# Patient Record
Sex: FEMALE | Race: BLACK OR AFRICAN AMERICAN | NOT HISPANIC OR LATINO | Employment: OTHER | URBAN - METROPOLITAN AREA
[De-identification: names, ages, dates, MRNs, and addresses within clinical notes are randomized per-mention and may not be internally consistent; named-entity substitution may affect disease eponyms.]

---

## 2023-02-22 LAB
ALANINE AMINOTRANSFERASE (SGPT) (U/L) IN SER/PLAS: 16 U/L (ref 7–45)
ALBUMIN (G/DL) IN SER/PLAS: 3.5 G/DL (ref 3.4–5)
ALKALINE PHOSPHATASE (U/L) IN SER/PLAS: 73 U/L (ref 33–136)
ANION GAP IN SER/PLAS: 14 MMOL/L (ref 10–20)
ASPARTATE AMINOTRANSFERASE (SGOT) (U/L) IN SER/PLAS: 20 U/L (ref 9–39)
BILIRUBIN TOTAL (MG/DL) IN SER/PLAS: 0.4 MG/DL (ref 0–1.2)
CALCIDIOL (25 OH VITAMIN D3) (NG/ML) IN SER/PLAS: 40 NG/ML
CALCIUM (MG/DL) IN SER/PLAS: 9.2 MG/DL (ref 8.6–10.6)
CARBON DIOXIDE, TOTAL (MMOL/L) IN SER/PLAS: 26 MMOL/L (ref 21–32)
CHLORIDE (MMOL/L) IN SER/PLAS: 105 MMOL/L (ref 98–107)
CHOLESTEROL (MG/DL) IN SER/PLAS: 183 MG/DL (ref 0–199)
CHOLESTEROL IN HDL (MG/DL) IN SER/PLAS: 57.1 MG/DL
CHOLESTEROL/HDL RATIO: 3.2
CREATININE (MG/DL) IN SER/PLAS: 1.61 MG/DL (ref 0.5–1.05)
ERYTHROCYTE DISTRIBUTION WIDTH (RATIO) BY AUTOMATED COUNT: 12.9 % (ref 11.5–14.5)
ERYTHROCYTE MEAN CORPUSCULAR HEMOGLOBIN CONCENTRATION (G/DL) BY AUTOMATED: 30 G/DL (ref 32–36)
ERYTHROCYTE MEAN CORPUSCULAR VOLUME (FL) BY AUTOMATED COUNT: 102 FL (ref 80–100)
ERYTHROCYTES (10*6/UL) IN BLOOD BY AUTOMATED COUNT: 3.24 X10E12/L (ref 4–5.2)
ESTIMATED AVERAGE GLUCOSE FOR HBA1C: 163 MG/DL
GFR FEMALE: 30 ML/MIN/1.73M2
GLUCOSE (MG/DL) IN SER/PLAS: 211 MG/DL (ref 74–99)
HEMATOCRIT (%) IN BLOOD BY AUTOMATED COUNT: 33 % (ref 36–46)
HEMOGLOBIN (G/DL) IN BLOOD: 9.9 G/DL (ref 12–16)
HEMOGLOBIN A1C/HEMOGLOBIN TOTAL IN BLOOD: 7.3 %
LDL: 109 MG/DL (ref 0–99)
LEUKOCYTES (10*3/UL) IN BLOOD BY AUTOMATED COUNT: 7.8 X10E9/L (ref 4.4–11.3)
NRBC (PER 100 WBCS) BY AUTOMATED COUNT: 0 /100 WBC (ref 0–0)
PLATELETS (10*3/UL) IN BLOOD AUTOMATED COUNT: 261 X10E9/L (ref 150–450)
POTASSIUM (MMOL/L) IN SER/PLAS: 4.9 MMOL/L (ref 3.5–5.3)
PROTEIN TOTAL: 6.4 G/DL (ref 6.4–8.2)
SODIUM (MMOL/L) IN SER/PLAS: 140 MMOL/L (ref 136–145)
THYROTROPIN (MIU/L) IN SER/PLAS BY DETECTION LIMIT <= 0.05 MIU/L: 3.28 MIU/L (ref 0.44–3.98)
TRIGLYCERIDE (MG/DL) IN SER/PLAS: 86 MG/DL (ref 0–149)
UREA NITROGEN (MG/DL) IN SER/PLAS: 34 MG/DL (ref 6–23)
VLDL: 17 MG/DL (ref 0–40)

## 2023-05-23 PROBLEM — R26.9 GAIT DISORDER: Status: ACTIVE | Noted: 2023-05-23

## 2023-05-23 PROBLEM — M79.89 LEG SWELLING: Status: ACTIVE | Noted: 2023-05-23

## 2023-05-23 PROBLEM — D64.9 ANEMIA: Status: ACTIVE | Noted: 2023-05-23

## 2023-05-23 PROBLEM — R01.1 HEART MURMUR: Status: ACTIVE | Noted: 2023-05-23

## 2023-05-23 PROBLEM — R31.9 HEMATURIA: Status: ACTIVE | Noted: 2023-05-23

## 2023-05-23 PROBLEM — K21.9 GERD (GASTROESOPHAGEAL REFLUX DISEASE): Status: ACTIVE | Noted: 2023-05-23

## 2023-05-23 PROBLEM — E11.649 DIABETIC HYPOGLYCEMIA (MULTI): Status: ACTIVE | Noted: 2023-05-23

## 2023-05-23 PROBLEM — I10 ESSENTIAL HYPERTENSION: Status: ACTIVE | Noted: 2023-05-23

## 2023-05-23 PROBLEM — N39.0 ACUTE UTI: Status: ACTIVE | Noted: 2023-05-23

## 2023-05-23 PROBLEM — M48.061 LUMBAR SPINAL STENOSIS: Status: ACTIVE | Noted: 2023-05-23

## 2023-05-23 PROBLEM — R30.0 BURNING WITH URINATION: Status: ACTIVE | Noted: 2023-05-23

## 2023-05-23 PROBLEM — E78.5 HYPERLIPIDEMIA: Status: ACTIVE | Noted: 2023-05-23

## 2023-05-23 PROBLEM — E11.9 DIABETES MELLITUS (MULTI): Status: ACTIVE | Noted: 2023-05-23

## 2023-05-23 PROBLEM — L60.2 LONG TOENAIL: Status: ACTIVE | Noted: 2023-05-23

## 2023-05-23 LAB
ALANINE AMINOTRANSFERASE (SGPT) (U/L) IN SER/PLAS: 13 U/L (ref 7–45)
ALBUMIN (G/DL) IN SER/PLAS: 3.3 G/DL (ref 3.4–5)
ALKALINE PHOSPHATASE (U/L) IN SER/PLAS: 60 U/L (ref 33–136)
ANION GAP IN SER/PLAS: 14 MMOL/L (ref 10–20)
ASPARTATE AMINOTRANSFERASE (SGOT) (U/L) IN SER/PLAS: 19 U/L (ref 9–39)
BILIRUBIN TOTAL (MG/DL) IN SER/PLAS: 0.7 MG/DL (ref 0–1.2)
CALCIUM (MG/DL) IN SER/PLAS: 9 MG/DL (ref 8.6–10.6)
CARBON DIOXIDE, TOTAL (MMOL/L) IN SER/PLAS: 26 MMOL/L (ref 21–32)
CHLORIDE (MMOL/L) IN SER/PLAS: 101 MMOL/L (ref 98–107)
CREATININE (MG/DL) IN SER/PLAS: 1.57 MG/DL (ref 0.5–1.05)
ERYTHROCYTE DISTRIBUTION WIDTH (RATIO) BY AUTOMATED COUNT: 12.5 % (ref 11.5–14.5)
ERYTHROCYTE MEAN CORPUSCULAR HEMOGLOBIN CONCENTRATION (G/DL) BY AUTOMATED: 29.7 G/DL (ref 32–36)
ERYTHROCYTE MEAN CORPUSCULAR VOLUME (FL) BY AUTOMATED COUNT: 102 FL (ref 80–100)
ERYTHROCYTES (10*6/UL) IN BLOOD BY AUTOMATED COUNT: 3.12 X10E12/L (ref 4–5.2)
ESTIMATED AVERAGE GLUCOSE FOR HBA1C: 151 MG/DL
GFR FEMALE: 31 ML/MIN/1.73M2
GLUCOSE (MG/DL) IN SER/PLAS: 101 MG/DL (ref 74–99)
HEMATOCRIT (%) IN BLOOD BY AUTOMATED COUNT: 31.7 % (ref 36–46)
HEMOGLOBIN (G/DL) IN BLOOD: 9.4 G/DL (ref 12–16)
HEMOGLOBIN A1C/HEMOGLOBIN TOTAL IN BLOOD: 6.9 %
LEUKOCYTES (10*3/UL) IN BLOOD BY AUTOMATED COUNT: 7.3 X10E9/L (ref 4.4–11.3)
NRBC (PER 100 WBCS) BY AUTOMATED COUNT: 0 /100 WBC (ref 0–0)
PLATELETS (10*3/UL) IN BLOOD AUTOMATED COUNT: 225 X10E9/L (ref 150–450)
POTASSIUM (MMOL/L) IN SER/PLAS: 4.8 MMOL/L (ref 3.5–5.3)
PROTEIN TOTAL: 6.3 G/DL (ref 6.4–8.2)
SODIUM (MMOL/L) IN SER/PLAS: 136 MMOL/L (ref 136–145)
UREA NITROGEN (MG/DL) IN SER/PLAS: 31 MG/DL (ref 6–23)

## 2023-05-25 ENCOUNTER — OFFICE VISIT (OUTPATIENT)
Dept: PRIMARY CARE | Facility: CLINIC | Age: 88
End: 2023-05-25
Payer: MEDICARE

## 2023-05-25 VITALS
DIASTOLIC BLOOD PRESSURE: 82 MMHG | HEIGHT: 64 IN | HEART RATE: 53 BPM | OXYGEN SATURATION: 98 % | RESPIRATION RATE: 12 BRPM | BODY MASS INDEX: 33.97 KG/M2 | WEIGHT: 199 LBS | SYSTOLIC BLOOD PRESSURE: 136 MMHG

## 2023-05-25 DIAGNOSIS — I10 ESSENTIAL HYPERTENSION: Primary | ICD-10-CM

## 2023-05-25 DIAGNOSIS — E11.69 TYPE 2 DIABETES MELLITUS WITH OTHER SPECIFIED COMPLICATION, UNSPECIFIED WHETHER LONG TERM INSULIN USE (MULTI): ICD-10-CM

## 2023-05-25 DIAGNOSIS — E78.5 HYPERLIPIDEMIA, UNSPECIFIED HYPERLIPIDEMIA TYPE: ICD-10-CM

## 2023-05-25 PROBLEM — H43.819 POSTERIOR VITREOUS DETACHMENT: Status: ACTIVE | Noted: 2023-04-12

## 2023-05-25 PROBLEM — H02.409 PTOSIS: Status: ACTIVE | Noted: 2023-04-12

## 2023-05-25 PROBLEM — G62.9 NEUROPATHY: Status: ACTIVE | Noted: 2023-05-25

## 2023-05-25 PROBLEM — E11.21 DIABETIC NEPHROPATHY ASSOCIATED WITH TYPE 2 DIABETES MELLITUS (MULTI): Status: ACTIVE | Noted: 2018-12-18

## 2023-05-25 PROBLEM — I89.0 LYMPHEDEMA: Status: ACTIVE | Noted: 2023-05-25

## 2023-05-25 PROBLEM — M19.90 OA (OSTEOARTHRITIS): Status: ACTIVE | Noted: 2023-05-25

## 2023-05-25 PROBLEM — N18.30 CHRONIC KIDNEY DISEASE, STAGE 3, MOD DECREASED GFR (MULTI): Status: ACTIVE | Noted: 2018-09-18

## 2023-05-25 PROBLEM — H40.1131 PRIMARY OPEN ANGLE GLAUCOMA OF BOTH EYES, MILD STAGE: Status: ACTIVE | Noted: 2023-04-12

## 2023-05-25 PROBLEM — H35.373 EPIRETINAL MEMBRANE (ERM) OF BOTH EYES: Status: ACTIVE | Noted: 2023-04-12

## 2023-05-25 PROBLEM — E11.9 DM (DIABETES MELLITUS) (MULTI): Status: ACTIVE | Noted: 2023-04-12

## 2023-05-25 PROBLEM — E66.9 OBESITY, CLASS II, BMI 35-39.9: Status: ACTIVE | Noted: 2019-08-27

## 2023-05-25 PROBLEM — E66.812 OBESITY, CLASS II, BMI 35-39.9: Status: ACTIVE | Noted: 2019-08-27

## 2023-05-25 PROBLEM — D12.6 TUBULAR ADENOMA OF COLON: Status: ACTIVE | Noted: 2023-05-25

## 2023-05-25 PROBLEM — K21.9 GASTROESOPHAGEAL REFLUX DISEASE WITHOUT ESOPHAGITIS: Status: ACTIVE | Noted: 2023-05-25

## 2023-05-25 PROBLEM — H25.013 CORTICAL SENILE CATARACT OF BOTH EYES: Status: ACTIVE | Noted: 2023-04-12

## 2023-05-25 LAB — POC FINGERSTICK BLOOD GLUCOSE: 148 MG/DL (ref 70–100)

## 2023-05-25 PROCEDURE — 82962 GLUCOSE BLOOD TEST: CPT | Performed by: INTERNAL MEDICINE

## 2023-05-25 PROCEDURE — 1036F TOBACCO NON-USER: CPT | Performed by: INTERNAL MEDICINE

## 2023-05-25 PROCEDURE — 1159F MED LIST DOCD IN RCRD: CPT | Performed by: INTERNAL MEDICINE

## 2023-05-25 PROCEDURE — 3079F DIAST BP 80-89 MM HG: CPT | Performed by: INTERNAL MEDICINE

## 2023-05-25 PROCEDURE — 3075F SYST BP GE 130 - 139MM HG: CPT | Performed by: INTERNAL MEDICINE

## 2023-05-25 PROCEDURE — 99214 OFFICE O/P EST MOD 30 MIN: CPT | Performed by: INTERNAL MEDICINE

## 2023-05-25 RX ORDER — LATANOPROST 50 UG/ML
1 SOLUTION/ DROPS OPHTHALMIC NIGHTLY
COMMUNITY
Start: 2019-11-11

## 2023-05-25 RX ORDER — AMLODIPINE BESYLATE 10 MG/1
10 TABLET ORAL
COMMUNITY
Start: 2019-08-27 | End: 2023-09-05 | Stop reason: ALTCHOICE

## 2023-05-25 RX ORDER — BRIMONIDINE TARTRATE 1 MG/ML
1 SOLUTION/ DROPS OPHTHALMIC 2 TIMES DAILY
COMMUNITY
Start: 2018-11-08

## 2023-05-25 RX ORDER — SYRINGE,SAFETY WITH NEEDLE,1ML 25GX1"
1 SYRINGE (EA) MISCELLANEOUS 2 TIMES DAILY
Qty: 100 EACH | Refills: 3 | Status: SHIPPED | OUTPATIENT
Start: 2023-05-25 | End: 2023-10-10 | Stop reason: SDUPTHER

## 2023-05-25 RX ORDER — CHOLECALCIFEROL (VITAMIN D3) 50 MCG
2000 TABLET ORAL
COMMUNITY
Start: 2013-07-09 | End: 2024-03-11 | Stop reason: ALTCHOICE

## 2023-05-25 RX ORDER — INSULIN HUMAN 100 [IU]/ML
35 INJECTION, SUSPENSION SUBCUTANEOUS
COMMUNITY
Start: 2021-08-05 | End: 2023-10-04 | Stop reason: ALTCHOICE

## 2023-05-25 RX ORDER — METOPROLOL SUCCINATE 50 MG/1
50 TABLET, EXTENDED RELEASE ORAL DAILY
COMMUNITY
Start: 2022-02-18 | End: 2023-09-05 | Stop reason: ALTCHOICE

## 2023-05-25 RX ORDER — LOSARTAN POTASSIUM 50 MG/1
50 TABLET ORAL DAILY
COMMUNITY
Start: 2023-05-09 | End: 2023-09-05 | Stop reason: ALTCHOICE

## 2023-05-25 RX ORDER — TIMOLOL MALEATE 5 MG/ML
1 SOLUTION/ DROPS OPHTHALMIC DAILY
COMMUNITY
Start: 2018-11-08

## 2023-05-25 RX ORDER — ATORVASTATIN CALCIUM 10 MG/1
1 TABLET, FILM COATED ORAL DAILY
COMMUNITY
Start: 2018-11-01 | End: 2023-08-03

## 2023-05-25 RX ORDER — PEN NEEDLE, DIABETIC 29 G X1/2"
NEEDLE, DISPOSABLE MISCELLANEOUS
COMMUNITY
Start: 2022-12-02 | End: 2023-05-25 | Stop reason: SDUPTHER

## 2023-05-25 RX ORDER — ASPIRIN 81 MG/1
81 TABLET ORAL
COMMUNITY

## 2023-05-25 NOTE — PROGRESS NOTES
"Subjective   Chief complaint: Carolann Cartagena is a 89 y.o. female who presents for Follow-up (Pt is here today to go over her blood work results. ).    HPI:  Patient presents for blood work follow up. She has no acute concerns.        Objective   /82   Pulse 53   Resp 12   Ht 1.626 m (5' 4\")   Wt 90.3 kg (199 lb)   SpO2 98%   BMI 34.16 kg/m²   Physical Exam  Vitals reviewed.   Constitutional:       Appearance: Normal appearance.   HENT:      Head: Normocephalic and atraumatic.   Cardiovascular:      Rate and Rhythm: Normal rate and regular rhythm.   Pulmonary:      Effort: Pulmonary effort is normal.      Breath sounds: Normal breath sounds.   Abdominal:      General: Bowel sounds are normal.      Palpations: Abdomen is soft.   Musculoskeletal:      Cervical back: Neck supple.   Skin:     General: Skin is warm and dry.   Neurological:      General: No focal deficit present.      Mental Status: She is alert.   Psychiatric:         Mood and Affect: Mood normal.         Behavior: Behavior is cooperative.         I have reviewed and reconciled the medication list with the patient today.   Current Outpatient Medications:     Alphagan P 0.1 % ophthalmic solution, Administer 1 drop into both eyes twice a day., Disp: , Rfl:     amLODIPine (Norvasc) 10 mg tablet, Take 1 tablet (10 mg) by mouth once daily., Disp: , Rfl:     aspirin 81 mg EC tablet, Take 1 tablet (81 mg) by mouth once daily., Disp: , Rfl:     atorvastatin (Lipitor) 10 mg tablet, Take 1 tablet (10 mg) by mouth once daily., Disp: , Rfl:     BD Insulin Syringe Ultra-Fine 1 mL 31 gauge x 5/16 syringe, , Disp: , Rfl:     cholecalciferol (Vitamin D-3) 50 MCG (2000 UT) tablet, Take 1 tablet (2,000 Units) by mouth once daily., Disp: , Rfl:     HumuLIN N NPH U-100 Insulin 100 unit/mL injection, Inject 35 Units under the skin 2 times a day before meals., Disp: , Rfl:     latanoprost (Xalatan) 0.005 % ophthalmic solution, 1 drop once daily., Disp: , Rfl:     " losartan (Cozaar) 50 mg tablet, Take 1 tablet (50 mg) by mouth once daily., Disp: , Rfl:     metoprolol succinate XL (Toprol-XL) 50 mg 24 hr tablet, Take 1 tablet (50 mg) by mouth once daily., Disp: , Rfl:     timolol (Timoptic) 0.5 % ophthalmic solution, 1 drop., Disp: , Rfl:      Imaging:  No results found.     Labs reviewed:    Lab Results   Component Value Date    WBC 7.3 05/23/2023    HGB 9.4 (L) 05/23/2023    HCT 31.7 (L) 05/23/2023     05/23/2023    CHOL 183 02/22/2023    TRIG 86 02/22/2023    HDL 57.1 02/22/2023    ALT 13 05/23/2023    AST 19 05/23/2023     05/23/2023    K 4.8 05/23/2023     05/23/2023    CREATININE 1.57 (H) 05/23/2023    BUN 31 (H) 05/23/2023    CO2 26 05/23/2023    TSH 3.28 02/22/2023    HGBA1C 6.9 (A) 05/23/2023       Assessment/Plan   Problem List Items Addressed This Visit       Diabetes mellitus (CMS/[facility] [place])     Continue humulin         Relevant Orders    POCT fingerstick glucose manually resulted (Completed)    Essential hypertension - Primary     Continue losartan, amlodipine, metoprolol         Hyperlipidemia     Continue atorvastatin            Continue current medications as listed  Follow up in 3 months

## 2023-08-02 DIAGNOSIS — E78.5 HYPERLIPIDEMIA, UNSPECIFIED HYPERLIPIDEMIA TYPE: ICD-10-CM

## 2023-08-03 RX ORDER — ATORVASTATIN CALCIUM 10 MG/1
10 TABLET, FILM COATED ORAL DAILY
Qty: 90 TABLET | Refills: 3 | Status: SHIPPED | OUTPATIENT
Start: 2023-08-03 | End: 2023-10-25 | Stop reason: SDUPTHER

## 2023-08-09 RX ORDER — LANCETS 33 GAUGE
EACH MISCELLANEOUS
COMMUNITY
Start: 2023-05-09 | End: 2023-10-12 | Stop reason: SDUPTHER

## 2023-08-09 RX ORDER — BLOOD SUGAR DIAGNOSTIC
1 STRIP MISCELLANEOUS 3 TIMES DAILY
COMMUNITY
Start: 2019-10-11 | End: 2023-10-12 | Stop reason: SDUPTHER

## 2023-08-28 ENCOUNTER — CLINICAL SUPPORT (OUTPATIENT)
Dept: PRIMARY CARE | Facility: CLINIC | Age: 88
End: 2023-08-28
Payer: MEDICARE

## 2023-08-28 DIAGNOSIS — I10 ESSENTIAL HYPERTENSION: ICD-10-CM

## 2023-08-28 DIAGNOSIS — E11.69 TYPE 2 DIABETES MELLITUS WITH OTHER SPECIFIED COMPLICATION, UNSPECIFIED WHETHER LONG TERM INSULIN USE (MULTI): ICD-10-CM

## 2023-08-28 DIAGNOSIS — I10 BENIGN ESSENTIAL HYPERTENSION: ICD-10-CM

## 2023-08-28 DIAGNOSIS — E03.9 HYPOTHYROIDISM, UNSPECIFIED TYPE: ICD-10-CM

## 2023-08-28 DIAGNOSIS — D50.9 IRON DEFICIENCY ANEMIA, UNSPECIFIED IRON DEFICIENCY ANEMIA TYPE: ICD-10-CM

## 2023-08-28 LAB
ALANINE AMINOTRANSFERASE (SGPT) (U/L) IN SER/PLAS: 13 U/L (ref 7–45)
ALBUMIN (G/DL) IN SER/PLAS: 3.4 G/DL (ref 3.4–5)
ALKALINE PHOSPHATASE (U/L) IN SER/PLAS: 64 U/L (ref 33–136)
ANION GAP IN SER/PLAS: 15 MMOL/L (ref 10–20)
ASPARTATE AMINOTRANSFERASE (SGOT) (U/L) IN SER/PLAS: 20 U/L (ref 9–39)
BILIRUBIN TOTAL (MG/DL) IN SER/PLAS: 0.5 MG/DL (ref 0–1.2)
CALCIUM (MG/DL) IN SER/PLAS: 9.1 MG/DL (ref 8.6–10.6)
CARBON DIOXIDE, TOTAL (MMOL/L) IN SER/PLAS: 24 MMOL/L (ref 21–32)
CHLORIDE (MMOL/L) IN SER/PLAS: 106 MMOL/L (ref 98–107)
CREATININE (MG/DL) IN SER/PLAS: 1.92 MG/DL (ref 0.5–1.05)
ERYTHROCYTE DISTRIBUTION WIDTH (RATIO) BY AUTOMATED COUNT: 12.8 % (ref 11.5–14.5)
ERYTHROCYTE MEAN CORPUSCULAR HEMOGLOBIN CONCENTRATION (G/DL) BY AUTOMATED: 31 G/DL (ref 32–36)
ERYTHROCYTE MEAN CORPUSCULAR VOLUME (FL) BY AUTOMATED COUNT: 102 FL (ref 80–100)
ERYTHROCYTES (10*6/UL) IN BLOOD BY AUTOMATED COUNT: 3.12 X10E12/L (ref 4–5.2)
GFR FEMALE: 24 ML/MIN/1.73M2
GLUCOSE (MG/DL) IN SER/PLAS: 81 MG/DL (ref 74–99)
HEMATOCRIT (%) IN BLOOD BY AUTOMATED COUNT: 31.9 % (ref 36–46)
HEMOGLOBIN (G/DL) IN BLOOD: 9.9 G/DL (ref 12–16)
LEUKOCYTES (10*3/UL) IN BLOOD BY AUTOMATED COUNT: 7.1 X10E9/L (ref 4.4–11.3)
NRBC (PER 100 WBCS) BY AUTOMATED COUNT: 0 /100 WBC (ref 0–0)
PLATELETS (10*3/UL) IN BLOOD AUTOMATED COUNT: 269 X10E9/L (ref 150–450)
POTASSIUM (MMOL/L) IN SER/PLAS: 4.6 MMOL/L (ref 3.5–5.3)
PROTEIN TOTAL: 6.4 G/DL (ref 6.4–8.2)
SODIUM (MMOL/L) IN SER/PLAS: 140 MMOL/L (ref 136–145)
UREA NITROGEN (MG/DL) IN SER/PLAS: 33 MG/DL (ref 6–23)

## 2023-08-28 PROCEDURE — 83036 HEMOGLOBIN GLYCOSYLATED A1C: CPT

## 2023-08-28 PROCEDURE — 85027 COMPLETE CBC AUTOMATED: CPT

## 2023-08-28 PROCEDURE — 80053 COMPREHEN METABOLIC PANEL: CPT

## 2023-08-29 LAB
ESTIMATED AVERAGE GLUCOSE FOR HBA1C: 154 MG/DL
HEMOGLOBIN A1C/HEMOGLOBIN TOTAL IN BLOOD: 7 %

## 2023-08-30 ENCOUNTER — OFFICE VISIT (OUTPATIENT)
Dept: PRIMARY CARE | Facility: CLINIC | Age: 88
End: 2023-08-30
Payer: MEDICARE

## 2023-08-30 VITALS
SYSTOLIC BLOOD PRESSURE: 142 MMHG | WEIGHT: 196 LBS | OXYGEN SATURATION: 98 % | HEART RATE: 69 BPM | BODY MASS INDEX: 33.64 KG/M2 | RESPIRATION RATE: 12 BRPM | DIASTOLIC BLOOD PRESSURE: 68 MMHG

## 2023-08-30 DIAGNOSIS — I10 ESSENTIAL HYPERTENSION: ICD-10-CM

## 2023-08-30 DIAGNOSIS — I89.0 LYMPHEDEMA: ICD-10-CM

## 2023-08-30 DIAGNOSIS — E78.5 HYPERLIPIDEMIA, UNSPECIFIED HYPERLIPIDEMIA TYPE: Primary | ICD-10-CM

## 2023-08-30 DIAGNOSIS — E11.22 TYPE 2 DIABETES MELLITUS WITH CHRONIC KIDNEY DISEASE, WITH LONG-TERM CURRENT USE OF INSULIN, UNSPECIFIED CKD STAGE (MULTI): ICD-10-CM

## 2023-08-30 DIAGNOSIS — N18.30 STAGE 3 CHRONIC KIDNEY DISEASE, UNSPECIFIED WHETHER STAGE 3A OR 3B CKD (MULTI): ICD-10-CM

## 2023-08-30 DIAGNOSIS — D64.9 ANEMIA, UNSPECIFIED TYPE: ICD-10-CM

## 2023-08-30 DIAGNOSIS — Z79.4 TYPE 2 DIABETES MELLITUS WITH CHRONIC KIDNEY DISEASE, WITH LONG-TERM CURRENT USE OF INSULIN, UNSPECIFIED CKD STAGE (MULTI): ICD-10-CM

## 2023-08-30 LAB — POC FINGERSTICK BLOOD GLUCOSE: 220 MG/DL (ref 70–100)

## 2023-08-30 PROCEDURE — 99214 OFFICE O/P EST MOD 30 MIN: CPT | Performed by: INTERNAL MEDICINE

## 2023-08-30 PROCEDURE — 1036F TOBACCO NON-USER: CPT | Performed by: INTERNAL MEDICINE

## 2023-08-30 PROCEDURE — 82962 GLUCOSE BLOOD TEST: CPT | Performed by: INTERNAL MEDICINE

## 2023-08-30 PROCEDURE — 1157F ADVNC CARE PLAN IN RCRD: CPT | Performed by: INTERNAL MEDICINE

## 2023-08-30 PROCEDURE — 3078F DIAST BP <80 MM HG: CPT | Performed by: INTERNAL MEDICINE

## 2023-08-30 PROCEDURE — 3077F SYST BP >= 140 MM HG: CPT | Performed by: INTERNAL MEDICINE

## 2023-08-30 PROCEDURE — 1159F MED LIST DOCD IN RCRD: CPT | Performed by: INTERNAL MEDICINE

## 2023-08-30 RX ORDER — HYDRALAZINE HYDROCHLORIDE 25 MG/1
25 TABLET, FILM COATED ORAL 2 TIMES DAILY
Qty: 14 TABLET | Refills: 0 | Status: SHIPPED | OUTPATIENT
Start: 2023-08-30 | End: 2023-09-05 | Stop reason: SDUPTHER

## 2023-08-30 ASSESSMENT — ENCOUNTER SYMPTOMS
GASTROINTESTINAL NEGATIVE: 1
EYES NEGATIVE: 1
NEUROLOGICAL NEGATIVE: 1
MUSCULOSKELETAL NEGATIVE: 1
RESPIRATORY NEGATIVE: 1
HEMATOLOGIC/LYMPHATIC NEGATIVE: 1
CONSTITUTIONAL NEGATIVE: 1
ALLERGIC/IMMUNOLOGIC NEGATIVE: 1
ENDOCRINE NEGATIVE: 1
PSYCHIATRIC NEGATIVE: 1

## 2023-08-30 NOTE — PROGRESS NOTES
Subjective   Reason for Visit: Carolann Cartagena is an 89 y.o. female here for a Medicare Wellness visit.     Past Medical, Surgical, and Family History reviewed and updated in chart.    Reviewed all medications by prescribing practitioner or clinical pharmacist (such as prescriptions, OTCs, herbal therapies and supplements) and documented in the medical record.    HPI  Pt is here for blood work follow up. No acute concerns   Patient Care Team:  Josh Rosa MD as PCP - General  Josh Rosa MD as PCP - [employer] PCP     Review of Systems   Constitutional: Negative.    HENT: Negative.     Eyes: Negative.    Respiratory: Negative.     Cardiovascular:  Positive for leg swelling.   Gastrointestinal: Negative.    Endocrine: Negative.    Genitourinary: Negative.    Musculoskeletal: Negative.    Skin: Negative.    Allergic/Immunologic: Negative.    Neurological: Negative.    Hematological: Negative.    Psychiatric/Behavioral: Negative.     All other systems reviewed and are negative.      Objective   Vitals:  /68   Pulse 69   Resp 12   Wt 88.9 kg (196 lb)   SpO2 98%   BMI 33.64 kg/m²       Physical Exam  Vitals reviewed.   Constitutional:       Appearance: Normal appearance.   HENT:      Head: Normocephalic and atraumatic.   Cardiovascular:      Rate and Rhythm: Normal rate and regular rhythm.      Pulses: Normal pulses.      Heart sounds: Normal heart sounds.   Pulmonary:      Effort: Pulmonary effort is normal.      Breath sounds: Normal breath sounds.   Musculoskeletal:         General: Normal range of motion.      Cervical back: Normal range of motion and neck supple.      Right lower leg: Edema present.      Left lower leg: Edema present.   Skin:     General: Skin is warm and dry.   Neurological:      General: No focal deficit present.      Mental Status: She is alert.   Psychiatric:         Mood and Affect: Mood normal.         Behavior: Behavior normal.         Assessment/Plan   Problem  List Items Addressed This Visit          Cardiac and Vasculature    Essential hypertension    Current Assessment & Plan     Discontinue losartan, start hydralazine 25mg BID continue amlodipine. Monitor BP at home           Hyperlipidemia - Primary    Current Assessment & Plan     Low fat diet watch salt intake             Endocrine/Metabolic    Diabetes mellitus (CMS/[facility])    Current Assessment & Plan     Sugar uncontrolled. Continue humulin 40 units am and pm but add 5 units before lunch time. Monitor A1c and sugar next visit   Check CMP next visit          Relevant Orders    POCT fingerstick glucose manually resulted (Completed)       Genitourinary and Reproductive    Chronic kidney disease, stage 3, mod decreased GFR (CMS/[facility])    Current Assessment & Plan     GFR 24, creatinine 1.92  Discontinue losartan and started pt on hydralazine 25 mg instead. Continue amlodipine recheck BMP next visit              Hematology and Neoplasia    Anemia       Symptoms and Signs    Lymphedema   7 day supply of hydralazine ordered until mail in order arrives. Repeat BMP and A1c scheduled for next visit. Start 5 units of humulin before lunch,. Monitor BP at home

## 2023-08-30 NOTE — ASSESSMENT & PLAN NOTE
GFR 24, creatinine 1.92  Discontinue losartan and started pt on hydralazine 25 mg instead. Continue amlodipine recheck BMP next visit

## 2023-08-30 NOTE — ASSESSMENT & PLAN NOTE
Sugar uncontrolled. Continue humulin 40 units am and pm but add 5 units before lunch time. Monitor A1c and sugar next visit   Check CMP next visit

## 2023-09-05 ENCOUNTER — OFFICE VISIT (OUTPATIENT)
Dept: PRIMARY CARE | Facility: CLINIC | Age: 88
End: 2023-09-05
Payer: MEDICARE

## 2023-09-05 VITALS
DIASTOLIC BLOOD PRESSURE: 82 MMHG | WEIGHT: 179 LBS | HEART RATE: 84 BPM | OXYGEN SATURATION: 99 % | RESPIRATION RATE: 14 BRPM | BODY MASS INDEX: 30.56 KG/M2 | HEIGHT: 64 IN | SYSTOLIC BLOOD PRESSURE: 258 MMHG

## 2023-09-05 DIAGNOSIS — N18.30 STAGE 3 CHRONIC KIDNEY DISEASE, UNSPECIFIED WHETHER STAGE 3A OR 3B CKD (MULTI): ICD-10-CM

## 2023-09-05 DIAGNOSIS — I89.0 LYMPHEDEMA: ICD-10-CM

## 2023-09-05 DIAGNOSIS — E13.69 OTHER SPECIFIED DIABETES MELLITUS WITH OTHER SPECIFIED COMPLICATION, UNSPECIFIED WHETHER LONG TERM INSULIN USE (MULTI): ICD-10-CM

## 2023-09-05 DIAGNOSIS — E11.22 TYPE 2 DIABETES MELLITUS WITH CHRONIC KIDNEY DISEASE, WITH LONG-TERM CURRENT USE OF INSULIN, UNSPECIFIED CKD STAGE (MULTI): ICD-10-CM

## 2023-09-05 DIAGNOSIS — D64.9 ANEMIA, UNSPECIFIED TYPE: ICD-10-CM

## 2023-09-05 DIAGNOSIS — I10 ESSENTIAL HYPERTENSION: ICD-10-CM

## 2023-09-05 DIAGNOSIS — Z79.4 TYPE 2 DIABETES MELLITUS WITH CHRONIC KIDNEY DISEASE, WITH LONG-TERM CURRENT USE OF INSULIN, UNSPECIFIED CKD STAGE (MULTI): ICD-10-CM

## 2023-09-05 DIAGNOSIS — E78.5 HYPERLIPIDEMIA, UNSPECIFIED HYPERLIPIDEMIA TYPE: ICD-10-CM

## 2023-09-05 LAB — POC FINGERSTICK BLOOD GLUCOSE: 155 MG/DL (ref 70–100)

## 2023-09-05 PROCEDURE — 82962 GLUCOSE BLOOD TEST: CPT | Performed by: INTERNAL MEDICINE

## 2023-09-05 PROCEDURE — 1159F MED LIST DOCD IN RCRD: CPT | Performed by: INTERNAL MEDICINE

## 2023-09-05 PROCEDURE — 1036F TOBACCO NON-USER: CPT | Performed by: INTERNAL MEDICINE

## 2023-09-05 PROCEDURE — 3079F DIAST BP 80-89 MM HG: CPT | Performed by: INTERNAL MEDICINE

## 2023-09-05 PROCEDURE — 1125F AMNT PAIN NOTED PAIN PRSNT: CPT | Performed by: INTERNAL MEDICINE

## 2023-09-05 PROCEDURE — 1157F ADVNC CARE PLAN IN RCRD: CPT | Performed by: INTERNAL MEDICINE

## 2023-09-05 PROCEDURE — 3077F SYST BP >= 140 MM HG: CPT | Performed by: INTERNAL MEDICINE

## 2023-09-05 PROCEDURE — 99213 OFFICE O/P EST LOW 20 MIN: CPT | Performed by: INTERNAL MEDICINE

## 2023-09-05 RX ORDER — METOPROLOL SUCCINATE 50 MG/1
50 TABLET, EXTENDED RELEASE ORAL DAILY
Qty: 90 TABLET | Refills: 3 | Status: SHIPPED | OUTPATIENT
Start: 2023-09-05 | End: 2023-09-12 | Stop reason: SDUPTHER

## 2023-09-05 RX ORDER — HYDRALAZINE HYDROCHLORIDE 25 MG/1
25 TABLET, FILM COATED ORAL 2 TIMES DAILY
Qty: 180 TABLET | Refills: 3 | Status: SHIPPED | OUTPATIENT
Start: 2023-09-05 | End: 2023-09-12 | Stop reason: SDUPTHER

## 2023-09-05 ASSESSMENT — PAIN SCALES - GENERAL: PAINLEVEL: 2

## 2023-09-05 NOTE — PROGRESS NOTES
"Subjective   Chief complaint: Carolann Cartagena is a 89 y.o. female who presents for hydralazine side effects  and Palpitations.    HPI:  Patient did not take her medication appropriately she supposed to be taking metoprolol hydralazine she thought only she should be taking hydralazine so her blood pressure went up and she becomes tachycardic.        Objective   BP (!) 258/82 (BP Location: Left arm, Patient Position: Sitting, BP Cuff Size: Large adult)   Pulse 84   Resp 14   Ht 1.626 m (5' 4\")   Wt 81.2 kg (179 lb)   SpO2 99%   BMI 30.73 kg/m²   Physical Exam  Vitals reviewed.   Constitutional:       Appearance: Normal appearance.   HENT:      Head: Normocephalic and atraumatic.   Cardiovascular:      Rate and Rhythm: Normal rate and regular rhythm.   Pulmonary:      Effort: Pulmonary effort is normal.      Breath sounds: Normal breath sounds.   Abdominal:      General: Bowel sounds are normal.      Palpations: Abdomen is soft.   Musculoskeletal:      Cervical back: Neck supple.   Skin:     General: Skin is warm and dry.   Neurological:      General: No focal deficit present.      Mental Status: She is alert.   Psychiatric:         Mood and Affect: Mood normal.         Behavior: Behavior is cooperative.         I have reviewed and reconciled the medication list with the patient today.   Current Outpatient Medications:     Alphagan P 0.1 % ophthalmic solution, Administer 1 drop into both eyes twice a day., Disp: , Rfl:     aspirin 81 mg EC tablet, Take 1 tablet (81 mg) by mouth once daily., Disp: , Rfl:     atorvastatin (Lipitor) 10 mg tablet, TAKE 1 TABLET DAILY, Disp: 90 tablet, Rfl: 3    cholecalciferol (Vitamin D-3) 50 MCG (2000 UT) tablet, Take 1 tablet (2,000 Units) by mouth once daily., Disp: , Rfl:     HumuLIN N NPH U-100 Insulin 100 unit/mL injection, Inject 35 Units under the skin 2 times a day before meals., Disp: , Rfl:     hydrALAZINE (Apresoline) 25 mg tablet, Take 1 tablet (25 mg) by mouth 2 times a " "day for 7 days., Disp: 14 tablet, Rfl: 0    insulin syringe-needle U-100 (BD Insulin Syringe Ultra-Fine) 31G X 5/16\" 1 mL syringe, Inject 1 each under the skin 2 times a day. Use as instructed, Disp: 100 each, Rfl: 3    latanoprost (Xalatan) 0.005 % ophthalmic solution, 1 drop once daily., Disp: , Rfl:     OneTouch Delica Plus Lancet 30 gauge misc, , Disp: , Rfl:     OneTouch Ultra Test strip, 1 strip 3 times a day., Disp: , Rfl:     timolol (Timoptic) 0.5 % ophthalmic solution, 1 drop., Disp: , Rfl:      Imaging:  No results found.     Labs reviewed:    Lab Results   Component Value Date    WBC 7.1 08/28/2023    HGB 9.9 (L) 08/28/2023    HCT 31.9 (L) 08/28/2023     08/28/2023    CHOL 183 02/22/2023    TRIG 86 02/22/2023    HDL 57.1 02/22/2023    ALT 13 08/28/2023    AST 20 08/28/2023     08/28/2023    K 4.6 08/28/2023     08/28/2023    CREATININE 1.92 (H) 08/28/2023    BUN 33 (H) 08/28/2023    CO2 24 08/28/2023    TSH 3.28 02/22/2023    HGBA1C 7.0 (A) 08/28/2023       Assessment/Plan   Problem List Items Addressed This Visit       Anemia    Diabetes mellitus (CMS/[facility])    Relevant Orders    POCT Fingerstick Glucose manually resulted (Completed)    Essential hypertension    Hyperlipidemia    Chronic kidney disease, stage 3, mod decreased GFR (CMS/[facility])    Lymphedema    DM (diabetes mellitus) (CMS/[facility])    Relevant Orders    POCT Fingerstick Glucose manually resulted (Completed)   Hypertension  Restart metoprolol ER XL 50 mg daily.  Start hydralazine again 25 mg twice a day  Come back in a week to check the blood pressure.    Continue current medications as listed  Follow up in 1 week to check the blood pressure again     "

## 2023-09-06 ENCOUNTER — APPOINTMENT (OUTPATIENT)
Dept: PRIMARY CARE | Facility: CLINIC | Age: 88
End: 2023-09-06
Payer: MEDICARE

## 2023-09-08 ENCOUNTER — LAB (OUTPATIENT)
Dept: LAB | Facility: LAB | Age: 88
End: 2023-09-08
Payer: MEDICARE

## 2023-09-08 DIAGNOSIS — N18.30 STAGE 3 CHRONIC KIDNEY DISEASE, UNSPECIFIED WHETHER STAGE 3A OR 3B CKD (MULTI): ICD-10-CM

## 2023-09-08 LAB
ANION GAP IN SER/PLAS: 14 MMOL/L (ref 10–20)
CALCIUM (MG/DL) IN SER/PLAS: 9.2 MG/DL (ref 8.6–10.6)
CARBON DIOXIDE, TOTAL (MMOL/L) IN SER/PLAS: 25 MMOL/L (ref 21–32)
CHLORIDE (MMOL/L) IN SER/PLAS: 101 MMOL/L (ref 98–107)
CREATININE (MG/DL) IN SER/PLAS: 1.76 MG/DL (ref 0.5–1.05)
GFR FEMALE: 27 ML/MIN/1.73M2
GLUCOSE (MG/DL) IN SER/PLAS: 98 MG/DL (ref 74–99)
POTASSIUM (MMOL/L) IN SER/PLAS: 4.8 MMOL/L (ref 3.5–5.3)
SODIUM (MMOL/L) IN SER/PLAS: 135 MMOL/L (ref 136–145)
UREA NITROGEN (MG/DL) IN SER/PLAS: 28 MG/DL (ref 6–23)

## 2023-09-08 PROCEDURE — 80048 BASIC METABOLIC PNL TOTAL CA: CPT

## 2023-09-08 PROCEDURE — 36415 COLL VENOUS BLD VENIPUNCTURE: CPT

## 2023-09-12 ENCOUNTER — OFFICE VISIT (OUTPATIENT)
Dept: PRIMARY CARE | Facility: CLINIC | Age: 88
End: 2023-09-12
Payer: MEDICARE

## 2023-09-12 VITALS
SYSTOLIC BLOOD PRESSURE: 178 MMHG | OXYGEN SATURATION: 98 % | HEART RATE: 63 BPM | DIASTOLIC BLOOD PRESSURE: 84 MMHG | RESPIRATION RATE: 12 BRPM

## 2023-09-12 DIAGNOSIS — I89.0 LYMPHEDEMA: ICD-10-CM

## 2023-09-12 DIAGNOSIS — E11.22 TYPE 2 DIABETES MELLITUS WITH CHRONIC KIDNEY DISEASE, WITH LONG-TERM CURRENT USE OF INSULIN, UNSPECIFIED CKD STAGE (MULTI): ICD-10-CM

## 2023-09-12 DIAGNOSIS — E78.5 HYPERLIPIDEMIA, UNSPECIFIED HYPERLIPIDEMIA TYPE: ICD-10-CM

## 2023-09-12 DIAGNOSIS — N18.30 STAGE 3 CHRONIC KIDNEY DISEASE, UNSPECIFIED WHETHER STAGE 3A OR 3B CKD (MULTI): Primary | ICD-10-CM

## 2023-09-12 DIAGNOSIS — I10 ESSENTIAL HYPERTENSION: ICD-10-CM

## 2023-09-12 DIAGNOSIS — D64.9 ANEMIA, UNSPECIFIED TYPE: ICD-10-CM

## 2023-09-12 DIAGNOSIS — Z79.4 TYPE 2 DIABETES MELLITUS WITH CHRONIC KIDNEY DISEASE, WITH LONG-TERM CURRENT USE OF INSULIN, UNSPECIFIED CKD STAGE (MULTI): ICD-10-CM

## 2023-09-12 LAB — POC FINGERSTICK BLOOD GLUCOSE: 210 MG/DL (ref 70–100)

## 2023-09-12 PROCEDURE — 1036F TOBACCO NON-USER: CPT | Performed by: INTERNAL MEDICINE

## 2023-09-12 PROCEDURE — 3077F SYST BP >= 140 MM HG: CPT | Performed by: INTERNAL MEDICINE

## 2023-09-12 PROCEDURE — 82962 GLUCOSE BLOOD TEST: CPT | Performed by: INTERNAL MEDICINE

## 2023-09-12 PROCEDURE — 1159F MED LIST DOCD IN RCRD: CPT | Performed by: INTERNAL MEDICINE

## 2023-09-12 PROCEDURE — 1125F AMNT PAIN NOTED PAIN PRSNT: CPT | Performed by: INTERNAL MEDICINE

## 2023-09-12 PROCEDURE — 99214 OFFICE O/P EST MOD 30 MIN: CPT | Performed by: INTERNAL MEDICINE

## 2023-09-12 PROCEDURE — 1157F ADVNC CARE PLAN IN RCRD: CPT | Performed by: INTERNAL MEDICINE

## 2023-09-12 PROCEDURE — 3079F DIAST BP 80-89 MM HG: CPT | Performed by: INTERNAL MEDICINE

## 2023-09-12 RX ORDER — HYDRALAZINE HYDROCHLORIDE 25 MG/1
12.5 TABLET, FILM COATED ORAL 2 TIMES DAILY
Qty: 90 TABLET | Refills: 3 | Status: SHIPPED | OUTPATIENT
Start: 2023-09-12 | End: 2023-10-11 | Stop reason: ALTCHOICE

## 2023-09-12 RX ORDER — METOPROLOL SUCCINATE 100 MG/1
100 TABLET, EXTENDED RELEASE ORAL DAILY
Qty: 90 TABLET | Refills: 3 | Status: SHIPPED | OUTPATIENT
Start: 2023-09-12 | End: 2023-10-11 | Stop reason: ALTCHOICE

## 2023-09-12 NOTE — ASSESSMENT & PLAN NOTE
BP is 178/84 today. Advised patient to keep track of BP at home.    Decrease Hydralazine from 25mg to 12.5mg BID. (to alleviate patient's palpitations)  Increase Metoprolol from 50mg to 100mg once a day in the morning.

## 2023-09-12 NOTE — ASSESSMENT & PLAN NOTE
Labs reviewed with patient, GFR is 27, creatinine is 1.76, and BUN is 28. Which is an improvement from her blood work in August.

## 2023-09-12 NOTE — PROGRESS NOTES
"Subjective   Chief complaint: Carolann Cartagena is a 89 y.o. female who presents for Follow-up (Pt is here for blood work , pt c/oi nausea and chills/).    HPI:  HPI      Objective   /84   Pulse 63   Resp 12   SpO2 98%   Physical Exam    I have reviewed and reconciled the medication list with the patient today.   Current Outpatient Medications:     Alphagan P 0.1 % ophthalmic solution, Administer 1 drop into both eyes twice a day., Disp: , Rfl:     aspirin 81 mg EC tablet, Take 1 tablet (81 mg) by mouth once daily., Disp: , Rfl:     atorvastatin (Lipitor) 10 mg tablet, TAKE 1 TABLET DAILY, Disp: 90 tablet, Rfl: 3    cholecalciferol (Vitamin D-3) 50 MCG (2000 UT) tablet, Take 1 tablet (2,000 Units) by mouth once daily., Disp: , Rfl:     HumuLIN N NPH U-100 Insulin 100 unit/mL injection, Inject 35 Units under the skin 2 times a day before meals., Disp: , Rfl:     hydrALAZINE (Apresoline) 25 mg tablet, Take 1 tablet (25 mg) by mouth 2 times a day., Disp: 180 tablet, Rfl: 3    insulin syringe-needle U-100 (BD Insulin Syringe Ultra-Fine) 31G X 5/16\" 1 mL syringe, Inject 1 each under the skin 2 times a day. Use as instructed, Disp: 100 each, Rfl: 3    latanoprost (Xalatan) 0.005 % ophthalmic solution, 1 drop once daily., Disp: , Rfl:     metoprolol succinate XL (Toprol XL) 50 mg 24 hr tablet, Take 1 tablet (50 mg) by mouth once daily. Do not crush or chew., Disp: 90 tablet, Rfl: 3    OneTouch Delica Plus Lancet 30 gauge misc, , Disp: , Rfl:     OneTouch Ultra Test strip, 1 strip 3 times a day., Disp: , Rfl:     timolol (Timoptic) 0.5 % ophthalmic solution, 1 drop., Disp: , Rfl:      Imaging:  No results found.     Labs reviewed:    Lab Results   Component Value Date    WBC 7.1 08/28/2023    HGB 9.9 (L) 08/28/2023    HCT 31.9 (L) 08/28/2023     08/28/2023    CHOL 183 02/22/2023    TRIG 86 02/22/2023    HDL 57.1 02/22/2023    ALT 13 08/28/2023    AST 20 08/28/2023     (L) 09/08/2023    K 4.8 09/08/2023    "  09/08/2023    CREATININE 1.76 (H) 09/08/2023    BUN 28 (H) 09/08/2023    CO2 25 09/08/2023    TSH 3.28 02/22/2023    HGBA1C 7.0 (A) 08/28/2023       Assessment/Plan   Problem List Items Addressed This Visit          Endocrine/Metabolic    Diabetes mellitus (CMS/[facility])    Relevant Orders    POCT fingerstick glucose manually resulted (Completed)       Continue current medications as listed  Follow up in 2 months

## 2023-09-12 NOTE — PROGRESS NOTES
"Subjective   Chief complaint: Carolann Cartagena is a 89 y.o. female who presents for Follow-up (Pt is here for blood work , pt c/oi nausea and chills/).    HPI:  HPI  Pt is here for a follow up on her blood work. She has been experiencing some palpitations associated with her hydralazine.      Objective   /84   Pulse 63   Resp 12   SpO2 98%   Physical Exam  Vitals reviewed.   Cardiovascular:      Rate and Rhythm: Normal rate and regular rhythm.      Heart sounds: No murmur heard.  Pulmonary:      Effort: No respiratory distress.      Breath sounds: No wheezing or rales.   Musculoskeletal:         General: No swelling or tenderness.   Skin:     General: Skin is warm and dry.   Neurological:      Mental Status: She is alert.         I have reviewed and reconciled the medication list with the patient today.   Current Outpatient Medications:     Alphagan P 0.1 % ophthalmic solution, Administer 1 drop into both eyes twice a day., Disp: , Rfl:     aspirin 81 mg EC tablet, Take 1 tablet (81 mg) by mouth once daily., Disp: , Rfl:     atorvastatin (Lipitor) 10 mg tablet, TAKE 1 TABLET DAILY, Disp: 90 tablet, Rfl: 3    cholecalciferol (Vitamin D-3) 50 MCG (2000 UT) tablet, Take 1 tablet (2,000 Units) by mouth once daily., Disp: , Rfl:     HumuLIN N NPH U-100 Insulin 100 unit/mL injection, Inject 35 Units under the skin 2 times a day before meals., Disp: , Rfl:     hydrALAZINE (Apresoline) 25 mg tablet, Take 1 tablet (25 mg) by mouth 2 times a day., Disp: 180 tablet, Rfl: 3    insulin syringe-needle U-100 (BD Insulin Syringe Ultra-Fine) 31G X 5/16\" 1 mL syringe, Inject 1 each under the skin 2 times a day. Use as instructed, Disp: 100 each, Rfl: 3    latanoprost (Xalatan) 0.005 % ophthalmic solution, 1 drop once daily., Disp: , Rfl:     metoprolol succinate XL (Toprol XL) 50 mg 24 hr tablet, Take 1 tablet (50 mg) by mouth once daily. Do not crush or chew., Disp: 90 tablet, Rfl: 3    OneTouch Delica Plus Lancet 30 gauge " misc, , Disp: , Rfl:     OneTouch Ultra Test strip, 1 strip 3 times a day., Disp: , Rfl:     timolol (Timoptic) 0.5 % ophthalmic solution, 1 drop., Disp: , Rfl:      Imaging:  No results found.     Labs reviewed:    Lab Results   Component Value Date    WBC 7.1 08/28/2023    HGB 9.9 (L) 08/28/2023    HCT 31.9 (L) 08/28/2023     08/28/2023    CHOL 183 02/22/2023    TRIG 86 02/22/2023    HDL 57.1 02/22/2023    ALT 13 08/28/2023    AST 20 08/28/2023     (L) 09/08/2023    K 4.8 09/08/2023     09/08/2023    CREATININE 1.76 (H) 09/08/2023    BUN 28 (H) 09/08/2023    CO2 25 09/08/2023    TSH 3.28 02/22/2023    HGBA1C 7.0 (A) 08/28/2023       Assessment/Plan   Problem List Items Addressed This Visit          Cardiac and Vasculature    Essential hypertension     BP is 178/84 today. Advised patient to keep track of BP at home.    Decrease Hydralazine from 25mg to 12.5mg BID. (to alleviate patient's palpitations)  Increase Metoprolol from 50mg to 100mg once a day in the morning.          Hyperlipidemia     Continue Lipitor 10mg.            Endocrine/Metabolic    Diabetes mellitus (CMS/[facility])     Labs reviewed with patient, Hgb A1c is 7.0%  Continue Humulin N as prescribed.         Relevant Orders    POCT fingerstick glucose manually resulted (Completed)       Genitourinary and Reproductive    Chronic kidney disease, stage 3, mod decreased GFR (CMS/[facility]) - Primary     Labs reviewed with patient, GFR is 27, creatinine is 1.76, and BUN is 28. Which is an improvement from her blood work in August.            Hematology and Neoplasia    Anemia       Symptoms and Signs    Lymphedema       Continue current medications as listed  Follow up in 3 months

## 2023-09-15 LAB
ALANINE AMINOTRANSFERASE (SGPT) (U/L) IN SER/PLAS: 23 U/L (ref 7–45)
ALBUMIN (G/DL) IN SER/PLAS: 3.6 G/DL (ref 3.4–5)
ALKALINE PHOSPHATASE (U/L) IN SER/PLAS: 74 U/L (ref 33–136)
ANION GAP IN SER/PLAS: 13 MMOL/L (ref 10–20)
ASPARTATE AMINOTRANSFERASE (SGOT) (U/L) IN SER/PLAS: 38 U/L (ref 9–39)
BASOPHILS (10*3/UL) IN BLOOD BY AUTOMATED COUNT: 0.05 X10E9/L (ref 0–0.1)
BASOPHILS/100 LEUKOCYTES IN BLOOD BY AUTOMATED COUNT: 0.5 % (ref 0–2)
BILIRUBIN TOTAL (MG/DL) IN SER/PLAS: 0.4 MG/DL (ref 0–1.2)
CALCIUM (MG/DL) IN SER/PLAS: 8.5 MG/DL (ref 8.6–10.3)
CARBON DIOXIDE, TOTAL (MMOL/L) IN SER/PLAS: 22 MMOL/L (ref 21–32)
CHLORIDE (MMOL/L) IN SER/PLAS: 90 MMOL/L (ref 98–107)
CREATININE (MG/DL) IN SER/PLAS: 1.92 MG/DL (ref 0.5–1.05)
EOSINOPHILS (10*3/UL) IN BLOOD BY AUTOMATED COUNT: 0.11 X10E9/L (ref 0–0.4)
EOSINOPHILS/100 LEUKOCYTES IN BLOOD BY AUTOMATED COUNT: 1.1 % (ref 0–6)
ERYTHROCYTE DISTRIBUTION WIDTH (RATIO) BY AUTOMATED COUNT: 11.9 % (ref 11.5–14.5)
ERYTHROCYTE MEAN CORPUSCULAR HEMOGLOBIN CONCENTRATION (G/DL) BY AUTOMATED: 34.1 G/DL (ref 32–36)
ERYTHROCYTE MEAN CORPUSCULAR VOLUME (FL) BY AUTOMATED COUNT: 92 FL (ref 80–100)
ERYTHROCYTES (10*6/UL) IN BLOOD BY AUTOMATED COUNT: 2.95 X10E12/L (ref 4–5.2)
GFR FEMALE: 24 ML/MIN/1.73M2
GLUCOSE (MG/DL) IN SER/PLAS: 67 MG/DL (ref 74–99)
HEMATOCRIT (%) IN BLOOD BY AUTOMATED COUNT: 27 % (ref 36–46)
HEMOGLOBIN (G/DL) IN BLOOD: 9.2 G/DL (ref 12–16)
IMMATURE GRANULOCYTES/100 LEUKOCYTES IN BLOOD BY AUTOMATED COUNT: 0.3 % (ref 0–0.9)
LEUKOCYTES (10*3/UL) IN BLOOD BY AUTOMATED COUNT: 9.8 X10E9/L (ref 4.4–11.3)
LYMPHOCYTES (10*3/UL) IN BLOOD BY AUTOMATED COUNT: 2.76 X10E9/L (ref 0.8–3)
LYMPHOCYTES/100 LEUKOCYTES IN BLOOD BY AUTOMATED COUNT: 28 % (ref 13–44)
MAGNESIUM (MG/DL) IN SER/PLAS: 1.9 MG/DL (ref 1.6–2.4)
MONOCYTES (10*3/UL) IN BLOOD BY AUTOMATED COUNT: 1.2 X10E9/L (ref 0.05–0.8)
MONOCYTES/100 LEUKOCYTES IN BLOOD BY AUTOMATED COUNT: 12.2 % (ref 2–10)
NEUTROPHILS (10*3/UL) IN BLOOD BY AUTOMATED COUNT: 5.69 X10E9/L (ref 1.6–5.5)
NEUTROPHILS/100 LEUKOCYTES IN BLOOD BY AUTOMATED COUNT: 57.9 % (ref 40–80)
PLATELETS (10*3/UL) IN BLOOD AUTOMATED COUNT: 270 X10E9/L (ref 150–450)
POCT GLUCOSE: 127 MG/DL (ref 74–99)
POCT GLUCOSE: 63 MG/DL (ref 74–99)
POTASSIUM (MMOL/L) IN SER/PLAS: 4.1 MMOL/L (ref 3.5–5.3)
PROTEIN TOTAL: 7.1 G/DL (ref 6.4–8.2)
SODIUM (MMOL/L) IN SER/PLAS: 121 MMOL/L (ref 136–145)
TROPONIN I, HIGH SENSITIVITY: 27 NG/L (ref 0–13)
UREA NITROGEN (MG/DL) IN SER/PLAS: 36 MG/DL (ref 6–23)

## 2023-09-16 LAB
ANION GAP IN SER/PLAS: 12 MMOL/L (ref 10–20)
APPEARANCE, URINE: CLEAR
BILIRUBIN, URINE: NEGATIVE
BLOOD, URINE: NEGATIVE
CALCIUM (MG/DL) IN SER/PLAS: 8.4 MG/DL (ref 8.6–10.3)
CARBON DIOXIDE, TOTAL (MMOL/L) IN SER/PLAS: 22 MMOL/L (ref 21–32)
CHLORIDE (MMOL/L) IN SER/PLAS: 93 MMOL/L (ref 98–107)
COLOR, URINE: YELLOW
CREATININE (MG/DL) IN SER/PLAS: 1.9 MG/DL (ref 0.5–1.05)
CREATININE (MG/DL) IN URINE: 54.6 MG/DL (ref 20–320)
ERYTHROCYTE DISTRIBUTION WIDTH (RATIO) BY AUTOMATED COUNT: 12 % (ref 11.5–14.5)
ERYTHROCYTE MEAN CORPUSCULAR HEMOGLOBIN CONCENTRATION (G/DL) BY AUTOMATED: 31.7 G/DL (ref 32–36)
ERYTHROCYTE MEAN CORPUSCULAR VOLUME (FL) BY AUTOMATED COUNT: 98 FL (ref 80–100)
ERYTHROCYTES (10*6/UL) IN BLOOD BY AUTOMATED COUNT: 2.85 X10E12/L (ref 4–5.2)
ESTIMATED AVERAGE GLUCOSE FOR HBA1C: 143 MG/DL
GFR FEMALE: 24 ML/MIN/1.73M2
GLUCOSE (MG/DL) IN SER/PLAS: 85 MG/DL (ref 74–99)
GLUCOSE, URINE: NEGATIVE MG/DL
HEMATOCRIT (%) IN BLOOD BY AUTOMATED COUNT: 27.8 % (ref 36–46)
HEMOGLOBIN (G/DL) IN BLOOD: 8.8 G/DL (ref 12–16)
HEMOGLOBIN A1C/HEMOGLOBIN TOTAL IN BLOOD: 6.6 %
KETONES, URINE: NEGATIVE MG/DL
LEUKOCYTE ESTERASE, URINE: NEGATIVE
LEUKOCYTES (10*3/UL) IN BLOOD BY AUTOMATED COUNT: 7.7 X10E9/L (ref 4.4–11.3)
MAGNESIUM (MG/DL) IN SER/PLAS: 1.9 MG/DL (ref 1.6–2.4)
NITRITE, URINE: NEGATIVE
OSMOLALITY, RANDOM URINE: 210 MOSM/KG (ref 200–1200)
OSMOLALITY, SERUM: 264 MOSM/KG H2O (ref 280–300)
PH, URINE: 5 (ref 5–8)
PLATELETS (10*3/UL) IN BLOOD AUTOMATED COUNT: 189 X10E9/L (ref 150–450)
POCT GLUCOSE: 111 MG/DL (ref 74–99)
POCT GLUCOSE: 176 MG/DL (ref 74–99)
POCT GLUCOSE: 274 MG/DL (ref 74–99)
POCT GLUCOSE: 303 MG/DL (ref 74–99)
POCT GLUCOSE: 96 MG/DL (ref 74–99)
POTASSIUM (MMOL/L) IN SER/PLAS: 4.2 MMOL/L (ref 3.5–5.3)
PROTEIN, URINE: ABNORMAL MG/DL
RBC, URINE: 1 /HPF (ref 0–5)
SODIUM (MMOL/L) IN SER/PLAS: 121 MMOL/L (ref 136–145)
SODIUM (MMOL/L) IN SER/PLAS: 123 MMOL/L (ref 136–145)
SODIUM URINE RANDOM: 17 MMOL/L
SODIUM/CREATININE (MMOL/G) IN URINE: 31 MMOL/G CREAT
SPECIFIC GRAVITY, URINE: 1.01 (ref 1–1.03)
SQUAMOUS EPITHELIAL CELLS, URINE: 1 /HPF
TROPONIN I, HIGH SENSITIVITY: 25 NG/L (ref 0–13)
UREA NITROGEN (MG/DL) IN SER/PLAS: 34 MG/DL (ref 6–23)
UROBILINOGEN, URINE: <2 MG/DL (ref 0–1.9)
WBC, URINE: 1 /HPF (ref 0–5)

## 2023-09-17 ENCOUNTER — HOSPITAL ENCOUNTER (INPATIENT)
Dept: DATA CONVERSION | Facility: HOSPITAL | Age: 88
LOS: 10 days | Discharge: HOME | End: 2023-09-27
Attending: INTERNAL MEDICINE | Admitting: INTERNAL MEDICINE
Payer: MEDICARE

## 2023-09-17 DIAGNOSIS — T14.90XA INJURY, UNSPECIFIED, INITIAL ENCOUNTER: ICD-10-CM

## 2023-09-17 DIAGNOSIS — R07.9 CHEST PAIN, UNSPECIFIED: ICD-10-CM

## 2023-09-17 DIAGNOSIS — I10 ESSENTIAL (PRIMARY) HYPERTENSION: ICD-10-CM

## 2023-09-17 LAB
ANION GAP IN SER/PLAS: 11 MMOL/L (ref 10–20)
CALCIUM (MG/DL) IN SER/PLAS: 8.1 MG/DL (ref 8.6–10.3)
CARBON DIOXIDE, TOTAL (MMOL/L) IN SER/PLAS: 23 MMOL/L (ref 21–32)
CHLORIDE (MMOL/L) IN SER/PLAS: 92 MMOL/L (ref 98–107)
CREATININE (MG/DL) IN SER/PLAS: 2.12 MG/DL (ref 0.5–1.05)
ERYTHROCYTE DISTRIBUTION WIDTH (RATIO) BY AUTOMATED COUNT: 12 % (ref 11.5–14.5)
ERYTHROCYTE MEAN CORPUSCULAR HEMOGLOBIN CONCENTRATION (G/DL) BY AUTOMATED: 33.2 G/DL (ref 32–36)
ERYTHROCYTE MEAN CORPUSCULAR VOLUME (FL) BY AUTOMATED COUNT: 94 FL (ref 80–100)
ERYTHROCYTES (10*6/UL) IN BLOOD BY AUTOMATED COUNT: 2.63 X10E12/L (ref 4–5.2)
GFR FEMALE: 21 ML/MIN/1.73M2
GLUCOSE (MG/DL) IN SER/PLAS: 203 MG/DL (ref 74–99)
HEMATOCRIT (%) IN BLOOD BY AUTOMATED COUNT: 24.7 % (ref 36–46)
HEMOGLOBIN (G/DL) IN BLOOD: 8.2 G/DL (ref 12–16)
LEUKOCYTES (10*3/UL) IN BLOOD BY AUTOMATED COUNT: 7.8 X10E9/L (ref 4.4–11.3)
NATRIURETIC PEPTIDE B (PG/ML) IN SER/PLAS: 555 PG/ML (ref 0–99)
PLATELETS (10*3/UL) IN BLOOD AUTOMATED COUNT: 248 X10E9/L (ref 150–450)
POCT GLUCOSE: 171 MG/DL (ref 74–99)
POCT GLUCOSE: 206 MG/DL (ref 74–99)
POCT GLUCOSE: 356 MG/DL (ref 74–99)
POCT GLUCOSE: 55 MG/DL (ref 74–99)
POTASSIUM (MMOL/L) IN SER/PLAS: 4.8 MMOL/L (ref 3.5–5.3)
SODIUM (MMOL/L) IN SER/PLAS: 121 MMOL/L (ref 136–145)
UREA NITROGEN (MG/DL) IN SER/PLAS: 40 MG/DL (ref 6–23)

## 2023-09-18 LAB
ANION GAP IN SER/PLAS: 13 MMOL/L (ref 10–20)
CALCIUM (MG/DL) IN SER/PLAS: 8 MG/DL (ref 8.6–10.3)
CARBON DIOXIDE, TOTAL (MMOL/L) IN SER/PLAS: 22 MMOL/L (ref 21–32)
CHLORIDE (MMOL/L) IN SER/PLAS: 93 MMOL/L (ref 98–107)
CORTISOL (UG/DL) IN SERUM - AM: 20.1 UG/DL (ref 5–20)
CREATININE (MG/DL) IN SER/PLAS: 2.12 MG/DL (ref 0.5–1.05)
ERYTHROCYTE DISTRIBUTION WIDTH (RATIO) BY AUTOMATED COUNT: 12 % (ref 11.5–14.5)
ERYTHROCYTE MEAN CORPUSCULAR HEMOGLOBIN CONCENTRATION (G/DL) BY AUTOMATED: 32.1 G/DL (ref 32–36)
ERYTHROCYTE MEAN CORPUSCULAR VOLUME (FL) BY AUTOMATED COUNT: 93 FL (ref 80–100)
ERYTHROCYTES (10*6/UL) IN BLOOD BY AUTOMATED COUNT: 2.6 X10E12/L (ref 4–5.2)
GFR FEMALE: 21 ML/MIN/1.73M2
GLUCOSE (MG/DL) IN SER/PLAS: 162 MG/DL (ref 74–99)
HEMATOCRIT (%) IN BLOOD BY AUTOMATED COUNT: 24.3 % (ref 36–46)
HEMOGLOBIN (G/DL) IN BLOOD: 7.8 G/DL (ref 12–16)
LEUKOCYTES (10*3/UL) IN BLOOD BY AUTOMATED COUNT: 9 X10E9/L (ref 4.4–11.3)
PLATELETS (10*3/UL) IN BLOOD AUTOMATED COUNT: 244 X10E9/L (ref 150–450)
POCT GLUCOSE: 184 MG/DL (ref 74–99)
POCT GLUCOSE: 221 MG/DL (ref 74–99)
POCT GLUCOSE: 238 MG/DL (ref 74–99)
POCT GLUCOSE: 298 MG/DL (ref 74–99)
POTASSIUM (MMOL/L) IN SER/PLAS: 4.7 MMOL/L (ref 3.5–5.3)
SODIUM (MMOL/L) IN SER/PLAS: 123 MMOL/L (ref 136–145)
THYROTROPIN (MIU/L) IN SER/PLAS BY DETECTION LIMIT <= 0.05 MIU/L: 5.15 MIU/L (ref 0.44–3.98)
THYROXINE (T4) FREE (NG/DL) IN SER/PLAS: 0.93 NG/DL (ref 0.61–1.12)
UREA NITROGEN (MG/DL) IN SER/PLAS: 41 MG/DL (ref 6–23)

## 2023-09-19 LAB
ANION GAP IN SER/PLAS: 10 MMOL/L (ref 10–20)
ANION GAP IN SER/PLAS: 12 MMOL/L (ref 10–20)
CALCIUM (MG/DL) IN SER/PLAS: 8 MG/DL (ref 8.6–10.3)
CALCIUM (MG/DL) IN SER/PLAS: 8.4 MG/DL (ref 8.6–10.3)
CARBON DIOXIDE, TOTAL (MMOL/L) IN SER/PLAS: 21 MMOL/L (ref 21–32)
CARBON DIOXIDE, TOTAL (MMOL/L) IN SER/PLAS: 23 MMOL/L (ref 21–32)
CHLORIDE (MMOL/L) IN SER/PLAS: 92 MMOL/L (ref 98–107)
CHLORIDE (MMOL/L) IN SER/PLAS: 92 MMOL/L (ref 98–107)
CREATININE (MG/DL) IN SER/PLAS: 2.14 MG/DL (ref 0.5–1.05)
CREATININE (MG/DL) IN SER/PLAS: 2.23 MG/DL (ref 0.5–1.05)
ERYTHROCYTE DISTRIBUTION WIDTH (RATIO) BY AUTOMATED COUNT: 12 % (ref 11.5–14.5)
ERYTHROCYTE MEAN CORPUSCULAR HEMOGLOBIN CONCENTRATION (G/DL) BY AUTOMATED: 33.1 G/DL (ref 32–36)
ERYTHROCYTE MEAN CORPUSCULAR VOLUME (FL) BY AUTOMATED COUNT: 93 FL (ref 80–100)
ERYTHROCYTES (10*6/UL) IN BLOOD BY AUTOMATED COUNT: 2.66 X10E12/L (ref 4–5.2)
GFR FEMALE: 20 ML/MIN/1.73M2
GFR FEMALE: 21 ML/MIN/1.73M2
GLUCOSE (MG/DL) IN SER/PLAS: 200 MG/DL (ref 74–99)
GLUCOSE (MG/DL) IN SER/PLAS: 234 MG/DL (ref 74–99)
HEMATOCRIT (%) IN BLOOD BY AUTOMATED COUNT: 24.8 % (ref 36–46)
HEMOGLOBIN (G/DL) IN BLOOD: 8.2 G/DL (ref 12–16)
LEUKOCYTES (10*3/UL) IN BLOOD BY AUTOMATED COUNT: 7.1 X10E9/L (ref 4.4–11.3)
PLATELETS (10*3/UL) IN BLOOD AUTOMATED COUNT: 253 X10E9/L (ref 150–450)
POCT GLUCOSE: 229 MG/DL (ref 74–99)
POCT GLUCOSE: 241 MG/DL (ref 74–99)
POCT GLUCOSE: 264 MG/DL (ref 74–99)
POCT GLUCOSE: 306 MG/DL (ref 74–99)
POTASSIUM (MMOL/L) IN SER/PLAS: 5.1 MMOL/L (ref 3.5–5.3)
POTASSIUM (MMOL/L) IN SER/PLAS: 5.1 MMOL/L (ref 3.5–5.3)
SODIUM (MMOL/L) IN SER/PLAS: 120 MMOL/L (ref 136–145)
SODIUM (MMOL/L) IN SER/PLAS: 120 MMOL/L (ref 136–145)
THYROTROPIN (MIU/L) IN SER/PLAS BY DETECTION LIMIT <= 0.05 MIU/L: 5.53 MIU/L (ref 0.44–3.98)
THYROXINE (T4) FREE (NG/DL) IN SER/PLAS: 1.03 NG/DL (ref 0.61–1.12)
UREA NITROGEN (MG/DL) IN SER/PLAS: 43 MG/DL (ref 6–23)
UREA NITROGEN (MG/DL) IN SER/PLAS: 47 MG/DL (ref 6–23)

## 2023-09-20 LAB
ANION GAP IN SER/PLAS: 12 MMOL/L (ref 10–20)
ATRIAL RATE: 38 BPM
CALCIUM (MG/DL) IN SER/PLAS: 8.2 MG/DL (ref 8.6–10.3)
CARBON DIOXIDE, TOTAL (MMOL/L) IN SER/PLAS: 21 MMOL/L (ref 21–32)
CHLORIDE (MMOL/L) IN SER/PLAS: 96 MMOL/L (ref 98–107)
CREATININE (MG/DL) IN SER/PLAS: 2.99 MG/DL (ref 0.5–1.05)
ERYTHROCYTE DISTRIBUTION WIDTH (RATIO) BY AUTOMATED COUNT: 12.2 % (ref 11.5–14.5)
ERYTHROCYTE MEAN CORPUSCULAR HEMOGLOBIN CONCENTRATION (G/DL) BY AUTOMATED: 33.3 G/DL (ref 32–36)
ERYTHROCYTE MEAN CORPUSCULAR VOLUME (FL) BY AUTOMATED COUNT: 94 FL (ref 80–100)
ERYTHROCYTES (10*6/UL) IN BLOOD BY AUTOMATED COUNT: 2.46 X10E12/L (ref 4–5.2)
GFR FEMALE: 14 ML/MIN/1.73M2
GLUCOSE (MG/DL) IN SER/PLAS: 203 MG/DL (ref 74–99)
HEMATOCRIT (%) IN BLOOD BY AUTOMATED COUNT: 23.1 % (ref 36–46)
HEMOGLOBIN (G/DL) IN BLOOD: 7.7 G/DL (ref 12–16)
LEUKOCYTES (10*3/UL) IN BLOOD BY AUTOMATED COUNT: 8.1 X10E9/L (ref 4.4–11.3)
P AXIS: 32 DEGREES
P OFFSET: 181 MS
P ONSET: 134 MS
PLATELETS (10*3/UL) IN BLOOD AUTOMATED COUNT: 254 X10E9/L (ref 150–450)
POCT GLUCOSE: 211 MG/DL (ref 74–99)
POCT GLUCOSE: 237 MG/DL (ref 74–99)
POCT GLUCOSE: 260 MG/DL (ref 74–99)
POCT GLUCOSE: 272 MG/DL (ref 74–99)
POTASSIUM (MMOL/L) IN SER/PLAS: 5.3 MMOL/L (ref 3.5–5.3)
PR INTERVAL: 176 MS
Q ONSET: 222 MS
QRS COUNT: 7 BEATS
QRS DURATION: 132 MS
QT INTERVAL: 532 MS
QTC CALCULATION(BAZETT): 422 MS
QTC FREDERICIA: 456 MS
R AXIS: 30 DEGREES
SODIUM (MMOL/L) IN SER/PLAS: 124 MMOL/L (ref 136–145)
T AXIS: 51 DEGREES
T OFFSET: 488 MS
UREA NITROGEN (MG/DL) IN SER/PLAS: 47 MG/DL (ref 6–23)
VENTRICULAR RATE: 38 BPM

## 2023-09-21 LAB
ADRENOCORTICOTROPIC HORMONE: 59 PG/ML (ref 7.2–63.3)
ALBUMIN (G/DL) IN SER/PLAS: 3.1 G/DL (ref 3.4–5)
ANION GAP IN SER/PLAS: 14 MMOL/L (ref 10–20)
CALCIUM (MG/DL) IN SER/PLAS: 8.1 MG/DL (ref 8.6–10.3)
CARBON DIOXIDE, TOTAL (MMOL/L) IN SER/PLAS: 22 MMOL/L (ref 21–32)
CHLORIDE (MMOL/L) IN SER/PLAS: 95 MMOL/L (ref 98–107)
CREATININE (MG/DL) IN SER/PLAS: 2.66 MG/DL (ref 0.5–1.05)
ERYTHROCYTE DISTRIBUTION WIDTH (RATIO) BY AUTOMATED COUNT: 12.2 % (ref 11.5–14.5)
ERYTHROCYTE MEAN CORPUSCULAR HEMOGLOBIN CONCENTRATION (G/DL) BY AUTOMATED: 32.6 G/DL (ref 32–36)
ERYTHROCYTE MEAN CORPUSCULAR VOLUME (FL) BY AUTOMATED COUNT: 95 FL (ref 80–100)
ERYTHROCYTES (10*6/UL) IN BLOOD BY AUTOMATED COUNT: 2.45 X10E12/L (ref 4–5.2)
FERRITIN (UG/LL) IN SER/PLAS: 147 UG/L (ref 8–150)
GFR FEMALE: 16 ML/MIN/1.73M2
GLUCOSE (MG/DL) IN SER/PLAS: 195 MG/DL (ref 74–99)
HEMATOCRIT (%) IN BLOOD BY AUTOMATED COUNT: 23.3 % (ref 36–46)
HEMOGLOBIN (G/DL) IN BLOOD: 7.6 G/DL (ref 12–16)
IRON (UG/DL) IN SER/PLAS: 63 UG/DL (ref 35–150)
IRON BINDING CAPACITY (UG/DL) IN SER/PLAS: 305 UG/DL (ref 240–445)
IRON SATURATION (%) IN SER/PLAS: 21 % (ref 25–45)
LEUKOCYTES (10*3/UL) IN BLOOD BY AUTOMATED COUNT: 6.7 X10E9/L (ref 4.4–11.3)
PHOSPHATE (MG/DL) IN SER/PLAS: 4.8 MG/DL (ref 2.5–4.9)
PLATELETS (10*3/UL) IN BLOOD AUTOMATED COUNT: 259 X10E9/L (ref 150–450)
POCT GLUCOSE: 155 MG/DL (ref 74–99)
POCT GLUCOSE: 183 MG/DL (ref 74–99)
POCT GLUCOSE: 214 MG/DL (ref 74–99)
POCT GLUCOSE: 232 MG/DL (ref 74–99)
POTASSIUM (MMOL/L) IN SER/PLAS: 5 MMOL/L (ref 3.5–5.3)
SODIUM (MMOL/L) IN SER/PLAS: 126 MMOL/L (ref 136–145)
UREA NITROGEN (MG/DL) IN SER/PLAS: 47 MG/DL (ref 6–23)

## 2023-09-22 LAB
ALBUMIN (G/DL) IN SER/PLAS: 3.3 G/DL (ref 3.4–5)
ANION GAP IN SER/PLAS: 15 MMOL/L (ref 10–20)
CALCIUM (MG/DL) IN SER/PLAS: 8.8 MG/DL (ref 8.6–10.3)
CARBON DIOXIDE, TOTAL (MMOL/L) IN SER/PLAS: 22 MMOL/L (ref 21–32)
CHLORIDE (MMOL/L) IN SER/PLAS: 94 MMOL/L (ref 98–107)
CREATININE (MG/DL) IN SER/PLAS: 2.65 MG/DL (ref 0.5–1.05)
ERYTHROCYTE DISTRIBUTION WIDTH (RATIO) BY AUTOMATED COUNT: 12.5 % (ref 11.5–14.5)
ERYTHROCYTE MEAN CORPUSCULAR HEMOGLOBIN CONCENTRATION (G/DL) BY AUTOMATED: 32.1 G/DL (ref 32–36)
ERYTHROCYTE MEAN CORPUSCULAR VOLUME (FL) BY AUTOMATED COUNT: 96 FL (ref 80–100)
ERYTHROCYTES (10*6/UL) IN BLOOD BY AUTOMATED COUNT: 2.6 X10E12/L (ref 4–5.2)
GFR FEMALE: 16 ML/MIN/1.73M2
GLUCOSE (MG/DL) IN SER/PLAS: 103 MG/DL (ref 74–99)
HEMATOCRIT (%) IN BLOOD BY AUTOMATED COUNT: 24.9 % (ref 36–46)
HEMOGLOBIN (G/DL) IN BLOOD: 8 G/DL (ref 12–16)
LEUKOCYTES (10*3/UL) IN BLOOD BY AUTOMATED COUNT: 7.6 X10E9/L (ref 4.4–11.3)
PHOSPHATE (MG/DL) IN SER/PLAS: 4.7 MG/DL (ref 2.5–4.9)
PLATELETS (10*3/UL) IN BLOOD AUTOMATED COUNT: 280 X10E9/L (ref 150–450)
POCT GLUCOSE: 119 MG/DL (ref 74–99)
POCT GLUCOSE: 123 MG/DL (ref 74–99)
POCT GLUCOSE: 161 MG/DL (ref 74–99)
POCT GLUCOSE: 164 MG/DL (ref 74–99)
POCT GLUCOSE: 185 MG/DL (ref 74–99)
POCT GLUCOSE: 328 MG/DL (ref 74–99)
POCT GLUCOSE: 37 MG/DL (ref 74–99)
POTASSIUM (MMOL/L) IN SER/PLAS: 4.5 MMOL/L (ref 3.5–5.3)
SODIUM (MMOL/L) IN SER/PLAS: 126 MMOL/L (ref 136–145)
UREA NITROGEN (MG/DL) IN SER/PLAS: 51 MG/DL (ref 6–23)

## 2023-09-23 LAB
ALBUMIN (G/DL) IN SER/PLAS: 3.2 G/DL (ref 3.4–5)
ALDOSTERONE IN SERUM: 17.2 NG/DL
ALDOSTERONE/RENIN ACTIVITY CALCULATION: 13.2 RATIO
ANION GAP IN SER/PLAS: 12 MMOL/L (ref 10–20)
CALCIUM (MG/DL) IN SER/PLAS: 8.4 MG/DL (ref 8.6–10.3)
CARBON DIOXIDE, TOTAL (MMOL/L) IN SER/PLAS: 25 MMOL/L (ref 21–32)
CHLORIDE (MMOL/L) IN SER/PLAS: 94 MMOL/L (ref 98–107)
CREATININE (MG/DL) IN SER/PLAS: 2.54 MG/DL (ref 0.5–1.05)
ERYTHROCYTE DISTRIBUTION WIDTH (RATIO) BY AUTOMATED COUNT: 12.9 % (ref 11.5–14.5)
ERYTHROCYTE MEAN CORPUSCULAR HEMOGLOBIN CONCENTRATION (G/DL) BY AUTOMATED: 31.9 G/DL (ref 32–36)
ERYTHROCYTE MEAN CORPUSCULAR VOLUME (FL) BY AUTOMATED COUNT: 98 FL (ref 80–100)
ERYTHROCYTES (10*6/UL) IN BLOOD BY AUTOMATED COUNT: 2.62 X10E12/L (ref 4–5.2)
GFR FEMALE: 17 ML/MIN/1.73M2
GLUCOSE (MG/DL) IN SER/PLAS: 227 MG/DL (ref 74–99)
HEMATOCRIT (%) IN BLOOD BY AUTOMATED COUNT: 25.7 % (ref 36–46)
HEMOGLOBIN (G/DL) IN BLOOD: 8.2 G/DL (ref 12–16)
LEUKOCYTES (10*3/UL) IN BLOOD BY AUTOMATED COUNT: 7.6 X10E9/L (ref 4.4–11.3)
PHOSPHATE (MG/DL) IN SER/PLAS: 4.9 MG/DL (ref 2.5–4.9)
PLATELETS (10*3/UL) IN BLOOD AUTOMATED COUNT: 283 X10E9/L (ref 150–450)
POCT GLUCOSE: 222 MG/DL (ref 74–99)
POCT GLUCOSE: 264 MG/DL (ref 74–99)
POCT GLUCOSE: 316 MG/DL (ref 74–99)
POCT GLUCOSE: 363 MG/DL (ref 74–99)
POTASSIUM (MMOL/L) IN SER/PLAS: 4.7 MMOL/L (ref 3.5–5.3)
RENIN ACTIVITY: 1.3 NG/ML/HR
SODIUM (MMOL/L) IN SER/PLAS: 126 MMOL/L (ref 136–145)
UREA NITROGEN (MG/DL) IN SER/PLAS: 50 MG/DL (ref 6–23)

## 2023-09-24 LAB
ALBUMIN (G/DL) IN SER/PLAS: 3.1 G/DL (ref 3.4–5)
ANION GAP IN SER/PLAS: 14 MMOL/L (ref 10–20)
CALCIUM (MG/DL) IN SER/PLAS: 8.3 MG/DL (ref 8.6–10.3)
CARBON DIOXIDE, TOTAL (MMOL/L) IN SER/PLAS: 26 MMOL/L (ref 21–32)
CHLORIDE (MMOL/L) IN SER/PLAS: 94 MMOL/L (ref 98–107)
CREATININE (MG/DL) IN SER/PLAS: 2.48 MG/DL (ref 0.5–1.05)
ERYTHROCYTE DISTRIBUTION WIDTH (RATIO) BY AUTOMATED COUNT: 12.6 % (ref 11.5–14.5)
ERYTHROCYTE MEAN CORPUSCULAR HEMOGLOBIN CONCENTRATION (G/DL) BY AUTOMATED: 32 G/DL (ref 32–36)
ERYTHROCYTE MEAN CORPUSCULAR VOLUME (FL) BY AUTOMATED COUNT: 97 FL (ref 80–100)
ERYTHROCYTES (10*6/UL) IN BLOOD BY AUTOMATED COUNT: 2.49 X10E12/L (ref 4–5.2)
GFR FEMALE: 18 ML/MIN/1.73M2
GLUCOSE (MG/DL) IN SER/PLAS: 191 MG/DL (ref 74–99)
HEMATOCRIT (%) IN BLOOD BY AUTOMATED COUNT: 24.1 % (ref 36–46)
HEMOGLOBIN (G/DL) IN BLOOD: 7.7 G/DL (ref 12–16)
LEUKOCYTES (10*3/UL) IN BLOOD BY AUTOMATED COUNT: 10.1 X10E9/L (ref 4.4–11.3)
PHOSPHATE (MG/DL) IN SER/PLAS: 4.1 MG/DL (ref 2.5–4.9)
PLATELETS (10*3/UL) IN BLOOD AUTOMATED COUNT: 255 X10E9/L (ref 150–450)
POCT GLUCOSE: 187 MG/DL (ref 74–99)
POCT GLUCOSE: 202 MG/DL (ref 74–99)
POCT GLUCOSE: 272 MG/DL (ref 74–99)
POCT GLUCOSE: 342 MG/DL (ref 74–99)
POTASSIUM (MMOL/L) IN SER/PLAS: 4.7 MMOL/L (ref 3.5–5.3)
SODIUM (MMOL/L) IN SER/PLAS: 129 MMOL/L (ref 136–145)
UREA NITROGEN (MG/DL) IN SER/PLAS: 48 MG/DL (ref 6–23)

## 2023-09-25 LAB
ALBUMIN (G/DL) IN SER/PLAS: 3 G/DL (ref 3.4–5)
ANION GAP IN SER/PLAS: 13 MMOL/L (ref 10–20)
CALCIUM (MG/DL) IN SER/PLAS: 8.5 MG/DL (ref 8.6–10.3)
CARBON DIOXIDE, TOTAL (MMOL/L) IN SER/PLAS: 26 MMOL/L (ref 21–32)
CHLORIDE (MMOL/L) IN SER/PLAS: 94 MMOL/L (ref 98–107)
CREATININE (MG/DL) IN SER/PLAS: 2.38 MG/DL (ref 0.5–1.05)
ERYTHROCYTE DISTRIBUTION WIDTH (RATIO) BY AUTOMATED COUNT: 12.7 % (ref 11.5–14.5)
ERYTHROCYTE MEAN CORPUSCULAR HEMOGLOBIN CONCENTRATION (G/DL) BY AUTOMATED: 32 G/DL (ref 32–36)
ERYTHROCYTE MEAN CORPUSCULAR VOLUME (FL) BY AUTOMATED COUNT: 98 FL (ref 80–100)
ERYTHROCYTES (10*6/UL) IN BLOOD BY AUTOMATED COUNT: 2.46 X10E12/L (ref 4–5.2)
GFR FEMALE: 19 ML/MIN/1.73M2
GLUCOSE (MG/DL) IN SER/PLAS: 140 MG/DL (ref 74–99)
HEMATOCRIT (%) IN BLOOD BY AUTOMATED COUNT: 24.1 % (ref 36–46)
HEMOGLOBIN (G/DL) IN BLOOD: 7.7 G/DL (ref 12–16)
LEUKOCYTES (10*3/UL) IN BLOOD BY AUTOMATED COUNT: 10.2 X10E9/L (ref 4.4–11.3)
PHOSPHATE (MG/DL) IN SER/PLAS: 3.7 MG/DL (ref 2.5–4.9)
PLATELETS (10*3/UL) IN BLOOD AUTOMATED COUNT: 262 X10E9/L (ref 150–450)
POCT GLUCOSE: 198 MG/DL (ref 74–99)
POCT GLUCOSE: 201 MG/DL (ref 74–99)
POCT GLUCOSE: 279 MG/DL (ref 74–99)
POCT GLUCOSE: 290 MG/DL (ref 74–99)
POTASSIUM (MMOL/L) IN SER/PLAS: 4.6 MMOL/L (ref 3.5–5.3)
SODIUM (MMOL/L) IN SER/PLAS: 128 MMOL/L (ref 136–145)
UREA NITROGEN (MG/DL) IN SER/PLAS: 44 MG/DL (ref 6–23)

## 2023-09-26 LAB
ANION GAP IN SER/PLAS: 13 MMOL/L (ref 10–20)
APPEARANCE, URINE: ABNORMAL
BACTERIA, URINE: ABNORMAL /HPF
BILIRUBIN, URINE: NEGATIVE
BLOOD, URINE: ABNORMAL
CALCIUM (MG/DL) IN SER/PLAS: 8.3 MG/DL (ref 8.6–10.3)
CARBON DIOXIDE, TOTAL (MMOL/L) IN SER/PLAS: 25 MMOL/L (ref 21–32)
CHLORIDE (MMOL/L) IN SER/PLAS: 94 MMOL/L (ref 98–107)
COLOR, URINE: ABNORMAL
CREATININE (MG/DL) IN SER/PLAS: 2.18 MG/DL (ref 0.5–1.05)
CREATININE (MG/DL) IN URINE: 69.1 MG/DL (ref 20–320)
ERYTHROCYTE DISTRIBUTION WIDTH (RATIO) BY AUTOMATED COUNT: 12.7 % (ref 11.5–14.5)
ERYTHROCYTE MEAN CORPUSCULAR HEMOGLOBIN CONCENTRATION (G/DL) BY AUTOMATED: 32.5 G/DL (ref 32–36)
ERYTHROCYTE MEAN CORPUSCULAR VOLUME (FL) BY AUTOMATED COUNT: 96 FL (ref 80–100)
ERYTHROCYTES (10*6/UL) IN BLOOD BY AUTOMATED COUNT: 2.52 X10E12/L (ref 4–5.2)
GFR FEMALE: 21 ML/MIN/1.73M2
GLUCOSE (MG/DL) IN SER/PLAS: 214 MG/DL (ref 74–99)
GLUCOSE, URINE: ABNORMAL MG/DL
HEMATOCRIT (%) IN BLOOD BY AUTOMATED COUNT: 24.3 % (ref 36–46)
HEMOGLOBIN (G/DL) IN BLOOD: 7.9 G/DL (ref 12–16)
KETONES, URINE: NEGATIVE MG/DL
LEUKOCYTE ESTERASE, URINE: ABNORMAL
LEUKOCYTES (10*3/UL) IN BLOOD BY AUTOMATED COUNT: 12.1 X10E9/L (ref 4.4–11.3)
MAGNESIUM (MG/DL) IN SER/PLAS: 2 MG/DL (ref 1.6–2.4)
NITRITE, URINE: NEGATIVE
PH, URINE: 6 (ref 5–8)
PLATELETS (10*3/UL) IN BLOOD AUTOMATED COUNT: 274 X10E9/L (ref 150–450)
POCT GLUCOSE: 204 MG/DL (ref 74–99)
POCT GLUCOSE: 212 MG/DL (ref 74–99)
POCT GLUCOSE: 225 MG/DL (ref 74–99)
POCT GLUCOSE: 298 MG/DL (ref 74–99)
POTASSIUM (MMOL/L) IN SER/PLAS: 4.6 MMOL/L (ref 3.5–5.3)
PROTEIN (MG/DL) IN URINE: 217 MG/DL (ref 5–24)
PROTEIN, URINE: ABNORMAL MG/DL
PROTEIN/CREATININE (MG/MG) IN URINE: 3.14 MG/MG CREAT (ref 0–0.17)
RBC, URINE: >182 /HPF (ref 0–5)
SODIUM (MMOL/L) IN SER/PLAS: 127 MMOL/L (ref 136–145)
SPECIFIC GRAVITY, URINE: 1.01 (ref 1–1.03)
SQUAMOUS EPITHELIAL CELLS, URINE: 6 /HPF
UREA NITROGEN (MG/DL) IN SER/PLAS: 38 MG/DL (ref 6–23)
UROBILINOGEN, URINE: <2 MG/DL (ref 0–1.9)
WBC, URINE: >182 /HPF (ref 0–5)

## 2023-09-27 LAB
ANION GAP IN SER/PLAS: 14 MMOL/L (ref 10–20)
CALCIUM (MG/DL) IN SER/PLAS: 8.4 MG/DL (ref 8.6–10.3)
CARBON DIOXIDE, TOTAL (MMOL/L) IN SER/PLAS: 25 MMOL/L (ref 21–32)
CHLORIDE (MMOL/L) IN SER/PLAS: 92 MMOL/L (ref 98–107)
CREATININE (MG/DL) IN SER/PLAS: 2.2 MG/DL (ref 0.5–1.05)
ERYTHROCYTE DISTRIBUTION WIDTH (RATIO) BY AUTOMATED COUNT: 12.8 % (ref 11.5–14.5)
ERYTHROCYTE MEAN CORPUSCULAR HEMOGLOBIN CONCENTRATION (G/DL) BY AUTOMATED: 31.6 G/DL (ref 32–36)
ERYTHROCYTE MEAN CORPUSCULAR VOLUME (FL) BY AUTOMATED COUNT: 98 FL (ref 80–100)
ERYTHROCYTES (10*6/UL) IN BLOOD BY AUTOMATED COUNT: 2.61 X10E12/L (ref 4–5.2)
GFR FEMALE: 20 ML/MIN/1.73M2
GLUCOSE (MG/DL) IN SER/PLAS: 168 MG/DL (ref 74–99)
HEMATOCRIT (%) IN BLOOD BY AUTOMATED COUNT: 25.6 % (ref 36–46)
HEMOGLOBIN (G/DL) IN BLOOD: 8.1 G/DL (ref 12–16)
LEUKOCYTES (10*3/UL) IN BLOOD BY AUTOMATED COUNT: 11.6 X10E9/L (ref 4.4–11.3)
PLATELETS (10*3/UL) IN BLOOD AUTOMATED COUNT: 273 X10E9/L (ref 150–450)
POCT GLUCOSE: 194 MG/DL (ref 74–99)
POCT GLUCOSE: 230 MG/DL (ref 74–99)
POCT GLUCOSE: 248 MG/DL (ref 74–99)
POTASSIUM (MMOL/L) IN SER/PLAS: 4.7 MMOL/L (ref 3.5–5.3)
SODIUM (MMOL/L) IN SER/PLAS: 126 MMOL/L (ref 136–145)
UREA NITROGEN (MG/DL) IN SER/PLAS: 38 MG/DL (ref 6–23)

## 2023-09-28 LAB — URINE CULTURE: ABNORMAL

## 2023-09-29 LAB
ALBUMIN ELP: NORMAL
ALPHA 1: NORMAL
ALPHA 2: NORMAL
BETA: NORMAL
GAMMA GLOBULIN: NORMAL
IMMUNOGLOBULIN LIGHT CHAINS KAPPA/LAMBDA (MASS RATIO) IN SERUM: NORMAL
IMMUNOGLOBULIN LIGHT CHAINS.KAPPA (MG/DL) IN SERUM: NORMAL
IMMUNOGLOBULIN LIGHT CHAINS.LAMBDA (MG/DL) IN SERUM: NORMAL
PATH REVIEW-SERUM PROTEIN ELECTROPHORESIS: NORMAL
PROTEIN ELECTROPHORESIS INTERPRETATION: NORMAL
PROTEIN TOTAL: NORMAL

## 2023-09-30 NOTE — PROGRESS NOTES
Service: Renal     Subjective Data:   ALICIA CARTAGENA is a 89 year old Female who is Hospital Day # 13.     Ms. Cartagena is a 89-year-old woman with a history of hypertension, hyperlipidemia, diabetes who presented with hypoglycemia and hypertension.  Cardiology saw her as for a BP of 200s systolic.  She had  apparently fallen the night prior to her presentation. Was taken of losartan in light of her renal function and placed on hydralazine.  Echocardiogram noted normal systolic function, moderate concentric LVH, and elevated right-sided pressures. Prather catheter  was placed. She received IVP Lasix, now held.     No overnight events.  She does not offer specific complaints.    Overnight Events: Patient had an uneventful night.     Objective Data:     Objective Information:      T   P  R  BP   MAP  SpO2   Value  36.1  72  18  178/62      98%  Date/Time 9/27 11:34 9/27 11:34 9/27 11:34 9/27 11:34    9/27 11:34  Range  (36.1C - 36.7C )  (56 - 75 )  (16 - 19 )  (129 - 178 )/ (45 - 73 )    (95% - 99% )      Pain reported at 9/27 9:43: 0 = None    ---- Intake and Output  -----  Mn/Dy/Year Time  Intake   Output  Net  Sep 27, 2023 6:00 am  120   400  -280  Sep 26, 2023 10:00 pm  0   800  -800    The Intake and Output Totals for the last 24 hours are:      Intake   Output  Net      120   1200  -1080    Physical Exam by System:    Constitutional: Well developed, awake/alert/oriented  x3, no distress, alert and cooperative   Eyes: PERRL, EOMI, clear sclera   ENMT: mucous membranes moist, no apparent injury,  no lesions seen   Head/Neck: Neck supple, no JVD, trachea midline   Respiratory/Thorax: Diminished to bilateral bases   Cardiovascular: Regular, rate and rhythm, systolic  murmur, normal S 1 and S 2   Gastrointestinal: Nondistended, soft, non-tender,  no rebound tenderness or guarding   Musculoskeletal: ROM intact, no joint swelling, normal  strength   Extremities: normal extremities, 1+ lower extremity  edema    Neurological: alert and oriented x3  No asterixis   Skin: Warm and dry, no lesions, no rashes     Medication:    Medications:          Continuous Medications       --------------------------------  No continuous medications are active       Scheduled Medications       --------------------------------    1. Aspirin Chewable:  81  mg  Oral  Daily    2. Atorvastatin:  10  mg  Oral  Daily    3. Brimonidine 0.2% Ophthalmic:  1  drop(s)  Both Eyes  2 Times a Day    4. cefTRIAXone 1 gram/ Dextrose 5% IVPB Premixed Soln 50 mL:  50  mL  IntraVenous Piggyback  Every 24 Hours    5. Cholecalciferol (Vitamin D3):  2000  International Unit(s)  Oral  Daily    6. Docusate 50 mg - Senna 8.6 m  tablet(s)  Oral  2 Times a Day    7. Furosemide:  20  mg  Oral  Daily    8. Heparin SubCutaneous:  5000  unit(s)  SubCutaneous  Every 8 Hours    9. hydrALAZINE (APRESOLINE):  50  mg  Oral  Every 8 Hours    10. Insulin Isophane (NPH) Injectable:  10  unit(s)  SubCutaneous  2 Times a Day    11. Insulin Lispro Moderate Corrective Scale:  unit(s)  SubCutaneous  3 Times a Day Before Meals    12. Latanoprost 0.005% Ophthalmic:  1  drop(s)  Both Eyes  At Bedtime    13. NIFEdipine (PROCARDIA XL) Extended Release:  60  mg  Oral  Daily    14. Pantoprazole:  40  mg  Oral  Daily    15. Polyethylene Glycol:  17  gram(s)  Oral  2 Times a Day    16. Timolol 0.5% Ophthalmic:  1  drop(s)  Both Eyes  2 Times a Day         PRN Medications       --------------------------------    1. Acetaminophen:  650  mg  Oral  Every 4 Hours    2. Acetaminophen:  650  mg  Oral  Every 4 Hours    3. Dextrose 50% in Water Injectable:  25  gram(s)  IntraVenous Push  Every 15 Minutes    4. Glucagon Injectable:  1  mg  IntraMuscular  Every 15 Minutes    5. hydrALAZINE (APRESOLINE) Injectable:  10  mg  IntraVenous Push  Every 6 Hours    6. Lidocaine 2% (UROJET) Topical Gel:  5  mL  Topical  Every 4 Hours    7. Meclizine:  12.5  mg  Oral  4 Times a Day    8. Ondansetron  Injectable:  4  mg  IntraVenous Push  Every 4 Hours    9. Sodium Chloride 0.9% Injectable Flush:  10  mL  IntraVenous Flush  Every 8 Hours and as Needed        Recent Lab Results:    Results:    CBC: 9/27/2023 07:30              \     Hgb     /                              \     8.1 L    /  WBC  ----------------  Plt               11.6 H    ----------------    273              /     Hct     \                              /     25.6 L    \            RBC: 2.61 L    MCV: 98           BMP: 9/27/2023 07:30  NA+        Cl-     BUN  /                         126 L   92 L    38 H /  --------------------------------  Glucose                ---------------------------  168 H    K+     HCO3-   Creat \                         4.7  25    2.20 H \  Calcium : 8.4 L    Anion Gap : 14      Radiology Results:    Results:        Impression:    Unremarkable exam.  There has not been significant interval change from the prior exam.     CT Head without Contrast [Sep 24 2023  4:42PM]      Assessment and Plan:   Daily Risk Screen:  ·  Does patient have an indwelling urinary catheter? n/a consulting service     Comorbidities:  ·  Comorbidity Other     Code Status:  ·  Code Status Full Code     Assessment:    ALICIA IRENE is a 89 year old Female with a past medical history of  hypertension, hyperlipidemia, diabetes, chronic kidney disease stage III with a baseline creatinine of 1.5 to 1.6 mg/deciliter who presented with hypoglycemia and hypertension.  Cardiology saw her  as for a BP of 200s systolic.  She had apparently fallen the night prior to her presentation. Was taken of losartan in light of her renal function and placed on hydralazine.  Echocardiogram noted normal systolic function, moderate concentric LVH, and  elevated right-sided pressures. Prather catheter was placed. She received IVP Lasix, now held.   She was noted to have an acute kidney injury thus we were asked to see her. Her creatinine has improved some and is now  plateau' d at 2.2. She still appears mildly fluid overloaded having lower limb edema but certainly no respiratory distress. She is  off of the sodium chloride tablets. Her Prather catheter is out and she denies further retention. Intravenous Lasix have been stopped. Her blood pressures have come down. I added her on 20 mg of oral Lasix daily which I will continue. Her hyponatremia has  improved with diuretics and fluid restriction. I liberalized her  to 1.4 L with a slight decline in sodium. We have otherwise given her IV Venofer and a dose of erythropoietin. She has nephrotic range proteinuria. This may be related to diabetes but I  will assess for a paraprotein. She is now on antimicrobial coverage as she has GNB in the urine. Her creatinine is stable. She will follow up outpatient [phone].       Electronic Signatures:  Pascual Bhakta)  (Signed 27-Sep-2023 16:06)   Authored: Service, Subjective Data, Objective Data, Assessment  and Plan, Note Completion      Last Updated: 27-Sep-2023 16:06 by Pascual Bhakta ()

## 2023-09-30 NOTE — PROGRESS NOTES
Service: Renal     Subjective Data:   ALICIA CARTAGENA is a 89 year old Female who is Hospital Day # 12.     Ms. Cartagena is a 89-year-old woman with a history of hypertension, hyperlipidemia, diabetes who presented with hypoglycemia and hypertension.  Cardiology saw her as for a BP of 200s systolic.  She had  apparently fallen the night prior to her presentation. Was taken of losartan in light of her renal function and placed on hydralazine.  Echocardiogram noted normal systolic function, moderate concentric LVH, and elevated right-sided pressures. Prather catheter  was placed. She received IVP Lasix, now held.     No overnight events. She feels dizzy this morning. BS and her BP is not low.    Overnight Events: Patient had an uneventful night.     Objective Data:     Objective Information:      T   P  R  BP   MAP  SpO2   Value  36.3  60  18  145/69      95%  Date/Time 9/26 11:23 9/26 11:23 9/26 11:23 9/26 11:23    9/26 11:23  Range  (36.3C - 37.2C )  (60 - 84 )  (16 - 19 )  (145 - 183 )/ (42 - 74 )    (95% - 99% )  Highest temp of 37.2 C was recorded at 9/25 15:46      Pain reported at 9/26 10:18: 0 = None    ---- Intake and Output  -----  Mn/Dy/Year Time  Intake   Output  Net  Sep 26, 2023 6:00 am  0   400  -400  Sep 25, 2023 10:00 pm  0   210  -210  Sep 25, 2023 2:00 pm  480   1000  -520    The Intake and Output Totals for the last 24 hours are:      Intake   Output  Net      480   1610  -1130    Physical Exam by System:    Constitutional: Well developed, awake/alert/oriented  x3, no distress, alert and cooperative   Eyes: PERRL, EOMI, clear sclera   ENMT: mucous membranes moist, no apparent injury,  no lesions seen   Head/Neck: Neck supple, no JVD, trachea midline   Respiratory/Thorax: Diminished to bilateral bases   Cardiovascular: Regular, rate and rhythm, systolic  murmur, normal S 1 and S 2   Gastrointestinal: Nondistended, soft, non-tender,  no rebound tenderness or guarding   Genitourinary: Prather catheter    Musculoskeletal: ROM intact, no joint swelling, normal  strength   Extremities: normal extremities, 1+ lower extremity  edema   Neurological: alert and oriented x3  No asterixis   Skin: Warm and dry, no lesions, no rashes     Medication:    Medications:          Continuous Medications       --------------------------------  No continuous medications are active       Scheduled Medications       --------------------------------    1. Aspirin Chewable:  81  mg  Oral  Daily    2. Atorvastatin:  10  mg  Oral  Daily    3. Brimonidine 0.2% Ophthalmic:  1  drop(s)  Both Eyes  2 Times a Day    4. cefTRIAXone 1 gram/ Dextrose 5% IVPB Premixed Soln 50 mL:  50  mL  IntraVenous Piggyback  Every 24 Hours    5. Cholecalciferol (Vitamin D3):  2000  International Unit(s)  Oral  Daily    6. Docusate 50 mg - Senna 8.6 m  tablet(s)  Oral  2 Times a Day    7. Furosemide:  20  mg  Oral  Daily    8. Heparin SubCutaneous:  5000  unit(s)  SubCutaneous  Every 8 Hours    9. hydrALAZINE (APRESOLINE):  50  mg  Oral  Every 8 Hours    10. Insulin Isophane (NPH) Injectable:  10  unit(s)  SubCutaneous  2 Times a Day    11. Insulin Lispro Moderate Corrective Scale:  unit(s)  SubCutaneous  3 Times a Day Before Meals    12. Latanoprost 0.005% Ophthalmic:  1  drop(s)  Both Eyes  At Bedtime    13. NIFEdipine (PROCARDIA XL) Extended Release:  60  mg  Oral  Daily    14. Pantoprazole:  40  mg  Oral  Daily    15. Polyethylene Glycol:  17  gram(s)  Oral  2 Times a Day    16. Timolol 0.5% Ophthalmic:  1  drop(s)  Both Eyes  2 Times a Day         PRN Medications       --------------------------------    1. Acetaminophen:  650  mg  Oral  Every 4 Hours    2. Acetaminophen:  650  mg  Oral  Every 4 Hours    3. Dextrose 50% in Water Injectable:  25  gram(s)  IntraVenous Push  Every 15 Minutes    4. Glucagon Injectable:  1  mg  IntraMuscular  Every 15 Minutes    5. hydrALAZINE (APRESOLINE) Injectable:  10  mg  IntraVenous Push  Every 6 Hours    6. Lidocaine  2% (UROJET) Topical Gel:  5  mL  Topical  Every 4 Hours    7. Ondansetron Injectable:  4  mg  IntraVenous Push  Every 4 Hours    8. Sodium Chloride 0.9% Injectable Flush:  10  mL  IntraVenous Flush  Every 8 Hours and as Needed        Recent Lab Results:    Results:    CBC: 9/26/2023 07:41              \     Hgb     /                              \     7.9 L    /  WBC  ----------------  Plt               12.1 H    ----------------    274              /     Hct     \                              /     24.3 L    \            RBC: 2.52 L    MCV: 96           BMP: 9/26/2023 07:41  NA+        Cl-     BUN  /                         127 L   94 L    38 H /  --------------------------------  Glucose                ---------------------------  214 H    K+     HCO3-   Creat \                         4.6  25    2.18 H \  Calcium : 8.3 L    Anion Gap : 13      Radiology Results:    Results:        Impression:    Unremarkable exam.  There has not been significant interval change from the prior exam.     CT Head without Contrast [Sep 24 2023  4:42PM]      Assessment and Plan:   Daily Risk Screen:  ·  Does patient have an indwelling urinary catheter? n/a consulting service     Comorbidities:  ·  Comorbidity Other     Code Status:  ·  Code Status Full Code     Assessment:    ALICIA IRENE is a 89 year old Female with a past medical history of  hypertension, hyperlipidemia, diabetes, chronic kidney disease stage III with a baseline creatinine of 1.5 to 1.6 mg/deciliter who presented with hypoglycemia and hypertension.  Cardiology saw her  as for a BP of 200s systolic.  She had apparently fallen the night prior to her presentation. Was taken of losartan in light of her renal function and placed on hydralazine.  Echocardiogram noted normal systolic function, moderate concentric LVH, and  elevated right-sided pressures. Prather catheter was placed. She received IVP Lasix, now held.   She was noted to have an acute kidney injury  thus we were asked to see her. Her creatinine fortunately is slowing improving. She still appears mildly fluid overloaded having lower limb edema but certainly no respiratory distress. She is off of the sodium  chloride tablets. Her Prather catheter is out and intravenous Lasix have been stopped. Her blood pressures have come down. I added her on 20 mg of oral Lasix daily which I will continue for the time being. Her sodium has corrected with diuretics and fluid  restriction with her corrected sodium being 130. Her labs and exam findings are not suggestive of an ADH issue. I will liberalize her restriction to 1.4 L. She has received IV Venofer and we have given her a dose of erythropoietin in light of her anemia.  Her blood pressures have come down. Frankie/renin ratio was checked and not suggestive of hyperaldosteronism but she was on the ARB, now held. She has nephrotic range proteinuria. This may be related to diabetes but I will assess for a paraprotein. Her UA  may be related to the Prather but a urine culture was sent. Trend her RFP. Will follow.       Electronic Signatures:  Pascual Bhakta)  (Signed 26-Sep-2023 14:11)   Authored: Service, Subjective Data, Objective Data, Assessment  and Plan, Note Completion      Last Updated: 26-Sep-2023 14:11 by Pascual Bhakta ()

## 2023-09-30 NOTE — PROGRESS NOTES
Consult Type: subsequent visit/care     Service: Endocrinology     Subjective Data:   ALICIA IRENE is a 89 year old Female who is Hospital Day # 10.     No new complaints    No hypoglycemia.    Objective Data:     Objective Information:      T   P  R  BP   MAP  SpO2   Value  37.1  63  18  146/68      99%  Date/Time  0:44  0:44  0:44  0:44     0:44  Range  (36.5C - 37.5C )  (63 - 79 )  (16 - 18 )  (146 - 180 )/ (40 - 68 )    (97% - 100% )   As of 23-Sep-2023 02:23:00, patient is on 2 L/min of oxygen via nasal cannula with humidification.  Highest temp of 37.5 C was recorded at  2:23        Physical Exam by System:    Constitutional: Elderly female in no distress     Medication:    Medications:          Continuous Medications       --------------------------------  No continuous medications are active       Scheduled Medications       --------------------------------    1. Aspirin Chewable:  81  mg  Oral  Daily    2. Atorvastatin:  10  mg  Oral  Daily    3. Brimonidine 0.2% Ophthalmic:  1  drop(s)  Both Eyes  2 Times a Day    4. Cholecalciferol (Vitamin D3):  2000  International Unit(s)  Oral  Daily    5. Docusate 50 mg - Senna 8.6 m  tablet(s)  Oral  2 Times a Day    6. Heparin SubCutaneous:  5000  unit(s)  SubCutaneous  Every 8 Hours    7. hydrALAZINE (APRESOLINE):  50  mg  Oral  Every 8 Hours    8. Insulin Isophane (NPH) Injectable:  5  unit(s)  SubCutaneous  2 Times a Day    9. Insulin Lispro Moderate Corrective Scale:  unit(s)  SubCutaneous  3 Times a Day Before Meals    10. Latanoprost 0.005% Ophthalmic:  1  drop(s)  Both Eyes  At Bedtime    11. NIFEdipine (PROCARDIA XL) Extended Release:  30  mg  Oral  Daily    12. Pantoprazole:  40  mg  Oral  Daily    13. Polyethylene Glycol:  17  gram(s)  Oral  2 Times a Day    14. Timolol 0.5% Ophthalmic:  1  drop(s)  Both Eyes  2 Times a Day         PRN Medications       --------------------------------    1. Acetaminophen:  650  mg  Oral   Every 4 Hours    2. Acetaminophen:  650  mg  Oral  Every 4 Hours    3. Dextrose 50% in Water Injectable:  25  gram(s)  IntraVenous Push  Every 15 Minutes    4. Glucagon Injectable:  1  mg  IntraMuscular  Every 15 Minutes    5. hydrALAZINE (APRESOLINE) Injectable:  10  mg  IntraVenous Push  Every 6 Hours    6. Lidocaine 2% (UROJET) Topical Gel:  5  mL  Topical  Every 4 Hours    7. Ondansetron Injectable:  4  mg  IntraVenous Push  Every 4 Hours    8. Sodium Chloride 0.9% Injectable Flush:  10  mL  IntraVenous Flush  Every 8 Hours and as Needed        Recent Lab Results:    Results:        I have reviewed these laboratory results:    Glucose_POCT  Trending View      Result 23-Sep-2023 20:24:00  23-Sep-2023 17:05:00  23-Sep-2023 13:03:00    Glucose-POCT 264   H   363   H   316   H          Assessment and Plan:   Code Status:  ·  Code Status Full Code     Assessment:    TYPE 2 DIABETES MELLITUS  LONG TERM CURRENT INSULIN USE  History of frequent hypoglycemia  No further hypoglycemia  Hyperglycemic yesterday      RECOMMENDATIONS  Continue insulin corrective scale, if needed  Discussed option of changing from NPH BID to once daily basal analog.  She did not seem to understand that change.  For the sake of consistency, will continue Humulin N BID.   NPH 8 units BID        Electronic Signatures:  Saad Edouard)  (Signed 24-Sep-2023 11:21)   Authored: Service, Subjective Data, Objective Data, Assessment  and Plan, Note Completion      Last Updated: 24-Sep-2023 11:21 by Saad Edouard)

## 2023-09-30 NOTE — PROGRESS NOTES
Consult Type: subsequent visit/care     Service: Endocrinology     Subjective Data:   ALICIA IRENE is a 89 year old Female who is Hospital Day # 8.     Hypoglycemia overnight following NPH 15 units BID.    Objective Data:     Objective Information:      T   P  R  BP   MAP  SpO2   Value  36.4  56  18  127/69      96%  Date/Time 9/22 2:06 9/22 2:06 9/22 2:06 9/22 2:06    9/22 2:06  Range  (36.4C - 37.1C )  (52 - 76 )  (17 - 18 )  (115 - 185 )/ (46 - 82 )    (96% - 100% )  Highest temp of 37.1 C was recorded at 9/21 12:00        Physical Exam by System:    Constitutional: Elderly female in no distress     Medication:    Medications:          Continuous Medications       --------------------------------  No continuous medications are active       Scheduled Medications       --------------------------------    1. Aspirin Chewable:  81  mg  Oral  Daily    2. Atorvastatin:  10  mg  Oral  Daily    3. Brimonidine 0.2% Ophthalmic:  1  drop(s)  Both Eyes  2 Times a Day    4. Cholecalciferol (Vitamin D3):  2000  International Unit(s)  Oral  Daily    5. Heparin SubCutaneous:  5000  unit(s)  SubCutaneous  Every 8 Hours    6. hydrALAZINE (APRESOLINE):  50  mg  Oral  Every 8 Hours    7. Insulin Isophane (NPH) Injectable:  15  unit(s)  SubCutaneous  2 Times a Day    8. Insulin Lispro Moderate Corrective Scale:  unit(s)  SubCutaneous  3 Times a Day Before Meals    9. Latanoprost 0.005% Ophthalmic:  1  drop(s)  Both Eyes  At Bedtime    10. NIFEdipine (PROCARDIA XL) Extended Release:  30  mg  Oral  Daily    11. Pantoprazole:  40  mg  Oral  Daily    12. Timolol 0.5% Ophthalmic:  1  drop(s)  Both Eyes  2 Times a Day         PRN Medications       --------------------------------    1. Acetaminophen:  650  mg  Oral  Every 4 Hours    2. Acetaminophen:  650  mg  Oral  Every 4 Hours    3. Dextrose 50% in Water Injectable:  25  gram(s)  IntraVenous Push  Every 15 Minutes    4. Glucagon Injectable:  1  mg  IntraMuscular  Every 15 Minutes     5. Ondansetron Injectable:  4  mg  IntraVenous Push  Every 4 Hours    6. Sodium Chloride 0.9% Injectable Flush:  10  mL  IntraVenous Flush  Every 8 Hours and as Needed           Currently Suspended Medications       --------------------------------    1. hydrALAZINE (APRESOLINE) Injectable:  10  mg  IntraVenous Push  Every 6 Hours      Recent Lab Results:    Results:        I have reviewed these laboratory results:    Glucose_POCT  Trending View      Result 22-Sep-2023 02:07:00  22-Sep-2023 01:33:00  21-Sep-2023 20:31:00  21-Sep-2023 18:11:00  21-Sep-2023 13:17:00    Glucose-POCT 123   H   37   L   155   H   214   H   183   H          Assessment and Plan:   Code Status:  ·  Code Status Full Code     Assessment:    TYPE 2 DIABETES MELLITUS  LONG TERM CURRENT INSULIN USE  History of frequent hypoglycemia  Hypoglycemia despite marked decrease in NPH insulin      RECOMMENDATIONS  Continue insulin corrective scale, if needed  Hold NPH for now.  Will determine dose to resume  Appreciate Diabetes education        Electronic Signatures:  Saad Edouard)  (Signed 22-Sep-2023 17:39)   Authored: Service, Subjective Data, Objective Data, Assessment  and Plan, Note Completion      Last Updated: 22-Sep-2023 17:39 by Saad Edouard)

## 2023-09-30 NOTE — PROGRESS NOTES
Service: Renal     Subjective Data:   ALICIA CARTAGENA is a 89 year old Female who is Hospital Day # 11.     Ms. Cartagena is a 89-year-old woman with a history of hypertension, hyperlipidemia, diabetes who presented with hypoglycemia and hypertension.  Cardiology saw her as for a BP of 200s systolic.  She had  apparently fallen the night prior to her presentation. Was taken of losartan in light of her renal function and placed on hydralazine.  Echocardiogram noted normal systolic function, moderate concentric LVH, and elevated right-sided pressures. Prather catheter  was placed. She received IVP Lasix, now held.     No overnight events.    Overnight Events: Patient had an uneventful night.     Objective Data:     Objective Information:      T   P  R  BP   MAP  SpO2   Value  36.4  73  16  153/70      96%  Date/Time 9/25 11:58 9/25 11:58 9/25 11:58 9/25 11:58    9/25 11:58  Range  (36.4C - 37.1C )  (63 - 79 )  (16 - 18 )  (146 - 179 )/ (45 - 71 )    (96% - 99% )  Highest temp of 37.1 C was recorded at 9/24 0:44      Pain reported at 9/25 7:45: 0 = None    ---- Intake and Output  -----  Mn/Dy/Year Time  Intake   Output  Net  Sep 24, 2023 2:00 pm  480   0  480    The Intake and Output Totals for the last 24 hours are:      Intake   Output  Net      480   null  null    Physical Exam by System:    Constitutional: Well developed, awake/alert/oriented  x3, no distress, alert and cooperative   Eyes: PERRL, EOMI, clear sclera   ENMT: mucous membranes moist, no apparent injury,  no lesions seen   Head/Neck: Neck supple, no JVD, trachea midline   Respiratory/Thorax: Diminished to bilateral bases   Cardiovascular: Regular, rate and rhythm, systolic  murmur, normal S 1 and S 2   Gastrointestinal: Nondistended, soft, non-tender,  no rebound tenderness or guarding   Genitourinary: Prather catheter   Musculoskeletal: ROM intact, no joint swelling, normal  strength   Extremities: normal extremities, 1+ lower extremity  edema    Neurological: alert and oriented x3  No asterixis   Skin: Warm and dry, no lesions, no rashes     Medication:    Medications:          Continuous Medications       --------------------------------  No continuous medications are active       Scheduled Medications       --------------------------------    1. Aspirin Chewable:  81  mg  Oral  Daily    2. Atorvastatin:  10  mg  Oral  Daily    3. Brimonidine 0.2% Ophthalmic:  1  drop(s)  Both Eyes  2 Times a Day    4. Cholecalciferol (Vitamin D3):  2000  International Unit(s)  Oral  Daily    5. Docusate 50 mg - Senna 8.6 m  tablet(s)  Oral  2 Times a Day    6. Heparin SubCutaneous:  5000  unit(s)  SubCutaneous  Every 8 Hours    7. hydrALAZINE (APRESOLINE):  50  mg  Oral  Every 8 Hours    8. Insulin Isophane (NPH) Injectable:  8  unit(s)  SubCutaneous  2 Times a Day    9. Insulin Lispro Moderate Corrective Scale:  unit(s)  SubCutaneous  3 Times a Day Before Meals    10. Latanoprost 0.005% Ophthalmic:  1  drop(s)  Both Eyes  At Bedtime    11. NIFEdipine (PROCARDIA XL) Extended Release:  30  mg  Oral  Daily    12. Pantoprazole:  40  mg  Oral  Daily    13. Polyethylene Glycol:  17  gram(s)  Oral  2 Times a Day    14. Timolol 0.5% Ophthalmic:  1  drop(s)  Both Eyes  2 Times a Day         PRN Medications       --------------------------------    1. Acetaminophen:  650  mg  Oral  Every 4 Hours    2. Acetaminophen:  650  mg  Oral  Every 4 Hours    3. Dextrose 50% in Water Injectable:  25  gram(s)  IntraVenous Push  Every 15 Minutes    4. Glucagon Injectable:  1  mg  IntraMuscular  Every 15 Minutes    5. hydrALAZINE (APRESOLINE) Injectable:  10  mg  IntraVenous Push  Every 6 Hours    6. Lidocaine 2% (UROJET) Topical Gel:  5  mL  Topical  Every 4 Hours    7. Ondansetron Injectable:  4  mg  IntraVenous Push  Every 4 Hours    8. Sodium Chloride 0.9% Injectable Flush:  10  mL  IntraVenous Flush  Every 8 Hours and as Needed        Recent Lab Results:    Results:    CBC:  9/25/2023 05:20              \     Hgb     /                              \     7.7 L    /  WBC  ----------------  Plt               10.2       ----------------    262              /     Hct     \                              /     24.1 L    \            RBC: 2.46 L    MCV: 98           RFP: 9/25/2023 05:20  NA+        Cl-     BUN  /                         128 L   94 L    44 H /  --------------------------------  Glucose                ---------------------------  140 H    K+     HCO3-   Creat \                         4.6    26    2.38 H \  Calcium : 8.5 LAnion Gap : 13          Albumin : 3.0 L     Phos : 3.7      Radiology Results:    Results:    CT Head without Contrast [Sep 24 2023  4:42PM]  Xray Chest 1 View [Sep 22 2023 11:38AM]  Electrocardiogram 12 Lead [Sep 20 2023  8:06AM]  Ultrasound Renal Bilateral [Sep 18 2023  2:35PM]  Echocardiogram [Sep 17 2023  8:17AM]      Assessment and Plan:   Daily Risk Screen:  ·  Does patient have an indwelling urinary catheter? n/a consulting service     Comorbidities:  ·  Comorbidity Other     Code Status:  ·  Code Status Full Code     Assessment:    ALICIA IRENE is a 89 year old Female with a past medical history of  hypertension, hyperlipidemia, diabetes, chronic kidney disease stage III with a baseline creatinine of 1.5 to 1.6 mg/deciliter who presented with hypoglycemia and hypertension.  Cardiology saw her  as for a BP of 200s systolic.  She had apparently fallen the night prior to her presentation. Was taken of losartan in light of her renal function and placed on hydralazine.  Echocardiogram noted normal systolic function, moderate concentric LVH, and  elevated right-sided pressures. Prather catheter was placed. She received IVP Lasix, now held.   She was noted to have an acute kidney injury thus we were asked to see her. Her creatinine fortunately has plateau' d. She still appears mildly fluid overloaded having lower limb edema but certainly no respiratory  distress.  She is off of the sodium chloride  tablets. She has the Prather catheter which will need addressed.  She is off intravenous Lasix. Blood pressures have come down. I will add her on 20 mg of oral Lasix daily.  He has iron deficiency anemia.  She has received IV Venofer and was given a dose  of erythropoietin.  Improving hyponatremia.  She is on a 1.2 L fluid restriction.  Her urine sodium was low, urine osmolality was below serum.  Lower suspicion for an ADH issue.  Her Frankie/renin ratio was not suggestive of hyperaldosteronism but she was  on the ARB. This is now held. Trend her RFP. Will follow.       Electronic Signatures:  Pascual Bhakta)  (Signed 25-Sep-2023 12:47)   Authored: Service, Subjective Data, Objective Data, Assessment  and Plan, Note Completion      Last Updated: 25-Sep-2023 12:47 by Pascual Bhakta ()

## 2023-09-30 NOTE — PROGRESS NOTES
Consult Type: subsequent visit/care     Service: General Internal Medicine     History of Present Illness:   History Present Illness:  Admission Reason: hypertensive urgency   HPI:    Blood pressure today still running high.  She did not receive the amlodipine yesterday.  Amlodipine was discontinued this morning.  Her blood pressure still 200s  over 70s today.  She complains of burning in her feet.  She complains of swelling to her lower extremities    Subjective Data:   ALICIA IRENE is a 89 year old Female who is Hospital Day # 4.    Overnight Events: Patient had an uneventful night.     Objective Data:     Objective Information:      T   P  R  BP   MAP  SpO2   Value  36.6  47  16  110/61   82  98%  Date/Time 9/18 8:28 9/18 9:52 9/18 9:52 9/18 9:52  9/17 4:15 9/18 9:52  Range  (36.2C - 36.8C )  (45 - 64 )  (16 - 20 )  (110 - 225 )/ (48 - 77 )  (82 - 104 )  (95% - 100% )      Pain reported at 9/17 21:51: 0 = None    Physical Exam Narrative:  ·  Physical Exam:    ROS-10 point review of systems obtained and negative except what is listed in HPI    Physical Exam:  General/Constitutional: Alert, oriented , cooperative,  in no acute distress.  Head: normocephalic, atraumatic  Skin: Intact,  dry skin  Eyes: PERRL, EOMs intact,  Conjunctiva pink with no erythema or exudates. No scleral icterus.   ENT: No external deformities. Nares patent, mucus membranes moist.  Pharynx clear, uvula midline.   Neck: Supple,   Pulmonary: Clear bilaterally with good chest wall excursion. No rales, rhonchi or wheezing.   Cardiac: Regular rate and rhythm. good pulses.  Abdomen: Soft, nontender, active bowel sounds.  No palpable organomegaly.  No rebound or guarding.  No CVA tenderness.  Genitourinary: Exam deferred.  Musculoskeletal: Full range of motion,  Neurological:  alert oriented x 3 no focal deficit.  Psychiatric: Appropriate mood and affect. Calm.     Medication:    Medications:          Continuous Medications        --------------------------------    1. Sodium Chloride 0.9% Infusion:  1000  mL  IntraVenous  <Continuous>         Scheduled Medications       --------------------------------    1. Aspirin Chewable:  81  mg  Oral  Daily    2. Atorvastatin:  10  mg  Oral  Daily    3. Brimonidine 0.2% Ophthalmic:  1  drop(s)  Both Eyes  2 Times a Day    4. Carvedilol:  12.5  mg  Oral  2 Times a Day    5. Cholecalciferol (Vitamin D3):  2000  International Unit(s)  Oral  Daily    6. Heparin SubCutaneous:  5000  unit(s)  SubCutaneous  Every 8 Hours    7. hydrALAZINE (APRESOLINE):  50  mg  Oral  Every 12 Hours    8. Insulin Lispro Moderate Corrective Scale:  unit(s)  SubCutaneous  3 Times a Day Before Meals    9. Latanoprost 0.005% Ophthalmic:  1  drop(s)  Both Eyes  At Bedtime    10. NIFEdipine (PROCARDIA XL) Extended Release:  30  mg  Oral  Daily    11. Pantoprazole:  40  mg  Oral  Daily    12. Timolol 0.5% Ophthalmic:  1  drop(s)  Both Eyes  2 Times a Day         PRN Medications       --------------------------------    1. Acetaminophen:  650  mg  Oral  Every 4 Hours    2. Acetaminophen:  650  mg  Oral  Every 4 Hours    3. Dextrose 50% in Water Injectable:  25  gram(s)  IntraVenous Push  Every 15 Minutes    4. Glucagon Injectable:  1  mg  IntraMuscular  Every 15 Minutes    5. hydrALAZINE (APRESOLINE) Injectable:  10  mg  IntraVenous Push  Every 6 Hours    6. Ondansetron Injectable:  4  mg  IntraVenous Push  Every 4 Hours    7. Sodium Chloride 0.9% Injectable Flush:  10  mL  IntraVenous Flush  Every 8 Hours and as Needed        Recent Lab Results:    Results:    CBC: 9/18/2023 06:55              \     Hgb     /                              \     7.8 L    /  WBC  ----------------  Plt               9.0       ----------------    244              /     Hct     \                              /     24.3 L    \            RBC: 2.60 L    MCV: 93           BMP: 9/18/2023 06:55  NA+        Cl-     BUN  /                         123 L   93 L     41 H /  --------------------------------  Glucose                ---------------------------  162 H    K+     HCO3-   Creat \                         4.7  22    2.12 H \  Calcium : 8.0 L    Anion Gap : 13      Radiology Results:    Results:        Conclusion:  CONCLUSIONS:  1. Poorly visualized anatomical structures due to suboptimal image quality.  2. Left ventricular systolic function is hyperdynamic with a 70-75% estimated ejection fraction.  3. Spectral Doppler shows an impaired relaxation pattern of left ventricular diastolic filling.  4. There is moderate concentric left ventricular hypertrophy.  5. Left ventricular cavity size is decreased.  6. The left atrium is enlarged.  7. Mild to moderately elevated right ventricular systolic pressure.  8. Mild aortic valve stenosis.    QUANTITATIVE DATA SUMMARY:  2D MEASUREMENTS:  Normal Ranges:  LAs:           3.15 cm    (2.7-4.0cm)  IVSd:          1.40 cm    (0.6-1.1cm)  LVPWd:         1.40 cm    (0.6-1.1cm)  LVIDd:         3.80 cm    (3.9-5.9cm)  LVIDs:         2.50 cm  LV Mass Index: 100.8 g/m2  LV % FS        34.2 %    LA VOLUME:  Normal Ranges:  LA Vol A4C:        131.3 ml   (22+/-6mL/m2)  LA Vol A2C:        88.2 ml  LA Vol BP:         109.2 ml  LA Vol Index A4C:  68.2ml/m2  LA Vol Index A2C:  45.8 ml/m2  LA Vol IndexBP:   56.7 ml/m2  LA Area A4C:       29.7 cm2  LA Area A2C:       24.0 cm2  LA Major Axis A4C: 5.7 cm  LA Major Axis A2C: 5.6 cm  LA Volume Index:   56.7 ml/m2    RA VOLUME BY A/L METHOD:  Normal Ranges:  RA Area A4C: 13.0 cm2    AORTA MEASUREMENTS:  Normal Ranges:  Ao Sinus, d: 2.67 cm (2.1-3.5cm)  Ao STJ, d:   2.38 cm (1.7-3.4cm)  Asc Ao, d:   3.04 cm (2.1-3.4cm)    LV SYSTOLIC FUNCTION BY 2D PLANIMETRY (MOD):  Normal Ranges:  EF-A4C View: 61.9 % (>=55%)  EF-A2C View: 67.4 %  EF-Biplane:  64.6 %    LV DIASTOLIC FUNCTION:  Normal Ranges:  MV Peak E:        0.48 m/s    (0.7-1.2 m/s)  MV Peak A:        1.10 m/s    (0.42-0.7 m/s)  E/A Ratio:         0.44        (1.0-2.2)  MV e'             0.04 m/s    (>8.0)  MV lateral e'     0.06 m/s  MV medial e'      0.04 m/s  MV A Dur:         156.00 msec  E/e' Ratio:       11.99       (<8.0)  PulmV Sys Maikel:    51.10 cm/s  PulmV Marshall Maikel:   32.90 cm/s  PulmV S/D Maikel:    1.60  PulmV A Revs Maikel: 21.20 cm/s  PulmV A Revs Dur: 124.00 msec    MITRAL VALVE:  Normal Ranges:  MV DT: 172 msec (150-240msec)    AORTIC VALVE:  Normal Ranges:  AoV Vmax:                2.46 m/s  (<=1.7m/s)  AoV Peak P.2 mmHg (<20mmHg)  AoV Mean P.0 mmHg (1.7-11.5mmHg)  LVOT Max Maikel:            1.60 m/s  (<=1.1m/s)  AoV VTI:                 51.80 cm  (18-25cm)  LVOT VTI:                34.40 cm  LVOT Diameter:           1.70 cm   (1.8-2.4cm)  AoV Area, VTI:           1.51 cm2  (2.5-5.5cm2)  AoV Area,Vmax:           1.48 cm2  (2.5-4.5cm2)  AoV Dimensionless Index: 0.66      RIGHT VENTRICLE:  RV Basal 3.35 cm  RV Mid   2.45 cm  RV Major 7.0 cm    TRICUSPID VALVE/RVSP:  Normal Ranges:  Peak TR Velocity: 3.28 m/s  Est. RA Pressure: 3 mmHg  RV Syst Pressure: 46.0 mmHg (< 30mmHg)  IVC Diam:   0.93 cm    PULMONIC VALVE:  Normal Ranges:  PV Accel Time: 129 msec (>120ms)  PV Max Maikel:    1.3 m/s  (0.6-0.9m/s)  PV Max P.1 mmHg    Pulmonary Veins:  PulmV A Revs Dur: 124.00 msec  PulmV A Revs Maikel: 21.20 cm/s  PulmV Marshall Maikel:   32.90 cm/s  PulmV S/D Maikel:    1.60  PulmV Sys Maikel:    51.10 cm/s      [id number] Adam Farmer MD  Electronically signed on 2023 at 8:17:32 AM        *** Final ***     Echocardiogram [Sep 17 2023  8:17AM]      Assessment and Plan:   Code Status:  ·  Code Status Full Code     Assessment:    ALICIA IRENE is a 89 year old Female presenting with hypoglycemia and hypertension for the past week or longer and a recent fall. she saw pcp before her fall and  also after a previous fall. her bp meds were adjusted and her insulin decreased but low bg persists and elevated bp persits. she noted a headache and  hallucinations last night prior to admission when her bp was very high. she reports walking to bathroom  and fell, and hit emergency assist button. she lives in senior home and they came to check on her. she was unable to get off the floor and thus was brought to ed.  on admission it was 223/71 with hr 57 and bg 63. she denies headache at this time. she  does have some right knee pain, hx of chronic r knee pain but a bit worse now. She reports hx of neuropathy related to her dm and has numb feeling in her feet.    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 trace bl lower ext edema. consider siadh, adrenal insuff, hypothyroidism    - tsh elevated, am cortisol, acth     massimo on CKD stg 3- nephrology consulted. bp uncontrolled. cret today is 2.12 baseline is 1.5      hypertensive urgency  -bp still uncontrolled. never received amlodipine yesterday. dc amlodipine and switched to nifedipine. cardio taday increased hydralazine to 50 mg- however bp is now down to 110- hydralazine is new. nifedipine has room to be adjusted. maybe consider adjusting  one medication at a time instead of multiple agents  - -cardiology consult - appreciate recs   - echo   - Left ventricular systolic function is hyperdynamic with a 70-75% estimated ejection fraction.  3. Spectral Doppler shows an impaired relaxation pattern of left ventricular diastolic filling.  4. There is moderate concentric left ventricular hypertrophy.  5. Left  ventricular cavity size is decreased.  6. The left atrium is enlarged.  7. Mild to moderately elevated right ventricular systolic pressure.  8. Mild aortic valve stenosis.     DVT prophylaxis  -heparin   -SCDs    GI prophylaxis   - ppi and docusate     Disposition: Plan of care discussed with patient.  No plan for discharge today.  Time spent 35 minutes..  Assumed care from observation unit on hospital day 2      Electronic Signatures:  Praveena Mark (APRN-CNS)  (Signed 18-Sep-2023 11:37)   Authored: Service, History of Present Illness, Subjective  Data, Objective Data, Assessment and Plan, Note Completion      Last Updated: 18-Sep-2023 11:37 by Praveena Mark (APRN-CNS)

## 2023-09-30 NOTE — PROGRESS NOTES
Service: Medicine     Subjective Data:   ALICIA IRENE is a 89 year old Female who is Hospital Day # 12.     On room air, feels ok. Robust void overnight. Afebrile. No overnight events reported.    Overnight Events: Patient had an uneventful night.     Objective Data:     Objective Information:      T   P  R  BP   MAP  SpO2   Value  36.6  75  18  164/73      96%  Date/Time 9/26 8:00 9/26 8:00 9/26 8:00 9/26 8:00    9/26 8:00  Range  (36.4C - 37.2C )  (69 - 84 )  (16 - 19 )  (153 - 183 )/ (42 - 74 )    (96% - 99% )  Highest temp of 37.2 C was recorded at 9/25 15:46      Pain reported at 9/26 8:00: 0 = None    ---- Intake and Output  -----  Mn/Dy/Year Time  Intake   Output  Net  Sep 26, 2023 6:00 am  0   400  -400  Sep 25, 2023 10:00 pm  0   210  -210  Sep 25, 2023 2:00 pm  480   1000  -520    The Intake and Output Totals for the last 24 hours are:      Intake   Output  Net      480   1610  -1130    Physical Exam Narrative:  ·  Physical Exam:    ROS-10 point review of systems obtained and negative except what is listed in HPI    Physical Exam:  General/Constitutional: Alert, oriented , cooperative,  in no acute distress.  Head: normocephalic, atraumatic  Skin: Intact,  dry skin  Eyes: PERRL, EOMs intact,  Conjunctiva pink with no erythema or exudates. No scleral icterus.   ENT: No external deformities. Nares patent, mucus membranes moist.  Pharynx clear, uvula midline.   Neck: Supple,   Pulmonary: Clear bilaterally with good chest wall excursion. No rales, rhonchi or wheezing.   Cardiac: Regular rate and rhythm. good pulses.  Abdomen: Soft, nontender, active bowel sounds.  No palpable organomegaly.  No rebound or guarding.  No CVA tenderness.  Musculoskeletal: Full range of motion,      Recent Lab Results:    Results:    CBC: 9/26/2023 07:41              \     Hgb     /                              \     7.9 L    /  WBC  ----------------  Plt               12.1 H    ----------------    274              /     Hct      \                              /     24.3 L    \            RBC: 2.52 L    MCV: 96         Radiology Results:    Results:        Impression:    1.  No evidence of acute cardiopulmonary process.           MACRO:  None     Xray Chest 1 View [Sep 22 2023 11:38AM]      Assessment and Plan:   Daily Risk Screen:  ·  Does patient have an indwelling urinary catheter? yes   ·  Plan for indwelling urinary catheter removal today? no   ·  The patient continues to require indwelling urinary catheterization for critically ill patients who need accurate urinary output measurements     Comorbidities:  ·  Comorbidity Other     Code Status:  ·  Code Status Full Code     Assessment:    9/26:  HypoNa... still low... cont fluid restriction, lasix, f/u renal recs.   DM... glucose better, a bit high... NPH increased 8 BID --> 10 BID. This will be dc regimen most likely.   Uncontrolled HTN... Increase procardia 30 --> 60 and monitor.   Prather removed yesterday, so far successful ToV.   Rafita/ckd... Cr stable, improving. Baseline appears to be about 1.5.   UTI... slight leukocytosis... cont CTX for now, f/u cx/s.   H/h stable and rising.   Home regimen for chronic conditions  Dvt px with heparin                9/25:  ALICIA IRENE is a 89 year old Female presenting with hypoglycemia and hypertension for the past week or longer and a recent fall. she saw pcp before her fall and also after a previous fall. her bp meds were adjusted and her insulin decreased but low  bg persists and elevated bp persits. she noted a headache and hallucinations last night prior to admission when her bp was very high. she reports walking to bathroom and fell, and hit emergency assist button. she lives in senior home and they came to  check on her. she was unable to get off the floor and thus was brought to ed.  on admission it was 223/71 with hr 57 and bg 63. she denies headache at this time. she does have some right knee pain, hx of chronic r knee pain but a  bit worse now. She reports  hx of neuropathy related to her dm and has numb feeling in her feet.    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   - hold oral hyperglycemic agent  -endocrinology consult   - sliding scale, nph per endocrinology   -glucose still with highs and lows     hyponatremia   - na 128, stable  -has been diuresed with Lasix, none today   dizziness resolved could have likely been from hyponatremia and/or diuresis, checked orthos they were also negative, ct head negative  - fluid restriction 1200ml   - consult nephrology    massimo on CKD stg 3-  nephrology following      volume overload  - huerta for strict I&O  iv lasix   huerta gave patient relief may have been retaining,     Anemia- likely due to chronic disease, ckd, hemoglobin 7.7    Hypertensive urgency- bp stable  bradycardia on 9/19 improved since coreg stopped  - switched to nifedipine. cardio increased hydralazine to 50 mg, switched metop switched  to coreg however coreg is now held, hydralazine increased to 50mg three times a day per cardiology   - -cardiology consult - appreciate recs   - echo   - Left ventricular systolic function is hyperdynamic with a 70-75% estimated ejection fraction.  3. Spectral Doppler shows an impaired relaxation pattern of left ventricular diastolic filling.  4. There is moderate concentric left ventricular hypertrophy.  5. Left ventricular cavity size is decreased.  6. The left atrium is enlarged.  7. Mild to moderately elevated right ventricular systolic pressure.  8. Mild aortic valve stenosis.     DVT prophylaxis  -heparin   -SCDs    GI prophylaxis   - ppi and docusate     Disposition: Plan of care discussed with patient.  Today or tomorrow pending nephrology recommendations.      Electronic Signatures:  Fredy Farah)  (Signed 26-Sep-2023 09:08)   Authored: Service, Subjective Data,  Objective Data, Assessment  and Plan, Note Completion      Last Updated: 26-Sep-2023 09:08 by Fredy Farah)

## 2023-09-30 NOTE — PROGRESS NOTES
Service: Cardiology     Subjective Data:   ALICIA IRENE is a 89 year old Female who is Hospital Day # 4.     BP still in the 200s.  Patient did not get amlodipine yesterday which was prescribed instead primary team gave her 30 mg of nifedipine this a.m.    CBC this morning shows hemoglobin from 9.2->7.8    Creatinine same at 2.12.    Objective Data:     Objective Information:        T   P  R  BP   MAP  SpO2   Value  36.6  63  18  208/62   82  100%  Date/Time 9/18 8:28 9/18 8:28 9/18 8:28 9/18 8:28  9/17 4:15 9/18 8:28  Range  (36.2C - 36.8C )  (45 - 64 )  (16 - 20 )  (138 - 225 )/ (48 - 77 )  (82 - 104 )  (95% - 100% )    Physical Exam by System:    Constitutional: Well developed, awake/alert/oriented  x3, no distress, alert and cooperative   Cardiovascular: Regular, rate and rhythm, no murmurs,  2+ equal pulses of the extremities, normal S 1and S 2   Extremities: normal extremities, no cyanosis edema,  contusions or wounds, no clubbing   Neurological: alert and oriented x3, intact senses,  motor, response and reflexes, normal strength   Psychological: Appropriate mood and behavior   Skin: Warm and dry, no lesions, no rashes     Medication:    Medications:          Continuous Medications       --------------------------------  No continuous medications are active       Scheduled Medications       --------------------------------    1. Aspirin Chewable:  81  mg  Oral  Daily    2. Atorvastatin:  10  mg  Oral  Daily    3. Brimonidine 0.2% Ophthalmic:  1  drop(s)  Both Eyes  2 Times a Day    4. Carvedilol:  12.5  mg  Oral  2 Times a Day    5. Cholecalciferol (Vitamin D3):  2000  International Unit(s)  Oral  Daily    6. Heparin SubCutaneous:  5000  unit(s)  SubCutaneous  Every 8 Hours    7. hydrALAZINE (APRESOLINE):  50  mg  Oral  Every 12 Hours    8. Insulin Lispro Moderate Corrective Scale:  unit(s)  SubCutaneous  3 Times a Day Before Meals    9. Latanoprost 0.005% Ophthalmic:  1  drop(s)  Both Eyes  At Bedtime     10. NIFEdipine (PROCARDIA XL) Extended Release:  30  mg  Oral  Daily    11. Pantoprazole:  40  mg  Oral  Daily    12. Timolol 0.5% Ophthalmic:  1  drop(s)  Both Eyes  2 Times a Day         PRN Medications       --------------------------------    1. Acetaminophen:  650  mg  Oral  Every 4 Hours    2. Acetaminophen:  650  mg  Oral  Every 4 Hours    3. Dextrose 50% in Water Injectable:  25  gram(s)  IntraVenous Push  Every 15 Minutes    4. Glucagon Injectable:  1  mg  IntraMuscular  Every 15 Minutes    5. hydrALAZINE (APRESOLINE) Injectable:  10  mg  IntraVenous Push  Every 6 Hours    6. Ondansetron Injectable:  4  mg  IntraVenous Push  Every 4 Hours    7. Sodium Chloride 0.9% Injectable Flush:  10  mL  IntraVenous Flush  Every 8 Hours and as Needed        Recent Lab Results:    Results:    CBC: 9/18/2023 06:55              \     Hgb     /                              \     7.8 L    /  WBC  ----------------  Plt               9.0       ----------------    244              /     Hct     \                              /     24.3 L    \            RBC: 2.60 L    MCV: 93           BMP: 9/18/2023 06:55  NA+        Cl-     BUN  /                         123 L   93 L    41 H /  --------------------------------  Glucose                ---------------------------  162 H    K+     HCO3-   Creat \                         4.7  22    2.12 H \  Calcium : 8.0 L    Anion Gap : 13      Radiology Results:    Results:        Conclusion:  CONCLUSIONS:  1. Poorly visualized anatomical structures due to suboptimal image quality.  2. Left ventricular systolic function is hyperdynamic with a 70-75% estimated ejection fraction.  3. Spectral Doppler shows an impaired relaxation pattern of left ventricular diastolic filling.  4. There is moderate concentric left ventricular hypertrophy.  5. Left ventricular cavity size is decreased.  6. The left atrium is enlarged.  7. Mild to moderately elevated right ventricular systolic  pressure.  8. Mild aortic valve stenosis.    QUANTITATIVE DATA SUMMARY:  2D MEASUREMENTS:  Normal Ranges:  LAs:           3.15 cm    (2.7-4.0cm)  IVSd:          1.40 cm    (0.6-1.1cm)  LVPWd:         1.40 cm    (0.6-1.1cm)  LVIDd:         3.80 cm    (3.9-5.9cm)  LVIDs:         2.50 cm  LV Mass Index: 100.8 g/m2  LV % FS        34.2 %    LA VOLUME:  Normal Ranges:  LA Vol A4C:        131.3 ml   (22+/-6mL/m2)  LA Vol A2C:        88.2 ml  LA Vol BP:         109.2 ml  LA Vol Index A4C:  68.2ml/m2  LA Vol Index A2C:  45.8 ml/m2  LA Vol IndexBP:   56.7 ml/m2  LA Area A4C:       29.7 cm2  LA Area A2C:       24.0 cm2  LA Major Axis A4C: 5.7 cm  LA Major Axis A2C: 5.6 cm  LA Volume Index:   56.7 ml/m2    RA VOLUME BY A/L METHOD:  Normal Ranges:  RA Area A4C: 13.0 cm2    AORTA MEASUREMENTS:  Normal Ranges:  Ao Sinus, d: 2.67 cm (2.1-3.5cm)  Ao STJ, d:   2.38 cm (1.7-3.4cm)  Asc Ao, d:   3.04 cm (2.1-3.4cm)    LV SYSTOLIC FUNCTION BY 2D PLANIMETRY (MOD):  Normal Ranges:  EF-A4C View: 61.9 % (>=55%)  EF-A2C View: 67.4 %  EF-Biplane:  64.6 %    LV DIASTOLIC FUNCTION:  Normal Ranges:  MV Peak E:        0.48 m/s    (0.7-1.2 m/s)  MV Peak A:        1.10 m/s    (0.42-0.7 m/s)  E/A Ratio:        0.44        (1.0-2.2)  MV e'             0.04 m/s    (>8.0)  MV lateral e'     0.06 m/s  MV medial e'      0.04 m/s  MV A Dur:         156.00 msec  E/e' Ratio:       11.99       (<8.0)  PulmV Sys Maikel:    51.10 cm/s  PulmV Marshall Maikel:   32.90 cm/s  PulmV S/D Maikel:    1.60  PulmV A Revs Maikel: 21.20 cm/s  PulmV A Revs Dur: 124.00 msec    MITRAL VALVE:  Normal Ranges:  MV DT: 172 msec (150-240msec)    AORTIC VALVE:  Normal Ranges:  AoV Vmax:                2.46 m/s  (<=1.7m/s)  AoV Peak P.2 mmHg (<20mmHg)  AoV Mean P.0 mmHg (1.7-11.5mmHg)  LVOT Max Maikel:            1.60 m/s  (<=1.1m/s)  AoV VTI:                 51.80 cm  (18-25cm)  LVOT VTI:                34.40 cm  LVOT Diameter:           1.70 cm   (1.8-2.4cm)  AoV  "Area, VTI:           1.51 cm2  (2.5-5.5cm2)  AoV Area,Vmax:           1.48 cm2  (2.5-4.5cm2)  AoV Dimensionless Index: 0.66      RIGHT VENTRICLE:  RV Basal 3.35 cm  RV Mid   2.45 cm  RV Major 7.0 cm    TRICUSPID VALVE/RVSP:  Normal Ranges:  Peak TR Velocity: 3.28 m/s  Est. RA Pressure: 3 mmHg  RV Syst Pressure: 46.0 mmHg (< 30mmHg)  IVC Diam:   0.93 cm    PULMONIC VALVE:  Normal Ranges:  PV Accel Time: 129 msec (>120ms)  PV Max Maikel:    1.3 m/s  (0.6-0.9m/s)  PV Max P.1 mmHg    Pulmonary Veins:  PulmV A Revs Dur: 124.00 msec  PulmV A Revs Maikel: 21.20 cm/s  PulmV Marshall Maikel:   32.90 cm/s  PulmV S/D Maikel:    1.60  PulmV Sys Maikel:    51.10 cm/s      [id number] Adam Farmer MD  Electronically signed on 2023 at 8:17:32 AM        *** Final ***     Echocardiogram [Sep 17 2023  8:17AM]      Impression:    Cardiomegaly. No acute pulmonary process.     Xray Chest 1 View [Sep 15 2023  9:41PM]      Assessment and Plan:   Comorbidities:  ·  Comorbidity Other     Code Status:  ·  Code Status Full Code     Assessment:    ALICIA IRENE is a 89 year old Female with a past medical history of GERD, htn, hld, anemia (baseline 9), DM (insulin), lumbar stenosis, and lymphedema presents to  [facility] ER with hypoglycemia and hypertension.  Patient suffered a recent fall as of late on admit to the ER her blood pressure was 223/71, heart rate 57, 98% on room air.  Her blood sugar on admit was 63.  Cardiology was consulted on this stay for \"resistant  htn, new findings on echo\".  I personally reviewed the patient?s recent labs, medications, orders, EKGs, and pertinent cardiac imaging/ echocardiography.     #Hypertensive emergency  -BP on admit 223/71  -Home BP meds include 100 metoprolol succinate daily and hydralazine 25 mg twice daily per patient  -Start Coreg 12.5 mg twice daily, and increase with hydralazine 50 mg twice daily  -Continue with nifedipine 30 mg daily as started by primary    #Chronic HFpEF  -TTE  Poorly visualized " anatomical structures due to suboptimal image quality.  2. Left ventricular systolic function is hyperdynamic with a 70-75% estimated ejection fraction.  3. Spectral Doppler shows an impaired relaxation pattern of left ventricular diastolic filling.  -admit   -Chest x-ray clear, on room air, denies shortness of breath    #OLGA LIDIA on CKD  -Baseline creatinine 1.5  -Today it is 2.12  -hold her ACE/ ARB for now     RECS:   -Continue with blood pressure meds above, can follow-up with Dr. Pete Iniguez MD or Lauren Pappas CNP in 4-6 weeks.     Thank you for allowing me to participate in their care.  Please feel free to call me with any further questions or concerns.        Louie Iniguez MD, [facility], [facility]  Division of Cardiovascular Medicine  Medical Director, [facility], [facility]  , Medicine, [facility] [facility] [employer] [facility] [employer]  [email or web address]  pager: [phone]     I have examined the patient and edited the documented physical examination as necessary.  I have reviewed the NAZANIN?s encounter note, approve the NAZANIN?s documentation and have edited the note to reflect our diagnostic and therapeutic plan.                 Electronic Signatures:  Amena Bowers (APRN-CNP)  (Signed 18-Sep-2023 09:26)   Authored: Service, Subjective Data, Objective Data, Assessment  and Plan  Louie Iniguez)  (Signed 18-Sep-2023 14:32)   Authored: Assessment and Plan, Note Completion      Last Updated: 18-Sep-2023 14:32 by Louie Iniguez)

## 2023-09-30 NOTE — PROGRESS NOTES
Service: Cardiology     Subjective Data:   ALICIA IRENE is a 89 year old Female who is Hospital Day # 7.     Patient resting, no complaints of CP, dyspnea, headache, dizziness or lightheadedness.    Objective Data:     Objective Information:      T   P  R  BP   MAP  SpO2   Value  37.1  52  18  150/46      98%  Date/Time 9/21 12:00 9/21 12:00 9/21 12:00 9/21 12:00    9/21 12:00  Range  (36.1C - 37.1C )  (52 - 86 )  (16 - 18 )  (133 - 185 )/ (40 - 72 )    (98% - 99% )  Highest temp of 37.1 C was recorded at 9/21 12:00      Pain reported at 9/21 8:24: 0 = None    Physical Exam by System:    Constitutional: Pleasant obese female, awake/alert/oriented  x3, no apparent distress, alert and cooperative   ENMT: mucous membranes moist, no apparent injury,  no lesions seen   Head/Neck: Neck supple, no apparent injury, No JVD   Respiratory/Thorax: Patent airways, CTAB, normal  breath sounds with good chest expansion   Cardiovascular: Regular, rate and rhythm, + cardiac  murmur, 2+ equal pulses of the extremities, normal S 1and S 2, trace BLE edema   Gastrointestinal: Nondistended, soft, non-tender,  no rebound tenderness or guarding, +BS   Psychological: Appropriate mood and behavior   Skin: Warm and dry, no lesions, no rashes     Medication:    Medications:      CARDIOVASCULAR AGENTS:    1. NIFEdipine (PROCARDIA XL) Extended Release:  30  mg  Oral  Daily    2. hydrALAZINE (APRESOLINE):  50  mg  Oral  Every 8 Hours      CENTRAL NERVOUS SYSTEM AGENTS:    1. Acetaminophen:  650  mg  Oral  Every 4 Hours   PRN       2. Acetaminophen:  650  mg  Oral  Every 4 Hours   PRN       3. Aspirin Chewable:  81  mg  Oral  Daily    4. Ondansetron Injectable:  4  mg  IntraVenous Push  Every 4 Hours   PRN         COAGULATION MODIFIERS:    1. Heparin SubCutaneous:  5000  unit(s)  SubCutaneous  Every 8 Hours      GASTROINTESTINAL AGENTS:    1. Pantoprazole:  40  mg  Oral  Daily      METABOLIC AGENTS:    1. Insulin Isophane (NPH) Injectable:   15  unit(s)  SubCutaneous  2 Times a Day    2. Insulin Lispro Moderate Corrective Scale:  unit(s)  SubCutaneous  3 Times a Day Before Meals    3. Atorvastatin:  10  mg  Oral  Daily    4. Dextrose 50% in Water Injectable:  25  gram(s)  IntraVenous Push  Every 15 Minutes   PRN       5. Glucagon Injectable:  1  mg  IntraMuscular  Every 15 Minutes   PRN         NUTRITIONAL PRODUCTS:    1. Sodium Chloride 0.9% Injectable Flush:  10  mL  IntraVenous Flush  Every 8 Hours and as Needed   PRN       2. Cholecalciferol (Vitamin D3):  2000  International Unit(s)  Oral  Daily      TOPICAL AGENTS:    1. Brimonidine 0.2% Ophthalmic:  1  drop(s)  Both Eyes  2 Times a Day    2. Latanoprost 0.005% Ophthalmic:  1  drop(s)  Both Eyes  At Bedtime    3. Timolol 0.5% Ophthalmic:  1  drop(s)  Both Eyes  2 Times a Day          Currently Suspended Medications       --------------------------------    1. hydrALAZINE (APRESOLINE) Injectable:  10  mg  IntraVenous Push  Every 6 Hours   PRN         Recent Lab Results:    Results:    CBC: 9/21/2023 05:01              \     Hgb     /                              \     7.6 L    /  WBC  ----------------  Plt               6.7       ----------------    259              /     Hct     \                              /     23.3 L    \            RBC: 2.45 L    MCV: 95           RFP: 9/21/2023 05:01  NA+        Cl-     BUN  /                         126 L   95 L    47 H /  --------------------------------  Glucose                ---------------------------  195 H    K+     HCO3-   Creat \                         5.0    22    2.66 H \  Calcium : 8.1 LAnion Gap : 14          Albumin : 3.1 L     Phos : 4.8      Radiology Results:    Results:    I have reviewed this radiology result:         Conclusion:  CONCLUSIONS:  1. Poorly visualized anatomical structures due to suboptimal image quality.  2. Left ventricular systolic function is hyperdynamic with a 70-75% estimated ejection fraction.  3. Spectral  Doppler shows an impaired relaxation pattern of left ventricular diastolic filling.  4. There is moderate concentric left ventricular hypertrophy.  5. Left ventricular cavity size is decreased.  6. The left atrium is enlarged.  7. Mild to moderately elevated right ventricular systolic pressure.  8. Mild aortic valve stenosis.    QUANTITATIVE DATA SUMMARY:  2D MEASUREMENTS:  Normal Ranges:  LAs:           3.15 cm    (2.7-4.0cm)  IVSd:          1.40 cm    (0.6-1.1cm)  LVPWd:         1.40 cm    (0.6-1.1cm)  LVIDd:         3.80 cm    (3.9-5.9cm)  LVIDs:         2.50 cm  LV Mass Index: 100.8 g/m2  LV % FS        34.2 %    LA VOLUME:  Normal Ranges:  LA Vol A4C:        131.3 ml   (22+/-6mL/m2)  LA Vol A2C:        88.2 ml  LA Vol BP:         109.2 ml  LA Vol Index A4C:  68.2ml/m2  LA Vol Index A2C:  45.8 ml/m2  LA Vol IndexBP:   56.7 ml/m2  LA Area A4C:       29.7 cm2  LA Area A2C:       24.0 cm2  LA Major Axis A4C: 5.7 cm  LA Major Axis A2C: 5.6 cm  LA Volume Index:   56.7 ml/m2    RA VOLUME BY A/L METHOD:  Normal Ranges:  RA Area A4C: 13.0 cm2    AORTA MEASUREMENTS:  Normal Ranges:  Ao Sinus, d: 2.67 cm (2.1-3.5cm)  Ao STJ, d:   2.38 cm (1.7-3.4cm)  Asc Ao, d:   3.04 cm (2.1-3.4cm)    LV SYSTOLIC FUNCTION BY 2D PLANIMETRY (MOD):  Normal Ranges:  EF-A4C View: 61.9 % (>=55%)  EF-A2C View: 67.4 %  EF-Biplane:  64.6 %    LV DIASTOLIC FUNCTION:  Normal Ranges:  MV Peak E:        0.48 m/s    (0.7-1.2 m/s)  MV Peak A:        1.10 m/s    (0.42-0.7 m/s)  E/A Ratio:        0.44        (1.0-2.2)  MV e'             0.04 m/s    (>8.0)  MV lateral e'     0.06 m/s  MV medial e'      0.04 m/s  MV A Dur:         156.00 msec  E/e' Ratio:       11.99       (<8.0)  PulmV Sys Maikel:    51.10 cm/s  PulmV Marshall Maikel:   32.90 cm/s  PulmV S/D Maikel:    1.60  PulmV A Revs Maikel: 21.20 cm/s  PulmV A Revs Dur: 124.00 msec    MITRAL VALVE:  Normal Ranges:  MV DT: 172 msec (150-240msec)    AORTIC VALVE:  Normal Ranges:  AoV Vmax:                2.46 m/s   "(<=1.7m/s)  AoV Peak P.2 mmHg (<20mmHg)  AoV Mean P.0 mmHg (1.7-11.5mmHg)  LVOT Max Maikel:            1.60 m/s  (<=1.1m/s)  AoV VTI:                 51.80 cm  (18-25cm)  LVOT VTI:                34.40 cm  LVOT Diameter:           1.70 cm   (1.8-2.4cm)  AoV Area, VTI:           1.51 cm2  (2.5-5.5cm2)  AoV Area,Vmax:           1.48 cm2  (2.5-4.5cm2)  AoV Dimensionless Index: 0.66      RIGHT VENTRICLE:  RV Basal 3.35 cm  RV Mid   2.45 cm  RV Major 7.0 cm    TRICUSPID VALVE/RVSP:  Normal Ranges:  Peak TR Velocity: 3.28 m/s  Est. RA Pressure: 3 mmHg  RV Syst Pressure: 46.0 mmHg (< 30mmHg)  IVC Diam:   0.93 cm    PULMONIC VALVE:  Normal Ranges:  PV Accel Time: 129 msec (>120ms)  PV Max Maikel:    1.3 m/s  (0.6-0.9m/s)  PV Max P.1 mmHg    Pulmonary Veins:  PulmV A Revs Dur: 124.00 msec  PulmV A Revs Maikel: 21.20 cm/s  PulmV Marshall Maikel:   32.90 cm/s  PulmV S/D Maikel:    1.60  PulmV Sys Maikel:    51.10 cm/s      [id number] Adam Farmer MD  Electronically signed on 2023 at 8:17:32 AM        *** Final ***     Echocardiogram [Sep 17 2023  8:17AM]      Assessment and Plan:   Comorbidities:  ·  Comorbidity Other     Code Status:  ·  Code Status Full Code     Assessment:    ALICIA IRENE is a 89 year old Female with a past medical history of GERD, htn, hld, anemia (baseline 9), DM (insulin), lumbar stenosis, and lymphedema presents to  [facility] ER with hypoglycemia and hypertension.  Patient suffered a recent fall as of late on admit to the ER her blood pressure was 223/71, heart rate 57, 98% on room air.  Her blood sugar on admit was 63. I personally reviewed the patient?s recent  labs, medications, orders, EKGs, and pertinent cardiac imaging/ echocardiography.  Cardiology was consulted on this stay for \"resistant htn    I reviewed telemetry , SR, no further episodes noted of bradycardia in the 30's    #Hypertensive emergency. BP improved  -BP on admit 223/71  -Home BP meds include 100 metoprolol " succinate daily and hydralazine 25 mg twice daily per patient  - Hold BB for now> d/t episode of bradycardia  - c/w Hydralazine to 50 mg PO TID  - c/w nifedipine 30 mg daily as started by primary    #Chronic HFpEF  -TTE 9/16/23 Poorly visualized anatomical structures due to suboptimal image quality.  2. Left ventricular systolic function is hyperdynamic with a 70-75% estimated ejection fraction.  3. Spectral Doppler shows an impaired relaxation pattern of left ventricular diastolic filling.  -admit   -Chest x-ray clear, on room air, denies shortness of breath    #OLGA LIDIA on CKD  -Baseline creatinine 1.5  -Today it is 2.66  - Nephrology following   -hold her ACE/ ARB for now     # Hyponatremia> management per primary    RECS:   - Continue with blood pressure meds as above, can uptitrate hydralazine as needed  - Discontinued BB in setting of Bradycardic episode, currently on Timolol eye gtt's  - Patient will benefit from follow-up with Dr. Pete Iniguez MD or Lauren Pappas CNP in 4-6 weeks> requested appointment  - No cardiac barriers to discharge  - Cardiology will sign off  - Please call with questions        Electronic Signatures:  Denise De La Rosa (APRN-CNP)  (Signed 21-Sep-2023 15:16)   Authored: Service, Subjective Data, Objective Data, Assessment  and Plan, Note Completion      Last Updated: 21-Sep-2023 15:16 by Denise De La Rosa (APRN-CNP)

## 2023-09-30 NOTE — PROGRESS NOTES
Service: Cardiology     Subjective Data:   ALICIA IRENE is a 89 year old Female who is Hospital Day # 5.     Patient sitting on side of bed eating meal, reports feeling significantly better than she did when she arrived.  Currently denies any complaints of chest pain, dyspnea, palpitations, headache, dizziness  or lightheadedness.  Currently has no supplemental oxygen requirements.    Objective Data:     Objective Information:      T   P  R  BP   MAP  SpO2   Value  36.4  58  18  153/44      97%  Date/Time 9/19 12:38 9/19 12:38 9/19 12:38 9/19 12:38    9/19 12:38  Range  (36.2C - 36.8C )  (47 - 63 )  (16 - 18 )  (110 - 208 )/ (44 - 69 )    (95% - 100% )      Pain reported at 9/19 9:15: 0 = None    Physical Exam by System:    Constitutional: Pleasant obese female, awake/alert/oriented  x3, no apparent distress, alert and cooperative   ENMT: mucous membranes moist, no apparent injury,  no lesions seen   Head/Neck: Neck supple, no apparent injury, No JVD   Respiratory/Thorax: Patent airways, CTAB, normal  breath sounds with good chest expansion   Cardiovascular: Regular, rate and rhythm, + cardiac  murmur, 2+ equal pulses of the extremities, normal S 1and S 2, trace BLE edema   Gastrointestinal: Nondistended, soft, non-tender,  no rebound tenderness or guarding, +BS   Psychological: Appropriate mood and behavior   Skin: Warm and dry, no lesions, no rashes     Medication:    Medications:      CARDIOVASCULAR AGENTS:    1. Carvedilol:  12.5  mg  Oral  2 Times a Day    2. NIFEdipine (PROCARDIA XL) Extended Release:  30  mg  Oral  Daily    3. hydrALAZINE (APRESOLINE):  50  mg  Oral  Every 12 Hours    4. hydrALAZINE (APRESOLINE) Injectable:  10  mg  IntraVenous Push  Every 6 Hours   PRN         CENTRAL NERVOUS SYSTEM AGENTS:    1. Acetaminophen:  650  mg  Oral  Every 4 Hours   PRN       2. Acetaminophen:  650  mg  Oral  Every 4 Hours   PRN       3. Aspirin Chewable:  81  mg  Oral  Daily    4. Ondansetron Injectable:  4   mg  IntraVenous Push  Every 4 Hours   PRN         COAGULATION MODIFIERS:    1. Heparin SubCutaneous:  5000  unit(s)  SubCutaneous  Every 8 Hours      GASTROINTESTINAL AGENTS:    1. Pantoprazole:  40  mg  Oral  Daily      METABOLIC AGENTS:    1. Insulin Lispro Moderate Corrective Scale:  unit(s)  SubCutaneous  3 Times a Day Before Meals    2. Atorvastatin:  10  mg  Oral  Daily    3. Dextrose 50% in Water Injectable:  25  gram(s)  IntraVenous Push  Every 15 Minutes   PRN       4. Glucagon Injectable:  1  mg  IntraMuscular  Every 15 Minutes   PRN         NUTRITIONAL PRODUCTS:    1. Sodium Chloride:  1  gram(s)  Oral  2 Times a Day    2. Sodium Chloride 0.9% Injectable Flush:  10  mL  IntraVenous Flush  Every 8 Hours and as Needed   PRN       3. Cholecalciferol (Vitamin D3):  2000  International Unit(s)  Oral  Daily      TOPICAL AGENTS:    1. Brimonidine 0.2% Ophthalmic:  1  drop(s)  Both Eyes  2 Times a Day    2. Latanoprost 0.005% Ophthalmic:  1  drop(s)  Both Eyes  At Bedtime    3. Timolol 0.5% Ophthalmic:  1  drop(s)  Both Eyes  2 Times a Day      Recent Lab Results:    Results:    CBC: 9/19/2023 07:53              \     Hgb     /                              \     8.2 L    /  WBC  ----------------  Plt               7.1       ----------------    253              /     Hct     \                              /     24.8 L    \            RBC: 2.66 L    MCV: 93           BMP: 9/19/2023 12:40  NA+        Cl-     BUN  /                         120 LL   92 L    47 H /  --------------------------------  Glucose                ---------------------------  234 H    K+     HCO3-   Creat \                         5.1  23    2.23 H \  Calcium : 8.0 L    Anion Gap : 10      Radiology Results:    Results:    I have reviewed this radiology result:         Impression:    Extremely limited study given body habitus.     1. No hydronephrosis.  2. Indeterminate left renal mixed echogenicity nodule measuring 1.4  cm. This could  be further evaluated by dedicated renal MRI with IV  contrast.  3. Partially visualized heterogenous hepatic echotexture possibly  related to underlying diffuse hepatocellular dysfunction. Advise  correlation with liver function test     MACRO:  None     Ultrasound Renal Bilateral [Sep 18 2023  2:35PM]      Conclusion:  CONCLUSIONS:  1. Poorly visualized anatomical structures due to suboptimal image quality.  2. Left ventricular systolic function is hyperdynamic with a 70-75% estimated ejection fraction.  3. Spectral Doppler shows an impaired relaxation pattern of left ventricular diastolic filling.  4. There is moderate concentric left ventricular hypertrophy.  5. Left ventricular cavity size is decreased.  6. The left atrium is enlarged.  7. Mild to moderately elevated right ventricular systolic pressure.  8. Mild aortic valve stenosis.    QUANTITATIVE DATA SUMMARY:  2D MEASUREMENTS:  Normal Ranges:  LAs:           3.15 cm    (2.7-4.0cm)  IVSd:          1.40 cm    (0.6-1.1cm)  LVPWd:         1.40 cm    (0.6-1.1cm)  LVIDd:         3.80 cm    (3.9-5.9cm)  LVIDs:         2.50 cm  LV Mass Index: 100.8 g/m2  LV % FS        34.2 %    LA VOLUME:  Normal Ranges:  LA Vol A4C:        131.3 ml   (22+/-6mL/m2)  LA Vol A2C:        88.2 ml  LA Vol BP:         109.2 ml  LA Vol Index A4C:  68.2ml/m2  LA Vol Index A2C:  45.8 ml/m2  LA Vol IndexBP:   56.7 ml/m2  LA Area A4C:       29.7 cm2  LA Area A2C:       24.0 cm2  LA Major Axis A4C: 5.7 cm  LA Major Axis A2C: 5.6 cm  LA Volume Index:   56.7 ml/m2    RA VOLUME BY A/L METHOD:  Normal Ranges:  RA Area A4C: 13.0 cm2    AORTA MEASUREMENTS:  Normal Ranges:  Ao Sinus, d: 2.67 cm (2.1-3.5cm)  Ao STJ, d:   2.38 cm (1.7-3.4cm)  Asc Ao, d:   3.04 cm (2.1-3.4cm)    LV SYSTOLIC FUNCTION BY 2D PLANIMETRY (MOD):  Normal Ranges:  EF-A4C View: 61.9 % (>=55%)  EF-A2C View: 67.4 %  EF-Biplane:  64.6 %    LV DIASTOLIC FUNCTION:  Normal Ranges:  MV Peak E:        0.48 m/s    (0.7-1.2 m/s)  MV Peak  A:        1.10 m/s    (0.42-0.7 m/s)  E/A Ratio:        0.44        (1.0-2.2)  MV e'             0.04 m/s    (>8.0)  MV lateral e'     0.06 m/s  MV medial e'      0.04 m/s  MV A Dur:         156.00 msec  E/e' Ratio:       11.99       (<8.0)  PulmV Sys Maikel:    51.10 cm/s  PulmV Marshall Maikel:   32.90 cm/s  PulmV S/D Maikel:    1.60  PulmV A Revs Maikel: 21.20 cm/s  PulmV A Revs Dur: 124.00 msec    MITRAL VALVE:  Normal Ranges:  MV DT: 172 msec (150-240msec)    AORTIC VALVE:  Normal Ranges:  AoV Vmax:                2.46 m/s  (<=1.7m/s)  AoV Peak P.2 mmHg (<20mmHg)  AoV Mean P.0 mmHg (1.7-11.5mmHg)  LVOT Max Maikel:            1.60 m/s  (<=1.1m/s)  AoV VTI:                 51.80 cm  (18-25cm)  LVOT VTI:                34.40 cm  LVOT Diameter:           1.70 cm   (1.8-2.4cm)  AoV Area, VTI:           1.51 cm2  (2.5-5.5cm2)  AoV Area,Vmax:           1.48 cm2  (2.5-4.5cm2)  AoV Dimensionless Index: 0.66      RIGHT VENTRICLE:  RV Basal 3.35 cm  RV Mid   2.45 cm  RV Major 7.0 cm    TRICUSPID VALVE/RVSP:  Normal Ranges:  Peak TR Velocity: 3.28 m/s  Est. RA Pressure: 3 mmHg  RV Syst Pressure: 46.0 mmHg (< 30mmHg)  IVC Diam:   0.93 cm    PULMONIC VALVE:  Normal Ranges:  PV Accel Time: 129 msec (>120ms)  PV Max Maikel:    1.3 m/s  (0.6-0.9m/s)  PV Max P.1 mmHg    Pulmonary Veins:  PulmV A Revs Dur: 124.00 msec  PulmV A Revs Maikel: 21.20 cm/s  PulmV Marshall Maikel:   32.90 cm/s  PulmV S/D Maikel:    1.60  PulmV Sys Maikel:    51.10 cm/s      [id number] Adam Farmer MD  Electronically signed on 2023 at 8:17:32 AM        *** Final ***     Echocardiogram [Sep 17 2023  8:17AM]      Assessment and Plan:   Comorbidities:  ·  Comorbidity Other     Code Status:  ·  Code Status Full Code     Assessment:    ALICIA IRENE is a 89 year old Female with a past medical history of GERD, htn, hld, anemia (baseline 9), DM (insulin), lumbar stenosis, and lymphedema presents to  [facility] ER with hypoglycemia and hypertension.  Patient  "suffered a recent fall as of late on admit to the ER her blood pressure was 223/71, heart rate 57, 98% on room air.  Her blood sugar on admit was 63. I personally reviewed the patient?s recent  labs, medications, orders, EKGs, and pertinent cardiac imaging/ echocardiography.  Cardiology was consulted on this stay for \"resistant htn    #Hypertensive emergency. BP currently improved  -BP on admit 223/71  -Home BP meds include 100 metoprolol succinate daily and hydralazine 25 mg twice daily per patient  - C/W Coreg 12.5 mg twice daily,hydralazine 50 mg twice daily  -Continue with nifedipine 30 mg daily as started by primary    #Chronic HFpEF  -TTE 9/17 Poorly visualized anatomical structures due to suboptimal image quality.  2. Left ventricular systolic function is hyperdynamic with a 70-75% estimated ejection fraction.  3. Spectral Doppler shows an impaired relaxation pattern of left ventricular diastolic filling.  -admit   -Chest x-ray clear, on room air, denies shortness of breath    #OLGA LIDIA on CKD  -Baseline creatinine 1.5  -Today it is 2.23  -hold her ACE/ ARB for now     # Hyponatremia> management per primary    RECS:   -Continue with blood pressure meds as above, can uptitrate hydralazine as needed  -Patient will benefit from follow-up with Dr. Pete Iniguez MD or Lauren Pappas CNP in 4-6 weeks> will request appointment  No cardiac barriers to discharge  Please call with questions     Thank you for allowing me to participate in their care.  Please feel free to call me with any further questions or concerns.        Louie Iniguez MD, FACC, INGR[facility] NAZARIO  Division of Cardiovascular Medicine  Medical Director, [facility], [facility]  , Medicine, [employer]  [email or web address]  pager: [phone]             Attestation:   Note Completion:  I am a:  Advanced Practice Provider   Attending Only - Shared Visit with Advanced " Practice Provider This is a shared visit.  I have reviewed the Advanced Practice Provider?s encounter note, approve the Advanced Practice Provider?s documentation,  and provide the following additional information from my personal encounter.    Comments/ Additional Findings    I have examined the patient and edited the documented physical examination as necessary.  I have reviewed the NAZANIN?s encounter note, approve  the NAZANIN?s documentation and have edited the note to reflect our diagnostic and therapeutic plan.          Electronic Signatures:  Louie Iniguez)  (Signed 19-Sep-2023 22:35)   Authored: Assessment and Plan, Note Completion  Denise De La Rosa (APRN-CNP)  (Signed 19-Sep-2023 17:09)   Authored: Service, Subjective Data, Objective Data, Assessment  and Plan, Note Completion      Last Updated: 19-Sep-2023 22:35 by Louie Iniguez)

## 2023-09-30 NOTE — PROGRESS NOTES
Service: Cardiology     Subjective Data:   ALICIA IRENE is a 89 year old Female who is Hospital Day # 6.     Evaluated patient this afternoon, states she had a rough night, but feels better now.    Appears patient had an episode yesterday evening of hypoglycemia glucose 55 and bradycardia heart rate in the 30s> she had + nausea/ dizziness    Unclear if hypoglycemia was treated but glucose was improved by morning    Coreg held and pt. is on Timolol eye drops.    Objective Data:     Objective Information:      T   P  R  BP   MAP  SpO2   Value  36.3  77  16  145/61      99%  Date/Time 9/20 11:31 9/20 11:31 9/20 11:31 9/20 11:31 9/20 11:31  Range  (36.1C - 37.4C )  (38 - 86 )  (16 - 18 )  (111 - 185 )/ (32 - 71 )    (96% - 99% )  Highest temp of 37.4 C was recorded at 9/19 19:45      Pain reported at 9/20 9:07: 0 = None    Physical Exam by System:    Constitutional: Pleasant obese female, awake/alert/oriented  x3, no apparent distress, alert and cooperative   ENMT: mucous membranes moist, no apparent injury,  no lesions seen   Head/Neck: Neck supple, no apparent injury, No JVD   Respiratory/Thorax: Patent airways, CTAB, normal  breath sounds with good chest expansion   Cardiovascular: Regular, rate and rhythm, + cardiac  murmur, 2+ equal pulses of the extremities, normal S 1and S 2, trace BLE edema   Gastrointestinal: Nondistended, soft, non-tender,  no rebound tenderness or guarding, +BS   Psychological: Appropriate mood and behavior   Skin: Warm and dry, no lesions, no rashes     Medication:    Medications:      CARDIOVASCULAR AGENTS:    1. NIFEdipine (PROCARDIA XL) Extended Release:  30  mg  Oral  Daily    2. Furosemide Injectable:  80  mg  IntraVenous Push  Once    3. hydrALAZINE (APRESOLINE):  50  mg  Oral  Every 12 Hours    4. hydrALAZINE (APRESOLINE) Injectable:  10  mg  IntraVenous Push  Every 6 Hours   PRN         CENTRAL NERVOUS SYSTEM AGENTS:    1. Acetaminophen:  650  mg  Oral  Every 4 Hours   PRN        2. Acetaminophen:  650  mg  Oral  Every 4 Hours   PRN       3. Aspirin Chewable:  81  mg  Oral  Daily    4. Ondansetron Injectable:  4  mg  IntraVenous Push  Every 4 Hours   PRN         COAGULATION MODIFIERS:    1. Heparin SubCutaneous:  5000  unit(s)  SubCutaneous  Every 8 Hours      GASTROINTESTINAL AGENTS:    1. Pantoprazole:  40  mg  Oral  Daily      METABOLIC AGENTS:    1. Insulin Lispro Moderate Corrective Scale:  unit(s)  SubCutaneous  3 Times a Day Before Meals    2. Atorvastatin:  10  mg  Oral  Daily    3. Dextrose 50% in Water Injectable:  25  gram(s)  IntraVenous Push  Every 15 Minutes   PRN       4. Glucagon Injectable:  1  mg  IntraMuscular  Every 15 Minutes   PRN         NUTRITIONAL PRODUCTS:    1. Sodium Chloride 0.9% Injectable Flush:  10  mL  IntraVenous Flush  Every 8 Hours and as Needed   PRN       2. Sodium Chloride 0.9% IV Bolus:  500  mL  IntraVenous Piggyback  Once    3. Cholecalciferol (Vitamin D3):  2000  International Unit(s)  Oral  Daily      TOPICAL AGENTS:    1. Brimonidine 0.2% Ophthalmic:  1  drop(s)  Both Eyes  2 Times a Day    2. Latanoprost 0.005% Ophthalmic:  1  drop(s)  Both Eyes  At Bedtime    3. Timolol 0.5% Ophthalmic:  1  drop(s)  Both Eyes  2 Times a Day          Currently Suspended Medications       --------------------------------    1. Carvedilol:  12.5  mg  Oral  2 Times a Day      Recent Lab Results:    Results:    CBC: 9/20/2023 06:40              \     Hgb     /                              \     7.7 L    /  WBC  ----------------  Plt               8.1       ----------------    254              /     Hct     \                              /     23.1 L    \            RBC: 2.46 L    MCV: 94           BMP: 9/20/2023 06:40  NA+        Cl-     BUN  /                         124 L   96 L    47 H /  --------------------------------  Glucose                ---------------------------  203 H    K+     HCO3-   Creat \                         5.3  21    2.99 H  \  Calcium : 8.2 L    Anion Gap : 12      Radiology Results:    Results:    I have reviewed this radiology result:         Conclusion:  CONCLUSIONS:  1. Poorly visualized anatomical structures due to suboptimal image quality.  2. Left ventricular systolic function is hyperdynamic with a 70-75% estimated ejection fraction.  3. Spectral Doppler shows an impaired relaxation pattern of left ventricular diastolic filling.  4. There is moderate concentric left ventricular hypertrophy.  5. Left ventricular cavity size is decreased.  6. The left atrium is enlarged.  7. Mild to moderately elevated right ventricular systolic pressure.  8. Mild aortic valve stenosis.    QUANTITATIVE DATA SUMMARY:  2D MEASUREMENTS:  Normal Ranges:  LAs:           3.15 cm    (2.7-4.0cm)  IVSd:          1.40 cm    (0.6-1.1cm)  LVPWd:         1.40 cm    (0.6-1.1cm)  LVIDd:         3.80 cm    (3.9-5.9cm)  LVIDs:         2.50 cm  LV Mass Index: 100.8 g/m2  LV % FS        34.2 %    LA VOLUME:  Normal Ranges:  LA Vol A4C:        131.3 ml   (22+/-6mL/m2)  LA Vol A2C:        88.2 ml  LA Vol BP:         109.2 ml  LA Vol Index A4C:  68.2ml/m2  LA Vol Index A2C:  45.8 ml/m2  LA Vol IndexBP:   56.7 ml/m2  LA Area A4C:       29.7 cm2  LA Area A2C:       24.0 cm2  LA Major Axis A4C: 5.7 cm  LA Major Axis A2C: 5.6 cm  LA Volume Index:   56.7 ml/m2    RA VOLUME BY A/L METHOD:  Normal Ranges:  RA Area A4C: 13.0 cm2    AORTA MEASUREMENTS:  Normal Ranges:  Ao Sinus, d: 2.67 cm (2.1-3.5cm)  Ao STJ, d:   2.38 cm (1.7-3.4cm)  Asc Ao, d:   3.04 cm (2.1-3.4cm)    LV SYSTOLIC FUNCTION BY 2D PLANIMETRY (MOD):  Normal Ranges:  EF-A4C View: 61.9 % (>=55%)  EF-A2C View: 67.4 %  EF-Biplane:  64.6 %    LV DIASTOLIC FUNCTION:  Normal Ranges:  MV Peak E:        0.48 m/s    (0.7-1.2 m/s)  MV Peak A:        1.10 m/s    (0.42-0.7 m/s)  E/A Ratio:        0.44        (1.0-2.2)  MV e'             0.04 m/s    (>8.0)  MV lateral e'     0.06 m/s  MV medial e'      0.04 m/s  MV A Dur:          156.00 msec  E/e' Ratio:       11.99       (<8.0)  PulmV Sys Maikel:    51.10 cm/s  PulmV Marshall Maikel:   32.90 cm/s  PulmV S/D Maikel:    1.60  PulmV A Revs Maikel: 21.20 cm/s  PulmV A Revs Dur: 124.00 msec    MITRAL VALVE:  Normal Ranges:  MV DT: 172 msec (150-240msec)    AORTIC VALVE:  Normal Ranges:  AoV Vmax:                2.46 m/s  (<=1.7m/s)  AoV Peak P.2 mmHg (<20mmHg)  AoV Mean P.0 mmHg (1.7-11.5mmHg)  LVOT Max Maikel:            1.60 m/s  (<=1.1m/s)  AoV VTI:                 51.80 cm  (18-25cm)  LVOT VTI:                34.40 cm  LVOT Diameter:           1.70 cm   (1.8-2.4cm)  AoV Area, VTI:           1.51 cm2  (2.5-5.5cm2)  AoV Area,Vmax:           1.48 cm2  (2.5-4.5cm2)  AoV Dimensionless Index: 0.66      RIGHT VENTRICLE:  RV Basal 3.35 cm  RV Mid   2.45 cm  RV Major 7.0 cm    TRICUSPID VALVE/RVSP:  Normal Ranges:  Peak TR Velocity: 3.28 m/s  Est. RA Pressure: 3 mmHg  RV Syst Pressure: 46.0 mmHg (< 30mmHg)  IVC Diam:   0.93 cm    PULMONIC VALVE:  Normal Ranges:  PV Accel Time: 129 msec (>120ms)  PV Max Maikel:    1.3 m/s  (0.6-0.9m/s)  PV Max P.1 mmHg    Pulmonary Veins:  PulmV A Revs Dur: 124.00 msec  PulmV A Revs Maikel: 21.20 cm/s  PulmV Marshall Maikel:   32.90 cm/s  PulmV S/D Maikel:    1.60  PulmV Sys Maikel:    51.10 cm/s      [id number] Adam Farmer MD  Electronically signed on 2023 at 8:17:32 AM        *** Final ***     Echocardiogram [Sep 17 2023  8:17AM]      Assessment and Plan:   Comorbidities:  ·  Comorbidity Other     Code Status:  ·  Code Status Full Code     Assessment:    ALICIA IRENE is a 89 year old Female with a past medical history of GERD, htn, hld, anemia (baseline 9), DM (insulin), lumbar stenosis, and lymphedema presents to  [facility] ER with hypoglycemia and hypertension.  Patient suffered a recent fall as of late on admit to the ER her blood pressure was 223/71, heart rate 57, 98% on room air.  Her blood sugar on admit was 63. I personally reviewed the patient?s  "recent  labs, medications, orders, EKGs, and pertinent cardiac imaging/ echocardiography.  Cardiology was consulted on this stay for \"resistant htn    I reviewed telemetry SR, rates in the 50-70's    #Hypertensive emergency. BP suboptimal.  -BP on admit 223/71  -Home BP meds include 100 metoprolol succinate daily and hydralazine 25 mg twice daily per patient  - Hold BB for now> d/t episode of bradycardia  - Increase Hydralazine to 50 mg PO TID  -Continue with nifedipine 30 mg daily as started by primary    #Chronic HFpEF  -TTE 9/16/23 Poorly visualized anatomical structures due to suboptimal image quality.  2. Left ventricular systolic function is hyperdynamic with a 70-75% estimated ejection fraction.  3. Spectral Doppler shows an impaired relaxation pattern of left ventricular diastolic filling.  -admit   -Chest x-ray clear, on room air, denies shortness of breath    #OLGA LIDIA on CKD  -Baseline creatinine 1.5  -Today it is 2.99  - Nephrology following   -hold her ACE/ ARB for now     # Hyponatremia> management per primary    RECS:   -Continue with blood pressure meds as above, can uptitrate hydralazine as needed  -Discontinue BB in setting of Bradycardic episode, currently on Timolol eye gtt's  -Patient will benefit from follow-up with Dr. Pete Iniguez MD or Lauren Pappas CNP in 4-6 weeks> requested appointment  Will follow peripherally         Attestation:   Note Completion:        Electronic Signatures:  Denise De La Rosa (APRN-CNP)  (Signed 20-Sep-2023 19:00)   Authored: Service, Subjective Data, Objective Data, Assessment  and Plan, Note Completion      Last Updated: 20-Sep-2023 19:00 by Denise De La Rosa (APRN-CNP)   "

## 2023-09-30 NOTE — PROGRESS NOTES
Service: Renal     Subjective Data:   ALICAI CARTAGENA is a 89 year old Female who is Hospital Day # 8.     Ms. Cartagena is a 89-year-old woman with a history of hypertension, hyperlipidemia, diabetes who presented with hypoglycemia and hypertension.  Cardiology saw her as the blood pressure was in the 200s systolic.   She had apparently fallen the night prior to her presentation.  Medications had been adjusted related to her kidneys, have been taken off of losartan and placed on hydralazine.  Echocardiogram had noted normal systolic function but moderate concentric  LVH, elevated right-sided pressures.  No overnight events.  Feeling fullness in the lower abdomen this morning.  Prather catheter just placed, Lasix was ordered.    Overnight Events: Patient had an uneventful night.     Objective Data:     Objective Information:      T   P  R  BP   MAP  SpO2   Value  36.7  80  18  209/50      100%  Date/Time 9/22 11:39 9/22 11:39 9/22 11:39 9/22 11:39    9/22 11:39  Range  (36.3C - 37.1C )  (48 - 80 )  (17 - 18 )  (115 - 209 )/ (38 - 82 )    (96% - 100% )  Highest temp of 37.1 C was recorded at 9/21 12:00      Pain reported at 9/21 20:27: 0 = None    Physical Exam by System:    Constitutional: Well developed, awake/alert/oriented  x3, no distress, alert and cooperative   Eyes: PERRL, EOMI, clear sclera   ENMT: mucous membranes moist, no apparent injury,  no lesions seen   Head/Neck: Elevated JVP   Respiratory/Thorax: Crackles in the bases   Cardiovascular: Regular, rate and rhythm, systolic  murmur, 2+ equal pulses of the extremities, normal S 1and S 2   Gastrointestinal: Nondistended, soft, non-tender,  no rebound tenderness or guarding, no masses palpable, no organomegaly, +BS, no bruits   Genitourinary: Prather catheter   Musculoskeletal: ROM intact, no joint swelling, normal  strength   Extremities: normal extremities, 1+ lower extremity  edema, contusions or wounds, no clubbing   Neurological: alert and oriented x3,  intact senses,  motor, response and reflexes, normal strength   Skin: Warm and dry, no lesions, no rashes     Medication:    Medications:          Continuous Medications       --------------------------------  No continuous medications are active       Scheduled Medications       --------------------------------    1. Aspirin Chewable:  81  mg  Oral  Daily    2. Atorvastatin:  10  mg  Oral  Daily    3. Brimonidine 0.2% Ophthalmic:  1  drop(s)  Both Eyes  2 Times a Day    4. Cholecalciferol (Vitamin D3):  2000  International Unit(s)  Oral  Daily    5. Furosemide Injectable:  40  mg  IntraVenous Push  Once    6. Heparin SubCutaneous:  5000  unit(s)  SubCutaneous  Every 8 Hours    7. hydrALAZINE (APRESOLINE):  50  mg  Oral  Every 8 Hours    8. Insulin Lispro Moderate Corrective Scale:  unit(s)  SubCutaneous  3 Times a Day Before Meals    9. Latanoprost 0.005% Ophthalmic:  1  drop(s)  Both Eyes  At Bedtime    10. NIFEdipine (PROCARDIA XL) Extended Release:  30  mg  Oral  Daily    11. Pantoprazole:  40  mg  Oral  Daily    12. Timolol 0.5% Ophthalmic:  1  drop(s)  Both Eyes  2 Times a Day         PRN Medications       --------------------------------    1. Acetaminophen:  650  mg  Oral  Every 4 Hours    2. Acetaminophen:  650  mg  Oral  Every 4 Hours    3. Dextrose 50% in Water Injectable:  25  gram(s)  IntraVenous Push  Every 15 Minutes    4. Glucagon Injectable:  1  mg  IntraMuscular  Every 15 Minutes    5. Lidocaine 2% (UROJET) Topical Gel:  5  mL  Topical  Every 4 Hours    6. Ondansetron Injectable:  4  mg  IntraVenous Push  Every 4 Hours    7. Sodium Chloride 0.9% Injectable Flush:  10  mL  IntraVenous Flush  Every 8 Hours and as Needed           Currently Suspended Medications       --------------------------------    1. hydrALAZINE (APRESOLINE) Injectable:  10  mg  IntraVenous Push  Every 6 Hours      Recent Lab Results:    Results:    CBC: 9/22/2023 06:59              \     Hgb     /                              \      8.0 L    /  WBC  ----------------  Plt               7.6       ----------------    280              /     Hct     \                              /     24.9 L    \            RBC: 2.60 L    MCV: 96           RFP: 9/22/2023 06:59  NA+        Cl-     BUN  /                         126 L   94 L    51 H /  --------------------------------  Glucose                ---------------------------  103 H    K+     HCO3-   Creat \                         4.5    22    2.65 H \  Calcium : 8.8Anion Gap : 15          Albumin : 3.3 L    Phos : 4.7        I have reviewed these laboratory results:    Glucose_POCT  Trending View      Result 22-Sep-2023 11:43:00  22-Sep-2023 08:49:00    Glucose-POCT 164   H   119   H        Renal Function Panel  22-Sep-2023 06:59:00      Result Value    Glucose, Serum  103   H   NA  126   L   K  4.5    CL  94   L   Bicarbonate, Serum  22    Anion Gap, Serum  15    BUN  51   H   CREAT  2.65   H   GFR Female  16   A   Calcium, Serum  8.8    Phosphorus, Serum  4.7    ALB  3.3   L     Complete Blood Count  22-Sep-2023 06:59:00      Result Value    White Blood Cell Count  7.6    Red Blood Cell Count  2.60   L   HGB  8.0   L   HCT  24.9   L   MCV  96    MCHC  32.1    PLT  280    RDW-CV  12.5        Radiology Results:    Results:        Impression:    1.  No evidence of acute cardiopulmonary process.           MACRO:  None     Xray Chest 1 View [Sep 22 2023 11:38AM]      Conclusion:  Marked sinus bradycardia  Right bundle branch block  Abnormal ECG  Confirmed by Jorge Lema ([id number]) on 9/20/2023 8:06:05 AM     Electrocardiogram 12 Lead [Sep 20 2023  8:06AM]      Impression:    Extremely limited study given body habitus.     1. No hydronephrosis.  2. Indeterminate left renal mixed echogenicity nodule measuring 1.4  cm. This could be further evaluated by dedicated renal MRI with IV  contrast.  3. Partially visualized heterogenous hepatic echotexture possibly  related to underlying diffuse  hepatocellular dysfunction. Advise  correlation with liver function test     MACRO:  None     Ultrasound Renal Bilateral [Sep 18 2023  2:35PM]      Conclusion:  CONCLUSIONS:  1. Poorly visualized anatomical structures due to suboptimal image quality.  2. Left ventricular systolic function is hyperdynamic with a 70-75% estimated ejection fraction.  3. Spectral Doppler shows an impaired relaxation pattern of left ventricular diastolic filling.  4. There is moderate concentric left ventricular hypertrophy.  5. Left ventricular cavity size is decreased.  6. The left atrium is enlarged.  7. Mild to moderately elevated right ventricular systolic pressure.  8. Mild aortic valve stenosis.    QUANTITATIVE DATA SUMMARY:  2D MEASUREMENTS:  Normal Ranges:  LAs:           3.15 cm    (2.7-4.0cm)  IVSd:          1.40 cm    (0.6-1.1cm)  LVPWd:         1.40 cm    (0.6-1.1cm)  LVIDd:         3.80 cm    (3.9-5.9cm)  LVIDs:         2.50 cm  LV Mass Index: 100.8 g/m2  LV % FS        34.2 %    LA VOLUME:  Normal Ranges:  LA Vol A4C:        131.3 ml   (22+/-6mL/m2)  LA Vol A2C:        88.2 ml  LA Vol BP:         109.2 ml  LA Vol Index A4C:  68.2ml/m2  LA Vol Index A2C:  45.8 ml/m2  LA Vol IndexBP:   56.7 ml/m2  LA Area A4C:       29.7 cm2  LA Area A2C:       24.0 cm2  LA Major Axis A4C: 5.7 cm  LA Major Axis A2C: 5.6 cm  LA Volume Index:   56.7 ml/m2    RA VOLUME BY A/L METHOD:  Normal Ranges:  RA Area A4C: 13.0 cm2    AORTA MEASUREMENTS:  Normal Ranges:  Ao Sinus, d: 2.67 cm (2.1-3.5cm)  Ao STJ, d:   2.38 cm (1.7-3.4cm)  Asc Ao, d:   3.04 cm (2.1-3.4cm)    LV SYSTOLIC FUNCTION BY 2D PLANIMETRY (MOD):  Normal Ranges:  EF-A4C View: 61.9 % (>=55%)  EF-A2C View: 67.4 %  EF-Biplane:  64.6 %    LV DIASTOLIC FUNCTION:  Normal Ranges:  MV Peak E:        0.48 m/s    (0.7-1.2 m/s)  MV Peak A:        1.10 m/s    (0.42-0.7 m/s)  E/A Ratio:        0.44        (1.0-2.2)  MV e'             0.04 m/s    (>8.0)  MV lateral e'     0.06 m/s  MV medial e'       0.04 m/s  MV A Dur:         156.00 msec  E/e' Ratio:       11.99       (<8.0)  PulmV Sys Maikel:    51.10 cm/s  PulmV Marshall Maikel:   32.90 cm/s  PulmV S/D Maikel:    1.60  PulmV A Revs Maikel: 21.20 cm/s  PulmV A Revs Dur: 124.00 msec    MITRAL VALVE:  Normal Ranges:  MV DT: 172 msec (150-240msec)    AORTIC VALVE:  Normal Ranges:  AoV Vmax:                2.46 m/s  (<=1.7m/s)  AoV Peak P.2 mmHg (<20mmHg)  AoV Mean P.0 mmHg (1.7-11.5mmHg)  LVOT Max Maikel:            1.60 m/s  (<=1.1m/s)  AoV VTI:                 51.80 cm  (18-25cm)  LVOT VTI:                34.40 cm  LVOT Diameter:           1.70 cm   (1.8-2.4cm)  AoV Area, VTI:           1.51 cm2  (2.5-5.5cm2)  AoV Area,Vmax:           1.48 cm2  (2.5-4.5cm2)  AoV Dimensionless Index: 0.66      RIGHT VENTRICLE:  RV Basal 3.35 cm  RV Mid   2.45 cm  RV Major 7.0 cm    TRICUSPID VALVE/RVSP:  Normal Ranges:  Peak TR Velocity: 3.28 m/s  Est. RA Pressure: 3 mmHg  RV Syst Pressure: 46.0 mmHg (< 30mmHg)  IVC Diam:   0.93 cm    PULMONIC VALVE:  Normal Ranges:  PV Accel Time: 129 msec (>120ms)  PV Max Maikel:    1.3 m/s  (0.6-0.9m/s)  PV Max P.1 mmHg    Pulmonary Veins:  PulmV A Revs Dur: 124.00 msec  PulmV A Revs Maikel: 21.20 cm/s  PulmV Marshall Maikel:   32.90 cm/s  PulmV S/D Maikel:    1.60  PulmV Sys Maikel:    51.10 cm/s      [id number] Adam Farmer MD  Electronically signed on 2023 at 8:17:32 AM        *** Final ***     Echocardiogram [Sep 17 2023  8:17AM]      Assessment and Plan:        Additional Dx:   Bradycardia: Entered Date: 20-Sep-2023 21:04   Chronic diastolic congestive heart failure: Entered Date:  20-Sep-2023 21:04   Hypertensive heart disease without heart failure: Entered  Date: 17-Sep-2023 10:08   Type 2 diabetes mellitus with diabetic chronic kidney disease : Entered Date: 17-Sep-2023 10:08   Stage 4 chronic kidney disease: Entered Date: 17-Sep-2023  10:08   Type 2 diabetes mellitus without complication: Entered Date:  16-Sep-2023  18:08   Long term current use of insulin: Entered Date: 16-Sep-2023  18:08       Medical History:   Chest pain: Onset Date: 18-Sep-2023, Entered Date: 18-Sep-2023  12:47   Atherosclerosis: Onset Date: 16-Sep-2023, Entered Date: 16-Sep-2023  15:18   Hypertension: Onset Date: 16-Sep-2023, Entered Date: 16-Sep-2023  15:18    Comorbidities:  ·  Comorbidity Other     Code Status:  ·  Code Status Full Code     Assessment:    Ms. Cartagena is a 89-year-old woman with a history of hypertension, hyperlipidemia, diabetes who presented with hypoglycemia  and hypertension.  Cardiology saw her as the blood pressure was in the 200s systolic.  She had apparently fallen the night prior to her presentation.  Medications had been adjusted related to her kidneys, have been taken off of losartan and placed on  hydralazine.  Echocardiogram had noted normal systolic function but moderate concentric LVH, elevated right-sided pressures.  I am afraid that she is fluid overloaded.  I stopped the sodium chloride tablets, I gave her a dose of Lasix.  Today she has fullness of the lower abdomen, Prather  catheter placed, more than 600 mL of urine immediately.  I reviewed the echocardiogram showing pressure overload.  She will get another dose of Lasix now.  Stable hyponatremia, fluid restriction necessary.  She will get a dose of intravenous iron.       Electronic Signatures:  Adam Leonard)  (Signed 22-Sep-2023 12:35)   Authored: Service, Subjective Data, Objective Data, Assessment  and Plan, Note Completion      Last Updated: 22-Sep-2023 12:35 by Adam Leonard)

## 2023-09-30 NOTE — PROGRESS NOTES
Consult Type: subsequent visit/care     Service: Endocrinology     Subjective Data:   ALICIA IRENE is a 89 year old Female who is Hospital Day # 7.     No new complaints  She agrees he home insulin was Humulin N.    Objective Data:     Objective Information:      T   P  R  BP   MAP  SpO2   Value  36.8  61  18  164/69      98%  Date/Time 9/21 3:27 9/21 3:27 9/21 3:27 9/21 3:27    9/21 3:27  Range  (36.1C - 36.8C )  (58 - 86 )  (16 - 18 )  (133 - 185 )/ (40 - 72 )    (98% - 99% )        Physical Exam by System:    Constitutional: Elderly female in no distress     Medication:    Medications:          Continuous Medications       --------------------------------  No continuous medications are active       Scheduled Medications       --------------------------------    1. Aspirin Chewable:  81  mg  Oral  Daily    2. Atorvastatin:  10  mg  Oral  Daily    3. Brimonidine 0.2% Ophthalmic:  1  drop(s)  Both Eyes  2 Times a Day    4. Cholecalciferol (Vitamin D3):  2000  International Unit(s)  Oral  Daily    5. Heparin SubCutaneous:  5000  unit(s)  SubCutaneous  Every 8 Hours    6. hydrALAZINE (APRESOLINE):  50  mg  Oral  Every 8 Hours    7. Insulin Isophane (NPH) Injectable:  15  unit(s)  SubCutaneous  2 Times a Day    8. Insulin Lispro Moderate Corrective Scale:  unit(s)  SubCutaneous  3 Times a Day Before Meals    9. Latanoprost 0.005% Ophthalmic:  1  drop(s)  Both Eyes  At Bedtime    10. NIFEdipine (PROCARDIA XL) Extended Release:  30  mg  Oral  Daily    11. Pantoprazole:  40  mg  Oral  Daily    12. Sodium Chloride 0.9% IV Bolus:  500  mL  IntraVenous Piggyback  Once    13. Timolol 0.5% Ophthalmic:  1  drop(s)  Both Eyes  2 Times a Day         PRN Medications       --------------------------------    1. Acetaminophen:  650  mg  Oral  Every 4 Hours    2. Acetaminophen:  650  mg  Oral  Every 4 Hours    3. Dextrose 50% in Water Injectable:  25  gram(s)  IntraVenous Push  Every 15 Minutes    4. Glucagon Injectable:  1   mg  IntraMuscular  Every 15 Minutes    5. hydrALAZINE (APRESOLINE) Injectable:  10  mg  IntraVenous Push  Every 6 Hours    6. Ondansetron Injectable:  4  mg  IntraVenous Push  Every 4 Hours    7. Sodium Chloride 0.9% Injectable Flush:  10  mL  IntraVenous Flush  Every 8 Hours and as Needed        Recent Lab Results:    Results:        I have reviewed these laboratory results:    Renal Function Panel  21-Sep-2023 05:01:00      Result Value    Glucose, Serum  195   H   NA  126   L   K  5.0    CL  95   L   Bicarbonate, Serum  22    Anion Gap, Serum  14    BUN  47   H   CREAT  2.66   H   GFR Female  16   A   Calcium, Serum  8.1   L   Phosphorus, Serum  4.8    ALB  3.1   L     Glucose_POCT  Trending View      Result 20-Sep-2023 19:35:00  20-Sep-2023 16:07:00  20-Sep-2023 11:30:00    Glucose-POCT 237   H   260   H   272   H          Assessment and Plan:   Code Status:  ·  Code Status Full Code     Assessment:    TYPE 2 DIABETES MELLITUS  LONG TERM CURRENT INSULIN USE  History of frequent hypoglycemia  No further hypoglycemia    RECOMMENDATIONS  Continue insulin corrective scale  Resume NPH at 15 units BID  Appreciate Diabetes education        Electronic Signatures:  Saad Edouard)  (Signed 21-Sep-2023 18:32)   Authored: Service, Subjective Data, Objective Data, Assessment  and Plan, Note Completion      Last Updated: 21-Sep-2023 18:32 by Saad Edouard)

## 2023-09-30 NOTE — PROGRESS NOTES
Service: Renal     Subjective Data:   ALICIA CARTAGENA is a 89 year old Female who is Hospital Day # 10.     Ms. Cartagena is a 89-year-old woman with a history of hypertension, hyperlipidemia, diabetes who presented with hypoglycemia and hypertension.  Cardiology saw her as the blood pressure was in the 200s systolic.   She had apparently fallen the night prior to her presentation.  Medications had been adjusted related to her kidneys, have been taken off of losartan and placed on hydralazine.  Echocardiogram had noted normal systolic function but moderate concentric  LVH, elevated right-sided pressures.  No overnight events.  Feeling fullness in the lower abdomen this morning.  Prather catheter was placed.  No overnight events.    Overnight Events: Patient had an uneventful night.     Objective Data:     Objective Information:      T   P  R  BP   MAP  SpO2   Value  37.1  78  18  179/46      97%  Date/Time 9/24 0:44 9/24 9:04 9/24 9:04 9/24 9:04    9/24 9:04  Range  (36.5C - 37.5C )  (63 - 79 )  (16 - 18 )  (146 - 180 )/ (40 - 68 )    (97% - 100% )   As of 23-Sep-2023 02:23:00, patient is on 2 L/min of oxygen via nasal cannula with humidification.  Highest temp of 37.5 C was recorded at 9/23 2:23      Pain reported at 9/23 23:10: 0 = None    ---- Intake and Output  -----  Mn/Dy/Year Time  Intake   Output  Net  Sep 23, 2023 2:00 pm  [phone] -[id number]    The Intake and Output Totals for the last 24 hours are:      Intake   Output  Net      [phone] -[id number]    Physical Exam by System:    Constitutional: Well developed, awake/alert/oriented  x3, no distress, alert and cooperative   Eyes: PERRL, EOMI, clear sclera   ENMT: mucous membranes moist, no apparent injury,  no lesions seen   Head/Neck: Elevated JVP   Respiratory/Thorax: Crackles in the bases   Cardiovascular: Regular, rate and rhythm, systolic  murmur, 2+ equal pulses of the extremities, normal S 1and S 2   Gastrointestinal: Nondistended, soft, non-tender,  no  rebound tenderness or guarding, no masses palpable, no organomegaly, +BS, no bruits   Genitourinary: Prather catheter   Musculoskeletal: ROM intact, no joint swelling, normal  strength   Extremities: normal extremities, 1+ lower extremity  edema, contusions or wounds, no clubbing   Neurological: alert and oriented x3, intact senses,  motor, response and reflexes, normal strength   Skin: Warm and dry, no lesions, no rashes     Medication:    Medications:          Continuous Medications       --------------------------------  No continuous medications are active       Scheduled Medications       --------------------------------    1. Aspirin Chewable:  81  mg  Oral  Daily    2. Atorvastatin:  10  mg  Oral  Daily    3. Brimonidine 0.2% Ophthalmic:  1  drop(s)  Both Eyes  2 Times a Day    4. Cholecalciferol (Vitamin D3):  2000  International Unit(s)  Oral  Daily    5. Docusate 50 mg - Senna 8.6 m  tablet(s)  Oral  2 Times a Day    6. Epoetin Fco (ESRD) Injectable:  18609  unit(s)  SubCutaneous  Once    7. Heparin SubCutaneous:  5000  unit(s)  SubCutaneous  Every 8 Hours    8. hydrALAZINE (APRESOLINE):  50  mg  Oral  Every 8 Hours    9. Insulin Isophane (NPH) Injectable:  5  unit(s)  SubCutaneous  2 Times a Day    10. Insulin Lispro Moderate Corrective Scale:  unit(s)  SubCutaneous  3 Times a Day Before Meals    11. Latanoprost 0.005% Ophthalmic:  1  drop(s)  Both Eyes  At Bedtime    12. NIFEdipine (PROCARDIA XL) Extended Release:  30  mg  Oral  Daily    13. Pantoprazole:  40  mg  Oral  Daily    14. Polyethylene Glycol:  17  gram(s)  Oral  2 Times a Day    15. Timolol 0.5% Ophthalmic:  1  drop(s)  Both Eyes  2 Times a Day         PRN Medications       --------------------------------    1. Acetaminophen:  650  mg  Oral  Every 4 Hours    2. Acetaminophen:  650  mg  Oral  Every 4 Hours    3. Dextrose 50% in Water Injectable:  25  gram(s)  IntraVenous Push  Every 15 Minutes    4. Glucagon Injectable:  1  mg   IntraMuscular  Every 15 Minutes    5. hydrALAZINE (APRESOLINE) Injectable:  10  mg  IntraVenous Push  Every 6 Hours    6. Lidocaine 2% (UROJET) Topical Gel:  5  mL  Topical  Every 4 Hours    7. Ondansetron Injectable:  4  mg  IntraVenous Push  Every 4 Hours    8. Sodium Chloride 0.9% Injectable Flush:  10  mL  IntraVenous Flush  Every 8 Hours and as Needed        Recent Lab Results:    Results:    CBC: 9/24/2023 08:25              \     Hgb     /                              \     7.7 L    /  WBC  ----------------  Plt               10.1       ----------------    255              /     Hct     \                              /     24.1 L    \            RBC: 2.49 L    MCV: 97           RFP: 9/24/2023 08:25  NA+        Cl-     BUN  /                         129 L   94 L    48 H /  --------------------------------  Glucose                ---------------------------  191 H    K+     HCO3-   Creat \                         4.7    26    2.48 H \  Calcium : 8.3 LAnion Gap : 14          Albumin : 3.1 L     Phos : 4.1        I have reviewed these laboratory results:    Glucose_POCT  Trending View      Result 24-Sep-2023 08:31:00  23-Sep-2023 20:24:00    Glucose-POCT 187   H   264   H        Complete Blood Count  24-Sep-2023 08:25:00      Result Value    White Blood Cell Count  10.1    Red Blood Cell Count  2.49   L   HGB  7.7   L   HCT  24.1   L   MCV  97    MCHC  32.0    PLT  255    RDW-CV  12.6      Renal Function Panel  24-Sep-2023 08:25:00      Result Value    Glucose, Serum  191   H   NA  129   L   K  4.7    CL  94   L   Bicarbonate, Serum  26    Anion Gap, Serum  14    BUN  48   H   CREAT  2.48   H   GFR Female  18   A   Calcium, Serum  8.3   L   Phosphorus, Serum  4.1    ALB  3.1   L       Radiology Results:    Results:        Impression:    1.  No evidence of acute cardiopulmonary process.           MACRO:  None     Xray Chest 1 View [Sep 22 2023 11:38AM]      Conclusion:  Marked sinus bradycardia  Right  bundle branch block  Abnormal ECG  Confirmed by Jorge Lema ([id number]) on 9/20/2023 8:06:05 AM     Electrocardiogram 12 Lead [Sep 20 2023  8:06AM]      Impression:    Extremely limited study given body habitus.     1. No hydronephrosis.  2. Indeterminate left renal mixed echogenicity nodule measuring 1.4  cm. This could be further evaluated by dedicated renal MRI with IV  contrast.  3. Partially visualized heterogenous hepatic echotexture possibly  related to underlying diffuse hepatocellular dysfunction. Advise  correlation with liver function test     MACRO:  None     Ultrasound Renal Bilateral [Sep 18 2023  2:35PM]      Conclusion:  CONCLUSIONS:  1. Poorly visualized anatomical structures due to suboptimal image quality.  2. Left ventricular systolic function is hyperdynamic with a 70-75% estimated ejection fraction.  3. Spectral Doppler shows an impaired relaxation pattern of left ventricular diastolic filling.  4. There is moderate concentric left ventricular hypertrophy.  5. Left ventricular cavity size is decreased.  6. The left atrium is enlarged.  7. Mild to moderately elevated right ventricular systolic pressure.  8. Mild aortic valve stenosis.    QUANTITATIVE DATA SUMMARY:  2D MEASUREMENTS:  Normal Ranges:  LAs:           3.15 cm    (2.7-4.0cm)  IVSd:          1.40 cm    (0.6-1.1cm)  LVPWd:         1.40 cm    (0.6-1.1cm)  LVIDd:         3.80 cm    (3.9-5.9cm)  LVIDs:         2.50 cm  LV Mass Index: 100.8 g/m2  LV % FS        34.2 %    LA VOLUME:  Normal Ranges:  LA Vol A4C:        131.3 ml   (22+/-6mL/m2)  LA Vol A2C:        88.2 ml  LA Vol BP:         109.2 ml  LA Vol Index A4C:  68.2ml/m2  LA Vol Index A2C:  45.8 ml/m2  LA Vol IndexBP:   56.7 ml/m2  LA Area A4C:       29.7 cm2  LA Area A2C:       24.0 cm2  LA Major Axis A4C: 5.7 cm  LA Major Axis A2C: 5.6 cm  LA Volume Index:   56.7 ml/m2    RA VOLUME BY A/L METHOD:  Normal Ranges:  RA Area A4C: 13.0 cm2    AORTA MEASUREMENTS:  Normal Ranges:  Ao  Sinus, d: 2.67 cm (2.1-3.5cm)  Ao STJ, d:   2.38 cm (1.7-3.4cm)  Asc Ao, d:   3.04 cm (2.1-3.4cm)    LV SYSTOLIC FUNCTION BY 2D PLANIMETRY (MOD):  Normal Ranges:  EF-A4C View: 61.9 % (>=55%)  EF-A2C View: 67.4 %  EF-Biplane:  64.6 %    LV DIASTOLIC FUNCTION:  Normal Ranges:  MV Peak E:        0.48 m/s    (0.7-1.2 m/s)  MV Peak A:        1.10 m/s    (0.42-0.7 m/s)  E/A Ratio:        0.44        (1.0-2.2)  MV e'             0.04 m/s    (>8.0)  MV lateral e'     0.06 m/s  MV medial e'      0.04 m/s  MV A Dur:         156.00 msec  E/e' Ratio:       11.99       (<8.0)  PulmV Sys Maikel:    51.10 cm/s  PulmV Marshall Maikel:   32.90 cm/s  PulmV S/D Maikel:    1.60  PulmV A Revs Maikel: 21.20 cm/s  PulmV A Revs Dur: 124.00 msec    MITRAL VALVE:  Normal Ranges:  MV DT: 172 msec (150-240msec)    AORTIC VALVE:  Normal Ranges:  AoV Vmax:                2.46 m/s  (<=1.7m/s)  AoV Peak P.2 mmHg (<20mmHg)  AoV Mean P.0 mmHg (1.7-11.5mmHg)  LVOT Max Maikel:            1.60 m/s  (<=1.1m/s)  AoV VTI:                 51.80 cm  (18-25cm)  LVOT VTI:                34.40 cm  LVOT Diameter:           1.70 cm   (1.8-2.4cm)  AoV Area, VTI:           1.51 cm2  (2.5-5.5cm2)  AoV Area,Vmax:           1.48 cm2  (2.5-4.5cm2)  AoV Dimensionless Index: 0.66      RIGHT VENTRICLE:  RV Basal 3.35 cm  RV Mid   2.45 cm  RV Major 7.0 cm    TRICUSPID VALVE/RVSP:  Normal Ranges:  Peak TR Velocity: 3.28 m/s  Est. RA Pressure: 3 mmHg  RV Syst Pressure: 46.0 mmHg (< 30mmHg)  IVC Diam:   0.93 cm    PULMONIC VALVE:  Normal Ranges:  PV Accel Time: 129 msec (>120ms)  PV Max Maikel:    1.3 m/s  (0.6-0.9m/s)  PV Max P.1 mmHg    Pulmonary Veins:  PulmV A Revs Dur: 124.00 msec  PulmV A Revs Maikel: 21.20 cm/s  PulmV Marshall Maikel:   32.90 cm/s  PulmV S/D Maikel:    1.60  PulmV Sys Maikel:    51.10 cm/s      [id number] Adam Farmer MD  Electronically signed on 2023 at 8:17:32 AM        *** Final ***     Echocardiogram [Sep 17 2023  8:17AM]      Assessment  and Plan:   Daily Risk Screen:  ·  Does patient have an indwelling urinary catheter? n/a consulting service          Additional Dx:   Bradycardia: Entered Date: 20-Sep-2023 21:04   Chronic diastolic congestive heart failure: Entered Date:  20-Sep-2023 21:04   Hypertensive heart disease without heart failure: Entered  Date: 17-Sep-2023 10:08   Type 2 diabetes mellitus with diabetic chronic kidney disease : Entered Date: 17-Sep-2023 10:08   Stage 4 chronic kidney disease: Entered Date: 17-Sep-2023  10:08   Type 2 diabetes mellitus without complication: Entered Date:  16-Sep-2023 18:08   Long term current use of insulin: Entered Date: 16-Sep-2023  18:08       Medical History:   Chest pain: Onset Date: 18-Sep-2023, Entered Date: 18-Sep-2023  12:47   Atherosclerosis: Onset Date: 16-Sep-2023, Entered Date: 16-Sep-2023  15:18   Hypertension: Onset Date: 16-Sep-2023, Entered Date: 16-Sep-2023  15:18    Comorbidities:  ·  Comorbidity Other     Code Status:  ·  Code Status Full Code     Assessment:    Ms. Cartagena is a 89-year-old woman with a history of hypertension, hyperlipidemia, diabetes who presented with hypoglycemia  and hypertension.  Cardiology saw her as the blood pressure was in the 200s systolic.  She had apparently fallen the night prior to her presentation.  Medications had been adjusted related to her kidneys, have been taken off of losartan and placed on  hydralazine.  Echocardiogram had noted normal systolic function but moderate concentric LVH, elevated right-sided pressures.  She still appears fluid overloaded but is improving.  She is off of the sodium chloride tablets, has the Prather catheter.  She is on intravenous Lasix, monitoring  the potassium.  Blood pressures are up and down, I hope that they will continue to improve with diuresis.  She has been receiving intravenous iron, she will get a dose of erythropoietin.        Electronic Signatures:  Adam Leonard)  (Signed 24-Sep-2023 09:45)   Authored:  Service, Subjective Data, Objective Data, Assessment  and Plan, Note Completion      Last Updated: 24-Sep-2023 09:45 by Adam Leonard)

## 2023-09-30 NOTE — PROGRESS NOTES
Service: Medicine     Subjective Data:   ALICIA IRENE is a 89 year old Female who is Hospital Day # 10.     Patient reports dizziness.    Objective Data:     Objective Information:      T   P  R  BP   MAP  SpO2   Value  36.4  70  18  169/68      99%  Date/Time 9/24 20:12 9/24 20:12 9/24 20:12 9/24 20:12    9/24 20:12  Range  (36.4C - 37.1C )  (63 - 78 )  (18 - 18 )  (146 - 179 )/ (45 - 68 )    (97% - 99% )  Highest temp of 37.1 C was recorded at 9/24 0:44      Pain reported at 9/24 10:15: 5 = Moderate    ---- Intake and Output  -----  Mn/Dy/Year Time  Intake   Output  Net  Sep 23, 2023 2:00 pm  358   557 -592      Physical Exam Narrative:  ·  Physical Exam:    ROS-10 point review of systems obtained and negative except what is listed in HPI    Physical Exam:  General/Constitutional: Alert, oriented , cooperative,  in no acute distress.  Head: normocephalic, atraumatic  Skin: Intact,  dry skin  Eyes: PERRL, EOMs intact,  Conjunctiva pink with no erythema or exudates. No scleral icterus.   ENT: No external deformities. Nares patent, mucus membranes moist.  Pharynx clear, uvula midline.   Neck: Supple,   Pulmonary: Clear bilaterally with good chest wall excursion. No rales, rhonchi or wheezing.   Cardiac: Regular rate and rhythm. good pulses.  Abdomen: Soft, nontender, active bowel sounds.  No palpable organomegaly.  No rebound or guarding.  No CVA tenderness.  Genitourinary: Exam deferred.  Musculoskeletal: Full range of motion,  Neurological:  alert oriented x 3 no focal deficit.  Psychiatric: Appropriate mood and affect. Calm.     Recent Lab Results:    Results:    CBC: 9/24/2023 08:25              \     Hgb     /                              \     7.7 L    /  WBC  ----------------  Plt               10.1       ----------------    255              /     Hct     \                              /     24.1 L    \            RBC: 2.49 L    MCV: 97           RFP: 9/24/2023 08:25  NA+        Cl-     BUN  /                          129 L   94 L    48 H /  --------------------------------  Glucose                ---------------------------  191 H    K+     HCO3-   Creat \                         4.7    26    2.48 H \  Calcium : 8.3 LAnion Gap : 14          Albumin : 3.1 L     Phos : 4.1      Radiology Results:    Results:        Impression:    1.  No evidence of acute cardiopulmonary process.           MACRO:  None     Xray Chest 1 View [Sep 22 2023 11:38AM]      Assessment and Plan:   Daily Risk Screen:  ·  Does patient have an indwelling urinary catheter? yes   ·  Plan for indwelling urinary catheter removal today? no   ·  The patient continues to require indwelling urinary catheterization for critically ill patients who need accurate urinary output measurements     Comorbidities:  ·  Comorbidity Other     Code Status:  ·  Code Status Full Code     Assessment:    ALICIA IRENE is a 89 year old Female presenting with hypoglycemia and hypertension for the past week or longer and a recent fall. she saw pcp before her fall and  also after a previous fall. her bp meds were adjusted and her insulin decreased but low bg persists and elevated bp persits. she noted a headache and hallucinations last night prior to admission when her bp was very high. she reports walking to bathroom  and fell, and hit emergency assist button. she lives in senior home and they came to check on her. she was unable to get off the floor and thus was brought to ed.  on admission it was 223/71 with hr 57 and bg 63. she denies headache at this time. she  does have some right knee pain, hx of chronic r knee pain but a bit worse now. She reports hx of neuropathy related to her dm and has numb feeling in her feet.    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   - hold oral hyperglycemic agent  -endocrinology consult   - sliding scale, nph per endocrinology   -glucose  still with highs and lows     hyponatremia   - na 129, continues to improve  -has been diuresed with Lasix, none today   patient reports dizziness, check orthos, ct head   - fluid restriction 1200ml   - consult nephrology    massimo on CKD stg 3-  nephrology following      volume overload  - huerta for strict I&O  iv lasix   huerta gave patient relief may have been retaining,     Anemia- likely due to chronic disease, ckd    hypertensive urgency- bp stabilizing, high noted at 0800 but in 140's with recheck at 1000 prior  to bp med pass  bradycardia on 9/19 improved since coreg stopped  - switched to nifedipine. cardio increased hydralazine to 50 mg, switched metop switched  to coreg however coreg is now held, hydralazine increased to 50mg three times a day per cardiology   - -cardiology consult - appreciate recs   - echo   - Left ventricular systolic function is hyperdynamic with a 70-75% estimated ejection fraction.  3. Spectral Doppler shows an impaired relaxation pattern of left ventricular diastolic filling.  4. There is moderate concentric left ventricular hypertrophy.  5. Left ventricular cavity size is decreased.  6. The left atrium is enlarged.  7. Mild to moderately elevated right ventricular systolic pressure.  8. Mild aortic valve stenosis.     DVT prophylaxis  -heparin   -SCDs    GI prophylaxis   - ppi and docusate     Disposition: Plan of care discussed with patient.  No plan for discharge today.       Electronic Signatures:  Dangelo Mcgregor (APRN-CNP)  (Signed 25-Sep-2023 08:35)   Authored: Service, Subjective Data, Objective Data, Assessment  and Plan, Note Completion      Last Updated: 25-Sep-2023 08:35 by Dangelo Mcgregor (APRN-CNP)

## 2023-09-30 NOTE — PROGRESS NOTES
Service: Medicine     Subjective Data:   ALICIA IRENE is a 89 year old Female who is Hospital Day # 9.     patient feels much better today.    Overnight Events: Patient had an uneventful night.     Objective Data:     Objective Information:      T   P  R  BP   MAP  SpO2   Value  36.9  79  18  167/53      98%  Date/Time 9/23 7:55 9/23 7:55 9/23 7:55 9/23 7:55    9/23 7:55  Range  (36.2C - 37.5C )  (48 - 87 )  (18 - 18 )  (127 - 209 )/ (38 - 70 )    (96% - 100% )   As of 23-Sep-2023 02:23:00, patient is on 2 L/min of oxygen via nasal cannula with humidification.  Highest temp of 37.5 C was recorded at 9/23 2:23    Physical Exam Narrative:  ·  Physical Exam:    ROS-10 point review of systems obtained and negative except what is listed in HPI    Physical Exam:  General/Constitutional: Alert, oriented , cooperative,  in no acute distress.  Head: normocephalic, atraumatic  Skin: Intact,  dry skin  Eyes: PERRL, EOMs intact,  Conjunctiva pink with no erythema or exudates. No scleral icterus.   ENT: No external deformities. Nares patent, mucus membranes moist.  Pharynx clear, uvula midline.   Neck: Supple,   Pulmonary: Clear bilaterally with good chest wall excursion. No rales, rhonchi or wheezing.   Cardiac: Regular rate and rhythm. good pulses.  Abdomen: Soft, nontender, active bowel sounds.  No palpable organomegaly.  No rebound or guarding.  No CVA tenderness.  Genitourinary: Exam deferred.  Musculoskeletal: Full range of motion,  Neurological:  alert oriented x 3 no focal deficit.  Psychiatric: Appropriate mood and affect. Calm.     Recent Lab Results:    Results:    CBC: 9/23/2023 06:01              \     Hgb     /                              \     8.2 L    /  WBC  ----------------  Plt               7.6       ----------------    283              /     Hct     \                              /     25.7 L    \            RBC: 2.62 L    MCV: 98           RFP: 9/23/2023 06:01  NA+        Cl-     BUN  /                          126 L   94 L    50 H /  --------------------------------  Glucose                ---------------------------  227 H    K+     HCO3-   Creat \                         4.7    25    2.54 H \  Calcium : 8.4 LAnion Gap : 12          Albumin : 3.2 L     Phos : 4.9      Radiology Results:    Results:        Impression:    1.  No evidence of acute cardiopulmonary process.           MACRO:  None     Xray Chest 1 View [Sep 22 2023 11:38AM]      Assessment and Plan:   Daily Risk Screen:  ·  Does patient have an indwelling urinary catheter? yes   ·  Plan for indwelling urinary catheter removal today? no   ·  The patient continues to require indwelling urinary catheterization for critically ill patients who need accurate urinary output measurements     Comorbidities:  ·  Comorbidity Other     Code Status:  ·  Code Status Full Code     Assessment:    ALICIA IRENE is a 89 year old Female presenting with hypoglycemia and hypertension for the past week or longer and a recent fall. she saw pcp before her fall and  also after a previous fall. her bp meds were adjusted and her insulin decreased but low bg persists and elevated bp persits. she noted a headache and hallucinations last night prior to admission when her bp was very high. she reports walking to bathroom  and fell, and hit emergency assist button. she lives in senior home and they came to check on her. she was unable to get off the floor and thus was brought to ed.  on admission it was 223/71 with hr 57 and bg 63. she denies headache at this time. she  does have some right knee pain, hx of chronic r knee pain but a bit worse now. She reports hx of neuropathy related to her dm and has numb feeling in her feet.    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   - hold oral hyperglycemic agent  -endocrinology consult   - sliding scale, nph per endocrinology   -glucose  still with highs and lows     hyponatremia   - na 126, continues to improve  -furosemide 80mg iv ordered x 1 per nephrology, yesterday, she will likely be diuresed again today  - fluid restriction 1200ml   - consult nephrology    massimo on CKD stg 3-  nephrology following      volume overload  - huerta for strict I&O  iv lasix   huerta gave patient relief may have been retaining,     Anemia- likely due to chronic disease, ckd    hypertensive urgency- bp stabilizing, high noted at 0800 but in 140's with recheck at 1000 prior  to bp med pass  bradycardia on 9/19 improved since coreg stopped  - switched to nifedipine. cardio increased hydralazine to 50 mg, switched metop switched  to coreg however coreg is now held, hydralazine increased to 50mg three times a day per cardiology   - -cardiology consult - appreciate recs   - echo   - Left ventricular systolic function is hyperdynamic with a 70-75% estimated ejection fraction.  3. Spectral Doppler shows an impaired relaxation pattern of left ventricular diastolic filling.  4. There is moderate concentric left ventricular hypertrophy.  5. Left ventricular cavity size is decreased.  6. The left atrium is enlarged.  7. Mild to moderately elevated right ventricular systolic pressure.  8. Mild aortic valve stenosis.     DVT prophylaxis  -heparin   -SCDs    GI prophylaxis   - ppi and docusate     Disposition: Plan of care discussed with patient.  No plan for discharge today.       Electronic Signatures:  Dangelo Mcgregor (APRN-CNP)  (Signed 23-Sep-2023 15:27)   Authored: Service, Subjective Data, Objective Data, Assessment  and Plan, Note Completion      Last Updated: 23-Sep-2023 15:27 by Dangelo Mcgregor (APRN-CNP)

## 2023-09-30 NOTE — PROGRESS NOTES
Service: Medicine     Subjective Data:   ALICIA IRENE is a 89 year old Female who is Hospital Day # 7.     Reports not feeling well this morning because of her leg swelling, no sob, dizziness or leg swelling.    Objective Data:     Objective Information:      T   P  R  BP   MAP  SpO2   Value  36.8  61  18  164/69      98%  Date/Time 9/21 3:27 9/21 3:27 9/21 3:27 9/21 3:27    9/21 3:27  Range  (36.1C - 36.8C )  (58 - 86 )  (16 - 18 )  (133 - 185 )/ (40 - 72 )    (98% - 99% )      Pain reported at 9/20 20:26: 0 = None    Physical Exam Narrative:  ·  Physical Exam:    ROS-10 point review of systems obtained and negative except what is listed in HPI    Physical Exam:  General/Constitutional: Alert, oriented , cooperative,  in no acute distress.  Head: normocephalic, atraumatic  Skin: Intact,  dry skin  Eyes: PERRL, EOMs intact,  Conjunctiva pink with no erythema or exudates. No scleral icterus.   ENT: No external deformities. Nares patent, mucus membranes moist.  Pharynx clear, uvula midline.   Neck: Supple,   Pulmonary: Clear bilaterally with good chest wall excursion. No rales, rhonchi or wheezing.   Cardiac: Regular rate and rhythm. good pulses.  Abdomen: Soft, nontender, active bowel sounds.  No palpable organomegaly.  No rebound or guarding.  No CVA tenderness.  Genitourinary: Exam deferred.  Musculoskeletal: Full range of motion,  Neurological:  alert oriented x 3 no focal deficit.  Psychiatric: Appropriate mood and affect. Calm.     Recent Lab Results:    Results:    CBC: 9/21/2023 05:01              \     Hgb     /                              \     7.6 L    /  WBC  ----------------  Plt               6.7       ----------------    259              /     Hct     \                              /     23.3 L    \            RBC: 2.45 L    MCV: 95           RFP: 9/21/2023 05:01  NA+        Cl-     BUN  /                         126 L   95 L    47 H /  --------------------------------  Glucose                 ---------------------------  195 H    K+     HCO3-   Creat \                         5.0    22    2.66 H \  Calcium : 8.1 LAnion Gap : 14          Albumin : 3.1 L     Phos : 4.8      Radiology Results:    Results:        Conclusion:  CONCLUSIONS:  1. Poorly visualized anatomical structures due to suboptimal image quality.  2. Left ventricular systolic function is hyperdynamic with a 70-75% estimated ejection fraction.  3. Spectral Doppler shows an impaired relaxation pattern of left ventricular diastolic filling.  4. There is moderate concentric left ventricular hypertrophy.  5. Left ventricular cavity size is decreased.  6. The left atrium is enlarged.  7. Mild to moderately elevated right ventricular systolic pressure.  8. Mild aortic valve stenosis.    QUANTITATIVE DATA SUMMARY:  2D MEASUREMENTS:  Normal Ranges:  LAs:           3.15 cm    (2.7-4.0cm)  IVSd:          1.40 cm    (0.6-1.1cm)  LVPWd:         1.40 cm    (0.6-1.1cm)  LVIDd:         3.80 cm    (3.9-5.9cm)  LVIDs:         2.50 cm  LV Mass Index: 100.8 g/m2  LV % FS        34.2 %    LA VOLUME:  Normal Ranges:  LA Vol A4C:        131.3 ml   (22+/-6mL/m2)  LA Vol A2C:        88.2 ml  LA Vol BP:         109.2 ml  LA Vol Index A4C:  68.2ml/m2  LA Vol Index A2C:  45.8 ml/m2  LA Vol IndexBP:   56.7 ml/m2  LA Area A4C:       29.7 cm2  LA Area A2C:       24.0 cm2  LA Major Axis A4C: 5.7 cm  LA Major Axis A2C: 5.6 cm  LA Volume Index:   56.7 ml/m2    RA VOLUME BY A/L METHOD:  Normal Ranges:  RA Area A4C: 13.0 cm2    AORTA MEASUREMENTS:  Normal Ranges:  Ao Sinus, d: 2.67 cm (2.1-3.5cm)  Ao STJ, d:   2.38 cm (1.7-3.4cm)  Asc Ao, d:   3.04 cm (2.1-3.4cm)    LV SYSTOLIC FUNCTION BY 2D PLANIMETRY (MOD):  Normal Ranges:  EF-A4C View: 61.9 % (>=55%)  EF-A2C View: 67.4 %  EF-Biplane:  64.6 %    LV DIASTOLIC FUNCTION:  Normal Ranges:  MV Peak E:        0.48 m/s    (0.7-1.2 m/s)  MV Peak A:        1.10 m/s    (0.42-0.7 m/s)  E/A Ratio:        0.44        (1.0-2.2)  MV  e'             0.04 m/s    (>8.0)  MV lateral e'     0.06 m/s  MV medial e'      0.04 m/s  MV A Dur:         156.00 msec  E/e' Ratio:       11.99       (<8.0)  PulmV Sys Maikel:    51.10 cm/s  PulmV Marshall Maikel:   32.90 cm/s  PulmV S/D Maikel:    1.60  PulmV A Revs Maikel: 21.20 cm/s  PulmV A Revs Dur: 124.00 msec    MITRAL VALVE:  Normal Ranges:  MV DT: 172 msec (150-240msec)    AORTIC VALVE:  Normal Ranges:  AoV Vmax:                2.46 m/s  (<=1.7m/s)  AoV Peak P.2 mmHg (<20mmHg)  AoV Mean P.0 mmHg (1.7-11.5mmHg)  LVOT Max Maikel:            1.60 m/s  (<=1.1m/s)  AoV VTI:                 51.80 cm  (18-25cm)  LVOT VTI:                34.40 cm  LVOT Diameter:           1.70 cm   (1.8-2.4cm)  AoV Area, VTI:           1.51 cm2  (2.5-5.5cm2)  AoV Area,Vmax:           1.48 cm2  (2.5-4.5cm2)  AoV Dimensionless Index: 0.66      RIGHT VENTRICLE:  RV Basal 3.35 cm  RV Mid   2.45 cm  RV Major 7.0 cm    TRICUSPID VALVE/RVSP:  Normal Ranges:  Peak TR Velocity: 3.28 m/s  Est. RA Pressure: 3 mmHg  RV Syst Pressure: 46.0 mmHg (< 30mmHg)  IVC Diam:   0.93 cm    PULMONIC VALVE:  Normal Ranges:  PV Accel Time: 129 msec (>120ms)  PV Max Maikel:    1.3 m/s  (0.6-0.9m/s)  PV Max P.1 mmHg    Pulmonary Veins:  PulmV A Revs Dur: 124.00 msec  PulmV A Revs Maikel: 21.20 cm/s  PulmV Marshall Maikel:   32.90 cm/s  PulmV S/D Maikel:    1.60  PulmV Sys Maikel:    51.10 cm/s      [id number] Adam Farmer MD  Electronically signed on 2023 at 8:17:32 AM        *** Final ***     Echocardiogram [Sep 17 2023  8:17AM]      Assessment and Plan:   Comorbidities:  ·  Comorbidity Other     Code Status:  ·  Code Status Full Code     Assessment:    ALICIA IRENE is a 89 year old Female presenting with hypoglycemia and hypertension for the past week or longer and a recent fall. she saw pcp before her fall and  also after a previous fall. her bp meds were adjusted and her insulin decreased but low bg persists and elevated bp persits. she noted a  headache and hallucinations last night prior to admission when her bp was very high. she reports walking to bathroom  and fell, and hit emergency assist button. she lives in senior home and they came to check on her. she was unable to get off the floor and thus was brought to ed.  on admission it was 223/71 with hr 57 and bg 63. she denies headache at this time. she  does have some right knee pain, hx of chronic r knee pain but a bit worse now. She reports hx of neuropathy related to her dm and has numb feeling in her feet.    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   - hold oral hyperglycemic agent  -endocrinology consult   - sliding scale resumed   -glu controlled    hyponatremia   - na 126, continues to improve  -furosemide 80mg iv ordered x 1 per nephrology, yesterday, she will likely be diuresed again today  - fluid restriction 1200ml   - consult nephrology    massimo on CKD stg 3-  creat trending up, nephrology following    Anemia-checking iron, likely due to chronic disease, ckd    hypertensive urgency- bp stabilizing, high noted at 0800 but in 140's with recheck at 1000 prior  to bp med pass  bradycardia on 9/19 improved since coreg stopped  - switched to nifedipine. cardio increased hydralazine to 50 mg, switched metop switched  to coreg however coreg is now held, hydralazine increased to 50mg three times a day per cardiology   - -cardiology consult - appreciate recs   - echo   - Left ventricular systolic function is hyperdynamic with a 70-75% estimated ejection fraction.  3. Spectral Doppler shows an impaired relaxation pattern of left ventricular diastolic filling.  4. There is moderate concentric left ventricular hypertrophy.  5. Left ventricular cavity size is decreased.  6. The left atrium is enlarged.  7. Mild to moderately elevated right ventricular systolic pressure.  8. Mild aortic valve stenosis.     DVT  prophylaxis  -heparin   -SCDs    GI prophylaxis   - ppi and docusate     Disposition: Plan of care discussed with patient.  No plan for discharge today.       Electronic Signatures:  Dangelo Mcgregor (APRN-CNP)  (Signed 22-Sep-2023 10:26)   Authored: Service, Subjective Data, Objective Data, Assessment  and Plan, Note Completion      Last Updated: 22-Sep-2023 10:26 by Dangelo Mcgregor (APRN-CNP)

## 2023-09-30 NOTE — PROGRESS NOTES
Service: Renal     Subjective Data:   ALICIA CARTAGENA is a 89 year old Female who is Hospital Day # 9.     Ms. Cartagena is a 89-year-old woman with a history of hypertension, hyperlipidemia, diabetes who presented with hypoglycemia and hypertension.  Cardiology saw her as the blood pressure was in the 200s systolic.   She had apparently fallen the night prior to her presentation.  Medications had been adjusted related to her kidneys, have been taken off of losartan and placed on hydralazine.  Echocardiogram had noted normal systolic function but moderate concentric  LVH, elevated right-sided pressures.  No overnight events.  Feeling fullness in the lower abdomen this morning.  Prather catheter was placed.  She is feeling much better today, sitting up eating.    Overnight Events: Patient had an uneventful night.     Objective Data:     Objective Information:      T   P  R  BP   MAP  SpO2   Value  36.9  79  18  167/53      98%  Date/Time 9/23 7:55 9/23 7:55 9/23 7:55 9/23 7:55    9/23 7:55  Range  (36.2C - 37.5C )  (48 - 87 )  (18 - 18 )  (127 - 209 )/ (38 - 70 )    (96% - 100% )   As of 23-Sep-2023 02:23:00, patient is on 2 L/min of oxygen via nasal cannula with humidification.  Highest temp of 37.5 C was recorded at 9/23 2:23    Physical Exam by System:    Constitutional: Well developed, awake/alert/oriented  x3, no distress, alert and cooperative   Eyes: PERRL, EOMI, clear sclera   ENMT: mucous membranes moist, no apparent injury,  no lesions seen   Head/Neck: Elevated JVP   Respiratory/Thorax: Crackles in the bases   Cardiovascular: Regular, rate and rhythm, systolic  murmur, 2+ equal pulses of the extremities, normal S 1and S 2   Gastrointestinal: Nondistended, soft, non-tender,  no rebound tenderness or guarding, no masses palpable, no organomegaly, +BS, no bruits   Genitourinary: Prather catheter   Musculoskeletal: ROM intact, no joint swelling, normal  strength   Extremities: normal extremities, 1+ lower  extremity  edema, contusions or wounds, no clubbing   Neurological: alert and oriented x3, intact senses,  motor, response and reflexes, normal strength   Skin: Warm and dry, no lesions, no rashes     Medication:    Medications:          Continuous Medications       --------------------------------  No continuous medications are active       Scheduled Medications       --------------------------------    1. Aspirin Chewable:  81  mg  Oral  Daily    2. Atorvastatin:  10  mg  Oral  Daily    3. Brimonidine 0.2% Ophthalmic:  1  drop(s)  Both Eyes  2 Times a Day    4. Cholecalciferol (Vitamin D3):  2000  International Unit(s)  Oral  Daily    5. Docusate 50 mg - Senna 8.6 m  tablet(s)  Oral  2 Times a Day    6. Furosemide Injectable:  80  mg  IntraVenous Push  Once    7. Heparin SubCutaneous:  5000  unit(s)  SubCutaneous  Every 8 Hours    8. hydrALAZINE (APRESOLINE):  50  mg  Oral  Every 8 Hours    9. Insulin Isophane (NPH) Injectable:  5  unit(s)  SubCutaneous  2 Times a Day    10. Insulin Lispro Moderate Corrective Scale:  unit(s)  SubCutaneous  3 Times a Day Before Meals    11. Iron Sucrose Injectable:  200  mg  IntraVenous Push  Once    12. Latanoprost 0.005% Ophthalmic:  1  drop(s)  Both Eyes  At Bedtime    13. NIFEdipine (PROCARDIA XL) Extended Release:  30  mg  Oral  Daily    14. Pantoprazole:  40  mg  Oral  Daily    15. Polyethylene Glycol:  17  gram(s)  Oral  2 Times a Day    16. Timolol 0.5% Ophthalmic:  1  drop(s)  Both Eyes  2 Times a Day         PRN Medications       --------------------------------    1. Acetaminophen:  650  mg  Oral  Every 4 Hours    2. Acetaminophen:  650  mg  Oral  Every 4 Hours    3. Dextrose 50% in Water Injectable:  25  gram(s)  IntraVenous Push  Every 15 Minutes    4. Glucagon Injectable:  1  mg  IntraMuscular  Every 15 Minutes    5. hydrALAZINE (APRESOLINE) Injectable:  10  mg  IntraVenous Push  Every 6 Hours    6. Lidocaine 2% (UROJET) Topical Gel:  5  mL  Topical  Every 4  Hours    7. Ondansetron Injectable:  4  mg  IntraVenous Push  Every 4 Hours    8. Sodium Chloride 0.9% Injectable Flush:  10  mL  IntraVenous Flush  Every 8 Hours and as Needed        Recent Lab Results:    Results:    CBC: 9/23/2023 06:01              \     Hgb     /                              \     8.2 L    /  WBC  ----------------  Plt               7.6       ----------------    283              /     Hct     \                              /     25.7 L    \            RBC: 2.62 L    MCV: 98           RFP: 9/23/2023 06:01  NA+        Cl-     BUN  /                         126 L   94 L    50 H /  --------------------------------  Glucose                ---------------------------  227 H    K+     HCO3-   Creat \                         4.7    25    2.54 H \  Calcium : 8.4 LAnion Gap : 12          Albumin : 3.2 L     Phos : 4.9        I have reviewed these laboratory results:    Glucose_POCT  23-Sep-2023 07:52:00      Result Value    Glucose-POCT  222   H     Renal Function Panel  23-Sep-2023 06:01:00      Result Value    Glucose, Serum  227   H   NA  126   L   K  4.7    CL  94   L   Bicarbonate, Serum  25    Anion Gap, Serum  12    BUN  50   H   CREAT  2.54   H   GFR Female  17   A   Calcium, Serum  8.4   L   Phosphorus, Serum  4.9    ALB  3.2   L     Complete Blood Count  23-Sep-2023 06:01:00      Result Value    White Blood Cell Count  7.6    Red Blood Cell Count  2.62   L   HGB  8.2   L   HCT  25.7   L   MCV  98    MCHC  31.9   L   PLT  283    RDW-CV  12.9        Radiology Results:    Results:        Impression:    1.  No evidence of acute cardiopulmonary process.           MACRO:  None     Xray Chest 1 View [Sep 22 2023 11:38AM]      Conclusion:  Marked sinus bradycardia  Right bundle branch block  Abnormal ECG  Confirmed by Jorge Lema ([id number]) on 9/20/2023 8:06:05 AM     Electrocardiogram 12 Lead [Sep 20 2023  8:06AM]      Impression:    Extremely limited study given body habitus.     1. No  hydronephrosis.  2. Indeterminate left renal mixed echogenicity nodule measuring 1.4  cm. This could be further evaluated by dedicated renal MRI with IV  contrast.  3. Partially visualized heterogenous hepatic echotexture possibly  related to underlying diffuse hepatocellular dysfunction. Advise  correlation with liver function test     MACRO:  None     Ultrasound Renal Bilateral [Sep 18 2023  2:35PM]      Assessment and Plan:   Daily Risk Screen:  ·  Does patient have an indwelling urinary catheter? n/a consulting service          Additional Dx:   Bradycardia: Entered Date: 20-Sep-2023 21:04   Chronic diastolic congestive heart failure: Entered Date:  20-Sep-2023 21:04   Hypertensive heart disease without heart failure: Entered  Date: 17-Sep-2023 10:08   Type 2 diabetes mellitus with diabetic chronic kidney disease : Entered Date: 17-Sep-2023 10:08   Stage 4 chronic kidney disease: Entered Date: 17-Sep-2023  10:08   Type 2 diabetes mellitus without complication: Entered Date:  16-Sep-2023 18:08   Long term current use of insulin: Entered Date: 16-Sep-2023  18:08       Medical History:   Chest pain: Onset Date: 18-Sep-2023, Entered Date: 18-Sep-2023  12:47   Atherosclerosis: Onset Date: 16-Sep-2023, Entered Date: 16-Sep-2023  15:18   Hypertension: Onset Date: 16-Sep-2023, Entered Date: 16-Sep-2023  15:18    Comorbidities:  ·  Comorbidity Other     Code Status:  ·  Code Status Full Code     Assessment:    Ms. Cartagena is a 89-year-old woman with a history of hypertension, hyperlipidemia, diabetes who presented with hypoglycemia  and hypertension.  Cardiology saw her as the blood pressure was in the 200s systolic.  She had apparently fallen the night prior to her presentation.  Medications had been adjusted related to her kidneys, have been taken off of losartan and placed on  hydralazine.  Echocardiogram had noted normal systolic function but moderate concentric LVH, elevated right-sided pressures.  She still appears  fluid overloaded but is improving.  She is off of the sodium chloride tablets, has the Prather catheter.  I will give her Lasix 80 mg IV now.  I am  hoping to see the kidney function improved, sodium,.  Again, echocardiogram suggested fluid overload.  She will also get another dose of intravenous iron.   tion necessary.  She will get a dose of intravenous iron.       Electronic Signatures:  Adam Leonard)  (Signed 23-Sep-2023 10:40)   Authored: Service, Subjective Data, Objective Data, Assessment  and Plan, Note Completion      Last Updated: 23-Sep-2023 10:40 by Adam Leonard)

## 2023-09-30 NOTE — PROGRESS NOTES
Consult Type: subsequent visit/care     Service: Endocrinology     Subjective Data:   ALICIA IRENE is a 89 year old Female who is Hospital Day # 9.     No new complaints  No further hypoglycemia.    Objective Data:     Objective Information:      T   P  R  BP   MAP  SpO2   Value  36.9  79  18  167/53      98%  Date/Time 9/23 7:55 9/23 7:55 9/23 7:55 9/23 7:55    9/23 7:55  Range  (36.2C - 37.5C )  (48 - 87 )  (18 - 18 )  (127 - 209 )/ (38 - 70 )    (96% - 100% )   As of 23-Sep-2023 02:23:00, patient is on 2 L/min of oxygen via nasal cannula with humidification.  Highest temp of 37.5 C was recorded at 9/23 2:23    Physical Exam by System:    Constitutional: Elderly female in no distress     Medication:    Medications:          Continuous Medications       --------------------------------  No continuous medications are active       Scheduled Medications       --------------------------------    1. Aspirin Chewable:  81  mg  Oral  Daily    2. Atorvastatin:  10  mg  Oral  Daily    3. Brimonidine 0.2% Ophthalmic:  1  drop(s)  Both Eyes  2 Times a Day    4. Cholecalciferol (Vitamin D3):  2000  International Unit(s)  Oral  Daily    5. Heparin SubCutaneous:  5000  unit(s)  SubCutaneous  Every 8 Hours    6. hydrALAZINE (APRESOLINE):  50  mg  Oral  Every 8 Hours    7. Insulin Isophane (NPH) Injectable:  5  unit(s)  SubCutaneous  2 Times a Day    8. Insulin Lispro Moderate Corrective Scale:  unit(s)  SubCutaneous  3 Times a Day Before Meals    9. Latanoprost 0.005% Ophthalmic:  1  drop(s)  Both Eyes  At Bedtime    10. NIFEdipine (PROCARDIA XL) Extended Release:  30  mg  Oral  Daily    11. Pantoprazole:  40  mg  Oral  Daily    12. Timolol 0.5% Ophthalmic:  1  drop(s)  Both Eyes  2 Times a Day         PRN Medications       --------------------------------    1. Acetaminophen:  650  mg  Oral  Every 4 Hours    2. Acetaminophen:  650  mg  Oral  Every 4 Hours    3. Dextrose 50% in Water Injectable:  25  gram(s)  IntraVenous  Push  Every 15 Minutes    4. Glucagon Injectable:  1  mg  IntraMuscular  Every 15 Minutes    5. hydrALAZINE (APRESOLINE) Injectable:  10  mg  IntraVenous Push  Every 6 Hours    6. Lidocaine 2% (UROJET) Topical Gel:  5  mL  Topical  Every 4 Hours    7. Ondansetron Injectable:  4  mg  IntraVenous Push  Every 4 Hours    8. Sodium Chloride 0.9% Injectable Flush:  10  mL  IntraVenous Flush  Every 8 Hours and as Needed        Recent Lab Results:    Results:        I have reviewed these laboratory results:    Glucose_POCT  Trending View      Result 23-Sep-2023 07:52:00  22-Sep-2023 20:32:00  22-Sep-2023 17:35:00  22-Sep-2023 16:44:00    Glucose-POCT 222   H   328   H   161   H   185   H        Renal Function Panel  23-Sep-2023 06:01:00      Result Value    Glucose, Serum  227   H   NA  126   L   K  4.7    CL  94   L   Bicarbonate, Serum  25    Anion Gap, Serum  12    BUN  50   H   CREAT  2.54   H   GFR Female  17   A   Calcium, Serum  8.4   L   Phosphorus, Serum  4.9    ALB  3.2   L       Assessment and Plan:   Daily Risk Screen:  ·  Does patient have an indwelling urinary catheter? n/a consulting service   ·  Does patient have a central line? n/a consulting service      Code Status:  ·  Code Status Full Code     Assessment:    TYPE 2 DIABETES MELLITUS  LONG TERM CURRENT INSULIN USE  History of frequent hypoglycemia  No hyperglycemia overnight      RECOMMENDATIONS  Continue insulin corrective scale, if needed  NPH 5 units BID        Electronic Signatures:  Saad Edouard)  (Signed 23-Sep-2023 11:52)   Authored: Service, Subjective Data, Objective Data, Assessment  and Plan, Note Completion      Last Updated: 23-Sep-2023 11:52 by Saad Edouard)

## 2023-09-30 NOTE — PROGRESS NOTES
Service: Renal     Subjective Data:   ALICIA CARTAGENA is a 89 year old Female who is Hospital Day # 7.     Ms. Cartagena is a 89-year-old woman with a history of hypertension, hyperlipidemia, diabetes who presented with hypoglycemia and hypertension.  Cardiology saw her as the blood pressure was in the 200s systolic.   She had apparently fallen the night prior to her presentation.  Medications had been adjusted related to her kidneys, have been taken off of losartan and placed on hydralazine.  Echocardiogram had noted normal systolic function but moderate concentric  LVH, elevated right-sided pressures.  No overnight events.    Overnight Events: Patient had an uneventful night.     Objective Data:     Objective Information:      T   P  R  BP   MAP  SpO2   Value  36.8  76  17  185/49      99%  Date/Time 9/21 8:00 9/21 8:00 9/21 8:00 9/21 8:00    9/21 8:00  Range  (36.1C - 36.8C )  (58 - 86 )  (16 - 18 )  (133 - 185 )/ (40 - 72 )    (98% - 99% )      Pain reported at 9/20 20:26: 0 = None    Physical Exam by System:    Constitutional: Well developed, awake/alert/oriented  x3, no distress, alert and cooperative   Eyes: PERRL, EOMI, clear sclera   ENMT: mucous membranes moist, no apparent injury,  no lesions seen   Head/Neck: Elevated JVP   Respiratory/Thorax: Crackles in the bases   Cardiovascular: Regular, rate and rhythm, systolic  murmur, 2+ equal pulses of the extremities, normal S 1and S 2   Gastrointestinal: Nondistended, soft, non-tender,  no rebound tenderness or guarding, no masses palpable, no organomegaly, +BS, no bruits   Genitourinary: No huerta   Musculoskeletal: ROM intact, no joint swelling, normal  strength   Extremities: normal extremities, 1+ lower extremity  edema, contusions or wounds, no clubbing   Neurological: alert and oriented x3, intact senses,  motor, response and reflexes, normal strength   Skin: Warm and dry, no lesions, no rashes     Medication:    Medications:          Continuous  Medications       --------------------------------  No continuous medications are active       Scheduled Medications       --------------------------------    1. Aspirin Chewable:  81  mg  Oral  Daily    2. Atorvastatin:  10  mg  Oral  Daily    3. Brimonidine 0.2% Ophthalmic:  1  drop(s)  Both Eyes  2 Times a Day    4. Cholecalciferol (Vitamin D3):  2000  International Unit(s)  Oral  Daily    5. Furosemide Injectable:  80  mg  IntraVenous Push  Once    6. Heparin SubCutaneous:  5000  unit(s)  SubCutaneous  Every 8 Hours    7. hydrALAZINE (APRESOLINE):  50  mg  Oral  Every 8 Hours    8. Insulin Isophane (NPH) Injectable:  15  unit(s)  SubCutaneous  2 Times a Day    9. Insulin Lispro Moderate Corrective Scale:  unit(s)  SubCutaneous  3 Times a Day Before Meals    10. Latanoprost 0.005% Ophthalmic:  1  drop(s)  Both Eyes  At Bedtime    11. NIFEdipine (PROCARDIA XL) Extended Release:  30  mg  Oral  Daily    12. Pantoprazole:  40  mg  Oral  Daily    13. Timolol 0.5% Ophthalmic:  1  drop(s)  Both Eyes  2 Times a Day         PRN Medications       --------------------------------    1. Acetaminophen:  650  mg  Oral  Every 4 Hours    2. Acetaminophen:  650  mg  Oral  Every 4 Hours    3. Dextrose 50% in Water Injectable:  25  gram(s)  IntraVenous Push  Every 15 Minutes    4. Glucagon Injectable:  1  mg  IntraMuscular  Every 15 Minutes    5. Ondansetron Injectable:  4  mg  IntraVenous Push  Every 4 Hours    6. Sodium Chloride 0.9% Injectable Flush:  10  mL  IntraVenous Flush  Every 8 Hours and as Needed           Currently Suspended Medications       --------------------------------    1. hydrALAZINE (APRESOLINE) Injectable:  10  mg  IntraVenous Push  Every 6 Hours      Recent Lab Results:    Results:    CBC: 9/21/2023 05:01              \     Hgb     /                              \     7.6 L    /  WBC  ----------------  Plt               6.7       ----------------    259              /     Hct     \                               /     23.3 L    \            RBC: 2.45 L    MCV: 95           RFP: 9/21/2023 05:01  NA+        Cl-     BUN  /                         126 L   95 L    47 H /  --------------------------------  Glucose                ---------------------------  195 H    K+     HCO3-   Creat \                         5.0    22    2.66 H \  Calcium : 8.1 LAnion Gap : 14          Albumin : 3.1 L     Phos : 4.8        I have reviewed these laboratory results:    Glucose_POCT  21-Sep-2023 09:42:00      Result Value    Glucose-POCT  232   H     Renal Function Panel  21-Sep-2023 05:01:00      Result Value    Glucose, Serum  195   H   NA  126   L   K  5.0    CL  95   L   Bicarbonate, Serum  22    Anion Gap, Serum  14    BUN  47   H   CREAT  2.66   H   GFR Female  16   A   Calcium, Serum  8.1   L   Phosphorus, Serum  4.8    ALB  3.1   L     Iron + TIBC, Serum  21-Sep-2023 05:01:00      Result Value    Iron, Serum  63    Total Iron Binding Capacity  305    % Saturation  21   L     Complete Blood Count  21-Sep-2023 05:01:00      Result Value    White Blood Cell Count  6.7    Red Blood Cell Count  2.45   L   HGB  7.6   L   HCT  23.3   L   MCV  95    MCHC  32.6    PLT  259    RDW-CV  12.2        Radiology Results:    Results:        Conclusion:  Marked sinus bradycardia  Right bundle branch block  Abnormal ECG  Confirmed by Jorge Lema ([id number]) on 9/20/2023 8:06:05 AM     Electrocardiogram 12 Lead [Sep 20 2023  8:06AM]      Impression:    Extremely limited study given body habitus.     1. No hydronephrosis.  2. Indeterminate left renal mixed echogenicity nodule measuring 1.4  cm. This could be further evaluated by dedicated renal MRI with IV  contrast.  3. Partially visualized heterogenous hepatic echotexture possibly  related to underlying diffuse hepatocellular dysfunction. Advise  correlation with liver function test     MACRO:  None     Ultrasound Renal Bilateral [Sep 18 2023  2:35PM]      Conclusion:  CONCLUSIONS:  1. Poorly  visualized anatomical structures due to suboptimal image quality.  2. Left ventricular systolic function is hyperdynamic with a 70-75% estimated ejection fraction.  3. Spectral Doppler shows an impaired relaxation pattern of left ventricular diastolic filling.  4. There is moderate concentric left ventricular hypertrophy.  5. Left ventricular cavity size is decreased.  6. The left atrium is enlarged.  7. Mild to moderately elevated right ventricular systolic pressure.  8. Mild aortic valve stenosis.    QUANTITATIVE DATA SUMMARY:  2D MEASUREMENTS:  Normal Ranges:  LAs:           3.15 cm    (2.7-4.0cm)  IVSd:          1.40 cm    (0.6-1.1cm)  LVPWd:         1.40 cm    (0.6-1.1cm)  LVIDd:         3.80 cm    (3.9-5.9cm)  LVIDs:         2.50 cm  LV Mass Index: 100.8 g/m2  LV % FS        34.2 %    LA VOLUME:  Normal Ranges:  LA Vol A4C:        131.3 ml   (22+/-6mL/m2)  LA Vol A2C:        88.2 ml  LA Vol BP:         109.2 ml  LA Vol Index A4C:  68.2ml/m2  LA Vol Index A2C:  45.8 ml/m2  LA Vol IndexBP:   56.7 ml/m2  LA Area A4C:       29.7 cm2  LA Area A2C:       24.0 cm2  LA Major Axis A4C: 5.7 cm  LA Major Axis A2C: 5.6 cm  LA Volume Index:   56.7 ml/m2    RA VOLUME BY A/L METHOD:  Normal Ranges:  RA Area A4C: 13.0 cm2    AORTA MEASUREMENTS:  Normal Ranges:  Ao Sinus, d: 2.67 cm (2.1-3.5cm)  Ao STJ, d:   2.38 cm (1.7-3.4cm)  Asc Ao, d:   3.04 cm (2.1-3.4cm)    LV SYSTOLIC FUNCTION BY 2D PLANIMETRY (MOD):  Normal Ranges:  EF-A4C View: 61.9 % (>=55%)  EF-A2C View: 67.4 %  EF-Biplane:  64.6 %    LV DIASTOLIC FUNCTION:  Normal Ranges:  MV Peak E:        0.48 m/s    (0.7-1.2 m/s)  MV Peak A:        1.10 m/s    (0.42-0.7 m/s)  E/A Ratio:        0.44        (1.0-2.2)  MV e'             0.04 m/s    (>8.0)  MV lateral e'     0.06 m/s  MV medial e'      0.04 m/s  MV A Dur:         156.00 msec  E/e' Ratio:       11.99       (<8.0)  PulmV Sys Maikel:    51.10 cm/s  PulmV Marshall Maikel:   32.90 cm/s  PulmV S/D Maikel:    1.60  PulmV A Revs Maikel:  21.20 cm/s  PulmV A Revs Dur: 124.00 msec    MITRAL VALVE:  Normal Ranges:  MV DT: 172 msec (150-240msec)    AORTIC VALVE:  Normal Ranges:  AoV Vmax:                2.46 m/s  (<=1.7m/s)  AoV Peak P.2 mmHg (<20mmHg)  AoV Mean P.0 mmHg (1.7-11.5mmHg)  LVOT Max Maikel:            1.60 m/s  (<=1.1m/s)  AoV VTI:                 51.80 cm  (18-25cm)  LVOT VTI:                34.40 cm  LVOT Diameter:           1.70 cm   (1.8-2.4cm)  AoV Area, VTI:           1.51 cm2  (2.5-5.5cm2)  AoV Area,Vmax:           1.48 cm2  (2.5-4.5cm2)  AoV Dimensionless Index: 0.66      RIGHT VENTRICLE:  RV Basal 3.35 cm  RV Mid   2.45 cm  RV Major 7.0 cm    TRICUSPID VALVE/RVSP:  Normal Ranges:  Peak TR Velocity: 3.28 m/s  Est. RA Pressure: 3 mmHg  RV Syst Pressure: 46.0 mmHg (< 30mmHg)  IVC Diam:   0.93 cm    PULMONIC VALVE:  Normal Ranges:  PV Accel Time: 129 msec (>120ms)  PV Max Maikel:    1.3 m/s  (0.6-0.9m/s)  PV Max P.1 mmHg    Pulmonary Veins:  PulmV A Revs Dur: 124.00 msec  PulmV A Revs Maikel: 21.20 cm/s  PulmV Marshall Maikel:   32.90 cm/s  PulmV S/D Maikel:    1.60  PulmV Sys Maikel:    51.10 cm/s      [id number] Adam Farmer MD  Electronically signed on 2023 at 8:17:32 AM        *** Final ***     Echocardiogram [Sep 17 2023  8:17AM]      Assessment and Plan:        Additional Dx:   Bradycardia: Entered Date: 20-Sep-2023 21:04   Chronic diastolic congestive heart failure: Entered Date:  20-Sep-2023 21:04   Hypertensive heart disease without heart failure: Entered  Date: 17-Sep-2023 10:08   Type 2 diabetes mellitus with diabetic chronic kidney disease : Entered Date: 17-Sep-2023 10:08   Stage 4 chronic kidney disease: Entered Date: 17-Sep-2023  10:08   Type 2 diabetes mellitus without complication: Entered Date:  16-Sep-2023 18:08   Long term current use of insulin: Entered Date: 16-Sep-2023  18:08       Medical History:   Chest pain: Onset Date: 18-Sep-2023, Entered Date: 18-Sep-2023  12:47   Atherosclerosis:  Onset Date: 16-Sep-2023, Entered Date: 16-Sep-2023  15:18   Hypertension: Onset Date: 16-Sep-2023, Entered Date: 16-Sep-2023  15:18    Comorbidities:  ·  Comorbidity Other     Code Status:  ·  Code Status Full Code     Assessment:    Ms. Cartagena is a 89-year-old woman with a history of hypertension, hyperlipidemia, diabetes who presented with hypoglycemia  and hypertension.  Cardiology saw her as the blood pressure was in the 200s systolic.  She had apparently fallen the night prior to her presentation.  Medications had been adjusted related to her kidneys, have been taken off of losartan and placed on  hydralazine.  Echocardiogram had noted normal systolic function but moderate concentric LVH, elevated right-sided pressures.  I am afraid that she is fluid overloaded.  I stopped the sodium chloride tablets, I gave her a dose of Lasix last night.  She will get another dose of Lasix.  She  had evidence of pressure overload by echocardiogram.  Iron stores are pending.  I asked for urine studies to be sent.  Acute kidney injury and hyponatremia are slightly better.        Electronic Signatures:  Adam Leonard)  (Signed 21-Sep-2023 11:17)   Authored: Service, Subjective Data, Objective Data, Assessment  and Plan, Note Completion      Last Updated: 21-Sep-2023 11:17 by Adam Leonard)

## 2023-09-30 NOTE — PROGRESS NOTES
Service: Medicine     Subjective Data:   ALICIA IRENE is a 89 year old Female who is Hospital Day # 11.     Patient dizziness resolved, no current complaints feeling well sodium at 128.    Overnight Events: Patient had an uneventful night.     Objective Data:     Objective Information:      T   P  R  BP   MAP  SpO2   Value  36.4  79  16  156/71      99%  Date/Time 9/25 8:33 9/25 8:33 9/25 8:33 9/25 8:33    9/25 8:33  Range  (36.4C - 37.1C )  (63 - 79 )  (16 - 18 )  (146 - 179 )/ (45 - 71 )    (97% - 99% )  Highest temp of 37.1 C was recorded at 9/24 0:44      Pain reported at 9/25 1:45: 0 = None    ---- Intake and Output  -----  Mn/Dy/Year Time  Intake   Output  Net  Sep 24, 2023 2:00 pm  480   0  480    The Intake and Output Totals for the last 24 hours are:      Intake   Output  Net      480   null  null    Physical Exam Narrative:  ·  Physical Exam:    ROS-10 point review of systems obtained and negative except what is listed in HPI    Physical Exam:  General/Constitutional: Alert, oriented , cooperative,  in no acute distress.  Head: normocephalic, atraumatic  Skin: Intact,  dry skin  Eyes: PERRL, EOMs intact,  Conjunctiva pink with no erythema or exudates. No scleral icterus.   ENT: No external deformities. Nares patent, mucus membranes moist.  Pharynx clear, uvula midline.   Neck: Supple,   Pulmonary: Clear bilaterally with good chest wall excursion. No rales, rhonchi or wheezing.   Cardiac: Regular rate and rhythm. good pulses.  Abdomen: Soft, nontender, active bowel sounds.  No palpable organomegaly.  No rebound or guarding.  No CVA tenderness.  Genitourinary: Exam deferred.  Musculoskeletal: Full range of motion,  Neurological:  alert oriented x 3 no focal deficit.  Psychiatric: Appropriate mood and affect. Calm.     Recent Lab Results:    Results:    CBC: 9/25/2023 05:20              \     Hgb     /                              \     7.7 L    /  WBC  ----------------  Plt               10.2        ----------------    262              /     Hct     \                              /     24.1 L    \            RBC: 2.46 L    MCV: 98           RFP: 9/25/2023 05:20  NA+        Cl-     BUN  /                         128 L   94 L    44 H /  --------------------------------  Glucose                ---------------------------  140 H    K+     HCO3-   Creat \                         4.6    26    2.38 H \  Calcium : 8.5 LAnion Gap : 13          Albumin : 3.0 L     Phos : 3.7      Radiology Results:    Results:        Impression:    1.  No evidence of acute cardiopulmonary process.           MACRO:  None     Xray Chest 1 View [Sep 22 2023 11:38AM]      Assessment and Plan:   Daily Risk Screen:  ·  Does patient have an indwelling urinary catheter? yes   ·  Plan for indwelling urinary catheter removal today? no   ·  The patient continues to require indwelling urinary catheterization for critically ill patients who need accurate urinary output measurements     Comorbidities:  ·  Comorbidity Other     Code Status:  ·  Code Status Full Code     Assessment:    ALICIA IRENE is a 89 year old Female presenting with hypoglycemia and hypertension for the past week or longer and a recent fall. she saw pcp before her fall and  also after a previous fall. her bp meds were adjusted and her insulin decreased but low bg persists and elevated bp persits. she noted a headache and hallucinations last night prior to admission when her bp was very high. she reports walking to bathroom  and fell, and hit emergency assist button. she lives in senior home and they came to check on her. she was unable to get off the floor and thus was brought to ed.  on admission it was 223/71 with hr 57 and bg 63. she denies headache at this time. she  does have some right knee pain, hx of chronic r knee pain but a bit worse now. She reports hx of neuropathy related to her dm and has numb feeling in her feet.    PMH: GERD, htn, hld, anemia (baseline 9),  DM (insulin), heart murmur, lumbar stenosis, lymphedema     Hypoglycemia  Glucose started to stabilize  dmt2 on insulin   - hold oral hyperglycemic agent  -endocrinology consult   - sliding scale, nph per endocrinology   -glucose still with highs and lows     hyponatremia   - na 128, stable  -has been diuresed with Lasix, none today   dizziness resolved could have likely been from hyponatremia and/or diuresis, checked orthos they were also negative, ct head negative  - fluid restriction 1200ml   - consult nephrology    massimo on CKD stg 3-  nephrology following      volume overload  - huerta for strict I&O  iv lasix   huerta gave patient relief may have been retaining,     Anemia- likely due to chronic disease, ckd, hemoglobin 7.7    Hypertensive urgency- bp stable  bradycardia on 9/19 improved since coreg stopped  - switched to nifedipine. cardio increased hydralazine to 50 mg, switched metop switched  to coreg however coreg is now held, hydralazine increased to 50mg three times a day per cardiology   - -cardiology consult - appreciate recs   - echo   - Left ventricular systolic function is hyperdynamic with a 70-75% estimated ejection fraction.  3. Spectral Doppler shows an impaired relaxation pattern of left ventricular diastolic filling.  4. There is moderate concentric left ventricular hypertrophy.  5. Left ventricular cavity size is decreased.  6. The left atrium is enlarged.  7. Mild to moderately elevated right ventricular systolic pressure.  8. Mild aortic valve stenosis.     DVT prophylaxis  -heparin   -SCDs    GI prophylaxis   - ppi and docusate     Disposition: Plan of care discussed with patient.  Today or tomorrow pending nephrology recommendations.      Electronic Signatures:  Dangelo Mcgregor (APRN-CNP)  (Signed 25-Sep-2023 08:44)   Authored: Service, Subjective Data, Objective Data, Assessment  and Plan, Note Completion      Last Updated: 25-Sep-2023 08:44 by Dangelo Mcgregor (APRN-CNP)

## 2023-09-30 NOTE — PROGRESS NOTES
Consult Type: subsequent visit/care     Service: Endocrinology     Subjective Data:   ALICIA IRENE is a 89 year old Female who is Hospital Day # 13.     Left leg numbness this morning, history of neuropathy.    Objective Data:     Objective Information:      T   P  R  BP   MAP  SpO2   Value  36.3  72  18  129/63      96%  Date/Time  4:00  4:00  4:00  4:00     4:00  Range  (36.3C - 36.7C )  (56 - 75 )  (16 - 19 )  (129 - 170 )/ (45 - 73 )    (95% - 99% )      Physical Exam by System:    Constitutional: Elderly female in no distress     Medication:    Medications:          Continuous Medications       --------------------------------  No continuous medications are active       Scheduled Medications       --------------------------------    1. Aspirin Chewable:  81  mg  Oral  Daily    2. Atorvastatin:  10  mg  Oral  Daily    3. Brimonidine 0.2% Ophthalmic:  1  drop(s)  Both Eyes  2 Times a Day    4. cefTRIAXone 1 gram/ Dextrose 5% IVPB Premixed Soln 50 mL:  50  mL  IntraVenous Piggyback  Every 24 Hours    5. Cholecalciferol (Vitamin D3):  2000  International Unit(s)  Oral  Daily    6. Docusate 50 mg - Senna 8.6 m  tablet(s)  Oral  2 Times a Day    7. Furosemide:  20  mg  Oral  Daily    8. Heparin SubCutaneous:  5000  unit(s)  SubCutaneous  Every 8 Hours    9. hydrALAZINE (APRESOLINE):  50  mg  Oral  Every 8 Hours    10. Insulin Isophane (NPH) Injectable:  10  unit(s)  SubCutaneous  2 Times a Day    11. Insulin Lispro Moderate Corrective Scale:  unit(s)  SubCutaneous  3 Times a Day Before Meals    12. Latanoprost 0.005% Ophthalmic:  1  drop(s)  Both Eyes  At Bedtime    13. NIFEdipine (PROCARDIA XL) Extended Release:  60  mg  Oral  Daily    14. Pantoprazole:  40  mg  Oral  Daily    15. Polyethylene Glycol:  17  gram(s)  Oral  2 Times a Day    16. Timolol 0.5% Ophthalmic:  1  drop(s)  Both Eyes  2 Times a Day         PRN Medications       --------------------------------    1.  Acetaminophen:  650  mg  Oral  Every 4 Hours    2. Acetaminophen:  650  mg  Oral  Every 4 Hours    3. Dextrose 50% in Water Injectable:  25  gram(s)  IntraVenous Push  Every 15 Minutes    4. Glucagon Injectable:  1  mg  IntraMuscular  Every 15 Minutes    5. hydrALAZINE (APRESOLINE) Injectable:  10  mg  IntraVenous Push  Every 6 Hours    6. Lidocaine 2% (UROJET) Topical Gel:  5  mL  Topical  Every 4 Hours    7. Meclizine:  12.5  mg  Oral  4 Times a Day    8. Ondansetron Injectable:  4  mg  IntraVenous Push  Every 4 Hours    9. Sodium Chloride 0.9% Injectable Flush:  10  mL  IntraVenous Flush  Every 8 Hours and as Needed        Recent Lab Results:    Results:        I have reviewed these laboratory results:    Glucose_POCT  Trending View      Result 26-Sep-2023 20:23:00  26-Sep-2023 15:43:00  26-Sep-2023 11:20:00    Glucose-POCT 212   H   204   H   298   H          Assessment and Plan:   Code Status:  ·  Code Status Full Code     Assessment:    TYPE 2 DIABETES MELLITUS  LONG TERM CURRENT INSULIN USE  No further hypoglycemia      RECOMMENDATIONS  Continue insulin corrective scale, discontinue at discharge  NPH 10 units BID, continue at discharge  Target glucose 100-200 mg/dl  Tight glucose control not indicated        Electronic Signatures:  Saad Edouard)  (Signed 27-Sep-2023 19:56)   Authored: Service, Subjective Data, Objective Data, Assessment  and Plan, Note Completion      Last Updated: 27-Sep-2023 19:56 by Saad Edouard)

## 2023-09-30 NOTE — PROGRESS NOTES
Service: Nephrology     Subjective Data:   ALICIA IRENE is a 89 year old Female who is Hospital Day # 5  Doing better with no new complaints.    Overnight Events: Patient had an uneventful night.     Objective Data:     Objective Information:      T   P  R  BP   MAP  SpO2   Value  36.4  58  18  153/44      97%  Date/Time 9/19 12:38 9/19 12:38 9/19 12:38 9/19 12:38    9/19 12:38  Range  (36.2C - 36.8C )  (47 - 63 )  (16 - 18 )  (110 - 208 )/ (44 - 69 )    (95% - 100% )      Pain reported at 9/19 9:15: 0 = None      T   P  R  BP   MAP  SpO2   Value  36.4  58  18  153/44      97%  Date/Time 9/19 12:38 9/19 12:38 9/19 12:38 9/19 12:38    9/19 12:38  Range  (36.2C - 36.8C )  (47 - 63 )  (16 - 18 )  (110 - 208 )/ (44 - 69 )    (95% - 100% )    Physical Exam by System:    Constitutional: Well developed, awake/alert/oriented  x3, no distress, alert and cooperative   Eyes: PERRL, EOMI, clear sclera   ENMT: mucous membranes moist, no apparent injury,  no lesions seen   Head/Neck: Neck supple, no apparent injury, thyroid  without mass or tenderness, No JVD, trachea midline, no bruits   Respiratory/Thorax: Patent airways, CTAB, normal  breath sounds with good chest expansion, thorax symmetric   Cardiovascular: Regular, rate and rhythm, no murmurs,  2+ equal pulses of the extremities, normal S 1and S 2   Gastrointestinal: Nondistended, soft, non-tender,  no rebound tenderness or guarding, no masses palpable, no organomegaly, +BS, no bruits   Musculoskeletal: ROM intact, no joint swelling, normal  strength   Extremities: normal extremities, no cyanosis edema,  contusions or wounds, no clubbing   Neurological: alert and oriented x3, intact senses,  motor, response and reflexes, normal strength   Skin: Warm and dry, no lesions, no rashes     Medication:    Medications:          Continuous Medications       --------------------------------  No continuous medications are active       Scheduled Medications        --------------------------------    1. Aspirin Chewable:  81  mg  Oral  Daily    2. Atorvastatin:  10  mg  Oral  Daily    3. Brimonidine 0.2% Ophthalmic:  1  drop(s)  Both Eyes  2 Times a Day    4. Carvedilol:  12.5  mg  Oral  2 Times a Day    5. Cholecalciferol (Vitamin D3):  2000  International Unit(s)  Oral  Daily    6. Heparin SubCutaneous:  5000  unit(s)  SubCutaneous  Every 8 Hours    7. hydrALAZINE (APRESOLINE):  50  mg  Oral  Every 12 Hours    8. Insulin Lispro Moderate Corrective Scale:  unit(s)  SubCutaneous  3 Times a Day Before Meals    9. Latanoprost 0.005% Ophthalmic:  1  drop(s)  Both Eyes  At Bedtime    10. NIFEdipine (PROCARDIA XL) Extended Release:  30  mg  Oral  Daily    11. Pantoprazole:  40  mg  Oral  Daily    12. Timolol 0.5% Ophthalmic:  1  drop(s)  Both Eyes  2 Times a Day         PRN Medications       --------------------------------    1. Acetaminophen:  650  mg  Oral  Every 4 Hours    2. Acetaminophen:  650  mg  Oral  Every 4 Hours    3. Dextrose 50% in Water Injectable:  25  gram(s)  IntraVenous Push  Every 15 Minutes    4. Glucagon Injectable:  1  mg  IntraMuscular  Every 15 Minutes    5. hydrALAZINE (APRESOLINE) Injectable:  10  mg  IntraVenous Push  Every 6 Hours    6. Ondansetron Injectable:  4  mg  IntraVenous Push  Every 4 Hours    7. Sodium Chloride 0.9% Injectable Flush:  10  mL  IntraVenous Flush  Every 8 Hours and as Needed           Currently Suspended Medications       --------------------------------    1. Sodium Chloride 0.9% Infusion:  1000  mL  IntraVenous  <Continuous>      Recent Lab Results:    Results:    CBC: 9/19/2023 07:53              \     Hgb     /                              \     8.2 L    /  WBC  ----------------  Plt               7.1       ----------------    253              /     Hct     \                              /     24.8 L    \            RBC: 2.66 L    MCV: 93           BMP: 9/19/2023 12:40  NA+        Cl-     BUN  /                         120  LL   92 L    47 H /  --------------------------------  Glucose                ---------------------------  234 H    K+     HCO3-   Creat \                         5.1  23    2.23 H \  Calcium : 8.0 L    Anion Gap : 10        I have reviewed these laboratory results:    Basic Metabolic Panel  19-Sep-2023 12:40:00      Result Value    Glucose, Serum  234   H   NA  120   LL   K  5.1    CL  92   L   Bicarbonate, Serum  23    Anion Gap, Serum  10    BUN  47   H   CREAT  2.23   H   GFR Female  20   A   Calcium, Serum  8.0   L     Glucose_POCT  Trending View      Result 19-Sep-2023 12:35:00  19-Sep-2023 08:18:00  18-Sep-2023 20:04:00  18-Sep-2023 16:09:00  18-Sep-2023 12:20:00  18-Sep-2023 08:32:00    Glucose-POCT 229   H   241   H   238   H   221   H   298   H   184   H        Complete Blood Count  Trending View      Result 19-Sep-2023 07:53:00  18-Sep-2023 06:55:00    White Blood Cell Count 7.1   9.0    Red Blood Cell Count 2.66   L   2.60   L    HGB 8.2   L   7.8   L    HCT 24.8   L   24.3   L    MCV 93   93    MCHC 33.1   32.1       244    RDW-CV 12.0   12.0        TSH with Reflex to Free T4 if Abnormal  18-Sep-2023 06:55:00      Result Value    Thyroid Stimulating Hormone, Serum  5.15   H         I have reviewed these laboratory results:    Basic Metabolic Panel  Trending View      Result 19-Sep-2023 12:40:00  18-Sep-2023 06:55:00    Glucose, Serum 234   H   162   H       LL   123   L    K 5.1   4.7    CL 92   L   93   L    Bicarbonate, Serum 23   22    Anion Gap, Serum 10   13    BUN 47   H   41   H    CREAT 2.23   H   2.12   H    GFR Female 20   A   21   A    Calcium, Serum 8.0   L   8.0   L        Glucose_POCT  Trending View      Result 19-Sep-2023 12:35:00  19-Sep-2023 08:18:00  18-Sep-2023 20:04:00  18-Sep-2023 16:09:00  18-Sep-2023 12:20:00  18-Sep-2023 08:32:00    Glucose-POCT 229   H   241   H   238   H   221   H   298   H   184   H        Complete Blood Count  Trending View      Result  19-Sep-2023 07:53:00  18-Sep-2023 06:55:00    White Blood Cell Count 7.1   9.0    Red Blood Cell Count 2.66   L   2.60   L    HGB 8.2   L   7.8   L    HCT 24.8   L   24.3   L    MCV 93   93    MCHC 33.1   32.1       244    RDW-CV 12.0   12.0        TSH with Reflex to Free T4 if Abnormal  18-Sep-2023 06:55:00      Result Value    Thyroid Stimulating Hormone, Serum  5.15   H     Cortisol A.M.  18-Sep-2023 06:55:00      Result Value    Cortisol A.M.  20.1   H       Radiology Results:    Results:        Impression:    Extremely limited study given body habitus.     1. No hydronephrosis.  2. Indeterminate left renal mixed echogenicity nodule measuring 1.4  cm. This could be further evaluated by dedicated renal MRI with IV  contrast.  3. Partially visualized heterogenous hepatic echotexture possibly  related to underlying diffuse hepatocellular dysfunction. Advise  correlation with liver function test     MACRO:  None     Ultrasound Renal Bilateral [Sep 18 2023  2:35PM]      Conclusion:  CONCLUSIONS:  1. Poorly visualized anatomical structures due to suboptimal image quality.  2. Left ventricular systolic function is hyperdynamic with a 70-75% estimated ejection fraction.  3. Spectral Doppler shows an impaired relaxation pattern of left ventricular diastolic filling.  4. There is moderate concentric left ventricular hypertrophy.  5. Left ventricular cavity size is decreased.  6. The left atrium is enlarged.  7. Mild to moderately elevated right ventricular systolic pressure.  8. Mild aortic valve stenosis.    QUANTITATIVE DATA SUMMARY:  2D MEASUREMENTS:  Normal Ranges:  LAs:           3.15 cm    (2.7-4.0cm)  IVSd:          1.40 cm    (0.6-1.1cm)  LVPWd:         1.40 cm    (0.6-1.1cm)  LVIDd:         3.80 cm    (3.9-5.9cm)  LVIDs:         2.50 cm  LV Mass Index: 100.8 g/m2  LV % FS        34.2 %    LA VOLUME:  Normal Ranges:  LA Vol A4C:        131.3 ml   (22+/-6mL/m2)  LA Vol A2C:        88.2 ml  LA Vol BP:          109.2 ml  LA Vol Index A4C:  68.2ml/m2  LA Vol Index A2C:  45.8 ml/m2  LA Vol IndexBP:   56.7 ml/m2  LA Area A4C:       29.7 cm2  LA Area A2C:       24.0 cm2  LA Major Axis A4C: 5.7 cm  LA Major Axis A2C: 5.6 cm  LA Volume Index:   56.7 ml/m2    RA VOLUME BY A/L METHOD:  Normal Ranges:  RA Area A4C: 13.0 cm2    AORTA MEASUREMENTS:  Normal Ranges:  Ao Sinus, d: 2.67 cm (2.1-3.5cm)  Ao STJ, d:   2.38 cm (1.7-3.4cm)  Asc Ao, d:   3.04 cm (2.1-3.4cm)    LV SYSTOLIC FUNCTION BY 2D PLANIMETRY (MOD):  Normal Ranges:  EF-A4C View: 61.9 % (>=55%)  EF-A2C View: 67.4 %  EF-Biplane:  64.6 %    LV DIASTOLIC FUNCTION:  Normal Ranges:  MV Peak E:        0.48 m/s    (0.7-1.2 m/s)  MV Peak A:        1.10 m/s    (0.42-0.7 m/s)  E/A Ratio:        0.44        (1.0-2.2)  MV e'             0.04 m/s    (>8.0)  MV lateral e'     0.06 m/s  MV medial e'      0.04 m/s  MV A Dur:         156.00 msec  E/e' Ratio:       11.99       (<8.0)  PulmV Sys Maikel:    51.10 cm/s  PulmV Marshall Maikel:   32.90 cm/s  PulmV S/D Maikel:    1.60  PulmV A Revs Maikel: 21.20 cm/s  PulmV A Revs Dur: 124.00 msec    MITRAL VALVE:  Normal Ranges:  MV DT: 172 msec (150-240msec)    AORTIC VALVE:  Normal Ranges:  AoV Vmax:                2.46 m/s  (<=1.7m/s)  AoV Peak P.2 mmHg (<20mmHg)  AoV Mean P.0 mmHg (1.7-11.5mmHg)  LVOT Max Maikel:            1.60 m/s  (<=1.1m/s)  AoV VTI:                 51.80 cm  (18-25cm)  LVOT VTI:                34.40 cm  LVOT Diameter:           1.70 cm   (1.8-2.4cm)  AoV Area, VTI:           1.51 cm2  (2.5-5.5cm2)  AoV Area,Vmax:           1.48 cm2  (2.5-4.5cm2)  AoV Dimensionless Index: 0.66      RIGHT VENTRICLE:  RV Basal 3.35 cm  RV Mid   2.45 cm  RV Major 7.0 cm    TRICUSPID VALVE/RVSP:  Normal Ranges:  Peak TR Velocity: 3.28 m/s  Est. RA Pressure: 3 mmHg  RV Syst Pressure: 46.0 mmHg (< 30mmHg)  IVC Diam:   0.93 cm    PULMONIC VALVE:  Normal Ranges:  PV Accel Time: 129 msec (>120ms)  PV Max Maikel:    1.3 m/s   (0.6-0.9m/s)  PV Max P.1 mmHg    Pulmonary Veins:  PulmV A Revs Dur: 124.00 msec  PulmV A Revs Maikel: 21.20 cm/s  PulmV Marshall Maikel:   32.90 cm/s  PulmV S/D Maikel:    1.60  PulmV Sys Maikel:    51.10 cm/s      [id number] Adam Farmer MD  Electronically signed on 2023 at 8:17:32 AM        *** Final ***     Echocardiogram [Sep 17 2023  8:17AM]      Assessment and Plan:        Additional Dx:   Hypertensive heart disease without heart failure: Entered  Date: 17-Sep-2023 10:08   Type 2 diabetes mellitus with diabetic chronic kidney disease : Entered Date: 17-Sep-2023 10:08   Stage 4 chronic kidney disease: Entered Date: 17-Sep-2023  10:08   Type 2 diabetes mellitus without complication: Entered Date:  16-Sep-2023 18:08   Long term current use of insulin: Entered Date: 16-Sep-2023  18:08       Medical History:   Chest pain: Onset Date: 18-Sep-2023, Entered Date: 18-Sep-2023  12:47   Atherosclerosis: Onset Date: 16-Sep-2023, Entered Date: 16-Sep-2023  15:18   Hypertension: Onset Date: 16-Sep-2023, Entered Date: 16-Sep-2023  15:18    Code Status:  ·  Code Status Full Code     Assessment:    Assessment:    ALICIA IRENE is a 89 year old -American female who presented emerged department with history of hypertension elevated for 1 week, with hypoglycemia, with  a fall on left knee,  Emergency Department temperature was 98.6 blood pressure done in triage was 223/78, heart rate was 59, respirate was 18, with the pulse ox on room air was 100%, patient has past medical history of diabetes, chronic right knee pain, and hypertension,  Labs Emergency Department showed evidence of hyponatremia with OLGA LIDIA on top of chronic kidney disease stage IV, history of diabetes mellitus and nephrology consult was placed for all the above.  Labs were reviewed and case was discussed with the nurse on the floor patient's blood sodium is is low with low osmolality for which we will be starting on normal saline to replace her intravascular  compartment, also negative for free water intake to  100 cc per 24 hours.  Family and patient denied being on SSRI/hydrochlorothiazide or any nephrotoxic medications.  We will check osmolality both urine and blood, and spot urine for electrolytes.      Impression and plan:  1.  OLGA LIDIA on top of CKD stage IV,  We will check urine lites, ultrasound kidneys throat obstruction, and follow labs closely  2.  Diabetes mellitus, will follow current meds  3.  Accelerated hypertension, currently well controlled we will check renin and aldosterone levels.  4.  Hyponatremia, currently on free water restriction we will follow labs closely.    BMP CBC daily    09/19/23:  No dramatic improvement in sodium level although her vital signs have been correcting.  Thyroid function tests reviewed high TSH will probably need supplementation.  Hospitalist.  We will start sodium chloride tablets 1 g p.o. twice daily today and follow levels in a.m.  BMP CBC in a.m.      Electronic Signatures:  Suly Camilo)  (Signed 19-Sep-2023 14:17)   Authored: Service, Subjective Data, Objective Data, Assessment  and Plan, Note Completion      Last Updated: 19-Sep-2023 14:17 by Suly Camilo)

## 2023-09-30 NOTE — PROGRESS NOTES
Service: Medicine     Subjective Data:   ALICIA IRENE is a 89 year old Female who is Hospital Day # 3.    Objective Data:     Objective Information:      T   P  R  BP   MAP  SpO2   Value  36.5  61  18  141/63   82  100%  Date/Time 9/17 8:12 9/17 8:12 9/17 8:12 9/17 8:12  9/17 4:15 9/17 8:12  Range  (35.9C - 36.8C )  (58 - 70 )  (16 - 18 )  (141 - 229 )/ (45 - 86 )  (82 - 104 )  (96% - 100% )      Pain reported at 9/16 22:11: 0 = None    Physical Exam Narrative:  ·  Physical Exam:    ROS-10 point review of systems obtained and negative except what is listed in HPI    Physical Exam:  General/Constitutional: Alert, oriented , cooperative,  in no acute distress.  Head: normocephalic, atraumatic  Skin: Intact,  dry skin  Eyes: PERRL, EOMs intact,  Conjunctiva pink with no erythema or exudates. No scleral icterus.   ENT: No external deformities. Nares patent, mucus membranes moist.  Pharynx clear, uvula midline.   Neck: Supple, without meningismus. Trachea at midline. No lymphadenopathy.  Pulmonary: Clear bilaterally with good chest wall excursion. No rales, rhonchi or wheezing.   Cardiac: Regular rate and rhythm. good pulses.  Abdomen: Soft, nontender, active bowel sounds.  No palpable organomegaly.  No rebound or guarding.  No CVA tenderness.  Genitourinary: Exam deferred.  Musculoskeletal: Full range of motion, no deformity. Pulses full and equal. trace, sock line edema bl lower ext. right knee no edema or warmth/redness.  Neurological:  Cranial nerves II through XII are grossly intact, no focal findings identified.  Psychiatric: Appropriate mood and affect. Calm.     Medication:    Medications:      CARDIOVASCULAR AGENTS:    1. Losartan:  100  mg  Oral  Daily    2. Metoprolol Succinate Extended Release:  50  mg  Oral  Daily    3. hydrALAZINE (APRESOLINE):  25  mg  Oral  Every 8 Hours    4. hydrALAZINE (APRESOLINE) Injectable:  10  mg  IntraVenous Push  Every 6 Hours   PRN         CENTRAL NERVOUS SYSTEM  AGENTS:    1. Acetaminophen:  650  mg  Oral  Every 4 Hours   PRN       2. Acetaminophen:  650  mg  Oral  Every 4 Hours   PRN       3. Aspirin Chewable:  81  mg  Oral  Daily    4. Ondansetron Injectable:  4  mg  IntraVenous Push  Every 4 Hours   PRN         COAGULATION MODIFIERS:    1. Heparin SubCutaneous:  5000  unit(s)  SubCutaneous  Every 8 Hours      GASTROINTESTINAL AGENTS:    1. Pantoprazole:  40  mg  Oral  Daily      METABOLIC AGENTS:    1. Insulin Lispro Moderate Corrective Scale:  unit(s)  SubCutaneous  3 Times a Day Before Meals    2. Atorvastatin:  10  mg  Oral  Daily    3. Dextrose 50% in Water Injectable:  25  gram(s)  IntraVenous Push  Every 15 Minutes   PRN       4. Glucagon Injectable:  1  mg  IntraMuscular  Every 15 Minutes   PRN         NUTRITIONAL PRODUCTS:    1. Sodium Chloride 0.9% Injectable Flush:  10  mL  IntraVenous Flush  Every 8 Hours and as Needed   PRN       2. Cholecalciferol (Vitamin D3):  2000  International Unit(s)  Oral  Daily      TOPICAL AGENTS:    1. Brimonidine 0.2% Ophthalmic:  1  drop(s)  Both Eyes  2 Times a Day    2. Latanoprost 0.005% Ophthalmic:  1  drop(s)  Both Eyes  At Bedtime    3. Timolol 0.5% Ophthalmic:  1  drop(s)  Both Eyes  2 Times a Day      Recent Lab Results:    Results:    CBC: 9/17/2023 04:42              \     Hgb     /                              \     8.2 L    /  WBC  ----------------  Plt               7.8       ----------------    248              /     Hct     \                              /     24.7 L    \            RBC: 2.63 L    MCV: 94           BMP: 9/17/2023 04:42  NA+        Cl-     BUN  /                         121 L   92 L    40 H /  --------------------------------  Glucose                ---------------------------  203 H    K+     HCO3-   Creat \                         4.8  23    2.12 H \  Calcium : 8.1 L    Anion Gap : 11        I have reviewed these laboratory results:    Glucose_POCT  Trending View      Result 17-Sep-2023  08:10:00  16-Sep-2023 19:58:00  16-Sep-2023 16:05:00  16-Sep-2023 12:07:00  16-Sep-2023 09:12:00  16-Sep-2023 00:57:00  15-Sep-2023 22:52:00  15-Sep-2023 21:02:00    Glucose-POCT 171   H   176   H   303   H   274   H   111   H   96   127   H   63   L        Complete Blood Count  Trending View      Result 17-Sep-2023 04:42:00  16-Sep-2023 08:11:00    White Blood Cell Count 7.8   7.7    Red Blood Cell Count 2.63   L   2.85   L    HGB 8.2   L   8.8   L    HCT 24.7   L   27.8   L    MCV 94   98    MCHC 33.2   31.7   L       189    RDW-CV 12.0   12.0        Basic Metabolic Panel  Trending View      Result 17-Sep-2023 04:42:00  16-Sep-2023 05:01:00    Glucose, Serum 203   H   85       L   123   L    K 4.8   4.2    CL 92   L   93   L    Bicarbonate, Serum 23   22    Anion Gap, Serum 11   12    BUN 40   H   34   H    CREAT 2.12   H   1.90   H    GFR Female 21   A   24   A    Calcium, Serum 8.1   L   8.4   L        Sodium, Serum  16-Sep-2023 18:58:00      Result Value    NA  121   L     Urinalysis  16-Sep-2023 10:20:00      Result Value    Color, Urine  YELLOW    Reference Range: STRAW,YELLOW    Appearance, Urine  CLEAR    Specific Gravity, Urine  1.008    pH, Urine  5.0    Protein, Urine  100(2+)   A   Glucose, Urine  NEGATIVE    Blood, Urine  NEGATIVE    Ketones, Urine  NEGATIVE    Bilirubin, Urine  NEGATIVE    Urobilinogen, Urine  <2.0    Nitrite, Urine  NEGATIVE    Leukocyte Esterase, Urine  NEGATIVE      Urinalysis, Microscopic  16-Sep-2023 10:20:00      Result Value    White Cells  1    Red Blood Cells  1    Epithelial Cells, Squamous  1      Sodium, Urine Spot  16-Sep-2023 10:20:00      Result Value    Sodium Urine Spot  17    Sodium/Creat Ratio  31    Creatinine, Urine Spot  54.6      Osmolality, Urine Spot  16-Sep-2023 10:20:00      Result Value    Osmolality, Urine Spot  210      Hemoglobin A1C, Level  16-Sep-2023 08:11:00      Result Value    Estimated Average Glucose  143    Hemoglobin A1C, Level   6.6 Diagnosis of Diabetes-Adults   Non-Diabetic: < or = 5.6%   Increased risk for developing diabetes: 5.7-6.4%   Diagnostic of diabetes: > or = 6.5%  .       Monitoring of Diabetes                Age (y)     Therapeutic Goal (%)   Adults:          >1   A     Osmolality, Serum  16-Sep-2023 08:11:00      Result Value    Osmolality, Serum  264   L     Magnesium, Serum  Trending View      Result 16-Sep-2023 05:01:00  15-Sep-2023 21:41:00    Magnesium, Serum 1.90   1.90        Troponin I, High Sensitivity  Trending View      Result 16-Sep-2023 00:49:00  15-Sep-2023 21:41:00    Troponin I, High Sensitivity 25   H   27   H        Complete Blood Count + Differential  15-Sep-2023 21:41:00      Result Value    White Blood Cell Count  9.8    Red Blood Cell Count  2.95   L   HGB  9.2   L   HCT  27.0   L   MCV  92    MCHC  34.1    PLT  270    RDW-CV  11.9    Neutrophil %  57.9    Immature Granulocytes %  0.3    Lymphocyte %  28.0    Monocyte %  12.2    Eosinophil %  1.1    Basophil %  0.5    Neutrophil Count  5.69   H   Lymphocyte Count  2.76    Monocyte Count  1.20   H   Eosinophil Count  0.11    Basophil Count  0.05      Comprehensive Metabolic Panel  15-Sep-2023 21:41:00      Result Value    Glucose, Serum  67   L   NA  121   L   K  4.1    CL  90   L   Bicarbonate, Serum  22    Anion Gap, Serum  13    BUN  36   H   CREAT  1.92   H   GFR Female  24   A   Calcium, Serum  8.5   L   ALB  3.6    ALKP  74    T Pro  7.1    T Bili  0.4    Alanine Aminotransferase, Serum  23    Aspartate Transaminase, Serum  38        Radiology Results:    Results:        Impression:    No acute fracture. Small joint effusion.     Tricompartmental right knee osteoarthrosis.     MACRO:  None     Xray Knee 3 View [Sep 15 2023  9:42PM]      Impression:    Cardiomegaly. No acute pulmonary process.     Xray Chest 1 View [Sep 15 2023  9:41PM]      Impression:    1. No acute intracranial abnormality or calvarial fracture.                    CT Head without  Contrast [Sep 15 2023  9:31PM]      Assessment and Plan:   Code Status:  ·  Code Status Full Code     Advance Care Planning:  Advance Care Planning: Patient didn't wish to or  was unable to provide advance care plan     Assessment:    ALICIA IRENE is a 89 year old Female presenting with hypoglycemia and hypertension for the past week or longer and a recent fall. she saw pcp before her fall and  also after a previous fall. her bp meds were adjusted and her insulin decreased but low bg persists and elevated bp persits. she noted a headache and hallucinations last night prior to admission when her bp was very high. she reports walking to bathroom  and fell, and hit emergency assist button. she lives in senior home and they came to check on her. she was unable to get off the floor and thus was brought to ed.  on admission it was 223/71 with hr 57 and bg 63. she denies headache at this time. she  does have some right knee pain, hx of chronic r knee pain but a bit worse now. She reports hx of neuropathy related to her dm and has numb feeling in her feet.    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   - hold oral hyperglycemic agent  - hold insulin   - d5 ns running - stopped   - sliding scale resumed this pm as bg 303     hyponatremia   acute renal insufficiency on CKD stg 3  - urine osmolality - 210  urine na 17  urine creat 54.6  - na 121 (corrected for glucose 123)   - serum osmolality 264   - was given ns iv fluids and d5ns, stopped overnight   - fluid restriction 1200ml   - consult nephrology   consider heart failure, not on thiazide. trace bl lower ext edema. consider siadh, adrenal insuff, hypothyroidism     - tsh, am cortisol, acth         hypertensive urgency, resistant htn   hx htn   acute renal insufficiency on CKD stg 3  anemia of chronic disease   bp 223/71 on admission   - creat (baseline 1.5) - up to 1.92, grf 24  ([id number])  - sched hydralazine 25mg q8hr and as needed hydralazine for sys >190  - monitor bp, diastolic low  - continue metoprolol and losartan home meds   - carolyn hose  - telemetry    -cardiology consult - appreciate recs   - echo   - Left ventricular systolic function is hyperdynamic with a 70-75% estimated ejection fraction.  3. Spectral Doppler shows an impaired relaxation pattern of left ventricular diastolic filling.  4. There is moderate concentric left ventricular hypertrophy.  5. Left ventricular cavity size is decreased.  6. The left atrium is enlarged.  7. Mild to moderately elevated right ventricular systolic pressure.  8. Mild aortic valve stenosis.     DVT prophylaxis  -heparin   -SCDs    GI prophylaxis   - ppi and docusate     Labs/Testing reviewed  Interdisciplinary team rounding completed with hospitalist, nurse, TCC  NP discussed plan and lab/testing results with Dr. Trejo  -- cardio consult pending   45 minutes total spent on patient's care today; >50% time spent on counseling/coordination of care       Electronic Signatures:  Johnna Ruelas (APRN-CNP)  (Signed 17-Sep-2023 10:54)   Authored: Service, Subjective Data, Objective Data, Assessment  and Plan, Note Completion      Last Updated: 17-Sep-2023 10:54 by Johnna Ruelas (APRN-CNP)

## 2023-09-30 NOTE — PROGRESS NOTES
Service: Medicine      Subjective Data:   ALICIA IRENE is a 89 year old Female who is Hospital Day # 8.     Patient not feeling well this am, huerta placed with relief.    Objective Data:     Objective Information:      T   P  R  BP   MAP  SpO2   Value  36.2  87  18  154/65      99%  Date/Time 9/22 20:00 9/22 20:00 9/22 20:00 9/22 20:00    9/22 20:00  Range  (36.2C - 37.1C )  (48 - 87 )  (17 - 18 )  (115 - 209 )/ (38 - 82 )    (96% - 100% )  Highest temp of 37.1 C was recorded at 9/21 12:00      Pain reported at 9/21 20:27: 0 = None    Physical Exam Narrative:  ·  Physical Exam:    ROS-10 point review of systems obtained and negative except what is listed in HPI    Physical Exam:  General/Constitutional: Alert, oriented , cooperative,  in no acute distress.  Head: normocephalic, atraumatic  Skin: Intact,  dry skin  Eyes: PERRL, EOMs intact,  Conjunctiva pink with no erythema or exudates. No scleral icterus.   ENT: No external deformities. Nares patent, mucus membranes moist.  Pharynx clear, uvula midline.   Neck: Supple,   Pulmonary: Clear bilaterally with good chest wall excursion. No rales, rhonchi or wheezing.   Cardiac: Regular rate and rhythm. good pulses.  Abdomen: Soft, nontender, active bowel sounds.  No palpable organomegaly.  No rebound or guarding.  No CVA tenderness.  Genitourinary: Exam deferred.  Musculoskeletal: Full range of motion,  Neurological:  alert oriented x 3 no focal deficit.  Psychiatric: Appropriate mood and affect. Calm.       Recent Lab Results:    Results:    CBC: 9/22/2023 06:59              \     Hgb     /                              \     8.0 L    /  WBC  ----------------  Plt               7.6       ----------------    280              /     Hct     \                              /     24.9 L    \            RBC: 2.60 L    MCV: 96           RFP: 9/22/2023 06:59  NA+        Cl-     BUN  /                         126 L   94 L    51 H /  --------------------------------   Glucose                ---------------------------  103 H    K+     HCO3-   Creat \                         4.5    22    2.65 H \  Calcium : 8.8Anion Gap : 15          Albumin : 3.3 L    Phos : 4.7      Radiology Results:    Results:        Impression:    1.  No evidence of acute cardiopulmonary process.           MACRO:  None     Xray Chest 1 View [Sep 22 2023 11:38AM]        Assessment and Plan:   Daily Risk Screen:  ·  Does patient have an indwelling urinary catheter? yes    ·  Plan for indwelling urinary catheter removal today? no    ·  The patient continues to require indwelling urinary catheterization for critically ill patients who need accurate urinary output measurements     Comorbidities:  ·  Comorbidity Other     Code Status:  ·  Code Status Full Code     Assessment:    ALICIA IRENE is a 89 year old Female presenting with hypoglycemia and hypertension for the past week or longer and a recent fall. she saw pcp before her fall and  also after a previous fall. her bp meds were adjusted and her insulin decreased but low bg persists and elevated bp persits. she noted a headache and hallucinations last night prior to admission when her bp was very high. she reports walking to bathroom  and fell, and hit emergency assist button. she lives in senior home and they came to check on her. she was unable to get off the floor and thus was brought to ed.  on admission it was 223/71 with hr 57 and bg 63. she denies headache at this time. she  does have some right knee pain, hx of chronic r knee pain but a bit worse now. She reports hx of neuropathy related to her dm and has numb feeling in her feet.    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   - hold oral hyperglycemic agent  -endocrinology consult   - sliding scale resumed   -glu controlled    hyponatremia   - na 126, continues to improve  -furosemide 80mg iv ordered x 1  per nephrology, yesterday, she will likely be diuresed again today  - fluid restriction 1200ml   - consult nephrology    massimo on CKD stg 3-  nephrology following      volume overload  -insert huerta for strict I&O  iv lasix   huerta gave patient relief may have been retaining,     Anemia- likely due to chronic disease, ckd    hypertensive urgency- bp stabilizing, high noted at 0800 but in 140's with recheck at 1000 prior  to bp med pass  bradycardia on 9/19 improved since coreg stopped  - switched to nifedipine. cardio increased hydralazine to 50 mg, switched metop switched  to coreg however coreg is now held, hydralazine increased to 50mg three times a day per cardiology   - -cardiology consult - appreciate recs   - echo   - Left ventricular systolic function is hyperdynamic with a 70-75% estimated ejection fraction.  3. Spectral Doppler shows an impaired relaxation pattern of left ventricular diastolic filling.  4. There is moderate concentric left ventricular hypertrophy.  5. Left ventricular cavity size is decreased.  6. The left atrium is enlarged.  7. Mild to moderately elevated right ventricular systolic pressure.  8. Mild aortic valve stenosis.     DVT prophylaxis  -heparin   -SCDs    GI prophylaxis   - ppi and docusate     Disposition: Plan of care discussed with patient.  No plan for discharge today.         Electronic Signatures:  Dangelo Mcgregor (APRN-CNP)  (Signed 23-Sep-2023 15:24)   Authored: Service, Subjective Data, Objective Data, Assessment  and Plan, Note Completion      Last Updated: 23-Sep-2023 15:24 by Dangelo Mcgregor (APRN-CNP)

## 2023-09-30 NOTE — PROGRESS NOTES
Service: Medicine     Subjective Data:   ALICIA IRENE is a 89 year old Female who is Hospital Day # 5.     Patient reports feeling better.  She was able to get sleep.    Objective Data:     Objective Information:      T   P  R  BP   MAP  SpO2   Value  36.1  61  17  150/71      99%  Date/Time 9/20 3:32 9/20 3:32 9/20 3:32 9/20 3:32    9/20 3:32  Range  (36.1C - 37.4C )  (38 - 86 )  (16 - 18 )  (111 - 184 )/ (32 - 71 )    (96% - 99% )  Highest temp of 37.4 C was recorded at 9/19 19:45      Pain reported at 9/19 21:30: 0 = None    Physical Exam Narrative:  ·  Physical Exam:    ROS-10 point review of systems obtained and negative except what is listed in HPI    Physical Exam:  General/Constitutional: Alert, oriented , cooperative,  in no acute distress.  Head: normocephalic, atraumatic  Skin: Intact,  dry skin  Eyes: PERRL, EOMs intact,  Conjunctiva pink with no erythema or exudates. No scleral icterus.   ENT: No external deformities. Nares patent, mucus membranes moist.  Pharynx clear, uvula midline.   Neck: Supple,   Pulmonary: Clear bilaterally with good chest wall excursion. No rales, rhonchi or wheezing.   Cardiac: Regular rate and rhythm. good pulses.  Abdomen: Soft, nontender, active bowel sounds.  No palpable organomegaly.  No rebound or guarding.  No CVA tenderness.  Genitourinary: Exam deferred.  Musculoskeletal: Full range of motion,  Neurological:  alert oriented x 3 no focal deficit.  Psychiatric: Appropriate mood and affect. Calm.     Recent Lab Results:    Results:    CBC: 9/19/2023 07:53              \     Hgb     /                              \     8.2 L    /  WBC  ----------------  Plt               7.1       ----------------    253              /     Hct     \                              /     24.8 L    \            RBC: 2.66 L    MCV: 93           BMP: 9/19/2023 12:40  NA+        Cl-     BUN  /                         120 LL   92 L    47 H /  --------------------------------  Glucose                 ---------------------------  234 H    K+     HCO3-   Creat \                         5.1  23    2.23 H \  Calcium : 8.0 L    Anion Gap : 10      Radiology Results:    Results:        Conclusion:  CONCLUSIONS:  1. Poorly visualized anatomical structures due to suboptimal image quality.  2. Left ventricular systolic function is hyperdynamic with a 70-75% estimated ejection fraction.  3. Spectral Doppler shows an impaired relaxation pattern of left ventricular diastolic filling.  4. There is moderate concentric left ventricular hypertrophy.  5. Left ventricular cavity size is decreased.  6. The left atrium is enlarged.  7. Mild to moderately elevated right ventricular systolic pressure.  8. Mild aortic valve stenosis.    QUANTITATIVE DATA SUMMARY:  2D MEASUREMENTS:  Normal Ranges:  LAs:           3.15 cm    (2.7-4.0cm)  IVSd:          1.40 cm    (0.6-1.1cm)  LVPWd:         1.40 cm    (0.6-1.1cm)  LVIDd:         3.80 cm    (3.9-5.9cm)  LVIDs:         2.50 cm  LV Mass Index: 100.8 g/m2  LV % FS        34.2 %    LA VOLUME:  Normal Ranges:  LA Vol A4C:        131.3 ml   (22+/-6mL/m2)  LA Vol A2C:        88.2 ml  LA Vol BP:         109.2 ml  LA Vol Index A4C:  68.2ml/m2  LA Vol Index A2C:  45.8 ml/m2  LA Vol IndexBP:   56.7 ml/m2  LA Area A4C:       29.7 cm2  LA Area A2C:       24.0 cm2  LA Major Axis A4C: 5.7 cm  LA Major Axis A2C: 5.6 cm  LA Volume Index:   56.7 ml/m2    RA VOLUME BY A/L METHOD:  Normal Ranges:  RA Area A4C: 13.0 cm2    AORTA MEASUREMENTS:  Normal Ranges:  Ao Sinus, d: 2.67 cm (2.1-3.5cm)  Ao STJ, d:   2.38 cm (1.7-3.4cm)  Asc Ao, d:   3.04 cm (2.1-3.4cm)    LV SYSTOLIC FUNCTION BY 2D PLANIMETRY (MOD):  Normal Ranges:  EF-A4C View: 61.9 % (>=55%)  EF-A2C View: 67.4 %  EF-Biplane:  64.6 %    LV DIASTOLIC FUNCTION:  Normal Ranges:  MV Peak E:        0.48 m/s    (0.7-1.2 m/s)  MV Peak A:        1.10 m/s    (0.42-0.7 m/s)  E/A Ratio:        0.44        (1.0-2.2)  MV e'             0.04 m/s     (>8.0)  MV lateral e'     0.06 m/s  MV medial e'      0.04 m/s  MV A Dur:         156.00 msec  E/e' Ratio:       11.99       (<8.0)  PulmV Sys Maikel:    51.10 cm/s  PulmV Marshall Maikel:   32.90 cm/s  PulmV S/D Maikel:    1.60  PulmV A Revs Maikel: 21.20 cm/s  PulmV A Revs Dur: 124.00 msec    MITRAL VALVE:  Normal Ranges:  MV DT: 172 msec (150-240msec)    AORTIC VALVE:  Normal Ranges:  AoV Vmax:                2.46 m/s  (<=1.7m/s)  AoV Peak P.2 mmHg (<20mmHg)  AoV Mean P.0 mmHg (1.7-11.5mmHg)  LVOT Max Maikel:            1.60 m/s  (<=1.1m/s)  AoV VTI:                 51.80 cm  (18-25cm)  LVOT VTI:                34.40 cm  LVOT Diameter:           1.70 cm   (1.8-2.4cm)  AoV Area, VTI:           1.51 cm2  (2.5-5.5cm2)  AoV Area,Vmax:           1.48 cm2  (2.5-4.5cm2)  AoV Dimensionless Index: 0.66      RIGHT VENTRICLE:  RV Basal 3.35 cm  RV Mid   2.45 cm  RV Major 7.0 cm    TRICUSPID VALVE/RVSP:  Normal Ranges:  Peak TR Velocity: 3.28 m/s  Est. RA Pressure: 3 mmHg  RV Syst Pressure: 46.0 mmHg (< 30mmHg)  IVC Diam:   0.93 cm    PULMONIC VALVE:  Normal Ranges:  PV Accel Time: 129 msec (>120ms)  PV Max Maikel:    1.3 m/s  (0.6-0.9m/s)  PV Max P.1 mmHg    Pulmonary Veins:  PulmV A Revs Dur: 124.00 msec  PulmV A Revs Maikel: 21.20 cm/s  PulmV Marshall Maikel:   32.90 cm/s  PulmV S/D Maikel:    1.60  PulmV Sys Maikel:    51.10 cm/s      [id number] Adam Farmer MD  Electronically signed on 2023 at 8:17:32 AM        *** Final ***     Echocardiogram [Sep 17 2023  8:17AM]      Assessment and Plan:   Code Status:  ·  Code Status Full Code     Assessment:    ALICIA IRENE is a 89 year old Female presenting with hypoglycemia and hypertension for the past week or longer and a recent fall. she saw pcp before her fall and  also after a previous fall. her bp meds were adjusted and her insulin decreased but low bg persists and elevated bp persits. she noted a headache and hallucinations last night prior to admission when her  bp was very high. she reports walking to bathroom  and fell, and hit emergency assist button. she lives in senior home and they came to check on her. she was unable to get off the floor and thus was brought to ed.  on admission it was 223/71 with hr 57 and bg 63. she denies headache at this time. she  does have some right knee pain, hx of chronic r knee pain but a bit worse now. She reports hx of neuropathy related to her dm and has numb feeling in her feet.    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 trace bl lower ext edema. consider siadh, adrenal insuff, hypothyroidism    - tsh elevated, am cortisol, acth     massimo on CKD stg 3- nephrology consulted. bp uncontrolled. cret today is 2.12 baseline is 1.5      hypertensive urgency  - switched to nifedipine. cardio  increased hydralazine to 50 mg, switched metop switched  to coreg  - -cardiology consult - appreciate recs   - echo   - Left ventricular systolic function is hyperdynamic with a 70-75% estimated ejection fraction.  3. Spectral Doppler shows an impaired relaxation pattern of left ventricular diastolic filling.  4. There is moderate concentric left ventricular hypertrophy.  5. Left ventricular cavity size is decreased.  6. The left atrium is enlarged.  7. Mild to moderately elevated right ventricular systolic pressure.  8. Mild aortic valve stenosis.     DVT prophylaxis  -heparin   -SCDs    GI prophylaxis   - ppi and docusate     Disposition: Plan of care discussed with patient.  No  plan for discharge today.  Time spent 35 minutes..  Assumed care from observation unit on hospital day 2      Electronic Signatures:  Dangelo Mcgregor (APRN-CNP)  (Signed 20-Sep-2023 06:31)   Authored: Service, Subjective Data, Objective Data, Assessment  and Plan, Note Completion      Last Updated: 20-Sep-2023 06:31 by Dangelo Mcgregor (APRN-CNP)

## 2023-09-30 NOTE — H&P
History of Present Illness:   HPI:    ALICIA IRENE is a 89 year old Female presenting with hypoglycemia and hypertension for the past week or longer and a recent fall. she saw pcp before her fall and  also after a previous fall. her bp meds were adjusted and her insulin decreased but low bg persists and elevated bp persits. she noted a headache and hallucinations last night prior to admission when her bp was very high. she reports walking to bathroom  and fell, and hit emergency assist button. she lives in senior home and they came to check on her. she was unable to get off the floor and thus was brought to ed.  on admission it was 223/71 with hr 57 and bg 63. she denies headache at this time. she  does have some right knee pain, hx of chronic r knee pain but a bit worse now. She reports hx of neuropathy related to her dm and has numb feeling in her feet.    PMH: GERD, htn, hld, anemia (baseline 9), DM (insulin), heart murmur, lumbar stenosis, lymphedema       Comorbidities:   Comorbidites:  ·  Comorbid Conditions chronic kidney disease, diabetes   ·  Chronic Kidney Disease diabetic  hypertension    ·  CKD Stage Stage 4    ·  Diabetes Type Type 2   ·  Insulin Dependent yes   ·  DM Acuity or Status unknown   ·  DM Complications unknown            Allergies:  ·  lisinopril : Facial Swelling  ·  Trusopt : Other    Medications Prior to Admission:   Admission Medication Reconciliation has not been completed for this patient.    Objective:     Objective Information:      T   P  R  BP   MAP  SpO2   Value  36  62  18  174/86      100%  Date/Time 9/16 4:55 9/16 4:55 9/16 4:55 9/16 4:55    9/16 4:55  Range  (35.9C - 37C )  (55 - 62 )  (16 - 18 )  (166 - 231 )/ (50 - 86 )    (99% - 100% )  Highest temp of 37 C was recorded at 9/15 20:47      Pain reported at 9/16 4:55: 3 = Mild    Physical Exam Narrative:  ·  Physical Exam:    ROS-10 point review of systems obtained and negative except what is listed in HPI    Physical  Exam:  General/Constitutional: Alert, oriented , cooperative,  in no acute distress.  Head: normocephalic, atraumatic  Skin: Intact,  dry skin  Eyes: PERRL, EOMs intact,  Conjunctiva pink with no erythema or exudates. No scleral icterus.   ENT: No external deformities. Nares patent, mucus membranes moist.  Pharynx clear, uvula midline.   Neck: Supple, without meningismus. Trachea at midline. No lymphadenopathy.  Pulmonary: Clear bilaterally with good chest wall excursion. No rales, rhonchi or wheezing.   Cardiac: Regular rate and rhythm. good pulses.  Abdomen: Soft, nontender, active bowel sounds.  No palpable organomegaly.  No rebound or guarding.  No CVA tenderness.  Genitourinary: Exam deferred.  Musculoskeletal: Full range of motion, no deformity. Pulses full and equal. +1 edema. right knee no edema or warmth/redness.  Neurological:  Cranial nerves II through XII are grossly intact, no focal findings identified.  Psychiatric: Appropriate mood and affect. Calm.     Medications:    Medications:      CARDIOVASCULAR AGENTS:    1. Losartan:  100  mg  Oral  Daily    2. Metoprolol Succinate Extended Release:  50  mg  Oral  Daily    3. hydrALAZINE (APRESOLINE):  25  mg  Oral  Every 8 Hours    4. hydrALAZINE (APRESOLINE) Injectable:  10  mg  IntraVenous Push  Every 6 Hours   PRN         CENTRAL NERVOUS SYSTEM AGENTS:    1. Acetaminophen:  650  mg  Oral  Every 4 Hours   PRN       2. Acetaminophen:  650  mg  Oral  Every 4 Hours   PRN       3. Aspirin Chewable:  81  mg  Oral  Daily    4. Ondansetron Injectable:  4  mg  IntraVenous Push  Every 4 Hours   PRN         COAGULATION MODIFIERS:    1. Heparin SubCutaneous:  5000  unit(s)  SubCutaneous  Every 8 Hours      GASTROINTESTINAL AGENTS:    1. Pantoprazole:  40  mg  Oral  Daily      METABOLIC AGENTS:    1. Insulin Lispro Moderate Corrective Scale:  unit(s)  SubCutaneous  3 Times a Day Before Meals    2. Atorvastatin:  10  mg  Oral  Daily    3. Dextrose 50% in Water  Injectable:  25  gram(s)  IntraVenous Push  Every 15 Minutes   PRN       4. Glucagon Injectable:  1  mg  IntraMuscular  Every 15 Minutes   PRN         NUTRITIONAL PRODUCTS:    1. Sodium Chloride 0.9% Infusion:  1000  mL  IntraVenous  <Continuous>    2. Sodium Chloride 0.9% Injectable Flush:  10  mL  IntraVenous Flush  Every 8 Hours and as Needed   PRN       3. Cholecalciferol (Vitamin D3):  2000  International Unit(s)  Oral  Daily      TOPICAL AGENTS:    1. Brimonidine 0.2% Ophthalmic:  1  drop(s)  Both Eyes  2 Times a Day    2. Latanoprost 0.005% Ophthalmic:  1  drop(s)  Both Eyes  At Bedtime    3. Timolol 0.5% Ophthalmic:  1  drop(s)  Both Eyes  2 Times a Day      Recent Lab Results:    Results:    CBC: 9/15/2023 21:41              \     Hgb     /                              \     9.2 L    /  WBC  ----------------  Plt               9.8       ----------------    270              /     Hct     \                              /     27.0 L    \            RBC: 2.95 L    MCV: 92     Neutrophil %: 57.9      BMP: 9/16/2023 05:01  NA+        Cl-     BUN  /                         123 L   93 L    34 H /  --------------------------------  Glucose                ---------------------------  85    K+     HCO3-   Creat \                         4.2  22    1.90 H \  Calcium : 8.4 L    Anion Gap : 12      CMP: 9/15/2023 21:41  NA+        Cl-     BUN  /                         121 L   90 L    36 H /  --------------------------------  Glucose                ---------------------------  67 L    K+     HCO3-   Creat \                         4.1    22    1.92 H \           \  T Bili  /                    \  0.4  /  AST  x ---- x ALT        38 x ---- x 23         /  Alk P   \               /  74  \  Calcium : 8.5 L    Anion Gap : 13     Albumin : 3.6     T Protein : 7.1             I have reviewed these laboratory results:    Glucose_POCT  Trending View      Result 16-Sep-2023 16:05:00  16-Sep-2023 12:07:00   16-Sep-2023 00:57:00  15-Sep-2023 22:52:00  15-Sep-2023 21:02:00    Glucose-POCT 303   H   274   H   96   127   H   63   L        Urinalysis  16-Sep-2023 10:20:00      Result Value    Color, Urine  YELLOW    Reference Range: STRAW,YELLOW    Appearance, Urine  CLEAR    Specific Gravity, Urine  1.008    pH, Urine  5.0    Protein, Urine  100(2+)   A   Glucose, Urine  NEGATIVE    Blood, Urine  NEGATIVE    Ketones, Urine  NEGATIVE    Bilirubin, Urine  NEGATIVE    Urobilinogen, Urine  <2.0    Nitrite, Urine  NEGATIVE    Leukocyte Esterase, Urine  NEGATIVE      Urinalysis, Microscopic  16-Sep-2023 10:20:00      Result Value    White Cells  1    Red Blood Cells  1    Epithelial Cells, Squamous  1      Sodium, Urine Spot  16-Sep-2023 10:20:00      Result Value    Sodium Urine Spot  17    Sodium/Creat Ratio  31    Creatinine, Urine Spot  54.6      Osmolality, Urine Spot  16-Sep-2023 10:20:00      Result Value    Osmolality, Urine Spot  210      Complete Blood Count  16-Sep-2023 08:11:00      Result Value    White Blood Cell Count  7.7    Red Blood Cell Count  2.85   L   HGB  8.8   L   HCT  27.8   L   MCV  98    MCHC  31.7   L   PLT  189    RDW-CV  12.0      Hemoglobin A1C, Level  16-Sep-2023 08:11:00      Result Value    Estimated Average Glucose  143    Hemoglobin A1C, Level  6.6 Diagnosis of Diabetes-Adults   Non-Diabetic: < or = 5.6%   Increased risk for developing diabetes: 5.7-6.4%   Diagnostic of diabetes: > or = 6.5%  .       Monitoring of Diabetes                Age (y)     Therapeutic Goal (%)   Adults:          >1   A     Osmolality, Serum  16-Sep-2023 08:11:00      Result Value    Osmolality, Serum  264   L     Basic Metabolic Panel  16-Sep-2023 05:01:00      Result Value    Glucose, Serum  85    NA  123   L   K  4.2    CL  93   L   Bicarbonate, Serum  22    Anion Gap, Serum  12    BUN  34   H   CREAT  1.90   H   GFR Female  24   A   Calcium, Serum  8.4   L     Magnesium, Serum  Trending View      Result  16-Sep-2023 05:01:00  15-Sep-2023 21:41:00    Magnesium, Serum 1.90   1.90        Troponin I, High Sensitivity  Trending View      Result 16-Sep-2023 00:49:00  15-Sep-2023 21:41:00    Troponin I, High Sensitivity 25   H   27   H        Complete Blood Count + Differential  15-Sep-2023 21:41:00      Result Value    White Blood Cell Count  9.8    Red Blood Cell Count  2.95   L   HGB  9.2   L   HCT  27.0   L   MCV  92    MCHC  34.1    PLT  270    RDW-CV  11.9    Neutrophil %  57.9    Immature Granulocytes %  0.3    Lymphocyte %  28.0    Monocyte %  12.2    Eosinophil %  1.1    Basophil %  0.5    Neutrophil Count  5.69   H   Lymphocyte Count  2.76    Monocyte Count  1.20   H   Eosinophil Count  0.11    Basophil Count  0.05      Comprehensive Metabolic Panel  15-Sep-2023 21:41:00      Result Value    Glucose, Serum  67   L   NA  121   L   K  4.1    CL  90   L   Bicarbonate, Serum  22    Anion Gap, Serum  13    BUN  36   H   CREAT  1.92   H   GFR Female  24   A   Calcium, Serum  8.5   L   ALB  3.6    ALKP  74    T Pro  7.1    T Bili  0.4    Alanine Aminotransferase, Serum  23    Aspartate Transaminase, Serum  38        Radiology Results:    Results:        Impression:    No acute fracture. Small joint effusion.     Tricompartmental right knee osteoarthrosis.     MACRO:  None     Xray Knee 3 View [Sep 15 2023  9:42PM]      Impression:    Cardiomegaly. No acute pulmonary process.     Xray Chest 1 View [Sep 15 2023  9:41PM]      Impression:    1. No acute intracranial abnormality or calvarial fracture.                    CT Head without Contrast [Sep 15 2023  9:31PM]      Assessment and Plan:   Assessment:    ALICIA IRENE is a 89 year old Female presenting with hypoglycemia and hypertension for the past week or longer and a recent fall. she saw pcp before her fall and  also after a previous fall. her bp meds were adjusted and her insulin decreased but low bg persists and elevated bp persits. she noted a headache and  hallucinations last night prior to admission when her bp was very high. she reports walking to bathroom  and fell, and hit emergency assist button. she lives in senior home and they came to check on her. she was unable to get off the floor and thus was brought to ed.  on admission it was 223/71 with hr 57 and bg 63. she denies headache at this time. she  does have some right knee pain, hx of chronic r knee pain but a bit worse now. She reports hx of neuropathy related to her dm and has numb feeling in her feet.    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   - hold oral hyperglycemic agent  - hold insulin   - d5 ns running - stopped   - sliding scale resumed this pm as bg 303     hypertensive urgency   hx htn   acute renal insufficiency on CKD stg 3/4  anemia of chronic disease   bp 223/71 on admission   - creat (baseline 1.5) - up to 1.92, grf 24 ([id number])  - start hydralazine 25mg q8hr   - as needed hydralazine   - carolyn hose  - telemetry    - echo   - Left ventricular systolic function is hyperdynamic with a 70-75% estimated ejection fraction.  3. Spectral Doppler shows an impaired relaxation pattern of left ventricular diastolic filling.  4. There is moderate concentric left ventricular hypertrophy.  5. Left ventricular cavity size is decreased.  6. The left atrium is enlarged.  7. Mild to moderately elevated right ventricular systolic pressure.  8. Mild aortic valve stenosis.     DVT prophylaxis  -heparin   -SCDs    GI prophylaxis   - ppi and docusate     Labs/Testing reviewed  Interdisciplinary team rounding completed with hospitalist, nurse, TCC  NP discussed plan and lab/testing results with Dr. Trejo  -- stabalize bg and htn  45 minutes total spent on patient's care today; >50% time spent on counseling/coordination of care       Electronic Signatures:  Johnna Ruelas (APRN-CNP)  (Signed 17-Sep-2023  10:08)   Authored: History of Present Illness, Comorbidities,  Allergies, Medications Prior to Admission, Objective, Assessment and Plan, Note Completion      Last Updated: 17-Sep-2023 10:08 by Johnna Ruelas (APRN-CNP)

## 2023-09-30 NOTE — DISCHARGE SUMMARY
Send Summary:   Discharge Summary Providers:  Provider Role Provider Name   · Consulting Saad Edouard   · Consulting Pascual Bhakta   · Primary Josh Rosa       Note Recipients: Josh Rosa MD Burgun, Stephen, MD Gaines, Sean, DO Sehgal, Ishwinder S, MD       Discharge:    Summary:   Admission Date: .15-Sep-2023 20:32:00   Discharge Date: 27-Sep-2023   Attending Physician at Discharge: Fredy Farah   Admission Reason: Very high blood pressure, low blood  sugar, left knee injury(1)   Final Discharge Diagnoses: massimo/ckd, hypoNa, hypervolemia   Procedures: none   Condition at Discharge: Satisfactory   Disposition at Discharge: .Home   Vital Signs:        T   P  R  BP   MAP  SpO2   Value  36.1  72  18  178/62      98%  Date/Time 9/27 11:34 9/27 11:34 9/27 11:34 9/27 11:34    9/27 11:34  Range  (36.1C - 36.7C )  (56 - 75 )  (16 - 19 )  (129 - 178 )/ (45 - 73 )    (95% - 99% )    Date:            Weight/Scale Type:  Height:   27-Sep-2023 08:10  80.2  kg              Physical Exam:      General/Constitutional: Alert, oriented , cooperative,  in no acute distress.  Head: normocephalic, atraumatic  Skin: Intact,  dry skin  Eyes: PERRL, EOMs intact,  Conjunctiva pink with no erythema or exudates. No scleral icterus.   ENT: No external deformities. Nares patent, mucus membranes moist.  Pharynx clear, uvula midline.   Neck: Supple,   Pulmonary: Clear bilaterally with good chest wall excursion. No rales, rhonchi or wheezing.   Cardiac: Regular rate and rhythm. good pulses.  Abdomen: Soft, nontender, active bowel sounds.  No palpable organomegaly.  No rebound or guarding.  No CVA tenderness.    Hospital Course:    9/27:  HypoNa... stable. Discussed with Dr Bhakta who has cleared pt for dc. Script given for RFP 10/2. F/u with nephrology requested.   DM... dc with humulin N 10 units BID per endo. F/u PCP and endo requested.   HTN... overall better. Dc with procardia, hydralazine. She will f/u with cardio and pcp  for further mgmt.   Bradycardia... BB stopped by cardio. F/u with cardio outpt.   Urine cx with < 100k cfus... most likely related to huerta and not true UTI.   Cr stable, likely her new baseline ckd.     Dispo home with DTR and HHC.     Pt is clinically and hemodynamically stable, tolerating PO intake and room air.  Instructed to f/u with pcp within 5 days      More than 30 min spent on dc.          9/26:  HypoNa... still low... cont fluid restriction, lasix, f/u renal recs.   DM... glucose better, a bit high... NPH increased 8 BID --> 10 BID. This will be dc regimen most likely.   Uncontrolled HTN... Increase procardia 30 --> 60 and monitor.   Huerta removed yesterday, so far successful ToV.   Rafita/ckd... Cr stable, improving. Baseline appears to be about 1.5.   UTI... slight leukocytosis... cont CTX for now, f/u cx/s.   H/h stable and rising.   Home regimen for chronic conditions  Dvt px with heparin      Discharge Information:    and Continuing Care:   Lab Results - Pending:    Culture, Urine Drawn at 25-Sep-2023 23:47:00  Radiology Results - Pending: None   Discharge Instructions:    Activity:           activity as tolerated.          May shower..      Nutrition/Diet:           resume normal diet          Fluid Restriction:   1400 ml/day    Home Care Certification:           Skilled Disciplines Ordered:   PT,  OT,  Home Health Aide    Follow Up Appointments:    Follow-Up Appointment 01:           Physician/Dept/Service:   primary care provider          Reason for Referral:   hospital discharge          Call to Schedule in:   1 week          Phone Number:   please call to schedule appt    Follow-Up Appointment 02:           Physician/Dept/Service:   Cardiology - Dr. Pete Iniguez MD          Reason for Referral:   hospital discharge/ Hypertension          Scheduled Date/Time:   11-Oct-2023 10:00          Location:   [address]          Phone Number:    "[phone]    Follow-Up Appointment 03:           Physician/Dept/Service:   Dr Bhakta or primary nephrologist.          Call to Schedule in:   1 week    Discharge Medications: Home Medication   Alphagan P 0.1% ophthalmic solution - 1 drop(s) to each affected eye 2 times a day  Low Dose ASA 81 mg oral tablet - 1 tab(s) orally once a day  atorvastatin 10 mg oral tablet - 1 tab(s) orally once a day  cholecalciferol 50 mcg (2000 intl units) oral capsule - 1 cap(s) orally once a day  latanoprost 0.005% ophthalmic solution - 1 drop(s) to each affected eye once a day (in the evening)  omeprazole 40 mg oral delayed release capsule - 1 cap(s) orally once a day  Timolol Maleate (Eqv-Timoptic) 0.5% ophthalmic solution - 1 drop(s) to each affected eye once a day  HumuLIN N 100 units/mL subcutaneous suspension - 10 Units subcutaneously twice daily.   carvedilol 12.5 mg oral tablet - 1 tab(s) orally 2 times a day  NIFEdipine 60 mg oral tablet, extended release - 1 tab(s) orally once a day  furosemide 20 mg oral tablet - 1 tab(s) orally once a day  hydrALAZINE 50 mg oral tablet - 1 tab(s) orally every 8 hours  sennosides-docusate 8.6 mg-50 mg oral tablet - 2 tab(s) orally 2 times a day  Renal Function Panel 10/2/23. Fax results to Dr Bhakta [phone] - null     PRN Medication     DNR Status:   ·  Code Status Code Status order at time of discharge: Full Code       Electronic Signatures:  Fredy Farah)  (Signed 27-Sep-2023 15:36)   Authored: Send Summary, Summary Content, Ongoing Care,  DNR Status, Note Completion      Last Updated: 27-Sep-2023 15:36 by Fredy Farah)    References:  1.  Data Referenced From \"Consult-Nephrology\" 18-Sep-2023 12:35   "

## 2023-09-30 NOTE — PROGRESS NOTES
Service: Renal     Subjective Data:   ALICIA CARTAGENA is a 89 year old Female who is Hospital Day # 6.     Ms. Cartagena is a 89-year-old woman with a history of hypertension, hyperlipidemia, diabetes who presented with hypoglycemia and hypertension.  Cardiology saw her as the blood pressure was in the 200s systolic.   She had apparently fallen the night prior to her presentation.  Medications had been adjusted related to her kidneys, have been taken off of losartan and placed on hydralazine.  Echocardiogram had noted normal systolic function but moderate concentric  LVH, elevated right-sided pressures.    Overnight Events: Patient had an uneventful night.     Objective Data:     Objective Information:      T   P  R  BP   MAP  SpO2   Value  36.6  69  16  185/57      98%  Date/Time 9/20 8:00 9/20 8:00 9/20 8:00 9/20 8:00    9/20 8:00  Range  (36.1C - 37.4C )  (38 - 86 )  (16 - 18 )  (111 - 185 )/ (32 - 71 )    (96% - 99% )  Highest temp of 37.4 C was recorded at 9/19 19:45      Pain reported at 9/19 21:30: 0 = None    Physical Exam by System:    Constitutional: Well developed, awake/alert/oriented  x3, no distress, alert and cooperative   Eyes: PERRL, EOMI, clear sclera   ENMT: mucous membranes moist, no apparent injury,  no lesions seen   Head/Neck: Elevated JVP   Respiratory/Thorax: Crackles in the bases   Cardiovascular: Regular, rate and rhythm, systolic  murmur, 2+ equal pulses of the extremities, normal S 1and S 2   Gastrointestinal: Nondistended, soft, non-tender,  no rebound tenderness or guarding, no masses palpable, no organomegaly, +BS, no bruits   Genitourinary: No huerta   Musculoskeletal: ROM intact, no joint swelling, normal  strength   Extremities: normal extremities, 1+ lower extremity  edema, contusions or wounds, no clubbing   Neurological: alert and oriented x3, intact senses,  motor, response and reflexes, normal strength   Skin: Warm and dry, no lesions, no rashes     Medication:    Medications:           Continuous Medications       --------------------------------  No continuous medications are active       Scheduled Medications       --------------------------------    1. Aspirin Chewable:  81  mg  Oral  Daily    2. Atorvastatin:  10  mg  Oral  Daily    3. Brimonidine 0.2% Ophthalmic:  1  drop(s)  Both Eyes  2 Times a Day    4. Cholecalciferol (Vitamin D3):  2000  International Unit(s)  Oral  Daily    5. Furosemide Injectable:  80  mg  IntraVenous Push  Once    6. Heparin SubCutaneous:  5000  unit(s)  SubCutaneous  Every 8 Hours    7. hydrALAZINE (APRESOLINE):  50  mg  Oral  Every 12 Hours    8. Insulin Lispro Moderate Corrective Scale:  unit(s)  SubCutaneous  3 Times a Day Before Meals    9. Latanoprost 0.005% Ophthalmic:  1  drop(s)  Both Eyes  At Bedtime    10. NIFEdipine (PROCARDIA XL) Extended Release:  30  mg  Oral  Daily    11. Pantoprazole:  40  mg  Oral  Daily    12. Sodium Chloride 0.9% IV Bolus:  500  mL  IntraVenous Piggyback  Once    13. Timolol 0.5% Ophthalmic:  1  drop(s)  Both Eyes  2 Times a Day         PRN Medications       --------------------------------    1. Acetaminophen:  650  mg  Oral  Every 4 Hours    2. Acetaminophen:  650  mg  Oral  Every 4 Hours    3. Dextrose 50% in Water Injectable:  25  gram(s)  IntraVenous Push  Every 15 Minutes    4. Glucagon Injectable:  1  mg  IntraMuscular  Every 15 Minutes    5. hydrALAZINE (APRESOLINE) Injectable:  10  mg  IntraVenous Push  Every 6 Hours    6. Ondansetron Injectable:  4  mg  IntraVenous Push  Every 4 Hours    7. Sodium Chloride 0.9% Injectable Flush:  10  mL  IntraVenous Flush  Every 8 Hours and as Needed           Currently Suspended Medications       --------------------------------    1. Carvedilol:  12.5  mg  Oral  2 Times a Day      Recent Lab Results:    Results:    CBC: 9/20/2023 06:40              \     Hgb     /                              \     7.7 L    /  WBC  ----------------  Plt               8.1       ----------------     254              /     Hct     \                              /     23.1 L    \            RBC: 2.46 L    MCV: 94           BMP: 9/20/2023 06:40  NA+        Cl-     BUN  /                         124 L   96 L    47 H /  --------------------------------  Glucose                ---------------------------  203 H    K+     HCO3-   Creat \                         5.3  21    2.99 H \  Calcium : 8.2 L    Anion Gap : 12        I have reviewed these laboratory results:    Glucose_POCT  Trending View      Result 20-Sep-2023 08:03:00  19-Sep-2023 20:19:00    Glucose-POCT 211   H   264   H        Complete Blood Count  20-Sep-2023 06:40:00      Result Value    White Blood Cell Count  8.1    Red Blood Cell Count  2.46   L   HGB  7.7   L   HCT  23.1   L   MCV  94    MCHC  33.3    PLT  254    RDW-CV  12.2      Basic Metabolic Panel  20-Sep-2023 06:40:00      Result Value    Glucose, Serum  203   H   NA  124   L   K  5.3    CL  96   L   Bicarbonate, Serum  21    Anion Gap, Serum  12    BUN  47   H   CREAT  2.99   H   GFR Female  14   A   Calcium, Serum  8.2   L       Radiology Results:    Results:        Conclusion:  Marked sinus bradycardia  Right bundle branch block  Abnormal ECG  Confirmed by Jorge Lema ([id number]) on 9/20/2023 8:06:05 AM     Electrocardiogram 12 Lead [Sep 20 2023  8:06AM]      Impression:    Extremely limited study given body habitus.     1. No hydronephrosis.  2. Indeterminate left renal mixed echogenicity nodule measuring 1.4  cm. This could be further evaluated by dedicated renal MRI with IV  contrast.  3. Partially visualized heterogenous hepatic echotexture possibly  related to underlying diffuse hepatocellular dysfunction. Advise  correlation with liver function test     MACRO:  None     Ultrasound Renal Bilateral [Sep 18 2023  2:35PM]      Conclusion:  CONCLUSIONS:  1. Poorly visualized anatomical structures due to suboptimal image quality.  2. Left ventricular systolic function is  hyperdynamic with a 70-75% estimated ejection fraction.  3. Spectral Doppler shows an impaired relaxation pattern of left ventricular diastolic filling.  4. There is moderate concentric left ventricular hypertrophy.  5. Left ventricular cavity size is decreased.  6. The left atrium is enlarged.  7. Mild to moderately elevated right ventricular systolic pressure.  8. Mild aortic valve stenosis.    QUANTITATIVE DATA SUMMARY:  2D MEASUREMENTS:  Normal Ranges:  LAs:           3.15 cm    (2.7-4.0cm)  IVSd:          1.40 cm    (0.6-1.1cm)  LVPWd:         1.40 cm    (0.6-1.1cm)  LVIDd:         3.80 cm    (3.9-5.9cm)  LVIDs:         2.50 cm  LV Mass Index: 100.8 g/m2  LV % FS        34.2 %    LA VOLUME:  Normal Ranges:  LA Vol A4C:        131.3 ml   (22+/-6mL/m2)  LA Vol A2C:        88.2 ml  LA Vol BP:         109.2 ml  LA Vol Index A4C:  68.2ml/m2  LA Vol Index A2C:  45.8 ml/m2  LA Vol IndexBP:   56.7 ml/m2  LA Area A4C:       29.7 cm2  LA Area A2C:       24.0 cm2  LA Major Axis A4C: 5.7 cm  LA Major Axis A2C: 5.6 cm  LA Volume Index:   56.7 ml/m2    RA VOLUME BY A/L METHOD:  Normal Ranges:  RA Area A4C: 13.0 cm2    AORTA MEASUREMENTS:  Normal Ranges:  Ao Sinus, d: 2.67 cm (2.1-3.5cm)  Ao STJ, d:   2.38 cm (1.7-3.4cm)  Asc Ao, d:   3.04 cm (2.1-3.4cm)    LV SYSTOLIC FUNCTION BY 2D PLANIMETRY (MOD):  Normal Ranges:  EF-A4C View: 61.9 % (>=55%)  EF-A2C View: 67.4 %  EF-Biplane:  64.6 %    LV DIASTOLIC FUNCTION:  Normal Ranges:  MV Peak E:        0.48 m/s    (0.7-1.2 m/s)  MV Peak A:        1.10 m/s    (0.42-0.7 m/s)  E/A Ratio:        0.44        (1.0-2.2)  MV e'             0.04 m/s    (>8.0)  MV lateral e'     0.06 m/s  MV medial e'      0.04 m/s  MV A Dur:         156.00 msec  E/e' Ratio:       11.99       (<8.0)  PulmV Sys Maikel:    51.10 cm/s  PulmV Marshall Maikel:   32.90 cm/s  PulmV S/D Maikel:    1.60  PulmV A Revs Maikel: 21.20 cm/s  PulmV A Revs Dur: 124.00 msec    MITRAL VALVE:  Normal Ranges:  MV DT: 172 msec  (150-240msec)    AORTIC VALVE:  Normal Ranges:  AoV Vmax:                2.46 m/s  (<=1.7m/s)  AoV Peak P.2 mmHg (<20mmHg)  AoV Mean P.0 mmHg (1.7-11.5mmHg)  LVOT Max Maikel:            1.60 m/s  (<=1.1m/s)  AoV VTI:                 51.80 cm  (18-25cm)  LVOT VTI:                34.40 cm  LVOT Diameter:           1.70 cm   (1.8-2.4cm)  AoV Area, VTI:           1.51 cm2  (2.5-5.5cm2)  AoV Area,Vmax:           1.48 cm2  (2.5-4.5cm2)  AoV Dimensionless Index: 0.66      RIGHT VENTRICLE:  RV Basal 3.35 cm  RV Mid   2.45 cm  RV Major 7.0 cm    TRICUSPID VALVE/RVSP:  Normal Ranges:  Peak TR Velocity: 3.28 m/s  Est. RA Pressure: 3 mmHg  RV Syst Pressure: 46.0 mmHg (< 30mmHg)  IVC Diam:   0.93 cm    PULMONIC VALVE:  Normal Ranges:  PV Accel Time: 129 msec (>120ms)  PV Max Maikel:    1.3 m/s  (0.6-0.9m/s)  PV Max P.1 mmHg    Pulmonary Veins:  PulmV A Revs Dur: 124.00 msec  PulmV A Revs Maikel: 21.20 cm/s  PulmV Marshall Maikel:   32.90 cm/s  PulmV S/D Maikel:    1.60  PulmV Sys Maikel:    51.10 cm/s      [id number] Adam Farmer MD  Electronically signed on 2023 at 8:17:32 AM        *** Final ***     Echocardiogram [Sep 17 2023  8:17AM]      Assessment and Plan:        Additional Dx:   Hypertensive heart disease without heart failure: Entered  Date: 17-Sep-2023 10:08   Type 2 diabetes mellitus with diabetic chronic kidney disease : Entered Date: 17-Sep-2023 10:08   Stage 4 chronic kidney disease: Entered Date: 17-Sep-2023  10:08   Type 2 diabetes mellitus without complication: Entered Date:  16-Sep-2023 18:08   Long term current use of insulin: Entered Date: 16-Sep-2023  18:08       Medical History:   Chest pain: Onset Date: 18-Sep-2023, Entered Date: 18-Sep-2023  12:47   Atherosclerosis: Onset Date: 16-Sep-2023, Entered Date: 16-Sep-2023  15:18   Hypertension: Onset Date: 16-Sep-2023, Entered Date: 16-Sep-2023  15:18    Comorbidities:  ·  Comorbidity Other     Code Status:  ·  Code Status Full Code      Assessment:    Ms. Cartagena is a 89-year-old woman with a history of hypertension, hyperlipidemia, diabetes who presented with hypoglycemia  and hypertension.  Cardiology saw her as the blood pressure was in the 200s systolic.  She had apparently fallen the night prior to her presentation.  Medications had been adjusted related to her kidneys, have been taken off of losartan and placed on  hydralazine.  Echocardiogram had noted normal systolic function but moderate concentric LVH, elevated right-sided pressures.  I am afraid that she is fluid overloaded.  I will stop the sodium chloride tablets, I will give her Lasix 80 mg IV now.  I reviewed the renal ultrasound.  I am concerned  with her anemia, I will check iron stores.  I will also quantify the protein in the urine.  I would not want to miss a paraprotein but before I check electrophoresis studies, I will contemplate whether he would change what we do at her age.  I am concerned  with the acute kidney injury and hyponatremia, I am hoping that she responds to the diuretic and we will make certain that she is on a fluid restriction.      Electronic Signatures:  Adam Leonard)  (Signed 20-Sep-2023 10:50)   Authored: Service, Subjective Data, Objective Data, Assessment  and Plan, Note Completion      Last Updated: 20-Sep-2023 10:50 by Adam Leonard)

## 2023-09-30 NOTE — PROGRESS NOTES
Consult Type: subsequent visit/care     Service: Endocrinology     Subjective Data:   ALICIA IRENE is a 89 year old Female who is Hospital Day # 11.     No new complaints.    Objective Data:     Objective Information:      T   P  R  BP   MAP  SpO2   Value  36.7  75  17  149/65      98%  Date/Time  0:00  0:00  0:00  0:00     0:00  Range  (36.4C - 37.1C )  (63 - 78 )  (17 - 18 )  (146 - 179 )/ (45 - 68 )    (97% - 99% )  Highest temp of 37.1 C was recorded at  0:44        Physical Exam by System:    Constitutional: Elderly female in no distress     Medication:    Medications:          Continuous Medications       --------------------------------  No continuous medications are active       Scheduled Medications       --------------------------------    1. Aspirin Chewable:  81  mg  Oral  Daily    2. Atorvastatin:  10  mg  Oral  Daily    3. Brimonidine 0.2% Ophthalmic:  1  drop(s)  Both Eyes  2 Times a Day    4. Cholecalciferol (Vitamin D3):  2000  International Unit(s)  Oral  Daily    5. Docusate 50 mg - Senna 8.6 m  tablet(s)  Oral  2 Times a Day    6. Heparin SubCutaneous:  5000  unit(s)  SubCutaneous  Every 8 Hours    7. hydrALAZINE (APRESOLINE):  50  mg  Oral  Every 8 Hours    8. Insulin Isophane (NPH) Injectable:  8  unit(s)  SubCutaneous  2 Times a Day    9. Insulin Lispro Moderate Corrective Scale:  unit(s)  SubCutaneous  3 Times a Day Before Meals    10. Latanoprost 0.005% Ophthalmic:  1  drop(s)  Both Eyes  At Bedtime    11. NIFEdipine (PROCARDIA XL) Extended Release:  30  mg  Oral  Daily    12. Pantoprazole:  40  mg  Oral  Daily    13. Polyethylene Glycol:  17  gram(s)  Oral  2 Times a Day    14. Timolol 0.5% Ophthalmic:  1  drop(s)  Both Eyes  2 Times a Day         PRN Medications       --------------------------------    1. Acetaminophen:  650  mg  Oral  Every 4 Hours    2. Acetaminophen:  650  mg  Oral  Every 4 Hours    3. Dextrose 50% in Water Injectable:  25  gram(s)   IntraVenous Push  Every 15 Minutes    4. Glucagon Injectable:  1  mg  IntraMuscular  Every 15 Minutes    5. hydrALAZINE (APRESOLINE) Injectable:  10  mg  IntraVenous Push  Every 6 Hours    6. Lidocaine 2% (UROJET) Topical Gel:  5  mL  Topical  Every 4 Hours    7. Ondansetron Injectable:  4  mg  IntraVenous Push  Every 4 Hours    8. Sodium Chloride 0.9% Injectable Flush:  10  mL  IntraVenous Flush  Every 8 Hours and as Needed        Recent Lab Results:    Results:        I have reviewed these laboratory results:    Renal Function Panel  25-Sep-2023 05:20:00      Result Value    Glucose, Serum  140   H   NA  128   L   K  4.6    CL  94   L   Bicarbonate, Serum  26    Anion Gap, Serum  13    BUN  44   H   CREAT  2.38   H   GFR Female  19   A   Calcium, Serum  8.5   L   Phosphorus, Serum  3.7    ALB  3.0   L     Glucose_POCT  Trending View      Result 24-Sep-2023 20:57:00  24-Sep-2023 16:29:00  24-Sep-2023 13:00:00    Glucose-POCT 202   H   272   H   342   H          Assessment and Plan:   Daily Risk Screen:  ·  Does patient have an indwelling urinary catheter? n/a consulting service    ·  Does patient have a central line? n/a consulting service      Code Status:  ·  Code Status Full Code     Assessment:    TYPE 2 DIABETES MELLITUS  LONG TERM CURRENT INSULIN USE  History of frequent hypoglycemia  No further hypoglycemia  Glucose improved this morning      RECOMMENDATIONS  Continue insulin corrective scale  NPH 8 units BID  Target glucose 100-200 mg/dl  Tight glucose control not indicated        Electronic Signatures:  Saad Edouard)  (Signed 25-Sep-2023 21:21)   Authored: Service, Subjective Data, Objective Data, Assessment  and Plan, Note Completion      Last Updated: 25-Sep-2023 21:21 by Saad Edouard)

## 2023-09-30 NOTE — PROGRESS NOTES
Service: Medicine     Subjective Data:   ALICIA IRENE is a 89 year old Female who is Hospital Day # 6.     Patient sitting up in bed, reports she feel better than she did last night.  Overnight she got dizzy, hr drop in 30's.    Objective Data:     Objective Information:      T   P  R  BP   MAP  SpO2   Value  36.6  69  16  185/57      98%  Date/Time 9/20 8:00 9/20 8:00 9/20 8:00 9/20 8:00    9/20 8:00  Range  (36.1C - 37.4C )  (38 - 86 )  (16 - 18 )  (111 - 185 )/ (32 - 71 )    (96% - 99% )  Highest temp of 37.4 C was recorded at 9/19 19:45      Pain reported at 9/19 21:30: 0 = None    Physical Exam Narrative:  ·  Physical Exam:    ROS-10 point review of systems obtained and negative except what is listed in HPI    Physical Exam:  General/Constitutional: Alert, oriented , cooperative,  in no acute distress.  Head: normocephalic, atraumatic  Skin: Intact,  dry skin  Eyes: PERRL, EOMs intact,  Conjunctiva pink with no erythema or exudates. No scleral icterus.   ENT: No external deformities. Nares patent, mucus membranes moist.  Pharynx clear, uvula midline.   Neck: Supple,   Pulmonary: Clear bilaterally with good chest wall excursion. No rales, rhonchi or wheezing.   Cardiac: Regular rate and rhythm. good pulses.  Abdomen: Soft, nontender, active bowel sounds.  No palpable organomegaly.  No rebound or guarding.  No CVA tenderness.  Genitourinary: Exam deferred.  Musculoskeletal: Full range of motion,  Neurological:  alert oriented x 3 no focal deficit.  Psychiatric: Appropriate mood and affect. Calm.     Recent Lab Results:    Results:    CBC: 9/20/2023 06:40              \     Hgb     /                              \     7.7 L    /  WBC  ----------------  Plt               8.1       ----------------    254              /     Hct     \                              /     23.1 L    \            RBC: 2.46 L    MCV: 94           BMP: 9/20/2023 06:40  NA+        Cl-     BUN  /                         124 L   96 L     47 H /  --------------------------------  Glucose                ---------------------------  203 H    K+     HCO3-   Creat \                         5.3  21    2.99 H \  Calcium : 8.2 L    Anion Gap : 12      Radiology Results:    Results:        Conclusion:  CONCLUSIONS:  1. Poorly visualized anatomical structures due to suboptimal image quality.  2. Left ventricular systolic function is hyperdynamic with a 70-75% estimated ejection fraction.  3. Spectral Doppler shows an impaired relaxation pattern of left ventricular diastolic filling.  4. There is moderate concentric left ventricular hypertrophy.  5. Left ventricular cavity size is decreased.  6. The left atrium is enlarged.  7. Mild to moderately elevated right ventricular systolic pressure.  8. Mild aortic valve stenosis.    QUANTITATIVE DATA SUMMARY:  2D MEASUREMENTS:  Normal Ranges:  LAs:           3.15 cm    (2.7-4.0cm)  IVSd:          1.40 cm    (0.6-1.1cm)  LVPWd:         1.40 cm    (0.6-1.1cm)  LVIDd:         3.80 cm    (3.9-5.9cm)  LVIDs:         2.50 cm  LV Mass Index: 100.8 g/m2  LV % FS        34.2 %    LA VOLUME:  Normal Ranges:  LA Vol A4C:        131.3 ml   (22+/-6mL/m2)  LA Vol A2C:        88.2 ml  LA Vol BP:         109.2 ml  LA Vol Index A4C:  68.2ml/m2  LA Vol Index A2C:  45.8 ml/m2  LA Vol IndexBP:   56.7 ml/m2  LA Area A4C:       29.7 cm2  LA Area A2C:       24.0 cm2  LA Major Axis A4C: 5.7 cm  LA Major Axis A2C: 5.6 cm  LA Volume Index:   56.7 ml/m2    RA VOLUME BY A/L METHOD:  Normal Ranges:  RA Area A4C: 13.0 cm2    AORTA MEASUREMENTS:  Normal Ranges:  Ao Sinus, d: 2.67 cm (2.1-3.5cm)  Ao STJ, d:   2.38 cm (1.7-3.4cm)  Asc Ao, d:   3.04 cm (2.1-3.4cm)    LV SYSTOLIC FUNCTION BY 2D PLANIMETRY (MOD):  Normal Ranges:  EF-A4C View: 61.9 % (>=55%)  EF-A2C View: 67.4 %  EF-Biplane:  64.6 %    LV DIASTOLIC FUNCTION:  Normal Ranges:  MV Peak E:        0.48 m/s    (0.7-1.2 m/s)  MV Peak A:        1.10 m/s    (0.42-0.7 m/s)  E/A Ratio:         0.44        (1.0-2.2)  MV e'             0.04 m/s    (>8.0)  MV lateral e'     0.06 m/s  MV medial e'      0.04 m/s  MV A Dur:         156.00 msec  E/e' Ratio:       11.99       (<8.0)  PulmV Sys Maikel:    51.10 cm/s  PulmV Marshall Maikel:   32.90 cm/s  PulmV S/D Maikel:    1.60  PulmV A Revs Maikel: 21.20 cm/s  PulmV A Revs Dur: 124.00 msec    MITRAL VALVE:  Normal Ranges:  MV DT: 172 msec (150-240msec)    AORTIC VALVE:  Normal Ranges:  AoV Vmax:                2.46 m/s  (<=1.7m/s)  AoV Peak P.2 mmHg (<20mmHg)  AoV Mean P.0 mmHg (1.7-11.5mmHg)  LVOT Max Maikel:            1.60 m/s  (<=1.1m/s)  AoV VTI:                 51.80 cm  (18-25cm)  LVOT VTI:                34.40 cm  LVOT Diameter:           1.70 cm   (1.8-2.4cm)  AoV Area, VTI:           1.51 cm2  (2.5-5.5cm2)  AoV Area,Vmax:           1.48 cm2  (2.5-4.5cm2)  AoV Dimensionless Index: 0.66      RIGHT VENTRICLE:  RV Basal 3.35 cm  RV Mid   2.45 cm  RV Major 7.0 cm    TRICUSPID VALVE/RVSP:  Normal Ranges:  Peak TR Velocity: 3.28 m/s  Est. RA Pressure: 3 mmHg  RV Syst Pressure: 46.0 mmHg (< 30mmHg)  IVC Diam:   0.93 cm    PULMONIC VALVE:  Normal Ranges:  PV Accel Time: 129 msec (>120ms)  PV Max Maikel:    1.3 m/s  (0.6-0.9m/s)  PV Max P.1 mmHg    Pulmonary Veins:  PulmV A Revs Dur: 124.00 msec  PulmV A Revs Maikel: 21.20 cm/s  PulmV Marshall Maikel:   32.90 cm/s  PulmV S/D Maikel:    1.60  PulmV Sys Maikel:    51.10 cm/s      [id number] Adam Farmer MD  Electronically signed on 2023 at 8:17:32 AM        *** Final ***     Echocardiogram [Sep 17 2023  8:17AM]      Assessment and Plan:   Comorbidities:  ·  Comorbidity Other     Code Status:  ·  Code Status Full Code     Assessment:    ALICIA IRENE is a 89 year old Female presenting with hypoglycemia and hypertension for the past week or longer and a recent fall. she saw pcp before her fall and  also after a previous fall. her bp meds were adjusted and her insulin decreased but low bg persists and  elevated bp persits. she noted a headache and hallucinations last night prior to admission when her bp was very high. she reports walking to bathroom  and fell, and hit emergency assist button. she lives in senior home and they came to check on her. she was unable to get off the floor and thus was brought to ed.  on admission it was 223/71 with hr 57 and bg 63. she denies headache at this time. she  does have some right knee pain, hx of chronic r knee pain but a bit worse now. She reports hx of neuropathy related to her dm and has numb feeling in her feet.    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   - hold oral hyperglycemic agent  -endocrinology consult   - sliding scale resumed   -glu controlled    hyponatremia   - na 124  -furosemide 80mg iv ordered x 1 per nephrology, na tabs stpped  - fluid restriction 1200ml   - consult nephrology    massimo on CKD stg 3-  creat trending up, nephrology following    Anemia-checking iron, likely due to chronic disease, ckd    hypertensive urgency  - switched to nifedipine. cardio  increased hydralazine to 50 mg, switched metop switched  to coreg hoever coreg is now held, hydralizne increased to 50mg three times a day per cardiology   - -cardiology consult - appreciate recs   - echo   - Left ventricular systolic function is hyperdynamic with a 70-75% estimated ejection fraction.  3. Spectral Doppler shows an impaired relaxation pattern of left ventricular diastolic filling.  4. There is moderate concentric left ventricular hypertrophy.  5. Left ventricular cavity size is decreased.  6. The left atrium is enlarged.  7. Mild to moderately elevated right ventricular systolic pressure.  8. Mild aortic valve stenosis.     DVT prophylaxis  -heparin   -SCDs    GI prophylaxis   - ppi and docusate     Disposition: Plan of care discussed with patient.  No plan for discharge today.       Electronic  Signatures:  Dangelo Mcgregor (APRN-CNP)  (Signed 21-Sep-2023 06:23)   Authored: Service, Subjective Data, Objective Data, Assessment  and Plan, Note Completion      Last Updated: 21-Sep-2023 06:23 by Dangelo Mcgregor (APRN-CNP)

## 2023-09-30 NOTE — PROGRESS NOTES
Consult Type: subsequent visit/care     Service: Endocrinology     Subjective Data:   ALICIA IRENE is a 89 year old Female who is Hospital Day # 12.     Patient complains of frequent urination last night.    Objective Data:     Objective Information:      T   P  R  BP   MAP  SpO2   Value  36.7  72  19  170/45      99%  Date/Time  5:13  5:13  5:13  5:13     5:13  Range  (36.4C - 37.2C )  (69 - 84 )  (16 - 19 )  (153 - 183 )/ (42 - 74 )    (96% - 99% )  Highest temp of 37.2 C was recorded at  15:46        Physical Exam by System:    Constitutional: Elderly female in no distress     Medication:    Medications:          Continuous Medications       --------------------------------  No continuous medications are active       Scheduled Medications       --------------------------------    1. Aspirin Chewable:  81  mg  Oral  Daily    2. Atorvastatin:  10  mg  Oral  Daily    3. Brimonidine 0.2% Ophthalmic:  1  drop(s)  Both Eyes  2 Times a Day    4. cefTRIAXone 1 gram/ Dextrose 5% IVPB Premixed Soln 50 mL:  50  mL  IntraVenous Piggyback  Every 24 Hours    5. Cholecalciferol (Vitamin D3):  2000  International Unit(s)  Oral  Daily    6. Docusate 50 mg - Senna 8.6 m  tablet(s)  Oral  2 Times a Day    7. Furosemide:  20  mg  Oral  Daily    8. Heparin SubCutaneous:  5000  unit(s)  SubCutaneous  Every 8 Hours    9. hydrALAZINE (APRESOLINE):  50  mg  Oral  Every 8 Hours    10. Insulin Isophane (NPH) Injectable:  8  unit(s)  SubCutaneous  2 Times a Day    11. Insulin Lispro Moderate Corrective Scale:  unit(s)  SubCutaneous  3 Times a Day Before Meals    12. Latanoprost 0.005% Ophthalmic:  1  drop(s)  Both Eyes  At Bedtime    13. NIFEdipine (PROCARDIA XL) Extended Release:  30  mg  Oral  Daily    14. Pantoprazole:  40  mg  Oral  Daily    15. Polyethylene Glycol:  17  gram(s)  Oral  2 Times a Day    16. Timolol 0.5% Ophthalmic:  1  drop(s)  Both Eyes  2 Times a Day         PRN Medications        --------------------------------    1. Acetaminophen:  650  mg  Oral  Every 4 Hours    2. Acetaminophen:  650  mg  Oral  Every 4 Hours    3. Dextrose 50% in Water Injectable:  25  gram(s)  IntraVenous Push  Every 15 Minutes    4. Glucagon Injectable:  1  mg  IntraMuscular  Every 15 Minutes    5. hydrALAZINE (APRESOLINE) Injectable:  10  mg  IntraVenous Push  Every 6 Hours    6. Lidocaine 2% (UROJET) Topical Gel:  5  mL  Topical  Every 4 Hours    7. Ondansetron Injectable:  4  mg  IntraVenous Push  Every 4 Hours    8. Sodium Chloride 0.9% Injectable Flush:  10  mL  IntraVenous Flush  Every 8 Hours and as Needed        Recent Lab Results:    Results:        I have reviewed these laboratory results:    Glucose_POCT  Trending View      Result 25-Sep-2023 22:11:00  25-Sep-2023 16:57:00  25-Sep-2023 11:49:00    Glucose-POCT 279   H   198   H   290   H          Assessment and Plan:   Daily Risk Screen:  ·  Does patient have an indwelling urinary catheter? n/a consulting service   ·  Does patient have a central line? n/a consulting service      Code Status:  ·  Code Status Full Code     Assessment:    TYPE 2 DIABETES MELLITUS  LONG TERM CURRENT INSULIN USE  History of frequent hypoglycemia  No further hypoglycemia  Glucose rising      RECOMMENDATIONS  Continue insulin corrective scale, discontinue at discharge  NPH 10 units BID, continue at discharge  Target glucose 100-200 mg/dl  Tight glucose control not indicated        Electronic Signatures:  Saad Edouard)  (Signed 26-Sep-2023 19:41)   Authored: Service, Subjective Data, Objective Data, Assessment  and Plan, Note Completion      Last Updated: 26-Sep-2023 19:41 by Saad Edouard)

## 2023-10-02 ENCOUNTER — OFFICE VISIT (OUTPATIENT)
Dept: PRIMARY CARE | Facility: CLINIC | Age: 88
End: 2023-10-02
Payer: MEDICARE

## 2023-10-02 VITALS
DIASTOLIC BLOOD PRESSURE: 60 MMHG | HEART RATE: 58 BPM | SYSTOLIC BLOOD PRESSURE: 148 MMHG | OXYGEN SATURATION: 98 % | RESPIRATION RATE: 12 BRPM

## 2023-10-02 DIAGNOSIS — E11.22 TYPE 2 DIABETES MELLITUS WITH CHRONIC KIDNEY DISEASE, WITH LONG-TERM CURRENT USE OF INSULIN, UNSPECIFIED CKD STAGE (MULTI): ICD-10-CM

## 2023-10-02 DIAGNOSIS — E78.5 HYPERLIPIDEMIA, UNSPECIFIED HYPERLIPIDEMIA TYPE: ICD-10-CM

## 2023-10-02 DIAGNOSIS — I10 ESSENTIAL HYPERTENSION: ICD-10-CM

## 2023-10-02 DIAGNOSIS — E11.21 DIABETIC NEPHROPATHY ASSOCIATED WITH TYPE 2 DIABETES MELLITUS (MULTI): ICD-10-CM

## 2023-10-02 DIAGNOSIS — N18.30 STAGE 3 CHRONIC KIDNEY DISEASE, UNSPECIFIED WHETHER STAGE 3A OR 3B CKD (MULTI): ICD-10-CM

## 2023-10-02 DIAGNOSIS — D64.9 ANEMIA, UNSPECIFIED TYPE: Primary | ICD-10-CM

## 2023-10-02 DIAGNOSIS — Z79.4 TYPE 2 DIABETES MELLITUS WITH CHRONIC KIDNEY DISEASE, WITH LONG-TERM CURRENT USE OF INSULIN, UNSPECIFIED CKD STAGE (MULTI): ICD-10-CM

## 2023-10-02 DIAGNOSIS — K21.9 GASTROESOPHAGEAL REFLUX DISEASE WITHOUT ESOPHAGITIS: ICD-10-CM

## 2023-10-02 DIAGNOSIS — M79.89 LEG SWELLING: ICD-10-CM

## 2023-10-02 DIAGNOSIS — I50.32 CHRONIC DIASTOLIC CONGESTIVE HEART FAILURE (MULTI): ICD-10-CM

## 2023-10-02 PROBLEM — E87.1 HYPONATREMIA: Status: ACTIVE | Noted: 2023-10-02

## 2023-10-02 PROBLEM — S37.009A INJURY OF KIDNEY: Status: ACTIVE | Noted: 2023-10-02

## 2023-10-02 PROBLEM — R00.1 ASYMPTOMATIC BRADYCARDIA: Status: ACTIVE | Noted: 2023-10-02

## 2023-10-02 LAB
ANION GAP SERPL CALC-SCNC: 16 MMOL/L (ref 10–20)
BUN SERPL-MCNC: 39 MG/DL (ref 6–23)
CALCIUM SERPL-MCNC: 9 MG/DL (ref 8.6–10.6)
CHLORIDE SERPL-SCNC: 90 MMOL/L (ref 98–107)
CO2 SERPL-SCNC: 23 MMOL/L (ref 21–32)
CREAT SERPL-MCNC: 1.81 MG/DL (ref 0.5–1.05)
GFR SERPL CREATININE-BSD FRML MDRD: 26 ML/MIN/1.73M*2
GLUCOSE SERPL-MCNC: 166 MG/DL (ref 74–99)
POC FINGERSTICK BLOOD GLUCOSE: 195 MG/DL (ref 70–100)
POTASSIUM SERPL-SCNC: 4.9 MMOL/L (ref 3.5–5.3)
SODIUM SERPL-SCNC: 124 MMOL/L (ref 136–145)

## 2023-10-02 PROCEDURE — 3077F SYST BP >= 140 MM HG: CPT | Performed by: INTERNAL MEDICINE

## 2023-10-02 PROCEDURE — 99496 TRANSJ CARE MGMT HIGH F2F 7D: CPT | Performed by: INTERNAL MEDICINE

## 2023-10-02 PROCEDURE — 1036F TOBACCO NON-USER: CPT | Performed by: INTERNAL MEDICINE

## 2023-10-02 PROCEDURE — 3078F DIAST BP <80 MM HG: CPT | Performed by: INTERNAL MEDICINE

## 2023-10-02 PROCEDURE — 1111F DSCHRG MED/CURRENT MED MERGE: CPT | Performed by: INTERNAL MEDICINE

## 2023-10-02 PROCEDURE — 36415 COLL VENOUS BLD VENIPUNCTURE: CPT

## 2023-10-02 PROCEDURE — 1125F AMNT PAIN NOTED PAIN PRSNT: CPT | Performed by: INTERNAL MEDICINE

## 2023-10-02 PROCEDURE — 1159F MED LIST DOCD IN RCRD: CPT | Performed by: INTERNAL MEDICINE

## 2023-10-02 PROCEDURE — 82962 GLUCOSE BLOOD TEST: CPT | Performed by: INTERNAL MEDICINE

## 2023-10-02 RX ORDER — CARVEDILOL 12.5 MG/1
12.5 TABLET ORAL
COMMUNITY
Start: 2023-09-27 | End: 2023-10-04 | Stop reason: SDUPTHER

## 2023-10-02 RX ORDER — NIFEDIPINE 60 MG/1
1 TABLET, EXTENDED RELEASE ORAL DAILY
COMMUNITY
Start: 2023-09-27 | End: 2023-10-11 | Stop reason: ALTCHOICE

## 2023-10-02 RX ORDER — FUROSEMIDE 20 MG/1
20 TABLET ORAL DAILY
COMMUNITY
Start: 2023-09-27 | End: 2023-10-04 | Stop reason: SDUPTHER

## 2023-10-02 RX ORDER — AMOXICILLIN 250 MG
CAPSULE ORAL 2 TIMES DAILY
COMMUNITY
Start: 2023-09-27 | End: 2024-02-05 | Stop reason: HOSPADM

## 2023-10-02 NOTE — ASSESSMENT & PLAN NOTE
Discontinue the nifedipine because it could be the reason why her edema in the lower extremities.  Increase Lasix to 40 mg.  For the leg edema  Check renal function panels in 2 weeks after the increase of the Lasix.

## 2023-10-02 NOTE — PROGRESS NOTES
Subjective   Chief complaint: Carolann Cartagena is a 89 y.o. female who presents for Hospital Follow-up (Pt is here for hospital follow up for fall and elevated blood pressure , c/o swelling in feet ).    HPI:  Patient was hospitalized at [facility]'s and she is complaining now of leg edema and she has chronic kidney disease.  Patient sugar has been elevated slightly at the hospital she kept for couple of days and she was sent out and she was asked to follow-up with her renal physician.        Objective   /60   Pulse 58   Resp 12   SpO2 98%   Physical Exam  Vitals reviewed.   Constitutional:       Appearance: Normal appearance.   HENT:      Head: Normocephalic and atraumatic.   Cardiovascular:      Rate and Rhythm: Normal rate and regular rhythm.   Pulmonary:      Effort: Pulmonary effort is normal.      Breath sounds: Normal breath sounds.   Abdominal:      General: Bowel sounds are normal.      Palpations: Abdomen is soft.   Musculoskeletal:      Cervical back: Neck supple.   Skin:     General: Skin is warm and dry.   Neurological:      General: No focal deficit present.      Mental Status: She is alert.   Psychiatric:         Mood and Affect: Mood normal.         Behavior: Behavior is cooperative.         I have reviewed and reconciled the medication list with the patient today.   Current Outpatient Medications:     Alphagan P 0.1 % ophthalmic solution, Administer 1 drop into both eyes twice a day., Disp: , Rfl:     aspirin 81 mg EC tablet, Take 1 tablet (81 mg) by mouth once daily., Disp: , Rfl:     atorvastatin (Lipitor) 10 mg tablet, TAKE 1 TABLET DAILY, Disp: 90 tablet, Rfl: 3    carvedilol (Coreg) 12.5 mg tablet, Take 1 tablet (12.5 mg) by mouth 2 times a day with meals., Disp: , Rfl:     cholecalciferol (Vitamin D-3) 50 MCG (2000 UT) tablet, Take 1 tablet (2,000 Units) by mouth once daily., Disp: , Rfl:     furosemide (Lasix) 20 mg tablet, Take 1 tablet (20 mg) by mouth  "once daily., Disp: , Rfl:     HumuLIN N NPH U-100 Insulin 100 unit/mL injection, Inject 35 Units under the skin 2 times a day before meals., Disp: , Rfl:     hydrALAZINE (Apresoline) 25 mg tablet, Take 0.5 tablets (12.5 mg) by mouth 2 times a day., Disp: 90 tablet, Rfl: 3    insulin syringe-needle U-100 (BD Insulin Syringe Ultra-Fine) 31G X 5/16\" 1 mL syringe, Inject 1 each under the skin 2 times a day. Use as instructed, Disp: 100 each, Rfl: 3    latanoprost (Xalatan) 0.005 % ophthalmic solution, 1 drop once daily., Disp: , Rfl:     NIFEdipine CC 60 mg 24 hr tablet, Take 1 tablet (60 mg) by mouth once daily., Disp: , Rfl:     OneTouch Delica Plus Lancet 30 gauge misc, , Disp: , Rfl:     OneTouch Ultra Test strip, 1 strip 3 times a day., Disp: , Rfl:     sennosides-docusate sodium (Steph-Colace) 8.6-50 mg tablet, Take by mouth twice a day., Disp: , Rfl:     timolol (Timoptic) 0.5 % ophthalmic solution, 1 drop., Disp: , Rfl:     carvedilol (Coreg) 12.5 mg tablet, TAKE 1 TABLET BY MOUTH TWO TIMES A DAY, Disp: 120 tablet, Rfl: 1    furosemide (Lasix) 20 mg tablet, TAKE 1 TABLET BY MOUTH ONCE DAILY, Disp: 30 tablet, Rfl: 0    hydrALAZINE (Apresoline) 50 mg tablet, TAKE 1 TABLET BY MOUTH EVERY 8 HOURS, Disp: 180 tablet, Rfl: 0    metoprolol succinate XL (Toprol-XL) 100 mg 24 hr tablet, Take 1 tablet (100 mg) by mouth once daily. Do not crush or chew. (Patient not taking: Reported on 10/2/2023), Disp: 90 tablet, Rfl: 3    NIFEdipine ER (Adalat CC) 60 mg 24 hr tablet, TAKE 1 TABLET BY MOUTH ONCE DAILY, Disp: 90 tablet, Rfl: 0    sennosides-docusate sodium (Steph-Colace) 8.6-50 mg tablet, TAKE 2 TABLETS BY MOUTH TWO TIMES A DAY, Disp: 120 tablet, Rfl: 0     Imaging:  CT head wo IV contrast    Result Date: 9/24/2023  Interpreted By:  DENISE PULIDO MD MRN: [id number] Patient Name: ALICIA IRENE  STUDY: CT HEAD WO CONTRAST;  9/24/2023 4:03 pm  INDICATION: dizziness .  COMPARISON: 09/15/2023  ACCESSION NUMBER(S): [id number]  ORDERING " CLINICIAN: ERIK SOLIZ  TECHNIQUE: Sequential trans axial images were obtained  .  FINDINGS: INTRACRANIAL:  CORTICAL SULCI AND EXTRA-AXIAL SPACES:  Unremarkable.  VENTRICULAR SYSTEM:  Unremarkable without significant dilatation.  CEREBRAL PARENCHYMA:  Unremarkable without significant degenerative change.There is no evidence of definite subacute infarction, intracranial hemorrhage or mass.  EXTRACRANIAL: Visualized paranasal sinuses and mastoids are clear. The calvarium is intact.      Unremarkable exam. There has not been significant interval change from the prior exam.    XR chest 1 view    Result Date: 9/22/2023  Interpreted By:  ASHLEY GARCIA MD MRN: [id number] Patient Name: ALICIA IRENE  STUDY: CHEST 1 VIEW;  9/22/2023 11:24 am  INDICATION: sob .  COMPARISON: 09/15/2023  ACCESSION NUMBER(S): [id number]  ORDERING CLINICIAN: ERIK SOLIZ  FINDINGS: AP portable view of the chest is obtained.  Limited exam due to portable nature. Magnified cardiac silhouette. No infiltrates, effusions or pneumothorax.      1.  No evidence of acute cardiopulmonary process.    MACRO: None    Electrocardiogram 12 Lead    Result Date: 9/20/2023  Marked sinus bradycardia Right bundle branch block Abnormal ECG Confirmed by Jorge eLma ([id number]) on 9/20/2023 8:06:05 AM    US renal complete    Result Date: 9/18/2023  Interpreted By:  JUNIOR PEARCE MD MRN: [id number] Patient Name: ALICIA IRENE  STUDY: US RENAL BILAT;  9/18/2023 2:22 pm  INDICATION: OLGA LIDIA on top of CKD .  COMPARISON: None.  ACCESSION NUMBER(S): [id number]  ORDERING CLINICIAN: CHAYITO COLLADO  TECHNIQUE: Multiple images of the kidneys were obtained  .  FINDINGS: RIGHT KIDNEY: The right kidney measures 8.6 cm in length. The renal cortical echogenicity and thickness are within normal limits. No hydronephrosis is present; no evidence of nephrolithiasis.  LEFT KIDNEY: The left kidney measures 8.5 cm in length. The renal cortical echogenicity and thickness are within normal  limits. No hydronephrosis is present; no evidence of nephrolithiasis. Small heterogenous lateral nodule measuring 1.4 cm.  BLADDER: Within normal limits  Partially visualized heterogenous hepatic echotexture.      Extremely limited study given body habitus.  1. No hydronephrosis. 2. Indeterminate left renal mixed echogenicity nodule measuring 1.4 cm. This could be further evaluated by dedicated renal MRI with IV contrast. 3. Partially visualized heterogenous hepatic echotexture possibly related to underlying diffuse hepatocellular dysfunction. Advise correlation with liver function test  MACRO: None    Echocardiogram    Result Date: 9/17/2023  [facility], [address] [place] [address] Tel [phone] and Fax [phone] TRANSTHORACIC ECHOCARDIOGRAM REPORT Patient Name:     ALICIAVINCENT Dawson Physician:  [id number] Adam Farmer MD Study Date:       9/16/2023           Referring           PATI MAY Physician: MRN/PID:          [id number]            PCP: Accession/Order#: [id number]           Department          [facility] Location:           Invasive YOB: 1931           Fellow: Gender:           F                   Nurse: Admit Date:       9/15/2023           Sonographer:        Bora Hinds RDCS Admission Status: Observation -       Additional Staff: Priority discharge Height:           162.00 cm           CC Report to: Weight:           88.00 kg            Study Type:         Echocardiogram BSA:              1.93 m2 Blood Pressure: 178 /84 mmHg Diagnosis/ICD: I10-Essential (primary) hypertension Indication:    Hypertensive urgency, Atherosclerosis Procedure/CPT: Echo Complete w Full [id number] Patient History: Pertinent History: HTN. Study Detail: The following Echo studies were performed: 2D, M-Mode, Doppler and color flow. Technically challenging study due to body habitus and poor acoustic windows. Definity used as a contrast agent for endocardial  border definition. Total contrast used for this procedure was 2 mL via IV push. PHYSICIAN INTERPRETATION: Left Ventricle: The left ventricular systolic function is hyperdynamic, with an estimated ejection fraction of 70-75%. There are no regional wall motion abnormalities. The left ventricular cavity size is decreased. There is moderate concentric left ventricular hypertrophy. Spectral Doppler shows an impaired relaxation pattern of left ventricular diastolic filling. Left Atrium: The left atrium is enlarged. Right Ventricle: The right ventricle was not well visualized. There is normal right ventricular global systolic function. Right Atrium: The right atrium is normal in size. Aortic Valve: The aortic valve is trileaflet. There is evidence of mild aortic valve stenosis. There is no evidence of aortic valve regurgitation. The peak instantaneous gradient of the aortic valve is 24.2 mmHg. The mean gradient of the aortic valve is 14.0 mmHg. Mitral Valve: The mitral valve is mildly thickened. There is trace mitral valve regurgitation. Tricuspid Valve: The tricuspid valve is structurally normal. There is trace tricuspid regurgitation. The Doppler estimated RVSP is mild to moderately elevated at 46.0 mmHg. Pulmonic Valve: The pulmonic valve is structurally normal. There is no indication of pulmonic valve regurgitation. Pericardium: There is a trivial pericardial effusion. Aorta: The aortic root is normal. There is no dilatation of the ascending aorta. There is no dilatation of the aortic root. In comparison to the previous echocardiogram(s): There are no prior studies on this patient for comparison purposes. CONCLUSIONS: 1. Poorly visualized anatomical structures due to suboptimal image quality. 2. Left ventricular systolic function is hyperdynamic with a 70-75% estimated ejection fraction. 3. Spectral Doppler shows an impaired relaxation pattern of left ventricular diastolic filling. 4. There is moderate concentric left  ventricular hypertrophy. 5. Left ventricular cavity size is decreased. 6. The left atrium is enlarged. 7. Mild to moderately elevated right ventricular systolic pressure. 8. Mild aortic valve stenosis. QUANTITATIVE DATA SUMMARY: 2D MEASUREMENTS: Normal Ranges: LAs:           3.15 cm    (2.7-4.0cm) IVSd:          1.40 cm    (0.6-1.1cm) LVPWd:         1.40 cm    (0.6-1.1cm) LVIDd:         3.80 cm    (3.9-5.9cm) LVIDs:         2.50 cm LV Mass Index: 100.8 g/m2 LV % FS        34.2 % LA VOLUME: Normal Ranges: LA Vol A4C:        131.3 ml   (22+/-6mL/m2) LA Vol A2C:        88.2 ml LA Vol BP:         109.2 ml LA Vol Index A4C:  68.2ml/m2 LA Vol Index A2C:  45.8 ml/m2 LA Vol Index BP:   56.7 ml/m2 LA Area A4C:       29.7 cm2 LA Area A2C:       24.0 cm2 LA Major Axis A4C: 5.7 cm LA Major Axis A2C: 5.6 cm LA Volume Index:   56.7 ml/m2 RA VOLUME BY A/L METHOD: Normal Ranges: RA Area A4C: 13.0 cm2 AORTA MEASUREMENTS: Normal Ranges: Ao Sinus, d: 2.67 cm (2.1-3.5cm) Ao STJ, d:   2.38 cm (1.7-3.4cm) Asc Ao, d:   3.04 cm (2.1-3.4cm) LV SYSTOLIC FUNCTION BY 2D PLANIMETRY (MOD): Normal Ranges: EF-A4C View: 61.9 % (>=55%) EF-A2C View: 67.4 % EF-Biplane:  64.6 % LV DIASTOLIC FUNCTION: Normal Ranges: MV Peak E:        0.48 m/s    (0.7-1.2 m/s) MV Peak A:        1.10 m/s    (0.42-0.7 m/s) E/A Ratio:        0.44        (1.0-2.2) MV e'             0.04 m/s    (>8.0) MV lateral e'     0.06 m/s MV medial e'      0.04 m/s MV A Dur:         156.00 msec E/e' Ratio:       11.99       (<8.0) PulmV Sys Amikel:    51.10 cm/s PulmV Marshall Maikel:   32.90 cm/s PulmV S/D Maikel:    1.60 PulmV A Revs Maikel: 21.20 cm/s PulmV A Revs Dur: 124.00 msec MITRAL VALVE: Normal Ranges: MV DT: 172 msec (150-240msec) AORTIC VALVE: Normal Ranges: AoV Vmax:                2.46 m/s  (<=1.7m/s) AoV Peak P.2 mmHg (<20mmHg) AoV Mean P.0 mmHg (1.7-11.5mmHg) LVOT Max Maikel:            1.60 m/s  (<=1.1m/s) AoV VTI:                 51.80 cm  (18-25cm) LVOT  VTI:                34.40 cm LVOT Diameter:           1.70 cm   (1.8-2.4cm) AoV Area, VTI:           1.51 cm2  (2.5-5.5cm2) AoV Area,Vmax:           1.48 cm2  (2.5-4.5cm2) AoV Dimensionless Index: 0.66 RIGHT VENTRICLE: RV Basal 3.35 cm RV Mid   2.45 cm RV Major 7.0 cm TRICUSPID VALVE/RVSP: Normal Ranges: Peak TR Velocity: 3.28 m/s Est. RA Pressure: 3 mmHg RV Syst Pressure: 46.0 mmHg (< 30mmHg) IVC Diam:         0.93 cm PULMONIC VALVE: Normal Ranges: PV Accel Time: 129 msec (>120ms) PV Max Maikel:    1.3 m/s  (0.6-0.9m/s) PV Max P.1 mmHg Pulmonary Veins: PulmV A Revs Dur: 124.00 msec PulmV A Revs Maikel: 21.20 cm/s PulmV Marshall Maikel:   32.90 cm/s PulmV S/D Maikel:    1.60 PulmV Sys Maikel:    51.10 cm/s [id number] Adam Farmer MD Electronically signed on 2023 at 8:17:32 AM  Final      XR knee    Result Date: 9/15/2023  Interpreted By:  ZORAIDA DODD MD MRN: [id number] Patient Name: ALICIA IRENE  STUDY: KNEE; 3 VIEWS; Left;  9/15/2023 9:32 pm  INDICATION: fall .  COMPARISON: None.  ACCESSION NUMBER(S): [id number]  ORDERING CLINICIAN: FANI DOSS  FINDINGS: Four views of the knee are obtained.  Generalized diffuse osteopenia. No acute fracture or dislocation. Tricompartmental osteoarthrosis worse in the medial compartment. Small joint effusion. Soft tissues are unremarkable.      No acute fracture. Small joint effusion.  Tricompartmental right knee osteoarthrosis.  MACRO: None    XR chest 1 view    Result Date: 9/15/2023  Interpreted By:  ZORAIDA DODD MD MRN: [id number] Patient Name: ALICIA IRENE  STUDY: CHEST 1 VIEW;  9/15/2023 9:31 pm  INDICATION: Chest Pain .  COMPARISON: None.  ACCESSION NUMBER(S): [id number]  ORDERING CLINICIAN: FANI DOSS  FINDINGS:   CARDIOMEDIASTINAL SILHOUETTE: Cardiac silhouette is enlarged. Atherosclerotic calcification of the aorta.  LUNGS: No consolidation, pleural effusion or pneumothorax.  ABDOMEN: No remarkable upper abdominal findings.  BONES: Multilevel degenerative changes of the  spine. Bilateral shoulder osteoarthrosis.      Cardiomegaly. No acute pulmonary process.    CT head wo IV contrast    Result Date: 9/15/2023  Interpreted By:  MARCO IRENE MD MRN: [id number] Patient Name: ALICIA IRENE  STUDY: CT HEAD WO CONTRAST;  9/15/2023 9:17 pm  INDICATION: fall .  COMPARISON: None.  ACCESSION NUMBER(S): [id number]  ORDERING CLINICIAN: FANI DOSS  TECHNIQUE: Noncontrast axial CT images of head were obtained with coronal and sagittal reconstructed images.  FINDINGS: BRAIN PARENCHYMA: No evidence of acute intraparenchymal hemorrhage or acute large territory ischemic infarct. Gray-white differentiation is preserved. No mass-effect, midline shift or effacement of cerebral sulci. Patchy periventricular and subcortical white matter hypodensities, nonspecific but often seen in the setting of chronic microangiopathic change.  VENTRICLES and EXTRA-AXIAL SPACES: No acute extra-axial or intraventricular hemorrhage. Ventricles and sulci are age-concordant.  ORBITS: The visualized orbits and globes are within normal limits.  PARANASAL SINUSES/MASTOIDS: No hemorrhage or air-fluid levels within the visualized paranasal sinuses. The mastoids are well aerated.  CALVARIUM: No skull fracture. Hyperostosis of the inner calvarial table.  EXTRACRANIAL SOFT TISSUES: No discernible abnormality.  Brain Injury Guidelines (BIG) CT Values:  Skull fracture: No SDH (subdural hematoma): No EDH (epidural hematoma): No IPH (intraparenchymal hemorrhage): No SAH (subarachnoid hemorrhage): No IVH (intraventricular hemorrhage): No  Reference: Vicente B, Chantal RS, Alton M, et al. The BIG (brain injury guidelines) project: defining the management of traumatic brain injury by acute care surgeons. J Trauma Acute Care Surg 2014; 76:965-969.      1. No acute intracranial abnormality or calvarial fracture.             Labs reviewed:    Lab Results   Component Value Date    WBC 11.6 (H) 09/27/2023    HGB 8.1 (L) 09/27/2023    HCT 25.6  (L) 09/27/2023     09/27/2023    CHOL 183 02/22/2023    TRIG 86 02/22/2023    HDL 57.1 02/22/2023    ALT 23 09/15/2023    AST 38 09/15/2023     (L) 09/27/2023    K 4.7 09/27/2023    CL 92 (L) 09/27/2023    CREATININE 2.20 (H) 09/27/2023    BUN 38 (H) 09/27/2023    CO2 25 09/27/2023    TSH 5.53 (H) 09/19/2023    HGBA1C 6.6 (A) 09/16/2023       Assessment/Plan   Problem List Items Addressed This Visit       Anemia - Primary    Diabetes mellitus (CMS/HCC)     Increase Humulin and to 12 units twice a day she would like to use the pen.         Relevant Orders    POCT fingerstick glucose manually resulted (Completed)    Basic Metabolic Panel    Essential hypertension     Hydralazine 50 twice daily.  Increase Coreg to 25 mg twice twice a day.  Follow-up renal function panels in 2 weeks.  Follow-up blood pressure check in 2 weeks  The Lasix increased to 40 mg daily.  If blood pressure not controlled consider the clonidine.         Hyperlipidemia     Continue low-fat diet and statin.         Leg swelling     Discontinue the nifedipine because it could be the reason why her edema in the lower extremities.  Increase Lasix to 40 mg.  For the leg edema  Check renal function panels in 2 weeks after the increase of the Lasix.         Chronic kidney disease, stage 3, mod decreased GFR (CMS/HCC)    Diabetic nephropathy associated with type 2 diabetes mellitus (CMS/HCC)    Gastroesophageal reflux disease without esophagitis     PPI.         Chronic diastolic congestive heart failure (CMS/HCC)    Relevant Medications    carvedilol (Coreg) 12.5 mg tablet    NIFEdipine CC 60 mg 24 hr tablet       Continue current medications as listed  Follow up in 2 weeks renal function panel on.  Evaluate the leg edema  Evaluate renal function.  Evaluate diabetes.

## 2023-10-02 NOTE — ASSESSMENT & PLAN NOTE
Hydralazine 50 twice daily.  Increase Coreg to 25 mg twice twice a day.  Follow-up renal function panels in 2 weeks.  Follow-up blood pressure check in 2 weeks  The Lasix increased to 40 mg daily.  If blood pressure not controlled consider the clonidine.

## 2023-10-04 DIAGNOSIS — I63.9 CEREBROVASCULAR ACCIDENT (CVA), UNSPECIFIED MECHANISM (MULTI): ICD-10-CM

## 2023-10-04 DIAGNOSIS — E11.22 TYPE 2 DIABETES MELLITUS WITH CHRONIC KIDNEY DISEASE, WITH LONG-TERM CURRENT USE OF INSULIN, UNSPECIFIED CKD STAGE (MULTI): ICD-10-CM

## 2023-10-04 DIAGNOSIS — Z79.4 TYPE 2 DIABETES MELLITUS WITH CHRONIC KIDNEY DISEASE, WITH LONG-TERM CURRENT USE OF INSULIN, UNSPECIFIED CKD STAGE (MULTI): ICD-10-CM

## 2023-10-04 DIAGNOSIS — I10 ESSENTIAL HYPERTENSION: ICD-10-CM

## 2023-10-04 RX ORDER — FUROSEMIDE 20 MG/1
40 TABLET ORAL DAILY
Qty: 60 TABLET | Refills: 2 | Status: SHIPPED | OUTPATIENT
Start: 2023-10-04 | End: 2023-10-04 | Stop reason: SDUPTHER

## 2023-10-04 RX ORDER — CARVEDILOL 12.5 MG/1
12.5 TABLET ORAL 2 TIMES DAILY
Qty: 120 TABLET | Refills: 1 | Status: SHIPPED | OUTPATIENT
Start: 2023-10-04 | End: 2023-10-04 | Stop reason: SDUPTHER

## 2023-10-04 RX ORDER — CARVEDILOL 25 MG/1
25 TABLET ORAL
Qty: 60 TABLET | Refills: 11 | Status: SHIPPED | OUTPATIENT
Start: 2023-10-04 | End: 2023-10-25 | Stop reason: SDUPTHER

## 2023-10-04 RX ORDER — FUROSEMIDE 20 MG/1
40 TABLET ORAL DAILY
Qty: 60 TABLET | Refills: 2 | Status: SHIPPED | OUTPATIENT
Start: 2023-10-04 | End: 2023-10-25 | Stop reason: SDUPTHER

## 2023-10-05 ENCOUNTER — HOME HEALTH ADMISSION (OUTPATIENT)
Dept: HOME HEALTH SERVICES | Facility: HOME HEALTH | Age: 88
End: 2023-10-05
Payer: MEDICARE

## 2023-10-05 ENCOUNTER — APPOINTMENT (OUTPATIENT)
Dept: RADIOLOGY | Facility: HOSPITAL | Age: 88
End: 2023-10-05
Payer: MEDICARE

## 2023-10-05 ENCOUNTER — HOSPITAL ENCOUNTER (EMERGENCY)
Facility: HOSPITAL | Age: 88
Discharge: HOME | End: 2023-10-06
Attending: STUDENT IN AN ORGANIZED HEALTH CARE EDUCATION/TRAINING PROGRAM | Admitting: STUDENT IN AN ORGANIZED HEALTH CARE EDUCATION/TRAINING PROGRAM
Payer: MEDICARE

## 2023-10-05 DIAGNOSIS — E87.1 HYPONATREMIA: Primary | ICD-10-CM

## 2023-10-05 LAB
ALBUMIN SERPL BCP-MCNC: 3.3 G/DL (ref 3.4–5)
ALP SERPL-CCNC: 61 U/L (ref 33–136)
ALT SERPL W P-5'-P-CCNC: 23 U/L (ref 7–45)
ANION GAP SERPL CALC-SCNC: 14 MMOL/L (ref 10–20)
AST SERPL W P-5'-P-CCNC: 26 U/L (ref 9–39)
BASOPHILS # BLD AUTO: 0.04 X10*3/UL (ref 0–0.1)
BASOPHILS NFR BLD AUTO: 0.7 %
BILIRUB SERPL-MCNC: 0.5 MG/DL (ref 0–1.2)
BNP SERPL-MCNC: 189 PG/ML (ref 0–99)
BUN SERPL-MCNC: 36 MG/DL (ref 6–23)
CALCIUM SERPL-MCNC: 8.9 MG/DL (ref 8.6–10.3)
CARDIAC TROPONIN I PNL SERPL HS: 15 NG/L (ref 0–13)
CARDIAC TROPONIN I PNL SERPL HS: 15 NG/L (ref 0–13)
CHLORIDE SERPL-SCNC: 92 MMOL/L (ref 98–107)
CO2 SERPL-SCNC: 23 MMOL/L (ref 21–32)
CREAT SERPL-MCNC: 1.82 MG/DL (ref 0.5–1.05)
D DIMER PPP FEU-MCNC: 2714 NG/ML FEU
EOSINOPHIL # BLD AUTO: 0.04 X10*3/UL (ref 0–0.4)
EOSINOPHIL NFR BLD AUTO: 0.7 %
ERYTHROCYTE [DISTWIDTH] IN BLOOD BY AUTOMATED COUNT: 12.4 % (ref 11.5–14.5)
FLUAV RNA RESP QL NAA+PROBE: NOT DETECTED
FLUBV RNA RESP QL NAA+PROBE: NOT DETECTED
GFR SERPL CREATININE-BSD FRML MDRD: 26 ML/MIN/1.73M*2
GLUCOSE SERPL-MCNC: 95 MG/DL (ref 74–99)
HCT VFR BLD AUTO: 26.9 % (ref 36–46)
HGB BLD-MCNC: 9.3 G/DL (ref 12–16)
IMM GRANULOCYTES # BLD AUTO: 0.01 X10*3/UL (ref 0–0.5)
IMM GRANULOCYTES NFR BLD AUTO: 0.2 % (ref 0–0.9)
LACTATE SERPL-SCNC: 0.7 MMOL/L (ref 0.4–2)
LIPASE SERPL-CCNC: 66 U/L (ref 9–82)
LYMPHOCYTES # BLD AUTO: 1.58 X10*3/UL (ref 0.8–3)
LYMPHOCYTES NFR BLD AUTO: 27 %
MAGNESIUM SERPL-MCNC: 1.97 MG/DL (ref 1.6–2.4)
MCH RBC QN AUTO: 32.1 PG (ref 26–34)
MCHC RBC AUTO-ENTMCNC: 34.6 G/DL (ref 32–36)
MCV RBC AUTO: 93 FL (ref 80–100)
MONOCYTES # BLD AUTO: 0.99 X10*3/UL (ref 0.05–0.8)
MONOCYTES NFR BLD AUTO: 16.9 %
NEUTROPHILS # BLD AUTO: 3.19 X10*3/UL (ref 1.6–5.5)
NEUTROPHILS NFR BLD AUTO: 54.5 %
NRBC BLD-RTO: 0 /100 WBCS (ref 0–0)
PLATELET # BLD AUTO: 230 X10*3/UL (ref 150–450)
PMV BLD AUTO: 10.9 FL (ref 7.5–11.5)
POTASSIUM SERPL-SCNC: 4.3 MMOL/L (ref 3.5–5.3)
PROT SERPL-MCNC: 6.5 G/DL (ref 6.4–8.2)
RBC # BLD AUTO: 2.9 X10*6/UL (ref 4–5.2)
SARS-COV-2 RNA RESP QL NAA+PROBE: NOT DETECTED
SODIUM SERPL-SCNC: 125 MMOL/L (ref 136–145)
WBC # BLD AUTO: 5.9 X10*3/UL (ref 4.4–11.3)

## 2023-10-05 PROCEDURE — 70450 CT HEAD/BRAIN W/O DYE: CPT

## 2023-10-05 PROCEDURE — 85379 FIBRIN DEGRADATION QUANT: CPT | Performed by: STUDENT IN AN ORGANIZED HEALTH CARE EDUCATION/TRAINING PROGRAM

## 2023-10-05 PROCEDURE — 71046 X-RAY EXAM CHEST 2 VIEWS: CPT | Mod: FR

## 2023-10-05 PROCEDURE — 36415 COLL VENOUS BLD VENIPUNCTURE: CPT | Performed by: STUDENT IN AN ORGANIZED HEALTH CARE EDUCATION/TRAINING PROGRAM

## 2023-10-05 PROCEDURE — 85025 COMPLETE CBC W/AUTO DIFF WBC: CPT | Performed by: STUDENT IN AN ORGANIZED HEALTH CARE EDUCATION/TRAINING PROGRAM

## 2023-10-05 PROCEDURE — 87636 SARSCOV2 & INF A&B AMP PRB: CPT | Performed by: STUDENT IN AN ORGANIZED HEALTH CARE EDUCATION/TRAINING PROGRAM

## 2023-10-05 PROCEDURE — 83880 ASSAY OF NATRIURETIC PEPTIDE: CPT | Performed by: STUDENT IN AN ORGANIZED HEALTH CARE EDUCATION/TRAINING PROGRAM

## 2023-10-05 PROCEDURE — 83690 ASSAY OF LIPASE: CPT | Performed by: STUDENT IN AN ORGANIZED HEALTH CARE EDUCATION/TRAINING PROGRAM

## 2023-10-05 PROCEDURE — 80053 COMPREHEN METABOLIC PANEL: CPT | Performed by: STUDENT IN AN ORGANIZED HEALTH CARE EDUCATION/TRAINING PROGRAM

## 2023-10-05 PROCEDURE — 99285 EMERGENCY DEPT VISIT HI MDM: CPT | Performed by: STUDENT IN AN ORGANIZED HEALTH CARE EDUCATION/TRAINING PROGRAM

## 2023-10-05 PROCEDURE — 83735 ASSAY OF MAGNESIUM: CPT | Performed by: STUDENT IN AN ORGANIZED HEALTH CARE EDUCATION/TRAINING PROGRAM

## 2023-10-05 PROCEDURE — G1004 CDSM NDSC: HCPCS

## 2023-10-05 PROCEDURE — 93970 EXTREMITY STUDY: CPT | Performed by: RADIOLOGY

## 2023-10-05 PROCEDURE — 84484 ASSAY OF TROPONIN QUANT: CPT | Performed by: STUDENT IN AN ORGANIZED HEALTH CARE EDUCATION/TRAINING PROGRAM

## 2023-10-05 PROCEDURE — 93970 EXTREMITY STUDY: CPT

## 2023-10-05 PROCEDURE — 71046 X-RAY EXAM CHEST 2 VIEWS: CPT | Mod: FOREIGN READ | Performed by: RADIOLOGY

## 2023-10-05 PROCEDURE — 70450 CT HEAD/BRAIN W/O DYE: CPT | Performed by: RADIOLOGY

## 2023-10-05 PROCEDURE — 83605 ASSAY OF LACTIC ACID: CPT | Performed by: STUDENT IN AN ORGANIZED HEALTH CARE EDUCATION/TRAINING PROGRAM

## 2023-10-05 PROCEDURE — 2500000004 HC RX 250 GENERAL PHARMACY W/ HCPCS (ALT 636 FOR OP/ED): Performed by: STUDENT IN AN ORGANIZED HEALTH CARE EDUCATION/TRAINING PROGRAM

## 2023-10-05 PROCEDURE — 71275 CT ANGIOGRAPHY CHEST: CPT | Mod: FOREIGN READ | Performed by: RADIOLOGY

## 2023-10-05 PROCEDURE — 2550000001 HC RX 255 CONTRASTS: Performed by: STUDENT IN AN ORGANIZED HEALTH CARE EDUCATION/TRAINING PROGRAM

## 2023-10-05 RX ADMIN — IOHEXOL 75 ML: 350 INJECTION, SOLUTION INTRAVENOUS at 14:57

## 2023-10-05 RX ADMIN — SODIUM CHLORIDE 500 ML: 9 INJECTION, SOLUTION INTRAVENOUS at 13:15

## 2023-10-05 ASSESSMENT — COLUMBIA-SUICIDE SEVERITY RATING SCALE - C-SSRS
2. HAVE YOU ACTUALLY HAD ANY THOUGHTS OF KILLING YOURSELF?: NO
6. HAVE YOU EVER DONE ANYTHING, STARTED TO DO ANYTHING, OR PREPARED TO DO ANYTHING TO END YOUR LIFE?: NO
1. IN THE PAST MONTH, HAVE YOU WISHED YOU WERE DEAD OR WISHED YOU COULD GO TO SLEEP AND NOT WAKE UP?: NO

## 2023-10-05 ASSESSMENT — PAIN - FUNCTIONAL ASSESSMENT: PAIN_FUNCTIONAL_ASSESSMENT: 0-10

## 2023-10-05 ASSESSMENT — LIFESTYLE VARIABLES
HAVE PEOPLE ANNOYED YOU BY CRITICIZING YOUR DRINKING: NO
EVER HAD A DRINK FIRST THING IN THE MORNING TO STEADY YOUR NERVES TO GET RID OF A HANGOVER: NO
EVER FELT BAD OR GUILTY ABOUT YOUR DRINKING: NO
HAVE YOU EVER FELT YOU SHOULD CUT DOWN ON YOUR DRINKING: NO

## 2023-10-05 ASSESSMENT — PAIN SCALES - GENERAL: PAINLEVEL_OUTOF10: 0 - NO PAIN

## 2023-10-05 ASSESSMENT — PAIN DESCRIPTION - PROGRESSION: CLINICAL_PROGRESSION: NOT CHANGED

## 2023-10-05 NOTE — ED PROVIDER NOTES
HPI   Chief Complaint   Patient presents with    Syncope       This is a 89-year-old female with past medical history of hypertension, hyperlipidemia, diabetes who presents to the emergency department for a syncopal episode.  Patient states that she did not completely lose consciousness.  She was sitting on the toilet when symptoms started.                          No data recorded                Patient History   Past Medical History:   Diagnosis Date    Encounter for general adult medical examination without abnormal findings 02/18/2021    Encounter for annual health examination    Encounter for general adult medical examination without abnormal findings 04/02/2020    Encounter for annual health examination    Urinary tract infection, site not specified 01/11/2021    Acute UTI     Past Surgical History:   Procedure Laterality Date    OTHER SURGICAL HISTORY  02/13/2020    Hysterectomy     Family History   Problem Relation Name Age of Onset    No Known Problems Mother      Bone cancer Sister      Lung cancer Daughter       Social History     Tobacco Use    Smoking status: Never    Smokeless tobacco: Never   Substance Use Topics    Alcohol use: Not Currently    Drug use: Never       Physical Exam   ED Triage Vitals [10/05/23 1127]   Temp Heart Rate Resp BP   36.6 °C (97.8 °F) 57 16 (!) 181/51      SpO2 Temp Source Heart Rate Source Patient Position   100 % Oral -- Lying      BP Location FiO2 (%)     Right arm --       Physical Exam  Constitutional:       General: She is not in acute distress.  HENT:      Head: Normocephalic and atraumatic.      Right Ear: Tympanic membrane normal.      Left Ear: Tympanic membrane normal.      Mouth/Throat:      Mouth: Mucous membranes are moist.   Eyes:      Extraocular Movements: Extraocular movements intact.      Conjunctiva/sclera: Conjunctivae normal.      Pupils: Pupils are equal, round, and reactive to light.   Cardiovascular:      Rate and Rhythm: Normal rate and regular  rhythm.      Heart sounds: No murmur heard.  Pulmonary:      Effort: Pulmonary effort is normal. No respiratory distress.      Breath sounds: Normal breath sounds. No stridor. No wheezing or rales.   Abdominal:      General: Bowel sounds are normal. There is no distension.      Tenderness: There is no abdominal tenderness. There is no guarding or rebound.   Musculoskeletal:         General: No swelling, tenderness or deformity. Normal range of motion.   Skin:     General: Skin is warm and dry.      Coloration: Skin is not jaundiced.      Findings: No bruising or lesion.   Neurological:      General: No focal deficit present.      Mental Status: She is alert and oriented to person, place, and time. Mental status is at baseline.      Cranial Nerves: No cranial nerve deficit.      Motor: No weakness.   Psychiatric:         Mood and Affect: Mood normal.       Labs Reviewed - No data to display  No orders to display       ED Course & MDM   ED Course as of 10/12/23 2348   Thu Oct 05, 2023   2338 Patient signed out to me pending CT scan results.  Elderly female today came with a nausea vomiting.  On my exam she is alert and awake O x3 comfortable calm and cooperative afebrile normotensive no tachycardia or hypoxia chest clear to auscultation abdomen soft nontender no rebound or guarding good bowel sounds.  Extremities well-perfused trace edema.  Neurologic intact.  EKG on my interpretation shows a normal sinus rhythm normal axis rate in the mid 60s with no acute ischemic change.  Troponin and delta troponin remains flat around 15 and 14, chemistries show hyponatremia sodium 125 but she has chronic hyponatremia going back a few months chronically around 1 26-1 28, stable CKD, CT angio chest negative for pulm embolism chest x-ray negative CT head negative ultrasound lower EXTR negative DVT.  I had a long discussion with the patient herself and the patient's family by the bedside I offered hospitalization advised  hospitalization but they want to go home which is not unreasonable patient is currently stable hemodynamic.  Made aware of the abnormal findings of the CAT scan and low sodium advised urgent outpatient follow-up with primary care doctor with strict return precautions. [MT]      ED Course User Index  [MT] Aníbal Barrios MD         Diagnoses as of 10/12/23 7443   Hyponatremia       Medical Decision Making    This is a 89-year-old female presents emerged department for syncope/near syncope.  Her EKG is nonischemic and troponins have remained flat.  CT angio done of her chest was negative for any pulmonary embolism chest x-ray was negative for any signs of pneumonia.  Labs are otherwise unremarkable.  Patient is being signed out pending CT of her abdomen.  Procedure  Procedures     Francine Donald MD  10/12/23 9732

## 2023-10-06 VITALS
OXYGEN SATURATION: 96 % | BODY MASS INDEX: 32.37 KG/M2 | WEIGHT: 189.6 LBS | HEIGHT: 64 IN | DIASTOLIC BLOOD PRESSURE: 58 MMHG | SYSTOLIC BLOOD PRESSURE: 168 MMHG | RESPIRATION RATE: 16 BRPM | TEMPERATURE: 97.8 F | HEART RATE: 62 BPM

## 2023-10-09 ENCOUNTER — HOME CARE VISIT (OUTPATIENT)
Dept: HOME HEALTH SERVICES | Facility: HOME HEALTH | Age: 88
End: 2023-10-09
Payer: MEDICARE

## 2023-10-09 VITALS
DIASTOLIC BLOOD PRESSURE: 70 MMHG | HEART RATE: 70 BPM | WEIGHT: 198 LBS | HEIGHT: 55 IN | SYSTOLIC BLOOD PRESSURE: 140 MMHG | RESPIRATION RATE: 18 BRPM | BODY MASS INDEX: 45.82 KG/M2

## 2023-10-09 PROCEDURE — 1090000001 HH PPS REVENUE CREDIT

## 2023-10-09 PROCEDURE — 1090000002 HH PPS REVENUE DEBIT

## 2023-10-09 PROCEDURE — 0023 HH SOC

## 2023-10-09 PROCEDURE — G0299 HHS/HOSPICE OF RN EA 15 MIN: HCPCS | Mod: HHH

## 2023-10-09 PROCEDURE — 169592 NO-PAY CLAIM PROCEDURE

## 2023-10-09 ASSESSMENT — PAIN SCALES - PAIN ASSESSMENT IN ADVANCED DEMENTIA (PAINAD)
NEGVOCALIZATION: 0 - NONE.
TOTALSCORE: 0
BODYLANGUAGE: 0
BREATHING: 0
FACIALEXPRESSION: 0
BODYLANGUAGE: 0 - RELAXED.
NEGVOCALIZATION: 0
CONSOLABILITY: 0
CONSOLABILITY: 0 - NO NEED TO CONSOLE.
FACIALEXPRESSION: 0 - SMILING OR INEXPRESSIVE.

## 2023-10-09 ASSESSMENT — ENCOUNTER SYMPTOMS
DEPRESSION: 0
DENIES PAIN: 1
LOSS OF SENSATION IN FEET: 0
APPETITE LEVEL: FAIR
CHANGE IN APPETITE: UNCHANGED
OCCASIONAL FEELINGS OF UNSTEADINESS: 1
LOWER EXTREMITY EDEMA: 1

## 2023-10-09 ASSESSMENT — ACTIVITIES OF DAILY LIVING (ADL)
ENTERING_EXITING_HOME: SUPERVISION
OASIS_M1830: 03
AMBULATION ASSISTANCE: STAND BY ASSIST
AMBULATION ASSISTANCE: ONE PERSON
AMBULATION ASSISTANCE: 1

## 2023-10-09 ASSESSMENT — LIFESTYLE VARIABLES
PERSONAL HISTORY ALCOHOL: 1
FAMILY HISTORY ILLEGAL DRUGS: 1

## 2023-10-10 DIAGNOSIS — E11.69 TYPE 2 DIABETES MELLITUS WITH OTHER SPECIFIED COMPLICATION, UNSPECIFIED WHETHER LONG TERM INSULIN USE (MULTI): ICD-10-CM

## 2023-10-10 PROCEDURE — 1090000002 HH PPS REVENUE DEBIT

## 2023-10-10 PROCEDURE — 1090000001 HH PPS REVENUE CREDIT

## 2023-10-11 ENCOUNTER — OFFICE VISIT (OUTPATIENT)
Dept: PRIMARY CARE | Facility: CLINIC | Age: 88
End: 2023-10-11
Payer: MEDICARE

## 2023-10-11 ENCOUNTER — APPOINTMENT (OUTPATIENT)
Dept: PRIMARY CARE | Facility: CLINIC | Age: 88
End: 2023-10-11
Payer: MEDICARE

## 2023-10-11 ENCOUNTER — OFFICE VISIT (OUTPATIENT)
Dept: CARDIOLOGY | Facility: CLINIC | Age: 88
End: 2023-10-11
Payer: MEDICARE

## 2023-10-11 VITALS
WEIGHT: 174.5 LBS | OXYGEN SATURATION: 98 % | HEART RATE: 57 BPM | BODY MASS INDEX: 29.79 KG/M2 | HEIGHT: 64 IN | SYSTOLIC BLOOD PRESSURE: 158 MMHG | DIASTOLIC BLOOD PRESSURE: 60 MMHG

## 2023-10-11 VITALS
WEIGHT: 189 LBS | HEART RATE: 64 BPM | SYSTOLIC BLOOD PRESSURE: 132 MMHG | DIASTOLIC BLOOD PRESSURE: 56 MMHG | BODY MASS INDEX: 32.44 KG/M2 | RESPIRATION RATE: 12 BRPM | OXYGEN SATURATION: 98 %

## 2023-10-11 DIAGNOSIS — E03.9 HYPOTHYROIDISM, UNSPECIFIED TYPE: ICD-10-CM

## 2023-10-11 DIAGNOSIS — I10 ESSENTIAL HYPERTENSION: ICD-10-CM

## 2023-10-11 DIAGNOSIS — R56.9 SEIZURE (MULTI): ICD-10-CM

## 2023-10-11 DIAGNOSIS — R01.1 HEART MURMUR: ICD-10-CM

## 2023-10-11 DIAGNOSIS — E11.22 TYPE 2 DIABETES MELLITUS WITH CHRONIC KIDNEY DISEASE, WITH LONG-TERM CURRENT USE OF INSULIN, UNSPECIFIED CKD STAGE (MULTI): ICD-10-CM

## 2023-10-11 DIAGNOSIS — Z79.4 TYPE 2 DIABETES MELLITUS WITHOUT COMPLICATION, WITH LONG-TERM CURRENT USE OF INSULIN (MULTI): ICD-10-CM

## 2023-10-11 DIAGNOSIS — Z79.4 TYPE 2 DIABETES MELLITUS WITH CHRONIC KIDNEY DISEASE, WITH LONG-TERM CURRENT USE OF INSULIN, UNSPECIFIED CKD STAGE (MULTI): ICD-10-CM

## 2023-10-11 DIAGNOSIS — L89.321 PRESSURE INJURY OF LEFT BUTTOCK, STAGE 1: Primary | ICD-10-CM

## 2023-10-11 DIAGNOSIS — I50.33 ACUTE ON CHRONIC DIASTOLIC HEART FAILURE (MULTI): Primary | ICD-10-CM

## 2023-10-11 DIAGNOSIS — I10 BENIGN ESSENTIAL HYPERTENSION: ICD-10-CM

## 2023-10-11 DIAGNOSIS — E78.5 HYPERLIPIDEMIA, UNSPECIFIED HYPERLIPIDEMIA TYPE: ICD-10-CM

## 2023-10-11 DIAGNOSIS — I50.32 CHRONIC DIASTOLIC CONGESTIVE HEART FAILURE (MULTI): ICD-10-CM

## 2023-10-11 DIAGNOSIS — D50.9 IRON DEFICIENCY ANEMIA, UNSPECIFIED IRON DEFICIENCY ANEMIA TYPE: ICD-10-CM

## 2023-10-11 DIAGNOSIS — E78.00 PURE HYPERCHOLESTEROLEMIA: ICD-10-CM

## 2023-10-11 DIAGNOSIS — R00.1 ASYMPTOMATIC BRADYCARDIA: ICD-10-CM

## 2023-10-11 DIAGNOSIS — E11.9 TYPE 2 DIABETES MELLITUS WITHOUT COMPLICATION, WITH LONG-TERM CURRENT USE OF INSULIN (MULTI): ICD-10-CM

## 2023-10-11 PROBLEM — L89.90 PRESSURE ULCER: Status: ACTIVE | Noted: 2023-10-11

## 2023-10-11 LAB
ERYTHROCYTE [DISTWIDTH] IN BLOOD BY AUTOMATED COUNT: 12.1 % (ref 11.5–14.5)
EST. AVERAGE GLUCOSE BLD GHB EST-MCNC: 134 MG/DL
HBA1C MFR BLD: 6.3 %
HCT VFR BLD AUTO: 27.4 % (ref 36–46)
HGB BLD-MCNC: 9 G/DL (ref 12–16)
MCH RBC QN AUTO: 31.7 PG (ref 26–34)
MCHC RBC AUTO-ENTMCNC: 32.8 G/DL (ref 32–36)
MCV RBC AUTO: 97 FL (ref 80–100)
NRBC BLD-RTO: 0 /100 WBCS (ref 0–0)
PLATELET # BLD AUTO: 325 X10*3/UL (ref 150–450)
PMV BLD AUTO: 10.9 FL (ref 7.5–11.5)
POC FINGERSTICK BLOOD GLUCOSE: 258 MG/DL (ref 70–100)
RBC # BLD AUTO: 2.84 X10*6/UL (ref 4–5.2)
WBC # BLD AUTO: 5.6 X10*3/UL (ref 4.4–11.3)

## 2023-10-11 PROCEDURE — 1036F TOBACCO NON-USER: CPT | Performed by: STUDENT IN AN ORGANIZED HEALTH CARE EDUCATION/TRAINING PROGRAM

## 2023-10-11 PROCEDURE — 99205 OFFICE O/P NEW HI 60 MIN: CPT | Performed by: STUDENT IN AN ORGANIZED HEALTH CARE EDUCATION/TRAINING PROGRAM

## 2023-10-11 PROCEDURE — 1126F AMNT PAIN NOTED NONE PRSNT: CPT | Performed by: INTERNAL MEDICINE

## 2023-10-11 PROCEDURE — 1159F MED LIST DOCD IN RCRD: CPT | Performed by: STUDENT IN AN ORGANIZED HEALTH CARE EDUCATION/TRAINING PROGRAM

## 2023-10-11 PROCEDURE — 82962 GLUCOSE BLOOD TEST: CPT | Performed by: INTERNAL MEDICINE

## 2023-10-11 PROCEDURE — 1036F TOBACCO NON-USER: CPT | Performed by: INTERNAL MEDICINE

## 2023-10-11 PROCEDURE — 1090000001 HH PPS REVENUE CREDIT

## 2023-10-11 PROCEDURE — 1090000002 HH PPS REVENUE DEBIT

## 2023-10-11 PROCEDURE — 99215 OFFICE O/P EST HI 40 MIN: CPT | Performed by: STUDENT IN AN ORGANIZED HEALTH CARE EDUCATION/TRAINING PROGRAM

## 2023-10-11 PROCEDURE — 83036 HEMOGLOBIN GLYCOSYLATED A1C: CPT

## 2023-10-11 PROCEDURE — 99214 OFFICE O/P EST MOD 30 MIN: CPT | Performed by: INTERNAL MEDICINE

## 2023-10-11 PROCEDURE — 3077F SYST BP >= 140 MM HG: CPT | Performed by: STUDENT IN AN ORGANIZED HEALTH CARE EDUCATION/TRAINING PROGRAM

## 2023-10-11 PROCEDURE — 1111F DSCHRG MED/CURRENT MED MERGE: CPT | Performed by: INTERNAL MEDICINE

## 2023-10-11 PROCEDURE — 3075F SYST BP GE 130 - 139MM HG: CPT | Performed by: INTERNAL MEDICINE

## 2023-10-11 PROCEDURE — 1159F MED LIST DOCD IN RCRD: CPT | Performed by: INTERNAL MEDICINE

## 2023-10-11 PROCEDURE — 80053 COMPREHEN METABOLIC PANEL: CPT

## 2023-10-11 PROCEDURE — 3078F DIAST BP <80 MM HG: CPT | Performed by: INTERNAL MEDICINE

## 2023-10-11 PROCEDURE — 1126F AMNT PAIN NOTED NONE PRSNT: CPT | Performed by: STUDENT IN AN ORGANIZED HEALTH CARE EDUCATION/TRAINING PROGRAM

## 2023-10-11 PROCEDURE — 85027 COMPLETE CBC AUTOMATED: CPT

## 2023-10-11 PROCEDURE — 36415 COLL VENOUS BLD VENIPUNCTURE: CPT

## 2023-10-11 PROCEDURE — 1111F DSCHRG MED/CURRENT MED MERGE: CPT | Performed by: STUDENT IN AN ORGANIZED HEALTH CARE EDUCATION/TRAINING PROGRAM

## 2023-10-11 PROCEDURE — 3078F DIAST BP <80 MM HG: CPT | Performed by: STUDENT IN AN ORGANIZED HEALTH CARE EDUCATION/TRAINING PROGRAM

## 2023-10-11 RX ORDER — HYDRALAZINE HYDROCHLORIDE 100 MG/1
100 TABLET, FILM COATED ORAL 3 TIMES DAILY
Status: DISCONTINUED | OUTPATIENT
Start: 2023-10-11 | End: 2024-02-01

## 2023-10-11 RX ORDER — PEN NEEDLE, DIABETIC 30 GX3/16"
1 NEEDLE, DISPOSABLE MISCELLANEOUS 2 TIMES DAILY
Qty: 200 EACH | Refills: 3 | Status: SHIPPED | OUTPATIENT
Start: 2023-10-11 | End: 2023-12-18 | Stop reason: SDUPTHER

## 2023-10-11 RX ORDER — SYRINGE,SAFETY WITH NEEDLE,1ML 25GX1"
1 SYRINGE (EA) MISCELLANEOUS 2 TIMES DAILY
Qty: 100 EACH | Refills: 3 | Status: SHIPPED | OUTPATIENT
Start: 2023-10-11 | End: 2024-10-10

## 2023-10-11 RX ORDER — DIMETHICONE 13000 MG/L
1 CREAM TOPICAL 3 TIMES DAILY
Qty: 3.25 G | Refills: 0 | Status: SHIPPED | OUTPATIENT
Start: 2023-10-11 | End: 2024-02-05 | Stop reason: HOSPADM

## 2023-10-11 ASSESSMENT — ENCOUNTER SYMPTOMS
LOSS OF SENSATION IN FEET: 0
DEPRESSION: 0
OCCASIONAL FEELINGS OF UNSTEADINESS: 0

## 2023-10-11 ASSESSMENT — PAIN SCALES - GENERAL: PAINLEVEL: 0-NO PAIN

## 2023-10-11 NOTE — PATIENT INSTRUCTIONS
We will increase hydralazine from 50 mg 3 times a day to 100 mg 3 times a day.    Please continue remaining cardiac medication including aspirin 81 mg daily, atorvastatin 10 mg daily, carvedilol 25 mg twice daily.    We will continue furosemide 40 mg once daily.  We did note that if there is significant increase in swelling you can increase this to 2 tablets a day for 3 to 4 days until swelling resolves.    We will arrange for follow-up with neurology for further evaluation of possible seizure-like episode.    Please followup with me in Cardiology clinic within the next 3 to 4 months.  Please return to clinic sooner or seek emergent care if your symptoms reoccur or worsen.

## 2023-10-11 NOTE — PROGRESS NOTES
Subjective   Chief complaint: Carolann Cartagena is a 89 y.o. female who presents for Follow-up (Pt is here for ED follow up for passing out. Sore on buttock. ).    HPI:  Pt presents for follow up after ED visit for near syncopal episode. Pt is feeling much better today. Labs and imaging were performed in the ED. See ED notes for results.     Pt also notes a sore on her buttock. Pt's family member noticed last week.         Objective   /56   Pulse 64   Resp 12   Wt 85.7 kg (189 lb)   SpO2 98%   BMI 32.44 kg/m²   Physical Exam  Vitals reviewed.   Constitutional:       Appearance: Normal appearance.   Cardiovascular:      Rate and Rhythm: Normal rate and regular rhythm.   Pulmonary:      Effort: Pulmonary effort is normal.      Breath sounds: Normal breath sounds.   Skin:     Comments: Bilateral 1+ pitting edema of the calves/ankles. One area of excoriation just lateral to the intergluteal cleft, left side. This appears to be a 1cm stage 1 pressure ulcer. Second area that is more distal, just above the gluteal crease. This is 1 cm area of stage 2 pressure ulcer.     Neurological:      Mental Status: She is alert.         I have reviewed and reconciled the medication list with the patient today.   Current Outpatient Medications:     Alphagan P 0.1 % ophthalmic solution, Administer 1 drop into both eyes twice a day., Disp: , Rfl:     aspirin 81 mg EC tablet, Take 1 tablet (81 mg) by mouth once daily., Disp: , Rfl:     atorvastatin (Lipitor) 10 mg tablet, TAKE 1 TABLET DAILY, Disp: 90 tablet, Rfl: 3    carvedilol (Coreg) 25 mg tablet, Take 1 tablet (25 mg) by mouth 2 times a day with meals., Disp: 60 tablet, Rfl: 11    cholecalciferol (Vitamin D-3) 50 MCG (2000 UT) tablet, Take 1 tablet (2,000 Units) by mouth once daily., Disp: , Rfl:     furosemide (Lasix) 20 mg tablet, Take 2 tablets (40 mg) by mouth once daily., Disp: 60 tablet, Rfl: 2    insulin NPH, Isophane, (HumuLIN N,NovoLIN N) 100 unit/mL (3 mL) injection,  "Inject 12 Units under the skin 2 times a day before meals. Take as directed per insulin instructions., Disp: 8 mL, Rfl: 2    insulin syringe-needle U-100 (BD Insulin Syringe Ultra-Fine) 31G X 5/16\" 1 mL syringe, Inject 1 each under the skin 2 times a day. Use as instructed, Disp: 100 each, Rfl: 3    latanoprost (Xalatan) 0.005 % ophthalmic solution, 1 drop once daily., Disp: , Rfl:     OneTouch Delica Plus Lancet 30 gauge misc, , Disp: , Rfl:     OneTouch Ultra Test strip, 1 strip 3 times a day., Disp: , Rfl:     sennosides-docusate sodium (Steph-Colace) 8.6-50 mg tablet, Take by mouth twice a day., Disp: , Rfl:     sennosides-docusate sodium (Steph-Colace) 8.6-50 mg tablet, TAKE 2 TABLETS BY MOUTH TWO TIMES A DAY, Disp: 120 tablet, Rfl: 0    timolol (Timoptic) 0.5 % ophthalmic solution, 1 drop., Disp: , Rfl:     Current Facility-Administered Medications:     hydrALAZINE (Apresoline) tablet 100 mg, 100 mg, oral, TID, Louie HORTA MD     Imaging:  Vascular US Lower Extremity Venous Duplex Bilateral    Result Date: 10/5/2023  Interpreted By:  Bill De La O, STUDY: [facility] US LOWER EXTREMITY VENOUS DUPLEX BILATERAL; 10/5/2023 7:14 pm   INDICATION: Signs/Symptoms:leg swelling.   COMPARISON: None.   ACCESSION NUMBER(S): [id number]   ORDERING CLINICIAN: OLIVIA TILLMAN   TECHNIQUE: 2D grayscale and color-flow duplex images were obtained along with Doppler spectral waveform analysis of the deep venous system of the lower extremity from the groin to the knee. Attempts were made to image the calf veins. Venous compression and augmentation were performed.   FINDINGS: Right Lower Extremity: There is normal compressibility and flow within the visualized vessels of the deep venous system of this lower extremity.   Left Lower Extremity: There is normal compressibility and flow within the visualized vessels of the deep venous system of this lower extremity.   Soft tissue swelling 4.4 cm left popliteal fossa cyst       No evidence " for deep venous thrombosis within the limits of this examination. Soft tissue swelling and left-sided Baker's cyst   MACRO: None.   Signed by: Bill De La O 10/5/2023 9:06 PM Dictation workstation:   [id number]    CT angio chest for pulmonary embolism    Result Date: 10/5/2023  STUDY: CT Angiogram of the Chest; 10/05/2023 14:37 PM. INDICATION: Syncope. COMPARISON: CXR 10/5/2023. ACCESSION NUMBER(S): [id number] ORDERING CLINICIAN: XIOMARA FUCHS TECHNIQUE:  CTA of the chest was performed with intravenous contrast. Images are reviewed and processed at a workstation according to the CT angiogram protocol with 3-D and/or MIP post processing imaging generated.  Omnipaque 350 75 mL was administered intravenously. Automated mA/kV exposure control was utilized and patient examination was performed in strict accordance with principles of ALARA. FINDINGS: Pulmonary arteries are adequately opacified without acute or chronic filling defects.  The thoracic aorta is normal in course and caliber without dissection or aneurysm. The heart is enlarged. Thoracic lymph nodes are not enlarged. There is no pleural effusion, pleural thickening, or pneumothorax. The airways are patent. Lungs are clear without consolidation, interstitial disease, or suspicious nodules. There is diffuse heterogeneity to the liver. There is nodularity to the surface and this most likely represents cirrhosis. There are multiple calcifications as well as focal areas of decreased attenuation. These may represent hepatic cysts but further evaluation with MRI should be considered. There are no acute fractures.  No suspicious bony lesions.    No evidence of pulmonary embolism. Findings consistent with cirrhosis. There is diffuse heterogeneity to the liver with multiple low density foci in calcifications. Further evaluation with MRI should be considered. Cardiomegaly. Signed by Efrain Miranda MD    XR chest 2 views    Result Date: 10/5/2023  STUDY: Chest  Radiographs;  10/05/2023 13:03 PM. INDICATION: Shortness of breath. COMPARISON: None Available ACCESSION NUMBER(S): [id number] ORDERING CLINICIAN: XIOMARA FUCHS TECHNIQUE:  Frontal and lateral chest. FINDINGS: CARDIOMEDIASTINAL SILHOUETTE: Cardiomediastinal silhouette is normal in size and configuration.  LUNGS: Lungs are clear.  ABDOMEN: No remarkable upper abdominal findings.  BONES: No acute osseous changes.  Prominent degenerative changes of both glenohumeral joints again noted.    No acute cardiopulmonary abnormality. Signed by True Bae MD    CT head wo IV contrast    Result Date: 10/5/2023  Interpreted By:  Yokasta Jackson, STUDY: CT HEAD WO IV CONTRAST;  10/5/2023 1:36 pm   INDICATION: Signs/Symptoms:TIA.   COMPARISON: 09/24/2023   ACCESSION NUMBER(S): [id number]   ORDERING CLINICIAN: XIOMARA FUCHS   TECHNIQUE: Examination was performed in the axial plane with sagittal and coronal reconstructions.   FINDINGS: INTRACRANIAL: There is prominence of the ventricular system and cerebral sulci consistent with cerebral atrophy. No mass or mass effect is identified. There is no hemorrhage or subdural fluid collection. There is no acute infarct.     EXTRACRANIAL: Visualized paranasal sinuses and mastoids are clear.       No acute intracranial pathology.   MACRO: None   Signed by: Yokasta Jackson 10/5/2023 1:38 PM Dictation workstation:   [id number]    CT head wo IV contrast    Result Date: 9/24/2023  Interpreted By:  DENISE PULIDO MD MRN: [id number] Patient Name: ALICIA IRENE  STUDY: CT HEAD WO CONTRAST;  9/24/2023 4:03 pm  INDICATION: dizziness .  COMPARISON: 09/15/2023  ACCESSION NUMBER(S): [id number]  ORDERING CLINICIAN: ERIK SOLIZ  TECHNIQUE: Sequential trans axial images were obtained  .  FINDINGS: INTRACRANIAL:  CORTICAL SULCI AND EXTRA-AXIAL SPACES:  Unremarkable.  VENTRICULAR SYSTEM:  Unremarkable without significant dilatation.  CEREBRAL PARENCHYMA:  Unremarkable without significant  degenerative change.There is no evidence of definite subacute infarction, intracranial hemorrhage or mass.  EXTRACRANIAL: Visualized paranasal sinuses and mastoids are clear. The calvarium is intact.      Unremarkable exam. There has not been significant interval change from the prior exam.    XR chest 1 view    Result Date: 9/22/2023  Interpreted By:  ASHLEY GARCIA MD MRN: [id number] Patient Name: ALICIA IRENE  STUDY: CHEST 1 VIEW;  9/22/2023 11:24 am  INDICATION: sob .  COMPARISON: 09/15/2023  ACCESSION NUMBER(S): [id number]  ORDERING CLINICIAN: ERIK SOLIZ  FINDINGS: AP portable view of the chest is obtained.  Limited exam due to portable nature. Magnified cardiac silhouette. No infiltrates, effusions or pneumothorax.      1.  No evidence of acute cardiopulmonary process.    MACRO: None    Electrocardiogram 12 Lead    Result Date: 9/20/2023  Marked sinus bradycardia Right bundle branch block Abnormal ECG Confirmed by Jorge Lema ([id number]) on 9/20/2023 8:06:05 AM    US renal complete    Result Date: 9/18/2023  Interpreted By:  JUNIOR PEARCE MD MRN: [id number] Patient Name: ALICIA IRENE  STUDY: US RENAL BILAT;  9/18/2023 2:22 pm  INDICATION: OLGA LIDIA on top of CKD .  COMPARISON: None.  ACCESSION NUMBER(S): [id number]  ORDERING CLINICIAN: CHAYITO COLLADO  TECHNIQUE: Multiple images of the kidneys were obtained  .  FINDINGS: RIGHT KIDNEY: The right kidney measures 8.6 cm in length. The renal cortical echogenicity and thickness are within normal limits. No hydronephrosis is present; no evidence of nephrolithiasis.  LEFT KIDNEY: The left kidney measures 8.5 cm in length. The renal cortical echogenicity and thickness are within normal limits. No hydronephrosis is present; no evidence of nephrolithiasis. Small heterogenous lateral nodule measuring 1.4 cm.  BLADDER: Within normal limits  Partially visualized heterogenous hepatic echotexture.      Extremely limited study given body habitus.  1. No hydronephrosis. 2.  Indeterminate left renal mixed echogenicity nodule measuring 1.4 cm. This could be further evaluated by dedicated renal MRI with IV contrast. 3. Partially visualized heterogenous hepatic echotexture possibly related to underlying diffuse hepatocellular dysfunction. Advise correlation with liver function test  MACRO: None    Echocardiogram    Result Date: 9/17/2023  [facility], [address] [place] [address] Tel [phone] and Fax [phone] TRANSTHORACIC ECHOCARDIOGRAM REPORT Patient Name:     ALICIA IRENE        aSmir Physician:  [id number] Adam Farmer MD Study Date:       9/16/2023           Referring           PATI MAY Physician: MRN/PID:          [id number]            PCP: Accession/Order#: [id number]           Department          [facility] Non Location:           Invasive YOB: 1931           Fellow: Gender:           F                   Nurse: Admit Date:       9/15/2023           Sonographer:        Bora Hinds RDCS Admission Status: Observation -       Additional Staff: Priority discharge Height:           162.00 cm           CC Report to: Weight:           88.00 kg            Study Type:         Echocardiogram BSA:              1.93 m2 Blood Pressure: 178 /84 mmHg Diagnosis/ICD: I10-Essential (primary) hypertension Indication:    Hypertensive urgency, Atherosclerosis Procedure/CPT: Echo Complete w Full [id number] Patient History: Pertinent History: HTN. Study Detail: The following Echo studies were performed: 2D, M-Mode, Doppler and color flow. Technically challenging study due to body habitus and poor acoustic windows. Definity used as a contrast agent for endocardial border definition. Total contrast used for this procedure was 2 mL via IV push. PHYSICIAN INTERPRETATION: Left Ventricle: The left ventricular systolic function is hyperdynamic, with an estimated ejection fraction of 70-75%. There are no regional wall motion abnormalities. The left  ventricular cavity size is decreased. There is moderate concentric left ventricular hypertrophy. Spectral Doppler shows an impaired relaxation pattern of left ventricular diastolic filling. Left Atrium: The left atrium is enlarged. Right Ventricle: The right ventricle was not well visualized. There is normal right ventricular global systolic function. Right Atrium: The right atrium is normal in size. Aortic Valve: The aortic valve is trileaflet. There is evidence of mild aortic valve stenosis. There is no evidence of aortic valve regurgitation. The peak instantaneous gradient of the aortic valve is 24.2 mmHg. The mean gradient of the aortic valve is 14.0 mmHg. Mitral Valve: The mitral valve is mildly thickened. There is trace mitral valve regurgitation. Tricuspid Valve: The tricuspid valve is structurally normal. There is trace tricuspid regurgitation. The Doppler estimated RVSP is mild to moderately elevated at 46.0 mmHg. Pulmonic Valve: The pulmonic valve is structurally normal. There is no indication of pulmonic valve regurgitation. Pericardium: There is a trivial pericardial effusion. Aorta: The aortic root is normal. There is no dilatation of the ascending aorta. There is no dilatation of the aortic root. In comparison to the previous echocardiogram(s): There are no prior studies on this patient for comparison purposes. CONCLUSIONS: 1. Poorly visualized anatomical structures due to suboptimal image quality. 2. Left ventricular systolic function is hyperdynamic with a 70-75% estimated ejection fraction. 3. Spectral Doppler shows an impaired relaxation pattern of left ventricular diastolic filling. 4. There is moderate concentric left ventricular hypertrophy. 5. Left ventricular cavity size is decreased. 6. The left atrium is enlarged. 7. Mild to moderately elevated right ventricular systolic pressure. 8. Mild aortic valve stenosis. QUANTITATIVE DATA SUMMARY: 2D MEASUREMENTS: Normal Ranges: LAs:           3.15 cm     (2.7-4.0cm) IVSd:          1.40 cm    (0.6-1.1cm) LVPWd:         1.40 cm    (0.6-1.1cm) LVIDd:         3.80 cm    (3.9-5.9cm) LVIDs:         2.50 cm LV Mass Index: 100.8 g/m2 LV % FS        34.2 % LA VOLUME: Normal Ranges: LA Vol A4C:        131.3 ml   (22+/-6mL/m2) LA Vol A2C:        88.2 ml LA Vol BP:         109.2 ml LA Vol Index A4C:  68.2ml/m2 LA Vol Index A2C:  45.8 ml/m2 LA Vol Index BP:   56.7 ml/m2 LA Area A4C:       29.7 cm2 LA Area A2C:       24.0 cm2 LA Major Axis A4C: 5.7 cm LA Major Axis A2C: 5.6 cm LA Volume Index:   56.7 ml/m2 RA VOLUME BY A/L METHOD: Normal Ranges: RA Area A4C: 13.0 cm2 AORTA MEASUREMENTS: Normal Ranges: Ao Sinus, d: 2.67 cm (2.1-3.5cm) Ao STJ, d:   2.38 cm (1.7-3.4cm) Asc Ao, d:   3.04 cm (2.1-3.4cm) LV SYSTOLIC FUNCTION BY 2D PLANIMETRY (MOD): Normal Ranges: EF-A4C View: 61.9 % (>=55%) EF-A2C View: 67.4 % EF-Biplane:  64.6 % LV DIASTOLIC FUNCTION: Normal Ranges: MV Peak E:        0.48 m/s    (0.7-1.2 m/s) MV Peak A:        1.10 m/s    (0.42-0.7 m/s) E/A Ratio:        0.44        (1.0-2.2) MV e'             0.04 m/s    (>8.0) MV lateral e'     0.06 m/s MV medial e'      0.04 m/s MV A Dur:         156.00 msec E/e' Ratio:       11.99       (<8.0) PulmV Sys Maikel:    51.10 cm/s PulmV Marshall Maikel:   32.90 cm/s PulmV S/D Maikel:    1.60 PulmV A Revs Maikel: 21.20 cm/s PulmV A Revs Dur: 124.00 msec MITRAL VALVE: Normal Ranges: MV DT: 172 msec (150-240msec) AORTIC VALVE: Normal Ranges: AoV Vmax:                2.46 m/s  (<=1.7m/s) AoV Peak P.2 mmHg (<20mmHg) AoV Mean P.0 mmHg (1.7-11.5mmHg) LVOT Max Maikel:            1.60 m/s  (<=1.1m/s) AoV VTI:                 51.80 cm  (18-25cm) LVOT VTI:                34.40 cm LVOT Diameter:           1.70 cm   (1.8-2.4cm) AoV Area, VTI:           1.51 cm2  (2.5-5.5cm2) AoV Area,Vmax:           1.48 cm2  (2.5-4.5cm2) AoV Dimensionless Index: 0.66 RIGHT VENTRICLE: RV Basal 3.35 cm RV Mid   2.45 cm RV Major 7.0 cm TRICUSPID  VALVE/RVSP: Normal Ranges: Peak TR Velocity: 3.28 m/s Est. RA Pressure: 3 mmHg RV Syst Pressure: 46.0 mmHg (< 30mmHg) IVC Diam:         0.93 cm PULMONIC VALVE: Normal Ranges: PV Accel Time: 129 msec (>120ms) PV Max Maikel:    1.3 m/s  (0.6-0.9m/s) PV Max P.1 mmHg Pulmonary Veins: PulmV A Revs Dur: 124.00 msec PulmV A Revs Maikel: 21.20 cm/s PulmV Marshall Maikel:   32.90 cm/s PulmV S/D Maikel:    1.60 PulmV Sys Maikel:    51.10 cm/s [id number] Adam Farmer MD Electronically signed on 2023 at 8:17:32 AM  Final      XR knee    Result Date: 9/15/2023  Interpreted By:  ZORAIDA DODD MD MRN: [id number] Patient Name: ALICIA IRENE  STUDY: KNEE; 3 VIEWS; Left;  9/15/2023 9:32 pm  INDICATION: fall .  COMPARISON: None.  ACCESSION NUMBER(S): [id number]  ORDERING CLINICIAN: FANI DOSS  FINDINGS: Four views of the knee are obtained.  Generalized diffuse osteopenia. No acute fracture or dislocation. Tricompartmental osteoarthrosis worse in the medial compartment. Small joint effusion. Soft tissues are unremarkable.      No acute fracture. Small joint effusion.  Tricompartmental right knee osteoarthrosis.  MACRO: None    XR chest 1 view    Result Date: 9/15/2023  Interpreted By:  ZORAIDA DODD MD MRN: [id number] Patient Name: ALICIA IRENE  STUDY: CHEST 1 VIEW;  9/15/2023 9:31 pm  INDICATION: Chest Pain .  COMPARISON: None.  ACCESSION NUMBER(S): [id number]  ORDERING CLINICIAN: FANI DOSS  FINDINGS:   CARDIOMEDIASTINAL SILHOUETTE: Cardiac silhouette is enlarged. Atherosclerotic calcification of the aorta.  LUNGS: No consolidation, pleural effusion or pneumothorax.  ABDOMEN: No remarkable upper abdominal findings.  BONES: Multilevel degenerative changes of the spine. Bilateral shoulder osteoarthrosis.      Cardiomegaly. No acute pulmonary process.    CT head wo IV contrast    Result Date: 9/15/2023  Interpreted By:  MARCO IRENE MD MRN: [id number] Patient Name: ALICIA IRENE  STUDY: CT HEAD WO CONTRAST;  9/15/2023 9:17 pm   INDICATION: fall .  COMPARISON: None.  ACCESSION NUMBER(S): [id number]  ORDERING CLINICIAN: FANI DOSS  TECHNIQUE: Noncontrast axial CT images of head were obtained with coronal and sagittal reconstructed images.  FINDINGS: BRAIN PARENCHYMA: No evidence of acute intraparenchymal hemorrhage or acute large territory ischemic infarct. Gray-white differentiation is preserved. No mass-effect, midline shift or effacement of cerebral sulci. Patchy periventricular and subcortical white matter hypodensities, nonspecific but often seen in the setting of chronic microangiopathic change.  VENTRICLES and EXTRA-AXIAL SPACES: No acute extra-axial or intraventricular hemorrhage. Ventricles and sulci are age-concordant.  ORBITS: The visualized orbits and globes are within normal limits.  PARANASAL SINUSES/MASTOIDS: No hemorrhage or air-fluid levels within the visualized paranasal sinuses. The mastoids are well aerated.  CALVARIUM: No skull fracture. Hyperostosis of the inner calvarial table.  EXTRACRANIAL SOFT TISSUES: No discernible abnormality.  Brain Injury Guidelines (BIG) CT Values:  Skull fracture: No SDH (subdural hematoma): No EDH (epidural hematoma): No IPH (intraparenchymal hemorrhage): No SAH (subarachnoid hemorrhage): No IVH (intraventricular hemorrhage): No  Reference: Vicente MEDRANO, Chantal RS, Alton M, et al. The BIG (brain injury guidelines) project: defining the management of traumatic brain injury by acute care surgeons. J Trauma Acute Care Surg 2014; 76:965-969.      1. No acute intracranial abnormality or calvarial fracture.             Labs reviewed:    Lab Results   Component Value Date    WBC 5.9 10/05/2023    HGB 9.3 (L) 10/05/2023    HCT 26.9 (L) 10/05/2023     10/05/2023    CHOL 183 02/22/2023    TRIG 86 02/22/2023    HDL 57.1 02/22/2023    ALT 23 10/05/2023    AST 26 10/05/2023     (L) 10/05/2023    K 4.3 10/05/2023    CL 92 (L) 10/05/2023    CREATININE 1.82 (H) 10/05/2023    BUN 36 (H)  10/05/2023    CO2 23 10/05/2023    TSH 5.53 (H) 09/19/2023    HGBA1C 6.6 (A) 09/16/2023       Assessment/Plan   Problem List Items Addressed This Visit       Anemia    Relevant Orders    CBC    Comprehensive Metabolic Panel    Diabetes mellitus (CMS/[facility])    Relevant Orders    POCT fingerstick glucose manually resulted (Completed)    Hemoglobin A1C    Pressure ulcer - Primary     Use A&D ointment or a barrier cream daily on the area of excoriation. Dress the area with hydrocolloid dressing.           Other Visit Diagnoses       Hypothyroidism, unspecified type        Relevant Orders    CBC    Comprehensive Metabolic Panel    Benign essential hypertension        Relevant Orders    CBC    Comprehensive Metabolic Panel            Continue current medications as listed  Follow up in 1 mo monitor labs and recheck ulcer.       Abdomen soft, nontender, nondistended, bowel sounds present in all 4 quadrants.

## 2023-10-11 NOTE — ASSESSMENT & PLAN NOTE
Use A&D ointment or a barrier cream daily on the area of excoriation. Dress the area with hydrocolloid dressing.

## 2023-10-11 NOTE — PROGRESS NOTES
Referred by Dr. Whitlock ref. provider found for Hospital Follow-up     HPI:    Carolann Cartagena is a 89 y.o. female with pertinent history of GERD, hypertension, hyperlipidemia, anemia, insulin-dependent diabetes, lumbar stenosis, lymphedema, chronic kidney disease acute on chronic diastolic heart failure with BNP elevation of 555 September 2023, preserved ejection fraction with impaired relaxation, moderate concentric left ventricular hypertrophy, left atrial enlargement, mild to moderate pulmonary hypertension with RVSP of 46 mmHg, mild aortic stenosis on echo performed 9/16/2023 presents to cardiology clinic to establish care after recent hospitalization and multiple emergency room visits.     She is accompanied by her niece who provides history and has questions.  Her recent hospital course and emergency room course was reviewed and discussed.  She is doing relatively well.  Dyspnea on exertion is improved.  She still has some lower extremity edema but it has improved since hospitalization.  We reviewed her recent blood work.  She does note a recent emergency room visit where the niece describes confusion and loss of bowel and bladder control as well as some tonic-clonic like movements that sound more seizure-like.  No clear syncope.  No exacerbating or relieving factors.  Patient denies chest pain and angina.  Pt denies orthopnea, and paroxysmal nocturnal dyspnea.  Pt denies worsening lower extremity edema.  Pt denies palpitations or syncope.  No recent falls.  No fever or chills.  No cough.  No change in bowel or bladder habits.  No sick contacts.  No recent travel.    12 point review of systems including (Constitutional, Eyes, ENMT, Respiratory, Cardiac, Gastrointestinal, Neurological, Psychiatric, and Hematologic) was performed and is otherwise negative.    Past medical history reviewed:   has a past medical history of Encounter for general adult medical examination without abnormal findings (02/18/2021), Encounter  "for general adult medical examination without abnormal findings (04/02/2020), and Urinary tract infection, site not specified (01/11/2021).    Past surgical history reviewed:   has a past surgical history that includes Other surgical history (02/13/2020).    Social history reviewed:   reports that she has never smoked. She has never used smokeless tobacco. She reports that she does not currently use alcohol. She reports that she does not use drugs.     Family history reviewed:    Family History   Problem Relation Name Age of Onset    No Known Problems Mother      Bone cancer Sister      Lung cancer Daughter         Allergies reviewed: Dorzolamide, Lisinopril, and Losartan     Medications reviewed:   Current Outpatient Medications   Medication Instructions    Alphagan P 0.1 % ophthalmic solution 1 drop, Both Eyes, 2 times daily    aspirin 81 mg, oral, Daily RT    atorvastatin (LIPITOR) 10 mg, oral, Daily    carvedilol (COREG) 25 mg, oral, 2 times daily with meals    cholecalciferol (VITAMIN D-3) 2,000 Units, oral, Daily RT    furosemide (Lasix) 20 mg tablet TAKE 1 TABLET BY MOUTH ONCE DAILY    furosemide (LASIX) 40 mg, oral, Daily    hydrALAZINE (Apresoline) 50 mg tablet TAKE 1 TABLET BY MOUTH EVERY 8 HOURS    hydrALAZINE (APRESOLINE) 12.5 mg, oral, 2 times daily    insulin NPH (Isophane) (HUMULIN N,NOVOLIN N) 12 Units, subcutaneous, 2 times daily before meals, Take as directed per insulin instructions.    insulin syringe-needle U-100 (BD Insulin Syringe Ultra-Fine) 31G X 5/16\" 1 mL syringe 1 each, subcutaneous, 2 times daily, Use as instructed    latanoprost (Xalatan) 0.005 % ophthalmic solution 1 drop, Daily RT    metoprolol succinate XL (TOPROL-XL) 100 mg, oral, Daily, Do not crush or chew.    NIFEdipine CC 60 mg 24 hr tablet 1 tablet, oral, Daily    NIFEdipine ER (Adalat CC) 60 mg 24 hr tablet TAKE 1 TABLET BY MOUTH ONCE DAILY    OneTouch Delica Plus Lancet 30 gauge misc     OneTouch Ultra Test strip 1 strip, " miscellaneous, 3 times daily    sennosides-docusate sodium (Steph-Colace) 8.6-50 mg tablet oral, 2 times daily    sennosides-docusate sodium (Steph-Colace) 8.6-50 mg tablet TAKE 2 TABLETS BY MOUTH TWO TIMES A DAY    timolol (Timoptic) 0.5 % ophthalmic solution 1 drop        Vitals reviewed: Visit Vitals  /60   Pulse 57       Physical Exam:   General:  Patient is awake, alert, and oriented.  Patient is in no acute distress.  HEENT:  Pupils equal and reactive.  Normocephalic.  Moist mucosa.    Neck:  No thyromegaly.  Normal Jugular Venous Pressure.  Cardiovascular:  Regular rate and rhythm.  Normal S1 and S2.  3/6 DEWAYNE.  Pulmonary:  Clear to auscultation bilaterally.  Abdomen:  Soft. Non-tender.   Non-distended.  Positive bowel sounds.  Lower Extremities:  2+ pedal pulses. No LE edema.  Neurologic:  Cranial nerves intact.  No focal deficit.   Skin: Skin warm and dry, normal skin turgor.   Psychiatric: Normal affect.    Last Labs:  CBC -      Lab Results   Component Value Date    WBC 5.9 10/05/2023    HGB 9.3 (L) 10/05/2023    HCT 26.9 (L) 10/05/2023     10/05/2023        CMP-  Lab Results   Component Value Date    GLUCOSE 95 10/05/2023     (L) 10/05/2023    K 4.3 10/05/2023    CL 92 (L) 10/05/2023    CO2 23 10/05/2023    ANIONGAP 14 10/05/2023    BUN 36 (H) 10/05/2023    CREATININE 1.82 (H) 10/05/2023    EGFR 26 (L) 10/05/2023    CALCIUM 8.9 10/05/2023    PHOS 3.7 09/25/2023    PROT 6.5 10/05/2023    ALBUMIN 3.3 (L) 10/05/2023    AST 26 10/05/2023    ALT 23 10/05/2023    ALKPHOS 61 10/05/2023    BILITOT 0.5 10/05/2023        LIPIDS-  Lab Results   Component Value Date    CHOL 183 02/22/2023    TRIG 86 02/22/2023    HDL 57.1 02/22/2023    CHHDL 3.2 02/22/2023    VLDL 17 02/22/2023        OTHERS-  Lab Results   Component Value Date    HGBA1C 6.6 (A) 09/16/2023     (H) 10/05/2023        I personally reviewed the patient's recent vitals, labs, medications, orders, EKGs, pertinent cardiac imaging/  echocardiography.    Assessment and Plan:    Carolann Cartagena is a 89 y.o. female with pertinent history of GERD, hypertension, hyperlipidemia, anemia, insulin-dependent diabetes, lumbar stenosis, lymphedema, chronic kidney disease acute on chronic diastolic heart failure with BNP elevation of 555 September 2023, preserved ejection fraction with impaired relaxation, moderate concentric left ventricular hypertrophy, left atrial enlargement, mild to moderate pulmonary hypertension with RVSP of 46 mmHg, mild aortic stenosis on echo performed 9/16/2023 presents to cardiology clinic to establish care after recent hospitalization and multiple emergency room visits.   She is accompanied by her niece who provides history and has questions.  Her recent hospital course and emergency room course was reviewed and discussed.  She is doing relatively well.  Dyspnea on exertion is improved.  She still has some lower extremity edema but it has improved since hospitalization.  We reviewed her recent blood work.  She does note a recent emergency room visit where the niece describes confusion and loss of bowel and bladder control as well as some tonic-clonic like movements that sound more seizure-like.  No clear syncope.  She remains significantly hypertensive and will benefit from further afterload reduction.    We will increase hydralazine from 50 mg 3 times a day to 100 mg 3 times a day.    Please continue remaining cardiac medication including aspirin 81 mg daily, atorvastatin 10 mg daily, carvedilol 25 mg twice daily.    We will continue furosemide 40 mg once daily.  We did note that if there is significant increase in swelling you can increase this to 2 tablets a day for 3 to 4 days until swelling resolves.    We will arrange for follow-up with neurology for further evaluation of possible seizure-like episode.    Please followup with me in Cardiology clinic within the next 3 to 4 months.  Please return to clinic sooner or seek  emergent care if your symptoms reoccur or worsen.    Thank you for allowing me to participate in their care.  Please feel free to call me with any further questions or concerns.        Louie Iniguez MD, FAC, [facility]  Division of Cardiovascular Medicine  Medical Director, [facility]  [facility]  Assistant Clinical Professor, Medicine  [employer]  [email or web address]  Office:  [phone]

## 2023-10-12 ENCOUNTER — HOME CARE VISIT (OUTPATIENT)
Dept: HOME HEALTH SERVICES | Facility: HOME HEALTH | Age: 88
End: 2023-10-12
Payer: MEDICARE

## 2023-10-12 VITALS
HEIGHT: 65 IN | WEIGHT: 196 LBS | HEART RATE: 66 BPM | DIASTOLIC BLOOD PRESSURE: 64 MMHG | OXYGEN SATURATION: 97 % | TEMPERATURE: 98.6 F | SYSTOLIC BLOOD PRESSURE: 122 MMHG | BODY MASS INDEX: 32.65 KG/M2

## 2023-10-12 VITALS — OXYGEN SATURATION: 99 % | TEMPERATURE: 97.8 F | RESPIRATION RATE: 18 BRPM

## 2023-10-12 DIAGNOSIS — E11.22 TYPE 2 DIABETES MELLITUS WITH CHRONIC KIDNEY DISEASE, WITH LONG-TERM CURRENT USE OF INSULIN, UNSPECIFIED CKD STAGE (MULTI): ICD-10-CM

## 2023-10-12 DIAGNOSIS — Z79.4 TYPE 2 DIABETES MELLITUS WITH CHRONIC KIDNEY DISEASE, WITH LONG-TERM CURRENT USE OF INSULIN, UNSPECIFIED CKD STAGE (MULTI): ICD-10-CM

## 2023-10-12 LAB
ALBUMIN SERPL BCP-MCNC: 3.6 G/DL (ref 3.4–5)
ALP SERPL-CCNC: 61 U/L (ref 33–136)
ALT SERPL W P-5'-P-CCNC: 17 U/L (ref 7–45)
ANION GAP SERPL CALC-SCNC: 14 MMOL/L (ref 10–20)
AST SERPL W P-5'-P-CCNC: 18 U/L (ref 9–39)
BILIRUB SERPL-MCNC: 0.4 MG/DL (ref 0–1.2)
BUN SERPL-MCNC: 46 MG/DL (ref 6–23)
CALCIUM SERPL-MCNC: 9.1 MG/DL (ref 8.6–10.6)
CHLORIDE SERPL-SCNC: 90 MMOL/L (ref 98–107)
CO2 SERPL-SCNC: 23 MMOL/L (ref 21–32)
CREAT SERPL-MCNC: 2.2 MG/DL (ref 0.5–1.05)
GFR SERPL CREATININE-BSD FRML MDRD: 21 ML/MIN/1.73M*2
GLUCOSE SERPL-MCNC: 206 MG/DL (ref 74–99)
POTASSIUM SERPL-SCNC: 4.4 MMOL/L (ref 3.5–5.3)
PROT SERPL-MCNC: 6.7 G/DL (ref 6.4–8.2)
SODIUM SERPL-SCNC: 123 MMOL/L (ref 136–145)

## 2023-10-12 PROCEDURE — G0299 HHS/HOSPICE OF RN EA 15 MIN: HCPCS | Mod: HHH

## 2023-10-12 PROCEDURE — G0151 HHCP-SERV OF PT,EA 15 MIN: HCPCS | Mod: HHH

## 2023-10-12 PROCEDURE — 1090000002 HH PPS REVENUE DEBIT

## 2023-10-12 PROCEDURE — 1090000001 HH PPS REVENUE CREDIT

## 2023-10-12 RX ORDER — BLOOD SUGAR DIAGNOSTIC
1 STRIP MISCELLANEOUS 3 TIMES DAILY
Qty: 200 STRIP | Refills: 11 | Status: SHIPPED | OUTPATIENT
Start: 2023-10-12 | End: 2024-06-04 | Stop reason: SDUPTHER

## 2023-10-12 RX ORDER — BLOOD-GLUCOSE CONTROL, NORMAL
EACH MISCELLANEOUS
Qty: 100 EACH | Refills: 11 | Status: SHIPPED | OUTPATIENT
Start: 2023-10-12

## 2023-10-12 RX ORDER — DEXTROSE 4 G
TABLET,CHEWABLE ORAL
Qty: 1 EACH | Refills: 0 | Status: SHIPPED | OUTPATIENT
Start: 2023-10-12

## 2023-10-12 SDOH — ECONOMIC STABILITY: GENERAL

## 2023-10-12 ASSESSMENT — ACTIVITIES OF DAILY LIVING (ADL)
FEEDING: SUPERVISION
MONEY MANAGEMENT (EXPENSES/BILLS): INDEPENDENT
TOILETING: MODERATE ASSIST
HOUSEKEEPING ASSESSED: 1
TOILETING: 1
GROOMING ASSESSED: 1
DRESSING_UB_CURRENT_FUNCTION: STAND BY ASSIST
SHOPPING: DEPENDENT
AMBULATION ASSISTANCE: STAND BY ASSIST
LAUNDRY ASSESSED: 1
AMBULATION ASSISTANCE ON FLAT SURFACES: 1
BATHING_CURRENT_FUNCTION: ONE PERSON
PREPARING MEALS: NEEDS ASSISTANCE
BATHING_CURRENT_FUNCTION: MAXIMUM ASSIST
LIGHT HOUSEKEEPING: DEPENDENT
GROOMING_CURRENT_FUNCTION: SUPERVISION
BATHING_CURRENT_FUNCTION: STAND BY ASSIST
BATHING ASSESSED: 1
SHOPPING ASSESSED: 1
AMBULATION ASSISTANCE: 1
TRANSPORTATION: DEPENDENT
LAUNDRY: DEPENDENT
FEEDING ASSESSED: 1
TRANSPORTATION ASSESSED: 1

## 2023-10-12 ASSESSMENT — PAIN SCALES - PAIN ASSESSMENT IN ADVANCED DEMENTIA (PAINAD)
BREATHING: 0
FACIALEXPRESSION: 0
NEGVOCALIZATION: 0
CONSOLABILITY: 0 - NO NEED TO CONSOLE.
CONSOLABILITY: 0
CONSOLABILITY: 0 - NO NEED TO CONSOLE.
CONSOLABILITY: 0
BODYLANGUAGE: 0 - RELAXED.
FACIALEXPRESSION: 0 - SMILING OR INEXPRESSIVE.
TOTALSCORE: 0
BODYLANGUAGE: 0 - RELAXED.
NEGVOCALIZATION: 0
BREATHING: 0
BODYLANGUAGE: 0
FACIALEXPRESSION: 0
FACIALEXPRESSION: 0 - SMILING OR INEXPRESSIVE.
NEGVOCALIZATION: 0 - NONE.
TOTALSCORE: 0
BODYLANGUAGE: 0
NEGVOCALIZATION: 0 - NONE.

## 2023-10-12 ASSESSMENT — ENCOUNTER SYMPTOMS
BOWEL PATTERN NORMAL: 1
CHANGE IN APPETITE: UNCHANGED
DENIES PAIN: 1
DEPRESSION: 0
LOSS OF SENSATION IN FEET: 0
OCCASIONAL FEELINGS OF UNSTEADINESS: 1
DENIES PAIN: 1
APPETITE LEVEL: GOOD

## 2023-10-12 ASSESSMENT — LIFESTYLE VARIABLES
PERSONAL HISTORY ALCOHOL: 1
FAMILY HISTORY ILLEGAL DRUGS: 1

## 2023-10-13 ENCOUNTER — APPOINTMENT (OUTPATIENT)
Dept: RADIOLOGY | Facility: HOSPITAL | Age: 88
End: 2023-10-13
Payer: MEDICARE

## 2023-10-13 ENCOUNTER — HOSPITAL ENCOUNTER (EMERGENCY)
Facility: HOSPITAL | Age: 88
Discharge: HOME | End: 2023-10-13
Attending: EMERGENCY MEDICINE
Payer: MEDICARE

## 2023-10-13 ENCOUNTER — APPOINTMENT (OUTPATIENT)
Dept: PRIMARY CARE | Facility: CLINIC | Age: 88
End: 2023-10-13
Payer: MEDICARE

## 2023-10-13 VITALS
TEMPERATURE: 97.4 F | RESPIRATION RATE: 18 BRPM | SYSTOLIC BLOOD PRESSURE: 209 MMHG | HEART RATE: 66 BPM | DIASTOLIC BLOOD PRESSURE: 59 MMHG | OXYGEN SATURATION: 100 %

## 2023-10-13 DIAGNOSIS — S09.90XA CLOSED HEAD INJURY, INITIAL ENCOUNTER: Primary | ICD-10-CM

## 2023-10-13 PROCEDURE — 1090000002 HH PPS REVENUE DEBIT

## 2023-10-13 PROCEDURE — 99285 EMERGENCY DEPT VISIT HI MDM: CPT | Mod: 25

## 2023-10-13 PROCEDURE — 72125 CT NECK SPINE W/O DYE: CPT | Mod: MG

## 2023-10-13 PROCEDURE — 1090000001 HH PPS REVENUE CREDIT

## 2023-10-13 PROCEDURE — 70450 CT HEAD/BRAIN W/O DYE: CPT | Mod: MG

## 2023-10-13 PROCEDURE — 70450 CT HEAD/BRAIN W/O DYE: CPT | Performed by: STUDENT IN AN ORGANIZED HEALTH CARE EDUCATION/TRAINING PROGRAM

## 2023-10-13 PROCEDURE — 72125 CT NECK SPINE W/O DYE: CPT | Performed by: STUDENT IN AN ORGANIZED HEALTH CARE EDUCATION/TRAINING PROGRAM

## 2023-10-13 SDOH — ECONOMIC STABILITY: INCOME INSECURITY: IN THE LAST 12 MONTHS, WAS THERE A TIME WHEN YOU WERE NOT ABLE TO PAY THE MORTGAGE OR RENT ON TIME?: NO

## 2023-10-13 SDOH — HEALTH STABILITY: MENTAL HEALTH: HOW OFTEN DO YOU HAVE 6 OR MORE DRINKS ON ONE OCCASION?: NEVER

## 2023-10-13 SDOH — ECONOMIC STABILITY: INCOME INSECURITY: HOW HARD IS IT FOR YOU TO PAY FOR THE VERY BASICS LIKE FOOD, HOUSING, MEDICAL CARE, AND HEATING?: NOT HARD AT ALL

## 2023-10-13 SDOH — SOCIAL STABILITY: SOCIAL INSECURITY: WITHIN THE LAST YEAR, HAVE YOU BEEN HUMILIATED OR EMOTIONALLY ABUSED IN OTHER WAYS BY YOUR PARTNER OR EX-PARTNER?: NO

## 2023-10-13 SDOH — ECONOMIC STABILITY: FOOD INSECURITY: WITHIN THE PAST 12 MONTHS, YOU WORRIED THAT YOUR FOOD WOULD RUN OUT BEFORE YOU GOT MONEY TO BUY MORE.: NEVER TRUE

## 2023-10-13 SDOH — ECONOMIC STABILITY: HOUSING INSECURITY: IN THE LAST 12 MONTHS, HOW MANY PLACES HAVE YOU LIVED?: 2

## 2023-10-13 SDOH — HEALTH STABILITY: MENTAL HEALTH
STRESS IS WHEN SOMEONE FEELS TENSE, NERVOUS, ANXIOUS, OR CAN'T SLEEP AT NIGHT BECAUSE THEIR MIND IS TROUBLED. HOW STRESSED ARE YOU?: ONLY A LITTLE

## 2023-10-13 SDOH — ECONOMIC STABILITY: HOUSING INSECURITY
IN THE LAST 12 MONTHS, WAS THERE A TIME WHEN YOU DID NOT HAVE A STEADY PLACE TO SLEEP OR SLEPT IN A SHELTER (INCLUDING NOW)?: NO

## 2023-10-13 SDOH — SOCIAL STABILITY: SOCIAL NETWORK: HOW OFTEN DO YOU ATTENT MEETINGS OF THE CLUB OR ORGANIZATION YOU BELONG TO?: NEVER

## 2023-10-13 SDOH — SOCIAL STABILITY: SOCIAL NETWORK: HOW OFTEN DO YOU GET TOGETHER WITH FRIENDS OR RELATIVES?: MORE THAN THREE TIMES A WEEK

## 2023-10-13 SDOH — SOCIAL STABILITY: SOCIAL INSECURITY: WITHIN THE LAST YEAR, HAVE YOU BEEN AFRAID OF YOUR PARTNER OR EX-PARTNER?: NO

## 2023-10-13 SDOH — SOCIAL STABILITY: SOCIAL INSECURITY
WITHIN THE LAST YEAR, HAVE YOU BEEN KICKED, HIT, SLAPPED, OR OTHERWISE PHYSICALLY HURT BY YOUR PARTNER OR EX-PARTNER?: NO

## 2023-10-13 SDOH — HEALTH STABILITY: MENTAL HEALTH: HOW MANY STANDARD DRINKS CONTAINING ALCOHOL DO YOU HAVE ON A TYPICAL DAY?: PATIENT DOES NOT DRINK

## 2023-10-13 SDOH — ECONOMIC STABILITY: FOOD INSECURITY: WITHIN THE PAST 12 MONTHS, THE FOOD YOU BOUGHT JUST DIDN'T LAST AND YOU DIDN'T HAVE MONEY TO GET MORE.: NEVER TRUE

## 2023-10-13 SDOH — ECONOMIC STABILITY: INCOME INSECURITY: IN THE PAST 12 MONTHS, HAS THE ELECTRIC, GAS, OIL, OR WATER COMPANY THREATENED TO SHUT OFF SERVICE IN YOUR HOME?: NO

## 2023-10-13 SDOH — SOCIAL STABILITY: SOCIAL INSECURITY
WITHIN THE LAST YEAR, HAVE TO BEEN RAPED OR FORCED TO HAVE ANY KIND OF SEXUAL ACTIVITY BY YOUR PARTNER OR EX-PARTNER?: NO

## 2023-10-13 SDOH — ECONOMIC STABILITY: TRANSPORTATION INSECURITY
IN THE PAST 12 MONTHS, HAS LACK OF TRANSPORTATION KEPT YOU FROM MEETINGS, WORK, OR FROM GETTING THINGS NEEDED FOR DAILY LIVING?: NO

## 2023-10-13 SDOH — SOCIAL STABILITY: SOCIAL NETWORK
IN A TYPICAL WEEK, HOW MANY TIMES DO YOU TALK ON THE PHONE WITH FAMILY, FRIENDS, OR NEIGHBORS?: MORE THAN THREE TIMES A WEEK

## 2023-10-13 SDOH — HEALTH STABILITY: MENTAL HEALTH: HOW OFTEN DO YOU HAVE A DRINK CONTAINING ALCOHOL?: NEVER

## 2023-10-13 SDOH — HEALTH STABILITY: PHYSICAL HEALTH: ON AVERAGE, HOW MANY DAYS PER WEEK DO YOU ENGAGE IN MODERATE TO STRENUOUS EXERCISE (LIKE A BRISK WALK)?: 0 DAYS

## 2023-10-13 SDOH — SOCIAL STABILITY: SOCIAL NETWORK: HOW OFTEN DO YOU ATTEND CHURCH OR RELIGIOUS SERVICES?: MORE THAN 4 TIMES PER YEAR

## 2023-10-13 SDOH — SOCIAL STABILITY: SOCIAL NETWORK
DO YOU BELONG TO ANY CLUBS OR ORGANIZATIONS SUCH AS CHURCH GROUPS UNIONS, FRATERNAL OR ATHLETIC GROUPS, OR SCHOOL GROUPS?: NO

## 2023-10-13 SDOH — SOCIAL STABILITY: SOCIAL NETWORK: ARE YOU MARRIED, WIDOWED, DIVORCED, SEPARATED, NEVER MARRIED, OR LIVING WITH A PARTNER?: WIDOWED

## 2023-10-13 SDOH — ECONOMIC STABILITY: TRANSPORTATION INSECURITY
IN THE PAST 12 MONTHS, HAS THE LACK OF TRANSPORTATION KEPT YOU FROM MEDICAL APPOINTMENTS OR FROM GETTING MEDICATIONS?: NO

## 2023-10-13 SDOH — HEALTH STABILITY: PHYSICAL HEALTH: ON AVERAGE, HOW MANY MINUTES DO YOU ENGAGE IN EXERCISE AT THIS LEVEL?: 0 MIN

## 2023-10-13 ASSESSMENT — PAIN - FUNCTIONAL ASSESSMENT: PAIN_FUNCTIONAL_ASSESSMENT: 0-10

## 2023-10-13 ASSESSMENT — ACTIVITIES OF DAILY LIVING (ADL): LACK_OF_TRANSPORTATION: NO

## 2023-10-13 ASSESSMENT — LIFESTYLE VARIABLES
AUDIT-C TOTAL SCORE: 0
SKIP TO QUESTIONS 9-10: 1

## 2023-10-13 ASSESSMENT — PAIN SCALES - GENERAL: PAINLEVEL_OUTOF10: 0 - NO PAIN

## 2023-10-13 NOTE — DISCHARGE INSTRUCTIONS
Follow-up with your PCP.  May use Tylenol for pain.  Ice for swelling.  Return the emergency department for severe headache neck pain vomiting confusion change in ambulatory status or any other problems.

## 2023-10-13 NOTE — PROGRESS NOTES
10/13/23 1105   [facility] Disability Status   Are you deaf or do you have serious difficulty hearing? N   Are you blind or do you have serious difficulty seeing, even when wearing glasses? N   Because of a physical, mental, or emotional condition, do you have serious difficulty concentrating, remembering, or making decisions? (5 years old or older) Y   Do you have serious difficulty walking or climbing stairs? Y  (rollator)   Do you have serious difficulty dressing or bathing? N   Because of a physical, mental, or emotional condition, do you have serious difficulty doing errands alone such as visiting the doctor? Y

## 2023-10-13 NOTE — PROGRESS NOTES
Home with daughter, adult granddaughter & private aid     10/13/23 1100   Current Planned Discharge Disposition   Current Planned Discharge Disposition Home

## 2023-10-13 NOTE — PROGRESS NOTES
10/13/23 1109   Physical Activity   On average, how many days per week do you engage in moderate to strenuous exercise (like a brisk walk)? 0 days   On average, how many minutes do you engage in exercise at this level? 0 min   Financial Resource Strain   How hard is it for you to pay for the very basics like food, housing, medical care, and heating? Not hard   Housing Stability   In the last 12 months, was there a time when you were not able to pay the mortgage or rent on time? N   In the last 12 months, how many places have you lived? 2   In the last 12 months, was there a time when you did not have a steady place to sleep or slept in a shelter (including now)? N   Transportation Needs   In the past 12 months, has lack of transportation kept you from medical appointments or from getting medications? no   In the past 12 months, has lack of transportation kept you from meetings, work, or from getting things needed for daily living? No   Food Insecurity   Within the past 12 months, you worried that your food would run out before you got the money to buy more. Never true   Within the past 12 months, the food you bought just didn't last and you didn't have money to get more. Never true   Stress   Do you feel stress - tense, restless, nervous, or anxious, or unable to sleep at night because your mind is troubled all the time - these days? Only a littl   Social Connections   In a typical week, how many times do you talk on the phone with family, friends, or neighbors? More than 3   How often do you get together with friends or relatives? More than 3   How often do you attend Adventism or Pentecostalism services? More than 4  (Trinh guerin on Zoom)   Do you belong to any clubs or organizations such as Adventism groups, unions, fraternal or athletic groups, or school groups? No   How often do you attend meetings of the clubs or organizations you belong to? Never   Are you , , , , never , or  living with a partner?    Intimate Partner Violence   Within the last year, have you been afraid of your partner or ex-partner? No   Within the last year, have you been humiliated or emotionally abused in other ways by your partner or ex-partner? No   Within the last year, have you been kicked, hit, slapped, or otherwise physically hurt by your partner or ex-partner? No   Within the last year, have you been raped or forced to have any kind of sexual activity by your partner or ex-partner? No   Alcohol Use   Q1: How often do you have a drink containing alcohol? Never   Q2: How many drinks containing alcohol do you have on a typical day when you are drinking? None   Q3: How often do you have six or more drinks on one occasion? Never   Utilities   In the past 12 months has the electric, gas, oil, or water company threatened to shut off services in your home? No

## 2023-10-13 NOTE — PROGRESS NOTES
Transitional Care Coordination Progress Note:  Plan per Medical/Surgical team: treatment of fall in bathroom with head strike  Status: ED  Payor source: [employer] medicare ADV  Discharge disposition: home with daughter & granddaughter  Potential Barriers: /65, , renal 46/2.20  ADOD: 10/13/203  ERNESTO Joshi RN, BSN Transitional Care Coordinator ED# [phone]       10/13/23 1106   Discharge Planning   Living Arrangements Children   Support Systems Children   Assistance Needed assistance with ADL's   Type of Residence Private residence   Number of Stairs to Enter Residence 2   Number of Stairs Within Residence 0   Do you have animals or pets at home? Yes   Type of Animals or Pets 1 cat   Home or Post Acute Services None   Patient expects to be discharged to: home with daughter, adult granddaughter & HHA service 2 hours every two weeks   Does the patient need discharge transport arranged? No   Financial Resource Strain   How hard is it for you to pay for the very basics like food, housing, medical care, and heating? Not hard   Housing Stability   In the last 12 months, was there a time when you were not able to pay the mortgage or rent on time? N   In the last 12 months, how many places have you lived? 1   In the last 12 months, was there a time when you did not have a steady place to sleep or slept in a shelter (including now)? N   Transportation Needs   In the past 12 months, has lack of transportation kept you from medical appointments or from getting medications? no   In the past 12 months, has lack of transportation kept you from meetings, work, or from getting things needed for daily living? No   Patient Choice   Provider Choice list and CMS website (https://medicare.gov/care-compare#search) for post-acute Quality and Resource Measure Data were provided and reviewed with: Patient;Family   Patient / Family choosing to utilize agency / facility established prior to hospitalization Yes

## 2023-10-13 NOTE — ED NOTES
Pt arrived via private vehicle from home with chief c/o of fall in the bathroom this morning. Pt fell getting off of the toilet, falling into the tub or her walker. Pt states she remembers the fall, denies dizziness prior. Pt states she felt weak. She denies LOC, not on blood thinners. Pt is AOx3 on arrival. Pt has hx of DM, HTN. Family endorses pt having increased weakness lately with multiple falls, no other known injuries.      Minnie Tony RN  10/13/23 3764

## 2023-10-13 NOTE — ED PROVIDER NOTES
HPI   Chief Complaint   Patient presents with    Head Injury       89-year-old female fell in the bathroom shortly prior to arrival.  She was walking with her rollator lost her balance causing the fall.  There is no dizziness or syncope.  She bumped her forehead against a rollator as she fell.  No loss conscious.  No headache.  No neck pain.  She is at her baseline status and ability.  She arrived by private car with family.  She has been ambulating at her normal baseline status.  She is not on anticoagulants.  No nausea vomiting.  States she feels fine and she came to the ED at the insistence of her family.  She did not want to come in.          History provided by:  Patient and relative   used: No                        No data recorded                Patient History   Past Medical History:   Diagnosis Date    Encounter for general adult medical examination without abnormal findings 02/18/2021    Encounter for annual health examination    Encounter for general adult medical examination without abnormal findings 04/02/2020    Encounter for annual health examination    Urinary tract infection, site not specified 01/11/2021    Acute UTI     Past Surgical History:   Procedure Laterality Date    OTHER SURGICAL HISTORY  02/13/2020    Hysterectomy     Family History   Problem Relation Name Age of Onset    No Known Problems Mother      Bone cancer Sister      Lung cancer Daughter       Social History     Tobacco Use    Smoking status: Never    Smokeless tobacco: Never   Substance Use Topics    Alcohol use: Not Currently    Drug use: Never       Physical Exam   ED Triage Vitals [10/13/23 0844]   Temp Heart Rate Resp BP   36.3 °C (97.4 °F) 56 18 (!) 205/65      SpO2 Temp Source Heart Rate Source Patient Position   100 % Oral Monitor --      BP Location FiO2 (%)     Right arm --       Physical Exam  Vitals and nursing note reviewed.   Constitutional:       General: She is not in acute distress.      Appearance: Normal appearance. She is not ill-appearing, toxic-appearing or diaphoretic.   HENT:      Head: Normocephalic and atraumatic.      Comments: Atraumatic other than mild soft tissue swelling mid forehead abrasion above her nose.  River's raccoon sign negative.  No rhinorrhea or otorrhea.     Nose: Nose normal.   Eyes:      Extraocular Movements: Extraocular movements intact.      Pupils: Pupils are equal, round, and reactive to light.   Cardiovascular:      Rate and Rhythm: Normal rate and regular rhythm.   Pulmonary:      Effort: Pulmonary effort is normal.      Breath sounds: Normal breath sounds.   Abdominal:      General: Abdomen is flat.      Palpations: Abdomen is soft.      Tenderness: There is no abdominal tenderness.   Musculoskeletal:         General: No swelling, tenderness, deformity or signs of injury. Normal range of motion.      Cervical back: Normal range of motion and neck supple. No tenderness.      Right lower leg: No edema.      Left lower leg: No edema.   Skin:     General: Skin is warm and dry.   Neurological:      General: No focal deficit present.      Mental Status: She is alert and oriented to person, place, and time. Mental status is at baseline.      Cranial Nerves: No cranial nerve deficit.      Sensory: No sensory deficit.      Motor: No weakness.   Psychiatric:         Mood and Affect: Mood normal.         Behavior: Behavior normal.         Thought Content: Thought content normal.         Judgment: Judgment normal.       CT head wo IV contrast   Final Result   1. No acute intracranial hemorrhage or depressed skull fracture.        MACRO:   None        Signed by: Mykel Santos 10/13/2023 10:15 AM   Dictation workstation:   [id number]      CT cervical spine wo IV contrast   Final Result   1. No evidence for an acute fracture or subluxation of the cervical   spine.   2. Advanced multilevel cervical spine spondylosis with mild   multilevel spinal canal stenosis mainly at C5-6.         MACRO:   None        Signed by: Mykel Santos 10/13/2023 10:20 AM   Dictation workstation:   [id number]            ED Course & [facility]   ED Course as of 10/13/23 1042   Fri Oct 13, 2023   1041 CT head.  No bleed or fracture.  CT C-spine no fracture.  Degenerative changes. [GA]      ED Course User Index  [GA] Sal Osei PA-C       Medical Decision Making  Patient declined Tylenol initial assessment.  CT imaging unremarkable other than degenerative changes.    The patient has also been seen and evaluated by the ER physician.  Stephanie.  Discharge outpatient management.  Agrees with ER assessment, disposition and plan.    Evaluation consistent with forehead contusion after mechanical fall.  Stable for discharge outpatient management.  She has family at home to assist with her care.  Continue use of her walker to assist with ambulation.  May use Tylenol if needed for pain.  Ice for swelling.          Procedure  Procedures     Sal Osei PA-C  10/13/23 1041

## 2023-10-14 PROCEDURE — 1090000001 HH PPS REVENUE CREDIT

## 2023-10-14 PROCEDURE — 1090000002 HH PPS REVENUE DEBIT

## 2023-10-15 PROCEDURE — 1090000002 HH PPS REVENUE DEBIT

## 2023-10-15 PROCEDURE — 1090000001 HH PPS REVENUE CREDIT

## 2023-10-16 ENCOUNTER — TELEPHONE (OUTPATIENT)
Dept: CARDIOLOGY | Facility: CLINIC | Age: 88
End: 2023-10-16
Payer: MEDICARE

## 2023-10-16 DIAGNOSIS — E11.22 TYPE 2 DIABETES MELLITUS WITH CHRONIC KIDNEY DISEASE, WITH LONG-TERM CURRENT USE OF INSULIN, UNSPECIFIED CKD STAGE (MULTI): ICD-10-CM

## 2023-10-16 DIAGNOSIS — Z79.4 TYPE 2 DIABETES MELLITUS WITH CHRONIC KIDNEY DISEASE, WITH LONG-TERM CURRENT USE OF INSULIN, UNSPECIFIED CKD STAGE (MULTI): ICD-10-CM

## 2023-10-16 PROCEDURE — 1090000001 HH PPS REVENUE CREDIT

## 2023-10-16 PROCEDURE — 1090000002 HH PPS REVENUE DEBIT

## 2023-10-16 RX ORDER — LANCETS 26 GAUGE
EACH MISCELLANEOUS
Qty: 1 EACH | Refills: 0 | Status: SHIPPED | OUTPATIENT
Start: 2023-10-16 | End: 2024-10-15

## 2023-10-16 NOTE — TELEPHONE ENCOUNTER
Patients daughter called with concerns of lower blood pressures since starting hydralazine. Readings include 128/40 before taking medication yesterday AM. Rechecking at 12:30 was 118/40.  Recheck after that around 2pm was 90/40. Patients symptoms include fatigue and new wet cough. Denies SOB, CP and dizziness. I told her that if the patients symptoms get worse they should be seen in the emergency room.

## 2023-10-16 NOTE — TELEPHONE ENCOUNTER
Spoke with patient's daughter regarding Dr. Iniguez's recommendations. She verbalizes understanding. I moved the patients follow up to 11/22/23 @ 11AM. I told her to call back with further questions or concerns

## 2023-10-17 ENCOUNTER — HOME CARE VISIT (OUTPATIENT)
Dept: HOME HEALTH SERVICES | Facility: HOME HEALTH | Age: 88
End: 2023-10-17
Payer: MEDICARE

## 2023-10-17 VITALS
SYSTOLIC BLOOD PRESSURE: 134 MMHG | OXYGEN SATURATION: 98 % | RESPIRATION RATE: 18 BRPM | TEMPERATURE: 96.2 F | HEART RATE: 60 BPM | DIASTOLIC BLOOD PRESSURE: 72 MMHG

## 2023-10-17 PROCEDURE — 1090000001 HH PPS REVENUE CREDIT

## 2023-10-17 PROCEDURE — G0300 HHS/HOSPICE OF LPN EA 15 MIN: HCPCS | Mod: HHH

## 2023-10-17 PROCEDURE — 1090000002 HH PPS REVENUE DEBIT

## 2023-10-17 ASSESSMENT — ENCOUNTER SYMPTOMS
OCCASIONAL FEELINGS OF UNSTEADINESS: 0
PERSON REPORTING PAIN: PATIENT
APPETITE LEVEL: GOOD
DENIES PAIN: 1
CHANGE IN APPETITE: UNCHANGED

## 2023-10-18 ENCOUNTER — APPOINTMENT (OUTPATIENT)
Dept: PRIMARY CARE | Facility: CLINIC | Age: 88
End: 2023-10-18
Payer: MEDICARE

## 2023-10-18 ENCOUNTER — HOME CARE VISIT (OUTPATIENT)
Dept: HOME HEALTH SERVICES | Facility: HOME HEALTH | Age: 88
End: 2023-10-18
Payer: MEDICARE

## 2023-10-18 VITALS
SYSTOLIC BLOOD PRESSURE: 132 MMHG | HEART RATE: 62 BPM | TEMPERATURE: 96.9 F | OXYGEN SATURATION: 97 % | DIASTOLIC BLOOD PRESSURE: 68 MMHG | RESPIRATION RATE: 18 BRPM

## 2023-10-18 PROCEDURE — G0157 HHC PT ASSISTANT EA 15: HCPCS | Mod: HHH

## 2023-10-18 PROCEDURE — 1090000002 HH PPS REVENUE DEBIT

## 2023-10-18 PROCEDURE — 1090000001 HH PPS REVENUE CREDIT

## 2023-10-19 ENCOUNTER — HOME CARE VISIT (OUTPATIENT)
Dept: HOME HEALTH SERVICES | Facility: HOME HEALTH | Age: 88
End: 2023-10-19
Payer: MEDICARE

## 2023-10-19 PROCEDURE — 1090000001 HH PPS REVENUE CREDIT

## 2023-10-19 PROCEDURE — 1090000002 HH PPS REVENUE DEBIT

## 2023-10-20 PROCEDURE — 1090000002 HH PPS REVENUE DEBIT

## 2023-10-20 PROCEDURE — 1090000001 HH PPS REVENUE CREDIT

## 2023-10-21 PROCEDURE — 1090000002 HH PPS REVENUE DEBIT

## 2023-10-21 PROCEDURE — 1090000001 HH PPS REVENUE CREDIT

## 2023-10-22 PROCEDURE — 1090000001 HH PPS REVENUE CREDIT

## 2023-10-22 PROCEDURE — 1090000002 HH PPS REVENUE DEBIT

## 2023-10-23 PROCEDURE — 1090000001 HH PPS REVENUE CREDIT

## 2023-10-23 PROCEDURE — 1090000002 HH PPS REVENUE DEBIT

## 2023-10-24 ENCOUNTER — HOME CARE VISIT (OUTPATIENT)
Dept: HOME HEALTH SERVICES | Facility: HOME HEALTH | Age: 88
End: 2023-10-24
Payer: MEDICARE

## 2023-10-24 ENCOUNTER — APPOINTMENT (OUTPATIENT)
Dept: HOME HEALTH SERVICES | Facility: HOME HEALTH | Age: 88
End: 2023-10-24
Payer: MEDICARE

## 2023-10-24 VITALS
OXYGEN SATURATION: 97 % | TEMPERATURE: 97.3 F | DIASTOLIC BLOOD PRESSURE: 64 MMHG | HEART RATE: 55 BPM | SYSTOLIC BLOOD PRESSURE: 140 MMHG

## 2023-10-24 PROCEDURE — 1090000001 HH PPS REVENUE CREDIT

## 2023-10-24 PROCEDURE — G0157 HHC PT ASSISTANT EA 15: HCPCS | Mod: HHH

## 2023-10-24 PROCEDURE — 1090000002 HH PPS REVENUE DEBIT

## 2023-10-25 DIAGNOSIS — E78.5 HYPERLIPIDEMIA, UNSPECIFIED HYPERLIPIDEMIA TYPE: ICD-10-CM

## 2023-10-25 DIAGNOSIS — I10 ESSENTIAL HYPERTENSION: ICD-10-CM

## 2023-10-25 PROCEDURE — 1090000001 HH PPS REVENUE CREDIT

## 2023-10-25 PROCEDURE — 1090000002 HH PPS REVENUE DEBIT

## 2023-10-26 PROCEDURE — 1090000002 HH PPS REVENUE DEBIT

## 2023-10-26 PROCEDURE — 1090000001 HH PPS REVENUE CREDIT

## 2023-10-26 RX ORDER — CARVEDILOL 25 MG/1
25 TABLET ORAL
Qty: 60 TABLET | Refills: 11 | Status: SHIPPED | OUTPATIENT
Start: 2023-10-26 | End: 2023-10-27 | Stop reason: SDUPTHER

## 2023-10-26 RX ORDER — HYDRALAZINE HYDROCHLORIDE 100 MG/1
100 TABLET, FILM COATED ORAL 3 TIMES DAILY
Qty: 90 TABLET | Refills: 11 | Status: SHIPPED | OUTPATIENT
Start: 2023-10-26 | End: 2023-10-27 | Stop reason: SDUPTHER

## 2023-10-26 RX ORDER — ATORVASTATIN CALCIUM 10 MG/1
10 TABLET, FILM COATED ORAL DAILY
Qty: 90 TABLET | Refills: 3 | Status: SHIPPED | OUTPATIENT
Start: 2023-10-26 | End: 2023-10-27 | Stop reason: SDUPTHER

## 2023-10-26 RX ORDER — FUROSEMIDE 20 MG/1
40 TABLET ORAL DAILY
Qty: 60 TABLET | Refills: 2 | Status: SHIPPED | OUTPATIENT
Start: 2023-10-26 | End: 2023-10-27 | Stop reason: SDUPTHER

## 2023-10-27 ENCOUNTER — HOME CARE VISIT (OUTPATIENT)
Dept: HOME HEALTH SERVICES | Facility: HOME HEALTH | Age: 88
End: 2023-10-27
Payer: MEDICARE

## 2023-10-27 PROCEDURE — 1090000001 HH PPS REVENUE CREDIT

## 2023-10-27 PROCEDURE — G0299 HHS/HOSPICE OF RN EA 15 MIN: HCPCS | Mod: HHH

## 2023-10-27 PROCEDURE — 1090000002 HH PPS REVENUE DEBIT

## 2023-10-27 SDOH — ECONOMIC STABILITY: GENERAL

## 2023-10-27 ASSESSMENT — ENCOUNTER SYMPTOMS
SUBJECTIVE PAIN PROGRESSION: UNCHANGED
LOWEST PAIN SEVERITY IN PAST 24 HOURS: 0/10
CONSTIPATION: 1
PAIN SEVERITY GOAL: 0/10
STOOL FREQUENCY: LESS THAN DAILY
DENIES PAIN: 1
FATIGUES EASILY: 1
OCCASIONAL FEELINGS OF UNSTEADINESS: 1
FATIGUE: 1
CHANGE IN APPETITE: UNCHANGED
LOSS OF SENSATION IN FEET: 1
DEPRESSION: 0
PERSON REPORTING PAIN: PATIENT
APPETITE LEVEL: GOOD
DYSPNEA ACTIVITY LEVEL: AFTER AMBULATING MORE THAN 20 FT
SHORTNESS OF BREATH: 1
HIGHEST PAIN SEVERITY IN PAST 24 HOURS: 2/10
LAST BOWEL MOVEMENT: 66772

## 2023-10-27 ASSESSMENT — ACTIVITIES OF DAILY LIVING (ADL)
MONEY MANAGEMENT (EXPENSES/BILLS): NEEDS ASSISTANCE
AMBULATION ASSISTANCE: INDEPENDENT
AMBULATION ASSISTANCE: 1

## 2023-10-27 ASSESSMENT — PAIN SCALES - PAIN ASSESSMENT IN ADVANCED DEMENTIA (PAINAD)
FACIALEXPRESSION: 0
BODYLANGUAGE: 0 - RELAXED.
CONSOLABILITY: 0 - NO NEED TO CONSOLE.
NEGVOCALIZATION: 0 - NONE.
CONSOLABILITY: 0
BODYLANGUAGE: 0
TOTALSCORE: 0
FACIALEXPRESSION: 0 - SMILING OR INEXPRESSIVE.
BREATHING: 0
NEGVOCALIZATION: 0

## 2023-10-28 PROCEDURE — 1090000002 HH PPS REVENUE DEBIT

## 2023-10-28 PROCEDURE — 1090000001 HH PPS REVENUE CREDIT

## 2023-10-29 PROCEDURE — 1090000002 HH PPS REVENUE DEBIT

## 2023-10-29 PROCEDURE — 1090000001 HH PPS REVENUE CREDIT

## 2023-10-29 RX ORDER — FUROSEMIDE 20 MG/1
40 TABLET ORAL DAILY
Qty: 60 TABLET | Refills: 2 | Status: SHIPPED | OUTPATIENT
Start: 2023-10-29 | End: 2024-01-03 | Stop reason: SDUPTHER

## 2023-10-29 RX ORDER — ATORVASTATIN CALCIUM 10 MG/1
10 TABLET, FILM COATED ORAL DAILY
Qty: 90 TABLET | Refills: 3 | Status: SHIPPED | OUTPATIENT
Start: 2023-10-29 | End: 2024-01-17 | Stop reason: SDUPTHER

## 2023-10-29 RX ORDER — HYDRALAZINE HYDROCHLORIDE 100 MG/1
100 TABLET, FILM COATED ORAL 3 TIMES DAILY
Qty: 90 TABLET | Refills: 11 | Status: SHIPPED | OUTPATIENT
Start: 2023-10-29 | End: 2024-02-12 | Stop reason: DRUGHIGH

## 2023-10-29 RX ORDER — CARVEDILOL 25 MG/1
25 TABLET ORAL
Qty: 60 TABLET | Refills: 11 | Status: SHIPPED | OUTPATIENT
Start: 2023-10-29 | End: 2024-01-17 | Stop reason: SDUPTHER

## 2023-10-30 ENCOUNTER — APPOINTMENT (OUTPATIENT)
Dept: HOME HEALTH SERVICES | Facility: HOME HEALTH | Age: 88
End: 2023-10-30
Payer: MEDICARE

## 2023-10-30 PROCEDURE — 1090000002 HH PPS REVENUE DEBIT

## 2023-10-30 PROCEDURE — 1090000001 HH PPS REVENUE CREDIT

## 2023-10-31 PROCEDURE — 1090000001 HH PPS REVENUE CREDIT

## 2023-10-31 PROCEDURE — 1090000002 HH PPS REVENUE DEBIT

## 2023-11-01 ENCOUNTER — HOME CARE VISIT (OUTPATIENT)
Dept: HOME HEALTH SERVICES | Facility: HOME HEALTH | Age: 88
End: 2023-11-01
Payer: MEDICARE

## 2023-11-01 VITALS
TEMPERATURE: 96.6 F | SYSTOLIC BLOOD PRESSURE: 124 MMHG | OXYGEN SATURATION: 96 % | DIASTOLIC BLOOD PRESSURE: 54 MMHG | HEART RATE: 52 BPM

## 2023-11-01 PROCEDURE — 1090000001 HH PPS REVENUE CREDIT

## 2023-11-01 PROCEDURE — G0157 HHC PT ASSISTANT EA 15: HCPCS | Mod: HHH

## 2023-11-01 PROCEDURE — 1090000002 HH PPS REVENUE DEBIT

## 2023-11-02 PROCEDURE — 1090000001 HH PPS REVENUE CREDIT

## 2023-11-02 PROCEDURE — 1090000002 HH PPS REVENUE DEBIT

## 2023-11-03 ENCOUNTER — APPOINTMENT (OUTPATIENT)
Dept: HOME HEALTH SERVICES | Facility: HOME HEALTH | Age: 88
End: 2023-11-03
Payer: MEDICARE

## 2023-11-03 PROCEDURE — 1090000002 HH PPS REVENUE DEBIT

## 2023-11-03 PROCEDURE — 1090000001 HH PPS REVENUE CREDIT

## 2023-11-04 PROCEDURE — 1090000001 HH PPS REVENUE CREDIT

## 2023-11-04 PROCEDURE — 1090000002 HH PPS REVENUE DEBIT

## 2023-11-04 SDOH — HEALTH STABILITY: PHYSICAL HEALTH: EXERCISE ACTIVITIES SETS: 1

## 2023-11-04 SDOH — HEALTH STABILITY: PHYSICAL HEALTH: PHYSICAL EXERCISE: STANDING

## 2023-11-04 SDOH — HEALTH STABILITY: PHYSICAL HEALTH: PHYSICAL EXERCISE: 15

## 2023-11-04 SDOH — HEALTH STABILITY: PHYSICAL HEALTH: EXERCISE ACTIVITY: LONG ARC QUADS

## 2023-11-04 SDOH — HEALTH STABILITY: PHYSICAL HEALTH: PHYSICAL EXERCISE: SEATED

## 2023-11-04 SDOH — HEALTH STABILITY: PHYSICAL HEALTH: RESISTANCE: AROM

## 2023-11-04 SDOH — HEALTH STABILITY: PHYSICAL HEALTH: EXERCISE ACTIVITY: HIP EXT

## 2023-11-04 SDOH — HEALTH STABILITY: PHYSICAL HEALTH: EXERCISE ACTIVITY: MARCHING

## 2023-11-04 SDOH — HEALTH STABILITY: PHYSICAL HEALTH: EXERCISE ACTIVITY: HIP ABD

## 2023-11-04 SDOH — HEALTH STABILITY: PHYSICAL HEALTH: EXERCISE ACTIVITY: HAMSTRING CURLS

## 2023-11-04 SDOH — HEALTH STABILITY: PHYSICAL HEALTH: EXERCISE TYPE: GENERAL RECONDITIONING

## 2023-11-04 SDOH — HEALTH STABILITY: PHYSICAL HEALTH: EXERCISE ACTIVITY: ANKLE PUMPS

## 2023-11-04 SDOH — HEALTH STABILITY: PHYSICAL HEALTH: EXERCISE ACTIVITY: MINI SQUATS

## 2023-11-04 ASSESSMENT — ACTIVITIES OF DAILY LIVING (ADL)
AMBULATION ASSISTANCE ON FLAT SURFACES: 1
AMBULATION_DISTANCE/DURATION_TOLERATED: 100 FT

## 2023-11-04 ASSESSMENT — ENCOUNTER SYMPTOMS: DENIES PAIN: 1

## 2023-11-05 PROCEDURE — 1090000001 HH PPS REVENUE CREDIT

## 2023-11-05 PROCEDURE — 1090000002 HH PPS REVENUE DEBIT

## 2023-11-06 ENCOUNTER — OFFICE VISIT (OUTPATIENT)
Dept: PRIMARY CARE | Facility: CLINIC | Age: 88
End: 2023-11-06
Payer: MEDICARE

## 2023-11-06 VITALS
OXYGEN SATURATION: 96 % | RESPIRATION RATE: 12 BRPM | DIASTOLIC BLOOD PRESSURE: 69 MMHG | SYSTOLIC BLOOD PRESSURE: 147 MMHG | HEART RATE: 65 BPM | BODY MASS INDEX: 30.12 KG/M2 | WEIGHT: 181 LBS

## 2023-11-06 DIAGNOSIS — Z79.4 TYPE 2 DIABETES MELLITUS WITH CHRONIC KIDNEY DISEASE, WITH LONG-TERM CURRENT USE OF INSULIN, UNSPECIFIED CKD STAGE (MULTI): ICD-10-CM

## 2023-11-06 DIAGNOSIS — Z09 FOLLOW-UP VISIT FOR COMPLETED OSTEOPOROSIS DRUG THERAPY: ICD-10-CM

## 2023-11-06 DIAGNOSIS — E11.22 TYPE 2 DIABETES MELLITUS WITH CHRONIC KIDNEY DISEASE, WITH LONG-TERM CURRENT USE OF INSULIN, UNSPECIFIED CKD STAGE (MULTI): ICD-10-CM

## 2023-11-06 DIAGNOSIS — N18.30 STAGE 3 CHRONIC KIDNEY DISEASE, UNSPECIFIED WHETHER STAGE 3A OR 3B CKD (MULTI): ICD-10-CM

## 2023-11-06 DIAGNOSIS — E87.1 HYPONATREMIA: ICD-10-CM

## 2023-11-06 DIAGNOSIS — M81.0 FOLLOW-UP VISIT FOR COMPLETED OSTEOPOROSIS DRUG THERAPY: ICD-10-CM

## 2023-11-06 DIAGNOSIS — I10 ESSENTIAL HYPERTENSION: Primary | ICD-10-CM

## 2023-11-06 DIAGNOSIS — E78.5 HYPERLIPIDEMIA, UNSPECIFIED HYPERLIPIDEMIA TYPE: ICD-10-CM

## 2023-11-06 DIAGNOSIS — D64.9 ANEMIA, UNSPECIFIED TYPE: ICD-10-CM

## 2023-11-06 DIAGNOSIS — I67.82 CEREBRAL ISCHEMIA: ICD-10-CM

## 2023-11-06 LAB — POC FINGERSTICK BLOOD GLUCOSE: 291 MG/DL (ref 70–100)

## 2023-11-06 PROCEDURE — 3077F SYST BP >= 140 MM HG: CPT | Performed by: INTERNAL MEDICINE

## 2023-11-06 PROCEDURE — 1036F TOBACCO NON-USER: CPT | Performed by: INTERNAL MEDICINE

## 2023-11-06 PROCEDURE — 1126F AMNT PAIN NOTED NONE PRSNT: CPT | Performed by: INTERNAL MEDICINE

## 2023-11-06 PROCEDURE — 1090000002 HH PPS REVENUE DEBIT

## 2023-11-06 PROCEDURE — 1159F MED LIST DOCD IN RCRD: CPT | Performed by: INTERNAL MEDICINE

## 2023-11-06 PROCEDURE — 1090000001 HH PPS REVENUE CREDIT

## 2023-11-06 PROCEDURE — 99214 OFFICE O/P EST MOD 30 MIN: CPT | Performed by: INTERNAL MEDICINE

## 2023-11-06 PROCEDURE — 82962 GLUCOSE BLOOD TEST: CPT | Performed by: INTERNAL MEDICINE

## 2023-11-06 PROCEDURE — 3078F DIAST BP <80 MM HG: CPT | Performed by: INTERNAL MEDICINE

## 2023-11-06 RX ORDER — CALCIUM CARB/VITAMIN D3/VIT K1 500-100-40
TABLET,CHEWABLE ORAL
Qty: 100 EACH | Refills: 11 | Status: SHIPPED | OUTPATIENT
Start: 2023-11-06 | End: 2024-11-05

## 2023-11-06 RX ORDER — INSULIN GLARGINE 100 [IU]/ML
18 INJECTION, SOLUTION SUBCUTANEOUS 2 TIMES DAILY
Qty: 10 ML | Refills: 11 | Status: SHIPPED | OUTPATIENT
Start: 2023-11-06 | End: 2023-11-22 | Stop reason: WASHOUT

## 2023-11-06 NOTE — PROGRESS NOTES
"Subjective   Chief complaint: Carolann Cartagena is a 89 y.o. female who presents for Follow-up.    HPI:  89 year old female presents today for follow up of hospital admission due to high blood pressure and sugar. Was admitted for 2 weeks. Upon returning home, patient fell. No pathology or bleeding found. Pt was recommended to see a nephrologist and neurologist. Sees nephrologist Wednesday but has not found a neurologist. Pt daughter states she needs neurologist due to \"going in and out\" and sx including being frozen in place, drooling. Vomited twice last weekend. Denies stomach pain, hematemesis, hematuria, blood in the stool, difficulty with bowel/bladder movements, fever. Has lost 9lbs in the past month. High blood sugar today.         Objective   /69   Pulse 65   Resp 12   Wt 82.1 kg (181 lb)   SpO2 96%   BMI 30.12 kg/m²   Physical Exam  Constitutional:       General: She is not in acute distress.     Appearance: Normal appearance. She is normal weight. She is not ill-appearing.   HENT:      Head: Normocephalic and atraumatic.   Eyes:      Conjunctiva/sclera: Conjunctivae normal.      Pupils: Pupils are equal, round, and reactive to light.   Pulmonary:      Effort: Pulmonary effort is normal. No respiratory distress.   Neurological:      Mental Status: She is alert. Mental status is at baseline.         I have reviewed and reconciled the medication list with the patient today.   Current Outpatient Medications:     Alphagan P 0.1 % ophthalmic solution, Administer 1 drop into both eyes twice a day., Disp: , Rfl:     aspirin 81 mg EC tablet, Take 1 tablet (81 mg) by mouth once daily., Disp: , Rfl:     atorvastatin (Lipitor) 10 mg tablet, Take 1 tablet (10 mg) by mouth once daily., Disp: 90 tablet, Rfl: 3    Autolet lancing device, Use as instructed, Disp: 1 each, Rfl: 0    blood sugar diagnostic (OneTouch Ultra Test) strip, 1 strip 3 times a day., Disp: 200 strip, Rfl: 11    blood-glucose meter misc, Use to " "test glucose 3 times daily, Disp: 1 each, Rfl: 0    carvedilol (Coreg) 25 mg tablet, Take 1 tablet (25 mg) by mouth 2 times a day with meals., Disp: 60 tablet, Rfl: 11    cholecalciferol (Vitamin D-3) 50 MCG (2000 UT) tablet, Take 1 tablet (2,000 Units) by mouth once daily., Disp: , Rfl:     dimethicone (Cavilon Durable Barrier) 1.3 % cream, Apply 1 Tube topically 3 times a day., Disp: 3.25 g, Rfl: 0    furosemide (Lasix) 20 mg tablet, Take 2 tablets (40 mg) by mouth once daily., Disp: 60 tablet, Rfl: 2    hydrALAZINE (Apresoline) 100 mg tablet, Take 1 tablet (100 mg) by mouth 3 times a day., Disp: 90 tablet, Rfl: 11    hydrocolloid dressing 1 3/4 X 1 1/2 \" bandage, Apply 1 patch topically once daily as needed (wear daily for pressure ulcer)., Disp: 10 each, Rfl: 1    insulin NPH, Isophane, (HumuLIN N,NovoLIN N) 100 unit/mL (3 mL) injection, Inject 12 Units under the skin 2 times a day before meals. Take as directed per insulin instructions., Disp: 8 mL, Rfl: 2    insulin syringe-needle U-100 (BD Insulin Syringe Ultra-Fine) 31G X 5/16\" 1 mL syringe, Inject 1 each under the skin 2 times a day. Use as instructed, Disp: 100 each, Rfl: 3    lancets (OneTouch Delica Plus Lancet) 30 gauge misc, Use to test glucose 3 times daily, Disp: 100 each, Rfl: 11    latanoprost (Xalatan) 0.005 % ophthalmic solution, 1 drop once daily., Disp: , Rfl:     pen needle, diabetic (BD Ultra-Fine Nely Pen Needle) 32 gauge x 5/32\" needle, 1 Needle 2 times a day., Disp: 200 each, Rfl: 3    sennosides-docusate sodium (Steph-Colace) 8.6-50 mg tablet, Take by mouth twice a day., Disp: , Rfl:     sennosides-docusate sodium (Steph-Colace) 8.6-50 mg tablet, TAKE 2 TABLETS BY MOUTH TWO TIMES A DAY, Disp: 120 tablet, Rfl: 0    timolol (Timoptic) 0.5 % ophthalmic solution, 1 drop., Disp: , Rfl:     Current Facility-Administered Medications:     hydrALAZINE (Apresoline) tablet 100 mg, 100 mg, oral, TID, Louie HORTA MD     Imaging:  VISUAL FIELD 24-2 " OU (BOTH EYES)    Result Date: 10/25/2023  Date of Procedure 10/25/2023. Technician Information Imaging Technician: CURLY. Start time: 11:25 AM. Stop time: 11:25 AM. Reliability Right Eye Borderline. Left Eye Borderline. Interpretation Right Eye Normal, Depressed mean deviation, Non-specific defect. Left Eye Normal, Depressed mean deviation, Non-specific defect. Interval Change Left Eye Stable.     CT cervical spine wo IV contrast    Result Date: 10/13/2023  Interpreted By:  Mykel Santos, STUDY: CT CERVICAL SPINE WO IV CONTRAST;  10/13/2023 10:07 am   INDICATION: Signs/Symptoms:Injury.   COMPARISON: None.   ACCESSION NUMBER(S): [id number]   ORDERING CLINICIAN: PATRICE GALDAMEZ   TECHNIQUE: Axial CT images of the cervical spine are obtained. Axial, coronal and sagittal reconstructions are provided for review.   FINDINGS: Fractures: There is no evidence for an acute fracture of the cervical spine.   Vertebral Alignment: Loss of normal lordosis likely related to muscle spasm   Craniocervical Junction: The odontoid process and craniocervical junction are intact.   Vertebrae/Disc Spaces:  The cervical vertebral body heights are intact. Multilevel spondylosis, discogenic disease and facet joint osteophytic hypertrophic changes with multilevel mild central spinal canal stenosis mainly at C5-6.   Prevertebral/Paraspinal Soft Tissues: The prevertebral and paraspinal soft tissues are unremarkable.       1. No evidence for an acute fracture or subluxation of the cervical spine. 2. Advanced multilevel cervical spine spondylosis with mild multilevel spinal canal stenosis mainly at C5-6.   MACRO: None   Signed by: Mykel Santos 10/13/2023 10:20 AM Dictation workstation:   [id number]    CT head wo IV contrast    Result Date: 10/13/2023  Interpreted By:  Mykel Santos, STUDY: CT HEAD WO IV CONTRAST;  10/13/2023 10:07 am   INDICATION: Signs/Symptoms:Injury.   COMPARISON: CT scan dated 10/05/2023   ACCESSION NUMBER(S):  [id number]   ORDERING CLINICIAN: PATRICE GALDAMEZ   TECHNIQUE: Noncontrast axial CT scan of head was performed. Angled reformats in brain and bone windows were generated. The images were reviewed in bone, brain, blood and soft tissue windows.   FINDINGS: CSF Spaces: The ventricles, sulci and basal cisterns are within normal limits. There is no extraaxial fluid collection.   Parenchyma: Bilateral basal ganglia calcifications. The grey-white differentiation is intact. There is no mass effect or midline shift. There is no intracranial hemorrhage.   Calvarium: The calvarium is unremarkable.   Paranasal sinuses and mastoids: Visualized paranasal sinuses and mastoids are clear.       1. No acute intracranial hemorrhage or depressed skull fracture.   MACRO: None   Signed by: Mykel Santos 10/13/2023 10:15 AM Dictation workstation:   [id number]       Labs reviewed:    Lab Results   Component Value Date    WBC 5.6 10/11/2023    HGB 9.0 (L) 10/11/2023    HCT 27.4 (L) 10/11/2023     10/11/2023    CHOL 183 02/22/2023    TRIG 86 02/22/2023    HDL 57.1 02/22/2023    ALT 17 10/11/2023    AST 18 10/11/2023     (L) 10/11/2023    K 4.4 10/11/2023    CL 90 (L) 10/11/2023    CREATININE 2.20 (H) 10/11/2023    BUN 46 (H) 10/11/2023    CO2 23 10/11/2023    TSH 5.53 (H) 09/19/2023    HGBA1C 6.3 (H) 10/11/2023       Assessment/Plan   Problem List Items Addressed This Visit       Anemia     stable         Diabetes mellitus (CMS/HCC)     Lantus to 18U BID         Relevant Orders    POCT fingerstick glucose manually resulted (Completed)    Essential hypertension - Primary     Continue medication         Hyperlipidemia     Continue medication         Chronic kidney disease, stage 3, mod decreased GFR (CMS/HCC)     Continue to monitor         Hyponatremia     Restrict water intake         Cerebral ischemia     Carotid US. Urgent neuro referral            Continue current medications as listed  Follow up in January

## 2023-11-07 PROCEDURE — 1090000002 HH PPS REVENUE DEBIT

## 2023-11-07 PROCEDURE — 1090000001 HH PPS REVENUE CREDIT

## 2023-11-08 ENCOUNTER — LAB (OUTPATIENT)
Dept: LAB | Facility: LAB | Age: 88
End: 2023-11-08
Payer: MEDICARE

## 2023-11-08 DIAGNOSIS — E87.1 HYPO-OSMOLALITY AND HYPONATREMIA: ICD-10-CM

## 2023-11-08 DIAGNOSIS — N18.30 CHRONIC KIDNEY DISEASE, STAGE 3 UNSPECIFIED (MULTI): Primary | ICD-10-CM

## 2023-11-08 LAB
ALBUMIN SERPL BCP-MCNC: 3.8 G/DL (ref 3.4–5)
ANION GAP SERPL CALC-SCNC: 16 MMOL/L (ref 10–20)
BASOPHILS # BLD AUTO: 0.03 X10*3/UL (ref 0–0.1)
BASOPHILS NFR BLD AUTO: 0.5 %
BUN SERPL-MCNC: 53 MG/DL (ref 6–23)
CALCIUM SERPL-MCNC: 9.4 MG/DL (ref 8.6–10.6)
CHLORIDE SERPL-SCNC: 91 MMOL/L (ref 98–107)
CO2 SERPL-SCNC: 29 MMOL/L (ref 21–32)
CREAT SERPL-MCNC: 2.39 MG/DL (ref 0.5–1.05)
EOSINOPHIL # BLD AUTO: 0.11 X10*3/UL (ref 0–0.4)
EOSINOPHIL NFR BLD AUTO: 1.7 %
ERYTHROCYTE [DISTWIDTH] IN BLOOD BY AUTOMATED COUNT: 12.5 % (ref 11.5–14.5)
GFR SERPL CREATININE-BSD FRML MDRD: 19 ML/MIN/1.73M*2
GLUCOSE SERPL-MCNC: 284 MG/DL (ref 74–99)
HCT VFR BLD AUTO: 28.9 % (ref 36–46)
HGB BLD-MCNC: 9.2 G/DL (ref 12–16)
IMM GRANULOCYTES # BLD AUTO: 0.02 X10*3/UL (ref 0–0.5)
IMM GRANULOCYTES NFR BLD AUTO: 0.3 % (ref 0–0.9)
LYMPHOCYTES # BLD AUTO: 1.87 X10*3/UL (ref 0.8–3)
LYMPHOCYTES NFR BLD AUTO: 28.8 %
MCH RBC QN AUTO: 31.2 PG (ref 26–34)
MCHC RBC AUTO-ENTMCNC: 31.8 G/DL (ref 32–36)
MCV RBC AUTO: 98 FL (ref 80–100)
MONOCYTES # BLD AUTO: 0.75 X10*3/UL (ref 0.05–0.8)
MONOCYTES NFR BLD AUTO: 11.5 %
NEUTROPHILS # BLD AUTO: 3.72 X10*3/UL (ref 1.6–5.5)
NEUTROPHILS NFR BLD AUTO: 57.2 %
NRBC BLD-RTO: 0 /100 WBCS (ref 0–0)
OSMOLALITY SERPL: 301 MOSM/KG (ref 280–300)
PHOSPHATE SERPL-MCNC: 4.2 MG/DL (ref 2.5–4.9)
PLATELET # BLD AUTO: 273 X10*3/UL (ref 150–450)
POTASSIUM SERPL-SCNC: 4 MMOL/L (ref 3.5–5.3)
PROT SERPL-MCNC: 6.8 G/DL (ref 6.4–8.2)
PTH-INTACT SERPL-MCNC: 61.7 PG/ML (ref 18.5–88)
RBC # BLD AUTO: 2.95 X10*6/UL (ref 4–5.2)
SODIUM SERPL-SCNC: 132 MMOL/L (ref 136–145)
WBC # BLD AUTO: 6.5 X10*3/UL (ref 4.4–11.3)

## 2023-11-08 PROCEDURE — 36415 COLL VENOUS BLD VENIPUNCTURE: CPT

## 2023-11-08 PROCEDURE — 84155 ASSAY OF PROTEIN SERUM: CPT

## 2023-11-08 PROCEDURE — 84165 PROTEIN E-PHORESIS SERUM: CPT

## 2023-11-08 PROCEDURE — 84156 ASSAY OF PROTEIN URINE: CPT

## 2023-11-08 PROCEDURE — 1090000002 HH PPS REVENUE DEBIT

## 2023-11-08 PROCEDURE — 1090000001 HH PPS REVENUE CREDIT

## 2023-11-08 PROCEDURE — 85025 COMPLETE CBC W/AUTO DIFF WBC: CPT

## 2023-11-08 PROCEDURE — 84165 PROTEIN E-PHORESIS SERUM: CPT | Performed by: INTERNAL MEDICINE

## 2023-11-08 PROCEDURE — 84166 PROTEIN E-PHORESIS/URINE/CSF: CPT

## 2023-11-08 PROCEDURE — 83970 ASSAY OF PARATHORMONE: CPT

## 2023-11-08 PROCEDURE — 83930 ASSAY OF BLOOD OSMOLALITY: CPT

## 2023-11-08 PROCEDURE — 83935 ASSAY OF URINE OSMOLALITY: CPT

## 2023-11-08 PROCEDURE — 84300 ASSAY OF URINE SODIUM: CPT

## 2023-11-08 PROCEDURE — 80069 RENAL FUNCTION PANEL: CPT

## 2023-11-08 PROCEDURE — 86320 SERUM IMMUNOELECTROPHORESIS: CPT | Performed by: INTERNAL MEDICINE

## 2023-11-08 PROCEDURE — 82570 ASSAY OF URINE CREATININE: CPT

## 2023-11-08 PROCEDURE — 86334 IMMUNOFIX E-PHORESIS SERUM: CPT

## 2023-11-09 ENCOUNTER — HOME CARE VISIT (OUTPATIENT)
Dept: HOME HEALTH SERVICES | Facility: HOME HEALTH | Age: 88
End: 2023-11-09
Payer: MEDICARE

## 2023-11-09 VITALS — TEMPERATURE: 97.6 F | HEART RATE: 58 BPM | RESPIRATION RATE: 16 BRPM

## 2023-11-09 VITALS
RESPIRATION RATE: 18 BRPM | SYSTOLIC BLOOD PRESSURE: 122 MMHG | DIASTOLIC BLOOD PRESSURE: 60 MMHG | OXYGEN SATURATION: 98 % | TEMPERATURE: 98 F | HEART RATE: 70 BPM

## 2023-11-09 LAB
CREAT UR-MCNC: 46 MG/DL (ref 20–320)
CREAT UR-MCNC: 46 MG/DL (ref 20–320)
OSMOLALITY UR: 269 MOSM/KG (ref 200–1200)
PROT UR-ACNC: 18 MG/DL (ref 5–24)
PROT UR-ACNC: 18 MG/DL (ref 5–25)
PROT/CREAT UR: 0.39 MG/MG CREAT (ref 0–0.17)
SODIUM UR-SCNC: 66 MMOL/L
SODIUM/CREAT UR-RTO: 143 MMOL/G CREAT

## 2023-11-09 PROCEDURE — 0023 HH SOC

## 2023-11-09 PROCEDURE — G0151 HHCP-SERV OF PT,EA 15 MIN: HCPCS | Mod: HHH

## 2023-11-09 PROCEDURE — 1090000002 HH PPS REVENUE DEBIT

## 2023-11-09 PROCEDURE — 1090000001 HH PPS REVENUE CREDIT

## 2023-11-09 PROCEDURE — G0300 HHS/HOSPICE OF LPN EA 15 MIN: HCPCS | Mod: HHH

## 2023-11-09 SDOH — HEALTH STABILITY: PHYSICAL HEALTH: PHYSICAL EXERCISE: 10

## 2023-11-09 SDOH — HEALTH STABILITY: PHYSICAL HEALTH: EXERCISE ACTIVITY: STANDING MARCHING

## 2023-11-09 SDOH — HEALTH STABILITY: PHYSICAL HEALTH: EXERCISE ACTIVITY: STANDING HEEL RAISES

## 2023-11-09 SDOH — HEALTH STABILITY: PHYSICAL HEALTH: EXERCISE ACTIVITY: SEATED HIP FLEX

## 2023-11-09 SDOH — HEALTH STABILITY: PHYSICAL HEALTH: EXERCISE TYPE: SITTING AND STANDING

## 2023-11-09 SDOH — HEALTH STABILITY: PHYSICAL HEALTH: EXERCISE ACTIVITY: STANDING MINI SQUATS

## 2023-11-09 SDOH — HEALTH STABILITY: PHYSICAL HEALTH: EXERCISE ACTIVITY: UE SHOULDER FLEX TO 90 DEGREES

## 2023-11-09 SDOH — HEALTH STABILITY: PHYSICAL HEALTH: EXERCISE ACTIVITY: UE FORWARD PUNCHES

## 2023-11-09 SDOH — HEALTH STABILITY: PHYSICAL HEALTH: EXERCISE ACTIVITY: STANDING HIP ABD

## 2023-11-09 SDOH — HEALTH STABILITY: PHYSICAL HEALTH: EXERCISE ACTIVITY: SEAT KNEE EXT

## 2023-11-09 ASSESSMENT — ENCOUNTER SYMPTOMS
PAIN SEVERITY GOAL: 0/10
PERSON REPORTING PAIN: PATIENT
LAST BOWEL MOVEMENT: 66785
HIGHEST PAIN SEVERITY IN PAST 24 HOURS: 0/10
STOOL FREQUENCY: LESS THAN DAILY
SUBJECTIVE PAIN PROGRESSION: UNCHANGED
LOWEST PAIN SEVERITY IN PAST 24 HOURS: 0/10
CONSTIPATION: 1
DENIES PAIN: 1
CHANGE IN APPETITE: UNCHANGED
APPETITE LEVEL: GOOD
DENIES PAIN: 1

## 2023-11-09 ASSESSMENT — PAIN SCALES - PAIN ASSESSMENT IN ADVANCED DEMENTIA (PAINAD)
NEGVOCALIZATION: 0 - NONE.
FACIALEXPRESSION: 0
BODYLANGUAGE: 0
BODYLANGUAGE: 0 - RELAXED.
TOTALSCORE: 0
CONSOLABILITY: 0 - NO NEED TO CONSOLE.
FACIALEXPRESSION: 0 - SMILING OR INEXPRESSIVE.
CONSOLABILITY: 0
BREATHING: 0
NEGVOCALIZATION: 0

## 2023-11-09 ASSESSMENT — ACTIVITIES OF DAILY LIVING (ADL)
ADLS_COMMENTS: ROLLATOR
AMBULATION ASSISTANCE ON FLAT SURFACES: 1
AMBULATION_DISTANCE/DURATION_TOLERATED: 100
AMBULATION ASSISTANCE: INDEPENDENT
AMBULATION ASSISTANCE: 1

## 2023-11-10 LAB
ALBUMIN MFR UR ELPH: 67.9 %
ALPHA1 GLOB MFR UR ELPH: 5.6 %
ALPHA2 GLOB MFR UR ELPH: 7.6 %
B-GLOBULIN MFR UR ELPH: 10.3 %
GAMMA GLOB MFR UR ELPH: 8.6 %
PATH REVIEW-URINE PROTEIN ELECTROPHORESIS: NORMAL
URINE ELECTROPHORESIS COMMENT: NORMAL

## 2023-11-10 PROCEDURE — 1090000001 HH PPS REVENUE CREDIT

## 2023-11-10 PROCEDURE — 1090000002 HH PPS REVENUE DEBIT

## 2023-11-11 PROCEDURE — 1090000002 HH PPS REVENUE DEBIT

## 2023-11-11 PROCEDURE — 1090000001 HH PPS REVENUE CREDIT

## 2023-11-12 PROCEDURE — 1090000001 HH PPS REVENUE CREDIT

## 2023-11-12 PROCEDURE — 1090000002 HH PPS REVENUE DEBIT

## 2023-11-13 DIAGNOSIS — E11.22 TYPE 2 DIABETES MELLITUS WITH CHRONIC KIDNEY DISEASE, WITH LONG-TERM CURRENT USE OF INSULIN, UNSPECIFIED CKD STAGE (MULTI): ICD-10-CM

## 2023-11-13 DIAGNOSIS — Z79.4 TYPE 2 DIABETES MELLITUS WITH CHRONIC KIDNEY DISEASE, WITH LONG-TERM CURRENT USE OF INSULIN, UNSPECIFIED CKD STAGE (MULTI): ICD-10-CM

## 2023-11-13 LAB
ALBUMIN: 3.6 G/DL (ref 3.4–5)
ALPHA 1 GLOBULIN: 0.3 G/DL (ref 0.2–0.6)
ALPHA 2 GLOBULIN: 0.8 G/DL (ref 0.4–1.1)
BETA GLOBULIN: 0.9 G/DL (ref 0.5–1.2)
GAMMA GLOBULIN: 1.2 G/DL (ref 0.5–1.4)
IMMUNOFIXATION COMMENT: NORMAL
M-PROTEIN 1: 0.1 G/DL
PATH REVIEW - SERUM IMMUNOFIXATION: NORMAL
PATH REVIEW-SERUM PROTEIN ELECTROPHORESIS: ABNORMAL
PROTEIN ELECTROPHORESIS COMMENT: ABNORMAL

## 2023-11-13 PROCEDURE — 1090000002 HH PPS REVENUE DEBIT

## 2023-11-13 PROCEDURE — 1090000001 HH PPS REVENUE CREDIT

## 2023-11-14 PROCEDURE — 1090000001 HH PPS REVENUE CREDIT

## 2023-11-14 PROCEDURE — 1090000002 HH PPS REVENUE DEBIT

## 2023-11-15 ENCOUNTER — HOME CARE VISIT (OUTPATIENT)
Dept: HOME HEALTH SERVICES | Facility: HOME HEALTH | Age: 88
End: 2023-11-15
Payer: MEDICARE

## 2023-11-15 VITALS
SYSTOLIC BLOOD PRESSURE: 130 MMHG | BODY MASS INDEX: 41.42 KG/M2 | OXYGEN SATURATION: 97 % | TEMPERATURE: 98.2 F | HEIGHT: 55 IN | RESPIRATION RATE: 18 BRPM | DIASTOLIC BLOOD PRESSURE: 60 MMHG | WEIGHT: 179 LBS | HEART RATE: 62 BPM

## 2023-11-15 PROCEDURE — G0299 HHS/HOSPICE OF RN EA 15 MIN: HCPCS | Mod: HHH

## 2023-11-15 PROCEDURE — 1090000002 HH PPS REVENUE DEBIT

## 2023-11-15 PROCEDURE — 1090000001 HH PPS REVENUE CREDIT

## 2023-11-15 PROCEDURE — G0180 MD CERTIFICATION HHA PATIENT: HCPCS | Performed by: INTERNAL MEDICINE

## 2023-11-15 ASSESSMENT — PAIN SCALES - PAIN ASSESSMENT IN ADVANCED DEMENTIA (PAINAD)
BODYLANGUAGE: 0 - RELAXED.
FACIALEXPRESSION: 0
CONSOLABILITY: 0
BODYLANGUAGE: 0
CONSOLABILITY: 0 - NO NEED TO CONSOLE.
BREATHING: 0
FACIALEXPRESSION: 0 - SMILING OR INEXPRESSIVE.

## 2023-11-15 ASSESSMENT — ACTIVITIES OF DAILY LIVING (ADL)
HOME_HEALTH_OASIS: 01
OASIS_M1830: 03

## 2023-11-15 ASSESSMENT — ENCOUNTER SYMPTOMS
DENIES PAIN: 1
SUBJECTIVE PAIN PROGRESSION: RESOLVED

## 2023-11-22 ENCOUNTER — OFFICE VISIT (OUTPATIENT)
Dept: CARDIOLOGY | Facility: CLINIC | Age: 88
End: 2023-11-22
Payer: MEDICARE

## 2023-11-22 VITALS
BODY MASS INDEX: 29.19 KG/M2 | SYSTOLIC BLOOD PRESSURE: 130 MMHG | WEIGHT: 171 LBS | DIASTOLIC BLOOD PRESSURE: 52 MMHG | HEIGHT: 64 IN | OXYGEN SATURATION: 96 % | HEART RATE: 68 BPM

## 2023-11-22 DIAGNOSIS — I10 ESSENTIAL HYPERTENSION: ICD-10-CM

## 2023-11-22 DIAGNOSIS — E78.00 PURE HYPERCHOLESTEROLEMIA: ICD-10-CM

## 2023-11-22 DIAGNOSIS — I50.32 CHRONIC DIASTOLIC CONGESTIVE HEART FAILURE (MULTI): Primary | ICD-10-CM

## 2023-11-22 DIAGNOSIS — R01.1 HEART MURMUR: ICD-10-CM

## 2023-11-22 PROCEDURE — 1126F AMNT PAIN NOTED NONE PRSNT: CPT | Performed by: STUDENT IN AN ORGANIZED HEALTH CARE EDUCATION/TRAINING PROGRAM

## 2023-11-22 PROCEDURE — 3075F SYST BP GE 130 - 139MM HG: CPT | Performed by: STUDENT IN AN ORGANIZED HEALTH CARE EDUCATION/TRAINING PROGRAM

## 2023-11-22 PROCEDURE — 3078F DIAST BP <80 MM HG: CPT | Performed by: STUDENT IN AN ORGANIZED HEALTH CARE EDUCATION/TRAINING PROGRAM

## 2023-11-22 PROCEDURE — 99214 OFFICE O/P EST MOD 30 MIN: CPT | Performed by: STUDENT IN AN ORGANIZED HEALTH CARE EDUCATION/TRAINING PROGRAM

## 2023-11-22 PROCEDURE — 1036F TOBACCO NON-USER: CPT | Performed by: STUDENT IN AN ORGANIZED HEALTH CARE EDUCATION/TRAINING PROGRAM

## 2023-11-22 PROCEDURE — 1159F MED LIST DOCD IN RCRD: CPT | Performed by: STUDENT IN AN ORGANIZED HEALTH CARE EDUCATION/TRAINING PROGRAM

## 2023-11-22 NOTE — PROGRESS NOTES
Follow-up (3-4 MO/Intermittent hypotension since starting hydralizine)     HPI:    Carolann Cartagena is a 89 y.o. female with pertinent history of GERD, hypertension, hyperlipidemia, anemia, insulin-dependent diabetes, lumbar stenosis, lymphedema, chronic kidney disease acute on chronic diastolic heart failure with BNP elevation of 555 September 2023, preserved ejection fraction with impaired relaxation, moderate concentric left ventricular hypertrophy, left atrial enlargement, mild to moderate pulmonary hypertension with RVSP of 46 mmHg, mild aortic stenosis on echo performed 9/16/2023 presents to cardiology clinic for follow up.    The patient is accompanied by her daughter who provides some history and has questions.  She is doing relatively well.  They brought her in for a urgent visit because there were some episodes of hypotension.  Blood pressures have been stable more recently.  During the low blood pressures the patient denies any significant dizziness.  No clear syncope.  No exacerbating or relieving factors.  Patient denies chest pain and angina.  Pt denies orthopnea, and paroxysmal nocturnal dyspnea.  Pt denies worsening lower extremity edema.  Pt denies palpitations or syncope.  No recent falls.  No fever or chills.  No cough.  No change in bowel or bladder habits.  No sick contacts.  No recent travel.    12 point review of systems including (Constitutional, Eyes, ENMT, Respiratory, Cardiac, Gastrointestinal, Neurological, Psychiatric, and Hematologic) was performed and is otherwise negative.    Past medical history reviewed:   has a past medical history of Encounter for general adult medical examination without abnormal findings (02/18/2021), Encounter for general adult medical examination without abnormal findings (04/02/2020), and Urinary tract infection, site not specified (01/11/2021).    Past surgical history reviewed:   has a past surgical history that includes Other surgical history  "(02/13/2020).    Social history reviewed:   reports that she has never smoked. She has never used smokeless tobacco. She reports that she does not currently use alcohol. She reports that she does not use drugs.     Family history reviewed:    Family History   Problem Relation Name Age of Onset    No Known Problems Mother      Bone cancer Sister      Lung cancer Daughter         Allergies reviewed: Dorzolamide, Lisinopril, and Losartan     Medications reviewed:   Current Outpatient Medications   Medication Instructions    Alphagan P 0.1 % ophthalmic solution 1 drop, Both Eyes, 2 times daily    aspirin 81 mg, oral, Daily RT    atorvastatin (LIPITOR) 10 mg, oral, Daily    Autolet lancing device Use as instructed    blood sugar diagnostic (OneTouch Ultra Test) strip 1 strip, miscellaneous, 3 times daily    blood-glucose meter misc Use to test glucose 3 times daily    carvedilol (COREG) 25 mg, oral, 2 times daily with meals    cholecalciferol (VITAMIN D-3) 2,000 Units, oral, Daily RT    dimethicone (Cavilon Durable Barrier) 1.3 % cream 1 Tube, topical (top), 3 times daily    furosemide (LASIX) 40 mg, oral, Daily    hydrALAZINE (APRESOLINE) 100 mg, oral, 3 times daily    hydrocolloid dressing 1 3/4 X 1 1/2 \" bandage 1 patch, topical (top), Daily PRN    insulin NPH (Isophane) (HUMULIN N,NOVOLIN N) 12 Units, subcutaneous, 2 times daily before meals, Take as directed per insulin instructions.    insulin syringe-needle U-100 (BD Insulin Syringe Ultra-Fine) 31G X 5/16\" 1 mL syringe 1 each, subcutaneous, 2 times daily, Use as instructed    insulin syringe-needle U-100 31G X 5/16\" 0.3 mL syringe Use as instructed    lancets (OneTouch Delica Plus Lancet) 30 gauge misc Use to test glucose 3 times daily    latanoprost (Xalatan) 0.005 % ophthalmic solution 1 drop, Daily RT    pen needle, diabetic (BD Ultra-Fine Nely Pen Needle) 32 gauge x 5/32\" needle 1 Needle, miscellaneous, 2 times daily    sennosides-docusate sodium (Steph-Colace) " 8.6-50 mg tablet oral, 2 times daily    timolol (Timoptic) 0.5 % ophthalmic solution 1 drop        Vitals reviewed: Visit Vitals  /52   Pulse 68       Physical Exam:   General:  Patient is awake, alert, and oriented.  Patient is in no acute distress.  HEENT:  Pupils equal and reactive.  Normocephalic.  Moist mucosa.    Neck:  No thyromegaly.  Normal Jugular Venous Pressure.  Cardiovascular:  Regular rate and rhythm.  Normal S1 and S2.  3/6 DEWAYNE.  Pulmonary:  Clear to auscultation bilaterally.  Abdomen:  Soft. Non-tender.   Non-distended.  Positive bowel sounds.  Lower Extremities:  2+ pedal pulses. No LE edema.  Neurologic:  Cranial nerves intact.  No focal deficit.   Skin: Skin warm and dry, normal skin turgor.   Psychiatric: Normal affect.    Last Labs:  CBC -      Lab Results   Component Value Date    WBC 6.5 11/08/2023    HGB 9.2 (L) 11/08/2023    HCT 28.9 (L) 11/08/2023     11/08/2023        CMP-  Lab Results   Component Value Date    GLUCOSE 284 (H) 11/08/2023     (L) 11/08/2023    K 4.0 11/08/2023    CL 91 (L) 11/08/2023    CO2 29 11/08/2023    ANIONGAP 16 11/08/2023    BUN 53 (H) 11/08/2023    CREATININE 2.39 (H) 11/08/2023    EGFR 19 (L) 11/08/2023    CALCIUM 9.4 11/08/2023    PHOS 4.2 11/08/2023    PROT 6.8 11/08/2023    PROT 6.7 10/11/2023    ALBUMIN 3.8 11/08/2023    AST 18 10/11/2023    ALT 17 10/11/2023    ALKPHOS 61 10/11/2023    BILITOT 0.4 10/11/2023        LIPIDS-  Lab Results   Component Value Date    CHOL 183 02/22/2023    TRIG 86 02/22/2023    HDL 57.1 02/22/2023    CHHDL 3.2 02/22/2023    VLDL 17 02/22/2023        OTHERS-  Lab Results   Component Value Date    HGBA1C 6.3 (H) 10/11/2023     (H) 10/05/2023        I personally reviewed the patient's recent vitals, labs, medications, orders, EKGs, pertinent cardiac imaging/ echocardiography.    Assessment and Plan:    Carolann Cartagena is a 89 y.o. female with pertinent history of GERD, hypertension, hyperlipidemia, anemia,  insulin-dependent diabetes, lumbar stenosis, lymphedema, chronic kidney disease acute on chronic diastolic heart failure with BNP elevation of 555 September 2023, preserved ejection fraction with impaired relaxation, moderate concentric left ventricular hypertrophy, left atrial enlargement, mild to moderate pulmonary hypertension with RVSP of 46 mmHg, mild aortic stenosis on echo performed 9/16/2023 presents to cardiology clinic for follow up.  The patient is accompanied by her daughter who provides some history and has questions.  She is doing relatively well.  They brought her in for a urgent visit because there were some episodes of hypotension.  Blood pressures have been stable more recently.  During the low blood pressures the patient denies any significant dizziness.      We discussed continuing hydralazine 100 mg 3 times a day.  We will plan to hold for systolic blood pressure below 110 mmHg.    Please continue remaining cardiac medication including aspirin 81 mg daily, atorvastatin 10 mg daily, carvedilol 25 mg twice daily.    We will continue furosemide 40 mg once daily.  We did note that if there is significant increase in swelling you can increase this to 2 tablets a day for 3 to 4 days until swelling resolves.    Please followup with me in Cardiology clinic within the next 3 months.  Please return to clinic sooner or seek emergent care if your symptoms reoccur or worsen.    Thank you for allowing me to participate in their care.  Please feel free to call me with any further questions or concerns.        Louie Iniguez MD, [facility], [facility]  Division of Cardiovascular Medicine  Medical Director, [facility]  [facility]  Assistant Clinical Professor, Medicine  [employer]  [email or web address]  Office:  [phone]

## 2023-11-22 NOTE — PATIENT INSTRUCTIONS
We discussed continuing hydralazine 100 mg 3 times a day.  We will plan to hold for systolic blood pressure below 110 mmHg.    Please continue remaining cardiac medication including aspirin 81 mg daily, atorvastatin 10 mg daily, carvedilol 25 mg twice daily.    We will continue furosemide 40 mg once daily.  We did note that if there is significant increase in swelling you can increase this to 2 tablets a day for 3 to 4 days until swelling resolves.    Please followup with me in Cardiology clinic within the next 3 months.  Please return to clinic sooner or seek emergent care if your symptoms reoccur or worsen.

## 2023-12-04 ENCOUNTER — HOSPITAL ENCOUNTER (OUTPATIENT)
Dept: VASCULAR MEDICINE | Facility: CLINIC | Age: 88
Discharge: HOME | End: 2023-12-04
Payer: MEDICARE

## 2023-12-04 DIAGNOSIS — M81.0 FOLLOW-UP VISIT FOR COMPLETED OSTEOPOROSIS DRUG THERAPY: ICD-10-CM

## 2023-12-04 DIAGNOSIS — Z09 FOLLOW-UP VISIT FOR COMPLETED OSTEOPOROSIS DRUG THERAPY: ICD-10-CM

## 2023-12-04 DIAGNOSIS — R09.89 OTHER SPECIFIED SYMPTOMS AND SIGNS INVOLVING THE CIRCULATORY AND RESPIRATORY SYSTEMS: ICD-10-CM

## 2023-12-04 DIAGNOSIS — I67.82 CEREBRAL ISCHEMIA: ICD-10-CM

## 2023-12-04 PROCEDURE — 93880 EXTRACRANIAL BILAT STUDY: CPT

## 2023-12-04 PROCEDURE — 93880 EXTRACRANIAL BILAT STUDY: CPT | Performed by: STUDENT IN AN ORGANIZED HEALTH CARE EDUCATION/TRAINING PROGRAM

## 2023-12-18 DIAGNOSIS — Z79.4 TYPE 2 DIABETES MELLITUS WITH CHRONIC KIDNEY DISEASE, WITH LONG-TERM CURRENT USE OF INSULIN, UNSPECIFIED CKD STAGE (MULTI): ICD-10-CM

## 2023-12-18 DIAGNOSIS — E11.22 TYPE 2 DIABETES MELLITUS WITH CHRONIC KIDNEY DISEASE, WITH LONG-TERM CURRENT USE OF INSULIN, UNSPECIFIED CKD STAGE (MULTI): ICD-10-CM

## 2023-12-19 RX ORDER — PEN NEEDLE, DIABETIC 30 GX3/16"
1 NEEDLE, DISPOSABLE MISCELLANEOUS 2 TIMES DAILY
Qty: 200 EACH | Refills: 3 | Status: SHIPPED | OUTPATIENT
Start: 2023-12-19 | End: 2024-02-26 | Stop reason: ALTCHOICE

## 2024-01-03 DIAGNOSIS — I10 ESSENTIAL HYPERTENSION: ICD-10-CM

## 2024-01-04 RX ORDER — FUROSEMIDE 20 MG/1
40 TABLET ORAL DAILY
Qty: 180 TABLET | Refills: 3 | Status: SHIPPED | OUTPATIENT
Start: 2024-01-04 | End: 2024-06-04 | Stop reason: ALTCHOICE

## 2024-01-08 ENCOUNTER — CLINICAL SUPPORT (OUTPATIENT)
Dept: PRIMARY CARE | Facility: CLINIC | Age: 89
End: 2024-01-08
Payer: MEDICARE

## 2024-01-08 DIAGNOSIS — D50.9 IRON DEFICIENCY ANEMIA, UNSPECIFIED IRON DEFICIENCY ANEMIA TYPE: ICD-10-CM

## 2024-01-08 DIAGNOSIS — I10 BENIGN ESSENTIAL HYPERTENSION: ICD-10-CM

## 2024-01-08 DIAGNOSIS — Z79.4 TYPE 2 DIABETES MELLITUS WITH CHRONIC KIDNEY DISEASE, WITH LONG-TERM CURRENT USE OF INSULIN, UNSPECIFIED CKD STAGE (MULTI): ICD-10-CM

## 2024-01-08 DIAGNOSIS — E11.22 TYPE 2 DIABETES MELLITUS WITH CHRONIC KIDNEY DISEASE, WITH LONG-TERM CURRENT USE OF INSULIN, UNSPECIFIED CKD STAGE (MULTI): ICD-10-CM

## 2024-01-08 DIAGNOSIS — I10 ESSENTIAL HYPERTENSION: ICD-10-CM

## 2024-01-08 DIAGNOSIS — E03.9 HYPOTHYROIDISM, UNSPECIFIED TYPE: ICD-10-CM

## 2024-01-08 LAB
ALBUMIN SERPL BCP-MCNC: 3.5 G/DL (ref 3.4–5)
ALP SERPL-CCNC: 59 U/L (ref 33–136)
ALT SERPL W P-5'-P-CCNC: 15 U/L (ref 7–45)
ANION GAP SERPL CALC-SCNC: 14 MMOL/L (ref 10–20)
AST SERPL W P-5'-P-CCNC: 20 U/L (ref 9–39)
BILIRUB SERPL-MCNC: 0.6 MG/DL (ref 0–1.2)
BUN SERPL-MCNC: 41 MG/DL (ref 6–23)
CALCIUM SERPL-MCNC: 9.2 MG/DL (ref 8.6–10.6)
CHLORIDE SERPL-SCNC: 90 MMOL/L (ref 98–107)
CO2 SERPL-SCNC: 27 MMOL/L (ref 21–32)
CREAT SERPL-MCNC: 2.03 MG/DL (ref 0.5–1.05)
EGFRCR SERPLBLD CKD-EPI 2021: 23 ML/MIN/1.73M*2
ERYTHROCYTE [DISTWIDTH] IN BLOOD BY AUTOMATED COUNT: 11.7 % (ref 11.5–14.5)
EST. AVERAGE GLUCOSE BLD GHB EST-MCNC: 143 MG/DL
GLUCOSE SERPL-MCNC: 108 MG/DL (ref 74–99)
HBA1C MFR BLD: 6.6 %
HCT VFR BLD AUTO: 26 % (ref 36–46)
HGB BLD-MCNC: 8.4 G/DL (ref 12–16)
MCH RBC QN AUTO: 32.4 PG (ref 26–34)
MCHC RBC AUTO-ENTMCNC: 32.3 G/DL (ref 32–36)
MCV RBC AUTO: 100 FL (ref 80–100)
NRBC BLD-RTO: 0 /100 WBCS (ref 0–0)
PLATELET # BLD AUTO: 232 X10*3/UL (ref 150–450)
POTASSIUM SERPL-SCNC: 4.3 MMOL/L (ref 3.5–5.3)
PROT SERPL-MCNC: 6.4 G/DL (ref 6.4–8.2)
RBC # BLD AUTO: 2.59 X10*6/UL (ref 4–5.2)
SODIUM SERPL-SCNC: 127 MMOL/L (ref 136–145)
WBC # BLD AUTO: 4.4 X10*3/UL (ref 4.4–11.3)

## 2024-01-08 PROCEDURE — 85027 COMPLETE CBC AUTOMATED: CPT

## 2024-01-08 PROCEDURE — 83036 HEMOGLOBIN GLYCOSYLATED A1C: CPT

## 2024-01-08 PROCEDURE — 36415 COLL VENOUS BLD VENIPUNCTURE: CPT

## 2024-01-08 PROCEDURE — 80053 COMPREHEN METABOLIC PANEL: CPT

## 2024-01-12 ENCOUNTER — APPOINTMENT (OUTPATIENT)
Dept: PRIMARY CARE | Facility: CLINIC | Age: 89
End: 2024-01-12
Payer: MEDICARE

## 2024-01-15 ENCOUNTER — APPOINTMENT (OUTPATIENT)
Dept: PRIMARY CARE | Facility: CLINIC | Age: 89
End: 2024-01-15
Payer: MEDICARE

## 2024-01-15 ENCOUNTER — APPOINTMENT (OUTPATIENT)
Dept: NEUROLOGY | Facility: CLINIC | Age: 89
End: 2024-01-15
Payer: MEDICARE

## 2024-01-17 ENCOUNTER — OFFICE VISIT (OUTPATIENT)
Dept: PRIMARY CARE | Facility: CLINIC | Age: 89
End: 2024-01-17
Payer: MEDICARE

## 2024-01-17 VITALS
RESPIRATION RATE: 12 BRPM | HEART RATE: 59 BPM | DIASTOLIC BLOOD PRESSURE: 71 MMHG | OXYGEN SATURATION: 97 % | SYSTOLIC BLOOD PRESSURE: 127 MMHG

## 2024-01-17 DIAGNOSIS — Z79.4 TYPE 2 DIABETES MELLITUS WITH CHRONIC KIDNEY DISEASE, WITH LONG-TERM CURRENT USE OF INSULIN, UNSPECIFIED CKD STAGE (MULTI): ICD-10-CM

## 2024-01-17 DIAGNOSIS — R09.82 POST-NASAL DRIP: Primary | ICD-10-CM

## 2024-01-17 DIAGNOSIS — K21.9 GASTROESOPHAGEAL REFLUX DISEASE, UNSPECIFIED WHETHER ESOPHAGITIS PRESENT: ICD-10-CM

## 2024-01-17 DIAGNOSIS — I10 ESSENTIAL HYPERTENSION: ICD-10-CM

## 2024-01-17 DIAGNOSIS — E78.5 HYPERLIPIDEMIA, UNSPECIFIED HYPERLIPIDEMIA TYPE: ICD-10-CM

## 2024-01-17 DIAGNOSIS — D64.9 ANEMIA, UNSPECIFIED TYPE: ICD-10-CM

## 2024-01-17 DIAGNOSIS — N18.30 STAGE 3 CHRONIC KIDNEY DISEASE, UNSPECIFIED WHETHER STAGE 3A OR 3B CKD (MULTI): ICD-10-CM

## 2024-01-17 DIAGNOSIS — L02.91 ABSCESS: ICD-10-CM

## 2024-01-17 DIAGNOSIS — E11.22 TYPE 2 DIABETES MELLITUS WITH CHRONIC KIDNEY DISEASE, WITH LONG-TERM CURRENT USE OF INSULIN, UNSPECIFIED CKD STAGE (MULTI): ICD-10-CM

## 2024-01-17 PROBLEM — D47.2 MONOCLONAL GAMMOPATHY: Status: ACTIVE | Noted: 2024-01-17

## 2024-01-17 LAB
FOLATE SERPL-MCNC: 8.2 NG/ML
HGB RETIC QN: 35 PG (ref 28–38)
IMMATURE RETIC FRACTION: 9.3 %
IRON SATN MFR SERPL: 21 % (ref 25–45)
IRON SERPL-MCNC: 61 UG/DL (ref 35–150)
POC FINGERSTICK BLOOD GLUCOSE: 228 MG/DL (ref 70–100)
RETICS #: 0.07 X10*6/UL (ref 0.02–0.11)
RETICS/RBC NFR AUTO: 2.4 % (ref 0.5–2)
TIBC SERPL-MCNC: 296 UG/DL (ref 240–445)
UIBC SERPL-MCNC: 235 UG/DL (ref 110–370)
VIT B12 SERPL-MCNC: 796 PG/ML (ref 211–911)

## 2024-01-17 PROCEDURE — 1126F AMNT PAIN NOTED NONE PRSNT: CPT | Performed by: INTERNAL MEDICINE

## 2024-01-17 PROCEDURE — 36415 COLL VENOUS BLD VENIPUNCTURE: CPT

## 2024-01-17 PROCEDURE — 82607 VITAMIN B-12: CPT

## 2024-01-17 PROCEDURE — 83540 ASSAY OF IRON: CPT

## 2024-01-17 PROCEDURE — 3074F SYST BP LT 130 MM HG: CPT | Performed by: INTERNAL MEDICINE

## 2024-01-17 PROCEDURE — 83550 IRON BINDING TEST: CPT

## 2024-01-17 PROCEDURE — 85045 AUTOMATED RETICULOCYTE COUNT: CPT

## 2024-01-17 PROCEDURE — 1159F MED LIST DOCD IN RCRD: CPT | Performed by: INTERNAL MEDICINE

## 2024-01-17 PROCEDURE — 82962 GLUCOSE BLOOD TEST: CPT | Performed by: INTERNAL MEDICINE

## 2024-01-17 PROCEDURE — 1036F TOBACCO NON-USER: CPT | Performed by: INTERNAL MEDICINE

## 2024-01-17 PROCEDURE — 3078F DIAST BP <80 MM HG: CPT | Performed by: INTERNAL MEDICINE

## 2024-01-17 PROCEDURE — 99214 OFFICE O/P EST MOD 30 MIN: CPT | Performed by: INTERNAL MEDICINE

## 2024-01-17 PROCEDURE — 82746 ASSAY OF FOLIC ACID SERUM: CPT

## 2024-01-17 RX ORDER — ATORVASTATIN CALCIUM 10 MG/1
10 TABLET, FILM COATED ORAL DAILY
Qty: 90 TABLET | Refills: 3 | Status: SHIPPED | OUTPATIENT
Start: 2024-01-17

## 2024-01-17 RX ORDER — FLUTICASONE PROPIONATE 50 MCG
1 SPRAY, SUSPENSION (ML) NASAL DAILY
Qty: 16 G | Refills: 11 | Status: SHIPPED | OUTPATIENT
Start: 2024-01-17 | End: 2024-03-11 | Stop reason: ALTCHOICE

## 2024-01-17 RX ORDER — DOXYCYCLINE 100 MG/1
100 CAPSULE ORAL 2 TIMES DAILY
Qty: 20 CAPSULE | Refills: 0 | Status: SHIPPED | OUTPATIENT
Start: 2024-01-17 | End: 2024-02-05 | Stop reason: HOSPADM

## 2024-01-17 RX ORDER — FLUTICASONE PROPIONATE 50 MCG
2 SPRAY, SUSPENSION (ML) NASAL 2 TIMES DAILY
Status: DISCONTINUED | OUTPATIENT
Start: 2024-01-17 | End: 2024-02-01

## 2024-01-17 RX ORDER — CARVEDILOL 25 MG/1
25 TABLET ORAL
Qty: 60 TABLET | Refills: 11 | Status: SHIPPED | OUTPATIENT
Start: 2024-01-17 | End: 2024-03-12 | Stop reason: SDUPTHER

## 2024-01-17 NOTE — ASSESSMENT & PLAN NOTE
1/8/2024 CMP - BUN 41 down from 53, Cr 2.03 down from 2.39, eGFR 23 up from 19. Renal function appears to be improving. Nephrology following. Will continue to monitor at annual.

## 2024-01-17 NOTE — PROGRESS NOTES
Subjective   Chief complaint: Carolann Cartagena is a 89 y.o. female who presents for Follow-up (Pt is here for blood work follow up ).    HPI:  Patient presenting to office to follow-up on blood work and to be seen for multiple complaints. Night time cough x 2 months. One episode of lightheadedness yesterday. Painful knot on back of neck for a long time, cannot remember when it started.        Objective   /71   Pulse 59   Resp 12   SpO2 97%   Physical Exam  Constitutional:       General: She is not in acute distress.     Appearance: Normal appearance.   HENT:      Head: Normocephalic and atraumatic.   Cardiovascular:      Rate and Rhythm: Normal rate and regular rhythm.      Pulses: Normal pulses.      Heart sounds: Murmur heard.   Pulmonary:      Effort: Pulmonary effort is normal.      Breath sounds: Normal breath sounds.   Skin:     General: Skin is warm and dry.      Findings: Erythema and lesion present.      Comments: Small lesion on posterior neck. TTP.   Neurological:      General: No focal deficit present.      Mental Status: She is alert and oriented to person, place, and time.         I have reviewed and reconciled the medication list with the patient today.   Current Outpatient Medications:     Alphagan P 0.1 % ophthalmic solution, Administer 1 drop into both eyes twice a day., Disp: , Rfl:     aspirin 81 mg EC tablet, Take 1 tablet (81 mg) by mouth once daily., Disp: , Rfl:     atorvastatin (Lipitor) 10 mg tablet, Take 1 tablet (10 mg) by mouth once daily., Disp: 90 tablet, Rfl: 3    Autolet lancing device, Use as instructed, Disp: 1 each, Rfl: 0    blood sugar diagnostic (OneTouch Ultra Test) strip, 1 strip 3 times a day., Disp: 200 strip, Rfl: 11    blood-glucose meter misc, Use to test glucose 3 times daily, Disp: 1 each, Rfl: 0    carvedilol (Coreg) 25 mg tablet, Take 1 tablet (25 mg) by mouth 2 times a day with meals., Disp: 60 tablet, Rfl: 11    cholecalciferol (Vitamin D-3) 50 MCG (2000 UT)  "tablet, Take 1 tablet (2,000 Units) by mouth once daily., Disp: , Rfl:     dimethicone (Cavilon Durable Barrier) 1.3 % cream, Apply 1 Tube topically 3 times a day., Disp: 3.25 g, Rfl: 0    furosemide (Lasix) 20 mg tablet, Take 2 tablets (40 mg) by mouth once daily., Disp: 180 tablet, Rfl: 3    hydrALAZINE (Apresoline) 100 mg tablet, Take 1 tablet (100 mg) by mouth 3 times a day., Disp: 90 tablet, Rfl: 11    hydrocolloid dressing 1 3/4 X 1 1/2 \" bandage, Apply 1 patch topically once daily as needed (wear daily for pressure ulcer)., Disp: 10 each, Rfl: 1    insulin NPH, Isophane, (HumuLIN N,NovoLIN N) 100 unit/mL (3 mL) injection, Inject 12 Units under the skin 2 times a day before meals. Take as directed per insulin instructions., Disp: 8 mL, Rfl: 2    insulin syringe-needle U-100 (BD Insulin Syringe Ultra-Fine) 31G X 5/16\" 1 mL syringe, Inject 1 each under the skin 2 times a day. Use as instructed, Disp: 100 each, Rfl: 3    insulin syringe-needle U-100 31G X 5/16\" 0.3 mL syringe, Use as instructed, Disp: 100 each, Rfl: 11    lancets (OneTouch Delica Plus Lancet) 30 gauge misc, Use to test glucose 3 times daily, Disp: 100 each, Rfl: 11    latanoprost (Xalatan) 0.005 % ophthalmic solution, 1 drop once daily., Disp: , Rfl:     pen needle, diabetic (BD Ultra-Fine Nely Pen Needle) 32 gauge x 5/32\" needle, 1 Needle 2 times a day., Disp: 200 each, Rfl: 3    sennosides-docusate sodium (Steph-Colace) 8.6-50 mg tablet, Take by mouth twice a day., Disp: , Rfl:     timolol (Timoptic) 0.5 % ophthalmic solution, 1 drop., Disp: , Rfl:     Current Facility-Administered Medications:     hydrALAZINE (Apresoline) tablet 100 mg, 100 mg, oral, TID, Louie HORTA MD     Imaging:  No results found.     Labs reviewed:    Lab Results   Component Value Date    WBC 4.4 01/08/2024    HGB 8.4 (L) 01/08/2024    HCT 26.0 (L) 01/08/2024     01/08/2024    CHOL 183 02/22/2023    TRIG 86 02/22/2023    HDL 57.1 02/22/2023    ALT 15 01/08/2024 "    AST 20 01/08/2024     (L) 01/08/2024    K 4.3 01/08/2024    CL 90 (L) 01/08/2024    CREATININE 2.03 (H) 01/08/2024    BUN 41 (H) 01/08/2024    CO2 27 01/08/2024    TSH 5.53 (H) 09/19/2023    HGBA1C 6.6 (H) 01/08/2024       Assessment/Plan   Problem List Items Addressed This Visit       Anemia     Patient reporting recent onset of symptoms of lightheadedness. 1/8/2024 CBC - RBC count 2.59 down from 2.98, Hemoglobin 8.4 down from 9.2, Hematocrit 26.0 down from 28.9. Anemia workup ordered - Iron, TIBC, Vitamin B12, Folate, Reticulocytes collected today in office, results pending. Patient sent home with Hemoccult card to eval for blood in stool, return to office within 24 hours. Follow-up with virtual visit in 1 week to review and discuss blood work results.         Relevant Orders    Iron and TIBC    Vitamin B12    Folate    Reticulocytes    Diabetes mellitus (CMS/[facility])     POC  in office today, patient reports eating apples for breakfast.   1/8/2024 CMP - Glucose 108.   1/8/2024 hemoglobin A1c - 6.6%.  Continue DM meds as directed, stable.  Will continue to monitor at annual.         Relevant Orders    POCT fingerstick glucose manually resulted (Completed)    Essential hypertension     /71 today in office. Continue BP meds as directed. Will continue to monitor at annual.         GERD (gastroesophageal reflux disease)     Continue OTC omeprazole as directed.         Hyperlipidemia     Will continue to monitor at annual.         Chronic kidney disease, stage 3, mod decreased GFR (CMS/[facility])     1/8/2024 CMP - BUN 41 down from 53, Cr 2.03 down from 2.39, eGFR 23 up from 19. Renal function appears to be improving. Nephrology following. Will continue to monitor at annual.         Post-nasal drip - Primary     Start Flonase 2 puffs in each nostril BID for nighttime cough, in conjunction with OTC omeprazole. Will continue to monitor, re-assess at annual visit.         Abscess     Painful, erythematous  lesion on posterior neck noted on physical exam. Start doxycycline 100 mg BID x 7 days. If symptoms not improving or worsening, consider I&D.            Continue current medications as listed  Follow up with virtual visit in 1 week to review anemia lab results, in 1 month for annual

## 2024-01-17 NOTE — ASSESSMENT & PLAN NOTE
POC  in office today, patient reports eating apples for breakfast.   1/8/2024 CMP - Glucose 108.   1/8/2024 hemoglobin A1c - 6.6%.  Continue DM meds as directed, stable.  Will continue to monitor at annual.

## 2024-01-17 NOTE — ASSESSMENT & PLAN NOTE
Start Flonase 2 puffs in each nostril BID for nighttime cough, in conjunction with OTC omeprazole. Will continue to monitor, re-assess at annual visit.

## 2024-01-17 NOTE — ASSESSMENT & PLAN NOTE
Painful, erythematous lesion on posterior neck noted on physical exam. Start doxycycline 100 mg BID x 7 days. If symptoms not improving or worsening, consider I&D.

## 2024-01-17 NOTE — ASSESSMENT & PLAN NOTE
Patient reporting recent onset of symptoms of lightheadedness. 1/8/2024 CBC - RBC count 2.59 down from 2.98, Hemoglobin 8.4 down from 9.2, Hematocrit 26.0 down from 28.9. Anemia workup ordered - Iron, TIBC, Vitamin B12, Folate, Reticulocytes collected today in office, results pending. Patient sent home with Hemoccult card to [facility] for blood in stool, return to office within 24 hours. Follow-up with virtual visit in 1 week to review and discuss blood work results.

## 2024-01-22 ENCOUNTER — TELEMEDICINE (OUTPATIENT)
Dept: PRIMARY CARE | Facility: CLINIC | Age: 89
End: 2024-01-22
Payer: MEDICARE

## 2024-01-22 DIAGNOSIS — Z86.2 HISTORY OF ANEMIA DUE TO CHRONIC KIDNEY DISEASE: ICD-10-CM

## 2024-01-22 DIAGNOSIS — N18.9 HISTORY OF ANEMIA DUE TO CHRONIC KIDNEY DISEASE: ICD-10-CM

## 2024-01-22 DIAGNOSIS — D50.8 OTHER IRON DEFICIENCY ANEMIA: Primary | ICD-10-CM

## 2024-01-22 PROCEDURE — 99213 OFFICE O/P EST LOW 20 MIN: CPT | Performed by: INTERNAL MEDICINE

## 2024-01-22 NOTE — ASSESSMENT & PLAN NOTE
Ferrous sulfate 325 daily  Check hemoglobin hematocrit in 2 months  Possible start patient on Procrit injection.

## 2024-01-22 NOTE — PROGRESS NOTES
Subjective   Chief complaint: Carolann Cartagena is a 89 y.o. female who presents for Follow-up (Pt is being seen for follow up post blood work).    HPI:  This is a virtual visit for this 89-year-old to discuss her anemia workup  The iron is normal the total iron-binding capacity is normal but the saturation is low  I spoke to the patient and the family that this is may be a mild iron deficiency but also her anemia from chronic kidney disease  I will start ferrous sulfate 325 mg daily for now.  Repeat her anemia check hemoglobin hematocrit and if it goes further down and it does not improve with the iron then I will start her on a Procrit injection weekly with monitor her H&H.  Family understand that.        Objective   There were no vitals taken for this visit.  Physical Exam  Vitals reviewed.   Constitutional:       Appearance: Normal appearance.   HENT:      Head: Normocephalic and atraumatic.   Pulmonary:      Effort: Pulmonary effort is normal.   Abdominal:      Palpations: Abdomen is soft.   Musculoskeletal:      Cervical back: Neck supple.   Skin:     General: Skin is warm and dry.   Neurological:      General: No focal deficit present.      Mental Status: She is alert.   Psychiatric:         Mood and Affect: Mood normal.         Behavior: Behavior is cooperative.         I have reviewed and reconciled the medication list with the patient today.   Current Outpatient Medications:     Alphagan P 0.1 % ophthalmic solution, Administer 1 drop into both eyes twice a day., Disp: , Rfl:     aspirin 81 mg EC tablet, Take 1 tablet (81 mg) by mouth once daily., Disp: , Rfl:     atorvastatin (Lipitor) 10 mg tablet, Take 1 tablet (10 mg) by mouth once daily., Disp: 90 tablet, Rfl: 3    Autolet lancing device, Use as instructed, Disp: 1 each, Rfl: 0    blood sugar diagnostic (OneTouch Ultra Test) strip, 1 strip 3 times a day., Disp: 200 strip, Rfl: 11    blood-glucose meter misc, Use to test glucose 3 times daily, Disp: 1 each,  "Rfl: 0    carvedilol (Coreg) 25 mg tablet, Take 1 tablet (25 mg) by mouth 2 times a day with meals., Disp: 60 tablet, Rfl: 11    cholecalciferol (Vitamin D-3) 50 MCG (2000 UT) tablet, Take 1 tablet (2,000 Units) by mouth once daily., Disp: , Rfl:     dimethicone (Cavilon Durable Barrier) 1.3 % cream, Apply 1 Tube topically 3 times a day., Disp: 3.25 g, Rfl: 0    doxycycline (Vibramycin) 100 mg capsule, Take 1 capsule (100 mg) by mouth 2 times a day for 10 days. Take with at least 8 ounces (large glass) of water, do not lie down for 30 minutes after, Disp: 20 capsule, Rfl: 0    fluticasone (Flonase) 50 mcg/actuation nasal spray, Administer 1 spray into each nostril once daily. Shake gently. Before first use, prime pump. After use, clean tip and replace cap., Disp: 16 g, Rfl: 11    furosemide (Lasix) 20 mg tablet, Take 2 tablets (40 mg) by mouth once daily., Disp: 180 tablet, Rfl: 3    hydrALAZINE (Apresoline) 100 mg tablet, Take 1 tablet (100 mg) by mouth 3 times a day., Disp: 90 tablet, Rfl: 11    hydrocolloid dressing 1 3/4 X 1 1/2 \" bandage, Apply 1 patch topically once daily as needed (wear daily for pressure ulcer)., Disp: 10 each, Rfl: 1    insulin NPH, Isophane, (HumuLIN N,NovoLIN N) 100 unit/mL (3 mL) injection, Inject 12 Units under the skin 2 times a day before meals. Take as directed per insulin instructions., Disp: 8 mL, Rfl: 2    insulin syringe-needle U-100 (BD Insulin Syringe Ultra-Fine) 31G X 5/16\" 1 mL syringe, Inject 1 each under the skin 2 times a day. Use as instructed, Disp: 100 each, Rfl: 3    insulin syringe-needle U-100 31G X 5/16\" 0.3 mL syringe, Use as instructed, Disp: 100 each, Rfl: 11    lancets (OneTouch Delica Plus Lancet) 30 gauge misc, Use to test glucose 3 times daily, Disp: 100 each, Rfl: 11    latanoprost (Xalatan) 0.005 % ophthalmic solution, 1 drop once daily., Disp: , Rfl:     pen needle, diabetic (BD Ultra-Fine Nely Pen Needle) 32 gauge x 5/32\" needle, 1 Needle 2 times a day., " Disp: 200 each, Rfl: 3    sennosides-docusate sodium (Steph-Colace) 8.6-50 mg tablet, Take by mouth twice a day., Disp: , Rfl:     timolol (Timoptic) 0.5 % ophthalmic solution, 1 drop., Disp: , Rfl:     Current Facility-Administered Medications:     fluticasone (Flonase) nasal spray 2 spray, 2 spray, Each Nostril, BID, Johs Rosa MD    hydrALAZINE (Apresoline) tablet 100 mg, 100 mg, oral, TID, Louie HORTA MD     Imaging:  No results found.     Labs reviewed:    Lab Results   Component Value Date    WBC 4.4 01/08/2024    HGB 8.4 (L) 01/08/2024    HCT 26.0 (L) 01/08/2024     01/08/2024    CHOL 183 02/22/2023    TRIG 86 02/22/2023    HDL 57.1 02/22/2023    ALT 15 01/08/2024    AST 20 01/08/2024     (L) 01/08/2024    K 4.3 01/08/2024    CL 90 (L) 01/08/2024    CREATININE 2.03 (H) 01/08/2024    BUN 41 (H) 01/08/2024    CO2 27 01/08/2024    TSH 5.53 (H) 09/19/2023    HGBA1C 6.6 (H) 01/08/2024       Assessment/Plan   Problem List Items Addressed This Visit       Anemia - Primary     Ferrous sulfate 325 daily  Check hemoglobin hematocrit in 2 months  Possible start patient on Procrit injection.         History of anemia due to chronic kidney disease       Continue current medications as listed  Follow up in 3 months

## 2024-01-23 DIAGNOSIS — E11.22 TYPE 2 DIABETES MELLITUS WITH CHRONIC KIDNEY DISEASE, WITH LONG-TERM CURRENT USE OF INSULIN, UNSPECIFIED CKD STAGE (MULTI): ICD-10-CM

## 2024-01-23 DIAGNOSIS — Z79.4 TYPE 2 DIABETES MELLITUS WITH CHRONIC KIDNEY DISEASE, WITH LONG-TERM CURRENT USE OF INSULIN, UNSPECIFIED CKD STAGE (MULTI): ICD-10-CM

## 2024-01-26 DIAGNOSIS — R30.0 BURNING WITH URINATION: ICD-10-CM

## 2024-01-27 RX ORDER — CEPHALEXIN 250 MG/1
250 CAPSULE ORAL EVERY 6 HOURS
Qty: 24 CAPSULE | Refills: 0 | Status: SHIPPED | OUTPATIENT
Start: 2024-01-27 | End: 2024-02-01 | Stop reason: ALTCHOICE

## 2024-01-30 ENCOUNTER — APPOINTMENT (OUTPATIENT)
Dept: RADIOLOGY | Facility: HOSPITAL | Age: 89
DRG: 640 | End: 2024-01-30
Payer: MEDICARE

## 2024-01-30 LAB
ALBUMIN SERPL BCP-MCNC: 3.4 G/DL (ref 3.4–5)
ALP SERPL-CCNC: 60 U/L (ref 33–136)
ALT SERPL W P-5'-P-CCNC: 15 U/L (ref 7–45)
ANION GAP SERPL CALC-SCNC: 12 MMOL/L (ref 10–20)
AST SERPL W P-5'-P-CCNC: 24 U/L (ref 9–39)
BASOPHILS # BLD AUTO: 0.05 X10*3/UL (ref 0–0.1)
BASOPHILS NFR BLD AUTO: 0.9 %
BILIRUB SERPL-MCNC: 0.6 MG/DL (ref 0–1.2)
BNP SERPL-MCNC: 171 PG/ML (ref 0–99)
BUN SERPL-MCNC: 41 MG/DL (ref 6–23)
CALCIUM SERPL-MCNC: 9.1 MG/DL (ref 8.6–10.3)
CARDIAC TROPONIN I PNL SERPL HS: 13 NG/L (ref 0–13)
CARDIAC TROPONIN I PNL SERPL HS: 13 NG/L (ref 0–13)
CHLORIDE SERPL-SCNC: 88 MMOL/L (ref 98–107)
CO2 SERPL-SCNC: 25 MMOL/L (ref 21–32)
CREAT SERPL-MCNC: 2.06 MG/DL (ref 0.5–1.05)
EGFRCR SERPLBLD CKD-EPI 2021: 22 ML/MIN/1.73M*2
EOSINOPHIL # BLD AUTO: 0.09 X10*3/UL (ref 0–0.4)
EOSINOPHIL NFR BLD AUTO: 1.7 %
ERYTHROCYTE [DISTWIDTH] IN BLOOD BY AUTOMATED COUNT: 11.9 % (ref 11.5–14.5)
FLUAV RNA RESP QL NAA+PROBE: NOT DETECTED
FLUBV RNA RESP QL NAA+PROBE: NOT DETECTED
GLUCOSE SERPL-MCNC: 227 MG/DL (ref 74–99)
HCT VFR BLD AUTO: 27.8 % (ref 36–46)
HGB BLD-MCNC: 9.7 G/DL (ref 12–16)
IMM GRANULOCYTES # BLD AUTO: 0.01 X10*3/UL (ref 0–0.5)
IMM GRANULOCYTES NFR BLD AUTO: 0.2 % (ref 0–0.9)
LYMPHOCYTES # BLD AUTO: 1.41 X10*3/UL (ref 0.8–3)
LYMPHOCYTES NFR BLD AUTO: 26 %
MAGNESIUM SERPL-MCNC: 1.8 MG/DL (ref 1.6–2.4)
MCH RBC QN AUTO: 32.8 PG (ref 26–34)
MCHC RBC AUTO-ENTMCNC: 34.9 G/DL (ref 32–36)
MCV RBC AUTO: 94 FL (ref 80–100)
MONOCYTES # BLD AUTO: 0.74 X10*3/UL (ref 0.05–0.8)
MONOCYTES NFR BLD AUTO: 13.6 %
NEUTROPHILS # BLD AUTO: 3.13 X10*3/UL (ref 1.6–5.5)
NEUTROPHILS NFR BLD AUTO: 57.6 %
NRBC BLD-RTO: 0 /100 WBCS (ref 0–0)
PLATELET # BLD AUTO: 264 X10*3/UL (ref 150–450)
POTASSIUM SERPL-SCNC: 3.6 MMOL/L (ref 3.5–5.3)
PROT SERPL-MCNC: 7 G/DL (ref 6.4–8.2)
RBC # BLD AUTO: 2.96 X10*6/UL (ref 4–5.2)
SARS-COV-2 RNA RESP QL NAA+PROBE: NOT DETECTED
SODIUM SERPL-SCNC: 121 MMOL/L (ref 136–145)
WBC # BLD AUTO: 5.4 X10*3/UL (ref 4.4–11.3)

## 2024-01-30 PROCEDURE — 96360 HYDRATION IV INFUSION INIT: CPT | Performed by: EMERGENCY MEDICINE

## 2024-01-30 PROCEDURE — 36415 COLL VENOUS BLD VENIPUNCTURE: CPT | Performed by: STUDENT IN AN ORGANIZED HEALTH CARE EDUCATION/TRAINING PROGRAM

## 2024-01-30 PROCEDURE — 96361 HYDRATE IV INFUSION ADD-ON: CPT | Performed by: EMERGENCY MEDICINE

## 2024-01-30 PROCEDURE — 87636 SARSCOV2 & INF A&B AMP PRB: CPT | Performed by: STUDENT IN AN ORGANIZED HEALTH CARE EDUCATION/TRAINING PROGRAM

## 2024-01-30 PROCEDURE — 80053 COMPREHEN METABOLIC PANEL: CPT | Performed by: STUDENT IN AN ORGANIZED HEALTH CARE EDUCATION/TRAINING PROGRAM

## 2024-01-30 PROCEDURE — 71046 X-RAY EXAM CHEST 2 VIEWS: CPT | Mod: FR

## 2024-01-30 PROCEDURE — 83735 ASSAY OF MAGNESIUM: CPT | Performed by: STUDENT IN AN ORGANIZED HEALTH CARE EDUCATION/TRAINING PROGRAM

## 2024-01-30 PROCEDURE — 84484 ASSAY OF TROPONIN QUANT: CPT | Performed by: STUDENT IN AN ORGANIZED HEALTH CARE EDUCATION/TRAINING PROGRAM

## 2024-01-30 PROCEDURE — 83880 ASSAY OF NATRIURETIC PEPTIDE: CPT | Performed by: STUDENT IN AN ORGANIZED HEALTH CARE EDUCATION/TRAINING PROGRAM

## 2024-01-30 PROCEDURE — 71046 X-RAY EXAM CHEST 2 VIEWS: CPT | Mod: FOREIGN READ | Performed by: RADIOLOGY

## 2024-01-30 PROCEDURE — 85025 COMPLETE CBC W/AUTO DIFF WBC: CPT | Performed by: STUDENT IN AN ORGANIZED HEALTH CARE EDUCATION/TRAINING PROGRAM

## 2024-01-30 PROCEDURE — 99283 EMERGENCY DEPT VISIT LOW MDM: CPT | Mod: 25 | Performed by: EMERGENCY MEDICINE

## 2024-01-30 ASSESSMENT — COLUMBIA-SUICIDE SEVERITY RATING SCALE - C-SSRS
2. HAVE YOU ACTUALLY HAD ANY THOUGHTS OF KILLING YOURSELF?: NO
1. IN THE PAST MONTH, HAVE YOU WISHED YOU WERE DEAD OR WISHED YOU COULD GO TO SLEEP AND NOT WAKE UP?: NO
6. HAVE YOU EVER DONE ANYTHING, STARTED TO DO ANYTHING, OR PREPARED TO DO ANYTHING TO END YOUR LIFE?: NO

## 2024-01-30 ASSESSMENT — PAIN SCALES - GENERAL: PAINLEVEL_OUTOF10: 0 - NO PAIN

## 2024-01-30 ASSESSMENT — PAIN DESCRIPTION - PROGRESSION: CLINICAL_PROGRESSION: NOT CHANGED

## 2024-01-30 ASSESSMENT — PAIN - FUNCTIONAL ASSESSMENT: PAIN_FUNCTIONAL_ASSESSMENT: 0-10

## 2024-01-31 ENCOUNTER — HOSPITAL ENCOUNTER (EMERGENCY)
Facility: HOSPITAL | Age: 89
Discharge: HOME | DRG: 640 | End: 2024-01-31
Attending: EMERGENCY MEDICINE
Payer: MEDICARE

## 2024-01-31 ENCOUNTER — HOSPITAL ENCOUNTER (INPATIENT)
Facility: HOSPITAL | Age: 89
LOS: 3 days | Discharge: HOME | DRG: 640 | End: 2024-02-05
Attending: EMERGENCY MEDICINE | Admitting: INTERNAL MEDICINE
Payer: MEDICARE

## 2024-01-31 ENCOUNTER — APPOINTMENT (OUTPATIENT)
Dept: RADIOLOGY | Facility: HOSPITAL | Age: 89
DRG: 640 | End: 2024-01-31
Payer: MEDICARE

## 2024-01-31 ENCOUNTER — TELEMEDICINE (OUTPATIENT)
Dept: PRIMARY CARE | Facility: CLINIC | Age: 89
End: 2024-01-31
Payer: MEDICARE

## 2024-01-31 VITALS
WEIGHT: 174 LBS | DIASTOLIC BLOOD PRESSURE: 55 MMHG | RESPIRATION RATE: 16 BRPM | TEMPERATURE: 97.9 F | HEART RATE: 58 BPM | BODY MASS INDEX: 29.71 KG/M2 | OXYGEN SATURATION: 100 % | HEIGHT: 64 IN | SYSTOLIC BLOOD PRESSURE: 187 MMHG

## 2024-01-31 DIAGNOSIS — E87.1 HYPONATREMIA: Primary | ICD-10-CM

## 2024-01-31 DIAGNOSIS — I10 ESSENTIAL HYPERTENSION: ICD-10-CM

## 2024-01-31 DIAGNOSIS — R11.11 VOMITING WITHOUT NAUSEA, UNSPECIFIED VOMITING TYPE: ICD-10-CM

## 2024-01-31 DIAGNOSIS — I50.33 ACUTE ON CHRONIC DIASTOLIC HEART FAILURE (MULTI): ICD-10-CM

## 2024-01-31 DIAGNOSIS — E87.1 HYPONATREMIA: ICD-10-CM

## 2024-01-31 DIAGNOSIS — E08.00 DIABETES MELLITUS DUE TO UNDERLYING CONDITION WITH HYPEROSMOLARITY WITHOUT COMA, WITHOUT LONG-TERM CURRENT USE OF INSULIN (MULTI): ICD-10-CM

## 2024-01-31 DIAGNOSIS — R42 DIZZINESS: Primary | ICD-10-CM

## 2024-01-31 DIAGNOSIS — E11.21 DIABETIC NEPHROPATHY ASSOCIATED WITH TYPE 2 DIABETES MELLITUS (MULTI): ICD-10-CM

## 2024-01-31 DIAGNOSIS — I16.0 HYPERTENSIVE URGENCY: ICD-10-CM

## 2024-01-31 DIAGNOSIS — R73.9 HYPERGLYCEMIA: ICD-10-CM

## 2024-01-31 DIAGNOSIS — G62.9 NEUROPATHY: ICD-10-CM

## 2024-01-31 PROBLEM — D63.1 ANEMIA DUE TO CHRONIC KIDNEY DISEASE: Chronic | Status: ACTIVE | Noted: 2024-01-22

## 2024-01-31 PROBLEM — N18.9 ANEMIA DUE TO CHRONIC KIDNEY DISEASE: Chronic | Status: ACTIVE | Noted: 2024-01-22

## 2024-01-31 PROBLEM — N18.30 CHRONIC KIDNEY DISEASE, STAGE 3, MOD DECREASED GFR (MULTI): Chronic | Status: ACTIVE | Noted: 2018-09-18

## 2024-01-31 PROBLEM — K21.9 GERD (GASTROESOPHAGEAL REFLUX DISEASE): Chronic | Status: ACTIVE | Noted: 2023-05-23

## 2024-01-31 PROBLEM — E11.9 DM (DIABETES MELLITUS) (MULTI): Chronic | Status: ACTIVE | Noted: 2023-04-12

## 2024-01-31 PROBLEM — I50.32 CHRONIC DIASTOLIC CONGESTIVE HEART FAILURE (MULTI): Chronic | Status: ACTIVE | Noted: 2023-10-02

## 2024-01-31 LAB
ALBUMIN SERPL BCP-MCNC: 3.5 G/DL (ref 3.4–5)
ALP SERPL-CCNC: 64 U/L (ref 33–136)
ALT SERPL W P-5'-P-CCNC: 15 U/L (ref 7–45)
ANION GAP SERPL CALC-SCNC: 12 MMOL/L (ref 10–20)
APPEARANCE UR: CLEAR
AST SERPL W P-5'-P-CCNC: 22 U/L (ref 9–39)
BASOPHILS # BLD AUTO: 0.03 X10*3/UL (ref 0–0.1)
BASOPHILS NFR BLD AUTO: 0.5 %
BILIRUB SERPL-MCNC: 0.5 MG/DL (ref 0–1.2)
BILIRUB UR STRIP.AUTO-MCNC: NEGATIVE MG/DL
BNP SERPL-MCNC: 218 PG/ML (ref 0–99)
BUN SERPL-MCNC: 39 MG/DL (ref 6–23)
CALCIUM SERPL-MCNC: 9.1 MG/DL (ref 8.6–10.3)
CHLORIDE SERPL-SCNC: 89 MMOL/L (ref 98–107)
CO2 SERPL-SCNC: 26 MMOL/L (ref 21–32)
COLOR UR: YELLOW
CREAT SERPL-MCNC: 1.85 MG/DL (ref 0.5–1.05)
EGFRCR SERPLBLD CKD-EPI 2021: 25 ML/MIN/1.73M*2
EOSINOPHIL # BLD AUTO: 0.06 X10*3/UL (ref 0–0.4)
EOSINOPHIL NFR BLD AUTO: 1.1 %
ERYTHROCYTE [DISTWIDTH] IN BLOOD BY AUTOMATED COUNT: 12 % (ref 11.5–14.5)
GLUCOSE SERPL-MCNC: 283 MG/DL (ref 74–99)
GLUCOSE UR STRIP.AUTO-MCNC: NEGATIVE MG/DL
HCT VFR BLD AUTO: 28.3 % (ref 36–46)
HGB BLD-MCNC: 9.4 G/DL (ref 12–16)
IMM GRANULOCYTES # BLD AUTO: 0.01 X10*3/UL (ref 0–0.5)
IMM GRANULOCYTES NFR BLD AUTO: 0.2 % (ref 0–0.9)
KETONES UR STRIP.AUTO-MCNC: NEGATIVE MG/DL
LEUKOCYTE ESTERASE UR QL STRIP.AUTO: NEGATIVE
LYMPHOCYTES # BLD AUTO: 1.03 X10*3/UL (ref 0.8–3)
LYMPHOCYTES NFR BLD AUTO: 18.8 %
MAGNESIUM SERPL-MCNC: 1.7 MG/DL (ref 1.6–2.4)
MCH RBC QN AUTO: 31.1 PG (ref 26–34)
MCHC RBC AUTO-ENTMCNC: 33.2 G/DL (ref 32–36)
MCV RBC AUTO: 94 FL (ref 80–100)
MONOCYTES # BLD AUTO: 0.64 X10*3/UL (ref 0.05–0.8)
MONOCYTES NFR BLD AUTO: 11.7 %
NEUTROPHILS # BLD AUTO: 3.7 X10*3/UL (ref 1.6–5.5)
NEUTROPHILS NFR BLD AUTO: 67.7 %
NITRITE UR QL STRIP.AUTO: NEGATIVE
NRBC BLD-RTO: 0 /100 WBCS (ref 0–0)
PH UR STRIP.AUTO: 6 [PH]
PHOSPHATE SERPL-MCNC: 3.3 MG/DL (ref 2.5–4.9)
PLATELET # BLD AUTO: 274 X10*3/UL (ref 150–450)
POTASSIUM SERPL-SCNC: 3.9 MMOL/L (ref 3.5–5.3)
PROT SERPL-MCNC: 7 G/DL (ref 6.4–8.2)
PROT UR STRIP.AUTO-MCNC: NEGATIVE MG/DL
RBC # BLD AUTO: 3.02 X10*6/UL (ref 4–5.2)
RBC # UR STRIP.AUTO: NEGATIVE /UL
SODIUM SERPL-SCNC: 123 MMOL/L (ref 136–145)
SP GR UR STRIP.AUTO: 1.01
UROBILINOGEN UR STRIP.AUTO-MCNC: <2 MG/DL
WBC # BLD AUTO: 5.5 X10*3/UL (ref 4.4–11.3)

## 2024-01-31 PROCEDURE — G0378 HOSPITAL OBSERVATION PER HR: HCPCS

## 2024-01-31 PROCEDURE — 2500000004 HC RX 250 GENERAL PHARMACY W/ HCPCS (ALT 636 FOR OP/ED): Performed by: EMERGENCY MEDICINE

## 2024-01-31 PROCEDURE — 70450 CT HEAD/BRAIN W/O DYE: CPT | Performed by: RADIOLOGY

## 2024-01-31 PROCEDURE — 99214 OFFICE O/P EST MOD 30 MIN: CPT | Performed by: INTERNAL MEDICINE

## 2024-01-31 PROCEDURE — 81003 URINALYSIS AUTO W/O SCOPE: CPT | Performed by: STUDENT IN AN ORGANIZED HEALTH CARE EDUCATION/TRAINING PROGRAM

## 2024-01-31 PROCEDURE — 83735 ASSAY OF MAGNESIUM: CPT | Performed by: EMERGENCY MEDICINE

## 2024-01-31 PROCEDURE — 99285 EMERGENCY DEPT VISIT HI MDM: CPT | Mod: 27 | Performed by: EMERGENCY MEDICINE

## 2024-01-31 PROCEDURE — 96374 THER/PROPH/DIAG INJ IV PUSH: CPT

## 2024-01-31 PROCEDURE — 2500000001 HC RX 250 WO HCPCS SELF ADMINISTERED DRUGS (ALT 637 FOR MEDICARE OP): Performed by: EMERGENCY MEDICINE

## 2024-01-31 PROCEDURE — 99222 1ST HOSP IP/OBS MODERATE 55: CPT | Performed by: PHYSICIAN ASSISTANT

## 2024-01-31 PROCEDURE — 70450 CT HEAD/BRAIN W/O DYE: CPT

## 2024-01-31 PROCEDURE — 80053 COMPREHEN METABOLIC PANEL: CPT | Performed by: EMERGENCY MEDICINE

## 2024-01-31 PROCEDURE — 83880 ASSAY OF NATRIURETIC PEPTIDE: CPT | Performed by: EMERGENCY MEDICINE

## 2024-01-31 PROCEDURE — 82570 ASSAY OF URINE CREATININE: CPT | Performed by: INTERNAL MEDICINE

## 2024-01-31 PROCEDURE — 83935 ASSAY OF URINE OSMOLALITY: CPT | Mod: AHULAB | Performed by: INTERNAL MEDICINE

## 2024-01-31 PROCEDURE — 84100 ASSAY OF PHOSPHORUS: CPT | Performed by: EMERGENCY MEDICINE

## 2024-01-31 PROCEDURE — 96376 TX/PRO/DX INJ SAME DRUG ADON: CPT

## 2024-01-31 PROCEDURE — 85025 COMPLETE CBC W/AUTO DIFF WBC: CPT | Performed by: EMERGENCY MEDICINE

## 2024-01-31 PROCEDURE — 36415 COLL VENOUS BLD VENIPUNCTURE: CPT | Performed by: EMERGENCY MEDICINE

## 2024-01-31 RX ORDER — HEPARIN SODIUM 5000 [USP'U]/ML
5000 INJECTION, SOLUTION INTRAVENOUS; SUBCUTANEOUS EVERY 8 HOURS
Status: DISCONTINUED | OUTPATIENT
Start: 2024-01-31 | End: 2024-02-05 | Stop reason: HOSPADM

## 2024-01-31 RX ORDER — ACETAMINOPHEN 325 MG/1
950 TABLET ORAL EVERY 6 HOURS PRN
Status: DISCONTINUED | OUTPATIENT
Start: 2024-01-31 | End: 2024-02-05 | Stop reason: HOSPADM

## 2024-01-31 RX ORDER — INSULIN LISPRO 100 [IU]/ML
0-5 INJECTION, SOLUTION INTRAVENOUS; SUBCUTANEOUS
Status: DISCONTINUED | OUTPATIENT
Start: 2024-02-01 | End: 2024-02-05 | Stop reason: HOSPADM

## 2024-01-31 RX ORDER — PANTOPRAZOLE SODIUM 40 MG/1
40 TABLET, DELAYED RELEASE ORAL
Status: DISCONTINUED | OUTPATIENT
Start: 2024-02-01 | End: 2024-02-05 | Stop reason: HOSPADM

## 2024-01-31 RX ORDER — DEXTROSE MONOHYDRATE 100 MG/ML
50 INJECTION, SOLUTION INTRAVENOUS ONCE AS NEEDED
Status: DISCONTINUED | OUTPATIENT
Start: 2024-01-31 | End: 2024-02-05 | Stop reason: HOSPADM

## 2024-01-31 RX ORDER — HYDRALAZINE HYDROCHLORIDE 20 MG/ML
10 INJECTION INTRAMUSCULAR; INTRAVENOUS ONCE
Status: COMPLETED | OUTPATIENT
Start: 2024-01-31 | End: 2024-01-31

## 2024-01-31 RX ORDER — HYDRALAZINE HYDROCHLORIDE 20 MG/ML
20 INJECTION INTRAMUSCULAR; INTRAVENOUS ONCE
Status: COMPLETED | OUTPATIENT
Start: 2024-01-31 | End: 2024-01-31

## 2024-01-31 RX ORDER — PANTOPRAZOLE SODIUM 40 MG/10ML
40 INJECTION, POWDER, LYOPHILIZED, FOR SOLUTION INTRAVENOUS
Status: DISCONTINUED | OUTPATIENT
Start: 2024-02-01 | End: 2024-02-05 | Stop reason: HOSPADM

## 2024-01-31 RX ORDER — TALC
3 POWDER (GRAM) TOPICAL
Status: DISCONTINUED | OUTPATIENT
Start: 2024-02-01 | End: 2024-02-05 | Stop reason: HOSPADM

## 2024-01-31 RX ORDER — HYDRALAZINE HYDROCHLORIDE 25 MG/1
100 TABLET, FILM COATED ORAL ONCE
Status: COMPLETED | OUTPATIENT
Start: 2024-01-31 | End: 2024-01-31

## 2024-01-31 RX ORDER — DOCUSATE SODIUM 100 MG/1
100 CAPSULE, LIQUID FILLED ORAL 2 TIMES DAILY PRN
Status: DISCONTINUED | OUTPATIENT
Start: 2024-01-31 | End: 2024-02-05 | Stop reason: HOSPADM

## 2024-01-31 RX ORDER — DEXTROSE 50 % IN WATER (D50W) INTRAVENOUS SYRINGE
25
Status: DISCONTINUED | OUTPATIENT
Start: 2024-01-31 | End: 2024-02-05 | Stop reason: HOSPADM

## 2024-01-31 RX ADMIN — HYDRALAZINE HYDROCHLORIDE 100 MG: 25 TABLET, FILM COATED ORAL at 02:42

## 2024-01-31 RX ADMIN — HYDRALAZINE HYDROCHLORIDE 20 MG: 20 INJECTION INTRAMUSCULAR; INTRAVENOUS at 22:11

## 2024-01-31 RX ADMIN — HYDRALAZINE HYDROCHLORIDE 10 MG: 20 INJECTION INTRAMUSCULAR; INTRAVENOUS at 20:41

## 2024-01-31 RX ADMIN — SODIUM CHLORIDE 1000 ML: 9 INJECTION, SOLUTION INTRAVENOUS at 01:45

## 2024-01-31 ASSESSMENT — PAIN SCALES - GENERAL
PAINLEVEL_OUTOF10: 0 - NO PAIN
PAINLEVEL_OUTOF10: 0 - NO PAIN

## 2024-01-31 ASSESSMENT — COLUMBIA-SUICIDE SEVERITY RATING SCALE - C-SSRS
6. HAVE YOU EVER DONE ANYTHING, STARTED TO DO ANYTHING, OR PREPARED TO DO ANYTHING TO END YOUR LIFE?: NO
2. HAVE YOU ACTUALLY HAD ANY THOUGHTS OF KILLING YOURSELF?: NO
1. IN THE PAST MONTH, HAVE YOU WISHED YOU WERE DEAD OR WISHED YOU COULD GO TO SLEEP AND NOT WAKE UP?: NO

## 2024-01-31 ASSESSMENT — ENCOUNTER SYMPTOMS: SHORTNESS OF BREATH: 1

## 2024-01-31 ASSESSMENT — PAIN - FUNCTIONAL ASSESSMENT: PAIN_FUNCTIONAL_ASSESSMENT: 0-10

## 2024-01-31 NOTE — ASSESSMENT & PLAN NOTE
Instructed to return to the hospital for IV fluids and further evaluation and monitoring. Her daughter will drive her.

## 2024-01-31 NOTE — PROGRESS NOTES
Subjective   Chief complaint: Carolann Cartagena is a 89 y.o. female who presents for Shortness of Breath (Pt fatigue, sob . went to the ED yesterday for sob. ).    HPI:  Follow-up visit to the emergency room for severe weakness shortness of breath was found to have severely hyponatremic was asked to be admitted and she refused she went home and when she arrived at home she was severely exhausted and tired and she sat in the chair    Shortness of Breath        Objective   There were no vitals taken for this visit.  Physical Exam  Constitutional:       Appearance: Normal appearance.   HENT:      Head: Normocephalic and atraumatic.   Pulmonary:      Effort: Pulmonary effort is normal.   Abdominal:      Palpations: Abdomen is soft.   Musculoskeletal:      Cervical back: Neck supple.   Skin:     General: Skin is warm and dry.   Neurological:      General: No focal deficit present.      Mental Status: She is alert.   Psychiatric:         Mood and Affect: Mood normal.         Behavior: Behavior is cooperative.         I have reviewed and reconciled the medication list with the patient today.   Current Outpatient Medications:     Alphagan P 0.1 % ophthalmic solution, Administer 1 drop into both eyes twice a day., Disp: , Rfl:     aspirin 81 mg EC tablet, Take 1 tablet (81 mg) by mouth once daily., Disp: , Rfl:     atorvastatin (Lipitor) 10 mg tablet, Take 1 tablet (10 mg) by mouth once daily., Disp: 90 tablet, Rfl: 3    Autolet lancing device, Use as instructed, Disp: 1 each, Rfl: 0    blood sugar diagnostic (OneTouch Ultra Test) strip, 1 strip 3 times a day., Disp: 200 strip, Rfl: 11    blood-glucose meter misc, Use to test glucose 3 times daily, Disp: 1 each, Rfl: 0    carvedilol (Coreg) 25 mg tablet, Take 1 tablet (25 mg) by mouth 2 times a day with meals., Disp: 60 tablet, Rfl: 11    cephalexin (Keflex) 250 mg capsule, Take 1 capsule (250 mg) by mouth every 6 hours for 6 days., Disp: 24 capsule, Rfl: 0    cholecalciferol  "(Vitamin D-3) 50 MCG (2000 UT) tablet, Take 1 tablet (2,000 Units) by mouth once daily., Disp: , Rfl:     dimethicone (Cavilon Durable Barrier) 1.3 % cream, Apply 1 Tube topically 3 times a day., Disp: 3.25 g, Rfl: 0    fluticasone (Flonase) 50 mcg/actuation nasal spray, Administer 1 spray into each nostril once daily. Shake gently. Before first use, prime pump. After use, clean tip and replace cap., Disp: 16 g, Rfl: 11    furosemide (Lasix) 20 mg tablet, Take 2 tablets (40 mg) by mouth once daily., Disp: 180 tablet, Rfl: 3    hydrALAZINE (Apresoline) 100 mg tablet, Take 1 tablet (100 mg) by mouth 3 times a day., Disp: 90 tablet, Rfl: 11    hydrocolloid dressing 1 3/4 X 1 1/2 \" bandage, Apply 1 patch topically once daily as needed (wear daily for pressure ulcer)., Disp: 10 each, Rfl: 1    insulin NPH, Isophane, (HumuLIN N,NovoLIN N) 100 unit/mL (3 mL) injection, Inject 12 Units under the skin 2 times a day before meals. Take as directed per insulin instructions., Disp: 8 mL, Rfl: 2    insulin syringe-needle U-100 (BD Insulin Syringe Ultra-Fine) 31G X 5/16\" 1 mL syringe, Inject 1 each under the skin 2 times a day. Use as instructed, Disp: 100 each, Rfl: 3    insulin syringe-needle U-100 31G X 5/16\" 0.3 mL syringe, Use as instructed, Disp: 100 each, Rfl: 11    lancets (OneTouch Delica Plus Lancet) 30 gauge misc, Use to test glucose 3 times daily, Disp: 100 each, Rfl: 11    latanoprost (Xalatan) 0.005 % ophthalmic solution, 1 drop once daily., Disp: , Rfl:     pen needle, diabetic (BD Ultra-Fine Nely Pen Needle) 32 gauge x 5/32\" needle, 1 Needle 2 times a day., Disp: 200 each, Rfl: 3    sennosides-docusate sodium (Steph-Colace) 8.6-50 mg tablet, Take by mouth twice a day., Disp: , Rfl:     timolol (Timoptic) 0.5 % ophthalmic solution, 1 drop., Disp: , Rfl:     Current Facility-Administered Medications:     fluticasone (Flonase) nasal spray 2 spray, 2 spray, Each Nostril, BID, Josh Rosa MD    hydrALAZINE " (Apresoline) tablet 100 mg, 100 mg, oral, TID, Louie HORTA MD     Imaging:  XR chest 2 views    Result Date: 1/30/2024  STUDY: Chest Radiographs;  01/30/2024 INDICATION: Cough. COMPARISON: 10/05/2023 XR chest ACCESSION NUMBER(S): [id number] ORDERING CLINICIAN: KODY BELLE TECHNIQUE:  Frontal and lateral chest. FINDINGS: CARDIOMEDIASTINAL SILHOUETTE: Cardiomediastinal silhouette is unchanged in size and configuration.  LUNGS: Lungs are clear.  ABDOMEN: No remarkable upper abdominal findings.  BONES: No acute osseous changes.    No acute abnormality based on plain film imaging.  Borderline cardiomegaly.. Signed by Aakash Paredes DO       Labs reviewed:    Lab Results   Component Value Date    WBC 5.5 01/31/2024    HGB 9.4 (L) 01/31/2024    HCT 28.3 (L) 01/31/2024     01/31/2024    CHOL 183 02/22/2023    TRIG 86 02/22/2023    HDL 57.1 02/22/2023    ALT 15 01/30/2024    AST 24 01/30/2024     (L) 01/30/2024    K 3.6 01/30/2024    CL 88 (L) 01/30/2024    CREATININE 2.06 (H) 01/30/2024    BUN 41 (H) 01/30/2024    CO2 25 01/30/2024    TSH 5.53 (H) 09/19/2023    HGBA1C 6.6 (H) 01/08/2024       Assessment/Plan   Problem List Items Addressed This Visit       Diabetes mellitus (CMS/[facility])    Essential hypertension    Diabetic nephropathy associated with type 2 diabetes mellitus (CMS/[facility])    Neuropathy    Hyponatremia - Primary     Instructed to return to the hospital for IV fluids and further evaluation and monitoring. Her daughter will drive her.             Continue current medications as listed  Follow up in after discharge from the hospital   Carolann presents virtually with her daughter. She was in the hospital yesterday for fatigue and generalized weakness. Her sodium was 122, they did and IV saline and she was feeling better and decided to leave.     Upon arriving home she felt tired again and has had no energy since returning home.     O:   Virtual assessment   At home vital 109/45 and pulse 51.      A+P  Hyponatremia  - patient instructed to return to the hospital for evaluation and IV fluids.

## 2024-01-31 NOTE — ED PROVIDER NOTES
HPI   Chief Complaint   Patient presents with    Nausea     Starting this morning      Dizziness     Starting this morning    Cough     Patients PCP prescribed flonase    Shortness of Breath     Starting today       HPI  Patient wanted a soda vomit today.  May have not kept on her morning medications.  Has felt somewhat woozy described as a generalized weakness since then.  States has somewhat poor p.o. intake at home.  Was able to keep down some broth earlier in the evening.  Family for her to be discharged home and stay in the hospital.                  [place] Coma Scale Score: 15                  Patient History   Past Medical History:   Diagnosis Date    Encounter for general adult medical examination without abnormal findings 02/18/2021    Encounter for annual health examination    Encounter for general adult medical examination without abnormal findings 04/02/2020    Encounter for annual health examination    Urinary tract infection, site not specified 01/11/2021    Acute UTI     Past Surgical History:   Procedure Laterality Date    OTHER SURGICAL HISTORY  02/13/2020    Hysterectomy     Family History   Problem Relation Name Age of Onset    No Known Problems Mother      Bone cancer Sister      Lung cancer Daughter       Social History     Tobacco Use    Smoking status: Never    Smokeless tobacco: Never   Substance Use Topics    Alcohol use: Not Currently    Drug use: Never       Physical Exam   ED Triage Vitals [01/30/24 2132]   Temperature Heart Rate Respirations BP   36.6 °C (97.9 °F) 50 16 180/54      Pulse Ox Temp Source Heart Rate Source Patient Position   100 % Oral Monitor Sitting      BP Location FiO2 (%)     Left arm --       Physical Exam  Vitals and nursing note reviewed.   Constitutional:       General: She is not in acute distress.     Appearance: She is well-developed.   HENT:      Head: Normocephalic and atraumatic.   Eyes:      Conjunctiva/sclera: Conjunctivae normal.   Cardiovascular:       Rate and Rhythm: Normal rate and regular rhythm.      Heart sounds: No murmur heard.  Pulmonary:      Effort: Pulmonary effort is normal. No respiratory distress.      Breath sounds: Normal breath sounds.   Abdominal:      Palpations: Abdomen is soft.      Tenderness: There is no abdominal tenderness.   Musculoskeletal:         General: No swelling.      Cervical back: Neck supple.   Skin:     General: Skin is warm and dry.      Capillary Refill: Capillary refill takes less than 2 seconds.   Neurological:      Mental Status: She is alert.   Psychiatric:         Mood and Affect: Mood normal.         ED Course & MDM   Diagnoses as of 01/31/24 0312   Dizziness   Vomiting without nausea, unspecified vomiting type   Hyponatremia   Hyperglycemia       Medical Decision Making  Patient has no focal weakness.  The patient does have a decreased sodium even corrected is still somewhat in the 125.  Did offer a bolus of NS.  Patient is able to and without difficulty.  Patient first be discharged home.  Sodium is low enough I did offer admission at this time given her age.  She will be discharged at this time.  With no further vomiting no abdominal tenderness do not feel she requires CT imaging of the abdomen this time.  No vertiginous symptoms concerning for stroke at this time.  Will discharge home with return precautions.    EKG interpreted by myself.  Bradycardic with PVCs sinus rhythm at a rate of 53 bpm.  Wide QRS possible RBBB normal axis.  No signs of acute ischemia.      Procedure  Procedures     Alex Finney MD  01/31/24 0314       Alex Finney MD  01/31/24 0315

## 2024-01-31 NOTE — ED TRIAGE NOTES
TRIAGE NOTE   I saw the patient as the Clinician in Triage and performed a brief history and physical exam, established acuity, and ordered appropriate tests to develop basic plan of care. Patient will be seen by an NAZANIN, resident and/or physician who will independently evaluate the patient. Please see subsequent provider notes for further details and disposition.     Brief HPI: In brief, Carolann Cartagena is a 89 y.o. female that presents for vomiting, sob, cough. Cough has been present for some time. Developed some nausea/vomiting this morning. Now feels SOB. Denies chest pain, abdominal pain, urinary symptoms.    Does report some numbness in her bilateral feet which is chronic. Felt lightheaded earlier but not now. No weakness.  History of hypertension, throughout her blood pressure medication this morning.  History of heart failure, pulmonary hypertension, aortic stenosis.    Focused Physical exam:   Lungs clear anteriorly, no increased wob. Abdomen soft, non-tender. Mildly hypertensive, otherwise vitals signs reassuring.  Gross     Plan/MDM:   Labs including cardiac work up  CXR  Viral testing.    Please see subsequent provider note for further details and disposition

## 2024-02-01 PROBLEM — I16.0 HYPERTENSIVE URGENCY: Status: ACTIVE | Noted: 2024-02-01

## 2024-02-01 PROBLEM — E87.1 CHRONIC HYPONATREMIA: Chronic | Status: ACTIVE | Noted: 2023-10-02

## 2024-02-01 PROBLEM — N18.4 STAGE 4 CHRONIC KIDNEY DISEASE (MULTI): Status: ACTIVE | Noted: 2018-09-18

## 2024-02-01 PROBLEM — E78.5 HYPERLIPIDEMIA: Chronic | Status: ACTIVE | Noted: 2023-05-23

## 2024-02-01 LAB
ANION GAP SERPL CALC-SCNC: 13 MMOL/L (ref 10–20)
ANION GAP SERPL CALC-SCNC: 13 MMOL/L (ref 10–20)
BUN SERPL-MCNC: 36 MG/DL (ref 6–23)
BUN SERPL-MCNC: 37 MG/DL (ref 6–23)
CALCIUM SERPL-MCNC: 8.7 MG/DL (ref 8.6–10.3)
CALCIUM SERPL-MCNC: 8.8 MG/DL (ref 8.6–10.3)
CHLORIDE SERPL-SCNC: 90 MMOL/L (ref 98–107)
CHLORIDE SERPL-SCNC: 91 MMOL/L (ref 98–107)
CO2 SERPL-SCNC: 23 MMOL/L (ref 21–32)
CO2 SERPL-SCNC: 25 MMOL/L (ref 21–32)
CREAT SERPL-MCNC: 1.82 MG/DL (ref 0.5–1.05)
CREAT SERPL-MCNC: 2.29 MG/DL (ref 0.5–1.05)
EGFRCR SERPLBLD CKD-EPI 2021: 20 ML/MIN/1.73M*2
EGFRCR SERPLBLD CKD-EPI 2021: 26 ML/MIN/1.73M*2
ERYTHROCYTE [DISTWIDTH] IN BLOOD BY AUTOMATED COUNT: 11.8 % (ref 11.5–14.5)
GLUCOSE BLD MANUAL STRIP-MCNC: 263 MG/DL (ref 74–99)
GLUCOSE BLD MANUAL STRIP-MCNC: 279 MG/DL (ref 74–99)
GLUCOSE BLD MANUAL STRIP-MCNC: 279 MG/DL (ref 74–99)
GLUCOSE BLD MANUAL STRIP-MCNC: 303 MG/DL (ref 74–99)
GLUCOSE BLD MANUAL STRIP-MCNC: 319 MG/DL (ref 74–99)
GLUCOSE SERPL-MCNC: 279 MG/DL (ref 74–99)
GLUCOSE SERPL-MCNC: 330 MG/DL (ref 74–99)
HCT VFR BLD AUTO: 26.7 % (ref 36–46)
HGB BLD-MCNC: 9 G/DL (ref 12–16)
HOLD SPECIMEN: NORMAL
MAGNESIUM SERPL-MCNC: 1.8 MG/DL (ref 1.6–2.4)
MCH RBC QN AUTO: 31.9 PG (ref 26–34)
MCHC RBC AUTO-ENTMCNC: 33.7 G/DL (ref 32–36)
MCV RBC AUTO: 95 FL (ref 80–100)
NRBC BLD-RTO: 0 /100 WBCS (ref 0–0)
PLATELET # BLD AUTO: 274 X10*3/UL (ref 150–450)
POTASSIUM SERPL-SCNC: 3.6 MMOL/L (ref 3.5–5.3)
POTASSIUM SERPL-SCNC: 3.8 MMOL/L (ref 3.5–5.3)
RBC # BLD AUTO: 2.82 X10*6/UL (ref 4–5.2)
SODIUM SERPL-SCNC: 123 MMOL/L (ref 136–145)
SODIUM SERPL-SCNC: 124 MMOL/L (ref 136–145)
TSH SERPL-ACNC: 3.1 MIU/L (ref 0.44–3.98)
WBC # BLD AUTO: 6.2 X10*3/UL (ref 4.4–11.3)

## 2024-02-01 PROCEDURE — 99223 1ST HOSP IP/OBS HIGH 75: CPT | Performed by: INTERNAL MEDICINE

## 2024-02-01 PROCEDURE — 82947 ASSAY GLUCOSE BLOOD QUANT: CPT

## 2024-02-01 PROCEDURE — 82533 TOTAL CORTISOL: CPT | Mod: AHULAB | Performed by: HOSPITALIST

## 2024-02-01 PROCEDURE — 83930 ASSAY OF BLOOD OSMOLALITY: CPT | Mod: AHULAB | Performed by: HOSPITALIST

## 2024-02-01 PROCEDURE — 84443 ASSAY THYROID STIM HORMONE: CPT | Performed by: HOSPITALIST

## 2024-02-01 PROCEDURE — G0378 HOSPITAL OBSERVATION PER HR: HCPCS

## 2024-02-01 PROCEDURE — 2500000004 HC RX 250 GENERAL PHARMACY W/ HCPCS (ALT 636 FOR OP/ED): Mod: MUE | Performed by: PHYSICIAN ASSISTANT

## 2024-02-01 PROCEDURE — 80048 BASIC METABOLIC PNL TOTAL CA: CPT | Performed by: INTERNAL MEDICINE

## 2024-02-01 PROCEDURE — 36415 COLL VENOUS BLD VENIPUNCTURE: CPT | Performed by: INTERNAL MEDICINE

## 2024-02-01 PROCEDURE — 36415 COLL VENOUS BLD VENIPUNCTURE: CPT | Performed by: PHYSICIAN ASSISTANT

## 2024-02-01 PROCEDURE — 80048 BASIC METABOLIC PNL TOTAL CA: CPT | Performed by: PHYSICIAN ASSISTANT

## 2024-02-01 PROCEDURE — 99232 SBSQ HOSP IP/OBS MODERATE 35: CPT | Performed by: HOSPITALIST

## 2024-02-01 PROCEDURE — 97165 OT EVAL LOW COMPLEX 30 MIN: CPT | Mod: GO

## 2024-02-01 PROCEDURE — 2500000001 HC RX 250 WO HCPCS SELF ADMINISTERED DRUGS (ALT 637 FOR MEDICARE OP): Performed by: PHYSICIAN ASSISTANT

## 2024-02-01 PROCEDURE — 83735 ASSAY OF MAGNESIUM: CPT | Performed by: PHYSICIAN ASSISTANT

## 2024-02-01 PROCEDURE — 2500000004 HC RX 250 GENERAL PHARMACY W/ HCPCS (ALT 636 FOR OP/ED): Performed by: HOSPITALIST

## 2024-02-01 PROCEDURE — 2500000002 HC RX 250 W HCPCS SELF ADMINISTERED DRUGS (ALT 637 FOR MEDICARE OP, ALT 636 FOR OP/ED): Performed by: PHYSICIAN ASSISTANT

## 2024-02-01 PROCEDURE — 85027 COMPLETE CBC AUTOMATED: CPT | Performed by: PHYSICIAN ASSISTANT

## 2024-02-01 RX ORDER — INSULIN LISPRO 100 [IU]/ML
5 INJECTION, SOLUTION INTRAVENOUS; SUBCUTANEOUS
Status: DISCONTINUED | OUTPATIENT
Start: 2024-02-01 | End: 2024-02-05 | Stop reason: HOSPADM

## 2024-02-01 RX ORDER — ASPIRIN 81 MG/1
81 TABLET ORAL
Status: DISCONTINUED | OUTPATIENT
Start: 2024-02-01 | End: 2024-02-05 | Stop reason: HOSPADM

## 2024-02-01 RX ORDER — ATORVASTATIN CALCIUM 10 MG/1
10 TABLET, FILM COATED ORAL NIGHTLY
Status: DISCONTINUED | OUTPATIENT
Start: 2024-02-01 | End: 2024-02-05 | Stop reason: HOSPADM

## 2024-02-01 RX ORDER — SODIUM BICARBONATE 650 MG/1
650 TABLET ORAL 2 TIMES DAILY
Status: DISCONTINUED | OUTPATIENT
Start: 2024-02-01 | End: 2024-02-05 | Stop reason: HOSPADM

## 2024-02-01 RX ORDER — HYDRALAZINE HYDROCHLORIDE 50 MG/1
100 TABLET, FILM COATED ORAL 3 TIMES DAILY
Status: DISCONTINUED | OUTPATIENT
Start: 2024-02-01 | End: 2024-02-05 | Stop reason: HOSPADM

## 2024-02-01 RX ORDER — CARVEDILOL 25 MG/1
25 TABLET ORAL
Status: DISCONTINUED | OUTPATIENT
Start: 2024-02-01 | End: 2024-02-05 | Stop reason: HOSPADM

## 2024-02-01 RX ORDER — CLONIDINE HYDROCHLORIDE 0.1 MG/1
0.1 TABLET ORAL EVERY 8 HOURS PRN
Status: DISCONTINUED | OUTPATIENT
Start: 2024-02-01 | End: 2024-02-01

## 2024-02-01 RX ORDER — FUROSEMIDE 40 MG/1
40 TABLET ORAL DAILY
Status: DISCONTINUED | OUTPATIENT
Start: 2024-02-01 | End: 2024-02-05 | Stop reason: HOSPADM

## 2024-02-01 RX ADMIN — HEPARIN SODIUM 5000 UNITS: 5000 INJECTION INTRAVENOUS; SUBCUTANEOUS at 14:55

## 2024-02-01 RX ADMIN — ATORVASTATIN CALCIUM 10 MG: 10 TABLET, FILM COATED ORAL at 20:12

## 2024-02-01 RX ADMIN — CARVEDILOL 25 MG: 25 TABLET, FILM COATED ORAL at 16:15

## 2024-02-01 RX ADMIN — HYDRALAZINE HYDROCHLORIDE 100 MG: 50 TABLET ORAL at 20:12

## 2024-02-01 RX ADMIN — ASPIRIN 81 MG: 81 TABLET, COATED ORAL at 06:00

## 2024-02-01 RX ADMIN — SODIUM CHLORIDE 500 ML: 9 INJECTION, SOLUTION INTRAVENOUS at 10:15

## 2024-02-01 RX ADMIN — INSULIN LISPRO 3 UNITS: 100 INJECTION, SOLUTION INTRAVENOUS; SUBCUTANEOUS at 20:24

## 2024-02-01 RX ADMIN — Medication 3 MG: at 20:12

## 2024-02-01 RX ADMIN — CARVEDILOL 25 MG: 25 TABLET, FILM COATED ORAL at 08:46

## 2024-02-01 RX ADMIN — HYDRALAZINE HYDROCHLORIDE 100 MG: 50 TABLET ORAL at 08:46

## 2024-02-01 RX ADMIN — PANTOPRAZOLE SODIUM 40 MG: 40 TABLET, DELAYED RELEASE ORAL at 06:00

## 2024-02-01 RX ADMIN — INSULIN LISPRO 4 UNITS: 100 INJECTION, SOLUTION INTRAVENOUS; SUBCUTANEOUS at 16:11

## 2024-02-01 RX ADMIN — SODIUM BICARBONATE 650 MG: 650 TABLET ORAL at 08:46

## 2024-02-01 RX ADMIN — INSULIN HUMAN 12 UNITS: 100 INJECTION, SUSPENSION SUBCUTANEOUS at 16:10

## 2024-02-01 RX ADMIN — HYDRALAZINE HYDROCHLORIDE 100 MG: 50 TABLET ORAL at 16:23

## 2024-02-01 RX ADMIN — FUROSEMIDE 40 MG: 40 TABLET ORAL at 08:46

## 2024-02-01 RX ADMIN — INSULIN HUMAN 12 UNITS: 100 INJECTION, SUSPENSION SUBCUTANEOUS at 09:06

## 2024-02-01 RX ADMIN — INSULIN LISPRO 3 UNITS: 100 INJECTION, SOLUTION INTRAVENOUS; SUBCUTANEOUS at 09:05

## 2024-02-01 RX ADMIN — SODIUM BICARBONATE 650 MG: 650 TABLET ORAL at 20:12

## 2024-02-01 RX ADMIN — INSULIN LISPRO 4 UNITS: 100 INJECTION, SOLUTION INTRAVENOUS; SUBCUTANEOUS at 12:46

## 2024-02-01 RX ADMIN — HEPARIN SODIUM 5000 UNITS: 5000 INJECTION INTRAVENOUS; SUBCUTANEOUS at 05:55

## 2024-02-01 RX ADMIN — CLONIDINE HYDROCHLORIDE 0.1 MG: 0.1 TABLET ORAL at 05:56

## 2024-02-01 SDOH — ECONOMIC STABILITY: INCOME INSECURITY: IN THE PAST 12 MONTHS, HAS THE ELECTRIC, GAS, OIL, OR WATER COMPANY THREATENED TO SHUT OFF SERVICE IN YOUR HOME?: NO

## 2024-02-01 SDOH — SOCIAL STABILITY: SOCIAL INSECURITY: HAVE YOU HAD THOUGHTS OF HARMING ANYONE ELSE?: NO

## 2024-02-01 SDOH — SOCIAL STABILITY: SOCIAL INSECURITY: WERE YOU ABLE TO COMPLETE ALL THE BEHAVIORAL HEALTH SCREENINGS?: YES

## 2024-02-01 SDOH — SOCIAL STABILITY: SOCIAL INSECURITY: ARE THERE ANY APPARENT SIGNS OF INJURIES/BEHAVIORS THAT COULD BE RELATED TO ABUSE/NEGLECT?: NO

## 2024-02-01 SDOH — HEALTH STABILITY: MENTAL HEALTH: HOW OFTEN DO YOU HAVE A DRINK CONTAINING ALCOHOL?: NEVER

## 2024-02-01 SDOH — SOCIAL STABILITY: SOCIAL INSECURITY: ABUSE: ADULT

## 2024-02-01 SDOH — SOCIAL STABILITY: SOCIAL INSECURITY: DOES ANYONE TRY TO KEEP YOU FROM HAVING/CONTACTING OTHER FRIENDS OR DOING THINGS OUTSIDE YOUR HOME?: NO

## 2024-02-01 SDOH — SOCIAL STABILITY: SOCIAL INSECURITY: ARE YOU OR HAVE YOU BEEN THREATENED OR ABUSED PHYSICALLY, EMOTIONALLY, OR SEXUALLY BY ANYONE?: NO

## 2024-02-01 SDOH — HEALTH STABILITY: MENTAL HEALTH: HOW OFTEN DO YOU HAVE 6 OR MORE DRINKS ON ONE OCCASION?: NEVER

## 2024-02-01 SDOH — SOCIAL STABILITY: SOCIAL INSECURITY: HAS ANYONE EVER THREATENED TO HURT YOUR FAMILY OR YOUR PETS?: NO

## 2024-02-01 SDOH — SOCIAL STABILITY: SOCIAL INSECURITY: DO YOU FEEL ANYONE HAS EXPLOITED OR TAKEN ADVANTAGE OF YOU FINANCIALLY OR OF YOUR PERSONAL PROPERTY?: NO

## 2024-02-01 SDOH — HEALTH STABILITY: MENTAL HEALTH: HOW MANY STANDARD DRINKS CONTAINING ALCOHOL DO YOU HAVE ON A TYPICAL DAY?: PATIENT DOES NOT DRINK

## 2024-02-01 SDOH — SOCIAL STABILITY: SOCIAL INSECURITY: DO YOU FEEL UNSAFE GOING BACK TO THE PLACE WHERE YOU ARE LIVING?: NO

## 2024-02-01 ASSESSMENT — PATIENT HEALTH QUESTIONNAIRE - PHQ9
2. FEELING DOWN, DEPRESSED OR HOPELESS: NOT AT ALL
1. LITTLE INTEREST OR PLEASURE IN DOING THINGS: NOT AT ALL
SUM OF ALL RESPONSES TO PHQ9 QUESTIONS 1 & 2: 0

## 2024-02-01 ASSESSMENT — ACTIVITIES OF DAILY LIVING (ADL)
HEARING - RIGHT EAR: DIFFICULTY WITH NOISE
JUDGMENT_ADEQUATE_SAFELY_COMPLETE_DAILY_ACTIVITIES: YES
ASSISTIVE_DEVICE: WALKER
DRESSING YOURSELF: NEEDS ASSISTANCE
FEEDING YOURSELF: NEEDS ASSISTANCE
LACK_OF_TRANSPORTATION: NO
GROOMING: NEEDS ASSISTANCE
PATIENT'S MEMORY ADEQUATE TO SAFELY COMPLETE DAILY ACTIVITIES?: YES
ADEQUATE_TO_COMPLETE_ADL: YES
BATHING: NEEDS ASSISTANCE
HEARING - LEFT EAR: DIFFICULTY WITH NOISE
WALKS IN HOME: NEEDS ASSISTANCE
TOILETING: NEEDS ASSISTANCE

## 2024-02-01 ASSESSMENT — ENCOUNTER SYMPTOMS
ENDOCRINE NEGATIVE: 1
FATIGUE: 1
BLOOD IN STOOL: 0
ABDOMINAL PAIN: 0
WEAKNESS: 1
HEMATURIA: 0
EYES NEGATIVE: 1
DIZZINESS: 1
FEVER: 0
MUSCULOSKELETAL NEGATIVE: 1
LIGHT-HEADEDNESS: 1
CARDIOVASCULAR NEGATIVE: 1
RESPIRATORY NEGATIVE: 1
DIFFICULTY URINATING: 0
PSYCHIATRIC NEGATIVE: 1
NAUSEA: 1
SHORTNESS OF BREATH: 1
UNEXPECTED WEIGHT CHANGE: 1
VOMITING: 0

## 2024-02-01 ASSESSMENT — PAIN - FUNCTIONAL ASSESSMENT
PAIN_FUNCTIONAL_ASSESSMENT: CPOT (CRITICAL CARE PAIN OBSERVATION TOOL)
PAIN_FUNCTIONAL_ASSESSMENT: 0-10

## 2024-02-01 ASSESSMENT — COGNITIVE AND FUNCTIONAL STATUS - GENERAL
EATING MEALS: A LITTLE
TOILETING: A LITTLE
DRESSING REGULAR UPPER BODY CLOTHING: A LITTLE
DRESSING REGULAR LOWER BODY CLOTHING: A LITTLE
MOBILITY SCORE: 18
HELP NEEDED FOR BATHING: A LITTLE
DRESSING REGULAR UPPER BODY CLOTHING: A LITTLE
WALKING IN HOSPITAL ROOM: A LITTLE
TOILETING: A LITTLE
MOVING TO AND FROM BED TO CHAIR: A LITTLE
TURNING FROM BACK TO SIDE WHILE IN FLAT BAD: A LITTLE
CLIMB 3 TO 5 STEPS WITH RAILING: A LITTLE
MOVING FROM LYING ON BACK TO SITTING ON SIDE OF FLAT BED WITH BEDRAILS: A LITTLE
PERSONAL GROOMING: A LITTLE
STANDING UP FROM CHAIR USING ARMS: A LITTLE
PERSONAL GROOMING: A LITTLE
DAILY ACTIVITIY SCORE: 18
DAILY ACTIVITIY SCORE: 18
EATING MEALS: A LITTLE
DRESSING REGULAR LOWER BODY CLOTHING: A LITTLE
PATIENT BASELINE BEDBOUND: NO
HELP NEEDED FOR BATHING: A LITTLE

## 2024-02-01 ASSESSMENT — PAIN SCALES - GENERAL
PAINLEVEL_OUTOF10: 0 - NO PAIN

## 2024-02-01 ASSESSMENT — LIFESTYLE VARIABLES
AUDIT-C TOTAL SCORE: 0
HOW MANY STANDARD DRINKS CONTAINING ALCOHOL DO YOU HAVE ON A TYPICAL DAY: PATIENT DOES NOT DRINK
HOW OFTEN DO YOU HAVE 6 OR MORE DRINKS ON ONE OCCASION: NEVER
SKIP TO QUESTIONS 9-10: 1
PRESCIPTION_ABUSE_PAST_12_MONTHS: NO
SUBSTANCE_ABUSE_PAST_12_MONTHS: NO

## 2024-02-01 NOTE — NURSING NOTE
Patient was incontinent.  Purewick came out.  Patient was bathed and purewick replaced.  No urine collected at this time.

## 2024-02-01 NOTE — CARE PLAN
The patient's goals for the shift include      The clinical goals for the shift include rest      Problem: Pain - Adult  Goal: Verbalizes/displays adequate comfort level or baseline comfort level  Outcome: Met     Problem: Safety - Adult  Goal: Free from fall injury  Outcome: Met     Problem: Discharge Planning  Goal: Discharge to home or other facility with appropriate resources  Outcome: Met     Problem: Chronic Conditions and Co-morbidities  Goal: Patient's chronic conditions and co-morbidity symptoms are monitored and maintained or improved  Outcome: Met     Problem: Diabetes  Goal: Achieve decreasing blood glucose levels by end of shift  Outcome: Met  Goal: Increase stability of blood glucose readings by end of shift  Outcome: Met  Goal: Decrease in ketones present in urine by end of shift  Outcome: Met  Goal: Maintain electrolyte levels within acceptable range throughout shift  Outcome: Met  Goal: Maintain glucose levels >70mg/dl to <250mg/dl throughout shift  Outcome: Met  Goal: No changes in neurological exam by end of shift  Outcome: Met  Goal: Learn about and adhere to nutrition recommendations by end of shift  Outcome: Met  Goal: Vital signs within normal range for age by end of shift  Outcome: Met  Goal: Increase self care and/or family involovement by end of shift  Outcome: Met  Goal: Receive DSME education by end of shift  Outcome: Met     Problem: Discharge Planning  Goal: Discharge to home or other facility with appropriate resources  Outcome: Met     Problem: Chronic Conditions and Co-morbidities  Goal: Patient's chronic conditions and co-morbidity symptoms are monitored and maintained or improved  Outcome: Met     Problem: Diabetes  Goal: Achieve decreasing blood glucose levels by end of shift  Outcome: Met  Goal: Increase stability of blood glucose readings by end of shift  Outcome: Met  Goal: Decrease in ketones present in urine by end of shift  Outcome: Met  Goal: Maintain electrolyte levels  within acceptable range throughout shift  Outcome: Met  Goal: Maintain glucose levels >70mg/dl to <250mg/dl throughout shift  Outcome: Met  Goal: No changes in neurological exam by end of shift  Outcome: Met  Goal: Learn about and adhere to nutrition recommendations by end of shift  Outcome: Met  Goal: Vital signs within normal range for age by end of shift  Outcome: Met  Goal: Increase self care and/or family involovement by end of shift  Outcome: Met  Goal: Receive DSME education by end of shift  Outcome: Met     Problem: Heart Failure  Goal: Improved gas exchange this shift  Outcome: Met  Goal: Improved urinary output this shift  Outcome: Met  Goal: Reduction in peripheral edema within 24 hours  Outcome: Met  Goal: Report improvement of dyspnea/breathlessness this shift  Outcome: Met  Goal: Weight from fluid excess reduced over 2-3 days, then stabilize  Outcome: Met  Goal: Increase self care and/or family involvement in 24 hours  Outcome: Met

## 2024-02-01 NOTE — CARE PLAN
The patient's goals for the shift include      The clinical goals for the shift include rest      Problem: Pain - Adult  Goal: Verbalizes/displays adequate comfort level or baseline comfort level  Outcome: Progressing     Problem: Safety - Adult  Goal: Free from fall injury  Outcome: Progressing     Problem: Discharge Planning  Goal: Discharge to home or other facility with appropriate resources  Outcome: Progressing     Problem: Chronic Conditions and Co-morbidities  Goal: Patient's chronic conditions and co-morbidity symptoms are monitored and maintained or improved  Outcome: Progressing

## 2024-02-01 NOTE — ED TRIAGE NOTES
TRIAGE NOTE   I saw the patient as the Clinician in Triage and performed a brief history and physical exam, established acuity, and ordered appropriate tests to develop basic plan of care. Patient will be seen by an NAZANIN, resident and/or physician who will independently evaluate the patient. Please see subsequent provider notes for further details and disposition.     Brief HPI: In brief, Carolann Cartagena is a 89 y.o. female PMH hypertension, diabetes, GERD, neuropathy, hyponatremia presenting with continued generalized fatigue, evaluation of low sodium.  Patient was seen yesterday and had fluids given and felt improved and requested to go home.  States that this morning she got up and she felt continued intermittent dizziness and lightheadedness, generalized fatigue.  Patient's primary provider recommended she comes in for further evaluation and management and likely admission.      Focused Physical exam:   GEN: NAD  CV: RRR, normal S1-S2, no MRG  Pulm: CTAB, no increased work of breathing  Neuro: A/Ox4, CN II-XII intact, strength 5/5 in all 4 ext, SILT, FNF normal b/l, no pronator drift    Plan/MDM:   Plan to obtain labs, CT head to assess for acute intracranial pathology    Please see subsequent provider note for further details and disposition      Called pt and gave him his normal us results.

## 2024-02-01 NOTE — ED PROVIDER NOTES
Limitations to History: None  Additional History Obtained from: Family    HPI:    Since presenting to the emergency department after being referred by her family doctor due to concern for electrolyte abnormalities.  She has been seen and evaluated here yesterday for hyponatremia had been offered admission after receiving IV fluids.  Patient had declined that at that time but due to continued lightheadedness upon arrival home she was referred back to the emergency department.  While resting in bed she denies any complaints.  Denies any headaches, dizziness or lightheadedness.  Family at the bedside states that she is acting at her baseline.  She did take her blood pressure medications this morning.  Denies any chest pain or difficulty breathing, numbness or tingling.  Her review of systems is otherwise negative.    ------------------------------------------------------------------------------------------------------------------------------------------  Physical Exam:    ED Triage Vitals [01/31/24 1726]   Temperature Heart Rate Respirations BP   36.4 °C (97.5 °F) 65 18 178/64      Pulse Ox Temp src Heart Rate Source Patient Position   98 % -- -- --      BP Location FiO2 (%)     -- --        VS: As documented in the triage note and EMR flowsheet from this visit were reviewed.  General: Well appearing. No acute distress.   Eyes: Pupils round and reactive. No scleral icterus. No conjunctival injection  HENT: Atraumatic. Normocephalic. Moist mucous membranes. Trachea midline  CV: RRR, No MRG. No pedal edema appreciated.  Resp: Clear to auscultation bilaterally. Non-labored.    GI: Soft, nontender to palpation. Nondistended. No guarding, rigidity or rebound  Skin: Warm, dry, intact. No systemic rashes or lesions appreciated.  Extremities: No deformities or pain out of proportion; pulses intact   Neuro: Alert. No focal motor or sensory deficits observed. Speech fluent. Answers questions appropriately.   Psych: Appropriate.  Jeremy.    ------------------------------------------------------------------------------------------------------------------------------------------    Medical Decision Making  Patient is presenting to the emergency department due to concern for electrolyte abnormalities.  She was seen and evaluated by provider in triage and blood work was ordered.  Blood work consistent with the patient's known hyponatremia.  She has a nonfocal neurologic exam.  Blood pressure medication was ordered for the patient due to her elevated pressures.  Due to the patient's electrolyte abnormalities and her being otherwise asymptomatic with this at this time patient to be admitted to the observation unit for further management.  Patient and family in agreement with the plan of care        Objective Data  I have independently interpreted the following labs, imaging studies and MDM added to ED Course  Labs Reviewed   CBC WITH AUTO DIFFERENTIAL - Abnormal       Result Value    WBC 5.5      nRBC 0.0      RBC 3.02 (*)     Hemoglobin 9.4 (*)     Hematocrit 28.3 (*)     MCV 94      MCH 31.1      MCHC 33.2      RDW 12.0      Platelets 274      Neutrophils % 67.7      Immature Granulocytes %, Automated 0.2      Lymphocytes % 18.8      Monocytes % 11.7      Eosinophils % 1.1      Basophils % 0.5      Neutrophils Absolute 3.70      Immature Granulocytes Absolute, Automated 0.01      Lymphocytes Absolute 1.03      Monocytes Absolute 0.64      Eosinophils Absolute 0.06      Basophils Absolute 0.03     COMPREHENSIVE METABOLIC PANEL - Abnormal    Glucose 283 (*)     Sodium 123 (*)     Potassium 3.9      Chloride 89 (*)     Bicarbonate 26      Anion Gap 12      Urea Nitrogen 39 (*)     Creatinine 1.85 (*)     eGFR 25 (*)     Calcium 9.1      Albumin 3.5      Alkaline Phosphatase 64      Total Protein 7.0      AST 22      Bilirubin, Total 0.5      ALT 15     B-TYPE NATRIURETIC PEPTIDE - Abnormal     (*)     Narrative:        <100 pg/mL - Heart  failure unlikely  100-299 pg/mL - Intermediate probability of acute heart                  failure exacerbation. Correlate with clinical                  context and patient history.    >=300 pg/mL - Heart Failure likely. Correlate with clinical                  context and patient history.    BNP testing is performed using different testing methodology at [facility] ([facility] than at other [facility]. Direct result comparisons should only be made within the same method.      MAGNESIUM - Normal    Magnesium 1.70     PHOSPHORUS - Normal    Phosphorus 3.3         CT head wo IV contrast   Final Result   No evidence of acute cortical infarct or intracranial hemorrhage.        Senescent changes. If persistent concern, further evaluation with MRI   is advised.        MACRO:   None        Signed by: Bill De La O 1/31/2024 8:31 PM   Dictation workstation:   [id number]          ED Course  Diagnoses as of 02/01/24 0054   Hyponatremia       Procedure  Procedures    Disposition: observation    Urvashi Renteria DO  Emergency Medicine  Medical Toxicology     Urvashi Renteria DO  02/01/24 0056

## 2024-02-01 NOTE — PROGRESS NOTES
Occupational Therapy    Evaluation    Patient Name: Carolann Cartagena  MRN: [id number]  Today's Date: 2/1/2024  Time Calculation  Start Time: 1345  Stop Time: 1405  Time Calculation (min): 20 min        Assessment:  OT Assessment: pt presents with generalized weakness, deconditioning and decreased balance/mobility which impedes ADL performance. pt would benefit from skilled OT services to address these deficits and to facilitate highest level of independence.  Prognosis: Good  Barriers to Discharge: None  Evaluation/Treatment Tolerance: Patient tolerated treatment well  Medical Staff Made Aware: Yes  End of Session Communication: Bedside nurse  End of Session Patient Position: Bed, 3 rail up, Alarm on  OT Assessment Results: Decreased ADL status, Decreased endurance, Decreased functional mobility  Prognosis: Good  Barriers to Discharge: None  Evaluation/Treatment Tolerance: Patient tolerated treatment well  Medical Staff Made Aware: Yes  Strengths: Support of Caregivers, Ability to acquire knowledge, Attitude of self  Plan:  Treatment Interventions: ADL retraining, Functional transfer training, UE strengthening/ROM, Endurance training, Equipment evaluation/education, Compensatory technique education  OT Frequency: 3 times per week  OT Discharge Recommendations: Low intensity level of continued care  Equipment Recommended upon Discharge: Wheeled walker  OT Recommended Transfer Status: Assist of 1, Stand by assist  OT - OK to Discharge: Yes (OT Eval and POC initiated.)  Treatment Interventions: ADL retraining, Functional transfer training, UE strengthening/ROM, Endurance training, Equipment evaluation/education, Compensatory technique education      General:  General  Reason for Referral: 89 y.o. female who presented with c/o symptomatic hyponatremia x 6 days.  Past Medical History Relevant to Rehab: PMH significant for HTN, GERD, CKD, DM2 with neuropathy, diastolic CHF, anemia, HLD, and hyponatremia  Family/Caregiver  Present: Yes  Caregiver Feedback: family present and encouraging  Prior to Session Communication: Bedside nurse  Patient Position Received: Bed, 3 rail up, Alarm on  Preferred Learning Style: auditory, kinesthetic  General Comment: pt agreeable and pleasant throughout  Precautions:  Medical Precautions: Fall precautions  Vital Signs:     Pain:  Pain Assessment  Pain Assessment: 0-10  Pain Score: 0 - No pain    Objective   Cognition:  Overall Cognitive Status: Within Functional Limits  Orientation Level: Disoriented to time (said year was 2023)  Attention: Within Functional Limits  Memory: Within Funtional Limits  Processing Speed: Within funtional limits           Home Living:  Type of Home: [place]  Lives With:  (daughter)  Home Adaptive Equipment: Cane (rollator)  Home Layout: One level  Home Access: Level entry  Bathroom Shower/Tub: Walk-in shower  Bathroom Toilet: Handicapped height  Bathroom Equipment: Grab bars in shower, Shower chair with back, Grab bars around toilet  Prior Function:  Level of [place]: Needs assistance with ADLs, Needs assistance with functional transfers  Ambulatory Assistance: Needs assistance (daughter provides steadying assist when ambulating around house with rollator)  Prior Function Comments: daughter assisted with dressing, bathing and toileting     ADL:  UE Dressing Assistance: Minimal  UE Dressing Deficit:  (hospital gown)  LE Dressing Assistance: Stand by  LE Dressing Deficit:  (pt donned taylor socks while seated EOB via figure-four technique)  Activity Tolerance:  Endurance: Tolerates 10 - 20 min exercise with multiple rests  Bed Mobility/Transfers: Bed Mobility  Bed Mobility: Yes  Bed Mobility 1  Bed Mobility 1: Supine to sitting, Sitting to supine  Level of Assistance 1: Close supervision  Bed Mobility Comments 1: no physical assistance, performed with HOB slightly elevated, effortful    Transfers  Transfer: Yes  Transfer 1  Technique 1: Sit to stand, Stand to sit  Transfer  Device 1: Walker  Transfer Level of Assistance 1: Contact guard  Trials/Comments 1: from edge of bed, min cues for positioning and hand placement  Transfers 2  Transfer to 2: Bed  Technique 2: Stand pivot  Transfer Device 2: Walker  Transfer Level of Assistance 2: Contact guard  Trials/Comments 2: pt transferred back to bed at end of session, min cues for positioning prior to descent      Ambulation/Gait Training:  Ambulation/Gait Training  Ambulation/Gait Training Performed: Yes  Ambulation/Gait Training 1  Surface 1: Level tile  Device 1: Rolling walker  Assistance 1: Contact guard  Comments/Distance (ft) 1: ~25ft with FWW, min cues for walker safety, no LOB noted  Sitting Balance:  Static Sitting Balance  Static Sitting-Balance Support: Feet supported  Static Sitting-Level of Assistance: Distant supervision  Standing Balance:  Static Standing Balance  Static Standing-Balance Support: Bilateral upper extremity supported  Static Standing-Level of Assistance: Contact guard      Vision:Vision - Basic Assessment  Current Vision: Wears glasses all the time  Sensation:  Light Touch: No apparent deficits  Strength:  Strength Comments: BUE WFL  Perception:  Inattention/Neglect: Appears intact  Coordination:  Movements are Fluid and Coordinated: Yes   Hand Function:  Gross Grasp: Functional  Coordination: Functional  Extremities: RUE   RUE : Within Functional Limits (Sh flexion limited to ~90 degrees) and LUE   LUE: Within Functional Limits (Sh flexion limited to ~90 degrees)    Outcome Measures:[facility] Daily Activity  Putting on and taking off regular lower body clothing: A little  Bathing (including washing, rinsing, drying): A little  Putting on and taking off regular upper body clothing: A little  Toileting, which includes using toilet, bedpan or urinal: A little  Taking care of personal grooming such as brushing teeth: A little  Eating Meals: A little  Daily Activity - Total Score: 18        Education  Documentation  Body Mechanics, taught by Jack Bañuelos OT at 2/1/2024  2:23 PM.  Learner: Patient  Readiness: Acceptance  Method: Explanation, Demonstration  Response: Verbalizes Understanding    ADL Training, taught by Jack Bañuelos OT at 2/1/2024  2:23 PM.  Learner: Patient  Readiness: Acceptance  Method: Explanation, Demonstration  Response: Verbalizes Understanding    Education Comments  No comments found.        OP EDUCATION:       Goals:  Encounter Problems       Encounter Problems (Active)       ADLs       Patient with complete upper body dressing with supervision level of assistance donning and doffing all UE clothes while edge of bed  (Progressing)       Start:  02/01/24    Expected End:  02/15/24            Patient with complete lower body dressing with supervision level of assistance donning and doffing all LE clothes  with PRN adaptive equipment while edge of bed  (Progressing)       Start:  02/01/24    Expected End:  02/15/24            Patient will complete daily grooming tasks with supervision level of assistance and PRN adaptive equipment while standing. (Progressing)       Start:  02/01/24    Expected End:  02/15/24            Patient will complete toileting including hygiene clothing management/hygiene with supervision level of assistance and raised toilet seat and grab bars. (Progressing)       Start:  02/01/24    Expected End:  02/15/24               MOBILITY       Patient will perform Functional mobility max Household distances/Community Distances with supervision level of assistance and least restrictive device in order to improve safety and functional mobility. (Progressing)       Start:  02/01/24    Expected End:  02/15/24               TRANSFERS       Patient will complete functional transfer to toilet/commode with least restrictive device with supervision level of assistance. (Progressing)       Start:  02/01/24    Expected End:  02/15/24

## 2024-02-01 NOTE — PROGRESS NOTES
[facility] Hospitalist Progress Note        Carolann Cartagena    :  3/26/1931(89 y.o.)    MRN:  [id number]  Date: 24     Assessment and Plan:     Hyponatremia  HTN  Hypotension  Dizziness  GERD  CKD4  T2DM with diabetic neuropathy, hyperglycemia  Chronic HFpEF  HLD    Plan  - Nephrology consulted. Na 124 -> 123. Check urine studies. Nephrology starting FR, increase protein intake  - OLGA LIDIA in part due to drastic drop in BP. BP elevated all night and given IV hydralazine, po clonidine. BP this AM 93/45 and patient complaining of feeling dizzy. 500cc NS bolus given.   - continue asa, statin  - continue coreg, lasix, hydralazine  - continue NPH 12 units bid with SSI. Add Humalog 5 units TID AC, continue SSI.       DVT Prophylaxis: subcutaneous Heparin    Disposition: await consultant recommendations, await test results, and await clinical improvement    The patient/family had opportunity to ask questions. All questions were answered to the best of my ability.    Between 7AM-7PM please message me via Epic Secure Chat.  After 7PM please page [facility] on call.    Electronically signed by Mychal Jett DO on 24 at 12:33 PM     Subjective:      Interval History:   Vitals and chart notes from overnight reviewed.   No acute issues overnight.   Patient seen and evaluated at bedside.     Dizziness this am after drastic drop in BP. Also having pain in left arm at IV site. Appears to have evidence of iV infiltration.     Review of Systems:   Other than patient's chronic conditions and those complaints in the history above, the rest of the 10 systems review were done and were negative.     Current medications:  Scheduled Meds:aspirin, 81 mg, oral, Daily  atorvastatin, 10 mg, oral, Nightly  carvedilol, 25 mg, oral, BID with meals  furosemide, 40 mg, oral, Daily  heparin (porcine), 5,000 Units, subcutaneous, q8h  hydrALAZINE, 100 mg, oral, TID  insulin lispro, 0-5 Units, subcutaneous, Before meals & nightly  insulin NPH (Isophane),  "12 Units, subcutaneous, BID AC  melatonin, 3 mg, oral, Daily  pantoprazole, 40 mg, oral, Daily before breakfast   Or  pantoprazole, 40 mg, intravenous, Daily before breakfast  sodium bicarbonate, 650 mg, oral, BID      Continuous Infusions:   PRN Meds:PRN medications: acetaminophen, dextrose 10 % in water (D10W), dextrose, docusate sodium, glucagon      Objective:     Vitals:    02/01/24 0511 02/01/24 0554 02/01/24 0848 02/01/24 1231   BP: (!) 209/64 (!) 198/66 130/75 131/65   BP Location:    Right arm   Patient Position:    Lying   Pulse: 69  64 63   Resp: 18  18    Temp: 36.8 °C (98.3 °F)  36.3 °C (97.4 °F) 36.5 °C (97.7 °F)   TempSrc: Temporal  Temporal Oral   SpO2: 100%  99% 97%   Weight: 78 kg (171 lb 15.3 oz)      Height: 1.626 m (5' 4\")           Physical Exam  Vitals and nursing note reviewed.   HENT:      Mouth/Throat:      Mouth: Mucous membranes are moist.      Pharynx: Oropharynx is clear.   Cardiovascular:      Rate and Rhythm: Normal rate and regular rhythm.   Pulmonary:      Effort: Pulmonary effort is normal.   Abdominal:      Palpations: Abdomen is soft.   Musculoskeletal:      Comments: Swelling in ball like pattern at right AC IV site.    Neurological:      Mental Status: She is alert and oriented to person, place, and time.      Cranial Nerves: No cranial nerve deficit.      Sensory: No sensory deficit.      Motor: No weakness.         Labs:   Lab Results   Component Value Date     (L) 02/01/2024    K 3.6 02/01/2024    CL 90 (L) 02/01/2024    CO2 25 02/01/2024    BUN 36 (H) 02/01/2024    CREATININE 1.82 (H) 02/01/2024    GLUCOSE 279 (H) 02/01/2024    CALCIUM 8.8 02/01/2024    PROT 7.0 01/31/2024    BILITOT 0.5 01/31/2024    ALKPHOS 64 01/31/2024    AST 22 01/31/2024    ALT 15 01/31/2024       Lab Results   Component Value Date    WBC 6.2 02/01/2024    HGB 9.0 (L) 02/01/2024    HCT 26.7 (L) 02/01/2024    MCV 95 02/01/2024     02/01/2024     "

## 2024-02-01 NOTE — CONSULTS
NEPHROLOGY CONSULT NOTE    Reason For Consult  Severe hyponatremia    History Of Present Illness  Carolann Cartagena is a 89 y.o. female has PMH significant for HTN, GERD, CKD, DM2 with neuropathy, diastolic CHF, anemia, HLD, and hyponatremia -nephrology was consulted because of Severe hyponatremia.  Latest sodium of 124.  Creatinine is a 1.8 which looks like baseline  On admission 1/31/2024: She presented with hyponatremia x 6 days. History limited due to poor historian, much of her history was obtained by her daughter. Patient's daughter states there is no change to her baseline mental status, which is slightly disoriented. States she was admitted to the ED yesterday with complaints of n/v, dizziness, SOB, and lightheadedness. Was found to be hyponatremic and was offered to be admitted but refused. Returned to the ED today with lightheadedness, SOB, dizziness, fatigue, and weakness after a telehealth visit with her PCP. States she has been experiencing these symptoms since last Friday. Admits to decreased oral intake. Denies changes in urination.  Denies fever, abdominal pain, chest pain, and edema. States her BM and urination have been normal for her, denies blood in stool or urine. Admits to 17 lbs unintentional weight loss in the past 6 months.        Past Medical History:   Diagnosis Date    Anemia due to chronic kidney disease 01/22/2024    Chronic diastolic congestive heart failure (CMS/[facility]) 10/02/2023    Chronic hyponatremia 10/02/2023    Chronic kidney disease, stage 3, mod decreased GFR (CMS/[facility]) 09/18/2018    Diabetic nephropathy associated with type 2 diabetes mellitus (CMS/[facility]) 12/18/2018    Essential hypertension 05/23/2023    Hyperlipidemia 05/23/2023    Pure hypercholesterolemia 10/02/2015     Past Surgical History:   Procedure Laterality Date    OTHER SURGICAL HISTORY  02/13/2020     Hysterectomy     Facility-Administered Medications Prior to Admission   Medication Dose Route Frequency Provider Last Rate Last Admin    [DISCONTINUED] fluticasone (Flonase) nasal spray 2 spray  2 spray Each Nostril BID Josh Rosa MD        [DISCONTINUED] hydrALAZINE (Apresoline) tablet 100 mg  100 mg oral TID Louie HORTA MD         Medications Prior to Admission   Medication Sig Dispense Refill Last Dose    Alphagan P 0.1 % ophthalmic solution Administer 1 drop into both eyes twice a day.   1/31/2024    aspirin 81 mg EC tablet Take 1 tablet (81 mg) by mouth once daily.   1/31/2024    atorvastatin (Lipitor) 10 mg tablet Take 1 tablet (10 mg) by mouth once daily. 90 tablet 3 1/31/2024    carvedilol (Coreg) 25 mg tablet Take 1 tablet (25 mg) by mouth 2 times a day with meals. 60 tablet 11 1/31/2024    cholecalciferol (Vitamin D-3) 50 MCG (2000 UT) tablet Take 1 tablet (2,000 Units) by mouth once daily.   1/31/2024    fluticasone (Flonase) 50 mcg/actuation nasal spray Administer 1 spray into each nostril once daily. Shake gently. Before first use, prime pump. After use, clean tip and replace cap. 16 g 11 1/31/2024    furosemide (Lasix) 20 mg tablet Take 2 tablets (40 mg) by mouth once daily. 180 tablet 3 1/31/2024    hydrALAZINE (Apresoline) 100 mg tablet Take 1 tablet (100 mg) by mouth 3 times a day. 90 tablet 11 1/31/2024    insulin NPH, Isophane, (HumuLIN N,NovoLIN N) 100 unit/mL (3 mL) injection Inject 12 Units under the skin 2 times a day before meals. Take as directed per insulin instructions. 8 mL 2 1/31/2024    latanoprost (Xalatan) 0.005 % ophthalmic solution 1 drop once daily.   Past Week    sennosides-docusate sodium (Steph-Colace) 8.6-50 mg tablet Take by mouth twice a day.   Past Week    Autolet lancing device Use as instructed 1 each 0     blood sugar diagnostic (OneTouch Ultra Test) strip 1 strip 3 times a day. 200 strip 11     blood-glucose meter misc Use to test glucose 3 times daily 1 each 0   "   dimethicone (Cavilon Durable Barrier) 1.3 % cream Apply 1 Tube topically 3 times a day. 3.25 g 0     [] doxycycline (Vibramycin) 100 mg capsule Take 1 capsule (100 mg) by mouth 2 times a day for 10 days. Take with at least 8 ounces (large glass) of water, do not lie down for 30 minutes after 20 capsule 0     hydrocolloid dressing 1 3/4 X 1 1/2 \" bandage Apply 1 patch topically once daily as needed (wear daily for pressure ulcer). 10 each 1     insulin syringe-needle U-100 (BD Insulin Syringe Ultra-Fine) 31G X 5/16\" 1 mL syringe Inject 1 each under the skin 2 times a day. Use as instructed 100 each 3     insulin syringe-needle U-100 31G X 5/16\" 0.3 mL syringe Use as instructed 100 each 11     lancets (OneTouch Delica Plus Lancet) 30 gauge misc Use to test glucose 3 times daily 100 each 11     pen needle, diabetic (BD Ultra-Fine Nely Pen Needle) 32 gauge x 5/32\" needle 1 Needle 2 times a day. 200 each 3     timolol (Timoptic) 0.5 % ophthalmic solution 1 drop.        Dorzolamide, Lisinopril, and Losartan  Social History     Tobacco Use    Smoking status: Never    Smokeless tobacco: Never   Substance Use Topics    Alcohol use: Not Currently    Drug use: Never     Family History   Problem Relation Name Age of Onset    No Known Problems Mother      Bone cancer Sister      Lung cancer Daughter         Scheduled Medications:   aspirin, 81 mg, oral, Daily  atorvastatin, 10 mg, oral, Nightly  carvedilol, 25 mg, oral, BID with meals  furosemide, 40 mg, oral, Daily  heparin (porcine), 5,000 Units, subcutaneous, q8h  hydrALAZINE, 100 mg, oral, TID  insulin lispro, 0-5 Units, subcutaneous, Before meals & nightly  insulin NPH (Isophane), 12 Units, subcutaneous, BID AC  melatonin, 3 mg, oral, Daily  pantoprazole, 40 mg, oral, Daily before breakfast   Or  pantoprazole, 40 mg, intravenous, Daily before breakfast  sodium bicarbonate, 650 mg, oral, BID         Continuous Medications:         PRN Medications:   PRN " medications: acetaminophen, dextrose 10 % in water (D10W), dextrose, docusate sodium, glucagon    Review of Systems:  Review of Systems   Constitutional:  Positive for fatigue and unexpected weight change.   HENT: Negative.     Eyes: Negative.    Respiratory: Negative.     Cardiovascular: Negative.    Gastrointestinal:  Positive for nausea.   Endocrine: Negative.    Genitourinary: Negative.    Musculoskeletal: Negative.    Skin: Negative.    Neurological:  Positive for weakness.   Psychiatric/Behavioral: Negative.     All other systems reviewed and are negative.       Objective   Vitals:  Most Recent:  Vitals:    02/01/24 1531   BP: (!) 153/46   Pulse: 60   Resp: 17   Temp: 36.1 °C (97 °F)   SpO2: 99%       24hr Min/Max:  Temp  Min: 36.1 °C (97 °F)  Max: 36.8 °C (98.3 °F)  Pulse  Min: 57  Max: 75  BP  Min: 130/75  Max: 217/55  Resp  Min: 15  Max: 22  SpO2  Min: 94 %  Max: 100 %    LDA:            Hemodynamic parameters for last 24 hours:       No intake or output data in the 24 hours ending 02/01/24 1559        Physical exam:    Physical Exam   GENERAL: Alert, NAD, cooperative. Baseline mental status slightly disoriented  SKIN: Warm and dry.  No suspicious lesions or rashes.  HEENT:  NCAT, PERRLA, EOMI, nonicteric sclera, MMM, neck supple, trachea midline  LUNGS: Unlabored respirations, CTAB, no significant wheezing, rhonchi, or rales appreciated  CARDS: RRR, blowing systolic murmur appreciated. Wearing mid-calf compression stockings.  GI: Soft, NTND, BS+, no rebound, no guarding   : no huerta, voids independently   MS/Extremities: WWP, no evident/significant pitting edema, distal pulses intact, moves all extremities.   NEURO: A&Ox3, CN grossly intact, speech fluent, follows commands, answers questions appropriately  PSYCH: mood and behavior appropriate    Lab/Radiology/Diagnostic Review:  Results for orders placed or performed during the hospital encounter of 01/31/24 (from the past 24 hour(s))   CBC with  Differential   Result Value Ref Range    WBC 5.5 4.4 - 11.3 x10*3/uL    nRBC 0.0 0.0 - 0.0 /100 WBCs    RBC 3.02 (L) 4.00 - 5.20 x10*6/uL    Hemoglobin 9.4 (L) 12.0 - 16.0 g/dL    Hematocrit 28.3 (L) 36.0 - 46.0 %    MCV 94 80 - 100 fL    MCH 31.1 26.0 - 34.0 pg    MCHC 33.2 32.0 - 36.0 g/dL    RDW 12.0 11.5 - 14.5 %    Platelets 274 150 - 450 x10*3/uL    Neutrophils % 67.7 40.0 - 80.0 %    Immature Granulocytes %, Automated 0.2 0.0 - 0.9 %    Lymphocytes % 18.8 13.0 - 44.0 %    Monocytes % 11.7 2.0 - 10.0 %    Eosinophils % 1.1 0.0 - 6.0 %    Basophils % 0.5 0.0 - 2.0 %    Neutrophils Absolute 3.70 1.60 - 5.50 x10*3/uL    Immature Granulocytes Absolute, Automated 0.01 0.00 - 0.50 x10*3/uL    Lymphocytes Absolute 1.03 0.80 - 3.00 x10*3/uL    Monocytes Absolute 0.64 0.05 - 0.80 x10*3/uL    Eosinophils Absolute 0.06 0.00 - 0.40 x10*3/uL    Basophils Absolute 0.03 0.00 - 0.10 x10*3/uL   Comprehensive Metabolic Panel   Result Value Ref Range    Glucose 283 (H) 74 - 99 mg/dL    Sodium 123 (L) 136 - 145 mmol/L    Potassium 3.9 3.5 - 5.3 mmol/L    Chloride 89 (L) 98 - 107 mmol/L    Bicarbonate 26 21 - 32 mmol/L    Anion Gap 12 10 - 20 mmol/L    Urea Nitrogen 39 (H) 6 - 23 mg/dL    Creatinine 1.85 (H) 0.50 - 1.05 mg/dL    eGFR 25 (L) >60 mL/min/1.73m*2    Calcium 9.1 8.6 - 10.3 mg/dL    Albumin 3.5 3.4 - 5.0 g/dL    Alkaline Phosphatase 64 33 - 136 U/L    Total Protein 7.0 6.4 - 8.2 g/dL    AST 22 9 - 39 U/L    Bilirubin, Total 0.5 0.0 - 1.2 mg/dL    ALT 15 7 - 45 U/L   Magnesium   Result Value Ref Range    Magnesium 1.70 1.60 - 2.40 mg/dL   B-type natriuretic peptide   Result Value Ref Range     (H) 0 - 99 pg/mL   Phosphorus   Result Value Ref Range    Phosphorus 3.3 2.5 - 4.9 mg/dL   POCT GLUCOSE   Result Value Ref Range    POCT Glucose 263 (H) 74 - 99 mg/dL   Magnesium   Result Value Ref Range    Magnesium 1.80 1.60 - 2.40 mg/dL   CBC   Result Value Ref Range    WBC 6.2 4.4 - 11.3 x10*3/uL    nRBC 0.0 0.0 - 0.0  /100 WBCs    RBC 2.82 (L) 4.00 - 5.20 x10*6/uL    Hemoglobin 9.0 (L) 12.0 - 16.0 g/dL    Hematocrit 26.7 (L) 36.0 - 46.0 %    MCV 95 80 - 100 fL    MCH 31.9 26.0 - 34.0 pg    MCHC 33.7 32.0 - 36.0 g/dL    RDW 11.8 11.5 - 14.5 %    Platelets 274 150 - 450 x10*3/uL   Basic metabolic panel   Result Value Ref Range    Glucose 279 (H) 74 - 99 mg/dL    Sodium 124 (L) 136 - 145 mmol/L    Potassium 3.6 3.5 - 5.3 mmol/L    Chloride 90 (L) 98 - 107 mmol/L    Bicarbonate 25 21 - 32 mmol/L    Anion Gap 13 10 - 20 mmol/L    Urea Nitrogen 36 (H) 6 - 23 mg/dL    Creatinine 1.82 (H) 0.50 - 1.05 mg/dL    eGFR 26 (L) >60 mL/min/1.73m*2    Calcium 8.8 8.6 - 10.3 mg/dL   TSH   Result Value Ref Range    Thyroid Stimulating Hormone 3.10 0.44 - 3.98 mIU/L   POCT GLUCOSE   Result Value Ref Range    POCT Glucose 279 (H) 74 - 99 mg/dL   POCT GLUCOSE   Result Value Ref Range    POCT Glucose 303 (H) 74 - 99 mg/dL   POCT GLUCOSE   Result Value Ref Range    POCT Glucose 319 (H) 74 - 99 mg/dL     CT head wo IV contrast    Result Date: 1/31/2024  Interpreted By:  Bill De La O, STUDY: CT HEAD WO IV CONTRAST;  1/31/2024 7:49 pm   INDICATION: Signs/Symptoms:Generalized fatigue, dizziness.   COMPARISON: 10/13/2023   ACCESSION NUMBER(S): [id number]   ORDERING CLINICIAN: PERFECTO GAO   TECHNIQUE: Noncontrast axial CT scan of head was performed. Angled reformats in brain and bone windows were generated. The images were reviewed in bone, brain, blood and soft tissue windows.   FINDINGS: CSF Spaces: The ventricles, sulci and basal cisterns are within normal limits. There is no extraaxial fluid collection. Global volume loss and mild chronic small vessel ischemic change   Parenchyma:  The grey-white differentiation is intact. There is no mass effect or midline shift.  There is no intracranial hemorrhage.   Calvarium: Hyperostosis   Paranasal sinuses and mastoids: Visualized paranasal sinuses and mastoids are clear.       No evidence of acute  cortical infarct or intracranial hemorrhage.   Senescent changes. If persistent concern, further evaluation with MRI is advised.   MACRO: None   Signed by: Bill De La O 1/31/2024 8:31 PM Dictation workstation:   [id number]    XR chest 2 views    Result Date: 1/30/2024  STUDY: Chest Radiographs;  01/30/2024 INDICATION: Cough. COMPARISON: 10/05/2023 XR chest ACCESSION NUMBER(S): [id number] ORDERING CLINICIAN: KODY BELLE TECHNIQUE:  Frontal and lateral chest. FINDINGS: CARDIOMEDIASTINAL SILHOUETTE: Cardiomediastinal silhouette is unchanged in size and configuration.  LUNGS: Lungs are clear.  ABDOMEN: No remarkable upper abdominal findings.  BONES: No acute osseous changes.    No acute abnormality based on plain film imaging.  Borderline cardiomegaly.. Signed by Aakash Paredes, DO      Assessment/Plan   Principal Problem:    Chronic hyponatremia  Active Problems:    Essential hypertension    GERD (gastroesophageal reflux disease)    Hyperlipidemia    Chronic kidney disease, stage 3, mod decreased GFR (CMS/HCC)    Diabetic nephropathy associated with type 2 diabetes mellitus (CMS/HCC)    Chronic diastolic congestive heart failure (CMS/HCC)    Anemia due to chronic kidney disease    Hypertensive urgency    Carolann Cartagena is a 89 y.o. female has PMH significant for HTN, GERD, CKD, DM2 with neuropathy, diastolic CHF, anemia, HLD, and hyponatremia -nephrology was consulted because of Severe hyponatremia.     Severe hyponatremia probably secondary to poor solute intake   2/1/2024: Nephrology was consulted because of Severe hyponatremia.    Latest sodium of 124.    Ordered serum and urine osmolality  Placed on restriction 1.5 L  Extensively counseled the patient and patient brother at the bedside to be compliant with the fluid restriction of 1.5 L for now  Patient admission sodium was a 121 at around 10 PM on 1/30/2024  For today 2/1/2024, okay to correct the sodium to go up to 133    2.  Chronic kidney disease  stage III probably secondary to hypertension and diabetes  Creatinine is a 1.8 which looks like baseline  Recommend to maintain hemoglobin A1c less than 6.5 and systolic blood pressure less than 140    3.  Severe protein malnutrition  Recommend dietitian consult and supplements      I spent 75 minutes of cumulative time with the patient.  Greater than 50% of that time was spent in the direct collaboration and or coordination of care of the patient.     Dragon dictation software was used to dictate this note and thus there may be minor errors in translation/transcription including garbled speech or misspellings. Please contact for clarification if needed.  Bonilla Lynn MD

## 2024-02-01 NOTE — PROGRESS NOTES
Left  for patient's daughter, Heather ([phone])-included my name and phone number and asked for return call.    1115 Spoke with patient's daughter, Heather--patient lives with her other daughter, Brien.  Brien takes care of all of the medications and helps the patient with ADLs and  IADLs.  Patient does use a walker.  There are no stairs at Brien's house. Her PCP is Dr Rosa and they use [facility] for their pharmacy.  Heather was not sure if patient needs pt/ot--she asked me to call her sister Brien ([phone]).    Spoke to Brien--the patient had pt/ot in October and Brien is not sure she needs it again.  They do have a nurse coming out once a month thru [facility].  Brien will talk to the [facility] nurse about the need for pt/ot and to see if that is something [facility] can provide.  Brien will  let me know if she needs a referral for therapy.  In October, she used [facility].    Family will be up to visit later today.

## 2024-02-01 NOTE — H&P
History Of Present Illness  Carolann Cartagena is a 89 y.o. female with PMH significant for HTN, GERD, CKD, DM2 with neuropathy, diastolic CHF, anemia, HLD, and hyponatremia who presented with c/o symptomatic hyponatremia x 6 days. History limited due to poor historian, much of her history was obtained by her daughter. Patient's daughter states there is no change to her baseline mental status, which is slightly disoriented. States she was admitted to the ED yesterday with complaints of n/v, dizziness, SOB, and lightheadedness. Was found to be hyponatremic and was offered to be admitted but refused. Returned to the ED today with lightheadedness, SOB, dizziness, fatigue, and weakness after a telehealth visit with her PCP. States she has been experiencing these symptoms since last Friday. Admits to decreased oral intake. Denies changes in urination.  Denies fever, abdominal pain, chest pain, and edema. States her BM and urination have been normal for her, denies blood in stool or urine. Admits to 17 lbs unintentional weight loss in the past 6 months.     Past Medical History  Past Medical History:   Diagnosis Date    Anemia due to chronic kidney disease 01/22/2024    Chronic diastolic congestive heart failure (CMS/Formerly [facility]) 10/02/2023    Chronic hyponatremia 10/02/2023    Chronic kidney disease, stage 3, mod decreased GFR (CMS/Formerly [facility]) 09/18/2018    Diabetic nephropathy associated with type 2 diabetes mellitus (CMS/[facility]ly [facility]) 12/18/2018    Essential hypertension 05/23/2023    Hyperlipidemia 05/23/2023    Pure hypercholesterolemia 10/02/2015         Surgical History  Past Surgical History:   Procedure Laterality Date    OTHER SURGICAL HISTORY  02/13/2020    Hysterectomy       Social History  Social History     Socioeconomic History    Marital status:      Spouse name: None    Number of children: None    Years of education: None    Highest education level: None   Occupational History    None   Tobacco Use    Smoking status: Never     Smokeless tobacco: Never   Substance and Sexual Activity    Alcohol use: Not Currently    Drug use: Never    Sexual activity: None   Other Topics Concern    None   Social History Narrative    None     Social Determinants of Health     Financial Resource Strain: Low Risk  (2/1/2024)    Overall Financial Resource Strain (CARDIA)     Difficulty of Paying Living Expenses: Not hard at all   Food Insecurity: No Food Insecurity (10/13/2023)    Hunger Vital Sign     Worried About Running Out of Food in the Last Year: Never true     Ran Out of Food in the Last Year: Never true   Transportation Needs: No Transportation Needs (2/1/2024)    PRAPARE - Transportation     Lack of Transportation (Medical): No     Lack of Transportation (Non-Medical): No   Physical Activity: Inactive (10/13/2023)    Exercise Vital Sign     Days of Exercise per Week: 0 days     Minutes of Exercise per Session: 0 min   Stress: No Stress Concern Present (10/13/2023)    [employer] [place] [employer] - Occupational Stress Questionnaire     Feeling of Stress : Only a little   Social Connections: Feeling Socially Integrated (11/15/2023)    OASIS : Social Isolation     Frequency of experiencing loneliness or isolation: Never   Recent Concern: Social Connections - Moderately Isolated (10/13/2023)    Social Connection and Isolation Panel [NHANES]     Frequency of Communication with Friends and Family: More than three times a week     Frequency of Social Gatherings with Friends and Family: More than three times a week     Attends Orthodox Services: More than 4 times per year     Active Member of Clubs or Organizations: No     Attends Club or Organization Meetings: Never     Marital Status:    Intimate Partner Violence: Not At Risk (10/13/2023)    Humiliation, Afraid, Rape, and Kick questionnaire     Fear of Current or Ex-Partner: No     Emotionally Abused: No     Physically Abused: No     Sexually Abused: No   Housing Stability: Low  Risk  (2/1/2024)    Housing Stability Vital Sign     Unable to Pay for Housing in the Last Year: No     Number of Places Lived in the Last Year: 1     Unstable Housing in the Last Year: No        Family History  Family History   Problem Relation Name Age of Onset    No Known Problems Mother      Bone cancer Sister      Lung cancer Daughter          Allergies  Allergies as of 01/31/2024 - Reviewed 01/31/2024   Allergen Reaction Noted    Dorzolamide Other 06/02/2009    Lisinopril Swelling and Unknown 08/27/2019    Losartan Hives 01/22/2016        Review of Systems  Review of Systems   Constitutional:  Positive for fatigue and unexpected weight change. Negative for fever.        17 lbs loss over past 6 months per pt   Respiratory:  Positive for shortness of breath.    Cardiovascular:  Negative for chest pain and leg swelling.   Gastrointestinal:  Positive for nausea. Negative for abdominal pain, blood in stool and vomiting.        States she vomited last Friday but has not had vomiting since   Genitourinary:  Negative for difficulty urinating and hematuria.   Skin:         Skin has been dry and hot per pt   Neurological:  Positive for dizziness, weakness and light-headedness.   All other systems reviewed and are negative.      Relevant results reviewed   PROVIDER notes  Nursing notes  MEDS:  Current Facility-Administered Medications   Medication Dose Route Frequency Provider Last Rate Last Admin    acetaminophen (Tylenol) tablet 975 mg  975 mg oral q6h PRN More Toscano PA-C        aspirin EC tablet 81 mg  81 mg oral Daily More Toscano PA-C   81 mg at 02/01/24 0600    atorvastatin (Lipitor) tablet 10 mg  10 mg oral Nightly More Toscano PA-C        carvedilol (Coreg) tablet 25 mg  25 mg oral BID with meals More Toscano PA-C        cloNIDine (Catapres) tablet 0.1 mg  0.1 mg oral q8h PRN More Toscano PA-C   0.1 mg at 02/01/24 0556    dextrose 10 % in water (D10W) infusion  50 mL/hr intravenous Once PRN  More Toscano PA-C        dextrose 50 % injection 25 g  25 g intravenous q15 min PRN More Toscano PA-C        docusate sodium (Colace) capsule 100 mg  100 mg oral BID PRN More Toscano PA-C        furosemide (Lasix) tablet 40 mg  40 mg oral Daily More Toscano PA-C        glucagon (Glucagen) injection 1 mg  1 mg intramuscular q15 min PRN More Toscano PA-C        heparin (porcine) injection 5,000 Units  5,000 Units subcutaneous q8h LISBET [facility]   5,000 Units at 02/01/24 0555    hydrALAZINE (Apresoline) tablet 100 mg  100 mg oral TID More Toscano PA-C        insulin lispro (HumaLOG) injection 0-5 Units  0-5 Units subcutaneous Before meals & nightly More Toscano PA-C        insulin NPH (Isophane) (HumuLIN N,NovoLIN N) injection 12 Units  12 Units subcutaneous BID AC More Toscano PA-C        melatonin tablet 3 mg  3 mg oral Daily More Toscano PA-C        pantoprazole (ProtoNix) EC tablet 40 mg  40 mg oral Daily before breakfast More Toscano PA-C   40 mg at 02/01/24 0600    Or    pantoprazole (ProtoNix) injection 40 mg  40 mg intravenous Daily before breakfast More Toscano PA-C        sodium bicarbonate tablet 650 mg  650 mg oral BID More Toscano PA-C          LABS:  Results for orders placed or performed during the hospital encounter of 01/31/24 (from the past 24 hour(s))   CBC with Differential   Result Value Ref Range    WBC 5.5 4.4 - 11.3 x10*3/uL    nRBC 0.0 0.0 - 0.0 /100 WBCs    RBC 3.02 (L) 4.00 - 5.20 x10*6/uL    Hemoglobin 9.4 (L) 12.0 - 16.0 g/dL    Hematocrit 28.3 (L) 36.0 - 46.0 %    MCV 94 80 - 100 fL    MCH 31.1 26.0 - 34.0 pg    MCHC 33.2 32.0 - 36.0 g/dL    RDW 12.0 11.5 - 14.5 %    Platelets 274 150 - 450 x10*3/uL    Neutrophils % 67.7 40.0 - 80.0 %    Immature Granulocytes %, Automated 0.2 0.0 - 0.9 %    Lymphocytes % 18.8 13.0 - 44.0 %    Monocytes % 11.7 2.0 - 10.0 %    Eosinophils % 1.1 0.0 - 6.0 %    Basophils % 0.5 0.0 - 2.0 %    Neutrophils  Absolute 3.70 1.60 - 5.50 x10*3/uL    Immature Granulocytes Absolute, Automated 0.01 0.00 - 0.50 x10*3/uL    Lymphocytes Absolute 1.03 0.80 - 3.00 x10*3/uL    Monocytes Absolute 0.64 0.05 - 0.80 x10*3/uL    Eosinophils Absolute 0.06 0.00 - 0.40 x10*3/uL    Basophils Absolute 0.03 0.00 - 0.10 x10*3/uL   Comprehensive Metabolic Panel   Result Value Ref Range    Glucose 283 (H) 74 - 99 mg/dL    Sodium 123 (L) 136 - 145 mmol/L    Potassium 3.9 3.5 - 5.3 mmol/L    Chloride 89 (L) 98 - 107 mmol/L    Bicarbonate 26 21 - 32 mmol/L    Anion Gap 12 10 - 20 mmol/L    Urea Nitrogen 39 (H) 6 - 23 mg/dL    Creatinine 1.85 (H) 0.50 - 1.05 mg/dL    eGFR 25 (L) >60 mL/min/1.73m*2    Calcium 9.1 8.6 - 10.3 mg/dL    Albumin 3.5 3.4 - 5.0 g/dL    Alkaline Phosphatase 64 33 - 136 U/L    Total Protein 7.0 6.4 - 8.2 g/dL    AST 22 9 - 39 U/L    Bilirubin, Total 0.5 0.0 - 1.2 mg/dL    ALT 15 7 - 45 U/L   Magnesium   Result Value Ref Range    Magnesium 1.70 1.60 - 2.40 mg/dL   B-type natriuretic peptide   Result Value Ref Range     (H) 0 - 99 pg/mL   Phosphorus   Result Value Ref Range    Phosphorus 3.3 2.5 - 4.9 mg/dL   POCT GLUCOSE   Result Value Ref Range    POCT Glucose 263 (H) 74 - 99 mg/dL   Magnesium   Result Value Ref Range    Magnesium 1.80 1.60 - 2.40 mg/dL   CBC   Result Value Ref Range    WBC 6.2 4.4 - 11.3 x10*3/uL    nRBC 0.0 0.0 - 0.0 /100 WBCs    RBC 2.82 (L) 4.00 - 5.20 x10*6/uL    Hemoglobin 9.0 (L) 12.0 - 16.0 g/dL    Hematocrit 26.7 (L) 36.0 - 46.0 %    MCV 95 80 - 100 fL    MCH 31.9 26.0 - 34.0 pg    MCHC 33.7 32.0 - 36.0 g/dL    RDW 11.8 11.5 - 14.5 %    Platelets 274 150 - 450 x10*3/uL   Basic metabolic panel   Result Value Ref Range    Glucose 279 (H) 74 - 99 mg/dL    Sodium 124 (L) 136 - 145 mmol/L    Potassium 3.6 3.5 - 5.3 mmol/L    Chloride 90 (L) 98 - 107 mmol/L    Bicarbonate 25 21 - 32 mmol/L    Anion Gap 13 10 - 20 mmol/L    Urea Nitrogen 36 (H) 6 - 23 mg/dL    Creatinine 1.82 (H) 0.50 - 1.05 mg/dL     "eGFR 26 (L) >60 mL/min/1.73m*2    Calcium 8.8 8.6 - 10.3 mg/dL      IMAGING:  CT head wo IV contrast   Final Result   No evidence of acute cortical infarct or intracranial hemorrhage.        Senescent changes. If persistent concern, further evaluation with MRI   is advised.        MACRO:   None        Signed by: Bill De La O 1/31/2024 8:31 PM   Dictation workstation:   [id number]             PHYSICAL EXAM  BP (!) 198/66   Pulse 69   Temp 36.8 °C (98.3 °F) (Temporal)   Resp 18   Ht 1.626 m (5' 4\")   Wt 78 kg (171 lb 15.3 oz)   SpO2 100%   BMI 29.52 kg/m²   PHYSICAL EXAM:  GENERAL: Alert, NAD, cooperative. Baseline mental status slightly disoriented, per pt's daughter.  SKIN: Warm and dry.  No suspicious lesions or rashes.  HEENT:  NCAT, PERRLA, EOMI, nonicteric sclera, MMM, neck supple, trachea midline  LUNGS: Unlabored respirations, CTAB, no significant wheezing, rhonchi, or rales appreciated  CARDS: RRR, blowing systolic murmur appreciated. Wearing mid-calf compression stockings.  GI: Soft, NTND, BS+, no rebound, no guarding   : no huerta, voids independently   MS/Extremities: WWP, no evident/significant pitting edema, distal pulses intact, moves all extremities.   NEURO: A&Ox3, CN grossly intact, speech fluent, follows commands, answers questions appropriately  PSYCH: mood and behavior appropriate    Assessment/Plan:   Principal Problem:    Chronic hyponatremia  Active Problems:    Essential hypertension    GERD (gastroesophageal reflux disease)    Hyperlipidemia    Chronic kidney disease, stage 3, mod decreased GFR (CMS/HCC)    Diabetic nephropathy associated with type 2 diabetes mellitus (CMS/HCC)    Chronic diastolic congestive heart failure (CMS/HCC)    Anemia due to chronic kidney disease    Hypertensive urgency     Chronic hyponatremia  CKD  T2DM   -CBC: no leukocytosis, no acute anemia, no thrombocytopenia  -CMP: hyperglycemia 283, hyponatremia at 123 (baseline appears around 125 -128 over the " last 6 months), corrected sodium still 126.7 which is at her baseline,  otherwise no acute electrolyte abnormalities, BUN/creatinine elevated but improved from prior elevation and suspect new her baseline, or liver dysfunction appreciated   -again chronic hyponatremia ongoing since at least September 2023, during which time she was seen by nephrology  -fluid restriction of 1400/day as per prior nephro recs  -start sodium bicarb tabs  -restart home lasix   -regarding her glucose, Hba1c 1/8/24: 6.6 --> not on any diabetic meds per PTA med list--> will need to optimize glycemic control   -will provide coverage with ISS for now    Essential hypertension   Hypertensive urgency   Chronic diastolic HF  -appears to be suboptimally controlled, as had systolic noted in the 200s back in 9/2023 at which time her meds were adjusted  -resume home meds  -clonidine 0.1 mg q8h PRN SBP > 180 or DBP > 100  -consider nephrology consult for BP management   -optimize BP control  -resume home meds as appropriate  -BNP elevated, but near prior elevations, clinically she does not appear to be in acute CHF exacerbation, will continue home meds      GI ppx: Protonix, bowel regimen  VTE ppx: caprini score 5, heparin injection       DANNIE Omer-S2  Note note finalized until co-signed     I was present with a PA student who participated in the documentation of this note. I personally saw and evaluated the patient and performed my own history and examination.  I discussed the case with the PA student. I have reviewed, verified, and revised the note as necessary and agree with the content and plan as written by the PA student, addendums noted in above in bold as appropriate.     DANNIE Christian-C    Total time spent [including but not limited to]: Obtaining and reviewing patient medical records/history, obtaining a separate history,  examining and assessing the patient, providing  and education, placing pertinent orders for  labs/tests/medications,  communicating with the patient/family/health team, documenting in the patient's EMR/formulation of this note, independently interpreting results and data, coordinating care:   60 minutes, with greater than 50% spent in personal discussion with patient and/or family

## 2024-02-02 PROBLEM — E87.1 HYPONATREMIA: Status: ACTIVE | Noted: 2024-02-02

## 2024-02-02 LAB
ANION GAP SERPL CALC-SCNC: 11 MMOL/L (ref 10–20)
BUN SERPL-MCNC: 38 MG/DL (ref 6–23)
CALCIUM SERPL-MCNC: 8.6 MG/DL (ref 8.6–10.3)
CHLORIDE SERPL-SCNC: 92 MMOL/L (ref 98–107)
CO2 SERPL-SCNC: 26 MMOL/L (ref 21–32)
CORTIS SERPL-MCNC: 18.3 UG/DL (ref 2.5–20)
CREAT SERPL-MCNC: 2.25 MG/DL (ref 0.5–1.05)
CREAT UR-MCNC: 27 MG/DL (ref 20–320)
CREAT UR-MCNC: 45.2 MG/DL (ref 20–320)
EGFRCR SERPLBLD CKD-EPI 2021: 20 ML/MIN/1.73M*2
ERYTHROCYTE [DISTWIDTH] IN BLOOD BY AUTOMATED COUNT: 11.9 % (ref 11.5–14.5)
GLUCOSE BLD MANUAL STRIP-MCNC: 136 MG/DL (ref 74–99)
GLUCOSE BLD MANUAL STRIP-MCNC: 141 MG/DL (ref 74–99)
GLUCOSE BLD MANUAL STRIP-MCNC: 156 MG/DL (ref 74–99)
GLUCOSE BLD MANUAL STRIP-MCNC: 190 MG/DL (ref 74–99)
GLUCOSE BLD MANUAL STRIP-MCNC: 69 MG/DL (ref 74–99)
GLUCOSE SERPL-MCNC: 131 MG/DL (ref 74–99)
HCT VFR BLD AUTO: 24.1 % (ref 36–46)
HGB BLD-MCNC: 8 G/DL (ref 12–16)
HOLD SPECIMEN: NORMAL
MCH RBC QN AUTO: 31.6 PG (ref 26–34)
MCHC RBC AUTO-ENTMCNC: 33.2 G/DL (ref 32–36)
MCV RBC AUTO: 95 FL (ref 80–100)
NRBC BLD-RTO: 0 /100 WBCS (ref 0–0)
OSMOLALITY SERPL: 279 MOSM/KG (ref 280–300)
PLATELET # BLD AUTO: 238 X10*3/UL (ref 150–450)
POTASSIUM SERPL-SCNC: 3.5 MMOL/L (ref 3.5–5.3)
RBC # BLD AUTO: 2.53 X10*6/UL (ref 4–5.2)
SODIUM SERPL-SCNC: 125 MMOL/L (ref 136–145)
SODIUM UR-SCNC: 40 MMOL/L
SODIUM UR-SCNC: 71 MMOL/L
SODIUM/CREAT UR-RTO: 263 MMOL/G CREAT
SODIUM/CREAT UR-RTO: 88 MMOL/G CREAT
WBC # BLD AUTO: 5 X10*3/UL (ref 4.4–11.3)

## 2024-02-02 PROCEDURE — 36415 COLL VENOUS BLD VENIPUNCTURE: CPT | Performed by: INTERNAL MEDICINE

## 2024-02-02 PROCEDURE — 83935 ASSAY OF URINE OSMOLALITY: CPT | Mod: AHULAB | Performed by: HOSPITALIST

## 2024-02-02 PROCEDURE — 80048 BASIC METABOLIC PNL TOTAL CA: CPT | Performed by: INTERNAL MEDICINE

## 2024-02-02 PROCEDURE — 2500000004 HC RX 250 GENERAL PHARMACY W/ HCPCS (ALT 636 FOR OP/ED): Performed by: PHYSICIAN ASSISTANT

## 2024-02-02 PROCEDURE — 99233 SBSQ HOSP IP/OBS HIGH 50: CPT | Performed by: INTERNAL MEDICINE

## 2024-02-02 PROCEDURE — 1100000001 HC PRIVATE ROOM DAILY

## 2024-02-02 PROCEDURE — 99233 SBSQ HOSP IP/OBS HIGH 50: CPT | Performed by: NURSE PRACTITIONER

## 2024-02-02 PROCEDURE — 82947 ASSAY GLUCOSE BLOOD QUANT: CPT

## 2024-02-02 PROCEDURE — 2500000001 HC RX 250 WO HCPCS SELF ADMINISTERED DRUGS (ALT 637 FOR MEDICARE OP): Performed by: PHYSICIAN ASSISTANT

## 2024-02-02 PROCEDURE — 85027 COMPLETE CBC AUTOMATED: CPT | Performed by: NURSE PRACTITIONER

## 2024-02-02 PROCEDURE — 2500000002 HC RX 250 W HCPCS SELF ADMINISTERED DRUGS (ALT 637 FOR MEDICARE OP, ALT 636 FOR OP/ED): Performed by: PHYSICIAN ASSISTANT

## 2024-02-02 PROCEDURE — 82947 ASSAY GLUCOSE BLOOD QUANT: CPT | Mod: MUE

## 2024-02-02 PROCEDURE — 82570 ASSAY OF URINE CREATININE: CPT | Performed by: HOSPITALIST

## 2024-02-02 PROCEDURE — 2500000002 HC RX 250 W HCPCS SELF ADMINISTERED DRUGS (ALT 637 FOR MEDICARE OP, ALT 636 FOR OP/ED): Performed by: HOSPITALIST

## 2024-02-02 PROCEDURE — 97161 PT EVAL LOW COMPLEX 20 MIN: CPT | Mod: GP

## 2024-02-02 RX ADMIN — HEPARIN SODIUM 5000 UNITS: 5000 INJECTION INTRAVENOUS; SUBCUTANEOUS at 06:22

## 2024-02-02 RX ADMIN — ATORVASTATIN CALCIUM 10 MG: 10 TABLET, FILM COATED ORAL at 21:27

## 2024-02-02 RX ADMIN — INSULIN HUMAN 12 UNITS: 100 INJECTION, SUSPENSION SUBCUTANEOUS at 08:37

## 2024-02-02 RX ADMIN — HEPARIN SODIUM 5000 UNITS: 5000 INJECTION INTRAVENOUS; SUBCUTANEOUS at 22:24

## 2024-02-02 RX ADMIN — INSULIN LISPRO 5 UNITS: 100 INJECTION, SOLUTION INTRAVENOUS; SUBCUTANEOUS at 08:36

## 2024-02-02 RX ADMIN — INSULIN HUMAN 12 UNITS: 100 INJECTION, SUSPENSION SUBCUTANEOUS at 16:50

## 2024-02-02 RX ADMIN — HYDRALAZINE HYDROCHLORIDE 100 MG: 50 TABLET ORAL at 16:49

## 2024-02-02 RX ADMIN — HEPARIN SODIUM 5000 UNITS: 5000 INJECTION INTRAVENOUS; SUBCUTANEOUS at 16:48

## 2024-02-02 RX ADMIN — INSULIN LISPRO 1 UNITS: 100 INJECTION, SOLUTION INTRAVENOUS; SUBCUTANEOUS at 12:34

## 2024-02-02 RX ADMIN — INSULIN LISPRO 5 UNITS: 100 INJECTION, SOLUTION INTRAVENOUS; SUBCUTANEOUS at 16:48

## 2024-02-02 RX ADMIN — Medication 3 MG: at 21:27

## 2024-02-02 RX ADMIN — ASPIRIN 81 MG: 81 TABLET, COATED ORAL at 06:19

## 2024-02-02 RX ADMIN — FUROSEMIDE 40 MG: 40 TABLET ORAL at 08:36

## 2024-02-02 RX ADMIN — CARVEDILOL 25 MG: 25 TABLET, FILM COATED ORAL at 08:36

## 2024-02-02 RX ADMIN — SODIUM BICARBONATE 650 MG: 650 TABLET ORAL at 21:27

## 2024-02-02 RX ADMIN — CARVEDILOL 25 MG: 25 TABLET, FILM COATED ORAL at 16:49

## 2024-02-02 RX ADMIN — PANTOPRAZOLE SODIUM 40 MG: 40 TABLET, DELAYED RELEASE ORAL at 06:19

## 2024-02-02 RX ADMIN — HYDRALAZINE HYDROCHLORIDE 100 MG: 50 TABLET ORAL at 08:36

## 2024-02-02 RX ADMIN — INSULIN LISPRO 5 UNITS: 100 INJECTION, SOLUTION INTRAVENOUS; SUBCUTANEOUS at 12:33

## 2024-02-02 RX ADMIN — HYDRALAZINE HYDROCHLORIDE 100 MG: 50 TABLET ORAL at 21:27

## 2024-02-02 RX ADMIN — INSULIN LISPRO 1 UNITS: 100 INJECTION, SOLUTION INTRAVENOUS; SUBCUTANEOUS at 08:39

## 2024-02-02 RX ADMIN — SODIUM BICARBONATE 650 MG: 650 TABLET ORAL at 08:36

## 2024-02-02 ASSESSMENT — COGNITIVE AND FUNCTIONAL STATUS - GENERAL
PERSONAL GROOMING: A LITTLE
TURNING FROM BACK TO SIDE WHILE IN FLAT BAD: A LITTLE
MOBILITY SCORE: 17
STANDING UP FROM CHAIR USING ARMS: A LITTLE
DRESSING REGULAR UPPER BODY CLOTHING: A LITTLE
DAILY ACTIVITIY SCORE: 18
MOBILITY SCORE: 18
MOVING FROM LYING ON BACK TO SITTING ON SIDE OF FLAT BED WITH BEDRAILS: A LITTLE
DRESSING REGULAR LOWER BODY CLOTHING: A LITTLE
MOVING FROM LYING ON BACK TO SITTING ON SIDE OF FLAT BED WITH BEDRAILS: A LITTLE
MOVING TO AND FROM BED TO CHAIR: A LITTLE
DAILY ACTIVITIY SCORE: 18
DAILY ACTIVITIY SCORE: 18
HELP NEEDED FOR BATHING: A LITTLE
TURNING FROM BACK TO SIDE WHILE IN FLAT BAD: A LITTLE
MOVING TO AND FROM BED TO CHAIR: A LITTLE
WALKING IN HOSPITAL ROOM: A LITTLE
EATING MEALS: A LITTLE
CLIMB 3 TO 5 STEPS WITH RAILING: A LOT
MOVING TO AND FROM BED TO CHAIR: A LITTLE
CLIMB 3 TO 5 STEPS WITH RAILING: A LITTLE
TOILETING: A LITTLE
DRESSING REGULAR UPPER BODY CLOTHING: A LITTLE
DRESSING REGULAR UPPER BODY CLOTHING: A LITTLE
MOVING TO AND FROM BED TO CHAIR: A LITTLE
TURNING FROM BACK TO SIDE WHILE IN FLAT BAD: A LITTLE
PERSONAL GROOMING: A LITTLE
EATING MEALS: A LITTLE
MOBILITY SCORE: 18
STANDING UP FROM CHAIR USING ARMS: A LITTLE
STANDING UP FROM CHAIR USING ARMS: A LITTLE
WALKING IN HOSPITAL ROOM: A LITTLE
HELP NEEDED FOR BATHING: A LITTLE
DRESSING REGULAR LOWER BODY CLOTHING: A LITTLE
EATING MEALS: A LITTLE
MOVING FROM LYING ON BACK TO SITTING ON SIDE OF FLAT BED WITH BEDRAILS: A LITTLE
MOBILITY SCORE: 18
TURNING FROM BACK TO SIDE WHILE IN FLAT BAD: A LITTLE
TOILETING: A LITTLE
WALKING IN HOSPITAL ROOM: A LITTLE
HELP NEEDED FOR BATHING: A LITTLE
DRESSING REGULAR LOWER BODY CLOTHING: A LITTLE
TOILETING: A LITTLE
MOVING FROM LYING ON BACK TO SITTING ON SIDE OF FLAT BED WITH BEDRAILS: A LITTLE
CLIMB 3 TO 5 STEPS WITH RAILING: A LITTLE
CLIMB 3 TO 5 STEPS WITH RAILING: A LITTLE
WALKING IN HOSPITAL ROOM: A LITTLE
STANDING UP FROM CHAIR USING ARMS: A LITTLE
PERSONAL GROOMING: A LITTLE

## 2024-02-02 ASSESSMENT — PAIN SCALES - GENERAL
PAINLEVEL_OUTOF10: 0 - NO PAIN
PAINLEVEL_OUTOF10: 0 - NO PAIN

## 2024-02-02 ASSESSMENT — PAIN - FUNCTIONAL ASSESSMENT: PAIN_FUNCTIONAL_ASSESSMENT: 0-10

## 2024-02-02 NOTE — NURSING NOTE
Pts purwick was displaced over night.  Was unable to obtain urine sample.  Pt was incontinent of large amount of urine. Purwick back in place

## 2024-02-02 NOTE — NURSING NOTE
Patient admitted to medr unit coming from observation unit assessments completed, no acute distress noted, oriented to room and call system.

## 2024-02-02 NOTE — PROGRESS NOTES
"Carolann Cartagena is a 89 y.o. female on day 0 of admission presenting with Chronic hyponatremia.    Subjective   Awake in bed, sitting up in bed. Denies any chest pain, SOB, or dizziness. Denies any specific complaints.        Objective     Physical Exam  Constitutional:       Appearance: Normal appearance.   Cardiovascular:      Rate and Rhythm: Normal rate and regular rhythm.      Pulses: Normal pulses.      Heart sounds: Murmur heard.      No gallop.   Pulmonary:      Effort: Pulmonary effort is normal. No respiratory distress.      Breath sounds: Normal breath sounds. No wheezing or rhonchi.   Abdominal:      General: Abdomen is flat. There is no distension.      Palpations: Abdomen is soft.      Tenderness: There is no abdominal tenderness. There is no guarding.   Musculoskeletal:         General: Normal range of motion.   Skin:     General: Skin is warm.      Capillary Refill: Capillary refill takes less than 2 seconds.   Neurological:      Mental Status: She is alert and oriented to person, place, and time.   Psychiatric:         Mood and Affect: Mood normal.         Thought Content: Thought content normal.         Judgment: Judgment normal.         Last Recorded Vitals  Blood pressure 120/55, pulse 68, temperature 36.7 °C (98.1 °F), temperature source Temporal, resp. rate 18, height 1.626 m (5' 4\"), weight 78 kg (171 lb 15.3 oz), SpO2 100 %.  Intake/Output last 3 Shifts:  No intake/output data recorded.    Relevant Results  Scheduled medications  aspirin, 81 mg, oral, Daily  atorvastatin, 10 mg, oral, Nightly  carvedilol, 25 mg, oral, BID with meals  [Held by provider] furosemide, 40 mg, oral, Daily  heparin (porcine), 5,000 Units, subcutaneous, q8h  hydrALAZINE, 100 mg, oral, TID  insulin lispro, 0-5 Units, subcutaneous, Before meals & nightly  insulin lispro, 5 Units, subcutaneous, TID with meals  insulin NPH (Isophane), 12 Units, subcutaneous, BID AC  melatonin, 3 mg, oral, Daily  pantoprazole, 40 mg, oral, " Daily before breakfast   Or  pantoprazole, 40 mg, intravenous, Daily before breakfast  sodium bicarbonate, 650 mg, oral, BID      Continuous medications     PRN medications  PRN medications: acetaminophen, dextrose 10 % in water (D10W), dextrose, docusate sodium, glucagon    Results for orders placed or performed during the hospital encounter of 01/31/24 (from the past 24 hour(s))   POCT GLUCOSE   Result Value Ref Range    POCT Glucose 303 (H) 74 - 99 mg/dL   Basic Metabolic Panel   Result Value Ref Range    Glucose 330 (H) 74 - 99 mg/dL    Sodium 123 (L) 136 - 145 mmol/L    Potassium 3.8 3.5 - 5.3 mmol/L    Chloride 91 (L) 98 - 107 mmol/L    Bicarbonate 23 21 - 32 mmol/L    Anion Gap 13 10 - 20 mmol/L    Urea Nitrogen 37 (H) 6 - 23 mg/dL    Creatinine 2.29 (H) 0.50 - 1.05 mg/dL    eGFR 20 (L) >60 mL/min/1.73m*2    Calcium 8.7 8.6 - 10.3 mg/dL   Lavender Top   Result Value Ref Range    Extra Tube Hold for add-ons.    POCT GLUCOSE   Result Value Ref Range    POCT Glucose 319 (H) 74 - 99 mg/dL   POCT GLUCOSE   Result Value Ref Range    POCT Glucose 279 (H) 74 - 99 mg/dL   Lavender Top   Result Value Ref Range    Extra Tube Hold for add-ons.    CBC   Result Value Ref Range    WBC 5.0 4.4 - 11.3 x10*3/uL    nRBC 0.0 0.0 - 0.0 /100 WBCs    RBC 2.53 (L) 4.00 - 5.20 x10*6/uL    Hemoglobin 8.0 (L) 12.0 - 16.0 g/dL    Hematocrit 24.1 (L) 36.0 - 46.0 %    MCV 95 80 - 100 fL    MCH 31.6 26.0 - 34.0 pg    MCHC 33.2 32.0 - 36.0 g/dL    RDW 11.9 11.5 - 14.5 %    Platelets 238 150 - 450 x10*3/uL   Basic Metabolic Panel   Result Value Ref Range    Glucose 131 (H) 74 - 99 mg/dL    Sodium 125 (L) 136 - 145 mmol/L    Potassium 3.5 3.5 - 5.3 mmol/L    Chloride 92 (L) 98 - 107 mmol/L    Bicarbonate 26 21 - 32 mmol/L    Anion Gap 11 10 - 20 mmol/L    Urea Nitrogen 38 (H) 6 - 23 mg/dL    Creatinine 2.25 (H) 0.50 - 1.05 mg/dL    eGFR 20 (L) >60 mL/min/1.73m*2    Calcium 8.6 8.6 - 10.3 mg/dL   POCT GLUCOSE   Result Value Ref Range    POCT  Glucose 156 (H) 74 - 99 mg/dL     CT head wo IV contrast    Result Date: 1/31/2024  Interpreted By:  Bill De La O, STUDY: CT HEAD WO IV CONTRAST;  1/31/2024 7:49 pm   INDICATION: Signs/Symptoms:Generalized fatigue, dizziness.   COMPARISON: 10/13/2023   ACCESSION NUMBER(S): [id number]   ORDERING CLINICIAN: PERFECTO GAO   TECHNIQUE: Noncontrast axial CT scan of head was performed. Angled reformats in brain and bone windows were generated. The images were reviewed in bone, brain, blood and soft tissue windows.   FINDINGS: CSF Spaces: The ventricles, sulci and basal cisterns are within normal limits. There is no extraaxial fluid collection. Global volume loss and mild chronic small vessel ischemic change   Parenchyma:  The grey-white differentiation is intact. There is no mass effect or midline shift.  There is no intracranial hemorrhage.   Calvarium: Hyperostosis   Paranasal sinuses and mastoids: Visualized paranasal sinuses and mastoids are clear.       No evidence of acute cortical infarct or intracranial hemorrhage.   Senescent changes. If persistent concern, further evaluation with MRI is advised.   MACRO: None   Signed by: Bill De La O 1/31/2024 8:31 PM Dictation workstation:   [id number]    XR chest 2 views    Result Date: 1/30/2024  STUDY: Chest Radiographs;  01/30/2024 INDICATION: Cough. COMPARISON: 10/05/2023 XR chest ACCESSION NUMBER(S): [id number] ORDERING CLINICIAN: KODY BELLE TECHNIQUE:  Frontal and lateral chest. FINDINGS: CARDIOMEDIASTINAL SILHOUETTE: Cardiomediastinal silhouette is unchanged in size and configuration.  LUNGS: Lungs are clear.  ABDOMEN: No remarkable upper abdominal findings.  BONES: No acute osseous changes.    No acute abnormality based on plain film imaging.  Borderline cardiomegaly.. Signed by Aakash Paredes DO                    Assessment/Plan   Principal Problem:    Chronic hyponatremia  Active Problems:    Essential hypertension    GERD (gastroesophageal  reflux disease)    Hyperlipidemia    Stage 4 chronic kidney disease (CMS/HCC)    Diabetic nephropathy associated with type 2 diabetes mellitus (CMS/HCC)    Chronic diastolic congestive heart failure (CMS/HCC)    Anemia due to chronic kidney disease    Hypertensive urgency    Carolann Cartagena is a 89 y.o. female with PMH significant for HTN, GERD, CKD, DM2 with neuropathy, diastolic CHF, anemia, HLD, and hyponatremia who presented with c/o symptomatic hyponatremia x 6 days. History limited due to poor historian, much of her history was obtained by her daughter. Patient's daughter states there is no change to her baseline mental status, which is slightly disoriented. States she was admitted to the ED yesterday with complaints of n/v, dizziness, SOB, and lightheadedness. Was found to be hyponatremic and was offered to be admitted but refused. Returned to the ED today with lightheadedness, SOB, dizziness, fatigue, and weakness after a telehealth visit with her PCP. States she has been experiencing these symptoms since last Friday. Admits to decreased oral intake. Denies changes in urination.  Denies fever, abdominal pain, chest pain, and edema. States her BM and urination have been normal for her, denies blood in stool or urine. Admits to 17 lbs unintentional weight loss in the past 6 months.        Acute on chronic Hyponatremia  -121 on admission-> 125 today  -nephrology consulted, appreciate recs   -1500 mL fluid restriction   -urine studies have been ordered but unfortunately not collected-> straight cath order placed to ensure urine studies get obtained     OLGA LIDIA on CKD  - creat 2.06 on admit-> 1.82 -> 2.25   -worsened by drop in BP  -avoid neprhotoxic agents  -nephrology consulted, appreciate recs     HTN  -resume home meds  -trend  -significant drop in BP-> OLGA LIDIA    DM  -Accu-Cheks ACHS  -Humalog correctional sliding scale  -continue NPH    PCM  -dietician consulted     DVT prophylaxis:  Heparin, SCD    DC plan:  DC home  when medically stable    Labs/Testing reviewed    Interdisciplinary team rounding completed with hospitalist, nurse, TCC    NP discussed plan and lab/testing results with Dr. Jett     Upgraded from obs to inpatient, hospitalist to assume care, notified via epic secure chat       I spent 50 minutes in the professional and overall care of this patient.      Veronica Law, APRN-CNP

## 2024-02-02 NOTE — PROGRESS NOTES
Patient is being followed by nephrology and is on 1.5L fluid restriction.  Nephrology will need to clear for discharge.  Dietician consult pending.  Waiting for PT to see patient.  As of now, the plan is for patient to return to her daughter's house.  Patient has a nurse come out once a month through [facility].  Daughter was going to contact the [facility] nurse about the need for pt/ot.  Will continue to follow for discharge planning needs.

## 2024-02-02 NOTE — PROGRESS NOTES
NEPHROLOGY PROGRESS NOTE      Carolann Cartagena is a 89 y.o. female on day 0 of admission presenting with Chronic hyponatremia.      Subjective   2/2/2024: Patient continues to be hemodynamically stable.  Sodium of 125.  Urine osmolality and urine sodium pending.  Held Lasix.     Scheduled Medications:   aspirin, 81 mg, oral, Daily  atorvastatin, 10 mg, oral, Nightly  carvedilol, 25 mg, oral, BID with meals  [Held by provider] furosemide, 40 mg, oral, Daily  heparin (porcine), 5,000 Units, subcutaneous, q8h  hydrALAZINE, 100 mg, oral, TID  insulin lispro, 0-5 Units, subcutaneous, Before meals & nightly  insulin lispro, 5 Units, subcutaneous, TID with meals  insulin NPH (Isophane), 12 Units, subcutaneous, BID AC  melatonin, 3 mg, oral, Daily  pantoprazole, 40 mg, oral, Daily before breakfast   Or  pantoprazole, 40 mg, intravenous, Daily before breakfast  sodium bicarbonate, 650 mg, oral, BID         Continuous Medications:         PRN Medications:   PRN medications: acetaminophen, dextrose 10 % in water (D10W), dextrose, docusate sodium, glucagon    Objective   Vitals:  Most Recent:  Vitals:    02/02/24 0821   BP: 154/66   Pulse: 65   Resp: 18   Temp: 36.9 °C (98.4 °F)   SpO2: 98%       24hr Min/Max:  Temp  Min: 36.1 °C (97 °F)  Max: 37 °C (98.6 °F)  Pulse  Min: 58  Max: 68  BP  Min: 115/39  Max: 154/66  Resp  Min: 17  Max: 18  SpO2  Min: 97 %  Max: 100 %    LDA:         Hemodynamic parameters for last 24 hours:       I/O:    Intake/Output Summary (Last 24 hours) at 2/2/2024 1128  Last data filed at 2/2/2024 0955  Gross per 24 hour   Intake 240 ml   Output 400 ml   Net -160 ml       Physical Exam:   Physical Exam   GENERAL: Alert, NAD, cooperative. Baseline mental status slightly disoriented  SKIN: Warm and dry.  No suspicious lesions or rashes.  HEENT:  NCAT, PERRLA, EOMI, nonicteric sclera, MMM, neck supple, trachea midline  LUNGS: Unlabored respirations, CTAB, no significant wheezing, rhonchi, or rales  appreciated  CARDS: RRR, blowing systolic murmur appreciated. Wearing mid-calf compression stockings.  GI: Soft, NTND, BS+, no rebound, no guarding   : no huerta, voids independently   MS/Extremities: WWP, no evident/significant pitting edema, distal pulses intact, moves all extremities.   NEURO: A&Ox3, CN grossly intact, speech fluent, follows commands, answers questions appropriately  PSYCH: mood and behavior appropriate    Lab/Radiology/Diagnostic Review:  Results for orders placed or performed during the hospital encounter of 01/31/24 (from the past 24 hour(s))   POCT GLUCOSE   Result Value Ref Range    POCT Glucose 303 (H) 74 - 99 mg/dL   Basic Metabolic Panel   Result Value Ref Range    Glucose 330 (H) 74 - 99 mg/dL    Sodium 123 (L) 136 - 145 mmol/L    Potassium 3.8 3.5 - 5.3 mmol/L    Chloride 91 (L) 98 - 107 mmol/L    Bicarbonate 23 21 - 32 mmol/L    Anion Gap 13 10 - 20 mmol/L    Urea Nitrogen 37 (H) 6 - 23 mg/dL    Creatinine 2.29 (H) 0.50 - 1.05 mg/dL    eGFR 20 (L) >60 mL/min/1.73m*2    Calcium 8.7 8.6 - 10.3 mg/dL   Lavender Top   Result Value Ref Range    Extra Tube Hold for add-ons.    POCT GLUCOSE   Result Value Ref Range    POCT Glucose 319 (H) 74 - 99 mg/dL   POCT GLUCOSE   Result Value Ref Range    POCT Glucose 279 (H) 74 - 99 mg/dL   Lavender Top   Result Value Ref Range    Extra Tube Hold for add-ons.    CBC   Result Value Ref Range    WBC 5.0 4.4 - 11.3 x10*3/uL    nRBC 0.0 0.0 - 0.0 /100 WBCs    RBC 2.53 (L) 4.00 - 5.20 x10*6/uL    Hemoglobin 8.0 (L) 12.0 - 16.0 g/dL    Hematocrit 24.1 (L) 36.0 - 46.0 %    MCV 95 80 - 100 fL    MCH 31.6 26.0 - 34.0 pg    MCHC 33.2 32.0 - 36.0 g/dL    RDW 11.9 11.5 - 14.5 %    Platelets 238 150 - 450 x10*3/uL   Basic Metabolic Panel   Result Value Ref Range    Glucose 131 (H) 74 - 99 mg/dL    Sodium 125 (L) 136 - 145 mmol/L    Potassium 3.5 3.5 - 5.3 mmol/L    Chloride 92 (L) 98 - 107 mmol/L    Bicarbonate 26 21 - 32 mmol/L    Anion Gap 11 10 - 20 mmol/L     Urea Nitrogen 38 (H) 6 - 23 mg/dL    Creatinine 2.25 (H) 0.50 - 1.05 mg/dL    eGFR 20 (L) >60 mL/min/1.73m*2    Calcium 8.6 8.6 - 10.3 mg/dL   POCT GLUCOSE   Result Value Ref Range    POCT Glucose 156 (H) 74 - 99 mg/dL     CT head wo IV contrast    Result Date: 1/31/2024  Interpreted By:  Bill De La O, STUDY: CT HEAD WO IV CONTRAST;  1/31/2024 7:49 pm   INDICATION: Signs/Symptoms:Generalized fatigue, dizziness.   COMPARISON: 10/13/2023   ACCESSION NUMBER(S): [id number]   ORDERING CLINICIAN: PERFECTO GAO   TECHNIQUE: Noncontrast axial CT scan of head was performed. Angled reformats in brain and bone windows were generated. The images were reviewed in bone, brain, blood and soft tissue windows.   FINDINGS: CSF Spaces: The ventricles, sulci and basal cisterns are within normal limits. There is no extraaxial fluid collection. Global volume loss and mild chronic small vessel ischemic change   Parenchyma:  The grey-white differentiation is intact. There is no mass effect or midline shift.  There is no intracranial hemorrhage.   Calvarium: Hyperostosis   Paranasal sinuses and mastoids: Visualized paranasal sinuses and mastoids are clear.       No evidence of acute cortical infarct or intracranial hemorrhage.   Senescent changes. If persistent concern, further evaluation with MRI is advised.   MACRO: None   Signed by: Bill De La O 1/31/2024 8:31 PM Dictation workstation:   [id number]    XR chest 2 views    Result Date: 1/30/2024  STUDY: Chest Radiographs;  01/30/2024 INDICATION: Cough. COMPARISON: 10/05/2023 XR chest ACCESSION NUMBER(S): [id number] ORDERING CLINICIAN: KODY BELLE TECHNIQUE:  Frontal and lateral chest. FINDINGS: CARDIOMEDIASTINAL SILHOUETTE: Cardiomediastinal silhouette is unchanged in size and configuration.  LUNGS: Lungs are clear.  ABDOMEN: No remarkable upper abdominal findings.  BONES: No acute osseous changes.    No acute abnormality based on plain film imaging.  Borderline  cardiomegaly.. Signed by Aakash Paredes,                      Assessment/Plan   Principal Problem:    Chronic hyponatremia  Active Problems:    Essential hypertension    GERD (gastroesophageal reflux disease)    Hyperlipidemia    Stage 4 chronic kidney disease (CMS/HCC)    Diabetic nephropathy associated with type 2 diabetes mellitus (CMS/HCC)    Chronic diastolic congestive heart failure (CMS/HCC)    Anemia due to chronic kidney disease    Hypertensive urgency    Hyponatremia      Carolann Cartagena is a 89 y.o. female has PMH significant for HTN, GERD, CKD, DM2 with neuropathy, diastolic CHF, anemia, HLD, and hyponatremia -nephrology was consulted because of Severe hyponatremia.      Severe hyponatremia probably secondary to poor solute intake   2/1/2024: Nephrology was consulted because of Severe hyponatremia.    Latest sodium of 124.    Ordered serum and urine osmolality  Placed on restriction 1.5 L  Extensively counseled the patient and patient brother at the bedside to be compliant with the fluid restriction of 1.5 L for now  Patient admission sodium was a 121 at around 10 PM on 1/30/2024  For today 2/1/2024, okay to correct the sodium to go up to 133  2/2/2024: Patient continues to be hemodynamically stable.  Sodium of 125.    Urine osmolality and urine sodium pending.    Continue fluid restriction of 1.5 L/day.  Held Lasix.      2.  Chronic kidney disease stage III probably secondary to hypertension and diabetes  Creatinine is a 1.8 which looks like baseline  Recommend to maintain hemoglobin A1c less than 6.5 and systolic blood pressure less than 140     3.  Severe protein malnutrition  Recommend dietitian consult and supplements       I spent 50 minutes of cumulative time with the patient.  Greater than 50% of that time was spent in the direct collaboration and or coordination of care of the patient.     Dragon dictation software was used to dictate this note and thus there may be minor errors in  translation/transcription including garbled speech or misspellings. Please contact for clarification if needed.

## 2024-02-02 NOTE — PROGRESS NOTES
Physical Therapy    Physical Therapy Evaluation    Patient Name: Carolann Cartagena  MRN: [id number]  Today's Date: 2/2/2024   Time Calculation  Start Time: 1137  Stop Time: 1157  Time Calculation (min): 20 min    Assessment/Plan   PT Assessment  PT Assessment Results: Decreased strength, Decreased endurance, Impaired balance, Decreased mobility, Decreased safety awareness  Rehab Prognosis: Good  Evaluation/Treatment Tolerance: Patient tolerated treatment well  Medical Staff Made Aware: Yes  Strengths: Access to adaptive/assistive products, Support of Caregivers, Support and attitude of living partners  End of Session Communication: Bedside nurse  Assessment Comment: Pt presents today with decreased BLE strength, balance, and safety awareness. Pt would benefit from continued PT during the current admission to address the above factors to achieve the reported PLOF while minimizing pt's risk of falls.  End of Session Patient Position: Bed, 3 rail up, Alarm on  IP OR SWING BED PT PLAN  Inpatient or Swing Bed: Inpatient  PT Plan  Treatment/Interventions: Bed mobility, Transfer training, Gait training, Balance training, Strengthening, Therapeutic exercise, Therapeutic activity, Home exercise program  PT Plan: Skilled PT  PT Frequency: 3 times per week  PT Discharge Recommendations: Low intensity level of continued care  PT Recommended Transfer Status: Contact guard, Assistive device  PT - OK to Discharge: Yes (Per PT POC)    Subjective   General Visit Information:  General  Reason for Referral: 89 y.o F presenting with c/o symptomatic hyponatremia x 6 days  Referred By: RUTH Jett  Past Medical History Relevant to Rehab: HTn, GERD, CKD, DM2 with neuropathy. diastolic CHF, anemia, HLD, hyponatremia  Family/Caregiver Present: Yes  Caregiver Feedback: Family present  Prior to Session Communication: Bedside nurse  Patient Position Received: Bed, 3 rail up, Alarm on  Preferred Learning Style: verbal, visual  General Comment: Pt supine  in bed upon PT arrival. Cleared to participate with RN, katrina, and agreeable to PT evaluation.  Home Living:  Home Living  Type of Home: [place]  Lives With: Adult children (Daughter)  Home Adaptive Equipment: Cane, Walker rolling or standard (Rollator)  Home Layout: One level  Home Access: Level entry  Bathroom Shower/Tub: Walk-in shower  Bathroom Toilet: Handicapped height  Bathroom Equipment: Grab bars in shower, Shower chair with back, Grab bars around toilet  Prior Level of Function:  Prior Function Per Pt/Caregiver Report  Level of [place]: Needs assistance with ADLs, Needs assistance with homemaking, Needs assistance with functional transfers  Receives Help From: Family (Daughter. Pt also reports nurse assist with medication at home)  Ambulatory Assistance: Needs assistance  Gait: Assistive device (Rollator)  Prior Function Comments: Daughter assists with all ADLs and completes IADLs. Pt denies recent falls.  Precautions:  Precautions  Medical Precautions: Fall precautions    Objective   Pain:  Pain Assessment  Pain Assessment: 0-10  Cognition:  Cognition  Orientation Level: Disoriented to time (Pt reports year as 1973 and then 2021. Pt able to state correct month. Pt oriented to person and place.)  Insight: Mild    Activity Tolerance  Endurance: Tolerates 10 - 20 min exercise with multiple rests    Sensation  Light Touch: No apparent deficits  Sensation Comment: Pt has PMHx of diabetic neuropathy    Coordination  Movements are Fluid and Coordinated: Yes    Postural Control  Postural Control: Impaired  Head Control: Forward head in standing with FWW  Trunk Control: Forward flexed trunk in standing with FWW  Posture Comment: Rounded shoulders in standing    Static Sitting Balance  Static Sitting-Balance Support: Bilateral upper extremity supported, Feet supported  Static Sitting-Level of Assistance: Distant supervision  Static Sitting-Comment/Number of Minutes: About 4 minutes  Dynamic Sitting  Balance  Dynamic Sitting-Balance Support: Bilateral upper extremity supported, Feet supported  Dynamic Sitting-Balance: Lateral lean  Dynamic Sitting-Comments: Leaning L/R for adjustment of hospital gown with close supervision. Posterior lean while seated EOB with BUE supported on bed/bilateral foot support on ground.    Static Standing Balance  Static Standing-Balance Support: Bilateral upper extremity supported  Static Standing-Level of Assistance: Contact guard  Static Standing-Comment/Number of Minutes: 1 minute  Dynamic Standing Balance  Dynamic Standing-Balance Support: Bilateral upper extremity supported  Functional Assessments:  Bed Mobility  Bed Mobility: Yes  Bed Mobility 1  Bed Mobility 1: Supine to sitting  Level of Assistance 1: Close supervision  Bed Mobility Comments 1: HOB lowered. Pt able to progress BLE off the EOB and use bed rail to bring trunk into upright sitting.  Bed Mobility 2  Bed Mobility  2: Sitting to supine  Level of Assistance 2: Close supervision  Bed Mobility Comments 2: HOB lowered. Pt able to lift BLE into bed with fair trunk control on descent to bed.    Transfers  Transfer: Yes  Transfer 1  Transfer From 1: Bed to  Transfer to 1: Stand  Technique 1: Sit to stand  Transfer Device 1: Walker, Gait belt  Transfer Level of Assistance 1: Contact guard, Minimal verbal cues  Trials/Comments 1: VC for bilateral foot placement on bed and bilateral UE push from bed to stand. Pt able to  a reasonable amount of time without apprent LOB. Pt forward flexed at trunk over FWW with head in forward flexion.  Transfers 2  Transfer From 2: Stand to  Transfer to 2: Bed  Technique 2: Stand to sit  Transfer Device 2: Walker (Gait belt)  Transfer Level of Assistance 2: Contact guard, Minimal verbal cues  Trials/Comments 2: Good BLE positioning against bed before attempting to sit. Pt held onto walker with both hands while sitting. Education provided on safe transfer technique including UE reach  for sitting surface for greater eccentric control. Pt demonstrated decreased eccentric control on descent to bed.    Ambulation/Gait Training  Ambulation/Gait Training Performed: Yes  Ambulation/Gait Training 1  Surface 1: Level tile  Device 1: Rolling walker  Gait Support Devices: Gait belt  Assistance 1: Close supervision, Moderate verbal cues, Minimal tactile cues (Progressed from CGA)  Comments/Distance (ft) 1: About 48 ft. Decreased clarence with forward flexed posture at trunk in head with gaze on feet. VC for improved posture and to look up to scan the environement for obstacles.VC/TC to initiate turns with walker.Pt prefers to turn to right after VC to turn left with walker during ambulation.    Stairs  Stairs: No  Extremity/Trunk Assessments:  RLE   RLE : Exceptions to WFL  Strength RLE  R Hip Flexion: 3+/5  R Knee Extension: 4-/5  R Ankle Dorsiflexion: 4-/5  R Ankle Plantar Flexion: 3+/5  LLE   LLE : Exceptions to WFL  Strength LLE  L Hip Flexion: 3+/5  L Knee Extension: 4-/5  L Ankle Dorsiflexion: 4-/5  L Ankle Plantar Flexion: 3+/5  Outcome Measures:  [facility] Basic Mobility  Turning from your back to your side while in a flat bed without using bedrails: A little  Moving from lying on your back to sitting on the side of a flat bed without using bedrails: A little  Moving to and from bed to chair (including a wheelchair): A little  Standing up from a chair using your arms (e.g. wheelchair or bedside chair): A little  To walk in hospital room: A little  Climbing 3-5 steps with railing: A lot  Basic Mobility - Total Score: 17    Encounter Problems       Encounter Problems (Active)       Balance       Goal 1 (Progressing)       Start:  02/02/24    Expected End:  02/16/24       Pt performs all sitting balance IND and standing balance with close supervision and LRAD            Mobility       STG - Patient will ambulate (Progressing)       Start:  02/02/24    Expected End:  02/16/24       150 ft with close  supervision and LRAD            PT Problem       PT Goal 1 (Not Progressing)       Start:  02/02/24    Expected End:  02/16/24       Pt demonstrates IND in performing 2 sets of 15 reps of prescribed BLE HEP            Transfers       STG - Patient to transfer to and from sit to supine (Progressing)       Start:  02/02/24    Expected End:  02/16/24       Mod IND         STG - Patient will transfer sit to and from stand (Progressing)       Start:  02/02/24    Expected End:  02/16/24       With close supervision and LRAD                Education Documentation  Body Mechanics, taught by Vijay Salazar, PT at 2/2/2024 12:38 PM.  Learner: Patient  Readiness: Acceptance  Method: Explanation, Demonstration  Response: Verbalizes Understanding    Mobility Training, taught by Vijay Salazar PT at 2/2/2024 12:38 PM.  Learner: Patient  Readiness: Acceptance  Method: Explanation, Demonstration  Response: Verbalizes Understanding    Education Comments  No comments found.

## 2024-02-02 NOTE — CARE PLAN
The patient's goals for the shift include      The clinical goals for the shift include remain free from falls    Problem: Pain  Goal: My pain/discomfort is manageable  Outcome: Progressing     Problem: Safety  Goal: Patient will be injury free during hospitalization  Outcome: Progressing  Goal: I will remain free of falls  Outcome: Progressing     Problem: Daily Care  Goal: Daily care needs are met  Outcome: Progressing     Problem: Psychosocial Needs  Goal: Demonstrates ability to cope with hospitalization/illness  Outcome: Progressing  Goal: Collaborate with me, my family, and caregiver to identify my specific goals  Outcome: Progressing     Problem: Discharge Barriers  Goal: My discharge needs are met  Outcome: Progressing

## 2024-02-02 NOTE — CONSULTS
Nutrition Assessment Note  Nutrition Assessment      Reason for Assessment  Reason for Assessment: Provider consult order nutritional assessment    Chart reviewed and pt visited.    Carolann is a 89 y.o. female admitted for chronic hyponatremia    Family present during visit.  Family states that pt eats very little and has for about a year. Family states pt gets hungry but not a big eater. Chart review indicates that there has been some wt loss in last 6 months- family agrees. Family denies any food allergies.    Pt agreeable to Glucerna once daily (coincides with FR).    Dietary Orders (From admission, onward)       Start     Ordered    02/01/24 1145  Adult diet Carb Controlled; 60 gram carb/meal, 30 gram Carb evening snack; 1500 mL fluid  Diet effective now        Question Answer Comment   Diet type Carb Controlled    Carb diet selection: 60 gram carb/meal, 30 gram Carb evening snack    Dietary fluid restriction / 24h: 1500 mL fluid        02/01/24 1144    02/01/24 0510  May Participate in Room Service  Once        Question:  .  Answer:  Yes    02/01/24 0510                  History:    Scheduled medications  aspirin, 81 mg, oral, Daily  atorvastatin, 10 mg, oral, Nightly  carvedilol, 25 mg, oral, BID with meals  [Held by provider] furosemide, 40 mg, oral, Daily  heparin (porcine), 5,000 Units, subcutaneous, q8h  hydrALAZINE, 100 mg, oral, TID  insulin lispro, 0-5 Units, subcutaneous, Before meals & nightly  insulin lispro, 5 Units, subcutaneous, TID with meals  insulin NPH (Isophane), 12 Units, subcutaneous, BID AC  melatonin, 3 mg, oral, Daily  pantoprazole, 40 mg, oral, Daily before breakfast   Or  pantoprazole, 40 mg, intravenous, Daily before breakfast  sodium bicarbonate, 650 mg, oral, BID      Continuous medications     PRN medications  PRN medications: acetaminophen, dextrose 10 % in water (D10W), dextrose, docusate sodium, glucagon      Results for orders placed or performed during the hospital encounter of  01/31/24 (from the past 24 hour(s))   POCT GLUCOSE   Result Value Ref Range    POCT Glucose 319 (H) 74 - 99 mg/dL   POCT GLUCOSE   Result Value Ref Range    POCT Glucose 279 (H) 74 - 99 mg/dL   Lavender Top   Result Value Ref Range    Extra Tube Hold for add-ons.    CBC   Result Value Ref Range    WBC 5.0 4.4 - 11.3 x10*3/uL    nRBC 0.0 0.0 - 0.0 /100 WBCs    RBC 2.53 (L) 4.00 - 5.20 x10*6/uL    Hemoglobin 8.0 (L) 12.0 - 16.0 g/dL    Hematocrit 24.1 (L) 36.0 - 46.0 %    MCV 95 80 - 100 fL    MCH 31.6 26.0 - 34.0 pg    MCHC 33.2 32.0 - 36.0 g/dL    RDW 11.9 11.5 - 14.5 %    Platelets 238 150 - 450 x10*3/uL   Basic Metabolic Panel   Result Value Ref Range    Glucose 131 (H) 74 - 99 mg/dL    Sodium 125 (L) 136 - 145 mmol/L    Potassium 3.5 3.5 - 5.3 mmol/L    Chloride 92 (L) 98 - 107 mmol/L    Bicarbonate 26 21 - 32 mmol/L    Anion Gap 11 10 - 20 mmol/L    Urea Nitrogen 38 (H) 6 - 23 mg/dL    Creatinine 2.25 (H) 0.50 - 1.05 mg/dL    eGFR 20 (L) >60 mL/min/1.73m*2    Calcium 8.6 8.6 - 10.3 mg/dL   POCT GLUCOSE   Result Value Ref Range    POCT Glucose 156 (H) 74 - 99 mg/dL   POCT GLUCOSE   Result Value Ref Range    POCT Glucose 190 (H) 74 - 99 mg/dL   POCT GLUCOSE   Result Value Ref Range    POCT Glucose 141 (H) 74 - 99 mg/dL       Past Medical History:   Diagnosis Date    Anemia due to chronic kidney disease 01/22/2024    Chronic diastolic congestive heart failure (CMS/[facility]) 10/02/2023    Chronic hyponatremia 10/02/2023    Chronic kidney disease, stage 3, mod decreased GFR (CMS/[facility]) 09/18/2018    Diabetic nephropathy associated with type 2 diabetes mellitus (CMS/[facility]) 12/18/2018    Essential hypertension 05/23/2023    Hyperlipidemia 05/23/2023    Pure hypercholesterolemia 10/02/2015     Food and Nutrient History  Energy Intake: Poor < 50 %  Food and Nutrient History: Per family pt po intake has been declining for the last year.  Vitamin/Herbal Supplement Use: Pt does not use supplements at home    "  Anthropometrics:  Height: 162.6 cm (5' 4.02\")  Weight: 78 kg (171 lb 15.3 oz)  BMI (Calculated): 29.5    Weight Change: 0    Wt Readings from Last 14 Encounters:   02/02/24 78 kg (171 lb 15.3 oz)   01/30/24 78.9 kg (174 lb)   11/22/23 77.6 kg (171 lb)   11/15/23 81.2 kg (179 lb)   11/06/23 82.1 kg (181 lb)   10/12/23 88.9 kg (196 lb)   10/11/23 85.7 kg (189 lb)   10/11/23 79.2 kg (174 lb 8 oz)   10/09/23 89.8 kg (198 lb)   10/05/23 86 kg (189 lb 9.5 oz)   09/05/23 81.2 kg (179 lb)   08/30/23 88.9 kg (196 lb)   05/25/23 90.3 kg (199 lb)   02/24/23 86.6 kg (191 lb)     Weight Change  Significant Weight Loss: Yes  Interpretation of Weight Loss: >10% in 6 months     IBW/kg (Dietitian Calculated): 54.5 kg  Percent of IBW: 143 %     Energy Needs:  Calculated Energy Needs Using Equations  Height: 162.6 cm (5' 4.02\")  Temp: 36.6 °C (97.8 °F)    Estimated Energy Needs  Total Energy Estimated Needs (kCal): 1950 kCal  Total Estimated Energy Need per Day (kCal/kg): 2350 kCal/kg  Method for Estimating Needs: 25-30kcal/kg    Estimated Protein Needs  Total Protein Estimated Needs (g): 80 g  Total Protein Estimated Needs (g/kg): 95 g/kg  Method for Estimating Needs: 1.0-1.2g/kg    Estimated Fluid Needs  Method for Estimating Needs: MD recommendations.     Nutrition Focused Physical Findings:  Subcutaneous Fat Loss  Orbital Fat Pads: Well nourshed (slightly bulging fat pads)  Buccal Fat Pads: Well nourished (full, rounded cheeks)  Triceps: Well nourished (ample fat tissue)    Muscle Wasting  Temporalis: Well nourished (well-defined muscle)  Pectoralis (Clavicular Region): Well nourished (clavicle not visible)  Interosseous: Well nourished (muscle bulges)    Edema  Edema: none     Physical Findings (Nutrition Deficiency/Toxicity)  Skin: Negative     Nutrition Diagnosis   Malnutrition Diagnosis  Patient has Malnutrition Diagnosis: Yes  Diagnosis Status: New  Malnutrition Diagnosis: Severe malnutrition related to chronic disease or " condition  As Evidenced by: greater than 10% wt loss in 6 months and less than/equal to 75% of estimated energy needs consumed for 1 month or greater.    Patient has Nutrition Diagnosis: Yes  Nutrition Diagnosis 1: Altered nutrition related to laboratory values     Related to (1): chronic illness  As Evidenced by (1): abnormal sodium levels       Nutrition Interventions/Recommendations      Food and/or Nutrient Delivery Interventions  Meals and Snacks: Other (Comment)     Medical Food Supplement: Commercial beverage  Goal: glucerna nutritional supplement once daily    If appetite declines, pt may benefit from appetite stimulant    Education Documentation  Nutrition Care Manual, taught by Andressa Miller RDN, JOMAR at 2/2/2024  2:52 PM.  Learner: Family, Patient  Readiness: Acceptance  Method: Explanation  Response: Verbalizes Understanding  Discussed using nutritional supplementation- pt has T2DM, recommended Glucerna or equivalent to help with blood glucose levels. Encouraged po intake of protein and electrolytes- possible cause of hyponatremia (per MD). Encouraged limiting fluids to help with hyponatremia. Recommended that family follows pt cues- when to offer nutritional supplements to adjust for FR. Encouraged po intake of meals before nutritional supplement. Encouraged small frequent meals if pt experiences early satiety. Discussed what foods are also considered fluids.     Nutrition Monitoring and Evaluation   Food and Nutrient Related History  Energy Intake: Estimated energy intake  Criteria: > 50% of po intake from meal trays    Fluid Intake: Estimated fluid intake  Criteria: 1500ml per MD recs    Amount of Food: Medical food intake  Criteria: >50% of glucerna nutritional supplementr    Anthropometrics: Body Composition/Growth/Weight History  Weight: Measured weight, Weight change  Criteria: As clinically indicated    Weight Change: Weight change percentage, Weight gain, Weight loss  Criteria: As clincally  indicated    Body Mass: Body mass index (BMI)  Criteria: As clinically indicated    Biochemical Data, Medical Tests and Procedures  Electrolyte and Renal Panel: Other (Comment), Sodium, Creatinine, BUN  Criteria: As clinically indicated    Gastrointestinal Profile: Other (Comment)  Criteria: As clinically indicated    Glucose/Endocrine Profile: Glucose, casual, Hemoglobin A1c (HgbA1c)  Criteria: As clinically indicated    Monitor I/Os        Nutrition Focused Physical Findings     Digestive System: Decrease in appetite  Criteria: As clinically indicated    Follow Up  Time Spent (min): 65 minutes  Last Date of Nutrition Visit: 02/02/24  Nutrition Follow-Up Needed?: Dietitian to reassess per policy  Follow up Comment: Tho- nutrition assessment

## 2024-02-03 LAB
ANION GAP SERPL CALC-SCNC: 11 MMOL/L (ref 10–20)
BUN SERPL-MCNC: 39 MG/DL (ref 6–23)
CALCIUM SERPL-MCNC: 8.7 MG/DL (ref 8.6–10.3)
CHLORIDE SERPL-SCNC: 91 MMOL/L (ref 98–107)
CO2 SERPL-SCNC: 28 MMOL/L (ref 21–32)
CREAT SERPL-MCNC: 2.18 MG/DL (ref 0.5–1.05)
EGFRCR SERPLBLD CKD-EPI 2021: 21 ML/MIN/1.73M*2
ERYTHROCYTE [DISTWIDTH] IN BLOOD BY AUTOMATED COUNT: 11.9 % (ref 11.5–14.5)
GLUCOSE BLD MANUAL STRIP-MCNC: 111 MG/DL (ref 74–99)
GLUCOSE BLD MANUAL STRIP-MCNC: 112 MG/DL (ref 74–99)
GLUCOSE BLD MANUAL STRIP-MCNC: 122 MG/DL (ref 74–99)
GLUCOSE BLD MANUAL STRIP-MCNC: 165 MG/DL (ref 74–99)
GLUCOSE BLD MANUAL STRIP-MCNC: 248 MG/DL (ref 74–99)
GLUCOSE BLD MANUAL STRIP-MCNC: 294 MG/DL (ref 74–99)
GLUCOSE SERPL-MCNC: 108 MG/DL (ref 74–99)
HCT VFR BLD AUTO: 24 % (ref 36–46)
HGB BLD-MCNC: 8 G/DL (ref 12–16)
HOLD SPECIMEN: NORMAL
MCH RBC QN AUTO: 32.4 PG (ref 26–34)
MCHC RBC AUTO-ENTMCNC: 33.3 G/DL (ref 32–36)
MCV RBC AUTO: 97 FL (ref 80–100)
NRBC BLD-RTO: 0 /100 WBCS (ref 0–0)
OSMOLALITY UR: 213 MOSM/KG (ref 200–1200)
OSMOLALITY UR: 227 MOSM/KG (ref 200–1200)
PLATELET # BLD AUTO: 236 X10*3/UL (ref 150–450)
POTASSIUM SERPL-SCNC: 3.7 MMOL/L (ref 3.5–5.3)
RBC # BLD AUTO: 2.47 X10*6/UL (ref 4–5.2)
SODIUM SERPL-SCNC: 126 MMOL/L (ref 136–145)
WBC # BLD AUTO: 4.9 X10*3/UL (ref 4.4–11.3)

## 2024-02-03 PROCEDURE — 2500000001 HC RX 250 WO HCPCS SELF ADMINISTERED DRUGS (ALT 637 FOR MEDICARE OP): Performed by: PHYSICIAN ASSISTANT

## 2024-02-03 PROCEDURE — 36415 COLL VENOUS BLD VENIPUNCTURE: CPT | Performed by: NURSE PRACTITIONER

## 2024-02-03 PROCEDURE — 2500000001 HC RX 250 WO HCPCS SELF ADMINISTERED DRUGS (ALT 637 FOR MEDICARE OP): Performed by: INTERNAL MEDICINE

## 2024-02-03 PROCEDURE — 85027 COMPLETE CBC AUTOMATED: CPT | Performed by: NURSE PRACTITIONER

## 2024-02-03 PROCEDURE — 2500000002 HC RX 250 W HCPCS SELF ADMINISTERED DRUGS (ALT 637 FOR MEDICARE OP, ALT 636 FOR OP/ED): Performed by: HOSPITALIST

## 2024-02-03 PROCEDURE — 1100000001 HC PRIVATE ROOM DAILY

## 2024-02-03 PROCEDURE — 2500000002 HC RX 250 W HCPCS SELF ADMINISTERED DRUGS (ALT 637 FOR MEDICARE OP, ALT 636 FOR OP/ED): Performed by: PHYSICIAN ASSISTANT

## 2024-02-03 PROCEDURE — 99233 SBSQ HOSP IP/OBS HIGH 50: CPT | Performed by: INTERNAL MEDICINE

## 2024-02-03 PROCEDURE — 82947 ASSAY GLUCOSE BLOOD QUANT: CPT

## 2024-02-03 PROCEDURE — 80048 BASIC METABOLIC PNL TOTAL CA: CPT | Performed by: NURSE PRACTITIONER

## 2024-02-03 PROCEDURE — 2500000004 HC RX 250 GENERAL PHARMACY W/ HCPCS (ALT 636 FOR OP/ED): Performed by: PHYSICIAN ASSISTANT

## 2024-02-03 PROCEDURE — 99232 SBSQ HOSP IP/OBS MODERATE 35: CPT | Performed by: INTERNAL MEDICINE

## 2024-02-03 RX ORDER — BRIMONIDINE TARTRATE 2 MG/ML
1 SOLUTION/ DROPS OPHTHALMIC 2 TIMES DAILY
Status: DISCONTINUED | OUTPATIENT
Start: 2024-02-03 | End: 2024-02-05 | Stop reason: HOSPADM

## 2024-02-03 RX ORDER — BRIMONIDINE TARTRATE 1 MG/ML
1 SOLUTION/ DROPS OPHTHALMIC 2 TIMES DAILY
Status: DISCONTINUED | OUTPATIENT
Start: 2024-02-03 | End: 2024-02-03

## 2024-02-03 RX ORDER — TIMOLOL MALEATE 5 MG/ML
1 SOLUTION/ DROPS OPHTHALMIC DAILY
Status: DISCONTINUED | OUTPATIENT
Start: 2024-02-03 | End: 2024-02-05 | Stop reason: HOSPADM

## 2024-02-03 RX ORDER — LATANOPROST 50 UG/ML
1 SOLUTION/ DROPS OPHTHALMIC DAILY
Status: DISCONTINUED | OUTPATIENT
Start: 2024-02-03 | End: 2024-02-04

## 2024-02-03 RX ADMIN — INSULIN LISPRO 5 UNITS: 100 INJECTION, SOLUTION INTRAVENOUS; SUBCUTANEOUS at 12:27

## 2024-02-03 RX ADMIN — HYDRALAZINE HYDROCHLORIDE 100 MG: 50 TABLET ORAL at 14:49

## 2024-02-03 RX ADMIN — LATANOPROST 1 DROP: 50 SOLUTION OPHTHALMIC at 12:15

## 2024-02-03 RX ADMIN — TIMOLOL MALEATE 1 DROP: 5 SOLUTION/ DROPS OPHTHALMIC at 13:00

## 2024-02-03 RX ADMIN — CARVEDILOL 25 MG: 25 TABLET, FILM COATED ORAL at 16:21

## 2024-02-03 RX ADMIN — BRIMONIDINE TARTRATE 1 DROP: 2 SOLUTION/ DROPS OPHTHALMIC at 12:59

## 2024-02-03 RX ADMIN — HEPARIN SODIUM 5000 UNITS: 5000 INJECTION INTRAVENOUS; SUBCUTANEOUS at 14:47

## 2024-02-03 RX ADMIN — INSULIN LISPRO 2 UNITS: 100 INJECTION, SOLUTION INTRAVENOUS; SUBCUTANEOUS at 12:25

## 2024-02-03 RX ADMIN — ATORVASTATIN CALCIUM 10 MG: 10 TABLET, FILM COATED ORAL at 21:19

## 2024-02-03 RX ADMIN — INSULIN LISPRO 5 UNITS: 100 INJECTION, SOLUTION INTRAVENOUS; SUBCUTANEOUS at 16:26

## 2024-02-03 RX ADMIN — Medication 3 MG: at 18:58

## 2024-02-03 RX ADMIN — HYDRALAZINE HYDROCHLORIDE 100 MG: 50 TABLET ORAL at 10:02

## 2024-02-03 RX ADMIN — BRIMONIDINE TARTRATE 1 DROP: 2 SOLUTION/ DROPS OPHTHALMIC at 21:00

## 2024-02-03 RX ADMIN — SODIUM BICARBONATE 650 MG: 650 TABLET ORAL at 21:19

## 2024-02-03 RX ADMIN — INSULIN HUMAN 12 UNITS: 100 INJECTION, SUSPENSION SUBCUTANEOUS at 16:23

## 2024-02-03 RX ADMIN — CARVEDILOL 25 MG: 25 TABLET, FILM COATED ORAL at 10:03

## 2024-02-03 RX ADMIN — INSULIN LISPRO 1 UNITS: 100 INJECTION, SOLUTION INTRAVENOUS; SUBCUTANEOUS at 21:19

## 2024-02-03 RX ADMIN — HYDRALAZINE HYDROCHLORIDE 100 MG: 50 TABLET ORAL at 21:19

## 2024-02-03 RX ADMIN — PANTOPRAZOLE SODIUM 40 MG: 40 TABLET, DELAYED RELEASE ORAL at 07:03

## 2024-02-03 RX ADMIN — INSULIN LISPRO 3 UNITS: 100 INJECTION, SOLUTION INTRAVENOUS; SUBCUTANEOUS at 16:23

## 2024-02-03 RX ADMIN — HEPARIN SODIUM 5000 UNITS: 5000 INJECTION INTRAVENOUS; SUBCUTANEOUS at 22:55

## 2024-02-03 RX ADMIN — HEPARIN SODIUM 5000 UNITS: 5000 INJECTION INTRAVENOUS; SUBCUTANEOUS at 07:03

## 2024-02-03 RX ADMIN — SODIUM BICARBONATE 650 MG: 650 TABLET ORAL at 10:02

## 2024-02-03 RX ADMIN — ASPIRIN 81 MG: 81 TABLET, COATED ORAL at 07:02

## 2024-02-03 RX ADMIN — INSULIN HUMAN 12 UNITS: 100 INJECTION, SUSPENSION SUBCUTANEOUS at 07:03

## 2024-02-03 ASSESSMENT — COGNITIVE AND FUNCTIONAL STATUS - GENERAL
HELP NEEDED FOR BATHING: A LITTLE
DRESSING REGULAR UPPER BODY CLOTHING: A LITTLE
TURNING FROM BACK TO SIDE WHILE IN FLAT BAD: A LITTLE
CLIMB 3 TO 5 STEPS WITH RAILING: A LITTLE
DRESSING REGULAR LOWER BODY CLOTHING: A LITTLE
MOVING FROM LYING ON BACK TO SITTING ON SIDE OF FLAT BED WITH BEDRAILS: A LITTLE
MOVING TO AND FROM BED TO CHAIR: A LITTLE
WALKING IN HOSPITAL ROOM: A LITTLE
TOILETING: A LITTLE
PERSONAL GROOMING: A LITTLE
DAILY ACTIVITIY SCORE: 18
EATING MEALS: A LITTLE
STANDING UP FROM CHAIR USING ARMS: A LITTLE
MOBILITY SCORE: 18

## 2024-02-03 ASSESSMENT — PAIN - FUNCTIONAL ASSESSMENT
PAIN_FUNCTIONAL_ASSESSMENT: 0-10

## 2024-02-03 ASSESSMENT — PAIN SCALES - GENERAL
PAINLEVEL_OUTOF10: 0 - NO PAIN

## 2024-02-03 NOTE — CARE PLAN
The patient's goals for the shift include  safety    The clinical goals for the shift include pt to remain comfortable      Problem: Safety  Goal: Patient will be injury free during hospitalization  Outcome: Progressing     Problem: Daily Care  Goal: Daily care needs are met  Outcome: Progressing     Problem: Psychosocial Needs  Goal: Demonstrates ability to cope with hospitalization/illness  Outcome: Progressing

## 2024-02-03 NOTE — PROGRESS NOTES
"Carolann Cartagena is a 89 y.o. female on day 1 of admission presenting with Chronic hyponatremia.    Subjective      Sodium 126; doing well looking forward to going home, creatinine at baseline 2.18    Objective     Physical Exam  Constitutional:       Appearance: Normal appearance.   HENT:      Head: Normocephalic.      Nose: Nose normal.      Mouth/Throat:      Mouth: Mucous membranes are dry.      Pharynx: Oropharynx is clear.   Cardiovascular:      Rate and Rhythm: Normal rate and regular rhythm.      Pulses: Normal pulses.      Heart sounds: Normal heart sounds.   Pulmonary:      Breath sounds: Normal breath sounds.   Abdominal:      General: Abdomen is flat. Bowel sounds are normal.      Palpations: Abdomen is soft.   Musculoskeletal:      Cervical back: Normal range of motion.   Skin:     General: Skin is warm.      Capillary Refill: Capillary refill takes less than 2 seconds.   Neurological:      General: No focal deficit present.      Mental Status: She is alert.         Last Recorded Vitals  Blood pressure 167/56, pulse 56, temperature 36.8 °C (98.2 °F), temperature source Oral, resp. rate 18, height 1.626 m (5' 4.02\"), weight 78 kg (171 lb 15.3 oz), SpO2 99 %.  Intake/Output last 3 Shifts:  I/O last 3 completed shifts:  In: 480 (6.2 mL/kg) [P.O.:480]  Out: 1000 (12.8 mL/kg) [Urine:1000 (0.4 mL/kg/hr)]  Weight: 78 kg     Relevant Results  Results for orders placed or performed during the hospital encounter of 01/31/24 (from the past 24 hour(s))   POCT GLUCOSE   Result Value Ref Range    POCT Glucose 190 (H) 74 - 99 mg/dL   POCT GLUCOSE   Result Value Ref Range    POCT Glucose 141 (H) 74 - 99 mg/dL   POCT GLUCOSE   Result Value Ref Range    POCT Glucose 136 (H) 74 - 99 mg/dL   Sodium, Urine Random   Result Value Ref Range    Sodium, Urine Random 71 mmol/L    Creatinine, Urine Random 27.0 20.0 - 320.0 mg/dL    Sodium/Creatinine Ratio 263 Not established. mmol/g Creat   Osmolality, urine   Result Value Ref Range "    Osmolality, Urine Random 227 200 - 1,200 mOsm/kg   POCT GLUCOSE   Result Value Ref Range    POCT Glucose 69 (L) 74 - 99 mg/dL   POCT GLUCOSE   Result Value Ref Range    POCT Glucose 122 (H) 74 - 99 mg/dL   POCT GLUCOSE   Result Value Ref Range    POCT Glucose 112 (H) 74 - 99 mg/dL   CBC   Result Value Ref Range    WBC 4.9 4.4 - 11.3 x10*3/uL    nRBC 0.0 0.0 - 0.0 /100 WBCs    RBC 2.47 (L) 4.00 - 5.20 x10*6/uL    Hemoglobin 8.0 (L) 12.0 - 16.0 g/dL    Hematocrit 24.0 (L) 36.0 - 46.0 %    MCV 97 80 - 100 fL    MCH 32.4 26.0 - 34.0 pg    MCHC 33.3 32.0 - 36.0 g/dL    RDW 11.9 11.5 - 14.5 %    Platelets 236 150 - 450 x10*3/uL   Basic metabolic panel   Result Value Ref Range    Glucose 108 (H) 74 - 99 mg/dL    Sodium 126 (L) 136 - 145 mmol/L    Potassium 3.7 3.5 - 5.3 mmol/L    Chloride 91 (L) 98 - 107 mmol/L    Bicarbonate 28 21 - 32 mmol/L    Anion Gap 11 10 - 20 mmol/L    Urea Nitrogen 39 (H) 6 - 23 mg/dL    Creatinine 2.18 (H) 0.50 - 1.05 mg/dL    eGFR 21 (L) >60 mL/min/1.73m*2    Calcium 8.7 8.6 - 10.3 mg/dL   SST TOP   Result Value Ref Range    Extra Tube Hold for add-ons.    POCT GLUCOSE   Result Value Ref Range    POCT Glucose 111 (H) 74 - 99 mg/dL       Imaging Results  Head ct:  IMPRESSION:  No evidence of acute cortical infarct or intracranial hemorrhage.      Senescent changes. If persistent concern, further evaluation with MRI  is advised.    Medications:  aspirin, 81 mg, oral, Daily  atorvastatin, 10 mg, oral, Nightly  carvedilol, 25 mg, oral, BID with meals  [Held by provider] furosemide, 40 mg, oral, Daily  heparin (porcine), 5,000 Units, subcutaneous, q8h  hydrALAZINE, 100 mg, oral, TID  insulin lispro, 0-5 Units, subcutaneous, Before meals & nightly  insulin lispro, 5 Units, subcutaneous, TID with meals  insulin NPH (Isophane), 12 Units, subcutaneous, BID AC  melatonin, 3 mg, oral, Daily  pantoprazole, 40 mg, oral, Daily before breakfast   Or  pantoprazole, 40 mg, intravenous, Daily before  breakfast  sodium bicarbonate, 650 mg, oral, BID       PRN medications: acetaminophen, dextrose 10 % in water (D10W), dextrose, docusate sodium, glucagon     Assessment/Plan   Acute on chronic hyponatremia  -sodium 126; doing well will likely dc in am  -nephro following    2. CKD  -stable at 2.1    3. HTN  -continue home meds    4. Dm  -CONTINUE ssi    DVT Prophylaxis:  Heparin subq        Julianne Elise MD  [facility]

## 2024-02-03 NOTE — PROGRESS NOTES
NEPHROLOGY PROGRESS NOTE      Carolann Cartagena is a 89 y.o. female on day 1 of admission presenting with Chronic hyponatremia.      Subjective   2/2/2024: Patient continues to be hemodynamically stable.  Sodium of 125.  Urine osmolality and urine sodium pending.  Held Lasix.     2/3/2024: Hemodynamically stable.  Patient has excessive renal loss of sodium suggested by urine sodium of 71, probably secondary to Lasix.  Sodium improved marginally, latest sodium of 126.     Scheduled Medications:   aspirin, 81 mg, oral, Daily  atorvastatin, 10 mg, oral, Nightly  brimonidine, 1 drop, Both Eyes, BID  carvedilol, 25 mg, oral, BID with meals  [Held by provider] furosemide, 40 mg, oral, Daily  heparin (porcine), 5,000 Units, subcutaneous, q8h  hydrALAZINE, 100 mg, oral, TID  insulin lispro, 0-5 Units, subcutaneous, Before meals & nightly  insulin lispro, 5 Units, subcutaneous, TID with meals  insulin NPH (Isophane), 12 Units, subcutaneous, BID AC  latanoprost, 1 drop, Both Eyes, Daily  melatonin, 3 mg, oral, Daily  pantoprazole, 40 mg, oral, Daily before breakfast   Or  pantoprazole, 40 mg, intravenous, Daily before breakfast  sodium bicarbonate, 650 mg, oral, BID  timolol, 1 drop, Both Eyes, Daily         Continuous Medications:         PRN Medications:   PRN medications: acetaminophen, dextrose 10 % in water (D10W), dextrose, docusate sodium, glucagon    Objective   Vitals:  Most Recent:  Vitals:    02/03/24 1151   BP: 154/62   Pulse: 60   Resp: 18   Temp: 36 °C (96.8 °F)   SpO2: 99%       24hr Min/Max:  Temp  Min: 36 °C (96.8 °F)  Max: 36.9 °C (98.4 °F)  Pulse  Min: 55  Max: 60  BP  Min: 125/64  Max: 196/72  Resp  Min: 18  Max: 18  SpO2  Min: 98 %  Max: 100 %    LDA:         Hemodynamic parameters for last 24 hours:       I/O:    Intake/Output Summary (Last 24 hours) at 2/3/2024 1427  Last data filed at 2/2/2024 2322  Gross per 24 hour   Intake 240 ml   Output 600 ml   Net -360 ml         Physical Exam:   Physical Exam    GENERAL: Alert, NAD, cooperative. Baseline mental status slightly disoriented  SKIN: Warm and dry.  No suspicious lesions or rashes.  HEENT:  NCAT, PERRLA, EOMI, nonicteric sclera, MMM, neck supple, trachea midline  LUNGS: Unlabored respirations, CTAB, no significant wheezing, rhonchi, or rales appreciated  CARDS: RRR, blowing systolic murmur appreciated. Wearing mid-calf compression stockings.  GI: Soft, NTND, BS+, no rebound, no guarding   : no huerta, voids independently   MS/Extremities: WWP, no evident/significant pitting edema, distal pulses intact, moves all extremities.   NEURO: A&Ox3, CN grossly intact, speech fluent, follows commands, answers questions appropriately  PSYCH: mood and behavior appropriate    Lab/Radiology/Diagnostic Review:  Results for orders placed or performed during the hospital encounter of 01/31/24 (from the past 24 hour(s))   POCT GLUCOSE   Result Value Ref Range    POCT Glucose 141 (H) 74 - 99 mg/dL   POCT GLUCOSE   Result Value Ref Range    POCT Glucose 136 (H) 74 - 99 mg/dL   Sodium, Urine Random   Result Value Ref Range    Sodium, Urine Random 71 mmol/L    Creatinine, Urine Random 27.0 20.0 - 320.0 mg/dL    Sodium/Creatinine Ratio 263 Not established. mmol/g Creat   Osmolality, urine   Result Value Ref Range    Osmolality, Urine Random 227 200 - 1,200 mOsm/kg   POCT GLUCOSE   Result Value Ref Range    POCT Glucose 69 (L) 74 - 99 mg/dL   POCT GLUCOSE   Result Value Ref Range    POCT Glucose 122 (H) 74 - 99 mg/dL   POCT GLUCOSE   Result Value Ref Range    POCT Glucose 112 (H) 74 - 99 mg/dL   CBC   Result Value Ref Range    WBC 4.9 4.4 - 11.3 x10*3/uL    nRBC 0.0 0.0 - 0.0 /100 WBCs    RBC 2.47 (L) 4.00 - 5.20 x10*6/uL    Hemoglobin 8.0 (L) 12.0 - 16.0 g/dL    Hematocrit 24.0 (L) 36.0 - 46.0 %    MCV 97 80 - 100 fL    MCH 32.4 26.0 - 34.0 pg    MCHC 33.3 32.0 - 36.0 g/dL    RDW 11.9 11.5 - 14.5 %    Platelets 236 150 - 450 x10*3/uL   Basic metabolic panel   Result Value Ref Range     Glucose 108 (H) 74 - 99 mg/dL    Sodium 126 (L) 136 - 145 mmol/L    Potassium 3.7 3.5 - 5.3 mmol/L    Chloride 91 (L) 98 - 107 mmol/L    Bicarbonate 28 21 - 32 mmol/L    Anion Gap 11 10 - 20 mmol/L    Urea Nitrogen 39 (H) 6 - 23 mg/dL    Creatinine 2.18 (H) 0.50 - 1.05 mg/dL    eGFR 21 (L) >60 mL/min/1.73m*2    Calcium 8.7 8.6 - 10.3 mg/dL   SST TOP   Result Value Ref Range    Extra Tube Hold for add-ons.    POCT GLUCOSE   Result Value Ref Range    POCT Glucose 111 (H) 74 - 99 mg/dL   POCT GLUCOSE   Result Value Ref Range    POCT Glucose 248 (H) 74 - 99 mg/dL     CT head wo IV contrast    Result Date: 1/31/2024  Interpreted By:  Bill De La O, STUDY: CT HEAD WO IV CONTRAST;  1/31/2024 7:49 pm   INDICATION: Signs/Symptoms:Generalized fatigue, dizziness.   COMPARISON: 10/13/2023   ACCESSION NUMBER(S): [id number]   ORDERING CLINICIAN: PERFECTO GAO   TECHNIQUE: Noncontrast axial CT scan of head was performed. Angled reformats in brain and bone windows were generated. The images were reviewed in bone, brain, blood and soft tissue windows.   FINDINGS: CSF Spaces: The ventricles, sulci and basal cisterns are within normal limits. There is no extraaxial fluid collection. Global volume loss and mild chronic small vessel ischemic change   Parenchyma:  The grey-white differentiation is intact. There is no mass effect or midline shift.  There is no intracranial hemorrhage.   Calvarium: Hyperostosis   Paranasal sinuses and mastoids: Visualized paranasal sinuses and mastoids are clear.       No evidence of acute cortical infarct or intracranial hemorrhage.   Senescent changes. If persistent concern, further evaluation with MRI is advised.   MACRO: None   Signed by: Bill De La O 1/31/2024 8:31 PM Dictation workstation:   [id number]    XR chest 2 views    Result Date: 1/30/2024  STUDY: Chest Radiographs;  01/30/2024 INDICATION: Cough. COMPARISON: 10/05/2023 XR chest ACCESSION NUMBER(S): [id number] ORDERING  CLINICIAN: KODY BELLE TECHNIQUE:  Frontal and lateral chest. FINDINGS: CARDIOMEDIASTINAL SILHOUETTE: Cardiomediastinal silhouette is unchanged in size and configuration.  LUNGS: Lungs are clear.  ABDOMEN: No remarkable upper abdominal findings.  BONES: No acute osseous changes.    No acute abnormality based on plain film imaging.  Borderline cardiomegaly.. Signed by Aakash Paredes, DO                    Assessment/Plan   Principal Problem:    Chronic hyponatremia  Active Problems:    Essential hypertension    GERD (gastroesophageal reflux disease)    Hyperlipidemia    Stage 4 chronic kidney disease (CMS/HCC)    Diabetic nephropathy associated with type 2 diabetes mellitus (CMS/HCC)    Chronic diastolic congestive heart failure (CMS/HCC)    Anemia due to chronic kidney disease    Hypertensive urgency    Hyponatremia      Carolann Cartagena is a 89 y.o. female has PMH significant for HTN, GERD, CKD, DM2 with neuropathy, diastolic CHF, anemia, HLD, and hyponatremia -nephrology was consulted because of Severe hyponatremia.      Severe hyponatremia probably secondary to poor solute intake and increased renal loss of sodium secondary to Lasix  2/1/2024: Nephrology was consulted because of Severe hyponatremia.    Latest sodium of 124.    Ordered serum and urine osmolality  Placed on restriction 1.5 L  Extensively counseled the patient and patient brother at the bedside to be compliant with the fluid restriction of 1.5 L for now  Patient admission sodium was a 121 at around 10 PM on 1/30/2024  For today 2/1/2024, okay to correct the sodium to go up to 133  2/2/2024: Patient continues to be hemodynamically stable.  Sodium of 125.    Urine osmolality and urine sodium pending.    Continue fluid restriction of 1.5 L/day.  Held Lasix.   2/3/2024: Patient has excessive renal loss of sodium suggested by urine sodium of 71, probably secondary to Lasix.    Sodium improved marginally, latest sodium of 126.   Continue fluid  restriction to 1.5 L/day and hold Lasix for few more days     2.  Chronic kidney disease stage III probably secondary to hypertension and diabetes  Creatinine is a 1.8 which looks like baseline  Recommend to maintain hemoglobin A1c less than 6.5 and systolic blood pressure less than 140     3.  Severe protein malnutrition  Recommend dietitian consult and supplements       I spent 50 minutes of cumulative time with the patient.  Greater than 50% of that time was spent in the direct collaboration and or coordination of care of the patient.     Dragon dictation software was used to dictate this note and thus there may be minor errors in translation/transcription including garbled speech or misspellings. Please contact for clarification if needed.

## 2024-02-03 NOTE — CARE PLAN
The patient's goals for the shift include      The clinical goals for the shift include pt to remain comfortable      Problem: Pain  Goal: My pain/discomfort is manageable  Outcome: Progressing     Problem: Safety  Goal: Patient will be injury free during hospitalization  Outcome: Progressing  Goal: I will remain free of falls  Outcome: Progressing     Problem: Daily Care  Goal: Daily care needs are met  Outcome: Progressing     Problem: Psychosocial Needs  Goal: Demonstrates ability to cope with hospitalization/illness  Outcome: Progressing  Goal: Collaborate with me, my family, and caregiver to identify my specific goals  Outcome: Progressing     Problem: Discharge Barriers  Goal: My discharge needs are met  Outcome: Progressing

## 2024-02-04 LAB
ANION GAP SERPL CALC-SCNC: 11 MMOL/L (ref 10–20)
BUN SERPL-MCNC: 43 MG/DL (ref 6–23)
CALCIUM SERPL-MCNC: 8.5 MG/DL (ref 8.6–10.3)
CHLORIDE SERPL-SCNC: 91 MMOL/L (ref 98–107)
CO2 SERPL-SCNC: 27 MMOL/L (ref 21–32)
CREAT SERPL-MCNC: 2.53 MG/DL (ref 0.5–1.05)
EGFRCR SERPLBLD CKD-EPI 2021: 17 ML/MIN/1.73M*2
ERYTHROCYTE [DISTWIDTH] IN BLOOD BY AUTOMATED COUNT: 11.9 % (ref 11.5–14.5)
GLUCOSE BLD MANUAL STRIP-MCNC: 104 MG/DL (ref 74–99)
GLUCOSE BLD MANUAL STRIP-MCNC: 161 MG/DL (ref 74–99)
GLUCOSE BLD MANUAL STRIP-MCNC: 216 MG/DL (ref 74–99)
GLUCOSE BLD MANUAL STRIP-MCNC: 92 MG/DL (ref 74–99)
GLUCOSE SERPL-MCNC: 85 MG/DL (ref 74–99)
HCT VFR BLD AUTO: 23.6 % (ref 36–46)
HGB BLD-MCNC: 7.7 G/DL (ref 12–16)
MCH RBC QN AUTO: 31.6 PG (ref 26–34)
MCHC RBC AUTO-ENTMCNC: 32.6 G/DL (ref 32–36)
MCV RBC AUTO: 97 FL (ref 80–100)
NRBC BLD-RTO: 0 /100 WBCS (ref 0–0)
PLATELET # BLD AUTO: 234 X10*3/UL (ref 150–450)
POTASSIUM SERPL-SCNC: 3.6 MMOL/L (ref 3.5–5.3)
RBC # BLD AUTO: 2.44 X10*6/UL (ref 4–5.2)
SODIUM SERPL-SCNC: 125 MMOL/L (ref 136–145)
WBC # BLD AUTO: 6.2 X10*3/UL (ref 4.4–11.3)

## 2024-02-04 PROCEDURE — 80048 BASIC METABOLIC PNL TOTAL CA: CPT | Performed by: NURSE PRACTITIONER

## 2024-02-04 PROCEDURE — 99233 SBSQ HOSP IP/OBS HIGH 50: CPT | Performed by: INTERNAL MEDICINE

## 2024-02-04 PROCEDURE — 36415 COLL VENOUS BLD VENIPUNCTURE: CPT | Performed by: NURSE PRACTITIONER

## 2024-02-04 PROCEDURE — 2500000004 HC RX 250 GENERAL PHARMACY W/ HCPCS (ALT 636 FOR OP/ED): Performed by: PHYSICIAN ASSISTANT

## 2024-02-04 PROCEDURE — 1100000001 HC PRIVATE ROOM DAILY

## 2024-02-04 PROCEDURE — 2500000002 HC RX 250 W HCPCS SELF ADMINISTERED DRUGS (ALT 637 FOR MEDICARE OP, ALT 636 FOR OP/ED): Performed by: HOSPITALIST

## 2024-02-04 PROCEDURE — 2500000002 HC RX 250 W HCPCS SELF ADMINISTERED DRUGS (ALT 637 FOR MEDICARE OP, ALT 636 FOR OP/ED): Performed by: PHYSICIAN ASSISTANT

## 2024-02-04 PROCEDURE — 82947 ASSAY GLUCOSE BLOOD QUANT: CPT

## 2024-02-04 PROCEDURE — 85027 COMPLETE CBC AUTOMATED: CPT | Performed by: NURSE PRACTITIONER

## 2024-02-04 PROCEDURE — 2500000001 HC RX 250 WO HCPCS SELF ADMINISTERED DRUGS (ALT 637 FOR MEDICARE OP): Performed by: PHYSICIAN ASSISTANT

## 2024-02-04 RX ORDER — LATANOPROST 50 UG/ML
1 SOLUTION/ DROPS OPHTHALMIC DAILY
Status: DISCONTINUED | OUTPATIENT
Start: 2024-02-05 | End: 2024-02-05 | Stop reason: HOSPADM

## 2024-02-04 RX ADMIN — ASPIRIN 81 MG: 81 TABLET, COATED ORAL at 09:54

## 2024-02-04 RX ADMIN — HEPARIN SODIUM 5000 UNITS: 5000 INJECTION INTRAVENOUS; SUBCUTANEOUS at 06:09

## 2024-02-04 RX ADMIN — BRIMONIDINE TARTRATE 1 DROP: 2 SOLUTION/ DROPS OPHTHALMIC at 09:55

## 2024-02-04 RX ADMIN — Medication 3 MG: at 22:45

## 2024-02-04 RX ADMIN — ATORVASTATIN CALCIUM 10 MG: 10 TABLET, FILM COATED ORAL at 22:45

## 2024-02-04 RX ADMIN — HEPARIN SODIUM 5000 UNITS: 5000 INJECTION INTRAVENOUS; SUBCUTANEOUS at 15:05

## 2024-02-04 RX ADMIN — PANTOPRAZOLE SODIUM 40 MG: 40 TABLET, DELAYED RELEASE ORAL at 06:09

## 2024-02-04 RX ADMIN — TIMOLOL MALEATE 1 DROP: 5 SOLUTION/ DROPS OPHTHALMIC at 09:53

## 2024-02-04 RX ADMIN — BRIMONIDINE TARTRATE 1 DROP: 2 SOLUTION/ DROPS OPHTHALMIC at 21:00

## 2024-02-04 RX ADMIN — HYDRALAZINE HYDROCHLORIDE 100 MG: 50 TABLET ORAL at 22:45

## 2024-02-04 RX ADMIN — SODIUM BICARBONATE 650 MG: 650 TABLET ORAL at 09:54

## 2024-02-04 RX ADMIN — INSULIN HUMAN 12 UNITS: 100 INJECTION, SUSPENSION SUBCUTANEOUS at 09:55

## 2024-02-04 RX ADMIN — INSULIN LISPRO 1 UNITS: 100 INJECTION, SOLUTION INTRAVENOUS; SUBCUTANEOUS at 17:26

## 2024-02-04 RX ADMIN — INSULIN LISPRO 2 UNITS: 100 INJECTION, SOLUTION INTRAVENOUS; SUBCUTANEOUS at 12:38

## 2024-02-04 RX ADMIN — CARVEDILOL 25 MG: 25 TABLET, FILM COATED ORAL at 17:25

## 2024-02-04 RX ADMIN — CARVEDILOL 25 MG: 25 TABLET, FILM COATED ORAL at 09:54

## 2024-02-04 RX ADMIN — INSULIN LISPRO 5 UNITS: 100 INJECTION, SOLUTION INTRAVENOUS; SUBCUTANEOUS at 12:36

## 2024-02-04 RX ADMIN — INSULIN HUMAN 12 UNITS: 100 INJECTION, SUSPENSION SUBCUTANEOUS at 17:26

## 2024-02-04 RX ADMIN — INSULIN LISPRO 5 UNITS: 100 INJECTION, SOLUTION INTRAVENOUS; SUBCUTANEOUS at 17:29

## 2024-02-04 RX ADMIN — SODIUM BICARBONATE 650 MG: 650 TABLET ORAL at 22:45

## 2024-02-04 RX ADMIN — INSULIN LISPRO 5 UNITS: 100 INJECTION, SOLUTION INTRAVENOUS; SUBCUTANEOUS at 09:55

## 2024-02-04 RX ADMIN — HYDRALAZINE HYDROCHLORIDE 100 MG: 50 TABLET ORAL at 09:54

## 2024-02-04 RX ADMIN — HYDRALAZINE HYDROCHLORIDE 100 MG: 50 TABLET ORAL at 15:32

## 2024-02-04 ASSESSMENT — COGNITIVE AND FUNCTIONAL STATUS - GENERAL
WALKING IN HOSPITAL ROOM: A LITTLE
MOVING TO AND FROM BED TO CHAIR: A LITTLE
CLIMB 3 TO 5 STEPS WITH RAILING: A LITTLE
PERSONAL GROOMING: A LITTLE
DAILY ACTIVITIY SCORE: 18
EATING MEALS: A LITTLE
TURNING FROM BACK TO SIDE WHILE IN FLAT BAD: A LITTLE
STANDING UP FROM CHAIR USING ARMS: A LITTLE
MOVING FROM LYING ON BACK TO SITTING ON SIDE OF FLAT BED WITH BEDRAILS: A LITTLE
TURNING FROM BACK TO SIDE WHILE IN FLAT BAD: A LITTLE
TOILETING: A LITTLE
CLIMB 3 TO 5 STEPS WITH RAILING: A LITTLE
DRESSING REGULAR UPPER BODY CLOTHING: A LITTLE
EATING MEALS: A LITTLE
MOBILITY SCORE: 18
DRESSING REGULAR LOWER BODY CLOTHING: A LITTLE
DRESSING REGULAR LOWER BODY CLOTHING: A LITTLE
DAILY ACTIVITIY SCORE: 18
MOVING FROM LYING ON BACK TO SITTING ON SIDE OF FLAT BED WITH BEDRAILS: A LITTLE
HELP NEEDED FOR BATHING: A LITTLE
TOILETING: A LITTLE
DRESSING REGULAR UPPER BODY CLOTHING: A LITTLE
STANDING UP FROM CHAIR USING ARMS: A LITTLE
PERSONAL GROOMING: A LITTLE
WALKING IN HOSPITAL ROOM: A LITTLE
MOBILITY SCORE: 18
HELP NEEDED FOR BATHING: A LITTLE
MOVING TO AND FROM BED TO CHAIR: A LITTLE

## 2024-02-04 ASSESSMENT — PAIN - FUNCTIONAL ASSESSMENT
PAIN_FUNCTIONAL_ASSESSMENT: 0-10

## 2024-02-04 ASSESSMENT — PAIN SCALES - GENERAL
PAINLEVEL_OUTOF10: 0 - NO PAIN

## 2024-02-04 NOTE — PROGRESS NOTES
NEPHROLOGY PROGRESS NOTE      Carolann Cartagena is a 89 y.o. female on day 2 of admission presenting with Chronic hyponatremia.      Subjective   2/2/2024: Patient continues to be hemodynamically stable.  Sodium of 125.  Urine osmolality and urine sodium pending.  Held Lasix.     2/3/2024: Hemodynamically stable.  Patient has excessive renal loss of sodium suggested by urine sodium of 71, probably secondary to Lasix.  Sodium improved marginally, latest sodium of 126.     2/4/2024: Latest sodium of 125.  Continue to hold Lasix because of urinary loss of sodium.  Continue fluid restriction 1.5 L/day    Scheduled Medications:   aspirin, 81 mg, oral, Daily  atorvastatin, 10 mg, oral, Nightly  brimonidine, 1 drop, Both Eyes, BID  carvedilol, 25 mg, oral, BID with meals  [Held by provider] furosemide, 40 mg, oral, Daily  heparin (porcine), 5,000 Units, subcutaneous, q8h  hydrALAZINE, 100 mg, oral, TID  insulin lispro, 0-5 Units, subcutaneous, Before meals & nightly  insulin lispro, 5 Units, subcutaneous, TID with meals  insulin NPH (Isophane), 12 Units, subcutaneous, BID AC  [START ON 2/5/2024] latanoprost, 1 drop, Both Eyes, Daily  melatonin, 3 mg, oral, Daily  pantoprazole, 40 mg, oral, Daily before breakfast   Or  pantoprazole, 40 mg, intravenous, Daily before breakfast  sodium bicarbonate, 650 mg, oral, BID  timolol, 1 drop, Both Eyes, Daily         Continuous Medications:         PRN Medications:   PRN medications: acetaminophen, dextrose 10 % in water (D10W), dextrose, docusate sodium, glucagon    Objective   Vitals:  Most Recent:  Vitals:    02/04/24 1126   BP: 119/62   Pulse: 55   Resp: 17   Temp:    SpO2: 97%       24hr Min/Max:  Temp  Min: 36.2 °C (97.2 °F)  Max: 36.8 °C (98.3 °F)  Pulse  Min: 55  Max: 67  BP  Min: 110/55  Max: 157/55  Resp  Min: 16  Max: 18  SpO2  Min: 97 %  Max: 98 %    LDA:         Hemodynamic parameters for last 24 hours:       I/O:    Intake/Output Summary (Last 24 hours) at 2/4/2024 1535  Last  data filed at 2/4/2024 0444  Gross per 24 hour   Intake 600 ml   Output 450 ml   Net 150 ml         Physical Exam:   Physical Exam   GENERAL: Alert, NAD, cooperative. Baseline mental status slightly disoriented  SKIN: Warm and dry.  No suspicious lesions or rashes.  HEENT:  NCAT, PERRLA, EOMI, nonicteric sclera, MMM, neck supple, trachea midline  LUNGS: Unlabored respirations, CTAB, no significant wheezing, rhonchi, or rales appreciated  CARDS: RRR, blowing systolic murmur appreciated. Wearing mid-calf compression stockings.  GI: Soft, NTND, BS+, no rebound, no guarding   : no huerta, voids independently   MS/Extremities: WWP, no evident/significant pitting edema, distal pulses intact, moves all extremities.   NEURO: A&Ox3, CN grossly intact, speech fluent, follows commands, answers questions appropriately  PSYCH: mood and behavior appropriate    Lab/Radiology/Diagnostic Review:  Results for orders placed or performed during the hospital encounter of 01/31/24 (from the past 24 hour(s))   POCT GLUCOSE   Result Value Ref Range    POCT Glucose 294 (H) 74 - 99 mg/dL   POCT GLUCOSE   Result Value Ref Range    POCT Glucose 165 (H) 74 - 99 mg/dL   CBC   Result Value Ref Range    WBC 6.2 4.4 - 11.3 x10*3/uL    nRBC 0.0 0.0 - 0.0 /100 WBCs    RBC 2.44 (L) 4.00 - 5.20 x10*6/uL    Hemoglobin 7.7 (L) 12.0 - 16.0 g/dL    Hematocrit 23.6 (L) 36.0 - 46.0 %    MCV 97 80 - 100 fL    MCH 31.6 26.0 - 34.0 pg    MCHC 32.6 32.0 - 36.0 g/dL    RDW 11.9 11.5 - 14.5 %    Platelets 234 150 - 450 x10*3/uL   Basic metabolic panel   Result Value Ref Range    Glucose 85 74 - 99 mg/dL    Sodium 125 (L) 136 - 145 mmol/L    Potassium 3.6 3.5 - 5.3 mmol/L    Chloride 91 (L) 98 - 107 mmol/L    Bicarbonate 27 21 - 32 mmol/L    Anion Gap 11 10 - 20 mmol/L    Urea Nitrogen 43 (H) 6 - 23 mg/dL    Creatinine 2.53 (H) 0.50 - 1.05 mg/dL    eGFR 17 (L) >60 mL/min/1.73m*2    Calcium 8.5 (L) 8.6 - 10.3 mg/dL   POCT GLUCOSE   Result Value Ref Range    POCT  Glucose 104 (H) 74 - 99 mg/dL   POCT GLUCOSE   Result Value Ref Range    POCT Glucose 216 (H) 74 - 99 mg/dL     CT head wo IV contrast    Result Date: 1/31/2024  Interpreted By:  Bill De La O, STUDY: CT HEAD WO IV CONTRAST;  1/31/2024 7:49 pm   INDICATION: Signs/Symptoms:Generalized fatigue, dizziness.   COMPARISON: 10/13/2023   ACCESSION NUMBER(S): [id number]   ORDERING CLINICIAN: PERFECTO GAO   TECHNIQUE: Noncontrast axial CT scan of head was performed. Angled reformats in brain and bone windows were generated. The images were reviewed in bone, brain, blood and soft tissue windows.   FINDINGS: CSF Spaces: The ventricles, sulci and basal cisterns are within normal limits. There is no extraaxial fluid collection. Global volume loss and mild chronic small vessel ischemic change   Parenchyma:  The grey-white differentiation is intact. There is no mass effect or midline shift.  There is no intracranial hemorrhage.   Calvarium: Hyperostosis   Paranasal sinuses and mastoids: Visualized paranasal sinuses and mastoids are clear.       No evidence of acute cortical infarct or intracranial hemorrhage.   Senescent changes. If persistent concern, further evaluation with MRI is advised.   MACRO: None   Signed by: Bill De La O 1/31/2024 8:31 PM Dictation workstation:   [id number]    XR chest 2 views    Result Date: 1/30/2024  STUDY: Chest Radiographs;  01/30/2024 INDICATION: Cough. COMPARISON: 10/05/2023 XR chest ACCESSION NUMBER(S): [id number] ORDERING CLINICIAN: KODY BELLE TECHNIQUE:  Frontal and lateral chest. FINDINGS: CARDIOMEDIASTINAL SILHOUETTE: Cardiomediastinal silhouette is unchanged in size and configuration.  LUNGS: Lungs are clear.  ABDOMEN: No remarkable upper abdominal findings.  BONES: No acute osseous changes.    No acute abnormality based on plain film imaging.  Borderline cardiomegaly.. Signed by Aakash Paredes DO                    Assessment/Plan   Principal Problem:    Chronic  hyponatremia  Active Problems:    Essential hypertension    GERD (gastroesophageal reflux disease)    Hyperlipidemia    Stage 4 chronic kidney disease (CMS/HCC)    Diabetic nephropathy associated with type 2 diabetes mellitus (CMS/HCC)    Chronic diastolic congestive heart failure (CMS/HCC)    Anemia due to chronic kidney disease    Hypertensive urgency    Hyponatremia      Carolann Cartagena is a 89 y.o. female has PMH significant for HTN, GERD, CKD, DM2 with neuropathy, diastolic CHF, anemia, HLD, and hyponatremia -nephrology was consulted because of Severe hyponatremia.      Severe hyponatremia probably secondary to poor solute intake and increased renal loss of sodium secondary to Lasix  2/1/2024: Nephrology was consulted because of Severe hyponatremia.    Latest sodium of 124.    Ordered serum and urine osmolality  Placed on restriction 1.5 L  Extensively counseled the patient and patient brother at the bedside to be compliant with the fluid restriction of 1.5 L for now  Patient admission sodium was a 121 at around 10 PM on 1/30/2024  For today 2/1/2024, okay to correct the sodium to go up to 133  2/2/2024: Patient continues to be hemodynamically stable.  Sodium of 125.    Urine osmolality and urine sodium pending.    Continue fluid restriction of 1.5 L/day.  Held Lasix.   2/3/2024: Patient has excessive renal loss of sodium suggested by urine sodium of 71, probably secondary to Lasix.    Sodium improved marginally, latest sodium of 126.   2/4/2024: Latest sodium of 125.  Continue to hold Lasix because of urinary loss of sodium.    Continue fluid restriction to 1.5 L/day and hold Lasix for few more days     2.  OLGA LIDIA with a history of chronic kidney disease stage III probably secondary to hypertension and diabetes  Creatinine of 2.5 and the baseline creatinine looks like around 2  Maintain MAP more than 65 to maintain perfusion to the kidney  Recommend to maintain hemoglobin A1c less than 6.5 and systolic blood  pressure less than 140     3.  Severe protein malnutrition  Recommend dietitian consult and supplements       I spent 50 minutes of cumulative time with the patient.  Greater than 50% of that time was spent in the direct collaboration and or coordination of care of the patient.     Dragon dictation software was used to dictate this note and thus there may be minor errors in translation/transcription including garbled speech or misspellings. Please contact for clarification if needed.

## 2024-02-04 NOTE — PROGRESS NOTES
"Carolann Cartagena is a 89 y.o. female on day 2 of admission presenting with Chronic hyponatremia.    Subjective      Slight drop to 125, but creatinine did bump to 2.5.  Patient feels well again asking when she can be discharged home    Objective     Physical Exam  Constitutional:       Appearance: Normal appearance.   HENT:      Head: Normocephalic.      Nose: Nose normal.      Mouth/Throat:      Mouth: Mucous membranes are dry.      Pharynx: Oropharynx is clear.   Cardiovascular:      Rate and Rhythm: Normal rate and regular rhythm.      Pulses: Normal pulses.      Heart sounds: Normal heart sounds.   Pulmonary:      Breath sounds: Normal breath sounds.   Abdominal:      General: Abdomen is flat. Bowel sounds are normal.      Palpations: Abdomen is soft.   Musculoskeletal:      Cervical back: Normal range of motion.   Skin:     General: Skin is warm.      Capillary Refill: Capillary refill takes less than 2 seconds.   Neurological:      General: No focal deficit present.      Mental Status: She is alert.         Last Recorded Vitals  Blood pressure 157/55, pulse 65, temperature 36.8 °C (98.3 °F), resp. rate 17, height 1.626 m (5' 4.02\"), weight 78 kg (171 lb 15.3 oz), SpO2 97 %.  Intake/Output last 3 Shifts:  I/O last 3 completed shifts:  In: 1320 (16.9 mL/kg) [P.O.:1320]  Out: 1400 (17.9 mL/kg) [Urine:1400 (0.5 mL/kg/hr)]  Weight: 78 kg     Relevant Results  Results for orders placed or performed during the hospital encounter of 01/31/24 (from the past 24 hour(s))   POCT GLUCOSE   Result Value Ref Range    POCT Glucose 248 (H) 74 - 99 mg/dL   POCT GLUCOSE   Result Value Ref Range    POCT Glucose 294 (H) 74 - 99 mg/dL   POCT GLUCOSE   Result Value Ref Range    POCT Glucose 165 (H) 74 - 99 mg/dL   CBC   Result Value Ref Range    WBC 6.2 4.4 - 11.3 x10*3/uL    nRBC 0.0 0.0 - 0.0 /100 WBCs    RBC 2.44 (L) 4.00 - 5.20 x10*6/uL    Hemoglobin 7.7 (L) 12.0 - 16.0 g/dL    Hematocrit 23.6 (L) 36.0 - 46.0 %    MCV 97 80 - 100 fL "    MCH 31.6 26.0 - 34.0 pg    MCHC 32.6 32.0 - 36.0 g/dL    RDW 11.9 11.5 - 14.5 %    Platelets 234 150 - 450 x10*3/uL   Basic metabolic panel   Result Value Ref Range    Glucose 85 74 - 99 mg/dL    Sodium 125 (L) 136 - 145 mmol/L    Potassium 3.6 3.5 - 5.3 mmol/L    Chloride 91 (L) 98 - 107 mmol/L    Bicarbonate 27 21 - 32 mmol/L    Anion Gap 11 10 - 20 mmol/L    Urea Nitrogen 43 (H) 6 - 23 mg/dL    Creatinine 2.53 (H) 0.50 - 1.05 mg/dL    eGFR 17 (L) >60 mL/min/1.73m*2    Calcium 8.5 (L) 8.6 - 10.3 mg/dL   POCT GLUCOSE   Result Value Ref Range    POCT Glucose 104 (H) 74 - 99 mg/dL       Imaging Results  Head ct:  IMPRESSION:  No evidence of acute cortical infarct or intracranial hemorrhage.      Senescent changes. If persistent concern, further evaluation with MRI  is advised.    Medications:  aspirin, 81 mg, oral, Daily  atorvastatin, 10 mg, oral, Nightly  brimonidine, 1 drop, Both Eyes, BID  carvedilol, 25 mg, oral, BID with meals  [Held by provider] furosemide, 40 mg, oral, Daily  heparin (porcine), 5,000 Units, subcutaneous, q8h  hydrALAZINE, 100 mg, oral, TID  insulin lispro, 0-5 Units, subcutaneous, Before meals & nightly  insulin lispro, 5 Units, subcutaneous, TID with meals  insulin NPH (Isophane), 12 Units, subcutaneous, BID AC  [START ON 2/5/2024] latanoprost, 1 drop, Both Eyes, Daily  melatonin, 3 mg, oral, Daily  pantoprazole, 40 mg, oral, Daily before breakfast   Or  pantoprazole, 40 mg, intravenous, Daily before breakfast  sodium bicarbonate, 650 mg, oral, BID  timolol, 1 drop, Both Eyes, Daily       PRN medications: acetaminophen, dextrose 10 % in water (D10W), dextrose, docusate sodium, glucagon     Assessment/Plan   Acute on chronic hyponatremia  -sodium 125; doing well will likely dc in am  -nephro following  -holding diurectics diuretics; on fluid restriction    2. CKD  -stable at 2.1 currently bumped to 2.5 continue to monitor closely    3. HTN  -continue home meds    4. Dm  -CONTINUE  ssi    DVT Prophylaxis:  Heparin subq        Julianne Elise MD  [facility]       PAST MEDICAL HISTORY:  No pertinent past medical history

## 2024-02-05 VITALS
DIASTOLIC BLOOD PRESSURE: 48 MMHG | TEMPERATURE: 97.9 F | HEIGHT: 64 IN | WEIGHT: 171.96 LBS | RESPIRATION RATE: 17 BRPM | OXYGEN SATURATION: 98 % | BODY MASS INDEX: 29.36 KG/M2 | HEART RATE: 59 BPM | SYSTOLIC BLOOD PRESSURE: 144 MMHG

## 2024-02-05 LAB
ANION GAP SERPL CALC-SCNC: 12 MMOL/L (ref 10–20)
ANION GAP SERPL CALC-SCNC: 12 MMOL/L (ref 10–20)
BUN SERPL-MCNC: 45 MG/DL (ref 6–23)
BUN SERPL-MCNC: 46 MG/DL (ref 6–23)
CALCIUM SERPL-MCNC: 8.7 MG/DL (ref 8.6–10.3)
CALCIUM SERPL-MCNC: 8.7 MG/DL (ref 8.6–10.3)
CHLORIDE SERPL-SCNC: 91 MMOL/L (ref 98–107)
CHLORIDE SERPL-SCNC: 92 MMOL/L (ref 98–107)
CO2 SERPL-SCNC: 28 MMOL/L (ref 21–32)
CO2 SERPL-SCNC: 28 MMOL/L (ref 21–32)
CREAT SERPL-MCNC: 2.32 MG/DL (ref 0.5–1.05)
CREAT SERPL-MCNC: 2.38 MG/DL (ref 0.5–1.05)
EGFRCR SERPLBLD CKD-EPI 2021: 19 ML/MIN/1.73M*2
EGFRCR SERPLBLD CKD-EPI 2021: 19 ML/MIN/1.73M*2
ERYTHROCYTE [DISTWIDTH] IN BLOOD BY AUTOMATED COUNT: 12 % (ref 11.5–14.5)
GLUCOSE BLD MANUAL STRIP-MCNC: 105 MG/DL (ref 74–99)
GLUCOSE SERPL-MCNC: 185 MG/DL (ref 74–99)
GLUCOSE SERPL-MCNC: 88 MG/DL (ref 74–99)
HCT VFR BLD AUTO: 24.9 % (ref 36–46)
HGB BLD-MCNC: 8.1 G/DL (ref 12–16)
HOLD SPECIMEN: NORMAL
MCH RBC QN AUTO: 31.3 PG (ref 26–34)
MCHC RBC AUTO-ENTMCNC: 32.5 G/DL (ref 32–36)
MCV RBC AUTO: 96 FL (ref 80–100)
NRBC BLD-RTO: 0 /100 WBCS (ref 0–0)
PLATELET # BLD AUTO: 240 X10*3/UL (ref 150–450)
POTASSIUM SERPL-SCNC: 3.9 MMOL/L (ref 3.5–5.3)
POTASSIUM SERPL-SCNC: 4 MMOL/L (ref 3.5–5.3)
RBC # BLD AUTO: 2.59 X10*6/UL (ref 4–5.2)
SODIUM SERPL-SCNC: 127 MMOL/L (ref 136–145)
SODIUM SERPL-SCNC: 128 MMOL/L (ref 136–145)
WBC # BLD AUTO: 5.5 X10*3/UL (ref 4.4–11.3)

## 2024-02-05 PROCEDURE — 82947 ASSAY GLUCOSE BLOOD QUANT: CPT

## 2024-02-05 PROCEDURE — 2500000002 HC RX 250 W HCPCS SELF ADMINISTERED DRUGS (ALT 637 FOR MEDICARE OP, ALT 636 FOR OP/ED): Performed by: HOSPITALIST

## 2024-02-05 PROCEDURE — 36415 COLL VENOUS BLD VENIPUNCTURE: CPT | Performed by: INTERNAL MEDICINE

## 2024-02-05 PROCEDURE — 2500000002 HC RX 250 W HCPCS SELF ADMINISTERED DRUGS (ALT 637 FOR MEDICARE OP, ALT 636 FOR OP/ED): Performed by: PHYSICIAN ASSISTANT

## 2024-02-05 PROCEDURE — 99239 HOSP IP/OBS DSCHRG MGMT >30: CPT | Performed by: INTERNAL MEDICINE

## 2024-02-05 PROCEDURE — 2500000001 HC RX 250 WO HCPCS SELF ADMINISTERED DRUGS (ALT 637 FOR MEDICARE OP): Performed by: PHYSICIAN ASSISTANT

## 2024-02-05 PROCEDURE — 2500000004 HC RX 250 GENERAL PHARMACY W/ HCPCS (ALT 636 FOR OP/ED): Performed by: PHYSICIAN ASSISTANT

## 2024-02-05 PROCEDURE — 36415 COLL VENOUS BLD VENIPUNCTURE: CPT | Performed by: NURSE PRACTITIONER

## 2024-02-05 PROCEDURE — 85027 COMPLETE CBC AUTOMATED: CPT | Performed by: NURSE PRACTITIONER

## 2024-02-05 PROCEDURE — 80048 BASIC METABOLIC PNL TOTAL CA: CPT | Performed by: INTERNAL MEDICINE

## 2024-02-05 PROCEDURE — 80048 BASIC METABOLIC PNL TOTAL CA: CPT | Performed by: NURSE PRACTITIONER

## 2024-02-05 RX ORDER — SODIUM BICARBONATE 650 MG/1
650 TABLET ORAL 2 TIMES DAILY
Qty: 2 TABLET | Refills: 29 | Status: SHIPPED | OUTPATIENT
Start: 2024-02-05 | End: 2024-03-11 | Stop reason: ALTCHOICE

## 2024-02-05 RX ADMIN — ASPIRIN 81 MG: 81 TABLET, COATED ORAL at 09:39

## 2024-02-05 RX ADMIN — INSULIN LISPRO 5 UNITS: 100 INJECTION, SOLUTION INTRAVENOUS; SUBCUTANEOUS at 09:39

## 2024-02-05 RX ADMIN — SODIUM BICARBONATE 650 MG: 650 TABLET ORAL at 09:39

## 2024-02-05 RX ADMIN — CARVEDILOL 25 MG: 25 TABLET, FILM COATED ORAL at 09:39

## 2024-02-05 RX ADMIN — HEPARIN SODIUM 5000 UNITS: 5000 INJECTION INTRAVENOUS; SUBCUTANEOUS at 06:23

## 2024-02-05 RX ADMIN — TIMOLOL MALEATE 1 DROP: 5 SOLUTION/ DROPS OPHTHALMIC at 09:44

## 2024-02-05 RX ADMIN — BRIMONIDINE TARTRATE 1 DROP: 2 SOLUTION/ DROPS OPHTHALMIC at 09:42

## 2024-02-05 RX ADMIN — PANTOPRAZOLE SODIUM 40 MG: 40 TABLET, DELAYED RELEASE ORAL at 06:23

## 2024-02-05 RX ADMIN — INSULIN HUMAN 12 UNITS: 100 INJECTION, SUSPENSION SUBCUTANEOUS at 09:39

## 2024-02-05 RX ADMIN — HYDRALAZINE HYDROCHLORIDE 100 MG: 50 TABLET ORAL at 09:39

## 2024-02-05 NOTE — DISCHARGE SUMMARY
"Discharge Diagnosis  Acute on chronic hyponatremia secondary to lasix and poor soltue intake  OLGA LIDIA on CKDIII, improved    Issues Requiring Follow-Up  Pcp and nephrology    Discharge Meds     Your medication list        START taking these medications        Instructions Last Dose Given Next Dose Due   sodium bicarbonate 650 mg tablet      Take 1 tablet (650 mg) by mouth 2 times a day.              CONTINUE taking these medications        Instructions Last Dose Given Next Dose Due   Alphagan P 0.1 % ophthalmic solution  Generic drug: brimonidine           aspirin 81 mg EC tablet           atorvastatin 10 mg tablet  Commonly known as: Lipitor      Take 1 tablet (10 mg) by mouth once daily.       Autolet lancing device      Use as instructed       blood-glucose meter misc      Use to test glucose 3 times daily       carvedilol 25 mg tablet  Commonly known as: Coreg      Take 1 tablet (25 mg) by mouth 2 times a day with meals.       cholecalciferol 50 MCG (2000 UT) tablet  Commonly known as: Vitamin D-3           fluticasone 50 mcg/actuation nasal spray  Commonly known as: Flonase      Administer 1 spray into each nostril once daily. Shake gently. Before first use, prime pump. After use, clean tip and replace cap.       furosemide 20 mg tablet  Commonly known as: Lasix      Take 2 tablets (40 mg) by mouth once daily.       hydrALAZINE 100 mg tablet  Commonly known as: Apresoline      Take 1 tablet (100 mg) by mouth 3 times a day.       hydrocolloid dressing 1 3/4 X 1 1/2 \" bandage      Apply 1 patch topically once daily as needed (wear daily for pressure ulcer).       insulin NPH (Isophane) 100 unit/mL (3 mL) injection  Commonly known as: HumuLIN N,NovoLIN N      Inject 12 Units under the skin 2 times a day before meals. Take as directed per insulin instructions.       insulin syringe-needle U-100 31G X 5/16\" 0.3 mL syringe      Use as instructed       insulin syringe-needle U-100 31G X 5/16\" 1 mL syringe  Commonly known " "as: BD Insulin Syringe Ultra-Fine      Inject 1 each under the skin 2 times a day. Use as instructed       lancets 30 gauge misc  Commonly known as: OneTouch Delica Plus Lancet      Use to test glucose 3 times daily       latanoprost 0.005 % ophthalmic solution  Commonly known as: Xalatan           OneTouch Ultra Test strip  Generic drug: blood sugar diagnostic      1 strip 3 times a day.       pen needle, diabetic 32 gauge x 5/32\" needle  Commonly known as: BD Ultra-Fine Nely Pen Needle      1 Needle 2 times a day.       timolol 0.5 % ophthalmic solution  Commonly known as: Timoptic                  STOP taking these medications      Cavilon Durable Barrier 1.3 % cream  Generic drug: dimethicone        doxycycline 100 mg capsule  Commonly known as: Vibramycin        sennosides-docusate sodium 8.6-50 mg tablet  Commonly known as: Steph-Colace                  Where to Get Your Medications        These medications were sent to [id number] - [facility] [address] [address] [address]  [address] [address], [facility] [address]      Phone: [phone]   sodium bicarbonate 650 mg tablet         Test Results Pending At Discharge  Pending Labs       Order Current Status    Extra Tubes In process    Lavender Top In process            Hospital Course   Patient is a very pleasant 89-year-old female presented to the hospital with complaints of nausea vomiting dizziness shortness of breath.  Patient was found to have acute on chronic hyponatremia secondary to poor solute intake and Lasix.  Patient was on 1-1/2 fluid restriction Lasix were held.  Sodium has improved to 128 she will remain on a 1-1/2 L restriction she can resume her Lasix.  Will need to follow-up with nephrology in 2 weeks and her PCP.  Patient was stable at the time of discharge.    Time spent more than 30 minutes on discharge      Pertinent Physical Exam At Time of Discharge  Physical Exam  Constitutional:       " Appearance: Normal appearance.   HENT:      Head: Normocephalic.      Nose: Nose normal.      Mouth/Throat:      Mouth: Mucous membranes are dry.      Pharynx: Oropharynx is clear.   Cardiovascular:      Rate and Rhythm: Normal rate and regular rhythm.      Pulses: Normal pulses.      Heart sounds: Normal heart sounds.   Pulmonary:      Effort: Pulmonary effort is normal.   Abdominal:      General: Abdomen is flat. Bowel sounds are normal.      Palpations: Abdomen is soft.   Skin:     General: Skin is warm.      Capillary Refill: Capillary refill takes less than 2 seconds.   Neurological:      General: No focal deficit present.      Mental Status: She is alert.         Outpatient Follow-Up  Future Appointments   Date Time Provider Department Center   2/5/2024  1:30 PM Fredy Villalta MD [id number] [facility]   2/12/2024 10:30 AM Louie HORTA MD [id number] None   4/9/2024  8:00 AM JERAMIE [id number] RM 1 [id number] [place]   4/12/2024  1:00 PM Josh Rosa MD [id number] [facility]         Julianne Elise MD

## 2024-02-06 ENCOUNTER — DOCUMENTATION (OUTPATIENT)
Dept: PRIMARY CARE | Facility: CLINIC | Age: 89
End: 2024-02-06
Payer: MEDICARE

## 2024-02-06 ENCOUNTER — PATIENT OUTREACH (OUTPATIENT)
Dept: CARE COORDINATION | Facility: CLINIC | Age: 89
End: 2024-02-06
Payer: MEDICARE

## 2024-02-06 NOTE — PROGRESS NOTES
Discharge Facility:[facility]  Discharge Diagnosis:Acute on Chronic Hyponatremia secondary to lasix and poor solute intake.  Admission Date:02/01/24  Discharge Date: 02/05/24    PCP Appointment Date:02/14/24  Specialist Appointment Date:   Hospital Encounter and Summary: Linked   See discharge assessment below for further details  Engagement  Call Start Time: 1232 (2/6/2024 12:32 PM)    Medications  Medications reviewed with patient/caregiver?: Yes (sodium bicarb) (2/6/2024 12:32 PM)  Is the patient having any side effects they believe may be caused by any medication additions or changes?: No (2/6/2024 12:32 PM)  Does the patient have all medications ordered at discharge?: Yes (2/6/2024 12:32 PM)  Is the patient taking all medications as directed (includes completed medication regime)?: Yes (2/6/2024 12:32 PM)    Appointments  Does the patient have a primary care provider?: Yes (2/6/2024 12:32 PM)  Care Management Interventions: Verified appointment date/time/provider (2/6/2024 12:32 PM)    Self Management  Has home health visited the patient within 72 hours of discharge?: No (2/6/2024 12:32 PM)  Has all Durable Medical Equipment (DME) been delivered?: No (2/6/2024 12:32 PM)    Patient Teaching  Does the patient have access to their discharge instructions?: Yes (2/6/2024 12:32 PM)  Care Management Interventions: Reviewed instructions with patient (reviewed with caregiver) (2/6/2024 12:32 PM)  What is the patient's perception of their health status since discharge?: Improving (2/6/2024 12:32 PM)    Wrap Up  Call End Time: 1245 (2/6/2024 12:32 PM)

## 2024-02-12 ENCOUNTER — OFFICE VISIT (OUTPATIENT)
Dept: CARDIOLOGY | Facility: CLINIC | Age: 89
End: 2024-02-12
Payer: MEDICARE

## 2024-02-12 ENCOUNTER — LAB (OUTPATIENT)
Dept: LAB | Facility: LAB | Age: 89
End: 2024-02-12
Payer: MEDICARE

## 2024-02-12 VITALS
SYSTOLIC BLOOD PRESSURE: 110 MMHG | OXYGEN SATURATION: 100 % | WEIGHT: 157 LBS | BODY MASS INDEX: 26.8 KG/M2 | HEART RATE: 62 BPM | HEIGHT: 64 IN | DIASTOLIC BLOOD PRESSURE: 40 MMHG

## 2024-02-12 DIAGNOSIS — I10 ESSENTIAL HYPERTENSION: ICD-10-CM

## 2024-02-12 DIAGNOSIS — N18.4 CHRONIC KIDNEY DISEASE, STAGE 4 (SEVERE) (MULTI): Primary | ICD-10-CM

## 2024-02-12 LAB
25(OH)D3 SERPL-MCNC: 76 NG/ML (ref 30–100)
ALBUMIN SERPL BCP-MCNC: 3.4 G/DL (ref 3.4–5)
ANION GAP SERPL CALC-SCNC: 13 MMOL/L (ref 10–20)
APPEARANCE UR: CLEAR
BASOPHILS # BLD AUTO: 0.04 X10*3/UL (ref 0–0.1)
BASOPHILS NFR BLD AUTO: 0.8 %
BILIRUB UR STRIP.AUTO-MCNC: NEGATIVE MG/DL
BUN SERPL-MCNC: 34 MG/DL (ref 6–23)
CALCIUM SERPL-MCNC: 9.2 MG/DL (ref 8.6–10.6)
CHLORIDE SERPL-SCNC: 101 MMOL/L (ref 98–107)
CO2 SERPL-SCNC: 26 MMOL/L (ref 21–32)
COLOR UR: YELLOW
CREAT SERPL-MCNC: 2.09 MG/DL (ref 0.5–1.05)
EGFRCR SERPLBLD CKD-EPI 2021: 22 ML/MIN/1.73M*2
EOSINOPHIL # BLD AUTO: 0.08 X10*3/UL (ref 0–0.4)
EOSINOPHIL NFR BLD AUTO: 1.7 %
ERYTHROCYTE [DISTWIDTH] IN BLOOD BY AUTOMATED COUNT: 12.2 % (ref 11.5–14.5)
GLUCOSE SERPL-MCNC: 197 MG/DL (ref 74–99)
GLUCOSE UR STRIP.AUTO-MCNC: NORMAL MG/DL
HCT VFR BLD AUTO: 26.2 % (ref 36–46)
HGB BLD-MCNC: 8.1 G/DL (ref 12–16)
HOLD SPECIMEN: NORMAL
IMM GRANULOCYTES # BLD AUTO: 0.01 X10*3/UL (ref 0–0.5)
IMM GRANULOCYTES NFR BLD AUTO: 0.2 % (ref 0–0.9)
KETONES UR STRIP.AUTO-MCNC: NEGATIVE MG/DL
LEUKOCYTE ESTERASE UR QL STRIP.AUTO: ABNORMAL
LYMPHOCYTES # BLD AUTO: 1.05 X10*3/UL (ref 0.8–3)
LYMPHOCYTES NFR BLD AUTO: 21.9 %
MCH RBC QN AUTO: 32 PG (ref 26–34)
MCHC RBC AUTO-ENTMCNC: 30.9 G/DL (ref 32–36)
MCV RBC AUTO: 104 FL (ref 80–100)
MONOCYTES # BLD AUTO: 0.6 X10*3/UL (ref 0.05–0.8)
MONOCYTES NFR BLD AUTO: 12.5 %
NEUTROPHILS # BLD AUTO: 3.01 X10*3/UL (ref 1.6–5.5)
NEUTROPHILS NFR BLD AUTO: 62.9 %
NITRITE UR QL STRIP.AUTO: NEGATIVE
NRBC BLD-RTO: 0 /100 WBCS (ref 0–0)
PH UR STRIP.AUTO: 6 [PH]
PHOSPHATE SERPL-MCNC: 4.1 MG/DL (ref 2.5–4.9)
PLATELET # BLD AUTO: 211 X10*3/UL (ref 150–450)
POTASSIUM SERPL-SCNC: 4.3 MMOL/L (ref 3.5–5.3)
PROT UR STRIP.AUTO-MCNC: ABNORMAL MG/DL
RBC # BLD AUTO: 2.53 X10*6/UL (ref 4–5.2)
RBC # UR STRIP.AUTO: NEGATIVE /UL
RBC #/AREA URNS AUTO: NORMAL /HPF
SODIUM SERPL-SCNC: 136 MMOL/L (ref 136–145)
SP GR UR STRIP.AUTO: 1.01
UROBILINOGEN UR STRIP.AUTO-MCNC: NORMAL MG/DL
WBC # BLD AUTO: 4.8 X10*3/UL (ref 4.4–11.3)
WBC #/AREA URNS AUTO: NORMAL /HPF

## 2024-02-12 PROCEDURE — 1111F DSCHRG MED/CURRENT MED MERGE: CPT | Performed by: STUDENT IN AN ORGANIZED HEALTH CARE EDUCATION/TRAINING PROGRAM

## 2024-02-12 PROCEDURE — 87086 URINE CULTURE/COLONY COUNT: CPT

## 2024-02-12 PROCEDURE — 1126F AMNT PAIN NOTED NONE PRSNT: CPT | Performed by: STUDENT IN AN ORGANIZED HEALTH CARE EDUCATION/TRAINING PROGRAM

## 2024-02-12 PROCEDURE — 82306 VITAMIN D 25 HYDROXY: CPT

## 2024-02-12 PROCEDURE — 1036F TOBACCO NON-USER: CPT | Performed by: STUDENT IN AN ORGANIZED HEALTH CARE EDUCATION/TRAINING PROGRAM

## 2024-02-12 PROCEDURE — 81001 URINALYSIS AUTO W/SCOPE: CPT

## 2024-02-12 PROCEDURE — 99214 OFFICE O/P EST MOD 30 MIN: CPT | Performed by: STUDENT IN AN ORGANIZED HEALTH CARE EDUCATION/TRAINING PROGRAM

## 2024-02-12 PROCEDURE — 1159F MED LIST DOCD IN RCRD: CPT | Performed by: STUDENT IN AN ORGANIZED HEALTH CARE EDUCATION/TRAINING PROGRAM

## 2024-02-12 PROCEDURE — 85025 COMPLETE CBC W/AUTO DIFF WBC: CPT

## 2024-02-12 PROCEDURE — 80069 RENAL FUNCTION PANEL: CPT

## 2024-02-12 PROCEDURE — 3074F SYST BP LT 130 MM HG: CPT | Performed by: STUDENT IN AN ORGANIZED HEALTH CARE EDUCATION/TRAINING PROGRAM

## 2024-02-12 PROCEDURE — 1157F ADVNC CARE PLAN IN RCRD: CPT | Performed by: STUDENT IN AN ORGANIZED HEALTH CARE EDUCATION/TRAINING PROGRAM

## 2024-02-12 PROCEDURE — 3078F DIAST BP <80 MM HG: CPT | Performed by: STUDENT IN AN ORGANIZED HEALTH CARE EDUCATION/TRAINING PROGRAM

## 2024-02-12 PROCEDURE — 36415 COLL VENOUS BLD VENIPUNCTURE: CPT

## 2024-02-12 RX ORDER — HYDRALAZINE HYDROCHLORIDE 100 MG/1
100 TABLET, FILM COATED ORAL 2 TIMES DAILY
Qty: 90 TABLET | Refills: 11 | Status: SHIPPED | OUTPATIENT
Start: 2024-02-12 | End: 2024-03-12 | Stop reason: SDUPTHER

## 2024-02-12 ASSESSMENT — ENCOUNTER SYMPTOMS
OCCASIONAL FEELINGS OF UNSTEADINESS: 1
LOSS OF SENSATION IN FEET: 0
DEPRESSION: 0

## 2024-02-12 ASSESSMENT — PAIN SCALES - GENERAL: PAINLEVEL: 0-NO PAIN

## 2024-02-12 NOTE — PATIENT INSTRUCTIONS
We discussed reducing hydralazine from 100 mg 3 times a day down to 100 mg twice daily.  If she continues to have dizziness with lower blood pressures, we will plan to reduce hydralazine to 50 mg twice daily.    Please continue remaining cardiac medication including aspirin 81 mg daily, atorvastatin 10 mg daily, carvedilol 25 mg twice daily, furosemide.    Please followup with me in Cardiology clinic within the next 3 months.  Please return to clinic sooner or seek emergent care if your symptoms reoccur or worsen.

## 2024-02-12 NOTE — PROGRESS NOTES
Follow-up     HPI:    Carolann Cartagena is a 89 y.o. female with pertinent history of GERD, hypertension, hyperlipidemia, anemia, insulin-dependent diabetes, lumbar stenosis, lymphedema, chronic kidney disease acute on chronic diastolic heart failure with BNP elevation of 555 September 2023, preserved ejection fraction with impaired relaxation, moderate concentric left ventricular hypertrophy, left atrial enlargement, mild to moderate pulmonary hypertension with RVSP of 46 mmHg, mild aortic stenosis on echo performed 9/16/2023 presents to cardiology clinic for follow up after recent hospitalization for severe hyponatremia and OLGA LIDIA.    The patient is accompanied by her daughter who provides some history and has questions.  She has been having significant fluctuations in blood pressure still.  Recent hospital course was reviewed and discussed.  No clear syncope.  No exacerbating or relieving factors.  Patient denies chest pain and angina.  Pt denies orthopnea, and paroxysmal nocturnal dyspnea.  Pt denies worsening lower extremity edema.  Pt denies palpitations or syncope.  No recent falls.  No fever or chills.  No cough.  No change in bowel or bladder habits.  No sick contacts.  No recent travel.    12 point review of systems including (Constitutional, Eyes, ENMT, Respiratory, Cardiac, Gastrointestinal, Neurological, Psychiatric, and Hematologic) was performed and is otherwise negative.    Past medical history reviewed:   has a past medical history of Anemia due to chronic kidney disease (01/22/2024), Chronic diastolic congestive heart failure (CMS/[facility]) (10/02/2023), Chronic hyponatremia (10/02/2023), Chronic kidney disease, stage 3, mod decreased GFR (CMS/[facility]) (09/18/2018), Diabetic nephropathy associated with type 2 diabetes mellitus (CMS/[facility]) (12/18/2018), Essential hypertension (05/23/2023), Hyperlipidemia (05/23/2023), and Pure hypercholesterolemia (10/02/2015).    Past surgical history reviewed:   has a past surgical  "history that includes Other surgical history (02/13/2020).    Social history reviewed:   reports that she has never smoked. She has never used smokeless tobacco. She reports that she does not currently use alcohol. She reports that she does not currently use drugs.     Family history reviewed:    Family History   Problem Relation Name Age of Onset    No Known Problems Mother      Bone cancer Sister      Lung cancer Daughter         Allergies reviewed: Dorzolamide, Lisinopril, and Losartan     Medications reviewed:   Current Outpatient Medications   Medication Instructions    Alphagan P 0.1 % ophthalmic solution 1 drop, Both Eyes, 2 times daily    aspirin 81 mg, oral, Daily RT    atorvastatin (LIPITOR) 10 mg, oral, Daily    Autolet lancing device Use as instructed    blood sugar diagnostic (OneTouch Ultra Test) strip 1 strip, miscellaneous, 3 times daily    blood-glucose meter misc Use to test glucose 3 times daily    carvedilol (COREG) 25 mg, oral, 2 times daily with meals    cholecalciferol (VITAMIN D-3) 2,000 Units, oral, Daily RT    fluticasone (Flonase) 50 mcg/actuation nasal spray 1 spray, Each Nostril, Daily, Shake gently. Before first use, prime pump. After use, clean tip and replace cap.    furosemide (LASIX) 40 mg, oral, Daily    hydrALAZINE (APRESOLINE) 100 mg, oral, 3 times daily    hydrocolloid dressing 1 3/4 X 1 1/2 \" bandage 1 patch, topical (top), Daily PRN    insulin NPH (Isophane) (HUMULIN N,NOVOLIN N) 12 Units, subcutaneous, 2 times daily before meals, Take as directed per insulin instructions.    insulin syringe-needle U-100 (BD Insulin Syringe Ultra-Fine) 31G X 5/16\" 1 mL syringe 1 each, subcutaneous, 2 times daily, Use as instructed    insulin syringe-needle U-100 31G X 5/16\" 0.3 mL syringe Use as instructed    lancets (OneTouch Delica Plus Lancet) 30 gauge misc Use to test glucose 3 times daily    latanoprost (Xalatan) 0.005 % ophthalmic solution 1 drop, Daily RT    pen needle, diabetic (BD " "Ultra-Fine Nely Pen Needle) 32 gauge x 5/32\" needle 1 Needle, miscellaneous, 2 times daily    sodium bicarbonate 650 mg, oral, 2 times daily    timolol (Timoptic) 0.5 % ophthalmic solution 1 drop        Vitals reviewed: Visit Vitals  BP (!) 110/40 (Patient Position: Sitting)   Pulse 62       Physical Exam:   General:  Patient is awake, alert, and oriented.  Patient is in no acute distress.  HEENT:  Pupils equal and reactive.  Normocephalic.  Moist mucosa.    Neck:  No thyromegaly.  Normal Jugular Venous Pressure.  Cardiovascular:  Regular rate and rhythm.  Normal S1 and S2.  3/6 DEWAYNE.  Pulmonary:  Clear to auscultation bilaterally.  Abdomen:  Soft. Non-tender.   Non-distended.  Positive bowel sounds.  Lower Extremities:  2+ pedal pulses. No LE edema.  Neurologic:  Cranial nerves intact.  No focal deficit.   Skin: Skin warm and dry, normal skin turgor.   Psychiatric: Normal affect.    Last Labs:  CBC -      Lab Results   Component Value Date    WBC 5.5 02/05/2024    HGB 8.1 (L) 02/05/2024    HCT 24.9 (L) 02/05/2024     02/05/2024        CMP-  Lab Results   Component Value Date    GLUCOSE 185 (H) 02/05/2024     (L) 02/05/2024    K 3.9 02/05/2024    CL 91 (L) 02/05/2024    CO2 28 02/05/2024    ANIONGAP 12 02/05/2024    BUN 46 (H) 02/05/2024    CREATININE 2.38 (H) 02/05/2024    EGFR 19 (L) 02/05/2024    CALCIUM 8.7 02/05/2024    PHOS 3.3 01/31/2024    PROT 7.0 01/31/2024    ALBUMIN 3.5 01/31/2024    AST 22 01/31/2024    ALT 15 01/31/2024    ALKPHOS 64 01/31/2024    BILITOT 0.5 01/31/2024        LIPIDS-  Lab Results   Component Value Date    CHOL 183 02/22/2023    TRIG 86 02/22/2023    HDL 57.1 02/22/2023    CHHDL 3.2 02/22/2023    VLDL 17 02/22/2023        OTHERS-  Lab Results   Component Value Date    HGBA1C 6.6 (H) 01/08/2024     (H) 01/31/2024        I personally reviewed the patient's recent vitals, labs, medications, orders, EKGs, pertinent cardiac imaging/ echocardiography.    Assessment and " Plan:    Carolann Cartagena is a 89 y.o. female with pertinent history of GERD, hypertension, hyperlipidemia, anemia, insulin-dependent diabetes, lumbar stenosis, lymphedema, chronic kidney disease acute on chronic diastolic heart failure with BNP elevation of 555 September 2023, preserved ejection fraction with impaired relaxation, moderate concentric left ventricular hypertrophy, left atrial enlargement, mild to moderate pulmonary hypertension with RVSP of 46 mmHg, mild aortic stenosis on echo performed 9/16/2023 presents to cardiology clinic for follow up after recent hospitalization for severe hyponatremia and OLGA LIDIA.  The patient is accompanied by her daughter who provides some history and has questions.  She has been having significant fluctuations in blood pressure still.  Recent hospital course was reviewed and discussed.  No clear syncope.     We discussed reducing hydralazine from 100 mg 3 times a day down to 100 mg twice daily.  If she continues to have dizziness with lower blood pressures, we will plan to reduce hydralazine to 50 mg twice daily.    Please continue remaining cardiac medication including aspirin 81 mg daily, atorvastatin 10 mg daily, carvedilol 25 mg twice daily, furosemide.    Please followup with me in Cardiology clinic within the next 3 months.  Please return to clinic sooner or seek emergent care if your symptoms reoccur or worsen.    Thank you for allowing me to participate in their care.  Please feel free to call me with any further questions or concerns.        Louie Iniguez MD, FACC, [facility]  Division of Cardiovascular Medicine  Medical Director, [facility]  [facility]  Assistant Clinical Professor, Medicine  [employer]  [email or web address]  Office:  [phone]

## 2024-02-13 LAB — BACTERIA UR CULT: NORMAL

## 2024-02-14 ENCOUNTER — OFFICE VISIT (OUTPATIENT)
Dept: PRIMARY CARE | Facility: CLINIC | Age: 89
End: 2024-02-14
Payer: MEDICARE

## 2024-02-14 VITALS
OXYGEN SATURATION: 96 % | SYSTOLIC BLOOD PRESSURE: 174 MMHG | WEIGHT: 175 LBS | HEART RATE: 61 BPM | BODY MASS INDEX: 30.04 KG/M2 | DIASTOLIC BLOOD PRESSURE: 67 MMHG | RESPIRATION RATE: 12 BRPM

## 2024-02-14 DIAGNOSIS — I10 ESSENTIAL HYPERTENSION: Chronic | ICD-10-CM

## 2024-02-14 DIAGNOSIS — E11.22 TYPE 2 DIABETES MELLITUS WITH CHRONIC KIDNEY DISEASE, WITH LONG-TERM CURRENT USE OF INSULIN, UNSPECIFIED CKD STAGE (MULTI): ICD-10-CM

## 2024-02-14 DIAGNOSIS — Z79.4 TYPE 2 DIABETES MELLITUS WITH CHRONIC KIDNEY DISEASE, WITH LONG-TERM CURRENT USE OF INSULIN, UNSPECIFIED CKD STAGE (MULTI): Primary | ICD-10-CM

## 2024-02-14 DIAGNOSIS — E11.22 TYPE 2 DIABETES MELLITUS WITH CHRONIC KIDNEY DISEASE, WITH LONG-TERM CURRENT USE OF INSULIN, UNSPECIFIED CKD STAGE (MULTI): Primary | ICD-10-CM

## 2024-02-14 DIAGNOSIS — E78.5 HYPERLIPIDEMIA, UNSPECIFIED HYPERLIPIDEMIA TYPE: Chronic | ICD-10-CM

## 2024-02-14 DIAGNOSIS — N18.4 STAGE 4 CHRONIC KIDNEY DISEASE (MULTI): Primary | ICD-10-CM

## 2024-02-14 DIAGNOSIS — I50.32 CHRONIC DIASTOLIC CONGESTIVE HEART FAILURE (MULTI): Chronic | ICD-10-CM

## 2024-02-14 DIAGNOSIS — Z79.4 TYPE 2 DIABETES MELLITUS WITH CHRONIC KIDNEY DISEASE, WITH LONG-TERM CURRENT USE OF INSULIN, UNSPECIFIED CKD STAGE (MULTI): ICD-10-CM

## 2024-02-14 LAB — POC FINGERSTICK BLOOD GLUCOSE: 207 MG/DL (ref 70–100)

## 2024-02-14 PROCEDURE — 1111F DSCHRG MED/CURRENT MED MERGE: CPT | Performed by: INTERNAL MEDICINE

## 2024-02-14 PROCEDURE — 3078F DIAST BP <80 MM HG: CPT | Performed by: INTERNAL MEDICINE

## 2024-02-14 PROCEDURE — 1157F ADVNC CARE PLAN IN RCRD: CPT | Performed by: INTERNAL MEDICINE

## 2024-02-14 PROCEDURE — 99496 TRANSJ CARE MGMT HIGH F2F 7D: CPT | Performed by: INTERNAL MEDICINE

## 2024-02-14 PROCEDURE — 1159F MED LIST DOCD IN RCRD: CPT | Performed by: INTERNAL MEDICINE

## 2024-02-14 PROCEDURE — 1036F TOBACCO NON-USER: CPT | Performed by: INTERNAL MEDICINE

## 2024-02-14 PROCEDURE — 3077F SYST BP >= 140 MM HG: CPT | Performed by: INTERNAL MEDICINE

## 2024-02-14 PROCEDURE — 1126F AMNT PAIN NOTED NONE PRSNT: CPT | Performed by: INTERNAL MEDICINE

## 2024-02-14 PROCEDURE — 82962 GLUCOSE BLOOD TEST: CPT | Performed by: INTERNAL MEDICINE

## 2024-02-14 NOTE — ASSESSMENT & PLAN NOTE
/67.   Lasix discontinued at hospital for kidney function. Kidney function has since improved.     Continue taking Hydralazine 100mg TID. Continue Carvedilol 25mg BID. BP at home have been 120-130 per juan luishter. Continue monitoring at home.   Follow-up in 2 mo or sooner as needed.

## 2024-02-14 NOTE — ASSESSMENT & PLAN NOTE
Kidney function increasing since discontinuation of lasix.   Encouraged to drink plenty of water.   Continue to monitor at visit in April.

## 2024-02-14 NOTE — PROGRESS NOTES
Subjective   Chief complaint: Carolann Cartagena is a 89 y.o. female who presents for Follow-up.    HPI:  Carolann is here for a hospital follow-up.    She was brought to the hospital for dizziness by her daughter. She was treated for OLGA LIDIA and hyponatremia. She stayed for 1 night. At discharge they discontinued her Lasix secondary to the OLGA LIDIA.       She also expressed that she does not want to continue seeing the cardiologist and nephrologist. She would only like to follow here.         Objective   /67   Pulse 61   Resp 12   Wt 79.4 kg (175 lb)   SpO2 96%   BMI 30.04 kg/m²   Physical Exam  Vitals reviewed.   Cardiovascular:      Rate and Rhythm: Normal rate and regular rhythm.   Neurological:      Mental Status: She is alert and oriented to person, place, and time.   Psychiatric:         Mood and Affect: Mood normal.         Behavior: Behavior normal.         Thought Content: Thought content normal.         Judgment: Judgment normal.         I have reviewed and reconciled the medication list with the patient today.   Current Outpatient Medications:     Alphagan P 0.1 % ophthalmic solution, Administer 1 drop into both eyes twice a day., Disp: , Rfl:     aspirin 81 mg EC tablet, Take 1 tablet (81 mg) by mouth once daily., Disp: , Rfl:     atorvastatin (Lipitor) 10 mg tablet, Take 1 tablet (10 mg) by mouth once daily., Disp: 90 tablet, Rfl: 3    Autolet lancing device, Use as instructed, Disp: 1 each, Rfl: 0    blood sugar diagnostic (OneTouch Ultra Test) strip, 1 strip 3 times a day., Disp: 200 strip, Rfl: 11    blood-glucose meter misc, Use to test glucose 3 times daily, Disp: 1 each, Rfl: 0    carvedilol (Coreg) 25 mg tablet, Take 1 tablet (25 mg) by mouth 2 times a day with meals., Disp: 60 tablet, Rfl: 11    cholecalciferol (Vitamin D-3) 50 MCG (2000 UT) tablet, Take 1 tablet (2,000 Units) by mouth once daily., Disp: , Rfl:     fluticasone (Flonase) 50 mcg/actuation nasal spray, Administer 1 spray into each  "nostril once daily. Shake gently. Before first use, prime pump. After use, clean tip and replace cap., Disp: 16 g, Rfl: 11    furosemide (Lasix) 20 mg tablet, Take 2 tablets (40 mg) by mouth once daily., Disp: 180 tablet, Rfl: 3    hydrALAZINE (Apresoline) 100 mg tablet, Take 1 tablet (100 mg) by mouth 2 times a day., Disp: 90 tablet, Rfl: 11    hydrocolloid dressing 1 3/4 X 1 1/2 \" bandage, Apply 1 patch topically once daily as needed (wear daily for pressure ulcer)., Disp: 10 each, Rfl: 1    insulin NPH, Isophane, (HumuLIN N,NovoLIN N) 100 unit/mL (3 mL) injection, Inject 12 Units under the skin 2 times a day before meals. Take as directed per insulin instructions., Disp: 8 mL, Rfl: 2    insulin syringe-needle U-100 (BD Insulin Syringe Ultra-Fine) 31G X 5/16\" 1 mL syringe, Inject 1 each under the skin 2 times a day. Use as instructed, Disp: 100 each, Rfl: 3    insulin syringe-needle U-100 31G X 5/16\" 0.3 mL syringe, Use as instructed, Disp: 100 each, Rfl: 11    lancets (OneTouch Delica Plus Lancet) 30 gauge misc, Use to test glucose 3 times daily, Disp: 100 each, Rfl: 11    latanoprost (Xalatan) 0.005 % ophthalmic solution, 1 drop once daily., Disp: , Rfl:     pen needle, diabetic (BD Ultra-Fine Nely Pen Needle) 32 gauge x 5/32\" needle, 1 Needle 2 times a day., Disp: 200 each, Rfl: 3    sodium bicarbonate 650 mg tablet, Take 1 tablet (650 mg) by mouth 2 times a day. (Patient not taking: Reported on 2/12/2024), Disp: 2 tablet, Rfl: 29    timolol (Timoptic) 0.5 % ophthalmic solution, 1 drop., Disp: , Rfl:      Imaging:  CT head wo IV contrast    Result Date: 1/31/2024  Interpreted By:  Bill De La O, STUDY: CT HEAD WO IV CONTRAST;  1/31/2024 7:49 pm   INDICATION: Signs/Symptoms:Generalized fatigue, dizziness.   COMPARISON: 10/13/2023   ACCESSION NUMBER(S): [id number]   ORDERING CLINICIAN: PERFECTO GAO   TECHNIQUE: Noncontrast axial CT scan of head was performed. Angled reformats in brain and bone windows were " generated. The images were reviewed in bone, brain, blood and soft tissue windows.   FINDINGS: CSF Spaces: The ventricles, sulci and basal cisterns are within normal limits. There is no extraaxial fluid collection. Global volume loss and mild chronic small vessel ischemic change   Parenchyma:  The grey-white differentiation is intact. There is no mass effect or midline shift.  There is no intracranial hemorrhage.   Calvarium: Hyperostosis   Paranasal sinuses and mastoids: Visualized paranasal sinuses and mastoids are clear.       No evidence of acute cortical infarct or intracranial hemorrhage.   Senescent changes. If persistent concern, further evaluation with MRI is advised.   MACRO: None   Signed by: Bill De La O 1/31/2024 8:31 PM Dictation workstation:   [id number]    XR chest 2 views    Result Date: 1/30/2024  STUDY: Chest Radiographs;  01/30/2024 INDICATION: Cough. COMPARISON: 10/05/2023 XR chest ACCESSION NUMBER(S): [id number] ORDERING CLINICIAN: KODY BELLE TECHNIQUE:  Frontal and lateral chest. FINDINGS: CARDIOMEDIASTINAL SILHOUETTE: Cardiomediastinal silhouette is unchanged in size and configuration.  LUNGS: Lungs are clear.  ABDOMEN: No remarkable upper abdominal findings.  BONES: No acute osseous changes.    No acute abnormality based on plain film imaging.  Borderline cardiomegaly.. Signed by Aakash Paredes DO       Labs reviewed:    Lab Results   Component Value Date    WBC 4.8 02/12/2024    HGB 8.1 (L) 02/12/2024    HCT 26.2 (L) 02/12/2024     02/12/2024    CHOL 183 02/22/2023    TRIG 86 02/22/2023    HDL 57.1 02/22/2023    ALT 15 01/31/2024    AST 22 01/31/2024     02/12/2024    K 4.3 02/12/2024     02/12/2024    CREATININE 2.09 (H) 02/12/2024    BUN 34 (H) 02/12/2024    CO2 26 02/12/2024    TSH 3.10 02/01/2024    HGBA1C 6.6 (H) 01/08/2024       Assessment/Plan   Problem List Items Addressed This Visit       Diabetes mellitus (CMS/[facility])     BS in office 207. Continue  taking medication as prescribed.   Follow-up in 2 mo.          Relevant Orders    POCT fingerstick glucose manually resulted (Completed)    Essential hypertension (Chronic)     /67.   Lasix discontinued at hospital for kidney function. Kidney function has since improved.     Continue taking Hydralazine 100mg TID. Continue Carvedilol 25mg BID. BP at home have been 120-130 per duaghter. Continue monitoring at home.   Follow-up in 2 mo or sooner as needed.          Hyperlipidemia (Chronic)     No new labs. Continue atorvastatin. New labs to be drawn in April with annual.          Stage 4 chronic kidney disease (CMS/HCC) - Primary     Kidney function increasing since discontinuation of lasix.   Encouraged to drink plenty of water.   Continue to monitor at visit in April.             Continue current medications as listed  Follow up in April for annual.

## 2024-02-16 ENCOUNTER — PATIENT OUTREACH (OUTPATIENT)
Dept: CARE COORDINATION | Facility: CLINIC | Age: 89
End: 2024-02-16
Payer: MEDICARE

## 2024-02-16 NOTE — PROGRESS NOTES
Unable to reach patient for call back after patient's follow up appointment with PCP.   MICHELLM with call back number for patient to call if needed   If no voicemail available call attempts x 2 were made to contact the patient to assist with any questions or concerns patient may have.

## 2024-02-23 DIAGNOSIS — E11.22 TYPE 2 DIABETES MELLITUS WITH CHRONIC KIDNEY DISEASE, WITH LONG-TERM CURRENT USE OF INSULIN, UNSPECIFIED CKD STAGE (MULTI): ICD-10-CM

## 2024-02-23 DIAGNOSIS — Z79.4 TYPE 2 DIABETES MELLITUS WITH CHRONIC KIDNEY DISEASE, WITH LONG-TERM CURRENT USE OF INSULIN, UNSPECIFIED CKD STAGE (MULTI): ICD-10-CM

## 2024-02-26 ENCOUNTER — TELEMEDICINE (OUTPATIENT)
Dept: PHARMACY | Facility: HOSPITAL | Age: 89
End: 2024-02-26
Payer: MEDICARE

## 2024-02-26 DIAGNOSIS — I50.32 CHRONIC DIASTOLIC CONGESTIVE HEART FAILURE (MULTI): Chronic | ICD-10-CM

## 2024-02-26 DIAGNOSIS — E11.22 TYPE 2 DIABETES MELLITUS WITH CHRONIC KIDNEY DISEASE, WITH LONG-TERM CURRENT USE OF INSULIN, UNSPECIFIED CKD STAGE (MULTI): Primary | ICD-10-CM

## 2024-02-26 DIAGNOSIS — Z79.4 TYPE 2 DIABETES MELLITUS WITH CHRONIC KIDNEY DISEASE, WITH LONG-TERM CURRENT USE OF INSULIN, UNSPECIFIED CKD STAGE (MULTI): Primary | ICD-10-CM

## 2024-02-26 ASSESSMENT — ENCOUNTER SYMPTOMS
POLYDIPSIA: 0
POLYPHAGIA: 0
BLURRED VISION: 1

## 2024-02-26 NOTE — ASSESSMENT & PLAN NOTE
Patient has controlled type 2 diabetes mellitus complicated by dyslipidemia, hypertension, and obesity as evidenced by her most recent A1c of 6.6% on 01/08/24 which had increased slightly from 6.3% on 10/11/23 but is still below goal of <7%.   Her daughter reports that she will sometimes take 19 units of her insulin instead of 18. She was encouraged to make sure she is using the insulin as prescribed. She has had no recent episodes of hypoglycemia.   CONTINUE: Insulin NPH (Humulin N) 100 unit/mL 18 units under the skin twice daily before meals   We discussed continuous glucose monitors today. Patient's daughter was counseled in the benefits including no longer needing to do fingerpricks daily, being able to see how certain foods affect her blood sugars and the trends, and being able to get notified remotely by following her mother's readings. She stated they will discuss it with her and we will meet again in ~2 weeks to discuss starting the CGM if agreeable. Since she is on insulin it should be covered under her insurance through a DME.   They were encouraged to reach out with any questions or concerns prior to our next follow-up.    No assistance needed

## 2024-02-26 NOTE — ASSESSMENT & PLAN NOTE
Carolann Cartagena has heart failure with preserved ejection fraction as evidenced by her most recent EF of 70-75% on 09/16/23 with NYHA function class II as evidenced by patient able to walk around the house without issues but will feel fatigued with occasional shortness of breath with increased activity.   Patient's systolic blood pressure at home this morning was significantly elevated at 208/68 mmHg before taking her medications. They re-checked it while on the phone and it came down to 150/58 mmHg. They were encouraged to reach out to Dr. Iniguez's office if her blood pressure continues to be consistently significantly elevated. She states that her readings are not usually as high as it was this morning. Her daughter also reports that the patient tends to worry a lot and feels that the anxiety around worrying plays a role in her higher blood pressure readings.   CONTINUE:   Carvedilol 25 mg 1 tablet by mouth twice daily   Hydralazine 100 mg 1 tablet by mouth twice daily   Patient's daughter states furosemide was discontinued while patient was in the hospital. She has not taken since discharge.   Patient will continue to follow-up with cardiology as scheduled.

## 2024-02-26 NOTE — PROGRESS NOTES
Pharmacy Post-Discharge Visit  Carolann Cartagena is a 89 y.o. female was referred to Clinical Pharmacy Team to complete a post-discharge medication optimization and monitoring visit.  The patient was referred for their Congestive Heart Failure and Diabetes. Appointment was completed today with patient's daughter Martha.     Admission Date: 01/31/24  Discharge Date: 02/05/24  Discharge Diagnosis: Acute on chronic hyponatremia secondary to lasix and poor solute intake, OLGA LIDIA on CKDIII      Referring Provider: Josh Rosa MD    Subjective   Allergies   Allergen Reactions    Dorzolamide Other     eye infection    Caused infection in her eyes.    Lisinopril Swelling and Unknown     cough    Losartan Hives       [facility] - [place], [place] - [address]  [address]  [address]  [facility] [address]  Phone: [phone] Fax: [phone]    [facility] - [place], [place] - [address] [place] [address] C  [address] [place] [address]  Phone: [phone] Fax: [phone]      Social History     Social History Narrative    Not on file      Family History   Problem Relation Name Age of Onset    No Known Problems Mother      Bone cancer Sister      Lung cancer Daughter         Notable Medication changes following discharge:  Start:   Sodium bicarbonate 650 mg twice daily   Stop:   Cavilon Durable Barrier 1.3% cream  Doxycycline 100 mg   Sennosides-docusate 8.6-50 mg     Diabetes  She presents for her initial diabetic visit. She has type 2 diabetes mellitus. The initial diagnosis of diabetes was made 43 years ago. Her disease course has been stable. There are no hypoglycemic associated symptoms. Associated symptoms include blurred vision. Pertinent negatives for diabetes include no polydipsia and no polyphagia. There are no hypoglycemic complications. Symptoms are stable. Diabetic complications include heart disease and nephropathy. Risk factors for coronary artery disease include  diabetes mellitus, dyslipidemia, hypertension, obesity and post-menopausal. She is compliant with treatment all of the time. When asked about meal planning, she reported none. She participates in exercise intermittently. Her overall blood glucose range is 140-180 mg/dl. An ACE inhibitor/angiotensin II receptor blocker is not being taken. She sees a podiatrist.Eye exam is current.   Congestive Heart Failure  Presents for initial visit. The disease course has been stable. The symptoms have been stable. Treatments tried: diuretics. The treatment provided mild relief. Compliance with prior treatments has been good. Her past medical history is significant for DM and HTN.     Patient follows with Dr. Iniguez with cardiology.     Patient is using: glucometer  The patient is currently checking the blood glucose 2-3 times per day.  Fasting this morning was in the 200s   Prior to meals typically in the 130s-160s    Hypoglycemia frequency: states only one episode since September   Hypoglycemia awareness: Yes     Diet:   Breakfast: cereal with a 1/4 banana, sometimes turkey pires, toast with butter/jelly, hardboiled egg   Lunch: protein meals she was given from the hospital (states they are not diabetic)  Dinner: fish, brown rice, vegetables, sometimes spaghetti but not a lot, sometimes pizza; turkey kielbasa with rice and vegetable; homemade soups   Snacks: very seldom snacks, if she does she'll have some cookies or some of a Glucerna shake, peanut butter and crackers   Fluids: diet pop, water, fruit juices     Physical Activity   Sets a lot in her chair, tries to walk throughout the day at least once every hour to get out of her chair      Current Diabetes Medications:   Insulin NPH (Novolin N) 100 unit/mL 18 units twice daily before meals (daughter states she has been giving 19 units at home)     Historical Diabetes Medications:  None      Secondary Prevention  Statin? Yes, Atorvastatin 10 mg   ACE-I/ARB? No  Aspirin? Yes,  81 mg daily     Health Maintenance:   Foot Exam: Nov 2023   Eye Exam: Oct/Nov 2023   Lipid Panel:  mg/dL, TG 86 mg/dL 02/22/23  Urine Albumin: 31.0 ug/mg crt 02/11/20  Influenza Vaccine: does not receive flu shots   Pneumonia Vaccine: PPCV23 09/21/2001, 09/12/2014      CHF Assessment    Symptom/Staging:  Most recent ejection fraction: 70-75% on 09/16/23  Classification: heart failure with preserved ejection fraction   NYHA Stage: II - patient will walk around the house without issues, will get short of breath and tired with increased activity   Weight changes?: No    Guideline-Directed Medical Therapy:  ARNI: No  If no, then ACEi/ARB?: No  Beta Blocker: Yes, describe: Carvedilol 25 mg twice daily   MRA: No  SGLT2i: No    Other Current CHF Pharmacotherapy:   Hydralazine 100 mg twice daily (reduced from 3 times daily on 02/12/24 bu Dr. Iniguez)    Historical CHF Pharmacotherapy:   Furosemide 20 mg (patient reports she was told to stop taking while in the hospital)    Secondary Prevention:  HTN?: Yes, describe: last in-office /67 on 02/14/24  Home BP Monitor?: Yes  Home BP Readings: 208/68 mmHg this morning (before medications)  Checked again while on the line - 150/58 mmHg, HR 59  Daughter states patient worries a lot which she feels leads to elevated blood pressure     Objective     LAB  Lab Results   Component Value Date    BILITOT 0.5 01/31/2024    CALCIUM 9.2 02/12/2024    CO2 26 02/12/2024     02/12/2024    CREATININE 2.09 (H) 02/12/2024    GLUCOSE 197 (H) 02/12/2024    ALKPHOS 64 01/31/2024    K 4.3 02/12/2024    PROT 7.0 01/31/2024     02/12/2024    AST 22 01/31/2024    ALT 15 01/31/2024    BUN 34 (H) 02/12/2024    ANIONGAP 13 02/12/2024    MG 1.80 02/01/2024    PHOS 4.1 02/12/2024    ALBUMIN 3.4 02/12/2024    LIPASE 66 10/05/2023    GFRF 20 (A) 09/27/2023     Lab Results   Component Value Date    TRIG 86 02/22/2023    CHOL 183 02/22/2023    HDL 57.1 02/22/2023     Lab Results  "  Component Value Date    HGBA1C 6.6 (H) 01/08/2024    HGBA1C 6.3 (H) 10/11/2023    HGBA1C 6.6 (A) 09/16/2023       The ASCVD Risk score (Rodolfo CLEMENS, et al., 2019) failed to calculate for the following reasons:    The 2019 ASCVD risk score is only valid for ages 40 to 79    The patient has a prior MI or stroke diagnosis    MEDICATION RECONCILIATION  Changed:   Insulin NPH (Humulin N) 18 units twice daily before meals (changed from 12 units twice daily)  Removed:   Pen needles (patient uses vials and insulin syringes, no longer uses insulin pens)    Current Outpatient Medications on File Prior to Visit   Medication Sig Dispense Refill    Alphagan P 0.1 % ophthalmic solution Administer 1 drop into both eyes twice a day.      aspirin 81 mg EC tablet Take 1 tablet (81 mg) by mouth once daily.      atorvastatin (Lipitor) 10 mg tablet Take 1 tablet (10 mg) by mouth once daily. 90 tablet 3    Autolet lancing device Use as instructed 1 each 0    blood sugar diagnostic (OneTouch Ultra Test) strip 1 strip 3 times a day. 200 strip 11    blood-glucose meter misc Use to test glucose 3 times daily 1 each 0    carvedilol (Coreg) 25 mg tablet Take 1 tablet (25 mg) by mouth 2 times a day with meals. 60 tablet 11    cholecalciferol (Vitamin D-3) 50 MCG (2000 UT) tablet Take 1 tablet (2,000 Units) by mouth once daily.      fluticasone (Flonase) 50 mcg/actuation nasal spray Administer 1 spray into each nostril once daily. Shake gently. Before first use, prime pump. After use, clean tip and replace cap. 16 g 11    hydrALAZINE (Apresoline) 100 mg tablet Take 1 tablet (100 mg) by mouth 2 times a day. 90 tablet 11    hydrocolloid dressing 1 3/4 X 1 1/2 \" bandage Apply 1 patch topically once daily as needed (wear daily for pressure ulcer). 10 each 1    insulin NPH, Isophane, (HumuLIN N,NovoLIN N) 100 unit/mL (3 mL) injection Inject 12 Units under the skin 2 times a day before meals. Take as directed per insulin instructions. (Patient taking " "differently: Inject 18 Units under the skin 2 times a day before meals. Take as directed per insulin instructions.) 8 mL 2    insulin syringe-needle U-100 (BD Insulin Syringe Ultra-Fine) 31G X 5/16\" 1 mL syringe Inject 1 each under the skin 2 times a day. Use as instructed 100 each 3    insulin syringe-needle U-100 31G X 5/16\" 0.3 mL syringe Use as instructed 100 each 11    lancets (OneTouch Delica Plus Lancet) 30 gauge misc Use to test glucose 3 times daily 100 each 11    latanoprost (Xalatan) 0.005 % ophthalmic solution Administer 1 drop into both eyes once daily at bedtime.      sodium bicarbonate 650 mg tablet Take 1 tablet (650 mg) by mouth 2 times a day. 2 tablet 29    timolol (Timoptic) 0.5 % ophthalmic solution Administer 1 drop into both eyes once daily.      furosemide (Lasix) 20 mg tablet Take 2 tablets (40 mg) by mouth once daily. (Patient not taking: Reported on 2024) 180 tablet 3    [DISCONTINUED] insulin NPH, Isophane, (HumuLIN N,NovoLIN N) 100 unit/mL (3 mL) injection Inject 12 Units under the skin 2 times a day before meals. Take as directed per insulin instructions. 8 mL 2    [DISCONTINUED] pen needle, diabetic (BD Ultra-Fine Nely Pen Needle) 32 gauge x 5/32\" needle 1 Needle 2 times a day. 200 each 3     No current facility-administered medications on file prior to visit.      DRUG INTERACTIONS  No significant drug-drug interactions exist that require adjustment to medication therapy     PATIENT GOALS   Fasting B - 130 mg/dL  Postprandial BG: less than 180 mg/dL  A1c: less than 7%     Assessment/Plan   Problem List Items Addressed This Visit       Chronic diastolic congestive heart failure (CMS/HCC) (Chronic)     Carolann Cartagena has heart failure with preserved ejection fraction as evidenced by her most recent EF of 70-75% on 23 with NYHA function class II as evidenced by patient able to walk around the house without issues but will feel fatigued with occasional shortness of breath " with increased activity.   Patient's systolic blood pressure at home this morning was significantly elevated at 208/68 mmHg before taking her medications. They re-checked it while on the phone and it came down to 150/58 mmHg. They were encouraged to reach out to Dr. Iniguez's office if her blood pressure continues to be consistently significantly elevated. She states that her readings are not usually as high as it was this morning. Her daughter also reports that the patient tends to worry a lot and feels that the anxiety around worrying plays a role in her higher blood pressure readings.   CONTINUE:   Carvedilol 25 mg 1 tablet by mouth twice daily   Hydralazine 100 mg 1 tablet by mouth twice daily   Patient's daughter states furosemide was discontinued while patient was in the hospital. She has not taken since discharge.   Patient will continue to follow-up with cardiology as scheduled.          Relevant Orders    Follow Up In Clinical Pharmacy    Diabetes mellitus (CMS/[facility]) - Primary     Patient has controlled type 2 diabetes mellitus complicated by dyslipidemia, hypertension, and obesity as evidenced by her most recent A1c of 6.6% on 01/08/24 which had increased slightly from 6.3% on 10/11/23 but is still below goal of <7%.   Her daughter reports that she will sometimes take 19 units of her insulin instead of 18. She was encouraged to make sure she is using the insulin as prescribed. She has had no recent episodes of hypoglycemia.   CONTINUE: Insulin NPH (Humulin N) 100 unit/mL 18 units under the skin twice daily before meals   We discussed continuous glucose monitors today. Patient's daughter was counseled in the benefits including no longer needing to do fingerpricks daily, being able to see how certain foods affect her blood sugars and the trends, and being able to get notified remotely by following her mother's readings. She stated they will discuss it with her and we will meet again in ~2 weeks to discuss  starting the CGM if agreeable. Since she is on insulin it should be covered under her insurance through a DME.   They were encouraged to reach out with any questions or concerns prior to our next follow-up.          Relevant Orders    Follow Up In Clinical Pharmacy     Pharmacy Follow-Up: 03/11/24  PCP Follow-Up: 02/29/24    Continue all meds under the continuation of care with the referring provider and clinical pharmacy team.    Javier Milner PharmD     Verbal consent to manage patient's drug therapy was obtained from the patient. They were informed they may decline to participate or withdraw from participation in pharmacy services at any time.

## 2024-02-28 ENCOUNTER — LAB (OUTPATIENT)
Dept: LAB | Facility: LAB | Age: 89
End: 2024-02-28
Payer: MEDICARE

## 2024-02-28 DIAGNOSIS — D63.8 ANEMIA IN OTHER CHRONIC DISEASES CLASSIFIED ELSEWHERE: Primary | ICD-10-CM

## 2024-02-28 LAB
FERRITIN SERPL-MCNC: 185 NG/ML (ref 8–150)
IRON SERPL-MCNC: 50 UG/DL (ref 35–150)

## 2024-02-28 PROCEDURE — 36415 COLL VENOUS BLD VENIPUNCTURE: CPT

## 2024-02-28 PROCEDURE — 82728 ASSAY OF FERRITIN: CPT

## 2024-02-28 PROCEDURE — 83540 ASSAY OF IRON: CPT

## 2024-02-29 ENCOUNTER — TELEMEDICINE (OUTPATIENT)
Dept: PRIMARY CARE | Facility: CLINIC | Age: 89
End: 2024-02-29
Payer: MEDICARE

## 2024-02-29 ENCOUNTER — TELEPHONE (OUTPATIENT)
Dept: HOME HEALTH SERVICES | Facility: HOME HEALTH | Age: 89
End: 2024-02-29

## 2024-02-29 ENCOUNTER — DOCUMENTATION (OUTPATIENT)
Dept: HOME HEALTH SERVICES | Facility: HOME HEALTH | Age: 89
End: 2024-02-29

## 2024-02-29 ENCOUNTER — HOME HEALTH ADMISSION (OUTPATIENT)
Dept: HOME HEALTH SERVICES | Facility: HOME HEALTH | Age: 89
End: 2024-02-29

## 2024-02-29 DIAGNOSIS — I10 ESSENTIAL HYPERTENSION: Chronic | ICD-10-CM

## 2024-02-29 DIAGNOSIS — R53.1 WEAKNESS: ICD-10-CM

## 2024-02-29 DIAGNOSIS — E11.21 DIABETIC NEPHROPATHY ASSOCIATED WITH TYPE 2 DIABETES MELLITUS (MULTI): Chronic | ICD-10-CM

## 2024-02-29 DIAGNOSIS — I50.32 CHRONIC DIASTOLIC CONGESTIVE HEART FAILURE (MULTI): Primary | Chronic | ICD-10-CM

## 2024-02-29 DIAGNOSIS — E08.00 DIABETES MELLITUS DUE TO UNDERLYING CONDITION WITH HYPEROSMOLARITY WITHOUT COMA, WITHOUT LONG-TERM CURRENT USE OF INSULIN (MULTI): ICD-10-CM

## 2024-02-29 DIAGNOSIS — N18.4 STAGE 4 CHRONIC KIDNEY DISEASE (MULTI): ICD-10-CM

## 2024-02-29 PROCEDURE — 1126F AMNT PAIN NOTED NONE PRSNT: CPT | Performed by: INTERNAL MEDICINE

## 2024-02-29 PROCEDURE — 1159F MED LIST DOCD IN RCRD: CPT | Performed by: INTERNAL MEDICINE

## 2024-02-29 PROCEDURE — 1157F ADVNC CARE PLAN IN RCRD: CPT | Performed by: INTERNAL MEDICINE

## 2024-02-29 PROCEDURE — 1111F DSCHRG MED/CURRENT MED MERGE: CPT | Performed by: INTERNAL MEDICINE

## 2024-02-29 PROCEDURE — 99214 OFFICE O/P EST MOD 30 MIN: CPT | Performed by: INTERNAL MEDICINE

## 2024-02-29 PROCEDURE — 1036F TOBACCO NON-USER: CPT | Performed by: INTERNAL MEDICINE

## 2024-02-29 NOTE — PROGRESS NOTES
Subjective   Chief complaint: Carolann Cartagena is a 89 y.o. female who presents for Follow-up (Pt daughter has concerns about mom).    HPI:  Follow-up general medical care this is a virtual visit for this 89 years old black female significant past medical history of diabetes chronic kidney disease anemia hypertension patient family presented that the visiting nurse asked them to be hospice and I told him that the patient is not ready weight is stable her kidney function is stable I do not believe that she is a candidate for hospice at this point now        Objective   There were no vitals taken for this visit.  Physical Exam  Constitutional:       Appearance: Normal appearance.   HENT:      Head: Normocephalic and atraumatic.   Pulmonary:      Effort: Pulmonary effort is normal.   Abdominal:      Palpations: Abdomen is soft.   Musculoskeletal:      Cervical back: Neck supple.   Skin:     General: Skin is warm and dry.   Neurological:      General: No focal deficit present.      Mental Status: She is alert.   Psychiatric:         Mood and Affect: Mood normal.         Behavior: Behavior is cooperative.         I have reviewed and reconciled the medication list with the patient today.   Current Outpatient Medications:     Alphagan P 0.1 % ophthalmic solution, Administer 1 drop into both eyes twice a day., Disp: , Rfl:     aspirin 81 mg EC tablet, Take 1 tablet (81 mg) by mouth once daily., Disp: , Rfl:     atorvastatin (Lipitor) 10 mg tablet, Take 1 tablet (10 mg) by mouth once daily., Disp: 90 tablet, Rfl: 3    Autolet lancing device, Use as instructed, Disp: 1 each, Rfl: 0    blood sugar diagnostic (OneTouch Ultra Test) strip, 1 strip 3 times a day., Disp: 200 strip, Rfl: 11    blood-glucose meter misc, Use to test glucose 3 times daily, Disp: 1 each, Rfl: 0    carvedilol (Coreg) 25 mg tablet, Take 1 tablet (25 mg) by mouth 2 times a day with meals., Disp: 60 tablet, Rfl: 11    cholecalciferol (Vitamin D-3) 50 MCG (2000  "UT) tablet, Take 1 tablet (2,000 Units) by mouth once daily., Disp: , Rfl:     fluticasone (Flonase) 50 mcg/actuation nasal spray, Administer 1 spray into each nostril once daily. Shake gently. Before first use, prime pump. After use, clean tip and replace cap., Disp: 16 g, Rfl: 11    furosemide (Lasix) 20 mg tablet, Take 2 tablets (40 mg) by mouth once daily. (Patient not taking: Reported on 2/26/2024), Disp: 180 tablet, Rfl: 3    hydrALAZINE (Apresoline) 100 mg tablet, Take 1 tablet (100 mg) by mouth 2 times a day., Disp: 90 tablet, Rfl: 11    hydrocolloid dressing 1 3/4 X 1 1/2 \" bandage, Apply 1 patch topically once daily as needed (wear daily for pressure ulcer)., Disp: 10 each, Rfl: 1    insulin NPH, Isophane, (HumuLIN N,NovoLIN N) 100 unit/mL (3 mL) injection, Inject 12 Units under the skin 2 times a day before meals. Take as directed per insulin instructions. (Patient taking differently: Inject 18 Units under the skin 2 times a day before meals. Take as directed per insulin instructions.), Disp: 8 mL, Rfl: 2    insulin syringe-needle U-100 (BD Insulin Syringe Ultra-Fine) 31G X 5/16\" 1 mL syringe, Inject 1 each under the skin 2 times a day. Use as instructed, Disp: 100 each, Rfl: 3    insulin syringe-needle U-100 31G X 5/16\" 0.3 mL syringe, Use as instructed, Disp: 100 each, Rfl: 11    lancets (OneTouch Delica Plus Lancet) 30 gauge misc, Use to test glucose 3 times daily, Disp: 100 each, Rfl: 11    latanoprost (Xalatan) 0.005 % ophthalmic solution, Administer 1 drop into both eyes once daily at bedtime., Disp: , Rfl:     sodium bicarbonate 650 mg tablet, Take 1 tablet (650 mg) by mouth 2 times a day., Disp: 2 tablet, Rfl: 29    timolol (Timoptic) 0.5 % ophthalmic solution, Administer 1 drop into both eyes once daily., Disp: , Rfl:      Imaging:  CT head wo IV contrast    Result Date: 1/31/2024  Interpreted By:  Bill De La O, STUDY: CT HEAD WO IV CONTRAST;  1/31/2024 7:49 pm   INDICATION: " Signs/Symptoms:Generalized fatigue, dizziness.   COMPARISON: 10/13/2023   ACCESSION NUMBER(S): [id number]   ORDERING CLINICIAN: PERFECTO GAO   TECHNIQUE: Noncontrast axial CT scan of head was performed. Angled reformats in brain and bone windows were generated. The images were reviewed in bone, brain, blood and soft tissue windows.   FINDINGS: CSF Spaces: The ventricles, sulci and basal cisterns are within normal limits. There is no extraaxial fluid collection. Global volume loss and mild chronic small vessel ischemic change   Parenchyma:  The grey-white differentiation is intact. There is no mass effect or midline shift.  There is no intracranial hemorrhage.   Calvarium: Hyperostosis   Paranasal sinuses and mastoids: Visualized paranasal sinuses and mastoids are clear.       No evidence of acute cortical infarct or intracranial hemorrhage.   Senescent changes. If persistent concern, further evaluation with MRI is advised.   MACRO: None   Signed by: Bill De La O 1/31/2024 8:31 PM Dictation workstation:   [id number]    XR chest 2 views    Result Date: 1/30/2024  STUDY: Chest Radiographs;  01/30/2024 INDICATION: Cough. COMPARISON: 10/05/2023 XR chest ACCESSION NUMBER(S): [id number] ORDERING CLINICIAN: KODY BELLE TECHNIQUE:  Frontal and lateral chest. FINDINGS: CARDIOMEDIASTINAL SILHOUETTE: Cardiomediastinal silhouette is unchanged in size and configuration.  LUNGS: Lungs are clear.  ABDOMEN: No remarkable upper abdominal findings.  BONES: No acute osseous changes.    No acute abnormality based on plain film imaging.  Borderline cardiomegaly.. Signed by Aakash Paredes DO       Labs reviewed:    Lab Results   Component Value Date    WBC 4.8 02/12/2024    HGB 8.1 (L) 02/12/2024    HCT 26.2 (L) 02/12/2024     02/12/2024    CHOL 183 02/22/2023    TRIG 86 02/22/2023    HDL 57.1 02/22/2023    ALT 15 01/31/2024    AST 22 01/31/2024     02/12/2024    K 4.3 02/12/2024     02/12/2024     CREATININE 2.09 (H) 02/12/2024    BUN 34 (H) 02/12/2024    CO2 26 02/12/2024    TSH 3.10 02/01/2024    HGBA1C 6.6 (H) 01/08/2024       Assessment/Plan   Problem List Items Addressed This Visit       Diabetes mellitus (CMS/[facility])     Patient has controlled type 2 diabetes mellitus complicated by dyslipidemia, hypertension, and obesity as evidenced by her most recent A1c of 6.6% on 01/08/24 which had increased slightly from 6.3% on 10/11/23 but is still below goal of <7%.   Her daughter reports that she will sometimes take 19 units of her insulin instead of 18. She was encouraged to make sure she is using the insulin as prescribed. She has had no recent episodes of hypoglycemia.   CONTINUE: Insulin NPH (Humulin N) 100 unit/mL 18 units under the skin twice daily before meals   We discussed continuous glucose monitors today. Patient's daughter was counseled in the benefits including no longer needing to do fingerpricks daily, being able to see how certain foods affect her blood sugars and the trends, and being able to get notified remotely by following her mother's readings. She stated they will discuss it with her and we will meet again in ~2 weeks to discuss starting the CGM if agreeable. Since she is on insulin it should be covered under her insurance through a DME.   They were encouraged to reach out with any questions or concerns prior to our next follow-up. Patient has controlled type 2 diabetes mellitus complicated by dyslipidemia, hypertension, and obesity as evidenced by her most recent A1c of 6.6% on 01/08/24 which had increased slightly from 6.3% on 10/11/23 but is still below goal of <7%.   Her daughter reports that she will sometimes take 19 units of her insulin instead of 18. She was encouraged to make sure she is using the insulin as prescribed. She has had no recent episodes of hypoglycemia.   CONTINUE: Insulin NPH (Humulin N) 100 unit/mL 18 units under the skin twice daily before meals   We discussed  continuous glucose monitors today. Patient's daughter was counseled in the benefits including no longer needing to do fingerpricks daily, being able to see how certain foods affect her blood sugars and the trends, and being able to get notified remotely by following her mother's readings. She stated they will discuss it with her and we will meet again in ~2 weeks to discuss starting the CGM if agreeable. Since she is on insulin it should be covered under her insurance through a DME.   They were encouraged to reach out with any questions or concerns prior to our next follow-up.          Essential hypertension (Chronic)       Lasix discontinued at hospital for kidney function. Kidney function has since improved.     Continue taking Hydralazine 100mg TID. Continue Carvedilol 25mg BID. BP at home have been 120-130 per duaghter. Continue monitoring at home.   Follow-up in 2 mo or sooner as needed.          Stage 4 chronic kidney disease (CMS/[facility])    Relevant Orders    Referral to Home Care    Diabetic nephropathy associated with type 2 diabetes mellitus (CMS/[facility]) (Chronic)     Renal function is stable         Chronic diastolic congestive heart failure (CMS/[facility]) - Primary (Chronic)     Carolann Cartagena has heart failure with preserved ejection fraction as evidenced by her most recent EF of 70-75% on 09/16/23 with NYHA function class II as evidenced by patient able to walk around the house without issues but will feel fatigued with occasional shortness of breath with increased activity.   Patient's systolic blood pressure at home this morning was significantly elevated at 208/68 mmHg before taking her medications. They re-checked it while on the phone and it came down to 150/58 mmHg. They were encouraged to reach out to Dr. Iniguez's office if her blood pressure continues to be consistently significantly elevated. She states that her readings are not usually as high as it was this morning. Her daughter also reports that the  patient tends to worry a lot and feels that the anxiety around worrying plays a role in her higher blood pressure readings.   CONTINUE:   Carvedilol 25 mg 1 tablet by mouth twice daily   Hydralazine 100 mg 1 tablet by mouth twice daily   Patient's daughter states furosemide was discontinued while patient was in the hospital. She has not taken since discharge.   Patient will continue to follow-up with cardiology as scheduled.           Other Visit Diagnoses       Weakness        Relevant Orders    Referral to Home Care            Continue current medications as listed  Follow up in 3 months  Revoke hospice  Discussed medication with the family

## 2024-02-29 NOTE — TELEPHONE ENCOUNTER
Thank you for your referral to  Home Care. At this time, we are unable to accommodate your patient's needs in a safe and timely manner. In order to help your patient receive quality home care, we have included certified agencies that service this area:         [facility] - P: [phone]; F: [phone]            [facility] - P: [phone]; F: [phone]         You may contact one of these agencies, or an alternate of your choice, to see if they are able to accept your patient.    Thank you,  [facility] Intake

## 2024-02-29 NOTE — ASSESSMENT & PLAN NOTE
Lasix discontinued at hospital for kidney function. Kidney function has since improved.     Continue taking Hydralazine 100mg TID. Continue Carvedilol 25mg BID. BP at home have been 120-130 per duaghter. Continue monitoring at home.   Follow-up in 2 mo or sooner as needed.

## 2024-02-29 NOTE — ASSESSMENT & PLAN NOTE
Patient has controlled type 2 diabetes mellitus complicated by dyslipidemia, hypertension, and obesity as evidenced by her most recent A1c of 6.6% on 01/08/24 which had increased slightly from 6.3% on 10/11/23 but is still below goal of <7%.   Her daughter reports that she will sometimes take 19 units of her insulin instead of 18. She was encouraged to make sure she is using the insulin as prescribed. She has had no recent episodes of hypoglycemia.   CONTINUE: Insulin NPH (Humulin N) 100 unit/mL 18 units under the skin twice daily before meals   We discussed continuous glucose monitors today. Patient's daughter was counseled in the benefits including no longer needing to do fingerpricks daily, being able to see how certain foods affect her blood sugars and the trends, and being able to get notified remotely by following her mother's readings. She stated they will discuss it with her and we will meet again in ~2 weeks to discuss starting the CGM if agreeable. Since she is on insulin it should be covered under her insurance through a DME.   They were encouraged to reach out with any questions or concerns prior to our next follow-up. Patient has controlled type 2 diabetes mellitus complicated by dyslipidemia, hypertension, and obesity as evidenced by her most recent A1c of 6.6% on 01/08/24 which had increased slightly from 6.3% on 10/11/23 but is still below goal of <7%.   Her daughter reports that she will sometimes take 19 units of her insulin instead of 18. She was encouraged to make sure she is using the insulin as prescribed. She has had no recent episodes of hypoglycemia.   CONTINUE: Insulin NPH (Humulin N) 100 unit/mL 18 units under the skin twice daily before meals   We discussed continuous glucose monitors today. Patient's daughter was counseled in the benefits including no longer needing to do fingerpricks daily, being able to see how certain foods affect her blood sugars and the trends, and being able to get  notified remotely by following her mother's readings. She stated they will discuss it with her and we will meet again in ~2 weeks to discuss starting the CGM if agreeable. Since she is on insulin it should be covered under her insurance through a DME.   They were encouraged to reach out with any questions or concerns prior to our next follow-up.

## 2024-03-04 ENCOUNTER — PATIENT OUTREACH (OUTPATIENT)
Dept: CARE COORDINATION | Facility: CLINIC | Age: 89
End: 2024-03-04
Payer: MEDICARE

## 2024-03-04 NOTE — PROGRESS NOTES
Call placed regarding one month post discharge follow up call.  At time of outreach call the patient feels as if their condition has improved  a little  since initial visit with PCP or specialist. Her caregiver stated her blood pressure has been a bit off and they have been watching her diet and things are getting a litter better.  Questions or concerns regarding recovery period addressed at this time. (Individualize as needed if questions arise)  Reviewed any PCP or specialists progress notes/labs/radiology reports if applicable and addressed any questions or concerns.

## 2024-03-06 ENCOUNTER — TELEPHONE (OUTPATIENT)
Dept: CARDIOLOGY | Facility: CLINIC | Age: 89
End: 2024-03-06
Payer: MEDICARE

## 2024-03-06 NOTE — TELEPHONE ENCOUNTER
Patient's daughter called yesterday with concerns of patient having productive cough. They spoke with a nurse from [facility] and were told this was a side effect of carvedilol and hydralzine. I reached out to Dr. Iniguez via secure chat regarding this and he stated that a productive cough is not a side effect linked to these medications but they could have a sooner follow up to discuss medications if they would like. They declined having a sooner appt at this time. They were not sure what else this could be but I told them they could see a pulmonologist to discuss other possible causes. They wanted to try this before speaking with Dr. Iniguez

## 2024-03-06 NOTE — CONSULTS
"    Service:   Service: Wound Care     Consult:  Consult requested by (Attending Name): EliseNanette alanizder   Reason: Left buttock stage 2     History of Present Illness:   HPI:    ALICIA IRENE is a 89 year old Female    Review Family/Social History and ROS:   Social History:    Smoking Status: never smoker  (1)   Alcohol Use: denies (1)   Drug Use: denies  (1)   Drug 2 Use: denies  (1)            Allergies:  ·  lisinopril : Facial Swelling  ·  Trusopt : Other      Assessment:    Outcome: Buttocks, sacrum, and coccyx were assessed d/t reported Stage 2 pressure injury. There is no wound or skin breakdown observed. There are 3 healed scars on  the left buttock. All skin in the assessed region is dry and intact.    Plan: please contact [facility] nurse with new wound/skin care concerns.    CAMERON Pineda, RN, CWOCN     Consult Status:  Consult Order ID: [id number]       Electronic Signatures:  Yamile Marshall (RN)  (Signed 19-Sep-2023 17:35)   Authored: Service, History of Present Illness, Review  Family/Social History and ROS, Allergies, Assessment/Recommendations, Note Completion      Last Updated: 19-Sep-2023 17:35 by Yamile Marshall (RN)    References:  1.  Data Referenced From \"Patient Profile - Adult v2\" 19-Sep-2023 11:09   "

## 2024-03-06 NOTE — CONSULTS
Service:   Service: Endocrinology     Consult:  Consult requested by (Attending Name): Josef Saldana   Reason: hypoglycemic on admission, elevated TSH     History of Present Illness:   HPI:    ALICIA IRENE is a 89 year old Female with a history of type 2 diabetes mellitus, complicated by neuropathy and CKD    Patient admitted 5 days ago with hypoglycemia, hypertension and fall.     At home she takes insulin 40 units BID, but does not know the brand    She denies use of sitagliptin    Testing glucose BID, with frequent hypoglycemia      CHRONIC ACTIVE:  Type 2 diabetes mellitus  CKD stage 4  HTN  Neuropathy at feet  GERD  Anemia  Heart murmur  Lumbar stenosis  Lymphedema    PAST SURGICAL HISTORY:  Hysterectomy      Review Family/Social History and ROS:   Family History:  Cancer: yes      Social History:    Smoking Status: never smoker  (1)   Alcohol Use: denies (1)   Drug Use: denies  (1)   Drug 2 Use: denies  (1)     Constitutional: NEGATIVE: Anorexia     Respiratory: NEGATIVE: Shortness of Breath     Cardiac: NEGATIVE: Chest Pain     Gastrointestinal: NEGATIVE: Nausea, Vomiting     Endocrine: COMMENTS: as above              Allergies:  ·  lisinopril : Facial Swelling  ·  Trusopt : Other    Objective:     Objective Information:        T   P  R  BP   MAP  SpO2   Value  36.1  61  17  150/71      99%  Date/Time 9/20 3:32 9/20 3:32 9/20 3:32 9/20 3:32    9/20 3:32  Range  (36.1C - 37.4C )  (38 - 86 )  (16 - 18 )  (111 - 184 )/ (32 - 71 )    (96% - 99% )  Highest temp of 37.4 C was recorded at 9/19 19:45         Weights   9/19 11:09: Weight in kg (Weight (kg))  90.4  9/19 11:09: Weight in lbs ((lbs))  199.2  9/19 11:09: BMI (kg/m2) (BMI (kg/m2))  34.234    Physical Exam by System:    Constitutional: Elderly female in no acute distress   Eyes: EOMI   ENMT: oral mucosa moist   Head/Neck: No goiter   Cardiovascular: 2+ radial aa bilat   Gastrointestinal: abdomen NT   Extremities: No foot sores   Neurological: no  hand tremor   Psychological: alert, oriented     Medications:    Medications:          Continuous Medications       --------------------------------  No continuous medications are active       Scheduled Medications       --------------------------------    1. Aspirin Chewable:  81  mg  Oral  Daily    2. Atorvastatin:  10  mg  Oral  Daily    3. Brimonidine 0.2% Ophthalmic:  1  drop(s)  Both Eyes  2 Times a Day    4. Cholecalciferol (Vitamin D3):  2000  International Unit(s)  Oral  Daily    5. Heparin SubCutaneous:  5000  unit(s)  SubCutaneous  Every 8 Hours    6. hydrALAZINE (APRESOLINE):  50  mg  Oral  Every 12 Hours    7. Insulin Lispro Moderate Corrective Scale:  unit(s)  SubCutaneous  3 Times a Day Before Meals    8. Latanoprost 0.005% Ophthalmic:  1  drop(s)  Both Eyes  At Bedtime    9. NIFEdipine (PROCARDIA XL) Extended Release:  30  mg  Oral  Daily    10. Pantoprazole:  40  mg  Oral  Daily    11. Sodium Chloride:  1  gram(s)  Oral  2 Times a Day    12. Sodium Chloride 0.9% IV Bolus:  500  mL  IntraVenous Piggyback  Once    13. Timolol 0.5% Ophthalmic:  1  drop(s)  Both Eyes  2 Times a Day         PRN Medications       --------------------------------    1. Acetaminophen:  650  mg  Oral  Every 4 Hours    2. Acetaminophen:  650  mg  Oral  Every 4 Hours    3. Dextrose 50% in Water Injectable:  25  gram(s)  IntraVenous Push  Every 15 Minutes    4. Glucagon Injectable:  1  mg  IntraMuscular  Every 15 Minutes    5. hydrALAZINE (APRESOLINE) Injectable:  10  mg  IntraVenous Push  Every 6 Hours    6. Ondansetron Injectable:  4  mg  IntraVenous Push  Every 4 Hours    7. Sodium Chloride 0.9% Injectable Flush:  10  mL  IntraVenous Flush  Every 8 Hours and as Needed           Currently Suspended Medications       --------------------------------    1. Carvedilol:  12.5  mg  Oral  2 Times a Day      Recent Lab Results:    Results:        I have reviewed these laboratory results:    Glucose_POCT  Trending View      Result  19-Sep-2023 20:19:00  19-Sep-2023 16:37:00  19-Sep-2023 12:35:00  19-Sep-2023 08:18:00    Glucose-POCT 264   H   306   H   229   H   241   H        Basic Metabolic Panel  Trending View      Result 19-Sep-2023 12:40:00  19-Sep-2023 07:53:00  18-Sep-2023 06:55:00  17-Sep-2023 04:42:00  16-Sep-2023 05:01:00    Lab Comment: SOD CALLED RB TO KAREN CROWLEY, 09/19/2023 14:16   CRITICAL SOD CALLED RB TO PAIGE VALDEZ, 09/19/2023 14:36          Glucose, Serum 234   H   200   H   162   H   203   H   85       LL   120   LL   123   L   121   L   123   L    K 5.1   5.1   4.7   4.8   4.2    CL 92   L   92   L   93   L   92   L   93   L    Bicarbonate, Serum 23   21   22   23   22    Anion Gap, Serum 10   12   13   11   12    BUN 47   H   43   H   41   H   40   H   34   H    CREAT 2.23   H   2.14   H   2.12   H   2.12   H   1.90   H    GFR Female 20   A   21   A   21   A   21   A   24   A    Calcium, Serum 8.0   L   8.4   L   8.0   L   8.1   L   8.4   L        TSH with Reflex to Free T4 if Abnormal  Trending View      Result 19-Sep-2023 07:53:00  18-Sep-2023 06:55:00    Thyroid Stimulating Hormone, Serum 5.53   H   5.15   H        Free Thyroxine, Serum  Trending View      Result 19-Sep-2023 07:53:00  18-Sep-2023 06:55:00    Free Thyroxine, Serum 1.03   0.93        Cortisol A.M.  18-Sep-2023 06:55:00      Result Value    Cortisol A.M.  20.1   H     Hemoglobin A1C, Level  16-Sep-2023 08:11:00      Result Value    Estimated Average Glucose  143    Hemoglobin A1C, Level  6.6 Diagnosis of Diabetes-Adults Non-Diabetic: < or = 5.6% Increased risk for developing diabetes: 5.7-6.4% Diagnostic of diabetes: > or = 6.5%.     Monitoring  of Diabetes              Age (y)     Therapeutic Goal (%) Adults:          >1   A         Assessment:    TYPE 2 DIABETES MELLITUS  LONG TERM CURRENT INSULIN USE  History of frequent hypoglycemia  Records suggest she may have been taking Humulin N, bearing confirmation with her home medication  "list.      RECOMMENDATIONS  Continue insulin corrective scale  Resume NPH at lower dose, to be determined  Diabetes education      Consult Status:  Consult Status    (select all that apply): initial  consult complete, will follow   Consult Order ID: [id number]       Electronic Signatures:  Saad Edouard)  (Signed 20-Sep-2023 20:31)   Authored: Service, History of Present Illness, Review  Family/Social History and ROS, Allergies, Objective, Assessment/Recommendations, Note Completion      Last Updated: 20-Sep-2023 20:31 by Saad Edouard)    References:  1.  Data Referenced From \"Consult\" 19-Sep-2023 17:31   "

## 2024-03-06 NOTE — CONSULTS
Service:   Service: Nephrology     Consult:  Consult requested by (Attending Name): Oscar Trejo   Reason: hyponatremia, acute renal insuf on appearing  chronic kidney disease, not diagnosed, resistant htn     History of Present Illness:   Admission Reason: Very high blood pressure, low blood  sugar, left knee injury   HPI:    ALICIA IRENE is a 89 year old -American female who presented emerged department with history of hypertension elevated for 1 week, with hypoglycemia, with  a fall on left knee,  Emergency Department temperature was 98.6 blood pressure done in triage was 223/78, heart rate was 59, respirate was 18, with the pulse ox on room air was 100%, patient has past medical history of diabetes, chronic right knee pain, and hypertension,  Labs Emergency Department showed evidence of hyponatremia with OLGA LIDIA on top of chronic kidney disease stage IV, history of diabetes mellitus and nephrology consult was placed for all the above.  Labs were reviewed and case was discussed with the nurse on the floor patient's blood sodium is is low with low osmolality for which we will be starting on normal saline to replace her intravascular compartment, also negative for free water intake to  100 cc per 24 hours.  Family and patient denied being on SSRI/hydrochlorothiazide or any nephrotoxic medications.  We will check osmolality both urine and blood, and spot urine for electrolytes.    Past Medical/Surgical History:        Medical History:   Atherosclerosis: Onset Date: 16-Sep-2023   Hypertension: Onset Date: 16-Sep-2023           Allergies:  ·  lisinopril : Facial Swelling  ·  Trusopt : Other    Objective:     Objective Information:        T   P  R  BP   MAP  SpO2   Value  36.6  50  16  150/65   82  99%  Date/Time 9/18 12:22 9/18 12:22 9/18 12:22 9/18 12:22  9/17 4:15 9/18 12:22  Range  (36.2C - 36.8C )  (45 - 64 )  (16 - 20 )  (110 - 225 )/ (48 - 77 )  (82 - 104 )  (95% - 100% )    Physical Exam by  System:    Constitutional: Well developed, awake/alert/oriented  x3, no distress, alert and cooperative   Eyes: PERRL, EOMI, clear sclera   ENMT: mucous membranes moist, no apparent injury,  no lesions seen   Head/Neck: Neck supple, no apparent injury, thyroid  without mass or tenderness, No JVD, trachea midline, no bruits   Respiratory/Thorax: Patent airways, CTAB, normal  breath sounds with good chest expansion, thorax symmetric   Cardiovascular: Regular, rate and rhythm, no murmurs,  2+ equal pulses of the extremities, normal S 1and S 2   Gastrointestinal: Nondistended, soft, non-tender,  no rebound tenderness or guarding, no masses palpable, no organomegaly, +BS, no bruits   Musculoskeletal: ROM intact, no joint swelling, normal  strength   Extremities: normal extremities, no cyanosis edema,  contusions or wounds, no clubbing   Neurological: alert and oriented x3, intact senses,  motor, response and reflexes, normal strength   Skin: Warm and dry, no lesions, no rashes     Medications:    Medications:          Continuous Medications       --------------------------------    1. Sodium Chloride 0.9% Infusion:  1000  mL  IntraVenous  <Continuous>         Scheduled Medications       --------------------------------    1. Aspirin Chewable:  81  mg  Oral  Daily    2. Atorvastatin:  10  mg  Oral  Daily    3. Brimonidine 0.2% Ophthalmic:  1  drop(s)  Both Eyes  2 Times a Day    4. Carvedilol:  12.5  mg  Oral  2 Times a Day    5. Cholecalciferol (Vitamin D3):  2000  International Unit(s)  Oral  Daily    6. Heparin SubCutaneous:  5000  unit(s)  SubCutaneous  Every 8 Hours    7. hydrALAZINE (APRESOLINE):  50  mg  Oral  Every 12 Hours    8. Insulin Lispro Moderate Corrective Scale:  unit(s)  SubCutaneous  3 Times a Day Before Meals    9. Latanoprost 0.005% Ophthalmic:  1  drop(s)  Both Eyes  At Bedtime    10. NIFEdipine (PROCARDIA XL) Extended Release:  30  mg  Oral  Daily    11. Pantoprazole:  40  mg  Oral  Daily    12.  Timolol 0.5% Ophthalmic:  1  drop(s)  Both Eyes  2 Times a Day         PRN Medications       --------------------------------    1. Acetaminophen:  650  mg  Oral  Every 4 Hours    2. Acetaminophen:  650  mg  Oral  Every 4 Hours    3. Dextrose 50% in Water Injectable:  25  gram(s)  IntraVenous Push  Every 15 Minutes    4. Glucagon Injectable:  1  mg  IntraMuscular  Every 15 Minutes    5. hydrALAZINE (APRESOLINE) Injectable:  10  mg  IntraVenous Push  Every 6 Hours    6. Ondansetron Injectable:  4  mg  IntraVenous Push  Every 4 Hours    7. Sodium Chloride 0.9% Injectable Flush:  10  mL  IntraVenous Flush  Every 8 Hours and as Needed        Recent Lab Results:    Results:        I have reviewed these laboratory results:    Glucose_POCT  Trending View      Result 18-Sep-2023 12:20:00  18-Sep-2023 08:32:00  17-Sep-2023 20:55:00  17-Sep-2023 16:13:00  17-Sep-2023 11:05:00  17-Sep-2023 08:10:00  16-Sep-2023 19:58:00  16-Sep-2023 16:05:00    Glucose-POCT 298   H   184   H   55   L   206   H   356   H   171   H   176   H   303   H        TSH with Reflex to Free T4 if Abnormal  18-Sep-2023 06:55:00      Result Value    Thyroid Stimulating Hormone, Serum  5.15   H     Complete Blood Count  Trending View      Result 18-Sep-2023 06:55:00  17-Sep-2023 04:42:00    White Blood Cell Count 9.0   7.8    Red Blood Cell Count 2.60   L   2.63   L    HGB 7.8   L   8.2   L    HCT 24.3   L   24.7   L    MCV 93   94    MCHC 32.1   33.2       248    RDW-CV 12.0   12.0        Basic Metabolic Panel  Trending View      Result 18-Sep-2023 06:55:00  17-Sep-2023 04:42:00    Glucose, Serum 162   H   203   H       L   121   L    K 4.7   4.8    CL 93   L   92   L    Bicarbonate, Serum 22   23    Anion Gap, Serum 13   11    BUN 41   H   40   H    CREAT 2.12   H   2.12   H    GFR Female 21   A   21   A    Calcium, Serum 8.0   L   8.1   L        Free Thyroxine, Serum  18-Sep-2023 06:55:00      Result Value    Free Thyroxine, Serum  0.93       Brain Natriuretic Peptide  17-Sep-2023 04:42:00      Result Value    Brain Natriuretic Peptide  555   H     Sodium, Serum  16-Sep-2023 18:58:00      Result Value    NA  121   L     Urinalysis  16-Sep-2023 10:20:00      Result Value    Color, Urine  YELLOW  Reference Range: STRAW,YELLOW    Appearance, Urine  CLEAR    Specific Gravity, Urine  1.008    pH, Urine  5.0    Protein, Urine  100(2+)   A   Glucose, Urine  NEGATIVE    Blood, Urine  NEGATIVE    Ketones, Urine  NEGATIVE    Bilirubin, Urine  NEGATIVE    Urobilinogen, Urine  <2.0    Nitrite, Urine  NEGATIVE    Leukocyte Esterase, Urine  NEGATIVE      Urinalysis, Microscopic  16-Sep-2023 10:20:00      Result Value    White Cells  1    Red Blood Cells  1    Epithelial Cells, Squamous  1        Radiology Results:    Results:        Conclusion:  CONCLUSIONS:  1. Poorly visualized anatomical structures due to suboptimal image quality.  2. Left ventricular systolic function is hyperdynamic with a 70-75% estimated ejection fraction.  3. Spectral Doppler shows an impaired relaxation pattern of left ventricular diastolic filling.  4. There is moderate concentric left ventricular hypertrophy.  5. Left ventricular cavity size is decreased.  6. The left atrium is enlarged.  7. Mild to moderately elevated right ventricular systolic pressure.  8. Mild aortic valve stenosis.    QUANTITATIVE DATA SUMMARY:  2D MEASUREMENTS:  Normal Ranges:  LAs:           3.15 cm    (2.7-4.0cm)  IVSd:          1.40 cm    (0.6-1.1cm)  LVPWd:         1.40 cm    (0.6-1.1cm)  LVIDd:         3.80 cm    (3.9-5.9cm)  LVIDs:         2.50 cm  LV Mass Index: 100.8 g/m2  LV % FS        34.2 %    LA VOLUME:  Normal Ranges:  LA Vol A4C:        131.3 ml   (22+/-6mL/m2)  LA Vol A2C:        88.2 ml  LA Vol BP:         109.2 ml  LA Vol Index A4C:  68.2ml/m2  LA Vol Index A2C:  45.8 ml/m2  LA Vol IndexBP:   56.7 ml/m2  LA Area A4C:       29.7 cm2  LA Area A2C:       24.0 cm2  LA Major Axis A4C: 5.7 cm  LA Major  Axis A2C: 5.6 cm  LA Volume Index:   56.7 ml/m2    RA VOLUME BY A/L METHOD:  Normal Ranges:  RA Area A4C: 13.0 cm2    AORTA MEASUREMENTS:  Normal Ranges:  Ao Sinus, d: 2.67 cm (2.1-3.5cm)  Ao STJ, d:   2.38 cm (1.7-3.4cm)  Asc Ao, d:   3.04 cm (2.1-3.4cm)    LV SYSTOLIC FUNCTION BY 2D PLANIMETRY (MOD):  Normal Ranges:  EF-A4C View: 61.9 % (>=55%)  EF-A2C View: 67.4 %  EF-Biplane:  64.6 %    LV DIASTOLIC FUNCTION:  Normal Ranges:  MV Peak E:        0.48 m/s    (0.7-1.2 m/s)  MV Peak A:        1.10 m/s    (0.42-0.7 m/s)  E/A Ratio:        0.44        (1.0-2.2)  MV e'             0.04 m/s    (>8.0)  MV lateral e'     0.06 m/s  MV medial e'      0.04 m/s  MV A Dur:         156.00 msec  E/e' Ratio:       11.99       (<8.0)  PulmV Sys Maikel:    51.10 cm/s  PulmV Marshall Maikel:   32.90 cm/s  PulmV S/D Maikel:    1.60  PulmV A Revs Maikel: 21.20 cm/s  PulmV A Revs Dur: 124.00 msec    MITRAL VALVE:  Normal Ranges:  MV DT: 172 msec (150-240msec)    AORTIC VALVE:  Normal Ranges:  AoV Vmax:                2.46 m/s  (<=1.7m/s)  AoV Peak P.2 mmHg (<20mmHg)  AoV Mean P.0 mmHg (1.7-11.5mmHg)  LVOT Max Maikel:            1.60 m/s  (<=1.1m/s)  AoV VTI:                 51.80 cm  (18-25cm)  LVOT VTI:                34.40 cm  LVOT Diameter:           1.70 cm   (1.8-2.4cm)  AoV Area, VTI:           1.51 cm2  (2.5-5.5cm2)  AoV Area,Vmax:           1.48 cm2  (2.5-4.5cm2)  AoV Dimensionless Index: 0.66      RIGHT VENTRICLE:  RV Basal 3.35 cm  RV Mid   2.45 cm  RV Major 7.0 cm    TRICUSPID VALVE/RVSP:  Normal Ranges:  Peak TR Velocity: 3.28 m/s  Est. RA Pressure: 3 mmHg  RV Syst Pressure: 46.0 mmHg (< 30mmHg)  IVC Diam:   0.93 cm    PULMONIC VALVE:  Normal Ranges:  PV Accel Time: 129 msec (>120ms)  PV Max Maikel:    1.3 m/s  (0.6-0.9m/s)  PV Max P.1 mmHg    Pulmonary Veins:  PulmV A Revs Dur: 124.00 msec  PulmV A Revs Maikel: 21.20 cm/s  PulmV Marshall Maikel:   32.90 cm/s  PulmV S/D Maikel:    1.60  PulmV Sys Maikel:    51.10  cm/s      [id number] Adam Farmer MD  Electronically signed on 9/17/2023 at 8:17:32 AM        *** Final ***     Echocardiogram [Sep 17 2023  8:17AM]      Impression:    No acute fracture. Small joint effusion.     Tricompartmental right knee osteoarthrosis.     MACRO:  None     Xray Knee 3 View [Sep 15 2023  9:42PM]      Impression:    Cardiomegaly. No acute pulmonary process.     Xray Chest 1 View [Sep 15 2023  9:41PM]      Impression:    1. No acute intracranial abnormality or calvarial fracture.                    CT Head without Contrast [Sep 15 2023  9:31PM]        Assessment:    ALICIA IRENE is a 89 year old -American female who presented emerged department with history of hypertension elevated for 1 week, with hypoglycemia, with  a fall on left knee,  Emergency Department temperature was 98.6 blood pressure done in triage was 223/78, heart rate was 59, respirate was 18, with the pulse ox on room air was 100%, patient has past medical history of diabetes, chronic right knee pain, and hypertension,  Labs Emergency Department showed evidence of hyponatremia with OLGA LIDIA on top of chronic kidney disease stage IV, history of diabetes mellitus and nephrology consult was placed for all the above.  Labs were reviewed and case was discussed with the nurse on the floor patient's blood sodium is is low with low osmolality for which we will be starting on normal saline to replace her intravascular compartment, also negative for free water intake to  100 cc per 24 hours.  Family and patient denied being on SSRI/hydrochlorothiazide or any nephrotoxic medications.  We will check osmolality both urine and blood, and spot urine for electrolytes.      Impression and plan:  1.  OLGA LIDIA on top of CKD stage IV,  We will check urine lites, ultrasound kidneys throat obstruction, and follow labs closely  2.  Diabetes mellitus, will follow current meds  3.  Accelerated hypertension, currently well controlled we will check renin and  aldosterone levels.  4.  Hyponatremia, currently on free water restriction we will follow labs closely.    BMP CBC daily    Plan of Care Reviewed With:  Plan of Care Reviewed With: caregiver     Consult Status:  Consult Status    (select all that apply): initial  consult complete, will follow   Consult Order ID: [id number]     Problem List:       Additional Dx:   Hypertensive heart disease without heart failure:    Type 2 diabetes mellitus with diabetic chronic kidney disease :    Stage 4 chronic kidney disease:    Type 2 diabetes mellitus without complication:    Long term current use of insulin:       Electronic Signatures:  Suly Camilo)  (Signed 18-Sep-2023 12:47)   Authored: Service, History of Present Illness, Past Medical/Surgical  History, Allergies, Objective, Assessment/Recommendations, Note Completion      Last Updated: 18-Sep-2023 12:47 by Suly Camilo)

## 2024-03-06 NOTE — CONSULTS
"    Service:   Service: Cardiology     Consult:  Consult requested by (Attending Name): Julianne Elise   Reason: resistant htn, new findings on echo     History of Present Illness:   HPI:    ALICIA IRENE is a 89 year old Female with a past medical history of GERD, htn, hld, anemia (baseline 9), DM (insulin), lumbar stenosis, and lymphedema presents to  [facility] ER with hypoglycemia and hypertension.  Patient suffered a recent fall as of late on admit to the ER her blood pressure was 223/71, heart rate 57, 98% on room air.  Her blood sugar on admit was 63.  Cardiology was consulted on this stay for \"resistant  htn, new findings on echo\"    Per the patient's daughter she was experiencing high blood pressure readings for the last couple days 200 and above systolic.  Patient complained she fell the night before denies any kind of loss of consciousness from this fall.  Per the patient her primary  care doctor has been adjusting her medications for her blood pressure due to her kidneys.  She said she was recently taking off her losartan and placed on hydralazine they were unsure of the dosage; only other medication she was on was metoprolol for  it as well she claims she was bumped up to 100 daily recently.  Patient denies taking a diuretic so she has been off of it for about 6 months.  Per the daughter the patient was asymptomatic prior to coming in and she denied any kind of shortness of breath,  chest pain, dyspnea on exertion, nausea, vomiting, lightheadedness endorses taking all her medications.    Afebrile, heart rate 68, blood pressure on admit was 223/71> now 141/63, 98% on room air.  Notable labs on admission H&H 8.2/24.7, sodium 121, BUNs/creatinine 40/2.12 today(baseline creatinine around 1.5), high-sensitivity troponins 27/25, hemoglobin  A1c 6.6, chest x-ray is nonacute and clear, CT of the head was negative for any acute process.  EKG shows sinus rhythm without any acute signs of ischemia.  She is WWP, trace " edema lungs CTA I did not appreciate any JVD.    Cardiac Tests  TTE 9/17 Poorly visualized anatomical structures due to suboptimal image quality.  2. Left ventricular systolic function is hyperdynamic with a 70-75% estimated ejection fraction.  3. Spectral Doppler shows an impaired relaxation pattern of left ventricular diastolic filling.  4. There is moderate concentric left ventricular hypertrophy.  5. Left ventricular cavity size is decreased.  6. The left atrium is enlarged.  7. Mild to moderately elevated right ventricular systolic pressure.  8. Mild aortic valve stenosis.      Home Cardiac Meds  Atorvastatin 10 mg daily  Hydralazine 25 mg twice daily  Metoprolol succinate 100 mg daily             Allergies:  ·  lisinopril : Facial Swelling  ·  Trusopt : Other    Objective:     Objective Information:        T   P  R  BP   MAP  SpO2   Value  36.2  56  18  160/59   82  100%  Date/Time 9/17 11:48 9/17 11:48 9/17 11:48 9/17 11:48  9/17 4:15 9/17 11:48  Range  (35.9C - 36.8C )  (56 - 70 )  (16 - 18 )  (141 - 229 )/ (45 - 86 )  (82 - 104 )  (96% - 100% )    Medications:    Medications:          Continuous Medications       --------------------------------  No continuous medications are active       Scheduled Medications       --------------------------------    1. amLODIPine (NORVASC):  10  mg  Oral  Daily    2. Aspirin Chewable:  81  mg  Oral  Daily    3. Atorvastatin:  10  mg  Oral  Daily    4. Brimonidine 0.2% Ophthalmic:  1  drop(s)  Both Eyes  2 Times a Day    5. Carvedilol:  12.5  mg  Oral  2 Times a Day    6. Cholecalciferol (Vitamin D3):  2000  International Unit(s)  Oral  Daily    7. Heparin SubCutaneous:  5000  unit(s)  SubCutaneous  Every 8 Hours    8. hydrALAZINE (APRESOLINE):  25  mg  Oral  Every 12 Hours    9. Insulin Lispro Moderate Corrective Scale:  unit(s)  SubCutaneous  3 Times a Day Before Meals    10. Latanoprost 0.005% Ophthalmic:  1  drop(s)  Both Eyes  At Bedtime    11. Pantoprazole:  40  mg   Oral  Daily    12. Timolol 0.5% Ophthalmic:  1  drop(s)  Both Eyes  2 Times a Day         PRN Medications       --------------------------------    1. Acetaminophen:  650  mg  Oral  Every 4 Hours    2. Acetaminophen:  650  mg  Oral  Every 4 Hours    3. Dextrose 50% in Water Injectable:  25  gram(s)  IntraVenous Push  Every 15 Minutes    4. Glucagon Injectable:  1  mg  IntraMuscular  Every 15 Minutes    5. hydrALAZINE (APRESOLINE) Injectable:  10  mg  IntraVenous Push  Every 6 Hours    6. Ondansetron Injectable:  4  mg  IntraVenous Push  Every 4 Hours    7. Sodium Chloride 0.9% Injectable Flush:  10  mL  IntraVenous Flush  Every 8 Hours and as Needed        Recent Lab Results:    Results:        I have reviewed these laboratory results:    Glucose_POCT  Trending View      Result 17-Sep-2023 11:05:00  17-Sep-2023 08:10:00  16-Sep-2023 19:58:00  16-Sep-2023 16:05:00  16-Sep-2023 12:07:00  16-Sep-2023 09:12:00  16-Sep-2023 00:57:00  15-Sep-2023 22:52:00    Glucose-POCT 356   H   171   H   176   H   303   H   274   H   111   H   96   127   H        Complete Blood Count  Trending View      Result 17-Sep-2023 04:42:00  16-Sep-2023 08:11:00    White Blood Cell Count 7.8   7.7    Red Blood Cell Count 2.63   L   2.85   L    HGB 8.2   L   8.8   L    HCT 24.7   L   27.8   L    MCV 94   98    MCHC 33.2   31.7   L       189    RDW-CV 12.0   12.0        Basic Metabolic Panel  17-Sep-2023 04:42:00      Result Value    Glucose, Serum  203   H   NA  121   L   K  4.8    CL  92   L   Bicarbonate, Serum  23    Anion Gap, Serum  11    BUN  40   H   CREAT  2.12   H   GFR Female  21   A   Calcium, Serum  8.1   L     Brain Natriuretic Peptide  17-Sep-2023 04:42:00      Result Value    Brain Natriuretic Peptide  555   H     Sodium, Serum  16-Sep-2023 18:58:00      Result Value    NA  121   L     Urinalysis  16-Sep-2023 10:20:00      Result Value    Color, Urine  YELLOW  Reference Range: STRAW,YELLOW    Appearance, Urine  CLEAR     Specific Gravity, Urine  1.008    pH, Urine  5.0    Protein, Urine  100(2+)   A   Glucose, Urine  NEGATIVE    Blood, Urine  NEGATIVE    Ketones, Urine  NEGATIVE    Bilirubin, Urine  NEGATIVE    Urobilinogen, Urine  <2.0    Nitrite, Urine  NEGATIVE    Leukocyte Esterase, Urine  NEGATIVE      Urinalysis, Microscopic  16-Sep-2023 10:20:00      Result Value    White Cells  1    Red Blood Cells  1    Epithelial Cells, Squamous  1      Sodium, Urine Spot  16-Sep-2023 10:20:00      Result Value    Sodium Urine Spot  17    Sodium/Creat Ratio  31    Creatinine, Urine Spot  54.6      Osmolality, Urine Spot  16-Sep-2023 10:20:00      Result Value    Osmolality, Urine Spot  210      Hemoglobin A1C, Level  16-Sep-2023 08:11:00      Result Value    Estimated Average Glucose  143    Hemoglobin A1C, Level  6.6 Diagnosis of Diabetes-Adults Non-Diabetic: < or = 5.6% Increased risk for developing diabetes: 5.7-6.4% Diagnostic of diabetes: > or = 6.5%.     Monitoring  of Diabetes              Age (y)     Therapeutic Goal (%) Adults:          >1   A     Osmolality, Serum  16-Sep-2023 08:11:00      Result Value    Osmolality, Serum  264   L       Radiology Results:    Results:        Conclusion:  CONCLUSIONS:  1. Poorly visualized anatomical structures due to suboptimal image quality.  2. Left ventricular systolic function is hyperdynamic with a 70-75% estimated ejection fraction.  3. Spectral Doppler shows an impaired relaxation pattern of left ventricular diastolic filling.  4. There is moderate concentric left ventricular hypertrophy.  5. Left ventricular cavity size is decreased.  6. The left atrium is enlarged.  7. Mild to moderately elevated right ventricular systolic pressure.  8. Mild aortic valve stenosis.    QUANTITATIVE DATA SUMMARY:  2D MEASUREMENTS:  Normal Ranges:  LAs:           3.15 cm    (2.7-4.0cm)  IVSd:          1.40 cm    (0.6-1.1cm)  LVPWd:         1.40 cm    (0.6-1.1cm)  LVIDd:         3.80 cm     (3.9-5.9cm)  LVIDs:         2.50 cm  LV Mass Index: 100.8 g/m2  LV % FS        34.2 %    LA VOLUME:  Normal Ranges:  LA Vol A4C:        131.3 ml   (22+/-6mL/m2)  LA Vol A2C:        88.2 ml  LA Vol BP:         109.2 ml  LA Vol Index A4C:  68.2ml/m2  LA Vol Index A2C:  45.8 ml/m2  LA Vol IndexBP:   56.7 ml/m2  LA Area A4C:       29.7 cm2  LA Area A2C:       24.0 cm2  LA Major Axis A4C: 5.7 cm  LA Major Axis A2C: 5.6 cm  LA Volume Index:   56.7 ml/m2    RA VOLUME BY A/L METHOD:  Normal Ranges:  RA Area A4C: 13.0 cm2    AORTA MEASUREMENTS:  Normal Ranges:  Ao Sinus, d: 2.67 cm (2.1-3.5cm)  Ao STJ, d:   2.38 cm (1.7-3.4cm)  Asc Ao, d:   3.04 cm (2.1-3.4cm)    LV SYSTOLIC FUNCTION BY 2D PLANIMETRY (MOD):  Normal Ranges:  EF-A4C View: 61.9 % (>=55%)  EF-A2C View: 67.4 %  EF-Biplane:  64.6 %    LV DIASTOLIC FUNCTION:  Normal Ranges:  MV Peak E:        0.48 m/s    (0.7-1.2 m/s)  MV Peak A:        1.10 m/s    (0.42-0.7 m/s)  E/A Ratio:        0.44        (1.0-2.2)  MV e'             0.04 m/s    (>8.0)  MV lateral e'     0.06 m/s  MV medial e'      0.04 m/s  MV A Dur:         156.00 msec  E/e' Ratio:       11.99       (<8.0)  PulmV Sys Maikel:    51.10 cm/s  PulmV Marshall Maikel:   32.90 cm/s  PulmV S/D Maikel:    1.60  PulmV A Revs Maikel: 21.20 cm/s  PulmV A Revs Dur: 124.00 msec    MITRAL VALVE:  Normal Ranges:  MV DT: 172 msec (150-240msec)    AORTIC VALVE:  Normal Ranges:  AoV Vmax:                2.46 m/s  (<=1.7m/s)  AoV Peak P.2 mmHg (<20mmHg)  AoV Mean P.0 mmHg (1.7-11.5mmHg)  LVOT Max Maikel:            1.60 m/s  (<=1.1m/s)  AoV VTI:                 51.80 cm  (18-25cm)  LVOT VTI:                34.40 cm  LVOT Diameter:           1.70 cm   (1.8-2.4cm)  AoV Area, VTI:           1.51 cm2  (2.5-5.5cm2)  AoV Area,Vmax:           1.48 cm2  (2.5-4.5cm2)  AoV Dimensionless Index: 0.66      RIGHT VENTRICLE:  RV Basal 3.35 cm  RV Mid   2.45 cm  RV Major 7.0 cm    TRICUSPID VALVE/RVSP:  Normal Ranges:  Peak TR  "Velocity: 3.28 m/s  Est. RA Pressure: 3 mmHg  RV Syst Pressure: 46.0 mmHg (< 30mmHg)  IVC Diam:   0.93 cm    PULMONIC VALVE:  Normal Ranges:  PV Accel Time: 129 msec (>120ms)  PV Max Maikel:    1.3 m/s  (0.6-0.9m/s)  PV Max P.1 mmHg    Pulmonary Veins:  PulmV A Revs Dur: 124.00 msec  PulmV A Revs Maikel: 21.20 cm/s  PulmV Marshall Maikel:   32.90 cm/s  PulmV S/D Maikel:    1.60  PulmV Sys Maikel:    51.10 cm/s      [id number] Adam Farmer MD  Electronically signed on 2023 at 8:17:32 AM        *** Final ***     Echocardiogram [Sep 17 2023  8:17AM]      Impression:    Cardiomegaly. No acute pulmonary process.     Xray Chest 1 View [Sep 15 2023  9:41PM]        Assessment:    ALICIA IRENE is a 89 year old Female with a past medical history of GERD, htn, hld, anemia (baseline 9), DM (insulin), lumbar stenosis, and lymphedema presents to  [facility] ER with hypoglycemia and hypertension.  Patient suffered a recent fall as of late on admit to the ER her blood pressure was 223/71, heart rate 57, 98% on room air.  Her blood sugar on admit was 63.  Cardiology was consulted on this stay for \"resistant  htn, new findings on echo\".  I personally reviewed the patient?s recent labs, medications, orders, EKGs, and pertinent cardiac imaging/ echocardiography.     #Hypertensive emergency  -BP on admit 223/71  -Home BP meds include 100 metoprolol succinate daily and hydralazine 25 mg twice daily per patient  -Start Coreg 12.5 mg twice daily, amlodipine 10 mg daily, and continue with hydralazine 25 mg twice daily    #Chronic HFpEF  -TTE  Poorly visualized anatomical structures due to suboptimal image quality.  2. Left ventricular systolic function is hyperdynamic with a 70-75% estimated ejection fraction.  3. Spectral Doppler shows an impaired relaxation pattern of left ventricular diastolic filling.  -admit   -Chest x-ray clear, on room air, denies shortness of breath    #OLGA LIDIA on CKD  -Baseline creatinine 1.5  -Today it is 2.12  -hold " her ACE/ ARB for now     RECS:   -Continue with blood pressure meds above, treat OLGA LIDIA, can follow-up with Dr. Pete Iniguez MD or Lauren Pappas CNP in 4-6 weeks.     Thank you for allowing me to participate in their care.  Please feel free to call me with any further questions or concerns.        Louie Iniguez MD, [facility], [facility], [facility]  Division of Cardiovascular Medicine  Medical Director, [facility], [facility]  , Medicine, [employer]  [email or web address]  pager: [phone]     I have examined the patient and edited the documented physical examination as necessary.  I have reviewed the NAZANIN?s encounter note, approve the NAZANIN?s documentation and have edited the note to reflect our diagnostic and therapeutic plan.     Consult Status:  Consult Order ID: [id number]       Electronic Signatures:  Amena Bowers (APRN-CNP)  (Signed 17-Sep-2023 13:18)   Authored: Service, History of Present Illness, Allergies,  Objective, Assessment/Recommendations  Louie Iniguez)  (Signed 17-Sep-2023 17:19)   Authored: Assessment/Recommendations, Note Completion      Last Updated: 17-Sep-2023 17:19 by Louie Iniguez)

## 2024-03-08 DIAGNOSIS — N18.30 ANEMIA DUE TO STAGE 3 CHRONIC KIDNEY DISEASE, UNSPECIFIED WHETHER STAGE 3A OR 3B CKD (MULTI): Primary | ICD-10-CM

## 2024-03-08 DIAGNOSIS — D63.1 ANEMIA DUE TO STAGE 3 CHRONIC KIDNEY DISEASE, UNSPECIFIED WHETHER STAGE 3A OR 3B CKD (MULTI): Primary | ICD-10-CM

## 2024-03-08 RX ORDER — EPINEPHRINE 0.3 MG/.3ML
0.3 INJECTION SUBCUTANEOUS EVERY 5 MIN PRN
OUTPATIENT
Start: 2024-03-08

## 2024-03-08 RX ORDER — DIPHENHYDRAMINE HYDROCHLORIDE 50 MG/ML
50 INJECTION INTRAMUSCULAR; INTRAVENOUS AS NEEDED
OUTPATIENT
Start: 2024-03-08

## 2024-03-08 RX ORDER — ALBUTEROL SULFATE 0.83 MG/ML
3 SOLUTION RESPIRATORY (INHALATION) AS NEEDED
OUTPATIENT
Start: 2024-03-08

## 2024-03-08 RX ORDER — FAMOTIDINE 10 MG/ML
20 INJECTION INTRAVENOUS ONCE AS NEEDED
OUTPATIENT
Start: 2024-03-08

## 2024-03-09 ASSESSMENT — ENCOUNTER SYMPTOMS
POLYDIPSIA: 0
POLYPHAGIA: 0

## 2024-03-09 NOTE — PROGRESS NOTES
Pharmacy Post-Discharge Visit Follow-Up  Carolann Cartagena is a 89 y.o. female was referred to Clinical Pharmacy Team to complete a post-discharge medication optimization and monitoring visit which was initially complete on 02/26/24.  The patient was referred for their Diabetes and Congestive Heart Failure. Appointment was completed today with patient's daughter Martha.     Admission Date: 01/31/24  Discharge Date: 02/05/24     Referring Provider: Josh Rosa MD    Subjective   Allergies   Allergen Reactions    Dorzolamide Other     eye infection    Caused infection in her eyes.    Lisinopril Swelling and Unknown     cough    Losartan Hives       [facility] - [place], [place] - [address]  [address]  [address]  [facility] [address]  Phone: [phone] Fax: [phone]    [facility] - [place], [place] - [address] [place] [address] C  [address] [place] [id number]  Phone: [phone] Fax: [phone]      Social History     Social History Narrative    Not on file      Family History   Problem Relation Name Age of Onset    No Known Problems Mother      Bone cancer Sister      Lung cancer Daughter         Diabetes  She presents for her follow-up diabetic visit. She has type 2 diabetes mellitus. The initial diagnosis of diabetes was made 43 years ago. Her disease course has been stable. There are no hypoglycemic associated symptoms. There are no diabetic associated symptoms. Pertinent negatives for diabetes include no polydipsia and no polyphagia. There are no hypoglycemic complications. Symptoms are stable. Diabetic complications include heart disease and nephropathy. Risk factors for coronary artery disease include diabetes mellitus, dyslipidemia, hypertension, obesity and post-menopausal. Current diabetic treatment includes insulin injections. She is compliant with treatment all of the time. When asked about meal planning, she reported none. She participates in  exercise intermittently. Her overall blood glucose range is 140-180 mg/dl. An ACE inhibitor/angiotensin II receptor blocker is not being taken. She sees a podiatrist.Eye exam is current.   Congestive Heart Failure  Presents for follow-up visit. The disease course has been stable. The symptoms have been stable. Treatments tried: diuretics. The treatment provided mild relief. Compliance with prior treatments has been good. Her past medical history is significant for DM and HTN.     Patient follows with Dr. Iniguez with cardiology.     Patient is using: glucometer  The patient is currently checking the blood glucose 2-3 times per day.  Blood sugar has been much better this week  Had been running in the 190s-200s the past few weeks, in the 130s this week     Hypoglycemia frequency: states only one episode since September   Hypoglycemia awareness: Yes     Diet:   Breakfast: cereal with a 1/4 banana, sometimes turkey pires, toast with butter/jelly, hardboiled egg   Lunch: protein meals she was given from the hospital (states they are not diabetic)  Dinner: fish, brown rice, vegetables, sometimes spaghetti but not a lot, sometimes pizza; turkey kielbasa with rice and vegetable; homemade soups   Snacks: very seldom snacks, if she does she'll have some cookies or some of a Glucerna shake, peanut butter and crackers   Fluids: diet pop, water, fruit juices     Physical Activity   Sets a lot in her chair, tries to walk throughout the day at least once every hour to get out of her chair      Current Diabetes Medications:   Insulin NPH (Novolin N) 100 unit/mL 18 units twice daily before meals     Historical Diabetes Medications:  None      Secondary Prevention  Statin? Yes, Atorvastatin 10 mg   ACE-I/ARB? No  Aspirin? Yes, 81 mg daily     Health Maintenance:   Foot Exam: Nov 2023   Eye Exam: Oct/Nov 2023   Lipid Panel:  mg/dL, TG 86 mg/dL 02/22/23  Urine Albumin: 31.0 ug/mg crt 02/11/20  Influenza Vaccine: does not receive  flu shots   Pneumonia Vaccine: PPCV23 09/21/2001, 09/12/2014      CHF Assessment    Symptom/Staging:  Most recent ejection fraction: 70-75% on 09/16/23  Classification: heart failure with preserved ejection fraction   NYHA Stage: II - patient will walk around the house without issues, will get short of breath and tired with increased activity   Weight changes?: No    Guideline-Directed Medical Therapy:  ARNI: No  If no, then ACEi/ARB?: No  Beta Blocker: Yes, describe: Carvedilol 25 mg twice daily   MRA: No  SGLT2i: No    Other Current CHF Pharmacotherapy:   Hydralazine 100 mg 3 times a day (increased on their own back to 3 times daily due to high blood pressure)    Historical CHF Pharmacotherapy:   Furosemide 20 mg (patient reports she was told to stop taking while in the hospital)    Secondary Prevention:  HTN?: Yes, describe: last in-office /67 on 02/14/24  Home BP Monitor?: Yes  Home BP Readings: states that blood pressures are elevated but she is able to bring them down with home remedies     Objective     LAB  Lab Results   Component Value Date    BILITOT 0.5 01/31/2024    CALCIUM 9.2 02/12/2024    CO2 26 02/12/2024     02/12/2024    CREATININE 2.09 (H) 02/12/2024    GLUCOSE 197 (H) 02/12/2024    ALKPHOS 64 01/31/2024    K 4.3 02/12/2024    PROT 7.0 01/31/2024     02/12/2024    AST 22 01/31/2024    ALT 15 01/31/2024    BUN 34 (H) 02/12/2024    ANIONGAP 13 02/12/2024    MG 1.80 02/01/2024    PHOS 4.1 02/12/2024    ALBUMIN 3.4 02/12/2024    LIPASE 66 10/05/2023    GFRF 20 (A) 09/27/2023     Lab Results   Component Value Date    TRIG 86 02/22/2023    CHOL 183 02/22/2023    HDL 57.1 02/22/2023     Lab Results   Component Value Date    HGBA1C 6.6 (H) 01/08/2024    HGBA1C 6.3 (H) 10/11/2023    HGBA1C 6.6 (A) 09/16/2023     The ASCVD Risk score (Rodolfo CLEMENS, et al., 2019) failed to calculate for the following reasons:    The 2019 ASCVD risk score is only valid for ages 40 to 79    The patient has a  "prior MI or stroke diagnosis    MEDICATION RECONCILIATION  Changed:  Hydralazine 100 mg 3 times daily (changed from twice daily, patient's daughter states she has been giving patient 3 times daily to help with high blood pressures)  Removed:   Cholecalciferol 50 mcg (patient no longer taking)  Fluticasone nasal spray (patient no longer taking)  Hydrocolloid dressing (daughter reports patient no longer using)  Notes:   Patient's daughter states that she is not currently taking furosemide but she wants to ask the patient's providers to see if it's something she should be taking     Current Outpatient Medications on File Prior to Visit   Medication Sig Dispense Refill    Alphagan P 0.1 % ophthalmic solution Administer 1 drop into both eyes twice a day.      aspirin 81 mg EC tablet Take 1 tablet (81 mg) by mouth once daily.      atorvastatin (Lipitor) 10 mg tablet Take 1 tablet (10 mg) by mouth once daily. 90 tablet 3    Autolet lancing device Use as instructed 1 each 0    blood sugar diagnostic (OneTouch Ultra Test) strip 1 strip 3 times a day. 200 strip 11    blood-glucose meter misc Use to test glucose 3 times daily 1 each 0    carvedilol (Coreg) 25 mg tablet Take 1 tablet (25 mg) by mouth 2 times a day with meals. 60 tablet 11    hydrALAZINE (Apresoline) 100 mg tablet Take 1 tablet (100 mg) by mouth 2 times a day. (Patient taking differently: Take 1 tablet (100 mg) by mouth 3 times a day.) 90 tablet 11    insulin NPH, Isophane, (HumuLIN N,NovoLIN N) 100 unit/mL (3 mL) injection Inject 12 Units under the skin 2 times a day before meals. Take as directed per insulin instructions. (Patient taking differently: Inject 18 Units under the skin 2 times a day before meals. Take as directed per insulin instructions.) 8 mL 2    insulin syringe-needle U-100 (BD Insulin Syringe Ultra-Fine) 31G X 5/16\" 1 mL syringe Inject 1 each under the skin 2 times a day. Use as instructed 100 each 3    insulin syringe-needle U-100 31G X 5/16\" " "0.3 mL syringe Use as instructed 100 each 11    lancets (OneTouch Delica Plus Lancet) 30 gauge misc Use to test glucose 3 times daily 100 each 11    latanoprost (Xalatan) 0.005 % ophthalmic solution Administer 1 drop into both eyes once daily at bedtime.      timolol (Timoptic) 0.5 % ophthalmic solution Administer 1 drop into both eyes once daily.      furosemide (Lasix) 20 mg tablet Take 2 tablets (40 mg) by mouth once daily. (Patient not taking: Reported on 2024) 180 tablet 3    [DISCONTINUED] cholecalciferol (Vitamin D-3) 50 MCG (2000 UT) tablet Take 1 tablet (2,000 Units) by mouth once daily.      [DISCONTINUED] fluticasone (Flonase) 50 mcg/actuation nasal spray Administer 1 spray into each nostril once daily. Shake gently. Before first use, prime pump. After use, clean tip and replace cap. 16 g 11    [DISCONTINUED] hydrocolloid dressing 1 3/4 X 1 1/2 \" bandage Apply 1 patch topically once daily as needed (wear daily for pressure ulcer). 10 each 1    [DISCONTINUED] sodium bicarbonate 650 mg tablet Take 1 tablet (650 mg) by mouth 2 times a day. 2 tablet 29     No current facility-administered medications on file prior to visit.      DRUG INTERACTIONS  No significant drug-drug interactions exist that require adjustment to medication therapy     PATIENT GOALS   Fasting B - 130 mg/dL  Postprandial BG: less than 180 mg/dL  A1c: less than 7%     Assessment/Plan   Problem List Items Addressed This Visit       Chronic diastolic congestive heart failure (CMS/HCC) (Chronic)     Carolann Cartagena has heart failure with preserved ejection fraction as evidenced by her most recent EF of 70-75% on 23 with NYHA function class II as evidenced by patient able to walk around the house without issues but will feel fatigued with occasional shortness of breath with increased activity.   Patient's daughter mentioned that her blood pressures have continued to be high at home up into the 200s systolic sometimes. She states " she gives the patient a home remedy and it helps her blood pressure come down. She was encouraged to reach out to Dr. Iniguez's office if her blood pressure continues to be significantly elevated. She has been giving patient hydralazine 3 times daily instead of the reduced dose twice daily to help with high blood pressures.  CONTINUE:   Carvedilol 25 mg 1 tablet by mouth twice daily   Hydralazine 100 mg 1 tablet by mouth 3 times daily   Patient's daughter states furosemide was discontinued while patient was in the hospital. She wants to reach out to patient's providers to see if it is something the patient should still be taking.   Patient will continue to follow-up with cardiology as scheduled.          Diabetes mellitus (CMS/[facility]) - Primary     Patient has controlled type 2 diabetes mellitus complicated by dyslipidemia, hypertension, and obesity as evidenced by her most recent A1c of 6.6% on 01/08/24 which had increased slightly from 6.3% on 10/11/23 but is still below goal of <7%.   CONTINUE: Insulin NPH (Humulin N) 100 unit/mL 18 units under the skin twice daily before meals   Patient is not interested in using a CGM at this time.   Patient's blood sugars have been elevated over the past few weeks but her daughter states that they have come down the past week and have been better. Patient is not due for an updated A1c for 6 months as Medicare will only cover one every 6 months if previous was <8%. A1c ordered for patient to have completed no earlier than 07/08/24.   We will follow-up after A1c to go over results. They were encouraged to reach out prior to next follow-up with any questions or concerns.          Relevant Orders    Hemoglobin A1c    Follow Up In Clinical Pharmacy     Pharmacy Follow-Up: 07/15/24  PCP Follow-Up: 04/12/24  Cardiology Follow-Up: 05/15/24    Continue all meds under the continuation of care with the referring provider and clinical pharmacy team.    Javier Milner, PharmD     Verbal  consent to manage patient's drug therapy was obtained from the patient's daughter Martha. They were informed they may decline to participate or withdraw from participation in pharmacy services at any time.

## 2024-03-11 ENCOUNTER — TELEMEDICINE (OUTPATIENT)
Dept: PHARMACY | Facility: HOSPITAL | Age: 89
End: 2024-03-11
Payer: MEDICARE

## 2024-03-11 DIAGNOSIS — Z79.4 TYPE 2 DIABETES MELLITUS WITH CHRONIC KIDNEY DISEASE, WITH LONG-TERM CURRENT USE OF INSULIN, UNSPECIFIED CKD STAGE (MULTI): Primary | ICD-10-CM

## 2024-03-11 DIAGNOSIS — I50.32 CHRONIC DIASTOLIC CONGESTIVE HEART FAILURE (MULTI): Chronic | ICD-10-CM

## 2024-03-11 DIAGNOSIS — E11.22 TYPE 2 DIABETES MELLITUS WITH CHRONIC KIDNEY DISEASE, WITH LONG-TERM CURRENT USE OF INSULIN, UNSPECIFIED CKD STAGE (MULTI): Primary | ICD-10-CM

## 2024-03-11 ASSESSMENT — ENCOUNTER SYMPTOMS: DIABETIC ASSOCIATED SYMPTOMS: 0

## 2024-03-11 NOTE — ASSESSMENT & PLAN NOTE
Carolann Cartagena has heart failure with preserved ejection fraction as evidenced by her most recent EF of 70-75% on 09/16/23 with NYHA function class II as evidenced by patient able to walk around the house without issues but will feel fatigued with occasional shortness of breath with increased activity.   Patient's daughter mentioned that her blood pressures have continued to be high at home up into the 200s systolic sometimes. She states she gives the patient a home remedy and it helps her blood pressure come down. She was encouraged to reach out to Dr. Iniguez's office if her blood pressure continues to be significantly elevated. She has been giving patient hydralazine 3 times daily instead of the reduced dose twice daily to help with high blood pressures.  CONTINUE:   Carvedilol 25 mg 1 tablet by mouth twice daily   Hydralazine 100 mg 1 tablet by mouth 3 times daily   Patient's daughter states furosemide was discontinued while patient was in the hospital. She wants to reach out to patient's providers to see if it is something the patient should still be taking.   Patient will continue to follow-up with cardiology as scheduled.

## 2024-03-11 NOTE — ASSESSMENT & PLAN NOTE
Patient has controlled type 2 diabetes mellitus complicated by dyslipidemia, hypertension, and obesity as evidenced by her most recent A1c of 6.6% on 01/08/24 which had increased slightly from 6.3% on 10/11/23 but is still below goal of <7%.   CONTINUE: Insulin NPH (Humulin N) 100 unit/mL 18 units under the skin twice daily before meals   Patient is not interested in using a CGM at this time.   Patient's blood sugars have been elevated over the past few weeks but her daughter states that they have come down the past week and have been better. Patient is not due for an updated A1c for 6 months as Medicare will only cover one every 6 months if previous was <8%. A1c ordered for patient to have completed no earlier than 07/08/24.   We will follow-up after A1c to go over results. They were encouraged to reach out prior to next follow-up with any questions or concerns.

## 2024-03-12 DIAGNOSIS — I10 ESSENTIAL HYPERTENSION: ICD-10-CM

## 2024-03-12 RX ORDER — HYDRALAZINE HYDROCHLORIDE 100 MG/1
100 TABLET, FILM COATED ORAL 2 TIMES DAILY
Qty: 90 TABLET | Refills: 11 | Status: SHIPPED | OUTPATIENT
Start: 2024-03-12

## 2024-03-12 RX ORDER — CARVEDILOL 25 MG/1
25 TABLET ORAL
Qty: 60 TABLET | Refills: 11 | Status: SHIPPED | OUTPATIENT
Start: 2024-03-12 | End: 2024-05-15 | Stop reason: SDUPTHER

## 2024-03-22 ENCOUNTER — APPOINTMENT (OUTPATIENT)
Dept: PRIMARY CARE | Facility: CLINIC | Age: 89
End: 2024-03-22
Payer: MEDICARE

## 2024-03-25 DIAGNOSIS — I63.9 CEREBROVASCULAR ACCIDENT (CVA), UNSPECIFIED MECHANISM (MULTI): ICD-10-CM

## 2024-03-26 ENCOUNTER — TELEPHONE (OUTPATIENT)
Dept: HOME HEALTH SERVICES | Facility: HOME HEALTH | Age: 89
End: 2024-03-26
Payer: MEDICARE

## 2024-03-26 NOTE — TELEPHONE ENCOUNTER
Thank you for your home care referral for this patient.  Please be advised that we do not currently have a Home Health Aide  in this service area. We can process the referral for all other disciplines at this time. If a HHA is required, please send this referral to another agency.

## 2024-03-29 ENCOUNTER — TELEPHONE (OUTPATIENT)
Dept: HOME HEALTH SERVICES | Facility: HOME HEALTH | Age: 89
End: 2024-03-29
Payer: MEDICARE

## 2024-03-30 ENCOUNTER — HOME HEALTH ADMISSION (OUTPATIENT)
Dept: HOME HEALTH SERVICES | Facility: HOME HEALTH | Age: 89
End: 2024-03-30
Payer: MEDICARE

## 2024-03-30 ENCOUNTER — DOCUMENTATION (OUTPATIENT)
Dept: HOME HEALTH SERVICES | Facility: HOME HEALTH | Age: 89
End: 2024-03-30
Payer: MEDICARE

## 2024-03-30 ENCOUNTER — TELEPHONE (OUTPATIENT)
Dept: HOME HEALTH SERVICES | Facility: HOME HEALTH | Age: 89
End: 2024-03-30
Payer: MEDICARE

## 2024-03-30 NOTE — TELEPHONE ENCOUNTER
This referral has been made a Non Admit with  Home Care due to order clarification needed and no order or information updated for over 72 hours.  . If you have further questions, feel free to reach out to our office at [phone]. Thank you, [facility] Intake.    Please submit new home care orders for your patient if we can meet their home care requirements without the home health aide.  If not, please find another home care agency

## 2024-03-30 NOTE — HH CARE COORDINATION
This referral has been made a Non Admit with  Home Care due to Staffing Constraints. If you have further questions, feel free to reach out to our office at [phone]. Thank you, [facility] Intake.

## 2024-04-09 ENCOUNTER — APPOINTMENT (OUTPATIENT)
Dept: PRIMARY CARE | Facility: CLINIC | Age: 89
End: 2024-04-09
Payer: MEDICARE

## 2024-04-12 ENCOUNTER — APPOINTMENT (OUTPATIENT)
Dept: PRIMARY CARE | Facility: CLINIC | Age: 89
End: 2024-04-12
Payer: MEDICARE

## 2024-04-17 ENCOUNTER — HOME HEALTH ADMISSION (OUTPATIENT)
Dept: HOME HEALTH SERVICES | Facility: HOME HEALTH | Age: 89
End: 2024-04-17
Payer: MEDICARE

## 2024-04-17 ENCOUNTER — DOCUMENTATION (OUTPATIENT)
Dept: HOME HEALTH SERVICES | Facility: HOME HEALTH | Age: 89
End: 2024-04-17
Payer: MEDICARE

## 2024-04-17 DIAGNOSIS — M48.061 SPINAL STENOSIS OF LUMBAR REGION, UNSPECIFIED WHETHER NEUROGENIC CLAUDICATION PRESENT: ICD-10-CM

## 2024-04-17 DIAGNOSIS — E11.22 TYPE 2 DIABETES MELLITUS WITH CHRONIC KIDNEY DISEASE, WITH LONG-TERM CURRENT USE OF INSULIN, UNSPECIFIED CKD STAGE (MULTI): ICD-10-CM

## 2024-04-17 DIAGNOSIS — H40.1131 PRIMARY OPEN ANGLE GLAUCOMA OF BOTH EYES, MILD STAGE: ICD-10-CM

## 2024-04-17 DIAGNOSIS — R26.9 GAIT DISORDER: ICD-10-CM

## 2024-04-17 DIAGNOSIS — N18.4 STAGE 4 CHRONIC KIDNEY DISEASE (MULTI): ICD-10-CM

## 2024-04-17 DIAGNOSIS — L89.90 PRESSURE INJURY OF SKIN, UNSPECIFIED INJURY STAGE, UNSPECIFIED LOCATION: ICD-10-CM

## 2024-04-17 DIAGNOSIS — M79.89 LEG SWELLING: ICD-10-CM

## 2024-04-17 DIAGNOSIS — I63.9 CEREBROVASCULAR ACCIDENT (CVA), UNSPECIFIED MECHANISM (MULTI): ICD-10-CM

## 2024-04-17 DIAGNOSIS — Z79.4 TYPE 2 DIABETES MELLITUS WITH CHRONIC KIDNEY DISEASE, WITH LONG-TERM CURRENT USE OF INSULIN, UNSPECIFIED CKD STAGE (MULTI): ICD-10-CM

## 2024-04-17 NOTE — HH CARE COORDINATION
Home Care received a Referral for Nursing, Physical Therapy, and Occupational Therapy. We have processed the referral for a Start of Care on 4/18-4/19.     If you have any questions or concerns, please feel free to contact us at [phone]. Follow the prompts, enter your five digit zip code, and you will be directed to your care team on [facility].

## 2024-04-23 ENCOUNTER — CLINICAL SUPPORT (OUTPATIENT)
Dept: PRIMARY CARE | Facility: CLINIC | Age: 89
End: 2024-04-23
Payer: MEDICARE

## 2024-04-23 DIAGNOSIS — E78.00 ELEVATED LDL CHOLESTEROL LEVEL: ICD-10-CM

## 2024-04-23 DIAGNOSIS — N18.4 STAGE 4 CHRONIC KIDNEY DISEASE (MULTI): ICD-10-CM

## 2024-04-23 DIAGNOSIS — I10 BENIGN ESSENTIAL HYPERTENSION: ICD-10-CM

## 2024-04-23 DIAGNOSIS — E03.9 HYPOTHYROIDISM, UNSPECIFIED TYPE: ICD-10-CM

## 2024-04-23 DIAGNOSIS — E78.5 HYPERLIPIDEMIA, UNSPECIFIED HYPERLIPIDEMIA TYPE: Chronic | ICD-10-CM

## 2024-04-23 DIAGNOSIS — D50.9 IRON DEFICIENCY ANEMIA, UNSPECIFIED IRON DEFICIENCY ANEMIA TYPE: ICD-10-CM

## 2024-04-23 DIAGNOSIS — Z00.00 ENCOUNTER FOR ANNUAL WELLNESS VISIT (AWV) IN MEDICARE PATIENT: ICD-10-CM

## 2024-04-23 DIAGNOSIS — I10 ESSENTIAL HYPERTENSION: Chronic | ICD-10-CM

## 2024-04-23 DIAGNOSIS — Z79.4 TYPE 2 DIABETES MELLITUS WITHOUT COMPLICATION, WITH LONG-TERM CURRENT USE OF INSULIN (MULTI): ICD-10-CM

## 2024-04-23 DIAGNOSIS — E55.9 VITAMIN D DEFICIENCY: ICD-10-CM

## 2024-04-23 DIAGNOSIS — E11.9 TYPE 2 DIABETES MELLITUS WITHOUT COMPLICATION, WITH LONG-TERM CURRENT USE OF INSULIN (MULTI): ICD-10-CM

## 2024-04-23 DIAGNOSIS — I10 PRIMARY HYPERTENSION: ICD-10-CM

## 2024-04-23 LAB
25(OH)D3 SERPL-MCNC: 54 NG/ML (ref 30–100)
ALBUMIN SERPL BCP-MCNC: 3.3 G/DL (ref 3.4–5)
ALP SERPL-CCNC: 50 U/L (ref 33–136)
ALT SERPL W P-5'-P-CCNC: 12 U/L (ref 7–45)
ANION GAP SERPL CALC-SCNC: 15 MMOL/L (ref 10–20)
AST SERPL W P-5'-P-CCNC: 18 U/L (ref 9–39)
BILIRUB SERPL-MCNC: 0.6 MG/DL (ref 0–1.2)
BUN SERPL-MCNC: 29 MG/DL (ref 6–23)
CALCIUM SERPL-MCNC: 8.7 MG/DL (ref 8.6–10.6)
CHLORIDE SERPL-SCNC: 102 MMOL/L (ref 98–107)
CHOLEST SERPL-MCNC: 132 MG/DL (ref 0–199)
CHOLESTEROL/HDL RATIO: 3.3
CO2 SERPL-SCNC: 22 MMOL/L (ref 21–32)
CREAT SERPL-MCNC: 1.72 MG/DL (ref 0.5–1.05)
EGFRCR SERPLBLD CKD-EPI 2021: 27 ML/MIN/1.73M*2
ERYTHROCYTE [DISTWIDTH] IN BLOOD BY AUTOMATED COUNT: 13.2 % (ref 11.5–14.5)
EST. AVERAGE GLUCOSE BLD GHB EST-MCNC: 105 MG/DL
GLUCOSE SERPL-MCNC: 96 MG/DL (ref 74–99)
HBA1C MFR BLD: 5.3 %
HCT VFR BLD AUTO: 26.2 % (ref 36–46)
HDLC SERPL-MCNC: 40.5 MG/DL
HGB BLD-MCNC: 8 G/DL (ref 12–16)
LDLC SERPL CALC-MCNC: 79 MG/DL
MCH RBC QN AUTO: 31.1 PG (ref 26–34)
MCHC RBC AUTO-ENTMCNC: 30.5 G/DL (ref 32–36)
MCV RBC AUTO: 102 FL (ref 80–100)
NON HDL CHOLESTEROL: 92 MG/DL (ref 0–149)
NRBC BLD-RTO: 0 /100 WBCS (ref 0–0)
PLATELET # BLD AUTO: 217 X10*3/UL (ref 150–450)
POTASSIUM SERPL-SCNC: 4.2 MMOL/L (ref 3.5–5.3)
PROT SERPL-MCNC: 6.4 G/DL (ref 6.4–8.2)
RBC # BLD AUTO: 2.57 X10*6/UL (ref 4–5.2)
SODIUM SERPL-SCNC: 135 MMOL/L (ref 136–145)
TRIGL SERPL-MCNC: 62 MG/DL (ref 0–149)
TSH SERPL-ACNC: 4.01 MIU/L (ref 0.44–3.98)
VLDL: 12 MG/DL (ref 0–40)
WBC # BLD AUTO: 4.8 X10*3/UL (ref 4.4–11.3)

## 2024-04-23 PROCEDURE — 80061 LIPID PANEL: CPT

## 2024-04-23 PROCEDURE — 84443 ASSAY THYROID STIM HORMONE: CPT

## 2024-04-23 PROCEDURE — 83036 HEMOGLOBIN GLYCOSYLATED A1C: CPT

## 2024-04-23 PROCEDURE — 80053 COMPREHEN METABOLIC PANEL: CPT

## 2024-04-23 PROCEDURE — 82306 VITAMIN D 25 HYDROXY: CPT

## 2024-04-23 PROCEDURE — 85027 COMPLETE CBC AUTOMATED: CPT

## 2024-04-23 PROCEDURE — 36415 COLL VENOUS BLD VENIPUNCTURE: CPT

## 2024-04-30 ENCOUNTER — OFFICE VISIT (OUTPATIENT)
Dept: PRIMARY CARE | Facility: CLINIC | Age: 89
End: 2024-04-30
Payer: MEDICARE

## 2024-04-30 VITALS
HEART RATE: 62 BPM | BODY MASS INDEX: 30.55 KG/M2 | OXYGEN SATURATION: 93 % | SYSTOLIC BLOOD PRESSURE: 131 MMHG | RESPIRATION RATE: 12 BRPM | WEIGHT: 178 LBS | DIASTOLIC BLOOD PRESSURE: 54 MMHG

## 2024-04-30 DIAGNOSIS — E11.21 DIABETIC NEPHROPATHY ASSOCIATED WITH TYPE 2 DIABETES MELLITUS (MULTI): Chronic | ICD-10-CM

## 2024-04-30 DIAGNOSIS — Z79.4 TYPE 2 DIABETES MELLITUS WITHOUT COMPLICATION, WITH LONG-TERM CURRENT USE OF INSULIN (MULTI): ICD-10-CM

## 2024-04-30 DIAGNOSIS — N18.32 ANEMIA DUE TO STAGE 3B CHRONIC KIDNEY DISEASE (MULTI): Primary | Chronic | ICD-10-CM

## 2024-04-30 DIAGNOSIS — E11.22 TYPE 2 DIABETES MELLITUS WITH CHRONIC KIDNEY DISEASE, WITH LONG-TERM CURRENT USE OF INSULIN, UNSPECIFIED CKD STAGE (MULTI): ICD-10-CM

## 2024-04-30 DIAGNOSIS — Z79.4 TYPE 2 DIABETES MELLITUS WITH CHRONIC KIDNEY DISEASE, WITH LONG-TERM CURRENT USE OF INSULIN, UNSPECIFIED CKD STAGE (MULTI): ICD-10-CM

## 2024-04-30 DIAGNOSIS — K59.00 CONSTIPATION, UNSPECIFIED CONSTIPATION TYPE: ICD-10-CM

## 2024-04-30 DIAGNOSIS — D63.1 ANEMIA DUE TO STAGE 3B CHRONIC KIDNEY DISEASE (MULTI): Primary | Chronic | ICD-10-CM

## 2024-04-30 DIAGNOSIS — E11.9 TYPE 2 DIABETES MELLITUS WITHOUT COMPLICATION, WITH LONG-TERM CURRENT USE OF INSULIN (MULTI): ICD-10-CM

## 2024-04-30 DIAGNOSIS — I50.32 CHRONIC DIASTOLIC CONGESTIVE HEART FAILURE (MULTI): Chronic | ICD-10-CM

## 2024-04-30 DIAGNOSIS — Z00.00 ENCOUNTER FOR ANNUAL WELLNESS VISIT (AWV) IN MEDICARE PATIENT: ICD-10-CM

## 2024-04-30 DIAGNOSIS — I10 ESSENTIAL HYPERTENSION: Chronic | ICD-10-CM

## 2024-04-30 LAB
POC APPEARANCE, URINE: ABNORMAL
POC BILIRUBIN, URINE: NEGATIVE
POC BLOOD, URINE: NEGATIVE
POC COLOR, URINE: ABNORMAL
POC FINGERSTICK BLOOD GLUCOSE: 250 MG/DL (ref 70–100)
POC GLUCOSE, URINE: NEGATIVE MG/DL
POC KETONES, URINE: NEGATIVE MG/DL
POC LEUKOCYTES, URINE: NEGATIVE
POC NITRITE,URINE: NEGATIVE
POC OCCULT BLOOD STOOL #1: NEGATIVE
POC PH, URINE: 5.5 PH
POC PROTEIN, URINE: NEGATIVE MG/DL
POC SPECIFIC GRAVITY, URINE: 1.01
POC UROBILINOGEN, URINE: 0.2 EU/DL

## 2024-04-30 PROCEDURE — 1159F MED LIST DOCD IN RCRD: CPT | Performed by: INTERNAL MEDICINE

## 2024-04-30 PROCEDURE — 1036F TOBACCO NON-USER: CPT | Performed by: INTERNAL MEDICINE

## 2024-04-30 PROCEDURE — 81002 URINALYSIS NONAUTO W/O SCOPE: CPT | Performed by: INTERNAL MEDICINE

## 2024-04-30 PROCEDURE — 93000 ELECTROCARDIOGRAM COMPLETE: CPT | Performed by: INTERNAL MEDICINE

## 2024-04-30 PROCEDURE — 3078F DIAST BP <80 MM HG: CPT | Performed by: INTERNAL MEDICINE

## 2024-04-30 PROCEDURE — G0439 PPPS, SUBSEQ VISIT: HCPCS | Performed by: INTERNAL MEDICINE

## 2024-04-30 PROCEDURE — 99214 OFFICE O/P EST MOD 30 MIN: CPT | Performed by: INTERNAL MEDICINE

## 2024-04-30 PROCEDURE — 3075F SYST BP GE 130 - 139MM HG: CPT | Performed by: INTERNAL MEDICINE

## 2024-04-30 PROCEDURE — 82962 GLUCOSE BLOOD TEST: CPT | Performed by: INTERNAL MEDICINE

## 2024-04-30 PROCEDURE — 82270 OCCULT BLOOD FECES: CPT | Performed by: INTERNAL MEDICINE

## 2024-04-30 PROCEDURE — 1157F ADVNC CARE PLAN IN RCRD: CPT | Performed by: INTERNAL MEDICINE

## 2024-04-30 ASSESSMENT — PATIENT HEALTH QUESTIONNAIRE - PHQ9
SUM OF ALL RESPONSES TO PHQ9 QUESTIONS 1 AND 2: 0
1. LITTLE INTEREST OR PLEASURE IN DOING THINGS: NOT AT ALL
2. FEELING DOWN, DEPRESSED OR HOPELESS: NOT AT ALL

## 2024-04-30 ASSESSMENT — ENCOUNTER SYMPTOMS
LOSS OF SENSATION IN FEET: 0
OCCASIONAL FEELINGS OF UNSTEADINESS: 0

## 2024-04-30 NOTE — ASSESSMENT & PLAN NOTE
Carolann Cartagena has heart failure with preserved ejection fraction as evidenced by her most recent EF of 70-75% on 09/16/23 with NYHA function class II as evidenced by patient able to walk around the house without issues but will feel fatigued with occasional shortness of breath with increased activity.   Patient's daughter mentioned that her blood pressures have continued to be high at home up into the 200s systolic sometimes. She states she gives the patient a home remedy and it helps her blood pressure come down. She was encouraged to reach out to Dr. Iniguez's office if her blood pressure continues to be significantly elevated. She has been giving patient hydralazine 3 times daily instead of the reduced dose twice daily to help with high blood pressures.  CONTINUE:   Carvedilol 25 mg 1 tablet by mouth twice daily   Hydralazine 100 mg 1 tablet by mouth 3 times daily   Patient's daughter states furosemide was discontinued while patient was in the hospital. She wants to reach out to patient's providers to see if it is something the patient should still be taking.   Patient will continue to follow-up with cardiology as scheduled.   Start Lasix 40 mg daily

## 2024-04-30 NOTE — PROGRESS NOTES
ANNUAL WELLNESS VISIT    Subjective :  Chief Complaint: Carolann Cartagena is an 89 y.o. female here for an annual wellness visit and general medical care and f/u.     HPI:  HPI    Objective   /54   Pulse 62   Resp 12   Wt 80.7 kg (178 lb)   SpO2 93%   BMI 30.55 kg/m²     Physical Exam  Vitals reviewed.   Constitutional:       Appearance: Normal appearance.   HENT:      Head: Normocephalic and atraumatic.   Cardiovascular:      Rate and Rhythm: Normal rate and regular rhythm.   Pulmonary:      Effort: Pulmonary effort is normal.      Breath sounds: Normal breath sounds.   Abdominal:      General: Bowel sounds are normal.      Palpations: Abdomen is soft.   Musculoskeletal:      Cervical back: Neck supple.      Right lower le+ Edema present.      Left lower le+ Edema present.   Skin:     General: Skin is warm and dry.   Neurological:      General: No focal deficit present.      Mental Status: She is alert.   Psychiatric:         Mood and Affect: Mood normal.         Behavior: Behavior is cooperative.         Imaging:  OCT OPTIC NERVE CIRRUS OU (BOTH EYES)    Result Date: 2024  Date of Procedure 2024. Technician Information Imaging Technician: CURLY. Start time: 11:11 AM. Stop time: 11:11 AM. Quality Right Eye Good. Left Eye Good. NFL Interpretation Right Eye Normal. Left Eye Normal. Interval Change Right Eye Stable. Left Eye Stable.     OCT MACULA CIRRUS OU (BOTH EYES)    Result Date: 2024  Date of Procedure 2024. Technician Information Imaging Technician: CURLY. Start time: 11:11 AM. Stop time: 11:11 AM. Interpretation Right Eye Normal without fluid. Left Eye Abnormal foveal contour. Findings include Intraretinal fluid. Interval Change Right Eye Stable. Left Eye Stable.        Labs reviewed:    Lab Results   Component Value Date    WBC 4.8 2024    HGB 8.0 (L) 2024    HCT 26.2 (L) 2024     2024    CHOL 132 2024    TRIG 62 2024    HDL 40.5  "04/23/2024    ALT 12 04/23/2024    AST 18 04/23/2024     (L) 04/23/2024    K 4.2 04/23/2024     04/23/2024    CREATININE 1.72 (H) 04/23/2024    BUN 29 (H) 04/23/2024    CO2 22 04/23/2024    TSH 4.01 (H) 04/23/2024    HGBA1C 5.3 04/23/2024       Past Medical, Surgical, and Family History reviewed and updated in chart.    I have reviewed and reconciled the medication list with the patient today.   Current Outpatient Medications:     Alphagan P 0.1 % ophthalmic solution, Administer 1 drop into both eyes twice a day., Disp: , Rfl:     aspirin 81 mg EC tablet, Take 1 tablet (81 mg) by mouth once daily., Disp: , Rfl:     atorvastatin (Lipitor) 10 mg tablet, Take 1 tablet (10 mg) by mouth once daily., Disp: 90 tablet, Rfl: 3    Autolet lancing device, Use as instructed, Disp: 1 each, Rfl: 0    blood sugar diagnostic (OneTouch Ultra Test) strip, 1 strip 3 times a day., Disp: 200 strip, Rfl: 11    blood-glucose meter misc, Use to test glucose 3 times daily, Disp: 1 each, Rfl: 0    carvedilol (Coreg) 25 mg tablet, Take 1 tablet (25 mg) by mouth 2 times a day with meals., Disp: 60 tablet, Rfl: 11    furosemide (Lasix) 20 mg tablet, Take 2 tablets (40 mg) by mouth once daily. (Patient not taking: Reported on 2/26/2024), Disp: 180 tablet, Rfl: 3    hydrALAZINE (Apresoline) 100 mg tablet, Take 1 tablet (100 mg) by mouth 2 times a day., Disp: 90 tablet, Rfl: 11    insulin NPH, Isophane, (HumuLIN N,NovoLIN N) 100 unit/mL (3 mL) injection, Inject 12 Units under the skin 2 times a day before meals. Take as directed per insulin instructions. (Patient taking differently: Inject 18 Units under the skin 2 times a day before meals. Take as directed per insulin instructions.), Disp: 8 mL, Rfl: 2    insulin syringe-needle U-100 (BD Insulin Syringe Ultra-Fine) 31G X 5/16\" 1 mL syringe, Inject 1 each under the skin 2 times a day. Use as instructed, Disp: 100 each, Rfl: 3    insulin syringe-needle U-100 31G X 5/16\" 0.3 mL syringe, " Use as instructed, Disp: 100 each, Rfl: 11    lancets (OneTouch Delica Plus Lancet) 30 gauge misc, Use to test glucose 3 times daily, Disp: 100 each, Rfl: 11    latanoprost (Xalatan) 0.005 % ophthalmic solution, Administer 1 drop into both eyes once daily at bedtime., Disp: , Rfl:     timolol (Timoptic) 0.5 % ophthalmic solution, Administer 1 drop into both eyes once daily., Disp: , Rfl:      List of current healthcare providers:  Patient Care Team:  Josh Rosa MD as PCP - General  Josh Rosa MD as PCP - [employer] PCP  Cintia Feliz LPN as Care Manager (Case Management)     HRA:  Over the past 2 weeks, how often have you been bothered by any of the following problems?  Little interest or pleasure in doing things: Not at all  Feeling down, depressed, or hopeless: Not at all  Patient Health Questionnaire-2 Score: 0    Steadi Fall Risk  One or more falls in the last year? No  How many Times?    Was the patient injured in the fall?    Has trouble stepping onto curb? No  Advised to use a cane or walker to get around safely? Yes  Often has to rush to toilet? No  Feels unsteady when walking? No  Has lost some feeling in feet? No  Often feels sad or depressed?    Steadies self on furniture while walking at home? No  Takes medicine that makes them feel lightheaded or more tired than usual? No  Worried about Falling? No  Takes medicine to sleep or improve mood? No  Needs to push with hands when rising from a chair? No            Falls Home Safety Risk Factors  Home Safety Risk Factors: None    Functional Ability/Level of Safety  Cognitive Impairment Observed: No cognitive impairment observed    Patient Self Assessment of Health Status  Patient Self Assessment: Fair    Nutrition and Exercise  Current Diet: Well Balanced Diet  Adequate Fluid Intake: Yes  Caffeine: No  Exercise Frequency: No Exercise              Assessment/Plan :  Problem List Items Addressed This Visit       Essential hypertension  (Chronic)       Lasix discontinued at hospital for kidney function. Kidney function has since improved.     Continue taking Hydralazine 100mg TID. Continue Carvedilol 25mg BID. BP at home have been 120-130 per duaghter. Continue monitoring at home.   Follow-up in 2 mo or sooner as needed.          Diabetic nephropathy associated with type 2 diabetes mellitus (Multi) (Chronic)     Renal function is stable we can keep monitoring renal function every 3 months         DM (diabetes mellitus) (Multi)     Will refill Humalog  Continue diabetic diet and monitor         Relevant Orders    POCT fingerstick glucose manually resulted (Completed)    Chronic diastolic congestive heart failure (Multi) (Chronic)     Carolann Cartagena has heart failure with preserved ejection fraction as evidenced by her most recent EF of 70-75% on 09/16/23 with NYHA function class II as evidenced by patient able to walk around the house without issues but will feel fatigued with occasional shortness of breath with increased activity.   Patient's daughter mentioned that her blood pressures have continued to be high at home up into the 200s systolic sometimes. She states she gives the patient a home remedy and it helps her blood pressure come down. She was encouraged to reach out to Dr. Iniguez's office if her blood pressure continues to be significantly elevated. She has been giving patient hydralazine 3 times daily instead of the reduced dose twice daily to help with high blood pressures.  CONTINUE:   Carvedilol 25 mg 1 tablet by mouth twice daily   Hydralazine 100 mg 1 tablet by mouth 3 times daily   Patient's daughter states furosemide was discontinued while patient was in the hospital. She wants to reach out to patient's providers to see if it is something the patient should still be taking.   Patient will continue to follow-up with cardiology as scheduled.   Start Lasix 40 mg daily         Anemia due to chronic kidney disease - Primary (Chronic)      Lashanda will monitor         Encounter for annual wellness visit (AWV) in Medicare patient    Relevant Orders    POCT UA (nonautomated) manually resulted (Completed)    POCT occult blood stool manually resulted (Completed)    ECG 12 lead (Clinic Performed)     The following health maintenance schedule was reviewed with the patient and provided in printed form in the after visit summary:  Health Maintenance   Topic Date Due    Diabetes: Hemoglobin A1C  07/23/2024    Influenza Vaccine (Season Ended) 09/01/2024    Echocardiogram  09/16/2024    Creatinine Level  04/23/2025    Potassium Level  04/23/2025    TSH Level  04/23/2025    Lipid Panel  04/23/2025    Medicare Annual Wellness Visit (AWV)  05/01/2025    HIB Vaccines  Aged Out    Hepatitis B Vaccines  Aged Out    IPV Vaccines  Aged Out    Hepatitis A Vaccines  Aged Out    Meningococcal Vaccine  Aged Out    Rotavirus Vaccines  Aged Out    HPV Vaccines  Aged Out    RSV Pregnant patients and/or  patients aged 60+ years  Discontinued    DTaP/Tdap/Td Vaccines  Discontinued    Diabetes: Foot Exam  Discontinued    Diabetes: Retinopathy Screening  Discontinued    Pneumococcal Vaccine: 65+ Years  Discontinued    Zoster Vaccines  Discontinued    Bone Density Scan  Discontinued    COVID-19 Vaccine  Discontinued       Advance Care Planning   poa does not have a living will just the POA  Will fill the DNR [facility]             Orders Placed This Encounter   Procedures    POCT UA (nonautomated) manually resulted     Order Specific Question:   Release result to MyChart     Answer:   Immediate [1]    POCT occult blood stool manually resulted     Order Specific Question:   Release result to MyChart     Answer:   Immediate [1]    POCT fingerstick glucose manually resulted     Order Specific Question:   Release result to MyChart     Answer:   Immediate [1]    ECG 12 lead (Clinic Performed)       Continue current medications as listed  Follow up in follow-up in 3 months to check  hemoglobin A1c and BMP and a CBC  Will arrange for Procrit injection for her anemia.

## 2024-05-03 ENCOUNTER — PATIENT OUTREACH (OUTPATIENT)
Dept: CARE COORDINATION | Facility: CLINIC | Age: 89
End: 2024-05-03

## 2024-05-03 ENCOUNTER — APPOINTMENT (OUTPATIENT)
Dept: PRIMARY CARE | Facility: CLINIC | Age: 89
End: 2024-05-03
Payer: MEDICARE

## 2024-05-04 RX ORDER — AMOXICILLIN 250 MG
1 CAPSULE ORAL DAILY
Qty: 30 TABLET | Refills: 11 | Status: SHIPPED | OUTPATIENT
Start: 2024-05-04 | End: 2025-05-04

## 2024-05-06 ENCOUNTER — APPOINTMENT (OUTPATIENT)
Dept: PRIMARY CARE | Facility: CLINIC | Age: 89
End: 2024-05-06
Payer: MEDICARE

## 2024-05-06 ENCOUNTER — OFFICE VISIT (OUTPATIENT)
Dept: PRIMARY CARE | Facility: CLINIC | Age: 89
End: 2024-05-06
Payer: MEDICARE

## 2024-05-06 VITALS
SYSTOLIC BLOOD PRESSURE: 189 MMHG | DIASTOLIC BLOOD PRESSURE: 70 MMHG | OXYGEN SATURATION: 97 % | HEART RATE: 48 BPM | RESPIRATION RATE: 12 BRPM

## 2024-05-06 DIAGNOSIS — E11.9 TYPE 2 DIABETES MELLITUS WITHOUT COMPLICATION, WITH LONG-TERM CURRENT USE OF INSULIN (MULTI): ICD-10-CM

## 2024-05-06 DIAGNOSIS — R42 VERTIGO: Primary | ICD-10-CM

## 2024-05-06 DIAGNOSIS — R42 DIZZINESS: ICD-10-CM

## 2024-05-06 DIAGNOSIS — Z79.4 TYPE 2 DIABETES MELLITUS WITHOUT COMPLICATION, WITH LONG-TERM CURRENT USE OF INSULIN (MULTI): ICD-10-CM

## 2024-05-06 LAB — POC FINGERSTICK BLOOD GLUCOSE: 160 MG/DL (ref 70–100)

## 2024-05-06 PROCEDURE — 1157F ADVNC CARE PLAN IN RCRD: CPT | Performed by: INTERNAL MEDICINE

## 2024-05-06 PROCEDURE — 3077F SYST BP >= 140 MM HG: CPT | Performed by: INTERNAL MEDICINE

## 2024-05-06 PROCEDURE — 1036F TOBACCO NON-USER: CPT | Performed by: INTERNAL MEDICINE

## 2024-05-06 PROCEDURE — 99214 OFFICE O/P EST MOD 30 MIN: CPT | Performed by: INTERNAL MEDICINE

## 2024-05-06 PROCEDURE — 82962 GLUCOSE BLOOD TEST: CPT | Performed by: INTERNAL MEDICINE

## 2024-05-06 PROCEDURE — 3078F DIAST BP <80 MM HG: CPT | Performed by: INTERNAL MEDICINE

## 2024-05-06 PROCEDURE — 1159F MED LIST DOCD IN RCRD: CPT | Performed by: INTERNAL MEDICINE

## 2024-05-06 RX ORDER — MECLIZINE HCL 12.5 MG 12.5 MG/1
12.5 TABLET ORAL 3 TIMES DAILY PRN
Qty: 30 TABLET | Refills: 11 | Status: SHIPPED | OUTPATIENT
Start: 2024-05-06 | End: 2025-05-06

## 2024-05-06 ASSESSMENT — ENCOUNTER SYMPTOMS: DIZZINESS: 1

## 2024-05-06 NOTE — PROGRESS NOTES
Subjective   Chief complaint: Carolann Cartagena is a 89 y.o. female who presents for Dizziness (Pt c/o dizziness , headaches nausea, confusion. Left shoulder pain for about as week. ).    HPI:  Dizziness      Pt comes in with complaint of dizziness, most notable after taking her blood pressure medication. She also has some swelling in her feet, and has a hard time putting shoes on. She also is having ongoing left shoulder pain.    Objective   BP (!) 189/70   Pulse (!) 48   Resp 12   SpO2 97%   Physical Exam  Constitutional:       General: She is not in acute distress.  Eyes:      Extraocular Movements: Extraocular movements intact.   Pulmonary:      Effort: Pulmonary effort is normal.   Musculoskeletal:      Cervical back: Neck supple.   Neurological:      Mental Status: She is alert. Mental status is at baseline.   Psychiatric:         Mood and Affect: Mood normal.         Behavior: Behavior is cooperative.         I have reviewed and reconciled the medication list with the patient today.   Current Outpatient Medications:     Alphagan P 0.1 % ophthalmic solution, Administer 1 drop into both eyes twice a day., Disp: , Rfl:     aspirin 81 mg EC tablet, Take 1 tablet (81 mg) by mouth once daily., Disp: , Rfl:     atorvastatin (Lipitor) 10 mg tablet, Take 1 tablet (10 mg) by mouth once daily., Disp: 90 tablet, Rfl: 3    Autolet lancing device, Use as instructed, Disp: 1 each, Rfl: 0    blood sugar diagnostic (OneTouch Ultra Test) strip, 1 strip 3 times a day., Disp: 200 strip, Rfl: 11    blood-glucose meter misc, Use to test glucose 3 times daily, Disp: 1 each, Rfl: 0    carvedilol (Coreg) 25 mg tablet, Take 1 tablet (25 mg) by mouth 2 times a day with meals., Disp: 60 tablet, Rfl: 11    furosemide (Lasix) 20 mg tablet, Take 2 tablets (40 mg) by mouth once daily. (Patient not taking: Reported on 2/26/2024), Disp: 180 tablet, Rfl: 3    hydrALAZINE (Apresoline) 100 mg tablet, Take 1 tablet (100 mg) by mouth 2 times a day.,  "Disp: 90 tablet, Rfl: 11    insulin NPH, Isophane, (HumuLIN N,NovoLIN N) 100 unit/mL (3 mL) injection, Inject 12 Units under the skin 2 times a day before meals. Take as directed per insulin instructions., Disp: 8 mL, Rfl: 2    insulin syringe-needle U-100 (BD Insulin Syringe Ultra-Fine) 31G X 5/16\" 1 mL syringe, Inject 1 each under the skin 2 times a day. Use as instructed, Disp: 100 each, Rfl: 3    insulin syringe-needle U-100 31G X 5/16\" 0.3 mL syringe, Use as instructed, Disp: 100 each, Rfl: 11    lancets (OneTouch Delica Plus Lancet) 30 gauge misc, Use to test glucose 3 times daily, Disp: 100 each, Rfl: 11    latanoprost (Xalatan) 0.005 % ophthalmic solution, Administer 1 drop into both eyes once daily at bedtime., Disp: , Rfl:     sennosides-docusate sodium (Steph-Colace) 8.6-50 mg tablet, Take 1 tablet by mouth once daily., Disp: 30 tablet, Rfl: 11    timolol (Timoptic) 0.5 % ophthalmic solution, Administer 1 drop into both eyes once daily., Disp: , Rfl:      Imaging:  ECG 12 lead (Clinic Performed)    Result Date: 4/30/2024  wnl    OCT OPTIC NERVE CIRRUS OU (BOTH EYES)    Result Date: 4/24/2024  Date of Procedure 4/24/2024. Technician Information Imaging Technician: CURLY. Start time: 11:11 AM. Stop time: 11:11 AM. Quality Right Eye Good. Left Eye Good. NFL Interpretation Right Eye Normal. Left Eye Normal. Interval Change Right Eye Stable. Left Eye Stable.     OCT MACULA CIRRUS OU (BOTH EYES)    Result Date: 4/24/2024  Date of Procedure 4/24/2024. Technician Information Imaging Technician: CURLY. Start time: 11:11 AM. Stop time: 11:11 AM. Interpretation Right Eye Normal without fluid. Left Eye Abnormal foveal contour. Findings include Intraretinal fluid. Interval Change Right Eye Stable. Left Eye Stable.        Labs reviewed:    Lab Results   Component Value Date    WBC 4.8 04/23/2024    HGB 8.0 (L) 04/23/2024    HCT 26.2 (L) 04/23/2024     04/23/2024    CHOL 132 04/23/2024    TRIG 62 04/23/2024    HDL " 40.5 04/23/2024    ALT 12 04/23/2024    AST 18 04/23/2024     (L) 04/23/2024    K 4.2 04/23/2024     04/23/2024    CREATININE 1.72 (H) 04/23/2024    BUN 29 (H) 04/23/2024    CO2 22 04/23/2024    TSH 4.01 (H) 04/23/2024    HGBA1C 5.3 04/23/2024       Assessment/Plan   Problem List Items Addressed This Visit       DM (diabetes mellitus) (Multi)     Controlled  Continue current regimen          Relevant Orders    POCT fingerstick glucose manually resulted (Completed)    Vertigo - Primary     Take 1 pill of meclizine 3x/day as needed for dizziness           Other Visit Diagnoses       Dizziness                Continue current medications as listed  Follow up in 4 weeks

## 2024-05-07 ENCOUNTER — APPOINTMENT (OUTPATIENT)
Dept: PRIMARY CARE | Facility: CLINIC | Age: 89
End: 2024-05-07
Payer: MEDICARE

## 2024-05-07 DIAGNOSIS — N18.30 CHRONIC KIDNEY DISEASE, STAGE 3 UNSPECIFIED (MULTI): Primary | ICD-10-CM

## 2024-05-15 ENCOUNTER — APPOINTMENT (OUTPATIENT)
Dept: CARDIOLOGY | Facility: CLINIC | Age: 89
End: 2024-05-15
Payer: MEDICARE

## 2024-05-15 ENCOUNTER — TELEMEDICINE (OUTPATIENT)
Dept: PRIMARY CARE | Facility: CLINIC | Age: 89
End: 2024-05-15
Payer: MEDICARE

## 2024-05-15 DIAGNOSIS — I95.89: Primary | ICD-10-CM

## 2024-05-15 DIAGNOSIS — I10 ESSENTIAL HYPERTENSION: Chronic | ICD-10-CM

## 2024-05-15 DIAGNOSIS — R42 VERTIGO: ICD-10-CM

## 2024-05-15 PROCEDURE — 1159F MED LIST DOCD IN RCRD: CPT | Performed by: INTERNAL MEDICINE

## 2024-05-15 PROCEDURE — 99214 OFFICE O/P EST MOD 30 MIN: CPT | Performed by: INTERNAL MEDICINE

## 2024-05-15 PROCEDURE — 1157F ADVNC CARE PLAN IN RCRD: CPT | Performed by: INTERNAL MEDICINE

## 2024-05-15 RX ORDER — MECLIZINE HCL 12.5 MG 12.5 MG/1
12.5 TABLET ORAL 3 TIMES DAILY PRN
Qty: 60 TABLET | Refills: 2 | Status: SHIPPED | OUTPATIENT
Start: 2024-05-15

## 2024-05-15 RX ORDER — CARVEDILOL 25 MG/1
25 TABLET ORAL
Qty: 60 TABLET | Refills: 11 | Status: SHIPPED | OUTPATIENT
Start: 2024-05-15

## 2024-05-15 ASSESSMENT — ENCOUNTER SYMPTOMS
DIZZINESS: 1
SORE THROAT: 1

## 2024-05-15 NOTE — PROGRESS NOTES
Subjective   Chief complaint: Carolann Cartagena is a 89 y.o. female who presents for Sore Throat and Dizziness (Pt c/o dizziness, nausea left side of throat hurts for 2 days. ).    HPI:  89 yr old female being seen virtually for dizziness and nausea. Pt began feeling nauseas since Monday. BP has been fluctuating. She also complains a sore throat, predominantly on her left side that began on monday. Notes dysphagia.     Sore Throat     Dizziness  Associated symptoms include a sore throat.       Objective   There were no vitals taken for this visit.  Physical Exam  Constitutional:       General: She is not in acute distress.  Eyes:      Extraocular Movements: Extraocular movements intact.   Cardiovascular:      Rate and Rhythm: Normal rate and regular rhythm.   Pulmonary:      Effort: Pulmonary effort is normal.      Breath sounds: Normal breath sounds.   Abdominal:      General: Bowel sounds are normal.      Palpations: Abdomen is soft.   Musculoskeletal:      Cervical back: Neck supple.      Right lower leg: No edema.      Left lower leg: No edema.   Neurological:      Mental Status: She is alert.   Psychiatric:         Mood and Affect: Mood normal.         Behavior: Behavior is cooperative.         I have reviewed and reconciled the medication list with the patient today.   Current Outpatient Medications:     Alphagan P 0.1 % ophthalmic solution, Administer 1 drop into both eyes twice a day., Disp: , Rfl:     aspirin 81 mg EC tablet, Take 1 tablet (81 mg) by mouth once daily., Disp: , Rfl:     atorvastatin (Lipitor) 10 mg tablet, Take 1 tablet (10 mg) by mouth once daily., Disp: 90 tablet, Rfl: 3    Autolet lancing device, Use as instructed, Disp: 1 each, Rfl: 0    blood sugar diagnostic (OneTouch Ultra Test) strip, 1 strip 3 times a day., Disp: 200 strip, Rfl: 11    blood-glucose meter misc, Use to test glucose 3 times daily, Disp: 1 each, Rfl: 0    carvedilol (Coreg) 25 mg tablet, Take 1 tablet (25 mg) by mouth 2  "times a day with meals., Disp: 60 tablet, Rfl: 11    furosemide (Lasix) 20 mg tablet, Take 2 tablets (40 mg) by mouth once daily. (Patient not taking: Reported on 2/26/2024), Disp: 180 tablet, Rfl: 3    hydrALAZINE (Apresoline) 100 mg tablet, Take 1 tablet (100 mg) by mouth 2 times a day., Disp: 90 tablet, Rfl: 11    insulin NPH, Isophane, (HumuLIN N,NovoLIN N) 100 unit/mL (3 mL) injection, Inject 12 Units under the skin 2 times a day before meals. Take as directed per insulin instructions., Disp: 8 mL, Rfl: 2    insulin syringe-needle U-100 (BD Insulin Syringe Ultra-Fine) 31G X 5/16\" 1 mL syringe, Inject 1 each under the skin 2 times a day. Use as instructed, Disp: 100 each, Rfl: 3    insulin syringe-needle U-100 31G X 5/16\" 0.3 mL syringe, Use as instructed, Disp: 100 each, Rfl: 11    lancets (OneTouch Delica Plus Lancet) 30 gauge misc, Use to test glucose 3 times daily, Disp: 100 each, Rfl: 11    latanoprost (Xalatan) 0.005 % ophthalmic solution, Administer 1 drop into both eyes once daily at bedtime., Disp: , Rfl:     meclizine (Antivert) 12.5 mg tablet, Take 1 tablet (12.5 mg) by mouth 3 times a day as needed for dizziness., Disp: 30 tablet, Rfl: 11    sennosides-docusate sodium (Steph-Colace) 8.6-50 mg tablet, Take 1 tablet by mouth once daily., Disp: 30 tablet, Rfl: 11    timolol (Timoptic) 0.5 % ophthalmic solution, Administer 1 drop into both eyes once daily., Disp: , Rfl:      Imaging:  ECG 12 lead (Clinic Performed)    Result Date: 4/30/2024  wnl    OCT OPTIC NERVE CIRRUS OU (BOTH EYES)    Result Date: 4/24/2024  Date of Procedure 4/24/2024. Technician Information Imaging Technician: CURLY. Start time: 11:11 AM. Stop time: 11:11 AM. Quality Right Eye Good. Left Eye Good. NFL Interpretation Right Eye Normal. Left Eye Normal. Interval Change Right Eye Stable. Left Eye Stable.     OCT MACULA CIRRUS OU (BOTH EYES)    Result Date: 4/24/2024  Date of Procedure 4/24/2024. Technician Information Imaging Technician: " MBS. Start time: 11:11 AM. Stop time: 11:11 AM. Interpretation Right Eye Normal without fluid. Left Eye Abnormal foveal contour. Findings include Intraretinal fluid. Interval Change Right Eye Stable. Left Eye Stable.        Labs reviewed:    Lab Results   Component Value Date    WBC 4.8 04/23/2024    HGB 8.0 (L) 04/23/2024    HCT 26.2 (L) 04/23/2024     04/23/2024    CHOL 132 04/23/2024    TRIG 62 04/23/2024    HDL 40.5 04/23/2024    ALT 12 04/23/2024    AST 18 04/23/2024     (L) 04/23/2024    K 4.2 04/23/2024     04/23/2024    CREATININE 1.72 (H) 04/23/2024    BUN 29 (H) 04/23/2024    CO2 22 04/23/2024    TSH 4.01 (H) 04/23/2024    HGBA1C 5.3 04/23/2024       Assessment/Plan   Problem List Items Addressed This Visit       Essential hypertension (Chronic)     Refilled carvedilol          Vertigo     12.5 mg meclizine 3x/day          Secondary hypotension - Primary     Pt instructed to not take BP medications when systolic reading is below 130             Continue current medications as listed  Follow up in 3 months

## 2024-06-03 ENCOUNTER — TELEMEDICINE (OUTPATIENT)
Dept: PRIMARY CARE | Facility: CLINIC | Age: 89
End: 2024-06-03
Payer: MEDICARE

## 2024-06-03 DIAGNOSIS — R42 VERTIGO: ICD-10-CM

## 2024-06-03 DIAGNOSIS — Z79.4 TYPE 2 DIABETES MELLITUS WITH CHRONIC KIDNEY DISEASE, WITH LONG-TERM CURRENT USE OF INSULIN, UNSPECIFIED CKD STAGE (MULTI): ICD-10-CM

## 2024-06-03 DIAGNOSIS — H35.373 EPIRETINAL MEMBRANE (ERM) OF BOTH EYES: ICD-10-CM

## 2024-06-03 DIAGNOSIS — I10 ESSENTIAL HYPERTENSION: Chronic | ICD-10-CM

## 2024-06-03 DIAGNOSIS — D63.1 ANEMIA DUE TO STAGE 3B CHRONIC KIDNEY DISEASE (MULTI): Primary | Chronic | ICD-10-CM

## 2024-06-03 DIAGNOSIS — N18.4 STAGE 4 CHRONIC KIDNEY DISEASE (MULTI): ICD-10-CM

## 2024-06-03 DIAGNOSIS — E11.22 TYPE 2 DIABETES MELLITUS WITH CHRONIC KIDNEY DISEASE, WITH LONG-TERM CURRENT USE OF INSULIN, UNSPECIFIED CKD STAGE (MULTI): ICD-10-CM

## 2024-06-03 DIAGNOSIS — I16.0 HYPERTENSIVE URGENCY: ICD-10-CM

## 2024-06-03 DIAGNOSIS — N18.32 ANEMIA DUE TO STAGE 3B CHRONIC KIDNEY DISEASE (MULTI): Primary | Chronic | ICD-10-CM

## 2024-06-03 DIAGNOSIS — G62.9 NEUROPATHY: ICD-10-CM

## 2024-06-03 PROCEDURE — 1157F ADVNC CARE PLAN IN RCRD: CPT | Performed by: INTERNAL MEDICINE

## 2024-06-03 PROCEDURE — 1036F TOBACCO NON-USER: CPT | Performed by: INTERNAL MEDICINE

## 2024-06-03 PROCEDURE — 1159F MED LIST DOCD IN RCRD: CPT | Performed by: INTERNAL MEDICINE

## 2024-06-03 PROCEDURE — 99214 OFFICE O/P EST MOD 30 MIN: CPT | Performed by: INTERNAL MEDICINE

## 2024-06-03 ASSESSMENT — ENCOUNTER SYMPTOMS: HYPERTENSION: 1

## 2024-06-03 NOTE — ASSESSMENT & PLAN NOTE
patient to check her sugar 4 times a day especially around 4:00 in the morning when she break a sweat and be sure she takes snacks before she take and if her sugar goes above 200 to take an extra 10 units of her insulin.

## 2024-06-03 NOTE — ASSESSMENT & PLAN NOTE
Glasses Prescribed: Hypoglycemia can happen early in the morning when she breaking a sweat and she supposed to take a snack before she go to bed.

## 2024-06-03 NOTE — PROGRESS NOTES
Subjective   Chief complaint: Caroalnn Cartagena is a 89 y.o. female who presents for Hypertension (Patient virtual for elevated BP and BS. Also night sweats. ).    HPI:  Patient has been taken Antivert for vertigo with minimal improvement..  Patient to stick multiple blood pressure med she was given hydralazine when her blood pressure went up to 200 and it went down after that to 160  I explained to the family that the blood pressure should be taken several time and if her blood pressure goes below 120 not to give her any medicine because she bottomed down and get very dizzy.  Better also if she is 130 around that number she can have only half of the hydralazine so the idea not to bring blood pressure too much fast and too much down.  Also advised the family to give her old with a snack before she goes to bed because she is breaking a sweat during the night which is may be she had a hypoglycemia and I told him to check her sugar around 4:00 in the morning if she becomes very sweaty.    Hypertension    This is a virtual visit for this 80 is 89 years old black female who has been feeling dizzy lightheaded her sugar sometimes goes up and sometimes goes down when he goes down to 45 she becomes sweaty and more confused.    Objective   There were no vitals taken for this visit.  Physical Exam  Constitutional:       Appearance: Normal appearance.   HENT:      Head: Normocephalic and atraumatic.   Pulmonary:      Effort: Pulmonary effort is normal.   Abdominal:      General: Bowel sounds are normal.      Palpations: Abdomen is soft.   Musculoskeletal:      Cervical back: Neck supple.   Skin:     General: Skin is warm and dry.   Neurological:      General: No focal deficit present.      Mental Status: She is alert. She is disoriented.   Psychiatric:         Mood and Affect: Mood normal.         Behavior: Behavior is cooperative.         I have reviewed and reconciled the medication list with the patient today.   Current  "Outpatient Medications:     Alphagan P 0.1 % ophthalmic solution, Administer 1 drop into both eyes twice a day., Disp: , Rfl:     aspirin 81 mg EC tablet, Take 1 tablet (81 mg) by mouth once daily., Disp: , Rfl:     atorvastatin (Lipitor) 10 mg tablet, Take 1 tablet (10 mg) by mouth once daily., Disp: 90 tablet, Rfl: 3    Autolet lancing device, Use as instructed, Disp: 1 each, Rfl: 0    blood sugar diagnostic (OneTouch Ultra Test) strip, 1 strip 3 times a day., Disp: 200 strip, Rfl: 11    blood-glucose meter misc, Use to test glucose 3 times daily, Disp: 1 each, Rfl: 0    carvedilol (Coreg) 25 mg tablet, Take 1 tablet (25 mg) by mouth 2 times daily (morning and late afternoon)., Disp: 60 tablet, Rfl: 11    hydrALAZINE (Apresoline) 100 mg tablet, Take 1 tablet (100 mg) by mouth 2 times a day., Disp: 90 tablet, Rfl: 11    insulin NPH, Isophane, (HumuLIN N,NovoLIN N) 100 unit/mL (3 mL) injection, Inject 12 Units under the skin 2 times a day before meals. Take as directed per insulin instructions., Disp: 8 mL, Rfl: 2    insulin syringe-needle U-100 (BD Insulin Syringe Ultra-Fine) 31G X 5/16\" 1 mL syringe, Inject 1 each under the skin 2 times a day. Use as instructed, Disp: 100 each, Rfl: 3    insulin syringe-needle U-100 31G X 5/16\" 0.3 mL syringe, Use as instructed, Disp: 100 each, Rfl: 11    lancets (OneTouch Delica Plus Lancet) 30 gauge misc, Use to test glucose 3 times daily, Disp: 100 each, Rfl: 11    latanoprost (Xalatan) 0.005 % ophthalmic solution, Administer 1 drop into both eyes once daily at bedtime., Disp: , Rfl:     meclizine (Antivert) 12.5 mg tablet, Take 1 tablet (12.5 mg) by mouth 3 times a day as needed for dizziness., Disp: 30 tablet, Rfl: 11    meclizine (Antivert) 12.5 mg tablet, Take 1 tablet (12.5 mg) by mouth 3 times a day as needed for dizziness., Disp: 60 tablet, Rfl: 2    sennosides-docusate sodium (Steph-Colace) 8.6-50 mg tablet, Take 1 tablet by mouth once daily., Disp: 30 tablet, Rfl: 11    " timolol (Timoptic) 0.5 % ophthalmic solution, Administer 1 drop into both eyes once daily., Disp: , Rfl:      Imaging:  No results found.     Labs reviewed:    Lab Results   Component Value Date    WBC 4.8 04/23/2024    HGB 8.0 (L) 04/23/2024    HCT 26.2 (L) 04/23/2024     04/23/2024    CHOL 132 04/23/2024    TRIG 62 04/23/2024    HDL 40.5 04/23/2024    ALT 12 04/23/2024    AST 18 04/23/2024     (L) 04/23/2024    K 4.2 04/23/2024     04/23/2024    CREATININE 1.72 (H) 04/23/2024    BUN 29 (H) 04/23/2024    CO2 22 04/23/2024    TSH 4.01 (H) 04/23/2024    HGBA1C 5.3 04/23/2024       Assessment/Plan   Problem List Items Addressed This Visit       Diabetes mellitus (Multi)      patient to check her sugar 4 times a day especially around 4:00 in the morning when she break a sweat and be sure she takes snacks before she take and if her sugar goes above 200 to take an extra 10 units of her insulin.         Essential hypertension (Chronic)     Refilled carvedilol  If blood pressure is 120 and below do not take any hydralazine.  If the blood pressure is 130 take half of the hydralazine         Stage 4 chronic kidney disease (Multi)     Kidney function increasing since discontinuation of lasix.   Encouraged to drink plenty of water.   Continue to monitor at visit in April.          Epiretinal membrane (ERM) of both eyes    Neuropathy     Control sugar  Gabapentin         Anemia due to chronic kidney disease - Primary (Chronic)     Stble will monitor         Hypertensive urgency    Vertigo     12.5 mg meclizine 3x/day             Continue current medications as listed  Follow up in 3 weeks

## 2024-06-03 NOTE — ASSESSMENT & PLAN NOTE
Refilled carvedilol  If blood pressure is 120 and below do not take any hydralazine.  If the blood pressure is 130 take half of the hydralazine

## 2024-06-04 ENCOUNTER — OFFICE VISIT (OUTPATIENT)
Dept: PRIMARY CARE | Facility: CLINIC | Age: 89
End: 2024-06-04
Payer: MEDICARE

## 2024-06-04 VITALS
OXYGEN SATURATION: 94 % | HEART RATE: 58 BPM | BODY MASS INDEX: 28.49 KG/M2 | RESPIRATION RATE: 12 BRPM | DIASTOLIC BLOOD PRESSURE: 56 MMHG | SYSTOLIC BLOOD PRESSURE: 124 MMHG | WEIGHT: 166 LBS

## 2024-06-04 DIAGNOSIS — E11.22 TYPE 2 DIABETES MELLITUS WITH CHRONIC KIDNEY DISEASE, WITH LONG-TERM CURRENT USE OF INSULIN, UNSPECIFIED CKD STAGE (MULTI): ICD-10-CM

## 2024-06-04 DIAGNOSIS — Z79.4 TYPE 2 DIABETES MELLITUS WITH CHRONIC KIDNEY DISEASE, WITH LONG-TERM CURRENT USE OF INSULIN, UNSPECIFIED CKD STAGE (MULTI): ICD-10-CM

## 2024-06-04 DIAGNOSIS — N18.4 STAGE 4 CHRONIC KIDNEY DISEASE (MULTI): ICD-10-CM

## 2024-06-04 DIAGNOSIS — I10 ESSENTIAL HYPERTENSION: Chronic | ICD-10-CM

## 2024-06-04 DIAGNOSIS — E11.649 DIABETIC HYPOGLYCEMIA (MULTI): ICD-10-CM

## 2024-06-04 DIAGNOSIS — E16.2 HYPOGLYCEMIA: Primary | ICD-10-CM

## 2024-06-04 LAB — POC FINGERSTICK BLOOD GLUCOSE: 274 MG/DL (ref 70–100)

## 2024-06-04 PROCEDURE — 1036F TOBACCO NON-USER: CPT | Performed by: INTERNAL MEDICINE

## 2024-06-04 PROCEDURE — 82962 GLUCOSE BLOOD TEST: CPT | Performed by: INTERNAL MEDICINE

## 2024-06-04 PROCEDURE — 1157F ADVNC CARE PLAN IN RCRD: CPT | Performed by: INTERNAL MEDICINE

## 2024-06-04 PROCEDURE — 99214 OFFICE O/P EST MOD 30 MIN: CPT | Performed by: INTERNAL MEDICINE

## 2024-06-04 PROCEDURE — 1159F MED LIST DOCD IN RCRD: CPT | Performed by: INTERNAL MEDICINE

## 2024-06-04 PROCEDURE — 3078F DIAST BP <80 MM HG: CPT | Performed by: INTERNAL MEDICINE

## 2024-06-04 PROCEDURE — 3074F SYST BP LT 130 MM HG: CPT | Performed by: INTERNAL MEDICINE

## 2024-06-04 RX ORDER — BLOOD SUGAR DIAGNOSTIC
1 STRIP MISCELLANEOUS 3 TIMES DAILY
Qty: 200 STRIP | Refills: 11 | Status: SHIPPED | OUTPATIENT
Start: 2024-06-04

## 2024-06-04 NOTE — PROGRESS NOTES
Subjective   Chief complaint: Carolann Cartagena is a 89 y.o. female who presents for Follow-up (Pt called 911 last night because her glucose dropped to 50 here for follow up, was having confusion. ).    HPI:  HPI  Pt comes to the office with daughter Edilia. Around 1:30 -2 am last night. Daughter noticed looked clammy with night sweats took her sugar and it was 50. Daughter called the paramedics. When they arrived the administered glucose/sugar water to increase levels to safe. She had a carb snack around 2:30 pm today. Today blood sugar in the morning was 345.    Pt reports feeling confused last night during the episode and having no recollection of the paramedics coming in. Per daughter Edilia she is back to her baseline.     Objective   /56   Pulse 58   Resp 12   Wt 75.3 kg (166 lb)   SpO2 94%   BMI 28.49 kg/m²     Physical Exam  Constitutional:       General: She is not in acute distress.     Appearance: She is normal weight. She is not ill-appearing, toxic-appearing or diaphoretic.   HENT:      Head: Normocephalic.      Mouth/Throat:      Mouth: Mucous membranes are moist.      Pharynx: Oropharynx is clear.   Eyes:      Extraocular Movements: Extraocular movements intact.      Conjunctiva/sclera: Conjunctivae normal.      Pupils: Pupils are equal, round, and reactive to light.   Cardiovascular:      Rate and Rhythm: Normal rate and regular rhythm.      Pulses: Normal pulses.      Heart sounds: Normal heart sounds.   Pulmonary:      Effort: Pulmonary effort is normal.      Breath sounds: Normal breath sounds.   Abdominal:      General: Bowel sounds are normal.      Palpations: Abdomen is soft.   Musculoskeletal:         General: Normal range of motion.      Cervical back: Normal range of motion and neck supple.   Skin:     General: Skin is warm.      Capillary Refill: Capillary refill takes less than 2 seconds.   Neurological:      General: No focal deficit present.      Mental Status: She is alert and  "oriented to person, place, and time.   Psychiatric:         Mood and Affect: Mood normal.         Behavior: Behavior normal.         Thought Content: Thought content normal.         Judgment: Judgment normal.         I have reviewed and reconciled the medication list with the patient today.   Current Outpatient Medications:     Alphagan P 0.1 % ophthalmic solution, Administer 1 drop into both eyes twice a day., Disp: , Rfl:     aspirin 81 mg EC tablet, Take 1 tablet (81 mg) by mouth once daily., Disp: , Rfl:     atorvastatin (Lipitor) 10 mg tablet, Take 1 tablet (10 mg) by mouth once daily., Disp: 90 tablet, Rfl: 3    Autolet lancing device, Use as instructed, Disp: 1 each, Rfl: 0    blood sugar diagnostic (OneTouch Ultra Test) strip, 1 strip 3 times a day., Disp: 200 strip, Rfl: 11    blood-glucose meter misc, Use to test glucose 3 times daily, Disp: 1 each, Rfl: 0    carvedilol (Coreg) 25 mg tablet, Take 1 tablet (25 mg) by mouth 2 times daily (morning and late afternoon)., Disp: 60 tablet, Rfl: 11    hydrALAZINE (Apresoline) 100 mg tablet, Take 1 tablet (100 mg) by mouth 2 times a day., Disp: 90 tablet, Rfl: 11    insulin NPH, Isophane, (HumuLIN N,NovoLIN N) 100 unit/mL (3 mL) injection, Inject 12 Units under the skin 2 times a day before meals. Take as directed per insulin instructions., Disp: 8 mL, Rfl: 2    insulin syringe-needle U-100 (BD Insulin Syringe Ultra-Fine) 31G X 5/16\" 1 mL syringe, Inject 1 each under the skin 2 times a day. Use as instructed, Disp: 100 each, Rfl: 3    insulin syringe-needle U-100 31G X 5/16\" 0.3 mL syringe, Use as instructed, Disp: 100 each, Rfl: 11    lancets (OneTouch Delica Plus Lancet) 30 gauge misc, Use to test glucose 3 times daily, Disp: 100 each, Rfl: 11    latanoprost (Xalatan) 0.005 % ophthalmic solution, Administer 1 drop into both eyes once daily at bedtime., Disp: , Rfl:     meclizine (Antivert) 12.5 mg tablet, Take 1 tablet (12.5 mg) by mouth 3 times a day as needed " for dizziness., Disp: 30 tablet, Rfl: 11    meclizine (Antivert) 12.5 mg tablet, Take 1 tablet (12.5 mg) by mouth 3 times a day as needed for dizziness., Disp: 60 tablet, Rfl: 2    sennosides-docusate sodium (Steph-Colace) 8.6-50 mg tablet, Take 1 tablet by mouth once daily., Disp: 30 tablet, Rfl: 11    timolol (Timoptic) 0.5 % ophthalmic solution, Administer 1 drop into both eyes once daily., Disp: , Rfl:      Imaging:  No results found.     Labs reviewed:    Lab Results   Component Value Date    WBC 4.8 04/23/2024    HGB 8.0 (L) 04/23/2024    HCT 26.2 (L) 04/23/2024     04/23/2024    CHOL 132 04/23/2024    TRIG 62 04/23/2024    HDL 40.5 04/23/2024    ALT 12 04/23/2024    AST 18 04/23/2024     (L) 04/23/2024    K 4.2 04/23/2024     04/23/2024    CREATININE 1.72 (H) 04/23/2024    BUN 29 (H) 04/23/2024    CO2 22 04/23/2024    TSH 4.01 (H) 04/23/2024    HGBA1C 5.3 04/23/2024       Assessment/Plan   Problem List Items Addressed This Visit       Diabetes mellitus (Multi)    Relevant Orders    POCT fingerstick glucose manually resulted (Completed)       Continue current medications as listed  Follow up in in 1 month for blood work

## 2024-06-04 NOTE — ASSESSMENT & PLAN NOTE
Hypoglycemia can happen early in the morning when she breaking a sweat and she supposed to take a snack before she go to bed.

## 2024-06-05 ENCOUNTER — LAB (OUTPATIENT)
Dept: LAB | Facility: LAB | Age: 89
End: 2024-06-05
Payer: MEDICARE

## 2024-06-05 DIAGNOSIS — N18.30 ANEMIA DUE TO STAGE 3 CHRONIC KIDNEY DISEASE, UNSPECIFIED WHETHER STAGE 3A OR 3B CKD (MULTI): ICD-10-CM

## 2024-06-05 DIAGNOSIS — N18.30 CHRONIC KIDNEY DISEASE, STAGE 3 UNSPECIFIED (MULTI): ICD-10-CM

## 2024-06-05 DIAGNOSIS — D63.1 ANEMIA DUE TO STAGE 3 CHRONIC KIDNEY DISEASE, UNSPECIFIED WHETHER STAGE 3A OR 3B CKD (MULTI): ICD-10-CM

## 2024-06-05 LAB
ALBUMIN SERPL BCP-MCNC: 3.4 G/DL (ref 3.4–5)
ANION GAP SERPL CALC-SCNC: 15 MMOL/L (ref 10–20)
BASOPHILS # BLD AUTO: 0.03 X10*3/UL (ref 0–0.1)
BASOPHILS NFR BLD AUTO: 0.6 %
BUN SERPL-MCNC: 44 MG/DL (ref 6–23)
CALCIUM SERPL-MCNC: 8.6 MG/DL (ref 8.6–10.6)
CHLORIDE SERPL-SCNC: 86 MMOL/L (ref 98–107)
CO2 SERPL-SCNC: 25 MMOL/L (ref 21–32)
CREAT SERPL-MCNC: 2.29 MG/DL (ref 0.5–1.05)
EGFRCR SERPLBLD CKD-EPI 2021: 19 ML/MIN/1.73M*2
EOSINOPHIL # BLD AUTO: 0.04 X10*3/UL (ref 0–0.4)
EOSINOPHIL NFR BLD AUTO: 0.8 %
ERYTHROCYTE [DISTWIDTH] IN BLOOD BY AUTOMATED COUNT: 12.5 % (ref 11.5–14.5)
GLUCOSE SERPL-MCNC: 144 MG/DL (ref 74–99)
HCT VFR BLD AUTO: 22.6 % (ref 36–46)
HGB BLD-MCNC: 7.8 G/DL (ref 12–16)
IMM GRANULOCYTES # BLD AUTO: 0.01 X10*3/UL (ref 0–0.5)
IMM GRANULOCYTES NFR BLD AUTO: 0.2 % (ref 0–0.9)
LYMPHOCYTES # BLD AUTO: 0.99 X10*3/UL (ref 0.8–3)
LYMPHOCYTES NFR BLD AUTO: 20.8 %
MCH RBC QN AUTO: 32.5 PG (ref 26–34)
MCHC RBC AUTO-ENTMCNC: 34.5 G/DL (ref 32–36)
MCV RBC AUTO: 94 FL (ref 80–100)
MONOCYTES # BLD AUTO: 0.64 X10*3/UL (ref 0.05–0.8)
MONOCYTES NFR BLD AUTO: 13.4 %
NEUTROPHILS # BLD AUTO: 3.06 X10*3/UL (ref 1.6–5.5)
NEUTROPHILS NFR BLD AUTO: 64.2 %
NRBC BLD-RTO: 0 /100 WBCS (ref 0–0)
PHOSPHATE SERPL-MCNC: 4.4 MG/DL (ref 2.5–4.9)
PLATELET # BLD AUTO: 216 X10*3/UL (ref 150–450)
POTASSIUM SERPL-SCNC: 4.2 MMOL/L (ref 3.5–5.3)
RBC # BLD AUTO: 2.4 X10*6/UL (ref 4–5.2)
SODIUM SERPL-SCNC: 122 MMOL/L (ref 136–145)
WBC # BLD AUTO: 4.8 X10*3/UL (ref 4.4–11.3)

## 2024-06-05 PROCEDURE — 80069 RENAL FUNCTION PANEL: CPT

## 2024-06-05 PROCEDURE — 85025 COMPLETE CBC W/AUTO DIFF WBC: CPT

## 2024-06-05 PROCEDURE — 83970 ASSAY OF PARATHORMONE: CPT

## 2024-06-05 PROCEDURE — 36415 COLL VENOUS BLD VENIPUNCTURE: CPT

## 2024-06-06 LAB — PTH-INTACT SERPL-MCNC: 94 PG/ML (ref 18.5–88)

## 2024-06-12 ENCOUNTER — LAB (OUTPATIENT)
Dept: LAB | Facility: LAB | Age: 89
End: 2024-06-12
Payer: MEDICARE

## 2024-06-12 ENCOUNTER — APPOINTMENT (OUTPATIENT)
Dept: RADIOLOGY | Facility: HOSPITAL | Age: 89
End: 2024-06-12
Payer: MEDICARE

## 2024-06-12 ENCOUNTER — HOSPITAL ENCOUNTER (INPATIENT)
Facility: HOSPITAL | Age: 89
LOS: 2 days | Discharge: HOME | End: 2024-06-15
Attending: EMERGENCY MEDICINE | Admitting: INTERNAL MEDICINE
Payer: MEDICARE

## 2024-06-12 ENCOUNTER — TELEPHONE (OUTPATIENT)
Dept: NEPHROLOGY | Facility: HOSPITAL | Age: 89
End: 2024-06-12

## 2024-06-12 ENCOUNTER — APPOINTMENT (OUTPATIENT)
Dept: CARDIOLOGY | Facility: HOSPITAL | Age: 89
End: 2024-06-12
Payer: MEDICARE

## 2024-06-12 DIAGNOSIS — E87.1 HYPO-OSMOLALITY AND HYPONATREMIA: Primary | ICD-10-CM

## 2024-06-12 DIAGNOSIS — E87.1 HYPONATREMIA: Primary | ICD-10-CM

## 2024-06-12 DIAGNOSIS — I10 ESSENTIAL HYPERTENSION: Chronic | ICD-10-CM

## 2024-06-12 DIAGNOSIS — N18.9 ANEMIA DUE TO CHRONIC KIDNEY DISEASE, UNSPECIFIED CKD STAGE: ICD-10-CM

## 2024-06-12 DIAGNOSIS — D63.1 ANEMIA DUE TO CHRONIC KIDNEY DISEASE, UNSPECIFIED CKD STAGE: ICD-10-CM

## 2024-06-12 LAB
ALBUMIN SERPL BCP-MCNC: 3.4 G/DL (ref 3.4–5)
ALBUMIN SERPL BCP-MCNC: 3.5 G/DL (ref 3.4–5)
ALP SERPL-CCNC: 59 U/L (ref 33–136)
ALT SERPL W P-5'-P-CCNC: 12 U/L (ref 7–45)
ANION GAP SERPL CALC-SCNC: 13 MMOL/L (ref 10–20)
ANION GAP SERPL CALC-SCNC: 14 MMOL/L (ref 10–20)
AST SERPL W P-5'-P-CCNC: 21 U/L (ref 9–39)
BASOPHILS # BLD AUTO: 0.03 X10*3/UL (ref 0–0.1)
BASOPHILS NFR BLD AUTO: 0.7 %
BILIRUB DIRECT SERPL-MCNC: 0.1 MG/DL (ref 0–0.3)
BILIRUB SERPL-MCNC: 0.4 MG/DL (ref 0–1.2)
BUN SERPL-MCNC: 48 MG/DL (ref 6–23)
BUN SERPL-MCNC: 50 MG/DL (ref 6–23)
CALCIUM SERPL-MCNC: 8.5 MG/DL (ref 8.6–10.3)
CALCIUM SERPL-MCNC: 8.7 MG/DL (ref 8.6–10.6)
CHLORIDE SERPL-SCNC: 88 MMOL/L (ref 98–107)
CHLORIDE SERPL-SCNC: 89 MMOL/L (ref 98–107)
CO2 SERPL-SCNC: 23 MMOL/L (ref 21–32)
CO2 SERPL-SCNC: 25 MMOL/L (ref 21–32)
CREAT SERPL-MCNC: 1.85 MG/DL (ref 0.5–1.05)
CREAT SERPL-MCNC: 1.87 MG/DL (ref 0.5–1.05)
EGFRCR SERPLBLD CKD-EPI 2021: 25 ML/MIN/1.73M*2
EGFRCR SERPLBLD CKD-EPI 2021: 25 ML/MIN/1.73M*2
EOSINOPHIL # BLD AUTO: 0.06 X10*3/UL (ref 0–0.4)
EOSINOPHIL NFR BLD AUTO: 1.3 %
ERYTHROCYTE [DISTWIDTH] IN BLOOD BY AUTOMATED COUNT: 12.3 % (ref 11.5–14.5)
GLUCOSE SERPL-MCNC: 110 MG/DL (ref 74–99)
GLUCOSE SERPL-MCNC: 119 MG/DL (ref 74–99)
HCT VFR BLD AUTO: 21.7 % (ref 36–46)
HGB BLD-MCNC: 7.6 G/DL (ref 12–16)
IMM GRANULOCYTES # BLD AUTO: 0.03 X10*3/UL (ref 0–0.5)
IMM GRANULOCYTES NFR BLD AUTO: 0.7 % (ref 0–0.9)
LYMPHOCYTES # BLD AUTO: 1.09 X10*3/UL (ref 0.8–3)
LYMPHOCYTES NFR BLD AUTO: 23.8 %
MAGNESIUM SERPL-MCNC: 2 MG/DL (ref 1.6–2.4)
MCH RBC QN AUTO: 32.8 PG (ref 26–34)
MCHC RBC AUTO-ENTMCNC: 35 G/DL (ref 32–36)
MCV RBC AUTO: 94 FL (ref 80–100)
MONOCYTES # BLD AUTO: 0.8 X10*3/UL (ref 0.05–0.8)
MONOCYTES NFR BLD AUTO: 17.5 %
NEUTROPHILS # BLD AUTO: 2.57 X10*3/UL (ref 1.6–5.5)
NEUTROPHILS NFR BLD AUTO: 56 %
NRBC BLD-RTO: 0 /100 WBCS (ref 0–0)
PHOSPHATE SERPL-MCNC: 3.9 MG/DL (ref 2.5–4.9)
PLATELET # BLD AUTO: 258 X10*3/UL (ref 150–450)
POTASSIUM SERPL-SCNC: 3.8 MMOL/L (ref 3.5–5.3)
POTASSIUM SERPL-SCNC: 4.2 MMOL/L (ref 3.5–5.3)
PROT SERPL-MCNC: 7 G/DL (ref 6.4–8.2)
RBC # BLD AUTO: 2.32 X10*6/UL (ref 4–5.2)
SODIUM SERPL-SCNC: 121 MMOL/L (ref 136–145)
SODIUM SERPL-SCNC: 123 MMOL/L (ref 136–145)
TSH SERPL-ACNC: 3.58 MIU/L (ref 0.44–3.98)
WBC # BLD AUTO: 4.6 X10*3/UL (ref 4.4–11.3)

## 2024-06-12 PROCEDURE — 99291 CRITICAL CARE FIRST HOUR: CPT | Performed by: EMERGENCY MEDICINE

## 2024-06-12 PROCEDURE — 71045 X-RAY EXAM CHEST 1 VIEW: CPT

## 2024-06-12 PROCEDURE — 80048 BASIC METABOLIC PNL TOTAL CA: CPT | Performed by: EMERGENCY MEDICINE

## 2024-06-12 PROCEDURE — 84155 ASSAY OF PROTEIN SERUM: CPT | Performed by: EMERGENCY MEDICINE

## 2024-06-12 PROCEDURE — 85025 COMPLETE CBC W/AUTO DIFF WBC: CPT | Performed by: EMERGENCY MEDICINE

## 2024-06-12 PROCEDURE — 93005 ELECTROCARDIOGRAM TRACING: CPT

## 2024-06-12 PROCEDURE — 36415 COLL VENOUS BLD VENIPUNCTURE: CPT | Performed by: EMERGENCY MEDICINE

## 2024-06-12 PROCEDURE — 83735 ASSAY OF MAGNESIUM: CPT | Performed by: EMERGENCY MEDICINE

## 2024-06-12 PROCEDURE — 84443 ASSAY THYROID STIM HORMONE: CPT | Performed by: EMERGENCY MEDICINE

## 2024-06-12 PROCEDURE — 71045 X-RAY EXAM CHEST 1 VIEW: CPT | Performed by: RADIOLOGY

## 2024-06-12 PROCEDURE — 83930 ASSAY OF BLOOD OSMOLALITY: CPT | Mod: AHULAB | Performed by: EMERGENCY MEDICINE

## 2024-06-12 PROCEDURE — 83880 ASSAY OF NATRIURETIC PEPTIDE: CPT | Performed by: EMERGENCY MEDICINE

## 2024-06-12 RX ORDER — SODIUM CHLORIDE 9 MG/ML
125 INJECTION, SOLUTION INTRAVENOUS CONTINUOUS
Status: DISCONTINUED | OUTPATIENT
Start: 2024-06-12 | End: 2024-06-13

## 2024-06-12 ASSESSMENT — PAIN - FUNCTIONAL ASSESSMENT: PAIN_FUNCTIONAL_ASSESSMENT: 0-10

## 2024-06-12 ASSESSMENT — PAIN SCALES - GENERAL: PAINLEVEL_OUTOF10: 0 - NO PAIN

## 2024-06-13 LAB
ANION GAP SERPL CALC-SCNC: 12 MMOL/L (ref 10–20)
ANION GAP SERPL CALC-SCNC: 14 MMOL/L (ref 10–20)
APPEARANCE UR: ABNORMAL
BACTERIA #/AREA URNS AUTO: ABNORMAL /HPF
BILIRUB UR STRIP.AUTO-MCNC: NEGATIVE MG/DL
BNP SERPL-MCNC: 308 PG/ML (ref 0–99)
BUN SERPL-MCNC: 44 MG/DL (ref 6–23)
BUN SERPL-MCNC: 49 MG/DL (ref 6–23)
CALCIUM SERPL-MCNC: 8.6 MG/DL (ref 8.6–10.3)
CALCIUM SERPL-MCNC: 8.6 MG/DL (ref 8.6–10.3)
CHLORIDE SERPL-SCNC: 91 MMOL/L (ref 98–107)
CHLORIDE SERPL-SCNC: 93 MMOL/L (ref 98–107)
CO2 SERPL-SCNC: 23 MMOL/L (ref 21–32)
CO2 SERPL-SCNC: 23 MMOL/L (ref 21–32)
COLOR UR: COLORLESS
CREAT SERPL-MCNC: 1.63 MG/DL (ref 0.5–1.05)
CREAT SERPL-MCNC: 1.8 MG/DL (ref 0.5–1.05)
EGFRCR SERPLBLD CKD-EPI 2021: 26 ML/MIN/1.73M*2
EGFRCR SERPLBLD CKD-EPI 2021: 29 ML/MIN/1.73M*2
ERYTHROCYTE [DISTWIDTH] IN BLOOD BY AUTOMATED COUNT: 12.2 % (ref 11.5–14.5)
GLUCOSE BLD MANUAL STRIP-MCNC: 140 MG/DL (ref 74–99)
GLUCOSE BLD MANUAL STRIP-MCNC: 177 MG/DL (ref 74–99)
GLUCOSE BLD MANUAL STRIP-MCNC: 185 MG/DL (ref 74–99)
GLUCOSE BLD MANUAL STRIP-MCNC: 90 MG/DL (ref 74–99)
GLUCOSE SERPL-MCNC: 142 MG/DL (ref 74–99)
GLUCOSE SERPL-MCNC: 147 MG/DL (ref 74–99)
GLUCOSE UR STRIP.AUTO-MCNC: NEGATIVE MG/DL
HCT VFR BLD AUTO: 23.9 % (ref 36–46)
HGB BLD-MCNC: 8.1 G/DL (ref 12–16)
KETONES UR STRIP.AUTO-MCNC: NEGATIVE MG/DL
LEUKOCYTE ESTERASE UR QL STRIP.AUTO: ABNORMAL
MCH RBC QN AUTO: 32.7 PG (ref 26–34)
MCHC RBC AUTO-ENTMCNC: 33.9 G/DL (ref 32–36)
MCV RBC AUTO: 96 FL (ref 80–100)
MUCOUS THREADS #/AREA URNS AUTO: ABNORMAL /LPF
NITRITE UR QL STRIP.AUTO: ABNORMAL
NRBC BLD-RTO: 0 /100 WBCS (ref 0–0)
OSMOLALITY SERPL: 272 MOSM/KG (ref 280–300)
OSMOLALITY UR: 215 MOSM/KG (ref 200–1200)
PH UR STRIP.AUTO: 5.5 [PH]
PLATELET # BLD AUTO: 259 X10*3/UL (ref 150–450)
POTASSIUM SERPL-SCNC: 3.8 MMOL/L (ref 3.5–5.3)
POTASSIUM SERPL-SCNC: 3.8 MMOL/L (ref 3.5–5.3)
PROT UR STRIP.AUTO-MCNC: ABNORMAL MG/DL
RBC # BLD AUTO: 2.48 X10*6/UL (ref 4–5.2)
RBC # UR STRIP.AUTO: NEGATIVE /UL
RBC #/AREA URNS AUTO: ABNORMAL /HPF
SODIUM SERPL-SCNC: 124 MMOL/L (ref 136–145)
SODIUM SERPL-SCNC: 124 MMOL/L (ref 136–145)
SP GR UR STRIP.AUTO: 1
SQUAMOUS #/AREA URNS AUTO: ABNORMAL /HPF
UROBILINOGEN UR STRIP.AUTO-MCNC: ABNORMAL MG/DL
WBC # BLD AUTO: 4.6 X10*3/UL (ref 4.4–11.3)
WBC #/AREA URNS AUTO: ABNORMAL /HPF

## 2024-06-13 PROCEDURE — 82947 ASSAY GLUCOSE BLOOD QUANT: CPT | Mod: 91

## 2024-06-13 PROCEDURE — 85027 COMPLETE CBC AUTOMATED: CPT | Performed by: INTERNAL MEDICINE

## 2024-06-13 PROCEDURE — 82374 ASSAY BLOOD CARBON DIOXIDE: CPT | Performed by: INTERNAL MEDICINE

## 2024-06-13 PROCEDURE — 2500000001 HC RX 250 WO HCPCS SELF ADMINISTERED DRUGS (ALT 637 FOR MEDICARE OP): Performed by: PHARMACIST

## 2024-06-13 PROCEDURE — 2500000004 HC RX 250 GENERAL PHARMACY W/ HCPCS (ALT 636 FOR OP/ED): Performed by: EMERGENCY MEDICINE

## 2024-06-13 PROCEDURE — 2500000001 HC RX 250 WO HCPCS SELF ADMINISTERED DRUGS (ALT 637 FOR MEDICARE OP): Performed by: INTERNAL MEDICINE

## 2024-06-13 PROCEDURE — 36415 COLL VENOUS BLD VENIPUNCTURE: CPT | Performed by: INTERNAL MEDICINE

## 2024-06-13 PROCEDURE — 83935 ASSAY OF URINE OSMOLALITY: CPT | Mod: AHULAB | Performed by: INTERNAL MEDICINE

## 2024-06-13 PROCEDURE — 2500000002 HC RX 250 W HCPCS SELF ADMINISTERED DRUGS (ALT 637 FOR MEDICARE OP, ALT 636 FOR OP/ED): Performed by: INTERNAL MEDICINE

## 2024-06-13 PROCEDURE — 2500000004 HC RX 250 GENERAL PHARMACY W/ HCPCS (ALT 636 FOR OP/ED): Performed by: INTERNAL MEDICINE

## 2024-06-13 PROCEDURE — 1200000002 HC GENERAL ROOM WITH TELEMETRY DAILY

## 2024-06-13 PROCEDURE — 87086 URINE CULTURE/COLONY COUNT: CPT | Mod: AHULAB | Performed by: EMERGENCY MEDICINE

## 2024-06-13 PROCEDURE — 81001 URINALYSIS AUTO W/SCOPE: CPT | Performed by: EMERGENCY MEDICINE

## 2024-06-13 PROCEDURE — 2500000001 HC RX 250 WO HCPCS SELF ADMINISTERED DRUGS (ALT 637 FOR MEDICARE OP): Performed by: EMERGENCY MEDICINE

## 2024-06-13 RX ORDER — FUROSEMIDE 40 MG/1
40 TABLET ORAL DAILY
Status: DISCONTINUED | OUTPATIENT
Start: 2024-06-13 | End: 2024-06-14

## 2024-06-13 RX ORDER — SODIUM CHLORIDE 9 MG/ML
100 INJECTION, SOLUTION INTRAVENOUS CONTINUOUS
Status: DISCONTINUED | OUTPATIENT
Start: 2024-06-13 | End: 2024-06-13

## 2024-06-13 RX ORDER — BRIMONIDINE TARTRATE 2 MG/ML
1 SOLUTION/ DROPS OPHTHALMIC 2 TIMES DAILY
COMMUNITY
Start: 2024-04-24

## 2024-06-13 RX ORDER — PANTOPRAZOLE SODIUM 40 MG/1
40 TABLET, DELAYED RELEASE ORAL
Status: DISCONTINUED | OUTPATIENT
Start: 2024-06-13 | End: 2024-06-15 | Stop reason: HOSPADM

## 2024-06-13 RX ORDER — HYDRALAZINE HYDROCHLORIDE 50 MG/1
100 TABLET, FILM COATED ORAL 2 TIMES DAILY
Status: DISCONTINUED | OUTPATIENT
Start: 2024-06-13 | End: 2024-06-15 | Stop reason: HOSPADM

## 2024-06-13 RX ORDER — DEXTROSE 50 % IN WATER (D50W) INTRAVENOUS SYRINGE
12.5
Status: DISCONTINUED | OUTPATIENT
Start: 2024-06-13 | End: 2024-06-15 | Stop reason: HOSPADM

## 2024-06-13 RX ORDER — DEXTROSE 50 % IN WATER (D50W) INTRAVENOUS SYRINGE
25
Status: DISCONTINUED | OUTPATIENT
Start: 2024-06-13 | End: 2024-06-15 | Stop reason: HOSPADM

## 2024-06-13 RX ORDER — ONDANSETRON 4 MG/1
4 TABLET, FILM COATED ORAL EVERY 8 HOURS PRN
Status: DISCONTINUED | OUTPATIENT
Start: 2024-06-13 | End: 2024-06-15 | Stop reason: HOSPADM

## 2024-06-13 RX ORDER — LATANOPROST 50 UG/ML
1 SOLUTION/ DROPS OPHTHALMIC NIGHTLY
Status: DISCONTINUED | OUTPATIENT
Start: 2024-06-13 | End: 2024-06-15 | Stop reason: HOSPADM

## 2024-06-13 RX ORDER — AMOXICILLIN 250 MG
1 CAPSULE ORAL DAILY
Status: DISCONTINUED | OUTPATIENT
Start: 2024-06-13 | End: 2024-06-15 | Stop reason: HOSPADM

## 2024-06-13 RX ORDER — ONDANSETRON HYDROCHLORIDE 2 MG/ML
4 INJECTION, SOLUTION INTRAVENOUS EVERY 8 HOURS PRN
Status: DISCONTINUED | OUTPATIENT
Start: 2024-06-13 | End: 2024-06-15 | Stop reason: HOSPADM

## 2024-06-13 RX ORDER — BRIMONIDINE TARTRATE 1 MG/ML
1 SOLUTION/ DROPS OPHTHALMIC 2 TIMES DAILY
Status: DISCONTINUED | OUTPATIENT
Start: 2024-06-13 | End: 2024-06-13 | Stop reason: CLARIF

## 2024-06-13 RX ORDER — INSULIN LISPRO 100 [IU]/ML
0-10 INJECTION, SOLUTION INTRAVENOUS; SUBCUTANEOUS
Status: DISCONTINUED | OUTPATIENT
Start: 2024-06-13 | End: 2024-06-15 | Stop reason: HOSPADM

## 2024-06-13 RX ORDER — ACETAMINOPHEN 325 MG/1
650 TABLET ORAL EVERY 4 HOURS PRN
Status: DISCONTINUED | OUTPATIENT
Start: 2024-06-13 | End: 2024-06-15 | Stop reason: HOSPADM

## 2024-06-13 RX ORDER — ACETAMINOPHEN 160 MG/5ML
650 SOLUTION ORAL EVERY 4 HOURS PRN
Status: DISCONTINUED | OUTPATIENT
Start: 2024-06-13 | End: 2024-06-15 | Stop reason: HOSPADM

## 2024-06-13 RX ORDER — CARVEDILOL 25 MG/1
25 TABLET ORAL
Status: DISCONTINUED | OUTPATIENT
Start: 2024-06-13 | End: 2024-06-15 | Stop reason: HOSPADM

## 2024-06-13 RX ORDER — BRIMONIDINE TARTRATE 2 MG/ML
1 SOLUTION/ DROPS OPHTHALMIC 2 TIMES DAILY
Status: DISCONTINUED | OUTPATIENT
Start: 2024-06-13 | End: 2024-06-15 | Stop reason: HOSPADM

## 2024-06-13 RX ORDER — ASPIRIN 81 MG/1
81 TABLET ORAL DAILY
Status: DISCONTINUED | OUTPATIENT
Start: 2024-06-13 | End: 2024-06-15 | Stop reason: HOSPADM

## 2024-06-13 RX ORDER — ACETAMINOPHEN 650 MG/1
650 SUPPOSITORY RECTAL EVERY 4 HOURS PRN
Status: DISCONTINUED | OUTPATIENT
Start: 2024-06-13 | End: 2024-06-15 | Stop reason: HOSPADM

## 2024-06-13 RX ORDER — SPIRONOLACTONE 25 MG/1
12.5 TABLET ORAL
Status: DISCONTINUED | OUTPATIENT
Start: 2024-06-13 | End: 2024-06-15 | Stop reason: HOSPADM

## 2024-06-13 RX ORDER — HYDRALAZINE HYDROCHLORIDE 20 MG/ML
20 INJECTION INTRAMUSCULAR; INTRAVENOUS ONCE
Status: COMPLETED | OUTPATIENT
Start: 2024-06-13 | End: 2024-06-13

## 2024-06-13 RX ORDER — HEPARIN SODIUM 5000 [USP'U]/ML
5000 INJECTION, SOLUTION INTRAVENOUS; SUBCUTANEOUS EVERY 8 HOURS
Status: DISCONTINUED | OUTPATIENT
Start: 2024-06-13 | End: 2024-06-15 | Stop reason: HOSPADM

## 2024-06-13 RX ORDER — MECLIZINE HYDROCHLORIDE 25 MG/1
12.5 TABLET ORAL 3 TIMES DAILY PRN
Status: DISCONTINUED | OUTPATIENT
Start: 2024-06-13 | End: 2024-06-15 | Stop reason: HOSPADM

## 2024-06-13 RX ORDER — TIMOLOL MALEATE 5 MG/ML
1 SOLUTION/ DROPS OPHTHALMIC DAILY
Status: DISCONTINUED | OUTPATIENT
Start: 2024-06-13 | End: 2024-06-15 | Stop reason: HOSPADM

## 2024-06-13 RX ORDER — ATORVASTATIN CALCIUM 10 MG/1
10 TABLET, FILM COATED ORAL DAILY
Status: DISCONTINUED | OUTPATIENT
Start: 2024-06-13 | End: 2024-06-15 | Stop reason: HOSPADM

## 2024-06-13 RX ORDER — CEFTRIAXONE 1 G/50ML
1 INJECTION, SOLUTION INTRAVENOUS EVERY 24 HOURS
Status: DISCONTINUED | OUTPATIENT
Start: 2024-06-13 | End: 2024-06-15 | Stop reason: HOSPADM

## 2024-06-13 RX ORDER — AMLODIPINE BESYLATE 5 MG/1
5 TABLET ORAL DAILY
Status: DISCONTINUED | OUTPATIENT
Start: 2024-06-13 | End: 2024-06-15 | Stop reason: HOSPADM

## 2024-06-13 RX ORDER — PANTOPRAZOLE SODIUM 40 MG/10ML
40 INJECTION, POWDER, LYOPHILIZED, FOR SOLUTION INTRAVENOUS
Status: DISCONTINUED | OUTPATIENT
Start: 2024-06-13 | End: 2024-06-15 | Stop reason: HOSPADM

## 2024-06-13 RX ORDER — HYDRALAZINE HYDROCHLORIDE 50 MG/1
100 TABLET, FILM COATED ORAL 2 TIMES DAILY
Status: DISCONTINUED | OUTPATIENT
Start: 2024-06-13 | End: 2024-06-13

## 2024-06-13 SDOH — ECONOMIC STABILITY: INCOME INSECURITY: HOW HARD IS IT FOR YOU TO PAY FOR THE VERY BASICS LIKE FOOD, HOUSING, MEDICAL CARE, AND HEATING?: NOT VERY HARD

## 2024-06-13 SDOH — HEALTH STABILITY: MENTAL HEALTH: HOW OFTEN DO YOU HAVE A DRINK CONTAINING ALCOHOL?: NEVER

## 2024-06-13 SDOH — SOCIAL STABILITY: SOCIAL INSECURITY: DO YOU FEEL ANYONE HAS EXPLOITED OR TAKEN ADVANTAGE OF YOU FINANCIALLY OR OF YOUR PERSONAL PROPERTY?: NO

## 2024-06-13 SDOH — SOCIAL STABILITY: SOCIAL INSECURITY: DOES ANYONE TRY TO KEEP YOU FROM HAVING/CONTACTING OTHER FRIENDS OR DOING THINGS OUTSIDE YOUR HOME?: NO

## 2024-06-13 SDOH — SOCIAL STABILITY: SOCIAL INSECURITY: ARE YOU OR HAVE YOU BEEN THREATENED OR ABUSED PHYSICALLY, EMOTIONALLY, OR SEXUALLY BY ANYONE?: NO

## 2024-06-13 SDOH — SOCIAL STABILITY: SOCIAL INSECURITY: ARE THERE ANY APPARENT SIGNS OF INJURIES/BEHAVIORS THAT COULD BE RELATED TO ABUSE/NEGLECT?: NO

## 2024-06-13 SDOH — ECONOMIC STABILITY: INCOME INSECURITY: IN THE LAST 12 MONTHS, WAS THERE A TIME WHEN YOU WERE NOT ABLE TO PAY THE MORTGAGE OR RENT ON TIME?: NO

## 2024-06-13 SDOH — SOCIAL STABILITY: SOCIAL INSECURITY: HAS ANYONE EVER THREATENED TO HURT YOUR FAMILY OR YOUR PETS?: NO

## 2024-06-13 SDOH — HEALTH STABILITY: MENTAL HEALTH: HOW MANY STANDARD DRINKS CONTAINING ALCOHOL DO YOU HAVE ON A TYPICAL DAY?: PATIENT DOES NOT DRINK

## 2024-06-13 SDOH — SOCIAL STABILITY: SOCIAL INSECURITY: HAVE YOU HAD THOUGHTS OF HARMING ANYONE ELSE?: NO

## 2024-06-13 SDOH — SOCIAL STABILITY: SOCIAL INSECURITY: ABUSE: ADULT

## 2024-06-13 SDOH — SOCIAL STABILITY: SOCIAL INSECURITY: WERE YOU ABLE TO COMPLETE ALL THE BEHAVIORAL HEALTH SCREENINGS?: YES

## 2024-06-13 SDOH — SOCIAL STABILITY: SOCIAL INSECURITY: DO YOU FEEL UNSAFE GOING BACK TO THE PLACE WHERE YOU ARE LIVING?: NO

## 2024-06-13 SDOH — SOCIAL STABILITY: SOCIAL INSECURITY: HAVE YOU HAD ANY THOUGHTS OF HARMING ANYONE ELSE?: NO

## 2024-06-13 ASSESSMENT — ACTIVITIES OF DAILY LIVING (ADL)
TOILETING: NEEDS ASSISTANCE
WALKS IN HOME: NEEDS ASSISTANCE
FEEDING YOURSELF: NEEDS ASSISTANCE
JUDGMENT_ADEQUATE_SAFELY_COMPLETE_DAILY_ACTIVITIES: YES
HEARING - LEFT EAR: HEARING AID
PATIENT'S MEMORY ADEQUATE TO SAFELY COMPLETE DAILY ACTIVITIES?: YES
BATHING: NEEDS ASSISTANCE
LACK_OF_TRANSPORTATION: NO
GROOMING: NEEDS ASSISTANCE
HEARING - RIGHT EAR: HEARING AID
DRESSING YOURSELF: NEEDS ASSISTANCE
ADEQUATE_TO_COMPLETE_ADL: YES

## 2024-06-13 ASSESSMENT — LIFESTYLE VARIABLES
HOW OFTEN DO YOU HAVE A DRINK CONTAINING ALCOHOL: NEVER
HOW OFTEN DO YOU HAVE 6 OR MORE DRINKS ON ONE OCCASION: NEVER
PRESCIPTION_ABUSE_PAST_12_MONTHS: NO
SKIP TO QUESTIONS 9-10: 1
SUBSTANCE_ABUSE_PAST_12_MONTHS: NO
AUDIT-C TOTAL SCORE: 0
HOW MANY STANDARD DRINKS CONTAINING ALCOHOL DO YOU HAVE ON A TYPICAL DAY: PATIENT DOES NOT DRINK
AUDIT-C TOTAL SCORE: 0

## 2024-06-13 ASSESSMENT — COGNITIVE AND FUNCTIONAL STATUS - GENERAL
TOILETING: A LITTLE
DRESSING REGULAR LOWER BODY CLOTHING: A LITTLE
MOBILITY SCORE: 17
MOVING TO AND FROM BED TO CHAIR: A LITTLE
DAILY ACTIVITIY SCORE: 19
TURNING FROM BACK TO SIDE WHILE IN FLAT BAD: A LITTLE
WALKING IN HOSPITAL ROOM: A LITTLE
CLIMB 3 TO 5 STEPS WITH RAILING: A LOT
HELP NEEDED FOR BATHING: A LITTLE
PERSONAL GROOMING: A LITTLE
MOVING FROM LYING ON BACK TO SITTING ON SIDE OF FLAT BED WITH BEDRAILS: A LITTLE
DRESSING REGULAR UPPER BODY CLOTHING: A LITTLE
STANDING UP FROM CHAIR USING ARMS: A LITTLE
PATIENT BASELINE BEDBOUND: NO

## 2024-06-13 ASSESSMENT — ENCOUNTER SYMPTOMS
GASTROINTESTINAL NEGATIVE: 1
RESPIRATORY NEGATIVE: 1
EYES NEGATIVE: 1
FATIGUE: 1
ENDOCRINE NEGATIVE: 1
HEMATOLOGIC/LYMPHATIC NEGATIVE: 1
CARDIOVASCULAR NEGATIVE: 1
ALLERGIC/IMMUNOLOGIC NEGATIVE: 1
PSYCHIATRIC NEGATIVE: 1
MUSCULOSKELETAL NEGATIVE: 1
NEUROLOGICAL NEGATIVE: 1

## 2024-06-13 ASSESSMENT — PATIENT HEALTH QUESTIONNAIRE - PHQ9
SUM OF ALL RESPONSES TO PHQ9 QUESTIONS 1 & 2: 0
1. LITTLE INTEREST OR PLEASURE IN DOING THINGS: NOT AT ALL
2. FEELING DOWN, DEPRESSED OR HOPELESS: NOT AT ALL

## 2024-06-13 ASSESSMENT — PAIN - FUNCTIONAL ASSESSMENT: PAIN_FUNCTIONAL_ASSESSMENT: 0-10

## 2024-06-13 ASSESSMENT — PAIN SCALES - GENERAL: PAINLEVEL_OUTOF10: 0 - NO PAIN

## 2024-06-13 NOTE — PROGRESS NOTES
"Pharmacy Medication History Review    Carolann Cartagena is a 89 y.o. female admitted for Hyponatremia. Pharmacy reviewed the patient's [id number] medications and allergies for accuracy.    The list below reflectives the updated PTA list. Please review each medication in order reconciliation for additional clarification and justification.  Prior to Admission Medications   Prescriptions Last Dose Informant     Autolet lancing device Past Week      Sig: Use as instructed   aspirin 81 mg EC tablet 2024      Sig: Take 1 tablet (81 mg) by mouth once daily.   atorvastatin (Lipitor) 10 mg tablet 2024      Sig: Take 1 tablet (10 mg) by mouth once daily.   blood sugar diagnostic (OneTouch Ultra Test) strip Past Week      Si strip 3 times a day.   blood-glucose meter misc Past Week      Sig: Use to test glucose 3 times daily   brimonidine (AlphaGAN) 0.2 % ophthalmic solution 2024      Sig: Administer 1 drop into both eyes 2 times a day.   carvedilol (Coreg) 25 mg tablet 2024      Sig: Take 1 tablet (25 mg) by mouth 2 times daily (morning and late afternoon).   hydrALAZINE (Apresoline) 100 mg tablet 2024      Sig: Take 1 tablet (100 mg) by mouth 2 times a day.   insulin NPH, Isophane, (HumuLIN N,NovoLIN N) 100 unit/mL (3 mL) injection 2024      Sig: Inject 12 Units under the skin 2 times a day before meals. Take as directed per insulin instructions.   Patient taking differently: Inject under the skin 2 times a day before meals. Take as directed per insulin instructions. 18 units subcutaneous every morning and 6 units subcutaneous every evening before meals   insulin syringe-needle U-100 (BD Insulin Syringe Ultra-Fine) 31G X 5/16\" 1 mL syringe Past Week      Sig: Inject 1 each under the skin 2 times a day. Use as instructed   insulin syringe-needle U-100 31G X 5/16\" 0.3 mL syringe Past Week      Sig: Use as instructed   lancets (OneTouch Delica Plus Lancet) 30 gauge misc Past Week      Sig: " Use to test glucose 3 times daily   latanoprost (Xalatan) 0.005 % ophthalmic solution 6/11/2024      Sig: Administer 1 drop into both eyes once daily at bedtime.   meclizine (Antivert) 12.5 mg tablet Past Week      Sig: Take 1 tablet (12.5 mg) by mouth 3 times a day as needed for dizziness.             sennosides-docusate sodium (Steph-Colace) 8.6-50 mg tablet Past Week      Sig: Take 1 tablet by mouth once daily.   timolol (Timoptic) 0.5 % ophthalmic solution 6/11/2024      Sig: Administer 1 drop into both eyes once daily.      Facility-Administered Medications: None      Allergies  Reviewed by Jackie Rendon RN on 6/12/2024        Severity Reactions Comments    Dorzolamide Not Specified Other eye infection Caused infection in her eyes.    Lisinopril Not Specified Swelling, Unknown cough    Losartan Not Specified Hives             Below are additional concerns with the patient's PTA list.  Patient's daughter verified all medications and doses.    Ruby Rivera

## 2024-06-13 NOTE — ED PROVIDER NOTES
HPI   Chief Complaint   Patient presents with   • Abnormal Sodium       HPI: []  89-year-old  female history of hypertension, diabetes, CHF, CKD, chronic anemia, comes in with abnormal labs.  Patient was seen by primary doctor labs drawn she got a call her sodium is low go to the hospital.  Patient asymptomatic.  She has no complaints.  She denies any chest pain pressure heaviness fever chills nausea vomit diarrhea cough congestion incontinence seizures syncope or near syncope no hematemesis melena hematochezia no hemoptysis no recent travel hospitalization or antibiotic use.  Denies any blood thinners.    Past history: Hypertension, diabetes, CKD, chronic anemia  Social: Patient denies current tobacco alcohol drug abuse.  REVIEW OF SYSTEMS:    GENERAL.: No weight loss, fatigue, anorexia, insomnia, fever.    EYES: No vision loss, double vision, drainage, eye pain.    ENT: No pharyngitis, dry mouth.    CARDIOPULMONARY: No chest pain, palpitations, syncope, near syncope. No shortness of breath, cough, hemoptysis.    GI: No abdominal pain, change in bowel habits, melena, hematemesis, hematochezia, nausea, vomiting, diarrhea.    : No discharge, dysuria, frequency, urgency, hematuria.    MS: No limb pain, joint pain, joint swelling.    SKIN: No rashes.    PSYCH: No depression, anxiety, suicidality, homicidality.    Review of systems is otherwise negative unless stated above or in history of present illness.  Social history, family history, allergies reviewed.  PHYSICAL EXAM:    GENERAL: Vitals noted, no distress. Alert and oriented  x 3. Non-toxic.      EENT: TMs clear. Posterior oropharynx unremarkable. No meningismus. No LAD.     NECK: Supple. Nontender. No midline tenderness.     CARDIAC: Regular, rate, rhythm.  Loud grade 2 x 6 ejection systolic murmur best at the left lower sternal border and apex, no rubs or gallops. No JVD    PULMONARY: Lungs clear bilaterally with good aeration. No wheezes rales or  rhonchi. No respiratory distress.  Fine bibasilar crackles    ABDOMEN: Soft, nonsurgical. Nontender. No peritoneal signs. Normoactive bowel sounds. No pulsatile masses.     EXTREMITIES: 1+ bilateral symmetric nonpitting peripheral edema. Negative Homans bilaterally, no cords.  2+ bounding pulses well-perfused    SKIN: No rash. Intact.     NEURO: No focal neurologic deficits, NIH score of 0. Cranial nerves normal as tested from II through XII.     MEDICAL DECISION MAKING:  EKG on my interpretation shows normal sinus rhythm normal axis rate mid 80s with no acute ischemic change.  CBC with shows no leukocytosis, hemoglobin 7.6 lower than his baseline, chemistries show low sodium 121, stable CKD, serum osmolarity pending, UA pending.  .  Chest ray shows stable cardiomegaly    Treatment in ED: IV established given IV normal saline at 125 cc an hour  ED course: Patient remained stable hemodynamic.  Impression: #1 hyponatremia, #2 anemia    Plans and MDM: Elderly female 89-year-old history of hypertension diabetes CKD chronic anemia presents with worsening anemia and hyponatremia patient asymptomatic currently the cause of hyponatremia I think is a combination of diuretics with increased free water intake low concern for SIADH, anemia most likely is due to CKD unlikely to be acute blood loss anemia patient has no reported history of hematemesis melena or hematochezia or hematuria or any other stigmata of active bleeding, hence my concern for hemorrhagic shock or active GI bleed is low.,  Patient will be hospitalized for further care.                            Beata Coma Scale Score: 15                     Patient History   Past Medical History:   Diagnosis Date   • Anemia due to chronic kidney disease 01/22/2024   • Chronic diastolic congestive heart failure (Multi) 10/02/2023   • Chronic hyponatremia 10/02/2023   • Chronic kidney disease, stage 3, mod decreased GFR (Multi) 09/18/2018   • Diabetic nephropathy  associated with type 2 diabetes mellitus (Multi) 12/18/2018   • Essential hypertension 05/23/2023   • Hyperlipidemia 05/23/2023   • Pure hypercholesterolemia 10/02/2015     Past Surgical History:   Procedure Laterality Date   • OTHER SURGICAL HISTORY  02/13/2020    Hysterectomy     Family History   Problem Relation Name Age of Onset   • No Known Problems Mother     • Bone cancer Sister     • Lung cancer Daughter       Social History     Tobacco Use   • Smoking status: Never   • Smokeless tobacco: Never   Substance Use Topics   • Alcohol use: Not Currently   • Drug use: Not Currently       Physical Exam   ED Triage Vitals [06/12/24 2124]   Temperature Heart Rate Respirations BP   36.6 °C (97.9 °F) 61 16 (!) 188/66      Pulse Ox Temp Source Heart Rate Source Patient Position   98 % Temporal Monitor --      BP Location FiO2 (%)     -- --       Physical Exam    ED Course & [facility]   ED Course as of 06/13/24 0135   Thu Jun 13, 2024   0130 EKG on my interpretation shows normal sinus rhythm normal axis rate mid 80s with no acute ischemic change.    CBC with shows no leukocytosis, drop in hemoglobin 7.6 worse than her baseline, chemistry shows stable CKD, sodium is 121, BNP is 308, chest ray shows stable cardiomegaly, patient was given gentle hydration with normal saline and patient will be hospitalized for further care. [MT]      ED Course User Index  [MT] Aníbal Barrios MD         Diagnoses as of 06/13/24 0135   Hyponatremia   Anemia due to chronic kidney disease, unspecified CKD stage       Medical Decision Making      Procedure  Critical Care    Performed by: Aníbal Barrios MD  Authorized by: Aníbal Barrios MD    Critical care provider statement:     Critical care time (minutes):  45    Critical care time was exclusive of:  Separately billable procedures and treating other patients    Critical care was necessary to treat or prevent imminent or life-threatening deterioration of the following conditions:  Metabolic  crisis    Critical care was time spent personally by me on the following activities:  Blood draw for specimens, development of treatment plan with patient or surrogate, discussions with primary provider, evaluation of patient's response to treatment, examination of patient, review of old charts, re-evaluation of patient's condition, pulse oximetry, ordering and review of radiographic studies, ordering and review of laboratory studies and ordering and performing treatments and interventions    Care discussed with: admitting provider         Aníabl Barrios MD  06/13/24 0134       Aníbal Barrios MD  06/13/24 0135

## 2024-06-13 NOTE — PROGRESS NOTES
Lab today came back showing sodium of 123. I called and spoke to the patient’s daughter and informed her of the critical lab result and I recommended she take the patient to the emergency room. Patient’s daughter will take patient to the emergency room.    Stella Rosa MD

## 2024-06-13 NOTE — H&P
History Of Present Illness  Carolann Cartagena is a 89 y.o. female with a past medical history of hypertension, type 2 diabetes mellitus requiring insulin, CKD4 chronic anemia, presents with abnormal labs.  She was seen by her primary care physician and had a blood work drawn and got a phone call from her doctor saying that her sodium is very low that she needs to go to the ER.  The patient is completely asymptomatic and has no complaints.  Specifically she denies fever chills chest pain shortness of breath nausea vomiting diarrhea abdominal pain.  In the ER lab work was drawn and her sodium was 121     Past Medical History  Past Medical History:   Diagnosis Date    Anemia due to chronic kidney disease 01/22/2024    Chronic diastolic congestive heart failure (Multi) 10/02/2023    Chronic hyponatremia 10/02/2023    Chronic kidney disease, stage 3, mod decreased GFR (Multi) 09/18/2018    Diabetic nephropathy associated with type 2 diabetes mellitus (Multi) 12/18/2018    Essential hypertension 05/23/2023    Hyperlipidemia 05/23/2023    Pure hypercholesterolemia 10/02/2015        Surgical History  Past Surgical History:   Procedure Laterality Date    OTHER SURGICAL HISTORY  02/13/2020    Hysterectomy         Social History  She reports that she has never smoked. She has never used smokeless tobacco. She reports that she does not currently use alcohol. She reports that she does not currently use drugs.    Family History  Family History   Problem Relation Name Age of Onset    No Known Problems Mother      Bone cancer Sister      Lung cancer Daughter          Allergies  Dorzolamide, Lisinopril, and Losartan    Review of Systems   Constitutional:  Positive for fatigue.   HENT: Negative.     Eyes: Negative.    Respiratory: Negative.     Cardiovascular: Negative.    Gastrointestinal: Negative.    Endocrine: Negative.    Genitourinary: Negative.    Musculoskeletal: Negative.    Skin: Negative.    Allergic/Immunologic: Negative.   "  Neurological: Negative.    Hematological: Negative.    Psychiatric/Behavioral: Negative.     All other systems reviewed and are negative.       Physical Exam  Vitals (BP is very) and nursing note reviewed.   Constitutional:       Appearance: Normal appearance.   HENT:      Head: Normocephalic.      Right Ear: External ear normal.      Left Ear: External ear normal.      Nose: Nose normal.      Mouth/Throat:      Mouth: Mucous membranes are dry.      Pharynx: Oropharynx is clear.   Eyes:      Extraocular Movements: Extraocular movements intact.      Conjunctiva/sclera: Conjunctivae normal.      Pupils: Pupils are equal, round, and reactive to light.   Cardiovascular:      Rate and Rhythm: Normal rate and regular rhythm.   Pulmonary:      Effort: Pulmonary effort is normal.      Breath sounds: Normal breath sounds.   Abdominal:      General: Abdomen is flat. Bowel sounds are normal.      Palpations: Abdomen is soft.   Musculoskeletal:         General: Normal range of motion.   Skin:     General: Skin is warm and dry.   Neurological:      General: No focal deficit present.      Mental Status: She is alert. Mental status is at baseline.   Psychiatric:         Mood and Affect: Mood normal.         Behavior: Behavior normal.          Last Recorded Vitals  Blood pressure (!) 226/70, pulse 61, temperature 36.6 °C (97.9 °F), temperature source Temporal, resp. rate 16, height 1.626 m (5' 4\"), weight 75.3 kg (166 lb), SpO2 99%.    Relevant Results  Meds:  Scheduled medications  aspirin, 81 mg, oral, Daily  atorvastatin, 10 mg, oral, Daily  brimonidine, 1 drop, Both Eyes, BID  carvedilol, 25 mg, oral, BID  heparin (porcine), 5,000 Units, subcutaneous, q8h  hydrALAZINE, 100 mg, oral, BID  hydrALAZINE, 100 mg, oral, BID  latanoprost, 1 drop, Both Eyes, Nightly  pantoprazole, 40 mg, oral, Daily before breakfast   Or  pantoprazole, 40 mg, intravenous, Daily before breakfast  piperacillin-tazobactam, 2.25 g, intravenous, " "q6h  sennosides-docusate sodium, 1 tablet, oral, Daily  timolol, 1 drop, Both Eyes, Daily      Continuous medications  sodium chloride 0.9%, 125 mL/hr, Last Rate: 125 mL/hr (06/13/24 0004)  sodium chloride 0.9%, 100 mL/hr      PRN medications  PRN medications: acetaminophen **OR** acetaminophen **OR** acetaminophen, meclizine, ondansetron **OR** ondansetron   Current Outpatient Medications   Medication Instructions    Alphagan P 0.1 % ophthalmic solution 1 drop, Both Eyes, 2 times daily    aspirin 81 mg, oral, Daily RT    atorvastatin (LIPITOR) 10 mg, oral, Daily    Autolet lancing device Use as instructed    blood sugar diagnostic (OneTouch Ultra Test) strip 1 strip, miscellaneous, 3 times daily    blood-glucose meter misc Use to test glucose 3 times daily    carvedilol (COREG) 25 mg, oral, 2 times daily (morning and late afternoon)    hydrALAZINE (APRESOLINE) 100 mg, oral, 2 times daily    insulin NPH (Isophane) (HUMULIN N,NOVOLIN N) 12 Units, subcutaneous, 2 times daily before meals, Take as directed per insulin instructions.    insulin syringe-needle U-100 (BD Insulin Syringe Ultra-Fine) 31G X 5/16\" 1 mL syringe 1 each, subcutaneous, 2 times daily, Use as instructed    insulin syringe-needle U-100 31G X 5/16\" 0.3 mL syringe Use as instructed    lancets (OneTouch Delica Plus Lancet) 30 gauge misc Use to test glucose 3 times daily    latanoprost (Xalatan) 0.005 % ophthalmic solution 1 drop, Both Eyes, Nightly    meclizine (ANTIVERT) 12.5 mg, oral, 3 times daily PRN    meclizine (ANTIVERT) 12.5 mg, oral, 3 times daily PRN    sennosides-docusate sodium (Steph-Colace) 8.6-50 mg tablet 1 tablet, oral, Daily    timolol (Timoptic) 0.5 % ophthalmic solution 1 drop, Both Eyes, Daily        Labs:  Results for orders placed or performed during the hospital encounter of 06/12/24 (from the past 24 hour(s))   CBC and Auto Differential   Result Value Ref Range    WBC 4.6 4.4 - 11.3 x10*3/uL    nRBC 0.0 0.0 - 0.0 /100 WBCs    RBC " 2.32 (L) 4.00 - 5.20 x10*6/uL    Hemoglobin 7.6 (L) 12.0 - 16.0 g/dL    Hematocrit 21.7 (L) 36.0 - 46.0 %    MCV 94 80 - 100 fL    MCH 32.8 26.0 - 34.0 pg    MCHC 35.0 32.0 - 36.0 g/dL    RDW 12.3 11.5 - 14.5 %    Platelets 258 150 - 450 x10*3/uL    Neutrophils % 56.0 40.0 - 80.0 %    Immature Granulocytes %, Automated 0.7 0.0 - 0.9 %    Lymphocytes % 23.8 13.0 - 44.0 %    Monocytes % 17.5 2.0 - 10.0 %    Eosinophils % 1.3 0.0 - 6.0 %    Basophils % 0.7 0.0 - 2.0 %    Neutrophils Absolute 2.57 1.60 - 5.50 x10*3/uL    Immature Granulocytes Absolute, Automated 0.03 0.00 - 0.50 x10*3/uL    Lymphocytes Absolute 1.09 0.80 - 3.00 x10*3/uL    Monocytes Absolute 0.80 0.05 - 0.80 x10*3/uL    Eosinophils Absolute 0.06 0.00 - 0.40 x10*3/uL    Basophils Absolute 0.03 0.00 - 0.10 x10*3/uL   Basic metabolic panel   Result Value Ref Range    Glucose 119 (H) 74 - 99 mg/dL    Sodium 121 (L) 136 - 145 mmol/L    Potassium 3.8 3.5 - 5.3 mmol/L    Chloride 88 (L) 98 - 107 mmol/L    Bicarbonate 23 21 - 32 mmol/L    Anion Gap 14 10 - 20 mmol/L    Urea Nitrogen 50 (H) 6 - 23 mg/dL    Creatinine 1.85 (H) 0.50 - 1.05 mg/dL    eGFR 25 (L) >60 mL/min/1.73m*2    Calcium 8.5 (L) 8.6 - 10.3 mg/dL   Hepatic function panel   Result Value Ref Range    Albumin 3.4 3.4 - 5.0 g/dL    Bilirubin, Total 0.4 0.0 - 1.2 mg/dL    Bilirubin, Direct 0.1 0.0 - 0.3 mg/dL    Alkaline Phosphatase 59 33 - 136 U/L    ALT 12 7 - 45 U/L    AST 21 9 - 39 U/L    Total Protein 7.0 6.4 - 8.2 g/dL   TSH with reflex to Free T4 if abnormal   Result Value Ref Range    Thyroid Stimulating Hormone 3.58 0.44 - 3.98 mIU/L   Magnesium   Result Value Ref Range    Magnesium 2.00 1.60 - 2.40 mg/dL   B-Type Natriuretic Peptide   Result Value Ref Range     (H) 0 - 99 pg/mL   Osmolality   Result Value Ref Range    Osmolality, Serum 272 (L) 280 - 300 mOsm/kg   Urinalysis with Reflex Culture and Microscopic   Result Value Ref Range    Color, Urine Colorless (N) Straw, Yellow     Appearance, Urine Cloudy (N) Clear    Specific Gravity, Urine 1.005 1.005 - 1.035    pH, Urine 5.5 5.0, 5.5, 6.0, 6.5, 7.0, 7.5, 8.0    Protein, Urine 30 (1+) (A) NEGATIVE, 10 (TRACE) mg/dL    Glucose, Urine NEGATIVE NEGATIVE mg/dL    Blood, Urine NEGATIVE NEGATIVE    Ketones, Urine NEGATIVE NEGATIVE mg/dL    Bilirubin, Urine NEGATIVE NEGATIVE    Urobilinogen, Urine NORM NORM mg/dL    Nitrite, Urine 1+ (A) NEGATIVE    Leukocyte Esterase, Urine 500 Nella/µL (A) NEGATIVE   Microscopic Only, Urine   Result Value Ref Range    WBC, Urine 6-10 (A) 1-5, NONE /HPF    RBC, Urine 1-2 NONE, 1-2, 3-5 /HPF    Squamous Epithelial Cells, Urine 1-9 (SPARSE) Reference range not established. /HPF    Bacteria, Urine 3+ (A) NONE SEEN /HPF    Mucus, Urine 1+ Reference range not established. /LPF      Imaging:  XR chest 1 view    Result Date: 6/12/2024  Interpreted By:  Finkelstein, Evan, STUDY: XR CHEST 1 VIEW;  6/12/2024 9:54 pm   INDICATION: Signs/Symptoms:electrolyte abnormality.   COMPARISON: Chest radiograph 01/30/2024   ACCESSION NUMBER(S): [id number]   ORDERING CLINICIAN: OLIVIA TILLMAN   FINDINGS:     CARDIOMEDIASTINAL SILHOUETTE: Cardiomediastinal silhouette is stable in size and configuration.   LUNGS: No pulmonary consolidation, pleural effusion or pneumothorax.   ABDOMEN: No remarkable upper abdominal findings.   BONES: No acute osseous abnormality.       No radiographic evidence of acute cardiopulmonary pathology.   MACRO: None.   Signed by: Evan Finkelstein 6/12/2024 10:07 PM Dictation workstation:   [id number]      Assessment/Plan   Hyponatremia  Plan:  IV normal saline and 100 mL/hour if this is unsuccessful in raising her sodium will fluid restrict.  Will recheck her sodium this afternoon  Careful not to raise sodium too quickly  Check urine osmolality    Hypertensive urgency  Plan:  Give hydralazine 20 mg IV x 1 now  Resume home meds  Hydralazine 100 mg twice daily  Coreg 25 mg twice  daily    Hyperlipidemia  Plan:  Atorvastatin 10 mg orally daily    CKD 4  Plan:  Trend creatinine    Type 2 diabetes mellitus, requiring insulin  Resume home NPH 12 units prior to breakfast and dinner  Humalog per corrective scale    Anemia of chronic kidney disease  Trend H&H    Glaucoma  Continue home eyedrops    DVT prophylaxis  Unfractionated heparin 5000 units subcu every 8 hours  SCDs    I spent 60 minutes in the professional and overall care of this patient.      Tyree Ordoñez DO

## 2024-06-13 NOTE — CONSULTS
Inpatient consult to Nephrology  Consult performed by: Pascual Bhakta DO  Consult ordered by: Emeterio Alves DO      Reason For Consult  Hyponatremia, hypertensive urgency, HFpEF, G4/A3 chronic kidney disease    History Of Present Illness  Carolann Cartagena is a 89 y.o. female with a past medical history of  hypertension, hyperlipidemia, diabetes, chronic anemia, IgG kappa monoclonal protein, chronic hyponatremia, HFpEF, moderate concentric LVH, mild aortic stenosis and mild to moderate pulmonary hypertension, and G4/A3 chronic kidney disease with a fluctuating creatinine baseline between 1.8 up to 2.3 mg/dL who was sent to the ED by her nephrologist due to hyponatremia with a sodium 123 noted on outpatient labs. Upon presentation Ms. Cartagena was noted to have hypertensive urgency with a blood pressure up to 220s systolic with a wide pulse pressure. Her sodium was 123 mmol/a liter. She had a sodium of 122 on 6/5.  Her creatinine was 1.87 mg/a deciliter, her hemoglobin was 7.8.  Chest x-ray showed no acute cardiopulmonary process.  Nephrology was consulted for hyponatremia, hypertensive urgency, HFpEF and stage G4 chronic kidney disease management.     Past Medical History:   Diagnosis Date    Anemia due to chronic kidney disease 01/22/2024    Chronic diastolic congestive heart failure (Multi) 10/02/2023    Chronic hyponatremia 10/02/2023    Chronic kidney disease, stage 3, mod decreased GFR (Multi) 09/18/2018    Diabetic nephropathy associated with type 2 diabetes mellitus (Multi) 12/18/2018    Essential hypertension 05/23/2023    Hyperlipidemia 05/23/2023    Pure hypercholesterolemia 10/02/2015       Past Surgical History:   Procedure Laterality Date    OTHER SURGICAL HISTORY  02/13/2020    Hysterectomy       Social History     Tobacco Use    Smoking status: Never    Smokeless tobacco: Never   Substance Use Topics    Alcohol use: Not Currently    Drug use: Not Currently        Family History  Family History   Problem  "Relation Name Age of Onset    No Known Problems Mother      Bone cancer Sister      Lung cancer Daughter          Allergies  Dorzolamide, Lisinopril, and Losartan    Review of Systems   10 point review of systems obtained and negative unless stated in HPI    Physical Exam   Constitutional: Well developed, awake/alert/oriented  x3, no distress, alert and cooperative   Eyes: PERRL, EOMI, clear sclera   ENMT: mucous membranes moist, no apparent injury, no lesions seen   Head/Neck: Neck supple, no JVD, trachea midline   Respiratory/Thorax: CTAB   Cardiovascular: Regular, rate and rhythm, systolic  murmur, normal S 1 and S 2   Gastrointestinal: Nondistended, soft, non-tender,  no rebound tenderness or guarding   Musculoskeletal: ROM intact, no joint swelling, normal  strength   Extremities: normal extremities, 1+ lower extremity  edema   Neurological: alert and oriented x3  No asterixis   Skin: Warm and dry, no lesions, no rashes          I&O 24HR    Intake/Output Summary (Last 24 hours) at 6/13/2024 1128  Last data filed at 6/13/2024 0633  Gross per 24 hour   Intake --   Output 400 ml   Net -400 ml       Vitals 24HR  Heart Rate:  [61]   Temp:  [36.6 °C (97.9 °F)-36.8 °C (98.2 °F)]   Resp:  [16]   BP: (166-226)/()   Height:  [162.6 cm (5' 4\")]   Weight:  [75.3 kg (166 lb)]   SpO2:  [98 %-100 %]     Scheduled Medications  amLODIPine, 5 mg, oral, Daily  aspirin, 81 mg, oral, Daily  atorvastatin, 10 mg, oral, Daily  brimonidine, 1 drop, Both Eyes, BID  carvedilol, 25 mg, oral, BID  cefTRIAXone, 1 g, intravenous, q24h  furosemide, 40 mg, oral, Daily  heparin (porcine), 5,000 Units, subcutaneous, q8h  hydrALAZINE, 100 mg, oral, BID  insulin lispro, 0-10 Units, subcutaneous, TID  insulin NPH (Isophane), 12 Units, subcutaneous, BID  latanoprost, 1 drop, Both Eyes, Nightly  pantoprazole, 40 mg, oral, Daily before breakfast   Or  pantoprazole, 40 mg, intravenous, Daily before breakfast  sennosides-docusate sodium, 1 tablet, " oral, Daily  spironolactone, 12.5 mg, oral, q24h TIP  timolol, 1 drop, Both Eyes, Daily      Continuous medications       PRN medications: acetaminophen **OR** acetaminophen **OR** acetaminophen, dextrose, dextrose, dextrose, dextrose, glucagon, glucagon, glucagon, glucagon, meclizine, ondansetron **OR** ondansetron     Relevant Results  Results from last 7 days   Lab Units 06/13/24 0609 06/12/24  2151   WBC AUTO x10*3/uL 4.6 4.6   HEMOGLOBIN g/dL 8.1* 7.6*   HEMATOCRIT % 23.9* 21.7*   PLATELETS AUTO x10*3/uL 259 258   NEUTROS PCT AUTO %  --  56.0   LYMPHS PCT AUTO %  --  23.8   MONOS PCT AUTO %  --  17.5   EOS PCT AUTO %  --  1.3      Results from last 7 days   Lab Units 06/13/24 0609 06/12/24 2151 06/12/24  1440   SODIUM mmol/L 124* 121* 123*   POTASSIUM mmol/L 3.8 3.8 4.2   CHLORIDE mmol/L 91* 88* 89*   CO2 mmol/L 23 23 25   BUN mg/dL 49* 50* 48*   CREATININE mg/dL 1.63* 1.85* 1.87*   CALCIUM mg/dL 8.6 8.5* 8.7   PROTEIN TOTAL g/dL  --  7.0  --    BILIRUBIN TOTAL mg/dL  --  0.4  --    ALK PHOS U/L  --  59  --    ALT U/L  --  12  --    AST U/L  --  21  --    GLUCOSE mg/dL 142* 119* 110*      XR chest 1 view   Final Result   No radiographic evidence of acute cardiopulmonary pathology.        MACRO:   None.        Signed by: Evan Finkelstein 6/12/2024 10:07 PM   Dictation workstation:   [id number]            Assessment/Plan     Carolann Cartagena is a 89 y.o. female with a past medical history of  hypertension, hyperlipidemia, diabetes, chronic anemia, IgG kappa monoclonal protein, chronic hyponatremia, HFpEF, moderate concentric LVH, mild aortic stenosis and mild to moderate pulmonary hypertension, and G4/A3 chronic kidney disease with a fluctuating creatinine baseline between 1.8 up to 2.3 mg/dL who was sent to the ED by her nephrologist due to hyponatremia with a sodium 123 noted on outpatient labs. Upon presentation Ms. Cartagena was noted to have hypertensive urgency with a blood pressure up to 220s systolic with a  wide pulse pressure. Her sodium was 123 mmol/a liter. She had a sodium of 122 on 6/5.  Her creatinine was 1.87 mg/a deciliter, her hemoglobin was 7.8.  Chest x-ray showed no acute cardiopulmonary process.  Nephrology was consulted for hyponatremia, hypertensive urgency, HFpEF and stage G4 chronic kidney disease management.    Ms. Cartagena has chronic hyponatremia.  She is not on typical hyponatremia offenders.  Previous workup is notable for hypoosmolar hyponatremia.  Urine osmolality has not been optimally dilute but is consistently 200 mOsm/kg range.  In light of the advanced CKD she may not be able to optimally dilute her urine. Her blood pressures are not consistent with cortisol deficiency.  TSH was normal. She is otherwise total body water overloaded having leg edema.  This is the likely culprit to her hyponatremia.  I will place her on a 1.4 L fluid restriction and resume Lasix 40 mg daily.  Tolvaptan is an option to correct her hyponatremia while in house. She otherwise has severe hypertension.  Previous aldosterone to renin activity ratio was 13.2 in September 2023.  This is not consistent with hyperaldosteronism or renal artery stenosis as the renin was not elevated.  She has paraprotein however this is not previously detected in the urine.  She would benefit antiproteinuric therapy.  She has an allergy to lisinopril/losartan.  Thus I will put her on 12.5 mg of Aldactone for starters.  Further antihypertensives include 5 mg of Norvasc, 25 mg of carvedilol and hydralazine 100 mg twice daily.  I will send iron stores ferritin. Trend her RFP.  Will follow.    Principal Problem:    Hyponatremia  Hypertensive urgency  HFpEF  G4/A3 chronic kidney disease      I spent 60 minutes in the professional and overall care of this patient.      Pascual Bhakta,

## 2024-06-13 NOTE — NURSING NOTE
Pt arrived to floor from ED. Pt had elevated Bps in the ED and current bp on the floor is 226/70 manual. MD notified of pressure. One time dose prn given at this time (see MAR).    0515  This nurse rechecked BP at this time. (See flowsheet for all vitals). BP hs slightly improved at this time 222/62.    0620  BP rechecked at this time. BP currently 223/52 in R. Arm and 220/53 in left arm. MD made aware of pressures and states to give scheduled coreg at this time and recheck BP in an hour

## 2024-06-14 ENCOUNTER — TELEMEDICINE (OUTPATIENT)
Dept: PHARMACY | Facility: HOSPITAL | Age: 89
End: 2024-06-14
Payer: MEDICARE

## 2024-06-14 DIAGNOSIS — I50.32 CHRONIC DIASTOLIC CONGESTIVE HEART FAILURE (MULTI): Primary | Chronic | ICD-10-CM

## 2024-06-14 DIAGNOSIS — Z79.4 TYPE 2 DIABETES MELLITUS WITH CHRONIC KIDNEY DISEASE, WITH LONG-TERM CURRENT USE OF INSULIN, UNSPECIFIED CKD STAGE (MULTI): ICD-10-CM

## 2024-06-14 DIAGNOSIS — E11.22 TYPE 2 DIABETES MELLITUS WITH CHRONIC KIDNEY DISEASE, WITH LONG-TERM CURRENT USE OF INSULIN, UNSPECIFIED CKD STAGE (MULTI): ICD-10-CM

## 2024-06-14 LAB
ANION GAP SERPL CALC-SCNC: 13 MMOL/L (ref 10–20)
BUN SERPL-MCNC: 50 MG/DL (ref 6–23)
CALCIUM SERPL-MCNC: 8.5 MG/DL (ref 8.6–10.3)
CHLORIDE SERPL-SCNC: 94 MMOL/L (ref 98–107)
CO2 SERPL-SCNC: 23 MMOL/L (ref 21–32)
CREAT SERPL-MCNC: 2.25 MG/DL (ref 0.5–1.05)
EGFRCR SERPLBLD CKD-EPI 2021: 20 ML/MIN/1.73M*2
GLUCOSE BLD MANUAL STRIP-MCNC: 116 MG/DL (ref 74–99)
GLUCOSE BLD MANUAL STRIP-MCNC: 143 MG/DL (ref 74–99)
GLUCOSE BLD MANUAL STRIP-MCNC: 236 MG/DL (ref 74–99)
GLUCOSE BLD MANUAL STRIP-MCNC: 80 MG/DL (ref 74–99)
GLUCOSE SERPL-MCNC: 130 MG/DL (ref 74–99)
HOLD SPECIMEN: NORMAL
POTASSIUM SERPL-SCNC: 3.9 MMOL/L (ref 3.5–5.3)
SODIUM SERPL-SCNC: 126 MMOL/L (ref 136–145)

## 2024-06-14 PROCEDURE — 2500000001 HC RX 250 WO HCPCS SELF ADMINISTERED DRUGS (ALT 637 FOR MEDICARE OP): Performed by: INTERNAL MEDICINE

## 2024-06-14 PROCEDURE — 2500000002 HC RX 250 W HCPCS SELF ADMINISTERED DRUGS (ALT 637 FOR MEDICARE OP, ALT 636 FOR OP/ED): Performed by: INTERNAL MEDICINE

## 2024-06-14 PROCEDURE — 82947 ASSAY GLUCOSE BLOOD QUANT: CPT | Mod: 91

## 2024-06-14 PROCEDURE — 2500000004 HC RX 250 GENERAL PHARMACY W/ HCPCS (ALT 636 FOR OP/ED): Performed by: INTERNAL MEDICINE

## 2024-06-14 PROCEDURE — 1200000002 HC GENERAL ROOM WITH TELEMETRY DAILY

## 2024-06-14 PROCEDURE — 36415 COLL VENOUS BLD VENIPUNCTURE: CPT | Performed by: INTERNAL MEDICINE

## 2024-06-14 PROCEDURE — 80048 BASIC METABOLIC PNL TOTAL CA: CPT | Performed by: INTERNAL MEDICINE

## 2024-06-14 PROCEDURE — 99233 SBSQ HOSP IP/OBS HIGH 50: CPT | Performed by: INTERNAL MEDICINE

## 2024-06-14 RX ORDER — FUROSEMIDE 20 MG/1
20 TABLET ORAL DAILY
Status: DISCONTINUED | OUTPATIENT
Start: 2024-06-15 | End: 2024-06-15 | Stop reason: HOSPADM

## 2024-06-14 ASSESSMENT — COGNITIVE AND FUNCTIONAL STATUS - GENERAL
MOBILITY SCORE: 17
MOVING FROM LYING ON BACK TO SITTING ON SIDE OF FLAT BED WITH BEDRAILS: A LITTLE
HELP NEEDED FOR BATHING: A LITTLE
MOVING TO AND FROM BED TO CHAIR: A LITTLE
WALKING IN HOSPITAL ROOM: A LITTLE
DRESSING REGULAR LOWER BODY CLOTHING: A LITTLE
MOVING FROM LYING ON BACK TO SITTING ON SIDE OF FLAT BED WITH BEDRAILS: A LITTLE
TURNING FROM BACK TO SIDE WHILE IN FLAT BAD: A LITTLE
PERSONAL GROOMING: A LITTLE
DRESSING REGULAR LOWER BODY CLOTHING: A LITTLE
CLIMB 3 TO 5 STEPS WITH RAILING: A LOT
STANDING UP FROM CHAIR USING ARMS: A LITTLE
TOILETING: A LITTLE
PERSONAL GROOMING: A LITTLE
DAILY ACTIVITIY SCORE: 19
MOBILITY SCORE: 17
TOILETING: A LITTLE
STANDING UP FROM CHAIR USING ARMS: A LITTLE
MOVING TO AND FROM BED TO CHAIR: A LITTLE
CLIMB 3 TO 5 STEPS WITH RAILING: A LOT
DAILY ACTIVITIY SCORE: 19
DRESSING REGULAR UPPER BODY CLOTHING: A LITTLE
WALKING IN HOSPITAL ROOM: A LITTLE
HELP NEEDED FOR BATHING: A LITTLE
TURNING FROM BACK TO SIDE WHILE IN FLAT BAD: A LITTLE
DRESSING REGULAR UPPER BODY CLOTHING: A LITTLE

## 2024-06-14 ASSESSMENT — PAIN - FUNCTIONAL ASSESSMENT: PAIN_FUNCTIONAL_ASSESSMENT: 0-10

## 2024-06-14 ASSESSMENT — PAIN SCALES - GENERAL: PAINLEVEL_OUTOF10: 0 - NO PAIN

## 2024-06-14 NOTE — CARE PLAN
The patient's goals for the shift include      The clinical goals for the shift include      Problem: Skin  Goal: Participates in plan/prevention/treatment measures  Outcome: Progressing  Goal: Prevent/manage excess moisture  Outcome: Progressing  Goal: Prevent/minimize sheer/friction injuries  Outcome: Progressing     Problem: Fall/Injury  Goal: Not fall by end of shift  Outcome: Progressing  Goal: Be free from injury by end of the shift  Outcome: Progressing  Goal: Verbalize understanding of personal risk factors for fall in the hospital  Outcome: Progressing  Goal: Verbalize understanding of risk factor reduction measures to prevent injury from fall in the home  Outcome: Progressing  Goal: Use assistive devices by end of the shift  Outcome: Progressing

## 2024-06-14 NOTE — PROGRESS NOTES
Carolann Cartagena is a 89 y.o. female who was referred to the Clinical Pharmacy Team to complete a virtual Transitions of Care encounter for discharge medication optimization. The patient was referred for their DM and HF.    Attending: Emeterio Alves DO    PCP: Josh Rosa MD     _______________________________________________________________________  PHARMACY ASSESSMENT    Home Pharmacy: [facility]    Meds to beds? yes    No issues reported in regards to affordability, accessibility, adherence, organization, and adverse effects  _______________________________________________________________________  DIABETES ASSESSMENT    CURRENT PHARMACOTHERAPY  - Humulin N KWIKPEN: inject 18 units subcutaneous QAM and 6 units QPM - patient was experiencing lows in the morning when doing 12 units BID; has been doing much better with adjustments    HISTORICAL PHARMACOTHERAPY  - Humulin N VIAL: pt had used insulin vial and syringe for a long time, but recently her doctor started writing her script to be dispensed as the kwikpen. Patient has some difficulty adjusting to this, but she has family caretakers at home that prefer the kwikpen and think it is easier all around.     BLOOD SUGAR TESTING AT HOME  -Frequency: 3x per day   -Meter: One Touch Ultra - patient lancing device fell apart and they rec'd new device from local retail pharmacy but it does not work with the lancets they have at home. Caretaker inquired if we could get Rx for new Meter and supplies so they can start fresh. Would like to stick with One Touch if insurance covers.    -CGM: Not interested at this time     SECONDARY PREVENTION  - Statin? Atorvastatin 10mg po every day   - ACE-I/ARB? No, patient has allergy to both   - Aspirin? 81mg po every day     Lab Results   Component Value Date    HGBA1C 5.3 04/23/2024       Lab Results   Component Value Date    CHOL 132 04/23/2024    CHOL 183 02/22/2023    CHOL 151 02/15/2022     Lab Results   Component Value Date  "   HDL 40.5 2024    HDL 57.1 2023    HDL 48.1 02/15/2022     Lab Results   Component Value Date    LDLCALC 79 2024     Lab Results   Component Value Date    TRIG 62 2024    TRIG 86 2023    TRIG 62 02/15/2022     No components found for: \"CHOLHDL\"    _______________________________________________________________________  CHRONIC HEART FAILURE ASSESSMENT  -The ASCVD Risk score (Rodolfo CLEMENS, et al., 2019) failed to calculate for the following reasons:    The 2019 ASCVD risk score is only valid for ages 40 to 79    The patient has a prior MI or stroke diagnosis   -HTN present/diagnosed: yes    LVEF: 70-75% on 23  eGFR: 20mL/min/1.73m*2  Beta blocker: carvedilol 25mg BID  ACEi/ARB/ARNI: No, allergy to ACEi/ARB  MRA: No   SGLT2i: No  _______________________________________________________________________  PATIENT EDUCATION/GOALS  - Fasting B - 130 mg/dL  - Postprandial BG: less than 180 mg/dL  - A1c: less than 7%  - LDL Goal: < 70mg/dL  - Your blood pressure goal is less than 130/80 mmHg  - Answered all patient questions and concerns to best of my ability  _______________________________________________________________________  RECOMMENDATIONS/PLAN  Follow up with patient/caretaker in regards to Rxs for new meter and supplies; DM well controlled with current medication therapy  Continue all medications per medical team  Please send prescriptions to [facility] pharmacy for assistance on insurance prior authorization and copay. Prescriptions will be delivered to the patient's bedside prior to discharge with the Meds to Beds program.   Continuity of care will be provided by PCP and clinical pharmacy team      Milagros Pettit PharmD    Verbal consent to manage patient's drug therapy was obtained from the patient. They were informed they may decline to participate or withdraw from participation in pharmacy services at any time.    "

## 2024-06-14 NOTE — CONSULTS
Nutrition Assessment Note    Reason for Assessment  Reason for Assessment: Admission nursing screening    Pt admitted for:  Hyponatremia [E87.1]  Anemia due to chronic kidney disease, unspecified CKD stage [N18.9, D63.1]    MST triggered for weight loss and eating poorly due to decreased appetite.  Chart reviewed and pt visited.  Insignificant weight loss noted in weight history.  Good intake per flowsheets.  Per pt declines appetite or weight changes.    No nutritional concerns at this time. Full nutritional assessment unwarranted at this time. Please re consult nutrition services should concerns present.     Past Medical History:   Diagnosis Date    Anemia due to chronic kidney disease 01/22/2024    Chronic diastolic congestive heart failure (Multi) 10/02/2023    Chronic hyponatremia 10/02/2023    Chronic kidney disease, stage 3, mod decreased GFR (Multi) 09/18/2018    Diabetic nephropathy associated with type 2 diabetes mellitus (Multi) 12/18/2018    Essential hypertension 05/23/2023    Hyperlipidemia 05/23/2023    Pure hypercholesterolemia 10/02/2015     Results for orders placed or performed during the hospital encounter of 06/12/24 (from the past 24 hour(s))   POCT GLUCOSE   Result Value Ref Range    POCT Glucose 185 (H) 74 - 99 mg/dL   POCT GLUCOSE   Result Value Ref Range    POCT Glucose 90 74 - 99 mg/dL   POCT GLUCOSE   Result Value Ref Range    POCT Glucose 80 74 - 99 mg/dL   Lavender Top   Result Value Ref Range    Extra Tube Hold for add-ons.    Basic Metabolic Panel   Result Value Ref Range    Glucose 130 (H) 74 - 99 mg/dL    Sodium 126 (L) 136 - 145 mmol/L    Potassium 3.9 3.5 - 5.3 mmol/L    Chloride 94 (L) 98 - 107 mmol/L    Bicarbonate 23 21 - 32 mmol/L    Anion Gap 13 10 - 20 mmol/L    Urea Nitrogen 50 (H) 6 - 23 mg/dL    Creatinine 2.25 (H) 0.50 - 1.05 mg/dL    eGFR 20 (L) >60 mL/min/1.73m*2    Calcium 8.5 (L) 8.6 - 10.3 mg/dL   POCT GLUCOSE   Result Value Ref Range    POCT Glucose 116 (H) 74 - 99  "mg/dL     Scheduled medications  amLODIPine, 5 mg, oral, Daily  aspirin, 81 mg, oral, Daily  atorvastatin, 10 mg, oral, Daily  brimonidine, 1 drop, Both Eyes, BID  carvedilol, 25 mg, oral, BID  cefTRIAXone, 1 g, intravenous, q24h  [START ON 6/15/2024] furosemide, 20 mg, oral, Daily  heparin (porcine), 5,000 Units, subcutaneous, q8h  hydrALAZINE, 100 mg, oral, BID  insulin lispro, 0-10 Units, subcutaneous, TID  insulin NPH (Isophane), 12 Units, subcutaneous, BID  latanoprost, 1 drop, Both Eyes, Nightly  pantoprazole, 40 mg, oral, Daily before breakfast   Or  pantoprazole, 40 mg, intravenous, Daily before breakfast  sennosides-docusate sodium, 1 tablet, oral, Daily  [Held by provider] spironolactone, 12.5 mg, oral, q24h TIP  timolol, 1 drop, Both Eyes, Daily      Continuous medications     PRN medications  PRN medications: acetaminophen **OR** acetaminophen **OR** acetaminophen, dextrose, dextrose, dextrose, dextrose, glucagon, glucagon, glucagon, glucagon, meclizine, ondansetron **OR** ondansetron  Dietary Orders (From admission, onward)       Start     Ordered    06/13/24 1105  Adult diet Regular; 1400 mL fluid  Diet effective now        Question Answer Comment   Diet type Regular    Dietary fluid restriction / 24h: 1400 mL fluid        06/13/24 1105                  History:  Food and Nutrient History  Energy Intake: Good > 75 %    Anthropometrics:  Height: 162.6 cm (5' 4\")  Weight: 75.3 kg (166 lb)  BMI (Calculated): 28.48    Wt Readings from Last 17 Encounters:   06/12/24 75.3 kg (166 lb)   06/04/24 75.3 kg (166 lb)   04/30/24 80.7 kg (178 lb)   02/14/24 79.4 kg (175 lb)   02/12/24 71.2 kg (157 lb)   02/02/24 78 kg (171 lb 15.3 oz)   01/30/24 78.9 kg (174 lb)   11/22/23 77.6 kg (171 lb)   11/15/23 81.2 kg (179 lb)   11/06/23 82.1 kg (181 lb)   10/12/23 88.9 kg (196 lb)   10/11/23 85.7 kg (189 lb)   10/11/23 79.2 kg (174 lb 8 oz)   10/09/23 89.8 kg (198 lb)   10/05/23 86 kg (189 lb 9.5 oz)   09/05/23 81.2 kg (179 " lb)   08/30/23 88.9 kg (196 lb)     Weight Change  Significant Weight Loss: No    Nutrition Focused Physical Findings:  Subcutaneous Fat Loss  Orbital Fat Pads: Well nourished (slightly bulging fat pads)  Buccal Fat Pads: Well nourished (full, rounded cheeks)    Muscle Wasting  Temporalis: Well nourished (well-defined muscle)    Edema  Edema Location: RLE, LLE non pitting    Physical Findings (Nutrition Deficiency/Toxicity)  Skin: Negative       Nutrition Interventions/Recommendations   Food and/or Nutrient Delivery Interventions  Goal: Consider changing diet order to DM diet due to DM dx       Follow Up  Time Spent (min): 60 minutes  Last Date of Nutrition Visit: 06/14/24  Nutrition Follow-Up Needed?: Dietitian to reassess per policy  Follow up Comment: BINA ALLRED

## 2024-06-14 NOTE — PROGRESS NOTES
Carolann Cartagena is a 89 y.o. female on day 1 of admission presenting with Hyponatremia.      Subjective   Seen and examined.  Blood pressures are better controlled. Improved leg edema.   Resting comfortably in bed.  No acute events.  She does not offer specific complaints.       Objective        Vitals 24HR  Heart Rate:  [57-63]   Temp:  [36.1 °C (97 °F)-36.9 °C (98.4 °F)]   Resp:  [16]   BP: (126-157)/(43-64)   SpO2:  [96 %-100 %]     Intake/Output last 3 Shifts:    Intake/Output Summary (Last 24 hours) at 6/14/2024 1150  Last data filed at 6/14/2024 0900  Gross per 24 hour   Intake 120 ml   Output 350 ml   Net -230 ml       Physical Exam  Constitutional: Well developed, awake/alert/oriented  x3, no distress, alert and cooperative   Eyes: PERRL, EOMI, clear sclera   ENMT: mucous membranes moist, no apparent injury, no lesions seen   Head/Neck: Neck supple, no JVD, trachea midline   Respiratory/Thorax: CTAB   Cardiovascular: Regular, rate and rhythm, systolic  murmur, normal S 1 and S 2   Gastrointestinal: Nondistended, soft, non-tender,  no rebound tenderness or guarding   Musculoskeletal: ROM intact, no joint swelling, normal  strength   Extremities: normal extremities, 1+ lower extremity  edema improved   Neurological: alert and oriented x3  No asterixis   Skin: Warm and dry, no lesions, no rashes     Scheduled Medications  amLODIPine, 5 mg, oral, Daily  aspirin, 81 mg, oral, Daily  atorvastatin, 10 mg, oral, Daily  brimonidine, 1 drop, Both Eyes, BID  carvedilol, 25 mg, oral, BID  cefTRIAXone, 1 g, intravenous, q24h  furosemide, 40 mg, oral, Daily  heparin (porcine), 5,000 Units, subcutaneous, q8h  hydrALAZINE, 100 mg, oral, BID  insulin lispro, 0-10 Units, subcutaneous, TID  insulin NPH (Isophane), 12 Units, subcutaneous, BID  latanoprost, 1 drop, Both Eyes, Nightly  pantoprazole, 40 mg, oral, Daily before breakfast   Or  pantoprazole, 40 mg, intravenous, Daily before breakfast  sennosides-docusate sodium, 1  tablet, oral, Daily  spironolactone, 12.5 mg, oral, q24h TIP  timolol, 1 drop, Both Eyes, Daily      Continuous medications       PRN medications: acetaminophen **OR** acetaminophen **OR** acetaminophen, dextrose, dextrose, dextrose, dextrose, glucagon, glucagon, glucagon, glucagon, meclizine, ondansetron **OR** ondansetron     Relevant Results  Results from last 7 days   Lab Units 06/13/24  0609 06/12/24  2151   WBC AUTO x10*3/uL 4.6 4.6   HEMOGLOBIN g/dL 8.1* 7.6*   HEMATOCRIT % 23.9* 21.7*   PLATELETS AUTO x10*3/uL 259 258   NEUTROS PCT AUTO %  --  56.0   LYMPHS PCT AUTO %  --  23.8   MONOS PCT AUTO %  --  17.5   EOS PCT AUTO %  --  1.3     Results from last 7 days   Lab Units 06/14/24  1029 06/13/24  1323 06/13/24  0609   SODIUM mmol/L 126* 124* 124*   POTASSIUM mmol/L 3.9 3.8 3.8   CHLORIDE mmol/L 94* 93* 91*   CO2 mmol/L 23 23 23   BUN mg/dL 50* 44* 49*   CREATININE mg/dL 2.25* 1.80* 1.63*   GLUCOSE mg/dL 130* 147* 142*   CALCIUM mg/dL 8.5* 8.6 8.6       XR chest 1 view   Final Result   No radiographic evidence of acute cardiopulmonary pathology.        MACRO:   None.        Signed by: Evan Finkelstein 6/12/2024 10:07 PM   Dictation workstation:   [id number]               Assessment/Plan        Carolann Cartagena is a 89 y.o. female with a past medical history of  hypertension, hyperlipidemia, diabetes, chronic anemia, IgG kappa monoclonal protein, chronic hyponatremia, HFpEF, moderate concentric LVH, mild aortic stenosis and mild to moderate pulmonary hypertension, and G4/A3 chronic kidney disease with a fluctuating creatinine baseline between 1.8 up to 2.3 mg/dL who was sent to the ED by her nephrologist due to hyponatremia with a sodium 123 noted on outpatient labs. Upon presentation Ms. Cartagena was noted to have hypertensive urgency with a blood pressure up to 220s systolic with a wide pulse pressure. Her sodium was 123 mmol/a liter. She had a sodium of 122 on 6/5.  Her creatinine was 1.87 mg/a deciliter, her  hemoglobin was 7.8.  Chest x-ray showed no acute cardiopulmonary process.  Nephrology was consulted for hyponatremia, hypertensive urgency, HFpEF and stage G4 chronic kidney disease management.     Ms. Cartagena has chronic hyponatremia.  She is not on typical hyponatremia offenders.  Previous workup is notable for hypoosmolar hyponatremia.  Urine osmolality has not been optimally dilute but is consistently within the 200 mOsm/kg range.  In light of the advanced CKD she may not be able to optimally dilute her urine. Her very high blood pressures are not consistent with cortisol deficiency.  TSH was normal. She is otherwise total body water overloaded having leg edema. She reports drinking fluids in access of prior fluid restriction. Both are likely culprits to her hyponatremia. I placed her on a 1.4 L fluid restriction and resumed Lasix 40 mg daily. She otherwise has severe hypertension.  Previous aldosterone to renin activity ratio was 13.2 in September 2023.  This is not consistent with hyperaldosteronism or renal artery stenosis as the renin was not elevated.  She has paraprotein however this was previously not detected in the urine.  She would benefit from antiproteinuric therapy.  She has an allergy to lisinopril/losartan.  Thus I put her on 12.5 mg of Aldactone.  Further antihypertensives include 5 mg of Norvasc, 25 mg of carvedilol and hydralazine 100 mg twice daily.     Blood pressures are better controlled. The wide pulse pressure is noted. Her sodium is trending higher but she has worsening renal function above the top end of her tony. I will have a bladder scan done. I will hold the Aldactone and reduce the loop diuretic to 20 mg daily. Continue fluid restriction. Improved limb edema. I sent iron stores ferritin. Trend her RFP.  Will follow.      Principal Problem:    Hyponatremia  Acute kidney injury   G4/A3 chronic kidney disease     I spent 45 minutes in the professional and overall care of this  patient.      Pascual Bhakta, DO

## 2024-06-14 NOTE — PROGRESS NOTES
"Carolann Cartagena is a 89 y.o. female on day 1 of admission presenting with Hyponatremia.    Subjective   No acute events overnight. She notes that she has been getting mixed signals between her PCP and her nephrologist in regards to fluid intake. Discussed she needs to restrict her fluid intake.        Objective     VITALS  Blood pressure 150/55, pulse 63, temperature 36.4 °C (97.6 °F), temperature source Temporal, resp. rate 16, height 1.626 m (5' 4\"), weight 75.3 kg (166 lb), SpO2 100%.  Physical Exam  Vitals and nursing note reviewed.   Constitutional:       General: She is not in acute distress.     Appearance: Normal appearance.   HENT:      Head: Normocephalic and atraumatic.   Cardiovascular:      Rate and Rhythm: Normal rate and regular rhythm.      Heart sounds: Normal heart sounds.   Pulmonary:      Effort: Pulmonary effort is normal. No respiratory distress.      Breath sounds: Normal breath sounds. No wheezing.   Abdominal:      General: Abdomen is flat. Bowel sounds are normal. There is no distension.      Palpations: Abdomen is soft.      Tenderness: There is no abdominal tenderness.   Musculoskeletal:         General: No swelling or tenderness. Normal range of motion.      Right lower leg: Edema present.      Left lower leg: Edema present.   Skin:     General: Skin is warm and dry.      Capillary Refill: Capillary refill takes less than 2 seconds.   Neurological:      General: No focal deficit present.      Mental Status: She is alert and oriented to person, place, and time.   Psychiatric:         Mood and Affect: Mood normal.           Intake/Output last 3 Shifts:  I/O last 3 completed shifts:  In: 50 (0.7 mL/kg) [IV Piggyback:50]  Out: 400 (5.3 mL/kg) [Urine:400 (0.1 mL/kg/hr)]  Weight: 75.3 kg     Relevant Results  Results for orders placed or performed during the hospital encounter of 06/12/24 (from the past 24 hour(s))   POCT GLUCOSE   Result Value Ref Range    POCT Glucose 177 (H) 74 - 99 mg/dL "   Basic Metabolic Panel   Result Value Ref Range    Glucose 147 (H) 74 - 99 mg/dL    Sodium 124 (L) 136 - 145 mmol/L    Potassium 3.8 3.5 - 5.3 mmol/L    Chloride 93 (L) 98 - 107 mmol/L    Bicarbonate 23 21 - 32 mmol/L    Anion Gap 12 10 - 20 mmol/L    Urea Nitrogen 44 (H) 6 - 23 mg/dL    Creatinine 1.80 (H) 0.50 - 1.05 mg/dL    eGFR 26 (L) >60 mL/min/1.73m*2    Calcium 8.6 8.6 - 10.3 mg/dL   POCT GLUCOSE   Result Value Ref Range    POCT Glucose 185 (H) 74 - 99 mg/dL   POCT GLUCOSE   Result Value Ref Range    POCT Glucose 90 74 - 99 mg/dL   POCT GLUCOSE   Result Value Ref Range    POCT Glucose 80 74 - 99 mg/dL       Imaging Results  XR chest 1 view   Final Result   No radiographic evidence of acute cardiopulmonary pathology.        MACRO:   None.        Signed by: Evan Finkelstein 6/12/2024 10:07 PM   Dictation workstation:   [id number]          Medications:  amLODIPine, 5 mg, oral, Daily  aspirin, 81 mg, oral, Daily  atorvastatin, 10 mg, oral, Daily  brimonidine, 1 drop, Both Eyes, BID  carvedilol, 25 mg, oral, BID  cefTRIAXone, 1 g, intravenous, q24h  furosemide, 40 mg, oral, Daily  heparin (porcine), 5,000 Units, subcutaneous, q8h  hydrALAZINE, 100 mg, oral, BID  insulin lispro, 0-10 Units, subcutaneous, TID  insulin NPH (Isophane), 12 Units, subcutaneous, BID  latanoprost, 1 drop, Both Eyes, Nightly  pantoprazole, 40 mg, oral, Daily before breakfast   Or  pantoprazole, 40 mg, intravenous, Daily before breakfast  sennosides-docusate sodium, 1 tablet, oral, Daily  spironolactone, 12.5 mg, oral, q24h TIP  timolol, 1 drop, Both Eyes, Daily       PRN medications: acetaminophen **OR** acetaminophen **OR** acetaminophen, dextrose, dextrose, dextrose, dextrose, glucagon, glucagon, glucagon, glucagon, meclizine, ondansetron **OR** ondansetron        Assessment/Plan   Principal Problem:    Hyponatremia    Hyponatremia, likely 2/2 fluid overload   -Fluid restriction  -Appreciate nephro recs      Hypertensive urgency,  improved   -Resume home meds  -Hydralazine 100 mg twice daily  -Coreg 25 mg twice daily     Hyperlipidemia  -Atorvastatin 10 mg orally daily     CKD 4  -Trend creatinine     Type 2 diabetes mellitus, requiring insulin  -Resume home NPH 12 units prior to breakfast and dinner  -Humalog per corrective scale     Anemia of chronic kidney disease  -Trend H&H     Glaucoma  -Continue home eyedrops    DVT Prophylaxis:  Heparin subq    Disposition:  Once Na a bit better can likely DC     Emeterio Alves, DO [facility]

## 2024-06-15 ENCOUNTER — PHARMACY VISIT (OUTPATIENT)
Dept: PHARMACY | Facility: CLINIC | Age: 89
End: 2024-06-15
Payer: MEDICARE

## 2024-06-15 VITALS
OXYGEN SATURATION: 99 % | DIASTOLIC BLOOD PRESSURE: 57 MMHG | TEMPERATURE: 98.2 F | HEIGHT: 64 IN | BODY MASS INDEX: 28.34 KG/M2 | RESPIRATION RATE: 15 BRPM | HEART RATE: 54 BPM | WEIGHT: 166 LBS | SYSTOLIC BLOOD PRESSURE: 175 MMHG

## 2024-06-15 LAB
ANION GAP SERPL CALC-SCNC: 11 MMOL/L (ref 10–20)
ATRIAL RATE: 63 BPM
BACTERIA UR CULT: ABNORMAL
BUN SERPL-MCNC: 56 MG/DL (ref 6–23)
CALCIUM SERPL-MCNC: 8.6 MG/DL (ref 8.6–10.3)
CHLORIDE SERPL-SCNC: 96 MMOL/L (ref 98–107)
CO2 SERPL-SCNC: 25 MMOL/L (ref 21–32)
CREAT SERPL-MCNC: 2.34 MG/DL (ref 0.5–1.05)
EGFRCR SERPLBLD CKD-EPI 2021: 19 ML/MIN/1.73M*2
FERRITIN SERPL-MCNC: 145 NG/ML (ref 8–150)
GLUCOSE BLD MANUAL STRIP-MCNC: 158 MG/DL (ref 74–99)
GLUCOSE BLD MANUAL STRIP-MCNC: 81 MG/DL (ref 74–99)
GLUCOSE SERPL-MCNC: 64 MG/DL (ref 74–99)
HOLD SPECIMEN: NORMAL
IRON SATN MFR SERPL: 23 % (ref 25–45)
IRON SERPL-MCNC: 57 UG/DL (ref 35–150)
P AXIS: 33 DEGREES
P OFFSET: 179 MS
P ONSET: 131 MS
POTASSIUM SERPL-SCNC: 3.9 MMOL/L (ref 3.5–5.3)
PR INTERVAL: 184 MS
Q ONSET: 223 MS
QRS COUNT: 11 BEATS
QRS DURATION: 148 MS
QT INTERVAL: 472 MS
QTC CALCULATION(BAZETT): 483 MS
QTC FREDERICIA: 479 MS
R AXIS: 7 DEGREES
SODIUM SERPL-SCNC: 128 MMOL/L (ref 136–145)
T AXIS: 30 DEGREES
T OFFSET: 459 MS
TIBC SERPL-MCNC: 251 UG/DL (ref 240–445)
UIBC SERPL-MCNC: 194 UG/DL (ref 110–370)
VENTRICULAR RATE: 63 BPM

## 2024-06-15 PROCEDURE — 2500000002 HC RX 250 W HCPCS SELF ADMINISTERED DRUGS (ALT 637 FOR MEDICARE OP, ALT 636 FOR OP/ED): Performed by: INTERNAL MEDICINE

## 2024-06-15 PROCEDURE — 82374 ASSAY BLOOD CARBON DIOXIDE: CPT | Performed by: INTERNAL MEDICINE

## 2024-06-15 PROCEDURE — 82728 ASSAY OF FERRITIN: CPT | Mod: AHULAB | Performed by: INTERNAL MEDICINE

## 2024-06-15 PROCEDURE — 2500000001 HC RX 250 WO HCPCS SELF ADMINISTERED DRUGS (ALT 637 FOR MEDICARE OP): Performed by: INTERNAL MEDICINE

## 2024-06-15 PROCEDURE — 2500000004 HC RX 250 GENERAL PHARMACY W/ HCPCS (ALT 636 FOR OP/ED): Performed by: INTERNAL MEDICINE

## 2024-06-15 PROCEDURE — 83540 ASSAY OF IRON: CPT | Performed by: INTERNAL MEDICINE

## 2024-06-15 PROCEDURE — RXMED WILLOW AMBULATORY MEDICATION CHARGE

## 2024-06-15 PROCEDURE — 99239 HOSP IP/OBS DSCHRG MGMT >30: CPT | Performed by: INTERNAL MEDICINE

## 2024-06-15 PROCEDURE — 36415 COLL VENOUS BLD VENIPUNCTURE: CPT | Performed by: INTERNAL MEDICINE

## 2024-06-15 PROCEDURE — 82947 ASSAY GLUCOSE BLOOD QUANT: CPT | Mod: 91

## 2024-06-15 RX ORDER — FUROSEMIDE 20 MG/1
20 TABLET ORAL DAILY
Qty: 30 TABLET | Refills: 0 | Status: SHIPPED | OUTPATIENT
Start: 2024-06-16 | End: 2024-07-16

## 2024-06-15 RX ORDER — SPIRONOLACTONE 25 MG/1
25 TABLET ORAL
Qty: 30 TABLET | Refills: 0 | Status: SHIPPED | OUTPATIENT
Start: 2024-06-15 | End: 2024-07-15

## 2024-06-15 RX ORDER — AMLODIPINE BESYLATE 5 MG/1
5 TABLET ORAL DAILY
Qty: 30 TABLET | Refills: 0 | Status: SHIPPED | OUTPATIENT
Start: 2024-06-16 | End: 2024-07-16

## 2024-06-15 ASSESSMENT — COGNITIVE AND FUNCTIONAL STATUS - GENERAL
MOVING TO AND FROM BED TO CHAIR: A LITTLE
TURNING FROM BACK TO SIDE WHILE IN FLAT BAD: A LITTLE
MOVING FROM LYING ON BACK TO SITTING ON SIDE OF FLAT BED WITH BEDRAILS: A LITTLE
DAILY ACTIVITIY SCORE: 19
DRESSING REGULAR UPPER BODY CLOTHING: A LITTLE
PERSONAL GROOMING: A LITTLE
TOILETING: A LITTLE
WALKING IN HOSPITAL ROOM: A LITTLE
STANDING UP FROM CHAIR USING ARMS: A LITTLE
CLIMB 3 TO 5 STEPS WITH RAILING: A LOT
HELP NEEDED FOR BATHING: A LITTLE
MOBILITY SCORE: 17
DRESSING REGULAR LOWER BODY CLOTHING: A LITTLE

## 2024-06-15 ASSESSMENT — PAIN SCALES - GENERAL
PAINLEVEL_OUTOF10: 0 - NO PAIN
PAINLEVEL_OUTOF10: 0 - NO PAIN

## 2024-06-15 ASSESSMENT — PAIN - FUNCTIONAL ASSESSMENT: PAIN_FUNCTIONAL_ASSESSMENT: 0-10

## 2024-06-15 NOTE — NURSING NOTE
IV(s) removed by bedside RN.  Discharge instructions reviewed via phone with family members and AVS scanned to email.  Discussed with dtr Martha new homegoing medications and also emphasized Dr. Leonard's rec to follow up ASAP with nephrologist.  Family requested new glucometer as current one is broken; request forwarded to Dr. Alves who placed order with pharmacy.

## 2024-06-15 NOTE — CARE PLAN
The patient's goals for the shift include      The clinical goals for the shift include pt will remain safe and free from injury

## 2024-06-15 NOTE — PROGRESS NOTES
Carolann Cartagena is a 89 y.o. female on day 2 of admission presenting with Hyponatremia.      Subjective   Ms. Cartagena is a 89-year-old woman with a history of diabetes, hypertension, hyperlipidemia, anemia, has longstanding hyponatremia which is on and off.  She has known pulmonary hypertension, chronic kidney disease with a baseline creatinine in the 2 mg/dL range.  She follows with nephrology, was sent in with a sodium of 123 mEq/L.  Blood pressure was high, creatinine was at baseline.  Dr. Bhakta saw her, felt that she had edema, placed on a restriction and Lasix.       Objective        Vitals 24HR  Heart Rate:  [54-69]   Temp:  [36.1 °C (97 °F)-36.8 °C (98.3 °F)]   Resp:  [15-16]   BP: (122-175)/(33-78)   SpO2:  [97 %-100 %]     Intake/Output last 3 Shifts:    Intake/Output Summary (Last 24 hours) at 6/15/2024 1017  Last data filed at 6/15/2024 0623  Gross per 24 hour   Intake 840 ml   Output 1050 ml   Net -210 ml       Physical Exam  Constitutional: Well developed, awake/alert/oriented  x3, no distress, alert and cooperative   Eyes: PERRL, EOMI, clear sclera   ENMT: mucous membranes moist, no apparent injury, no lesions seen   Head/Neck: Neck supple, no JVD, trachea midline   Respiratory/Thorax: CTAB   Cardiovascular: Regular, rate and rhythm, systolic  murmur, normal S 1 and S 2   Gastrointestinal: Nondistended, soft, non-tender,  no rebound tenderness or guarding   Musculoskeletal: ROM intact, no joint swelling, normal  strength   Extremities: normal extremities, 1+ lower extremity  edema improved   Neurological: alert and oriented x3  No asterixis   Skin: Warm and dry, no lesions, no rashes     Scheduled Medications  amLODIPine, 5 mg, oral, Daily  aspirin, 81 mg, oral, Daily  atorvastatin, 10 mg, oral, Daily  brimonidine, 1 drop, Both Eyes, BID  carvedilol, 25 mg, oral, BID  cefTRIAXone, 1 g, intravenous, q24h  furosemide, 20 mg, oral, Daily  heparin (porcine), 5,000 Units, subcutaneous, q8h  hydrALAZINE, 100 mg,  oral, BID  insulin lispro, 0-10 Units, subcutaneous, TID  insulin NPH (Isophane), 12 Units, subcutaneous, BID  latanoprost, 1 drop, Both Eyes, Nightly  pantoprazole, 40 mg, oral, Daily before breakfast   Or  pantoprazole, 40 mg, intravenous, Daily before breakfast  sennosides-docusate sodium, 1 tablet, oral, Daily  [Held by provider] spironolactone, 12.5 mg, oral, q24h TIP  timolol, 1 drop, Both Eyes, Daily      Continuous medications       PRN medications: acetaminophen **OR** acetaminophen **OR** acetaminophen, dextrose, dextrose, dextrose, dextrose, glucagon, glucagon, glucagon, glucagon, meclizine, ondansetron **OR** ondansetron     Relevant Results  Results from last 7 days   Lab Units 06/13/24  0609 06/12/24  2151   WBC AUTO x10*3/uL 4.6 4.6   HEMOGLOBIN g/dL 8.1* 7.6*   HEMATOCRIT % 23.9* 21.7*   PLATELETS AUTO x10*3/uL 259 258   NEUTROS PCT AUTO %  --  56.0   LYMPHS PCT AUTO %  --  23.8   MONOS PCT AUTO %  --  17.5   EOS PCT AUTO %  --  1.3     Results from last 7 days   Lab Units 06/15/24  0607 06/14/24  1029 06/13/24  1323   SODIUM mmol/L 128* 126* 124*   POTASSIUM mmol/L 3.9 3.9 3.8   CHLORIDE mmol/L 96* 94* 93*   CO2 mmol/L 25 23 23   BUN mg/dL 56* 50* 44*   CREATININE mg/dL 2.34* 2.25* 1.80*   GLUCOSE mg/dL 64* 130* 147*   CALCIUM mg/dL 8.6 8.5* 8.6       XR chest 1 view   Final Result   No radiographic evidence of acute cardiopulmonary pathology.        MACRO:   None.        Signed by: Evan Finkelstein 6/12/2024 10:07 PM   Dictation workstation:   [id number]               Assessment/Plan        Carolann Cartagena is a 89 y.o. female with a past medical history of  hypertension, hyperlipidemia, diabetes, chronic anemia, IgG kappa monoclonal protein, chronic hyponatremia, HFpEF, moderate concentric LVH, mild aortic stenosis and mild to moderate pulmonary hypertension, and G4/A3 chronic kidney disease with a fluctuating creatinine baseline between 1.8 up to 2.3 mg/dL who was sent to the ED by her nephrologist  due to hyponatremia with a sodium 123 noted on outpatient labs. Upon presentation Ms. Cartagena was noted to have hypertensive urgency with a blood pressure up to 220s systolic with a wide pulse pressure. Her sodium was 123 mmol/a liter. She had a sodium of 122 on 6/5.  Her creatinine was 1.87 mg/a deciliter, her hemoglobin was 7.8.  Chest x-ray showed no acute cardiopulmonary process.  Nephrology was consulted for hyponatremia, hypertensive urgency, HFpEF and stage G4 chronic kidney disease management.     Ms. Cartagena has chronic hyponatremia.  She is not on typical hyponatremia offenders.  Previous workup is notable for hypoosmolar hyponatremia.  Urine osmolality has not been optimally dilute but is consistently within the 200 mOsm/kg range.  In light of the advanced CKD she may not be able to optimally dilute her urine. Her very high blood pressures are not consistent with cortisol deficiency.  TSH was normal. She is otherwise total body water overloaded having leg edema. She reports drinking fluids in access of prior fluid restriction. Both are likely culprits to her hyponatremia. I placed her on a 1.4 L fluid restriction and resumed Lasix 40 mg daily. She otherwise has severe hypertension.  Previous aldosterone to renin activity ratio was 13.2 in September 2023.  This is not consistent with hyperaldosteronism or renal artery stenosis as the renin was not elevated.  She has paraprotein however this was previously not detected in the urine.  She would benefit from antiproteinuric therapy.  She has an allergy to lisinopril/losartan.  Thus I put her on 12.5 mg of Aldactone.  Further antihypertensives include 5 mg of Norvasc, 25 mg of carvedilol and hydralazine 100 mg twice daily.     Blood pressures are better controlled. The wide pulse pressure is noted which is not a surprise at her age, she has poor vascular compliance.  She does have LVH, likely to have significant diastolic dysfunction.  On the hydralazine she will  have increased sympathetic tone, may have elevated ADH as well which could perpetuate the hyponatremia.  It seems unlikely that she would not need a low-dose loop diuretic such as Lasix 20 mg daily, and I would not be against sending her out on spironolactone 25 mg daily.  She should see her nephrologist, AUNJA Ordaz.  There are no renal barriers to discharge.  I will give her 1 dose of intravenous iron now.    Principal Problem:    Hyponatremia  Acute kidney injury   G4/A3 chronic kidney disease     I spent 45 minutes in the professional and overall care of this patient.      Adam Leonard MD

## 2024-06-15 NOTE — CARE PLAN
The patient's goals for the shift include      The clinical goals for the shift include pt will maintain hemodynamically stable this shift    Over the shift, the patient did not make progress toward the following goals. Barriers to progression include . Recommendations to address these barriers include .

## 2024-06-15 NOTE — DISCHARGE SUMMARY
"Discharge Diagnosis  Hyponatremia    Issues Requiring Follow-Up  Nephro follow up     Discharge Meds     Your medication list        START taking these medications        Instructions Last Dose Given Next Dose Due   amLODIPine 5 mg tablet  Commonly known as: Norvasc  Start taking on: June 16, 2024      Take 1 tablet (5 mg) by mouth once daily.       furosemide 20 mg tablet  Commonly known as: Lasix  Start taking on: June 16, 2024      Take 1 tablet (20 mg) by mouth once daily.       spironolactone 25 mg tablet  Commonly known as: Aldactone      Take 1 tablet (25 mg) by mouth once every 24 hours.              CONTINUE taking these medications        Instructions Last Dose Given Next Dose Due   aspirin 81 mg EC tablet           atorvastatin 10 mg tablet  Commonly known as: Lipitor      Take 1 tablet (10 mg) by mouth once daily.       Autolet lancing device      Use as instructed       blood-glucose meter misc      Use to test glucose 3 times daily       brimonidine 0.2 % ophthalmic solution  Commonly known as: AlphaGAN           carvedilol 25 mg tablet  Commonly known as: Coreg      Take 1 tablet (25 mg) by mouth 2 times daily (morning and late afternoon).       hydrALAZINE 100 mg tablet  Commonly known as: Apresoline      Take 1 tablet (100 mg) by mouth 2 times a day.       insulin NPH (Isophane) 100 unit/mL (3 mL) injection  Commonly known as: HumuLIN N,NovoLIN N      Inject 12 Units under the skin 2 times a day before meals. Take as directed per insulin instructions.       insulin syringe-needle U-100 31G X 5/16\" 0.3 mL syringe      Use as instructed       insulin syringe-needle U-100 31G X 5/16\" 1 mL syringe  Commonly known as: BD Insulin Syringe Ultra-Fine      Inject 1 each under the skin 2 times a day. Use as instructed       lancets 30 gauge misc  Commonly known as: OneTouch Delica Plus Lancet      Use to test glucose 3 times daily       latanoprost 0.005 % ophthalmic solution  Commonly known as: Xalatan       "     meclizine 12.5 mg tablet  Commonly known as: Antivert      Take 1 tablet (12.5 mg) by mouth 3 times a day as needed for dizziness.       meclizine 12.5 mg tablet  Commonly known as: Antivert      Take 1 tablet (12.5 mg) by mouth 3 times a day as needed for dizziness.       OneTouch Ultra Test strip  Generic drug: blood sugar diagnostic      1 strip 3 times a day.       sennosides-docusate sodium 8.6-50 mg tablet  Commonly known as: Steph-Colace      Take 1 tablet by mouth once daily.       timolol 0.5 % ophthalmic solution  Commonly known as: Timoptic                     Where to Get Your Medications        These medications were sent to [facility]  [id number] , [address], [facility] [id number]      Hours: 8 AM to 6 PM Mon-Fri, 9 AM to 1 PM Saturday Phone: [phone]   amLODIPine 5 mg tablet  furosemide 20 mg tablet  spironolactone 25 mg tablet         Test Results Pending At Discharge  Pending Labs       Order Current Status    Urine Culture Preliminary result            Procedures       Hospital Course   Carolann was admitted to the hospital with hyponatremia found in the outpatient setting.  She was fairly asymptomatic.  She was also found to be significantly hypertensive.  She was started on blood pressure medications and her blood pressure did slowly improve.  Her sodium did slowly come up.  Does appear that she was mildly fluid overloaded.  She has had issues with this in the past.  She does believe she has had differing recommendations from her primary care physician and her nephrologist.  I did discuss with her that she needs to make sure to limit the amount of water she takes and on a daily basis.  She noted understanding.  At this time she is otherwise hemodynamically stable appropriate for discharge.  She will be going home with her daughter.  This plan discussed with her and she is in agreement.  All questions were answered.    Blood pressure 175/57, pulse 54, temperature 36.8 °C (98.2  "°F), temperature source Oral, resp. rate 15, height 1.626 m (5' 4\"), weight 75.3 kg (166 lb), SpO2 99%.  Pertinent Physical Exam At Time of Discharge  Physical Exam  Vitals and nursing note reviewed.   Constitutional:       General: She is not in acute distress.     Appearance: Normal appearance.   HENT:      Head: Normocephalic and atraumatic.   Cardiovascular:      Rate and Rhythm: Normal rate and regular rhythm.      Heart sounds: Normal heart sounds.   Pulmonary:      Effort: Pulmonary effort is normal. No respiratory distress.      Breath sounds: Normal breath sounds. No wheezing.   Abdominal:      General: Abdomen is flat. Bowel sounds are normal. There is no distension.      Palpations: Abdomen is soft.      Tenderness: There is no abdominal tenderness.   Musculoskeletal:         General: No swelling or tenderness. Normal range of motion.      Right lower leg: Edema present.      Left lower leg: Edema present.   Skin:     General: Skin is warm and dry.      Capillary Refill: Capillary refill takes less than 2 seconds.   Neurological:      General: No focal deficit present.      Mental Status: She is alert and oriented to person, place, and time.   Psychiatric:         Mood and Affect: Mood normal.         Outpatient Follow-Up  Future Appointments   Date Time Provider Department Center   7/15/2024 11:00 AM PHARMACY [id number]   7/25/2024  9:00 AM [id number] 1 [id number]   7/29/2024 12:45 PM Josh Rosa MD [id number] [facility]         Emeterio Alves DO [facility]     Time spent during this discharge: >30 min        "

## 2024-06-17 ENCOUNTER — PATIENT OUTREACH (OUTPATIENT)
Dept: CARE COORDINATION | Facility: CLINIC | Age: 89
End: 2024-06-17
Payer: MEDICARE

## 2024-06-17 DIAGNOSIS — E87.1 HYPO-OSMOLALITY AND HYPONATREMIA: Primary | ICD-10-CM

## 2024-06-17 NOTE — PROGRESS NOTES
Discharge Facility:[facility]  Discharge Diagnosis:Hyponatremia  Admission Date:06/13/24  Discharge Date: 06/15/24    PCP Appointment Date:06/24/24  Specialist Appointment Date:   Hospital Encounter and Summary: Linked   See discharge assessment below for further details  Engagement  Call Start Time: 0850 (6/17/2024  8:50 AM)    Medications  Medications reviewed with patient/caregiver?: Yes (norvasc 5mg spirolactone 25mg) (6/17/2024  8:50 AM)  Is the patient having any side effects they believe may be caused by any medication additions or changes?: No (6/17/2024  8:50 AM)  Does the patient have all medications ordered at discharge?: Yes (6/17/2024  8:50 AM)  Is the patient taking all medications as directed (includes completed medication regime)?: Yes (6/17/2024  8:50 AM)    Appointments  Does the patient have a primary care provider?: Yes (6/17/2024  8:50 AM)  Care Management Interventions: Advised patient to make appointment (6/17/2024  8:50 AM)    Self Management  Has home health visited the patient within 72 hours of discharge?: Yes (6/17/2024  8:50 AM)  Has all Durable Medical Equipment (DME) been delivered?: No (6/17/2024  8:50 AM)    Patient Teaching  Does the patient have access to their discharge instructions?: Yes (6/17/2024  8:50 AM)  Care Management Interventions: Reviewed instructions with patient (6/17/2024  8:50 AM)  What is the patient's perception of their health status since discharge?: Improving (6/17/2024  8:50 AM)    Wrap Up  Call End Time: 0900 (6/17/2024  8:50 AM)

## 2024-06-20 ENCOUNTER — LAB (OUTPATIENT)
Dept: LAB | Facility: LAB | Age: 89
End: 2024-06-20
Payer: MEDICARE

## 2024-06-20 ENCOUNTER — DOCUMENTATION (OUTPATIENT)
Dept: INFUSION THERAPY | Facility: CLINIC | Age: 89
End: 2024-06-20
Payer: MEDICARE

## 2024-06-20 DIAGNOSIS — N18.30 ANEMIA DUE TO STAGE 3 CHRONIC KIDNEY DISEASE, UNSPECIFIED WHETHER STAGE 3A OR 3B CKD (MULTI): ICD-10-CM

## 2024-06-20 DIAGNOSIS — N18.30 CHRONIC KIDNEY DISEASE, STAGE 3 UNSPECIFIED (MULTI): ICD-10-CM

## 2024-06-20 DIAGNOSIS — E87.1 HYPO-OSMOLALITY AND HYPONATREMIA: ICD-10-CM

## 2024-06-20 DIAGNOSIS — D63.1 ANEMIA DUE TO STAGE 3 CHRONIC KIDNEY DISEASE, UNSPECIFIED WHETHER STAGE 3A OR 3B CKD (MULTI): ICD-10-CM

## 2024-06-20 DIAGNOSIS — E11.22 TYPE 2 DIABETES MELLITUS WITH CHRONIC KIDNEY DISEASE, WITH LONG-TERM CURRENT USE OF INSULIN, UNSPECIFIED CKD STAGE (MULTI): ICD-10-CM

## 2024-06-20 DIAGNOSIS — Z79.4 TYPE 2 DIABETES MELLITUS WITH CHRONIC KIDNEY DISEASE, WITH LONG-TERM CURRENT USE OF INSULIN, UNSPECIFIED CKD STAGE (MULTI): ICD-10-CM

## 2024-06-20 LAB
ALBUMIN SERPL BCP-MCNC: 3.2 G/DL (ref 3.4–5)
ANION GAP SERPL CALC-SCNC: 11 MMOL/L (ref 10–20)
BASOPHILS # BLD AUTO: 0.03 X10*3/UL (ref 0–0.1)
BASOPHILS NFR BLD AUTO: 0.3 %
BUN SERPL-MCNC: 50 MG/DL (ref 6–23)
CALCIUM SERPL-MCNC: 8.6 MG/DL (ref 8.6–10.3)
CHLORIDE SERPL-SCNC: 98 MMOL/L (ref 98–107)
CO2 SERPL-SCNC: 23 MMOL/L (ref 21–32)
CREAT SERPL-MCNC: 2.1 MG/DL (ref 0.5–1.05)
EGFRCR SERPLBLD CKD-EPI 2021: 22 ML/MIN/1.73M*2
EOSINOPHIL # BLD AUTO: 0.05 X10*3/UL (ref 0–0.4)
EOSINOPHIL NFR BLD AUTO: 0.5 %
ERYTHROCYTE [DISTWIDTH] IN BLOOD BY AUTOMATED COUNT: 13.1 % (ref 11.5–14.5)
FERRITIN SERPL-MCNC: 131 NG/ML (ref 8–150)
GLUCOSE SERPL-MCNC: 143 MG/DL (ref 74–99)
HCT VFR BLD AUTO: 21.1 % (ref 36–46)
HGB BLD-MCNC: 7.2 G/DL (ref 12–16)
IMM GRANULOCYTES # BLD AUTO: 0.03 X10*3/UL (ref 0–0.5)
IMM GRANULOCYTES NFR BLD AUTO: 0.3 % (ref 0–0.9)
IRON SATN MFR SERPL: 4 % (ref 25–45)
IRON SERPL-MCNC: 10 UG/DL (ref 35–150)
LYMPHOCYTES # BLD AUTO: 1.39 X10*3/UL (ref 0.8–3)
LYMPHOCYTES NFR BLD AUTO: 13.4 %
MCH RBC QN AUTO: 33.6 PG (ref 26–34)
MCHC RBC AUTO-ENTMCNC: 34.1 G/DL (ref 32–36)
MCV RBC AUTO: 99 FL (ref 80–100)
MONOCYTES # BLD AUTO: 1.09 X10*3/UL (ref 0.05–0.8)
MONOCYTES NFR BLD AUTO: 10.5 %
NEUTROPHILS # BLD AUTO: 7.78 X10*3/UL (ref 1.6–5.5)
NEUTROPHILS NFR BLD AUTO: 75 %
NRBC BLD-RTO: 0 /100 WBCS (ref 0–0)
PHOSPHATE SERPL-MCNC: 3.6 MG/DL (ref 2.5–4.9)
PLATELET # BLD AUTO: 248 X10*3/UL (ref 150–450)
POTASSIUM SERPL-SCNC: 4.6 MMOL/L (ref 3.5–5.3)
RBC # BLD AUTO: 2.14 X10*6/UL (ref 4–5.2)
SODIUM SERPL-SCNC: 127 MMOL/L (ref 136–145)
TIBC SERPL-MCNC: 253 UG/DL (ref 240–445)
UIBC SERPL-MCNC: 243 UG/DL (ref 110–370)
WBC # BLD AUTO: 10.4 X10*3/UL (ref 4.4–11.3)

## 2024-06-20 PROCEDURE — 80069 RENAL FUNCTION PANEL: CPT

## 2024-06-20 PROCEDURE — 36415 COLL VENOUS BLD VENIPUNCTURE: CPT

## 2024-06-20 PROCEDURE — 82728 ASSAY OF FERRITIN: CPT

## 2024-06-20 PROCEDURE — 85025 COMPLETE CBC W/AUTO DIFF WBC: CPT

## 2024-06-20 PROCEDURE — 83036 HEMOGLOBIN GLYCOSYLATED A1C: CPT

## 2024-06-20 PROCEDURE — 83550 IRON BINDING TEST: CPT

## 2024-06-20 PROCEDURE — 83540 ASSAY OF IRON: CPT

## 2024-06-20 NOTE — PROGRESS NOTES
Patient to be scheduled for New Start of Procrit injections.  For Diagnosis: Anemia due to chronic kidney disease    Dosing is weight based at: FLAT DOSE     For a total dose of 5000 units every 1 weeks.  Per the Rx, Procrit to be administered ONLY if the Hgb is <= 11  If the Hgb is >11 we will hold the injection and reschedule pt for next injection for the next week.    Labs..  CBC drawn on:   Lab Results   Component Value Date    WBC 4.6 06/13/2024    HGB 8.1 (L) 06/13/2024    HCT 23.9 (L) 06/13/2024    MCV 96 06/13/2024     06/13/2024      Hgb:   Lab Results   Component Value Date    HGB 8.1 (L) 06/13/2024      Iron Studies completed on:   Lab Results   Component Value Date    IRON 57 06/15/2024    TIBC 251 06/15/2024    FERRITIN 145 06/15/2024        CBC to be repeated within 1 weeks prior to each Procrit injection.    Diagnosis of hypertension? Yes  Patient Active Problem List   Diagnosis    Acute UTI    Anemia    Burning with urination    Diabetes mellitus (Multi)    Diabetic hypoglycemia (Multi)    Essential hypertension    Gait disorder    GERD (gastroesophageal reflux disease)    Heart murmur    Hematuria    Hyperlipidemia    Leg swelling    Long toenail    Lumbar spinal stenosis    Stage 4 chronic kidney disease (Multi)    Cortical senile cataract of both eyes    Diabetic nephropathy associated with type 2 diabetes mellitus (Multi)    Epiretinal membrane (ERM) of both eyes    Lymphedema    Neuropathy    OA (osteoarthritis)    Obesity, Class II, BMI 35-39.9    Posterior vitreous detachment    Primary open angle glaucoma of both eyes, mild stage    Ptosis    Pure hypercholesterolemia    Tubular adenoma of colon    Vitamin D deficiency    DM (diabetes mellitus) (Multi)    Gastroesophageal reflux disease without esophagitis    Asymptomatic bradycardia    Chronic diastolic congestive heart failure (Multi)    Chronic hyponatremia    Injury of kidney    Pressure ulcer    Cerebral ischemia    Monoclonal  gammopathy    Post-nasal drip    Abscess    Anemia due to chronic kidney disease    Hypertensive urgency    Hyponatremia    Encounter for annual wellness visit (AWV) in Medicare patient    Vertigo    Secondary hypotension    Hypoglycemia      There were no vitals taken for this visit.     Last injection received: NA (if continuation)    Due: ANYTIME    This result meets treatment criteria.

## 2024-06-21 ENCOUNTER — APPOINTMENT (OUTPATIENT)
Dept: RADIOLOGY | Facility: HOSPITAL | Age: 89
End: 2024-06-21
Payer: MEDICARE

## 2024-06-21 ENCOUNTER — HOSPITAL ENCOUNTER (EMERGENCY)
Facility: HOSPITAL | Age: 89
Discharge: HOME | End: 2024-06-21
Attending: EMERGENCY MEDICINE
Payer: MEDICARE

## 2024-06-21 ENCOUNTER — PHARMACY VISIT (OUTPATIENT)
Dept: PHARMACY | Facility: CLINIC | Age: 89
End: 2024-06-21
Payer: COMMERCIAL

## 2024-06-21 VITALS
BODY MASS INDEX: 28.15 KG/M2 | HEART RATE: 76 BPM | HEIGHT: 64 IN | WEIGHT: 164.9 LBS | OXYGEN SATURATION: 100 % | TEMPERATURE: 99.3 F | DIASTOLIC BLOOD PRESSURE: 65 MMHG | SYSTOLIC BLOOD PRESSURE: 180 MMHG | RESPIRATION RATE: 16 BRPM

## 2024-06-21 DIAGNOSIS — K52.9 COLITIS: Primary | ICD-10-CM

## 2024-06-21 LAB
ALBUMIN SERPL BCP-MCNC: 3.1 G/DL (ref 3.4–5)
ALP SERPL-CCNC: 44 U/L (ref 33–136)
ALT SERPL W P-5'-P-CCNC: 9 U/L (ref 7–45)
AMORPH CRY #/AREA UR COMP ASSIST: ABNORMAL /HPF
ANION GAP SERPL CALC-SCNC: 13 MMOL/L (ref 10–20)
APPEARANCE UR: CLEAR
AST SERPL W P-5'-P-CCNC: 17 U/L (ref 9–39)
BACTERIA #/AREA URNS AUTO: ABNORMAL /HPF
BASOPHILS # BLD AUTO: 0.02 X10*3/UL (ref 0–0.1)
BASOPHILS NFR BLD AUTO: 0.2 %
BILIRUB SERPL-MCNC: 0.7 MG/DL (ref 0–1.2)
BILIRUB UR STRIP.AUTO-MCNC: NEGATIVE MG/DL
BUN SERPL-MCNC: 49 MG/DL (ref 6–23)
CALCIUM SERPL-MCNC: 8.5 MG/DL (ref 8.6–10.3)
CHLORIDE SERPL-SCNC: 97 MMOL/L (ref 98–107)
CO2 SERPL-SCNC: 20 MMOL/L (ref 21–32)
COLOR UR: ABNORMAL
CREAT SERPL-MCNC: 2.09 MG/DL (ref 0.5–1.05)
EGFRCR SERPLBLD CKD-EPI 2021: 22 ML/MIN/1.73M*2
EOSINOPHIL # BLD AUTO: 0.03 X10*3/UL (ref 0–0.4)
EOSINOPHIL NFR BLD AUTO: 0.3 %
ERYTHROCYTE [DISTWIDTH] IN BLOOD BY AUTOMATED COUNT: 13.2 % (ref 11.5–14.5)
EST. AVERAGE GLUCOSE BLD GHB EST-MCNC: 105 MG/DL
GLUCOSE SERPL-MCNC: 119 MG/DL (ref 74–99)
GLUCOSE UR STRIP.AUTO-MCNC: NORMAL MG/DL
HBA1C MFR BLD: 5.3 %
HCT VFR BLD AUTO: 20.3 % (ref 36–46)
HGB BLD-MCNC: 7 G/DL (ref 12–16)
HOLD SPECIMEN: NORMAL
IMM GRANULOCYTES # BLD AUTO: 0.06 X10*3/UL (ref 0–0.5)
IMM GRANULOCYTES NFR BLD AUTO: 0.5 % (ref 0–0.9)
KETONES UR STRIP.AUTO-MCNC: NEGATIVE MG/DL
LACTATE SERPL-SCNC: 0.5 MMOL/L (ref 0.4–2)
LEUKOCYTE ESTERASE UR QL STRIP.AUTO: NEGATIVE
LYMPHOCYTES # BLD AUTO: 1.54 X10*3/UL (ref 0.8–3)
LYMPHOCYTES NFR BLD AUTO: 13.4 %
MCH RBC QN AUTO: 34 PG (ref 26–34)
MCHC RBC AUTO-ENTMCNC: 34.5 G/DL (ref 32–36)
MCV RBC AUTO: 99 FL (ref 80–100)
MONOCYTES # BLD AUTO: 1.75 X10*3/UL (ref 0.05–0.8)
MONOCYTES NFR BLD AUTO: 15.3 %
MUCOUS THREADS #/AREA URNS AUTO: ABNORMAL /LPF
NEUTROPHILS # BLD AUTO: 8.05 X10*3/UL (ref 1.6–5.5)
NEUTROPHILS NFR BLD AUTO: 70.3 %
NITRITE UR QL STRIP.AUTO: NEGATIVE
NRBC BLD-RTO: 0 /100 WBCS (ref 0–0)
PH UR STRIP.AUTO: 5 [PH]
PLATELET # BLD AUTO: 244 X10*3/UL (ref 150–450)
POTASSIUM SERPL-SCNC: 4.4 MMOL/L (ref 3.5–5.3)
PROT SERPL-MCNC: 6.2 G/DL (ref 6.4–8.2)
PROT UR STRIP.AUTO-MCNC: ABNORMAL MG/DL
RBC # BLD AUTO: 2.06 X10*6/UL (ref 4–5.2)
RBC # UR STRIP.AUTO: NEGATIVE /UL
RBC #/AREA URNS AUTO: ABNORMAL /HPF
SARS-COV-2 RNA RESP QL NAA+PROBE: NOT DETECTED
SODIUM SERPL-SCNC: 126 MMOL/L (ref 136–145)
SP GR UR STRIP.AUTO: 1.01
SQUAMOUS #/AREA URNS AUTO: ABNORMAL /HPF
UROBILINOGEN UR STRIP.AUTO-MCNC: NORMAL MG/DL
WBC # BLD AUTO: 11.5 X10*3/UL (ref 4.4–11.3)
WBC #/AREA URNS AUTO: ABNORMAL /HPF

## 2024-06-21 PROCEDURE — 74176 CT ABD & PELVIS W/O CONTRAST: CPT | Performed by: STUDENT IN AN ORGANIZED HEALTH CARE EDUCATION/TRAINING PROGRAM

## 2024-06-21 PROCEDURE — 71250 CT THORAX DX C-: CPT | Performed by: STUDENT IN AN ORGANIZED HEALTH CARE EDUCATION/TRAINING PROGRAM

## 2024-06-21 PROCEDURE — 87635 SARS-COV-2 COVID-19 AMP PRB: CPT | Performed by: EMERGENCY MEDICINE

## 2024-06-21 PROCEDURE — 83605 ASSAY OF LACTIC ACID: CPT | Performed by: EMERGENCY MEDICINE

## 2024-06-21 PROCEDURE — 85025 COMPLETE CBC W/AUTO DIFF WBC: CPT | Performed by: EMERGENCY MEDICINE

## 2024-06-21 PROCEDURE — 36415 COLL VENOUS BLD VENIPUNCTURE: CPT | Performed by: EMERGENCY MEDICINE

## 2024-06-21 PROCEDURE — 96361 HYDRATE IV INFUSION ADD-ON: CPT

## 2024-06-21 PROCEDURE — RXMED WILLOW AMBULATORY MEDICATION CHARGE

## 2024-06-21 PROCEDURE — 80053 COMPREHEN METABOLIC PANEL: CPT | Performed by: EMERGENCY MEDICINE

## 2024-06-21 PROCEDURE — 2500000004 HC RX 250 GENERAL PHARMACY W/ HCPCS (ALT 636 FOR OP/ED): Performed by: EMERGENCY MEDICINE

## 2024-06-21 PROCEDURE — 99284 EMERGENCY DEPT VISIT MOD MDM: CPT | Mod: 25

## 2024-06-21 PROCEDURE — 96360 HYDRATION IV INFUSION INIT: CPT

## 2024-06-21 PROCEDURE — 81003 URINALYSIS AUTO W/O SCOPE: CPT | Performed by: EMERGENCY MEDICINE

## 2024-06-21 PROCEDURE — 74176 CT ABD & PELVIS W/O CONTRAST: CPT

## 2024-06-21 PROCEDURE — 87040 BLOOD CULTURE FOR BACTERIA: CPT | Mod: 91,PORLAB | Performed by: EMERGENCY MEDICINE

## 2024-06-21 RX ORDER — PROCHLORPERAZINE MALEATE 10 MG
10 TABLET ORAL EVERY 8 HOURS PRN
Qty: 12 TABLET | Refills: 0 | Status: SHIPPED | OUTPATIENT
Start: 2024-06-21 | End: 2024-06-21

## 2024-06-21 RX ORDER — ACETAMINOPHEN 500 MG
1000 TABLET ORAL EVERY 8 HOURS PRN
Qty: 30 TABLET | Refills: 0 | Status: SHIPPED | OUTPATIENT
Start: 2024-06-21 | End: 2024-06-26

## 2024-06-21 RX ORDER — DICYCLOMINE HYDROCHLORIDE 20 MG/1
20 TABLET ORAL 4 TIMES DAILY PRN
Qty: 20 TABLET | Refills: 0 | Status: SHIPPED | OUTPATIENT
Start: 2024-06-21 | End: 2024-06-21

## 2024-06-21 RX ORDER — ACETAMINOPHEN 500 MG
1000 TABLET ORAL EVERY 8 HOURS PRN
Qty: 30 TABLET | Refills: 0 | Status: SHIPPED | OUTPATIENT
Start: 2024-06-21 | End: 2024-06-21

## 2024-06-21 RX ORDER — PROCHLORPERAZINE MALEATE 10 MG
10 TABLET ORAL EVERY 8 HOURS PRN
Qty: 12 TABLET | Refills: 0 | Status: SHIPPED | OUTPATIENT
Start: 2024-06-21 | End: 2024-06-26

## 2024-06-21 RX ORDER — DICYCLOMINE HYDROCHLORIDE 20 MG/1
20 TABLET ORAL 4 TIMES DAILY PRN
Qty: 20 TABLET | Refills: 0 | Status: SHIPPED | OUTPATIENT
Start: 2024-06-21 | End: 2024-07-01

## 2024-06-21 ASSESSMENT — LIFESTYLE VARIABLES
TOTAL SCORE: 0
HAVE PEOPLE ANNOYED YOU BY CRITICIZING YOUR DRINKING: NO
EVER FELT BAD OR GUILTY ABOUT YOUR DRINKING: NO
EVER HAD A DRINK FIRST THING IN THE MORNING TO STEADY YOUR NERVES TO GET RID OF A HANGOVER: NO
HAVE YOU EVER FELT YOU SHOULD CUT DOWN ON YOUR DRINKING: NO

## 2024-06-21 ASSESSMENT — PAIN SCALES - GENERAL: PAINLEVEL_OUTOF10: 0 - NO PAIN

## 2024-06-21 ASSESSMENT — PAIN - FUNCTIONAL ASSESSMENT: PAIN_FUNCTIONAL_ASSESSMENT: 0-10

## 2024-06-21 NOTE — ED TRIAGE NOTES
Patient here by MS from home for fever today and diarrhea, patient recently discharge from hospital on Saturday from kidney failure and has had diarrhea since being discharged, pt A&Ox4 no signs of acute distress even unlabored respirtions

## 2024-06-21 NOTE — PROGRESS NOTES
Emergency Medicine Transition of Care Note.    I received Carolann Cartagena in signout from Dr. Nava.  Please see the previous ED provider note for all HPI, PE and MDM up to the time of signout at 0700. This is in addition to the primary record.    In brief Carolann Cartagena is an 89 y.o. female presenting for   Chief Complaint   Patient presents with    Fever    Diarrhea     At the time of signout we were awaiting: CT results    Diagnoses as of 06/21/24 0743   Colitis       Medical Decision Making  Patient was signed out to me pending results of her CT scan, CT scan demonstrated pancolitis likely contributing to the patient's symptoms.  Symptoms have been ongoing for less than 24 hours raising suspicion for likely viral cause of the patient's symptoms.  She does have hyponatremia which appears to be chronic and stable.  She had a mild leukocytosis without any significant bandemia or left shift or elevated lactate lowering suspicion for sepsis and while antibiotics were considered I do not believe are clinically indicated.  On repeat evaluation she is not having any vomiting, she is tolerating oral intake, I feel she is stable for discharge with supportive care instructions.  Patient was discharged otherwise stable condition. Meds were sent to pharmacy and strict return precautions were given and discussed.    Final diagnoses:   [K52.9] Colitis           Procedure  Procedures    Jovanni Alfonso MD

## 2024-06-23 LAB
BACTERIA BLD CULT: NORMAL
BACTERIA BLD CULT: NORMAL

## 2024-06-24 ENCOUNTER — APPOINTMENT (OUTPATIENT)
Dept: PRIMARY CARE | Facility: CLINIC | Age: 89
End: 2024-06-24
Payer: MEDICARE

## 2024-06-24 VITALS
BODY MASS INDEX: 27.81 KG/M2 | OXYGEN SATURATION: 96 % | DIASTOLIC BLOOD PRESSURE: 53 MMHG | HEART RATE: 60 BPM | WEIGHT: 162 LBS | SYSTOLIC BLOOD PRESSURE: 91 MMHG | RESPIRATION RATE: 12 BRPM

## 2024-06-24 DIAGNOSIS — Z79.4 TYPE 2 DIABETES MELLITUS WITHOUT COMPLICATION, WITH LONG-TERM CURRENT USE OF INSULIN (MULTI): ICD-10-CM

## 2024-06-24 DIAGNOSIS — N18.32 ANEMIA DUE TO STAGE 3B CHRONIC KIDNEY DISEASE (MULTI): Chronic | ICD-10-CM

## 2024-06-24 DIAGNOSIS — N18.4 STAGE 4 CHRONIC KIDNEY DISEASE (MULTI): ICD-10-CM

## 2024-06-24 DIAGNOSIS — D64.9 ANEMIA, UNSPECIFIED TYPE: ICD-10-CM

## 2024-06-24 DIAGNOSIS — I10 ESSENTIAL HYPERTENSION: Primary | Chronic | ICD-10-CM

## 2024-06-24 DIAGNOSIS — E11.9 TYPE 2 DIABETES MELLITUS WITHOUT COMPLICATION, WITH LONG-TERM CURRENT USE OF INSULIN (MULTI): ICD-10-CM

## 2024-06-24 DIAGNOSIS — I50.32 CHRONIC DIASTOLIC CONGESTIVE HEART FAILURE (MULTI): Chronic | ICD-10-CM

## 2024-06-24 DIAGNOSIS — E87.1 HYPONATREMIA: ICD-10-CM

## 2024-06-24 DIAGNOSIS — D63.1 ANEMIA DUE TO STAGE 3B CHRONIC KIDNEY DISEASE (MULTI): Chronic | ICD-10-CM

## 2024-06-24 LAB — POC FINGERSTICK BLOOD GLUCOSE: 271 MG/DL (ref 70–100)

## 2024-06-24 PROCEDURE — 3078F DIAST BP <80 MM HG: CPT | Performed by: INTERNAL MEDICINE

## 2024-06-24 PROCEDURE — 1157F ADVNC CARE PLAN IN RCRD: CPT | Performed by: INTERNAL MEDICINE

## 2024-06-24 PROCEDURE — 1111F DSCHRG MED/CURRENT MED MERGE: CPT | Performed by: INTERNAL MEDICINE

## 2024-06-24 PROCEDURE — 1036F TOBACCO NON-USER: CPT | Performed by: INTERNAL MEDICINE

## 2024-06-24 PROCEDURE — 1159F MED LIST DOCD IN RCRD: CPT | Performed by: INTERNAL MEDICINE

## 2024-06-24 PROCEDURE — 3074F SYST BP LT 130 MM HG: CPT | Performed by: INTERNAL MEDICINE

## 2024-06-24 PROCEDURE — 99496 TRANSJ CARE MGMT HIGH F2F 7D: CPT | Performed by: INTERNAL MEDICINE

## 2024-06-24 PROCEDURE — 82962 GLUCOSE BLOOD TEST: CPT | Performed by: INTERNAL MEDICINE

## 2024-06-24 RX ORDER — SODIUM BICARBONATE 650 MG/1
650 TABLET ORAL 2 TIMES DAILY
Qty: 90 TABLET | Refills: 1 | Status: SHIPPED | OUTPATIENT
Start: 2024-06-24

## 2024-06-24 NOTE — ASSESSMENT & PLAN NOTE
Carolann Cartagena has heart failure with preserved ejection fraction as evidenced by her most recent EF of 70-75% on 09/16/23 with NYHA function class II as evidenced by patient able to walk around the house without issues but will feel fatigued with occasional shortness of breath with increased activity.   CONTINUE:   Carvedilol 25 mg 1 tablet by mouth twice daily   Hydralazine 100 mg 1 tablet by mouth 2 times daily     Patient will continue to follow-up with cardiology as scheduled.   Continue Lasix 20 mg daily

## 2024-06-24 NOTE — PROGRESS NOTES
Subjective   Chief complaint: Carolann Cartagena is a 89 y.o. female who presents for Hospital Follow-up (Pt is here for hospital follow up for decreased sodium and diarrhea ).    HPI:  HPI  Pt comes to the clinic to follow up hospital discharge from [place] ED. She was told to follow up with PCP due to hyponatremia.     Objective   BP 91/53   Pulse 60   Resp 12   Wt 73.5 kg (162 lb)   SpO2 96%   BMI 27.81 kg/m²   Physical Exam  Vitals reviewed.   Constitutional:       General: She is not in acute distress.     Appearance: Normal appearance. She is not ill-appearing, toxic-appearing or diaphoretic.   HENT:      Head: Normocephalic and atraumatic.   Cardiovascular:      Rate and Rhythm: Normal rate and regular rhythm.      Pulses: Normal pulses.      Heart sounds: Murmur heard.   Pulmonary:      Effort: Pulmonary effort is normal. No respiratory distress.      Breath sounds: Normal breath sounds.   Abdominal:      Palpations: Abdomen is soft.   Musculoskeletal:      Cervical back: Normal range of motion and neck supple. No rigidity.   Skin:     General: Skin is warm and dry.      Capillary Refill: Capillary refill takes less than 2 seconds.   Neurological:      Mental Status: She is alert and oriented to person, place, and time. Mental status is at baseline.   Psychiatric:         Mood and Affect: Mood normal.         Behavior: Behavior normal.         Thought Content: Thought content normal.         Judgment: Judgment normal.         I have reviewed and reconciled the medication list with the patient today.   Current Outpatient Medications:     acetaminophen (Tylenol Extra Strength) 500 mg tablet, Take 2 tablets (1,000 mg) by mouth every 8 hours if needed for moderate pain (4 - 6) or fever (temp greater than 38.0 C) for up to 5 days., Disp: 30 tablet, Rfl: 0    amLODIPine (Norvasc) 5 mg tablet, Take 1 tablet (5 mg) by mouth once daily., Disp: 30 tablet, Rfl: 0    aspirin 81 mg EC tablet, Take 1 tablet (81 mg) by  "mouth once daily., Disp: , Rfl:     atorvastatin (Lipitor) 10 mg tablet, Take 1 tablet (10 mg) by mouth once daily., Disp: 90 tablet, Rfl: 3    Autolet lancing device, Use as instructed, Disp: 1 each, Rfl: 0    blood sugar diagnostic (OneTouch Ultra Test) strip, 1 strip 3 times a day., Disp: 200 strip, Rfl: 11    blood-glucose meter misc, Use to test glucose 3 times daily, Disp: 1 each, Rfl: 0    brimonidine (AlphaGAN) 0.2 % ophthalmic solution, Administer 1 drop into both eyes 2 times a day., Disp: , Rfl:     carvedilol (Coreg) 25 mg tablet, Take 1 tablet (25 mg) by mouth 2 times daily (morning and late afternoon)., Disp: 60 tablet, Rfl: 11    dicyclomine (Bentyl) 20 mg tablet, Take 1 tablet (20 mg) by mouth 4 times a day as needed (abdominal cramping) for up to 10 days., Disp: 20 tablet, Rfl: 0    furosemide (Lasix) 20 mg tablet, Take 1 tablet (20 mg) by mouth once daily., Disp: 30 tablet, Rfl: 0    hydrALAZINE (Apresoline) 100 mg tablet, Take 1 tablet (100 mg) by mouth 2 times a day., Disp: 90 tablet, Rfl: 11    insulin NPH, Isophane, (HumuLIN N,NovoLIN N) 100 unit/mL (3 mL) injection, Inject 12 Units under the skin 2 times a day before meals. Take as directed per insulin instructions. (Patient taking differently: Inject under the skin 2 times a day before meals. Take as directed per insulin instructions. 18 units subcutaneous every morning and 6 units subcutaneous every evening before meals), Disp: 8 mL, Rfl: 2    insulin syringe-needle U-100 (BD Insulin Syringe Ultra-Fine) 31G X 5/16\" 1 mL syringe, Inject 1 each under the skin 2 times a day. Use as instructed, Disp: 100 each, Rfl: 3    insulin syringe-needle U-100 31G X 5/16\" 0.3 mL syringe, Use as instructed, Disp: 100 each, Rfl: 11    lancets (OneTouch Delica Plus Lancet) 30 gauge misc, Use to test glucose 3 times daily, Disp: 100 each, Rfl: 11    latanoprost (Xalatan) 0.005 % ophthalmic solution, Administer 1 drop into both eyes once daily at bedtime., Disp: " , Rfl:     meclizine (Antivert) 12.5 mg tablet, Take 1 tablet (12.5 mg) by mouth 3 times a day as needed for dizziness., Disp: 30 tablet, Rfl: 11    meclizine (Antivert) 12.5 mg tablet, Take 1 tablet (12.5 mg) by mouth 3 times a day as needed for dizziness., Disp: 60 tablet, Rfl: 2    prochlorperazine (Compazine) 10 mg tablet, Take 1 tablet (10 mg) by mouth every 8 hours if needed for nausea or vomiting for up to 5 days., Disp: 12 tablet, Rfl: 0    sennosides-docusate sodium (Steph-Colace) 8.6-50 mg tablet, Take 1 tablet by mouth once daily., Disp: 30 tablet, Rfl: 11    spironolactone (Aldactone) 25 mg tablet, Take 1 tablet (25 mg) by mouth once every 24 hours., Disp: 30 tablet, Rfl: 0    timolol (Timoptic) 0.5 % ophthalmic solution, Administer 1 drop into both eyes once daily., Disp: , Rfl:      Imaging:  CT chest abdomen pelvis wo IV contrast    Result Date: 6/21/2024  Interpreted By:  Kishore He, STUDY: CT CHEST ABDOMEN PELVIS WO CONTRAST;  6/21/2024 6:34 am   INDICATION: Signs/Symptoms:fever, malaise, pain   COMPARISON: Chest CT dated 10/05/2022.   ACCESSION NUMBER(S): [id number]   ORDERING CLINICIAN: CONCEPCION EWING   TECHNIQUE: CT of the chest, abdomen, and pelvis was performed. Contiguous axial images were obtained at  5 mm slice thickness through the chest, and at  3 mm through the abdomen and pelvis. Coronal and sagittal reconstructions at  3 mm slice thickness were performed.   FINDINGS: CHEST:   LUNG/PLEURA/LARGE AIRWAYS: Tracheobronchial tree is patent. There are nodular patchy opacities in the basilar left lower lobe favored to represent atelectasis, however an infectious process can not be excluded.   VESSELS: The thoracic aorta is of normal course and caliber. Mild thoracic aortic calcifications. Main pulmonary artery and its branches are normal in caliber.  No coronary artery calcifications are seen.   HEART: The heart is normal in size.   There is no pericardial effusion.   MEDIASTINUM AND CHARU: No  pneumomediastinum, abnormal mediastinal fluid collection or mediastinal hematoma are appreciated.  No mediastinal, hilar or biaxillary adenopathy is present.  The esophagus is normal in course and caliber.   CHEST WALL AND LOWER NECK: No acute fracture or dislocation of the included osseous structures are appreciated.  No suspicious osseous lesions are identified.  The thoracic wall soft tissues are within normal limits.   ABDOMEN:   LIVER: The liver is normal in size and contour. There are numerous calcified granulomas throughout the liver. There is also an indeterminate hypodense lesion with peripheral calcifications and septations which is however stable since 2023, measuring 2.6 cm, favoring to represent a complex cyst. There is also a 3.1 cm cyst in the left hepatic lobe.   GALLBLADDER: The gallbladder is nondistended without evidence of radiopaque stone.   BILE DUCTS: The intahepatic and extrahepatic bile ducts are not dilated.   PANCREAS: The pancreas appears unremarkable.   SPLEEN: No parenchymal perfusion deficit of the spleen is appreciated to suggest contusion or laceration. There is no subcapsular hematoma, no perisplenic fluid collection.   ADRENAL GLANDS: The bilateral adrenal glands are unremarkable in appearance.   KIDNEYS AND URETERS: Normal-sized kidneys. No hydroureteronephrosis or nephroureterolithiasis is present.   PELVIS:   BLADDER: The urinary bladder appears within normal limits.   REPRODUCTIVE ORGANS: Status post hysterectomy.   BOWEL: The stomach is is partially collapsed without gross abnormality. No bowel dilatation or obstruction. There is sigmoid diverticulosis without diverticulitis. There is circumferential thickening of the entire colon with associated inflammatory change compatible with colitis.   VESSELS: The aorta and IVC are normal in caliber. Scattered atherosclerotic calcifications of the abdominal aorta.   PERITONEUM/RETROPERITONEUM/LYMPH NODES: There is no evidence of intra-  or retroperitoneal hematoma.  There is no free or loculated fluid collection, no free intraperitoneal air. No abdominopelvic lymphadenopathy is present. Mild mesenteric edema in the pelvis.   BONES AND ABDOMINAL WALL: No evidence of acute fracture or dislocation of the included osseous structures.  No suspicious osseous lesions are identified.  The abdominal wall soft tissues appear normal.       CHEST 1.  Nodular subsegmental opacities in the basilar left lower lobe favoring atelectasis, however early infectious/inflammatory process can not be excluded. Otherwise no acute abnormality in the chest.   ABDOMEN - PELVIS 1.  Circumferential thickening and associated inflammatory change throughout the entire colon compatible with pancolitis. This may be infectious or inflammatory in etiology.     MACRO: None   Signed by: Kishore He 6/21/2024 6:47 AM Dictation workstation:   [id number]    ECG 12 lead    Result Date: 6/15/2024  Normal sinus rhythm Right bundle branch block Abnormal ECG When compared with ECG of 30-JAN-2024 21:42, Premature supraventricular complexes are no longer Present See ED provider note for full interpretation and clinical correlation Confirmed by Johnna Oliva ([id number]) on 6/15/2024 2:49:55 PM    XR chest 1 view    Result Date: 6/12/2024  Interpreted By:  Finkelstein, Evan, STUDY: XR CHEST 1 VIEW;  6/12/2024 9:54 pm   INDICATION: Signs/Symptoms:electrolyte abnormality.   COMPARISON: Chest radiograph 01/30/2024   ACCESSION NUMBER(S): [id number]   ORDERING CLINICIAN: OLIVIA TILLMAN   FINDINGS:     CARDIOMEDIASTINAL SILHOUETTE: Cardiomediastinal silhouette is stable in size and configuration.   LUNGS: No pulmonary consolidation, pleural effusion or pneumothorax.   ABDOMEN: No remarkable upper abdominal findings.   BONES: No acute osseous abnormality.       No radiographic evidence of acute cardiopulmonary pathology.   MACRO: None.   Signed by: Evan Finkelstein 6/12/2024 10:07 PM Dictation  workstation:   [id number]       Labs reviewed:    Lab Results   Component Value Date    WBC 11.5 (H) 06/21/2024    HGB 7.0 (L) 06/21/2024    HCT 20.3 (L) 06/21/2024     06/21/2024    CHOL 132 04/23/2024    TRIG 62 04/23/2024    HDL 40.5 04/23/2024    ALT 9 06/21/2024    AST 17 06/21/2024     (L) 06/21/2024    K 4.4 06/21/2024    CL 97 (L) 06/21/2024    CREATININE 2.09 (H) 06/21/2024    BUN 49 (H) 06/21/2024    CO2 20 (L) 06/21/2024    TSH 3.58 06/12/2024    HGBA1C 5.3 06/20/2024       Assessment/Plan   Problem List Items Addressed This Visit       Anemia    Essential hypertension - Primary (Chronic)     Carvedilol  If blood pressure is 120 and below do not take any hydralazine.  If the blood pressure is 130 take half of the hydralazine  Recheck BP at home   Pt denied feeling dizzy or lightheaded         Stage 4 chronic kidney disease (Multi)    DM (diabetes mellitus) (Multi)     Controlled  Continue current regimen          Relevant Orders    POCT fingerstick glucose manually resulted (Completed)    Chronic diastolic congestive heart failure (Multi) (Chronic)     Carolann Cartagena has heart failure with preserved ejection fraction as evidenced by her most recent EF of 70-75% on 09/16/23 with NYHA function class II as evidenced by patient able to walk around the house without issues but will feel fatigued with occasional shortness of breath with increased activity.   CONTINUE:   Carvedilol 25 mg 1 tablet by mouth twice daily   Hydralazine 100 mg 1 tablet by mouth 2 times daily     Patient will continue to follow-up with cardiology as scheduled.   Continue Lasix 20 mg daily         Anemia due to chronic kidney disease (Chronic)     Referral given to Hematology with Dr. Rogers   Pt hasn't gotten infusion from center  Hgb 7.0  Hematocrit 20  Crt 2.0  Pt refused blood tranfusion (Jehova witness)         Hyponatremia     Start sodium bicarb 650 BID  Following up with nephrology            Continue current  medications as listed  Follow up in 2mo

## 2024-06-24 NOTE — ASSESSMENT & PLAN NOTE
Carvedilol  If blood pressure is 120 and below do not take any hydralazine.  If the blood pressure is 130 take half of the hydralazine  Recheck BP at home   Pt denied feeling dizzy or lightheaded

## 2024-06-24 NOTE — ASSESSMENT & PLAN NOTE
Referral given to Hematology with Dr. Rogesr   Pt hasn't gotten infusion from center  Hgb 7.0  Hematocrit 20  Crt 2.0  Pt refused blood tranfusion (Jehova witness)

## 2024-06-25 LAB
BACTERIA BLD CULT: NORMAL
BACTERIA BLD CULT: NORMAL

## 2024-06-27 ENCOUNTER — PATIENT OUTREACH (OUTPATIENT)
Dept: CARE COORDINATION | Facility: CLINIC | Age: 89
End: 2024-06-27

## 2024-06-27 ENCOUNTER — TELEMEDICINE (OUTPATIENT)
Dept: PRIMARY CARE | Facility: CLINIC | Age: 89
End: 2024-06-27
Payer: MEDICARE

## 2024-06-27 DIAGNOSIS — D63.1 ANEMIA DUE TO STAGE 3B CHRONIC KIDNEY DISEASE (MULTI): Primary | Chronic | ICD-10-CM

## 2024-06-27 DIAGNOSIS — I50.32 CHRONIC DIASTOLIC CONGESTIVE HEART FAILURE (MULTI): Chronic | ICD-10-CM

## 2024-06-27 DIAGNOSIS — I10 ESSENTIAL HYPERTENSION: Chronic | ICD-10-CM

## 2024-06-27 DIAGNOSIS — E11.22 TYPE 2 DIABETES MELLITUS WITH CHRONIC KIDNEY DISEASE, WITH LONG-TERM CURRENT USE OF INSULIN, UNSPECIFIED CKD STAGE (MULTI): ICD-10-CM

## 2024-06-27 DIAGNOSIS — N18.4 STAGE 4 CHRONIC KIDNEY DISEASE (MULTI): ICD-10-CM

## 2024-06-27 DIAGNOSIS — K21.9 GASTROESOPHAGEAL REFLUX DISEASE WITHOUT ESOPHAGITIS: ICD-10-CM

## 2024-06-27 DIAGNOSIS — E55.9 VITAMIN D DEFICIENCY: ICD-10-CM

## 2024-06-27 DIAGNOSIS — N18.32 ANEMIA DUE TO STAGE 3B CHRONIC KIDNEY DISEASE (MULTI): Primary | Chronic | ICD-10-CM

## 2024-06-27 DIAGNOSIS — E11.21 DIABETIC NEPHROPATHY ASSOCIATED WITH TYPE 2 DIABETES MELLITUS (MULTI): Chronic | ICD-10-CM

## 2024-06-27 DIAGNOSIS — Z79.4 TYPE 2 DIABETES MELLITUS WITH CHRONIC KIDNEY DISEASE, WITH LONG-TERM CURRENT USE OF INSULIN, UNSPECIFIED CKD STAGE (MULTI): ICD-10-CM

## 2024-06-27 DIAGNOSIS — I67.82 CEREBRAL ISCHEMIA: ICD-10-CM

## 2024-06-27 PROCEDURE — 1157F ADVNC CARE PLAN IN RCRD: CPT | Performed by: INTERNAL MEDICINE

## 2024-06-27 PROCEDURE — 1111F DSCHRG MED/CURRENT MED MERGE: CPT | Performed by: INTERNAL MEDICINE

## 2024-06-27 PROCEDURE — 99214 OFFICE O/P EST MOD 30 MIN: CPT | Performed by: INTERNAL MEDICINE

## 2024-06-27 RX ORDER — BLOOD SUGAR DIAGNOSTIC
1 STRIP MISCELLANEOUS 3 TIMES DAILY
Qty: 200 STRIP | Refills: 11 | Status: SHIPPED | OUTPATIENT
Start: 2024-06-27

## 2024-06-27 NOTE — PROGRESS NOTES
Call regarding appt. with PCP on 06/24/24 after hospitalization.  At time of outreach call the patient feels as if their condition has improved  since last visit.  Reviewed the PCP appointment with the pt and addressed any questions or concerns.

## 2024-06-27 NOTE — ASSESSMENT & PLAN NOTE
Referral given to Hematology with Dr. Rogers   Pt hasn't gotten infusion from center  Hgb 7.0  Hematocrit 20  Crt 2.0  Pt refused blood tranfusion (Jehova witness)

## 2024-06-27 NOTE — PROGRESS NOTES
Subjective   Chief complaint: Carolann Cartagena is a 89 y.o. female who presents for No chief complaint on file..    HPI:  It is a virtual visit I have the patient hold the daughter 3 of them patient has been deteriorating has been waxing and waning admitted to the hospital several times the family is wondering if she is time for her to go to the nursing home and I told him that at this point to require more care and it is not a bad idea for her to go to nursing home where he had more attention and people will be able to observe her and control her blood pressure better.  Also we discussed hospice because of the end-stage heart failure and diabetes and decline in general condition.  Patient to get approved for Procrit injection for anemia of chronic kidney disease.  Will monitor will monitor patient will monitor        Objective   There were no vitals taken for this visit.  Physical Exam  Constitutional:       Appearance: Normal appearance.   HENT:      Head: Normocephalic and atraumatic.   Pulmonary:      Effort: Pulmonary effort is normal.   Abdominal:      General: Bowel sounds are normal.      Palpations: Abdomen is soft.   Musculoskeletal:      Cervical back: Neck supple.   Skin:     General: Skin is warm and dry.   Neurological:      General: No focal deficit present.      Mental Status: She is alert.   Psychiatric:         Mood and Affect: Mood normal.         Behavior: Behavior is cooperative.         I have reviewed and reconciled the medication list with the patient today.   Current Outpatient Medications:     amLODIPine (Norvasc) 5 mg tablet, Take 1 tablet (5 mg) by mouth once daily., Disp: 30 tablet, Rfl: 0    aspirin 81 mg EC tablet, Take 1 tablet (81 mg) by mouth once daily., Disp: , Rfl:     atorvastatin (Lipitor) 10 mg tablet, Take 1 tablet (10 mg) by mouth once daily., Disp: 90 tablet, Rfl: 3    Autolet lancing device, Use as instructed, Disp: 1 each, Rfl: 0    blood sugar diagnostic (OneTouch Ultra  "Test) strip, 1 strip 3 times a day., Disp: 200 strip, Rfl: 11    blood-glucose meter misc, Use to test glucose 3 times daily, Disp: 1 each, Rfl: 0    brimonidine (AlphaGAN) 0.2 % ophthalmic solution, Administer 1 drop into both eyes 2 times a day., Disp: , Rfl:     carvedilol (Coreg) 25 mg tablet, Take 1 tablet (25 mg) by mouth 2 times daily (morning and late afternoon)., Disp: 60 tablet, Rfl: 11    dicyclomine (Bentyl) 20 mg tablet, Take 1 tablet (20 mg) by mouth 4 times a day as needed (abdominal cramping) for up to 10 days., Disp: 20 tablet, Rfl: 0    furosemide (Lasix) 20 mg tablet, Take 1 tablet (20 mg) by mouth once daily., Disp: 30 tablet, Rfl: 0    hydrALAZINE (Apresoline) 100 mg tablet, Take 1 tablet (100 mg) by mouth 2 times a day., Disp: 90 tablet, Rfl: 11    insulin NPH, Isophane, (HumuLIN N,NovoLIN N) 100 unit/mL (3 mL) injection, Inject 12 Units under the skin 2 times a day before meals. Take as directed per insulin instructions. (Patient taking differently: Inject under the skin 2 times a day before meals. Take as directed per insulin instructions. 18 units subcutaneous every morning and 6 units subcutaneous every evening before meals), Disp: 8 mL, Rfl: 2    insulin syringe-needle U-100 (BD Insulin Syringe Ultra-Fine) 31G X 5/16\" 1 mL syringe, Inject 1 each under the skin 2 times a day. Use as instructed, Disp: 100 each, Rfl: 3    insulin syringe-needle U-100 31G X 5/16\" 0.3 mL syringe, Use as instructed, Disp: 100 each, Rfl: 11    lancets (OneTouch Delica Plus Lancet) 30 gauge misc, Use to test glucose 3 times daily, Disp: 100 each, Rfl: 11    latanoprost (Xalatan) 0.005 % ophthalmic solution, Administer 1 drop into both eyes once daily at bedtime., Disp: , Rfl:     meclizine (Antivert) 12.5 mg tablet, Take 1 tablet (12.5 mg) by mouth 3 times a day as needed for dizziness., Disp: 30 tablet, Rfl: 11    meclizine (Antivert) 12.5 mg tablet, Take 1 tablet (12.5 mg) by mouth 3 times a day as needed for " dizziness., Disp: 60 tablet, Rfl: 2    sennosides-docusate sodium (Steph-Colace) 8.6-50 mg tablet, Take 1 tablet by mouth once daily., Disp: 30 tablet, Rfl: 11    sodium bicarbonate 650 mg tablet, Take 1 tablet (650 mg) by mouth 2 times a day., Disp: 90 tablet, Rfl: 1    spironolactone (Aldactone) 25 mg tablet, Take 1 tablet (25 mg) by mouth once every 24 hours., Disp: 30 tablet, Rfl: 0    timolol (Timoptic) 0.5 % ophthalmic solution, Administer 1 drop into both eyes once daily., Disp: , Rfl:      Imaging:  CT chest abdomen pelvis wo IV contrast    Result Date: 6/21/2024  Interpreted By:  Kishore He, STUDY: CT CHEST ABDOMEN PELVIS WO CONTRAST;  6/21/2024 6:34 am   INDICATION: Signs/Symptoms:fever, malaise, pain   COMPARISON: Chest CT dated 10/05/2022.   ACCESSION NUMBER(S): [id number]   ORDERING CLINICIAN: CONCEPCION EWING   TECHNIQUE: CT of the chest, abdomen, and pelvis was performed. Contiguous axial images were obtained at  5 mm slice thickness through the chest, and at  3 mm through the abdomen and pelvis. Coronal and sagittal reconstructions at  3 mm slice thickness were performed.   FINDINGS: CHEST:   LUNG/PLEURA/LARGE AIRWAYS: Tracheobronchial tree is patent. There are nodular patchy opacities in the basilar left lower lobe favored to represent atelectasis, however an infectious process can not be excluded.   VESSELS: The thoracic aorta is of normal course and caliber. Mild thoracic aortic calcifications. Main pulmonary artery and its branches are normal in caliber.  No coronary artery calcifications are seen.   HEART: The heart is normal in size.   There is no pericardial effusion.   MEDIASTINUM AND CHARU: No pneumomediastinum, abnormal mediastinal fluid collection or mediastinal hematoma are appreciated.  No mediastinal, hilar or biaxillary adenopathy is present.  The esophagus is normal in course and caliber.   CHEST WALL AND LOWER NECK: No acute fracture or dislocation of the included osseous structures are  appreciated.  No suspicious osseous lesions are identified.  The thoracic wall soft tissues are within normal limits.   ABDOMEN:   LIVER: The liver is normal in size and contour. There are numerous calcified granulomas throughout the liver. There is also an indeterminate hypodense lesion with peripheral calcifications and septations which is however stable since 2023, measuring 2.6 cm, favoring to represent a complex cyst. There is also a 3.1 cm cyst in the left hepatic lobe.   GALLBLADDER: The gallbladder is nondistended without evidence of radiopaque stone.   BILE DUCTS: The intahepatic and extrahepatic bile ducts are not dilated.   PANCREAS: The pancreas appears unremarkable.   SPLEEN: No parenchymal perfusion deficit of the spleen is appreciated to suggest contusion or laceration. There is no subcapsular hematoma, no perisplenic fluid collection.   ADRENAL GLANDS: The bilateral adrenal glands are unremarkable in appearance.   KIDNEYS AND URETERS: Normal-sized kidneys. No hydroureteronephrosis or nephroureterolithiasis is present.   PELVIS:   BLADDER: The urinary bladder appears within normal limits.   REPRODUCTIVE ORGANS: Status post hysterectomy.   BOWEL: The stomach is is partially collapsed without gross abnormality. No bowel dilatation or obstruction. There is sigmoid diverticulosis without diverticulitis. There is circumferential thickening of the entire colon with associated inflammatory change compatible with colitis.   VESSELS: The aorta and IVC are normal in caliber. Scattered atherosclerotic calcifications of the abdominal aorta.   PERITONEUM/RETROPERITONEUM/LYMPH NODES: There is no evidence of intra- or retroperitoneal hematoma.  There is no free or loculated fluid collection, no free intraperitoneal air. No abdominopelvic lymphadenopathy is present. Mild mesenteric edema in the pelvis.   BONES AND ABDOMINAL WALL: No evidence of acute fracture or dislocation of the included osseous structures.  No  suspicious osseous lesions are identified.  The abdominal wall soft tissues appear normal.       CHEST 1.  Nodular subsegmental opacities in the basilar left lower lobe favoring atelectasis, however early infectious/inflammatory process can not be excluded. Otherwise no acute abnormality in the chest.   ABDOMEN - PELVIS 1.  Circumferential thickening and associated inflammatory change throughout the entire colon compatible with pancolitis. This may be infectious or inflammatory in etiology.     MACRO: None   Signed by: Kishore He 6/21/2024 6:47 AM Dictation workstation:   [id number]    ECG 12 lead    Result Date: 6/15/2024  Normal sinus rhythm Right bundle branch block Abnormal ECG When compared with ECG of 30-JAN-2024 21:42, Premature supraventricular complexes are no longer Present See ED provider note for full interpretation and clinical correlation Confirmed by Johnna Oliva ([id number]) on 6/15/2024 2:49:55 PM    XR chest 1 view    Result Date: 6/12/2024  Interpreted By:  Finkelstein, Evan, STUDY: XR CHEST 1 VIEW;  6/12/2024 9:54 pm   INDICATION: Signs/Symptoms:electrolyte abnormality.   COMPARISON: Chest radiograph 01/30/2024   ACCESSION NUMBER(S): [id number]   ORDERING CLINICIAN: OLIVIA TILLMAN   FINDINGS:     CARDIOMEDIASTINAL SILHOUETTE: Cardiomediastinal silhouette is stable in size and configuration.   LUNGS: No pulmonary consolidation, pleural effusion or pneumothorax.   ABDOMEN: No remarkable upper abdominal findings.   BONES: No acute osseous abnormality.       No radiographic evidence of acute cardiopulmonary pathology.   MACRO: None.   Signed by: Evan Finkelstein 6/12/2024 10:07 PM Dictation workstation:   [id number]       Labs reviewed:    Lab Results   Component Value Date    WBC 11.5 (H) 06/21/2024    HGB 7.0 (L) 06/21/2024    HCT 20.3 (L) 06/21/2024     06/21/2024    CHOL 132 04/23/2024    TRIG 62 04/23/2024    HDL 40.5 04/23/2024    ALT 9 06/21/2024    AST 17 06/21/2024     (L)  06/21/2024    K 4.4 06/21/2024    CL 97 (L) 06/21/2024    CREATININE 2.09 (H) 06/21/2024    BUN 49 (H) 06/21/2024    CO2 20 (L) 06/21/2024    TSH 3.58 06/12/2024    HGBA1C 5.3 06/20/2024       Assessment/Plan   Problem List Items Addressed This Visit       Diabetes mellitus (Multi)    Essential hypertension (Chronic)     Carvedilol  If blood pressure is 120 and below do not take any hydralazine.  If the blood pressure is 130 take half of the hydralazine  Recheck BP at home   Pt denied feeling dizzy or lightheaded         Stage 4 chronic kidney disease (Multi)     Kidney function increasing since discontinuation of lasix.   Encouraged to drink plenty of water.   Continue to monitor at visit in April.          Diabetic nephropathy associated with type 2 diabetes mellitus (Multi) (Chronic)     Renal function is stable we can keep monitoring renal function every 3 months         Vitamin D deficiency     Vitamin D supplement         Gastroesophageal reflux disease without esophagitis     PPI.         Chronic diastolic congestive heart failure (Multi) (Chronic)     Carolann Cartagena has heart failure with preserved ejection fraction as evidenced by her most recent EF of 70-75% on 09/16/23 with NYHA function class II as evidenced by patient able to walk around the house without issues but will feel fatigued with occasional shortness of breath with increased activity.   CONTINUE:   Carvedilol 25 mg 1 tablet by mouth twice daily   Hydralazine 100 mg 1 tablet by mouth 2 times daily     Patient will continue to follow-up with cardiology as scheduled.   Continue Lasix 20 mg daily         Cerebral ischemia     Will monitor patient         Anemia due to chronic kidney disease - Primary (Chronic)     Referral given to Hematology with Dr. Rogers   Pt hasn't gotten infusion from center  Hgb 7.0  Hematocrit 20  Crt 2.0  Pt refused blood tranfusion (Jehova witness)            Continue current medications as listed  Follow up in 3 months

## 2024-06-29 NOTE — ED PROVIDER NOTES
Carolann Cartagena is a 89 y.o. patient presenting to the ED for diarrhea and fever.  The patient's symptoms began earlier today.  Not bloody or containing mucus.  She rates it as severe.  She has had some mild nausea but no vomiting.  She gets abdominal cramping but no significant abdominal pain.  She denies other associated symptoms.    Additional History Obtained from: None  Limitations to History: None  ------------------------------------------------------------------------------------------------------------------------------------------  Physical Exam:  Appearance: Alert, cooperative.  Skin: Warm, dry, appropriate color for ethnicity.  Eyes: Cornea clear. No scleral icterus or injection.   ENT: Mucous membranes moist.  Pulmonary: No accessory muscle use or stridor. Clear lung sounds bilaterally without rhonchi or wheezing.   Cardiac: Heart sounds regular without murmur. B/L radial pulses full and symmetric.   Abdomen: Soft, not tender.  No rebound or guarding.   Musculoskeletal: No gross deformities.   Neurological: Face symmetrical. Voice clear. Appropriately conversant.   Psychiatric: Appropriate mood and affect.    Medical Decision Making:  Chronic Medical Conditions Significantly Affecting Care:  has a past medical history of Anemia due to chronic kidney disease (01/22/2024), Chronic diastolic congestive heart failure (Multi) (10/02/2023), Chronic hyponatremia (10/02/2023), Chronic kidney disease, stage 3, mod decreased GFR (Multi) (09/18/2018), Diabetic nephropathy associated with type 2 diabetes mellitus (Multi) (12/18/2018), Essential hypertension (05/23/2023), Hyperlipidemia (05/23/2023), and Pure hypercholesterolemia (10/02/2015).    Social Determinants of Health Significantly Affecting Care: None identified    Differential Diagnosis Considered but not limited to: Patient with 1 day of diarrhea and fever.  Colitis, diverticulitis are considerations.  Less likely urinary tract infection,  appendicitis.      External Records Reviewed:   I reviewed recent and relevant outside records including:     Independent Interpretation of Studies: The following studies were ordered as part of the emergency department work up and independently interpreted by me. See ED Course for details.    CBC with slight leukocytosis to 11.5 and anemia with hemoglobin of 7.  Chemistry with hyponatremia and sodium of 126.  Abnormal renal function with creatinine of 2.09.  Electrolytes are normal.    At the time of signout CT scan is pending.    Diagnoses as of 06/29/24 1649   Colitis            Pallavi Nava,   06/29/24 1652

## 2024-07-01 DIAGNOSIS — E11.9 TYPE 2 DIABETES MELLITUS WITHOUT COMPLICATION, WITH LONG-TERM CURRENT USE OF INSULIN (MULTI): ICD-10-CM

## 2024-07-01 DIAGNOSIS — Z79.4 TYPE 2 DIABETES MELLITUS WITHOUT COMPLICATION, WITH LONG-TERM CURRENT USE OF INSULIN (MULTI): ICD-10-CM

## 2024-07-01 RX ORDER — PEN NEEDLE, DIABETIC 30 GX3/16"
NEEDLE, DISPOSABLE MISCELLANEOUS
Qty: 200 EACH | Refills: 11 | Status: SHIPPED | OUTPATIENT
Start: 2024-07-01 | End: 2025-07-01

## 2024-07-11 ENCOUNTER — PATIENT OUTREACH (OUTPATIENT)
Dept: CARE COORDINATION | Facility: CLINIC | Age: 89
End: 2024-07-11
Payer: MEDICARE

## 2024-07-15 ENCOUNTER — APPOINTMENT (OUTPATIENT)
Dept: PHARMACY | Facility: HOSPITAL | Age: 89
End: 2024-07-15
Payer: MEDICARE

## 2024-07-23 DIAGNOSIS — E87.1 HYPO-OSMOLALITY AND HYPONATREMIA: ICD-10-CM

## 2024-07-23 DIAGNOSIS — N18.4 CHRONIC KIDNEY DISEASE, STAGE 4 (SEVERE) (MULTI): Primary | ICD-10-CM

## 2024-07-25 ENCOUNTER — APPOINTMENT (OUTPATIENT)
Dept: PRIMARY CARE | Facility: CLINIC | Age: 89
End: 2024-07-25
Payer: MEDICARE

## 2024-07-25 DIAGNOSIS — D50.9 IRON DEFICIENCY ANEMIA, UNSPECIFIED IRON DEFICIENCY ANEMIA TYPE: ICD-10-CM

## 2024-07-25 DIAGNOSIS — E78.00 ELEVATED LDL CHOLESTEROL LEVEL: ICD-10-CM

## 2024-07-25 DIAGNOSIS — E11.9 TYPE 2 DIABETES MELLITUS WITHOUT COMPLICATION, WITH LONG-TERM CURRENT USE OF INSULIN (MULTI): ICD-10-CM

## 2024-07-25 DIAGNOSIS — I10 BENIGN ESSENTIAL HYPERTENSION: ICD-10-CM

## 2024-07-25 DIAGNOSIS — I10 PRIMARY HYPERTENSION: ICD-10-CM

## 2024-07-25 DIAGNOSIS — E03.9 HYPOTHYROIDISM, UNSPECIFIED TYPE: ICD-10-CM

## 2024-07-25 DIAGNOSIS — Z79.4 TYPE 2 DIABETES MELLITUS WITHOUT COMPLICATION, WITH LONG-TERM CURRENT USE OF INSULIN (MULTI): ICD-10-CM

## 2024-07-25 DIAGNOSIS — E78.5 HYPERLIPIDEMIA, UNSPECIFIED HYPERLIPIDEMIA TYPE: Chronic | ICD-10-CM

## 2024-07-25 DIAGNOSIS — I10 ESSENTIAL HYPERTENSION: Chronic | ICD-10-CM

## 2024-07-25 LAB
ALBUMIN SERPL BCP-MCNC: 3.3 G/DL (ref 3.4–5)
ALP SERPL-CCNC: 47 U/L (ref 33–136)
ALT SERPL W P-5'-P-CCNC: 11 U/L (ref 7–45)
ANION GAP SERPL CALC-SCNC: 15 MMOL/L (ref 10–20)
AST SERPL W P-5'-P-CCNC: 18 U/L (ref 9–39)
BILIRUB SERPL-MCNC: 0.4 MG/DL (ref 0–1.2)
BUN SERPL-MCNC: 35 MG/DL (ref 6–23)
CALCIUM SERPL-MCNC: 8.7 MG/DL (ref 8.6–10.6)
CHLORIDE SERPL-SCNC: 103 MMOL/L (ref 98–107)
CHOLEST SERPL-MCNC: 127 MG/DL (ref 0–199)
CHOLESTEROL/HDL RATIO: 3.4
CO2 SERPL-SCNC: 20 MMOL/L (ref 21–32)
CREAT SERPL-MCNC: 1.95 MG/DL (ref 0.5–1.05)
EGFRCR SERPLBLD CKD-EPI 2021: 24 ML/MIN/1.73M*2
ERYTHROCYTE [DISTWIDTH] IN BLOOD BY AUTOMATED COUNT: 14 % (ref 11.5–14.5)
GLUCOSE SERPL-MCNC: 121 MG/DL (ref 74–99)
HCT VFR BLD AUTO: 27.7 % (ref 36–46)
HDLC SERPL-MCNC: 37.8 MG/DL
HGB BLD-MCNC: 8.8 G/DL (ref 12–16)
LDLC SERPL CALC-MCNC: 74 MG/DL
MCH RBC QN AUTO: 33.5 PG (ref 26–34)
MCHC RBC AUTO-ENTMCNC: 31.8 G/DL (ref 32–36)
MCV RBC AUTO: 105 FL (ref 80–100)
NON HDL CHOLESTEROL: 89 MG/DL (ref 0–149)
NRBC BLD-RTO: 0 /100 WBCS (ref 0–0)
PLATELET # BLD AUTO: 223 X10*3/UL (ref 150–450)
POTASSIUM SERPL-SCNC: 4.6 MMOL/L (ref 3.5–5.3)
PROT SERPL-MCNC: 6.5 G/DL (ref 6.4–8.2)
RBC # BLD AUTO: 2.63 X10*6/UL (ref 4–5.2)
SODIUM SERPL-SCNC: 133 MMOL/L (ref 136–145)
TRIGL SERPL-MCNC: 75 MG/DL (ref 0–149)
VLDL: 15 MG/DL (ref 0–40)
WBC # BLD AUTO: 3.5 X10*3/UL (ref 4.4–11.3)

## 2024-07-25 PROCEDURE — 36415 COLL VENOUS BLD VENIPUNCTURE: CPT

## 2024-07-25 PROCEDURE — 83036 HEMOGLOBIN GLYCOSYLATED A1C: CPT

## 2024-07-25 PROCEDURE — 85027 COMPLETE CBC AUTOMATED: CPT

## 2024-07-25 PROCEDURE — 80053 COMPREHEN METABOLIC PANEL: CPT

## 2024-07-25 PROCEDURE — 80061 LIPID PANEL: CPT

## 2024-07-26 ENCOUNTER — APPOINTMENT (OUTPATIENT)
Dept: PRIMARY CARE | Facility: CLINIC | Age: 89
End: 2024-07-26
Payer: MEDICARE

## 2024-07-26 LAB
EST. AVERAGE GLUCOSE BLD GHB EST-MCNC: 82 MG/DL
HBA1C MFR BLD: 4.5 %

## 2024-07-29 ENCOUNTER — LAB (OUTPATIENT)
Dept: LAB | Facility: LAB | Age: 89
End: 2024-07-29
Payer: MEDICARE

## 2024-07-29 ENCOUNTER — APPOINTMENT (OUTPATIENT)
Dept: PRIMARY CARE | Facility: CLINIC | Age: 89
End: 2024-07-29
Payer: MEDICARE

## 2024-07-29 VITALS
OXYGEN SATURATION: 98 % | HEART RATE: 52 BPM | SYSTOLIC BLOOD PRESSURE: 192 MMHG | WEIGHT: 154 LBS | DIASTOLIC BLOOD PRESSURE: 64 MMHG | BODY MASS INDEX: 26.43 KG/M2 | RESPIRATION RATE: 12 BRPM

## 2024-07-29 DIAGNOSIS — D63.1 ANEMIA DUE TO STAGE 3B CHRONIC KIDNEY DISEASE (MULTI): Primary | Chronic | ICD-10-CM

## 2024-07-29 DIAGNOSIS — Z79.4 TYPE 2 DIABETES MELLITUS WITH CHRONIC KIDNEY DISEASE, WITH LONG-TERM CURRENT USE OF INSULIN, UNSPECIFIED CKD STAGE (MULTI): ICD-10-CM

## 2024-07-29 DIAGNOSIS — I50.32 CHRONIC DIASTOLIC CONGESTIVE HEART FAILURE (MULTI): Chronic | ICD-10-CM

## 2024-07-29 DIAGNOSIS — E11.21 DIABETIC NEPHROPATHY ASSOCIATED WITH TYPE 2 DIABETES MELLITUS (MULTI): Chronic | ICD-10-CM

## 2024-07-29 DIAGNOSIS — E87.1 HYPO-OSMOLALITY AND HYPONATREMIA: ICD-10-CM

## 2024-07-29 DIAGNOSIS — E11.22 TYPE 2 DIABETES MELLITUS WITH CHRONIC KIDNEY DISEASE, WITH LONG-TERM CURRENT USE OF INSULIN, UNSPECIFIED CKD STAGE (MULTI): ICD-10-CM

## 2024-07-29 DIAGNOSIS — N18.32 ANEMIA DUE TO STAGE 3B CHRONIC KIDNEY DISEASE (MULTI): Primary | Chronic | ICD-10-CM

## 2024-07-29 DIAGNOSIS — I10 ESSENTIAL HYPERTENSION: Chronic | ICD-10-CM

## 2024-07-29 DIAGNOSIS — N18.4 CHRONIC KIDNEY DISEASE, STAGE 4 (SEVERE) (MULTI): ICD-10-CM

## 2024-07-29 LAB
ALBUMIN SERPL BCP-MCNC: 3.7 G/DL (ref 3.4–5)
ANION GAP SERPL CALC-SCNC: 13 MMOL/L (ref 10–20)
BUN SERPL-MCNC: 40 MG/DL (ref 6–23)
CALCIUM SERPL-MCNC: 9.3 MG/DL (ref 8.6–10.6)
CHLORIDE SERPL-SCNC: 103 MMOL/L (ref 98–107)
CO2 SERPL-SCNC: 24 MMOL/L (ref 21–32)
CREAT SERPL-MCNC: 1.77 MG/DL (ref 0.5–1.05)
EGFRCR SERPLBLD CKD-EPI 2021: 27 ML/MIN/1.73M*2
ERYTHROCYTE [DISTWIDTH] IN BLOOD BY AUTOMATED COUNT: 14.4 % (ref 11.5–14.5)
GLUCOSE SERPL-MCNC: 170 MG/DL (ref 74–99)
HCT VFR BLD AUTO: 33.9 % (ref 36–46)
HGB BLD-MCNC: 10.3 G/DL (ref 12–16)
MCH RBC QN AUTO: 33 PG (ref 26–34)
MCHC RBC AUTO-ENTMCNC: 30.4 G/DL (ref 32–36)
MCV RBC AUTO: 109 FL (ref 80–100)
NRBC BLD-RTO: 0 /100 WBCS (ref 0–0)
PHOSPHATE SERPL-MCNC: 4 MG/DL (ref 2.5–4.9)
PLATELET # BLD AUTO: 213 X10*3/UL (ref 150–450)
POC FINGERSTICK BLOOD GLUCOSE: 165 MG/DL (ref 70–100)
POTASSIUM SERPL-SCNC: 4.5 MMOL/L (ref 3.5–5.3)
RBC # BLD AUTO: 3.12 X10*6/UL (ref 4–5.2)
SODIUM SERPL-SCNC: 135 MMOL/L (ref 136–145)
WBC # BLD AUTO: 4.1 X10*3/UL (ref 4.4–11.3)

## 2024-07-29 PROCEDURE — 3078F DIAST BP <80 MM HG: CPT | Performed by: INTERNAL MEDICINE

## 2024-07-29 PROCEDURE — 85027 COMPLETE CBC AUTOMATED: CPT

## 2024-07-29 PROCEDURE — 3077F SYST BP >= 140 MM HG: CPT | Performed by: INTERNAL MEDICINE

## 2024-07-29 PROCEDURE — 1159F MED LIST DOCD IN RCRD: CPT | Performed by: INTERNAL MEDICINE

## 2024-07-29 PROCEDURE — 1157F ADVNC CARE PLAN IN RCRD: CPT | Performed by: INTERNAL MEDICINE

## 2024-07-29 PROCEDURE — 1036F TOBACCO NON-USER: CPT | Performed by: INTERNAL MEDICINE

## 2024-07-29 PROCEDURE — 36415 COLL VENOUS BLD VENIPUNCTURE: CPT

## 2024-07-29 PROCEDURE — 82962 GLUCOSE BLOOD TEST: CPT | Performed by: INTERNAL MEDICINE

## 2024-07-29 PROCEDURE — 99214 OFFICE O/P EST MOD 30 MIN: CPT | Performed by: INTERNAL MEDICINE

## 2024-07-29 PROCEDURE — 80069 RENAL FUNCTION PANEL: CPT

## 2024-07-29 RX ORDER — AMLODIPINE BESYLATE 5 MG/1
5 TABLET ORAL DAILY
Qty: 90 TABLET | Refills: 3 | Status: SHIPPED | OUTPATIENT
Start: 2024-07-29

## 2024-07-29 RX ORDER — SPIRONOLACTONE 25 MG/1
25 TABLET ORAL
Qty: 90 TABLET | Refills: 3 | Status: SHIPPED | OUTPATIENT
Start: 2024-07-29

## 2024-07-29 RX ORDER — BLOOD SUGAR DIAGNOSTIC
1 STRIP MISCELLANEOUS 3 TIMES DAILY
Qty: 200 STRIP | Refills: 11 | Status: SHIPPED | OUTPATIENT
Start: 2024-07-29

## 2024-07-29 RX ORDER — BLOOD-GLUCOSE CONTROL, NORMAL
EACH MISCELLANEOUS
Qty: 100 EACH | Refills: 11 | Status: SHIPPED | OUTPATIENT
Start: 2024-07-29

## 2024-07-29 RX ORDER — INSULIN HUMAN 100 [IU]/ML
12 INJECTION, SUSPENSION SUBCUTANEOUS
Qty: 10 ML | Refills: 3 | Status: SHIPPED | OUTPATIENT
Start: 2024-07-29

## 2024-07-29 NOTE — ASSESSMENT & PLAN NOTE
Add Norvasc 5 mg daily to the carvedilol and hydralazine  If blood pressure is 120 and below do not take any hydralazine.  If the blood pressure is 130 take half of the hydralazine  Recheck BP at home   Pt denied feeling dizzy or lightheaded

## 2024-07-29 NOTE — PROGRESS NOTES
Subjective   Chief complaint: Carolann Cartagena is a 89 y.o. female who presents for Follow-up ( patient being seen today for blood work f/u).    HPI:  Blood pressure has been elevated at home we will follow-up on her diabetes hypertension chronic kidney disease chronic anemia has been getting Procrit injection        Objective   BP (!) 192/64   Pulse 52   Resp 12   Wt 69.9 kg (154 lb)   SpO2 98%   BMI 26.43 kg/m²   Physical Exam  Vitals reviewed.   Constitutional:       Appearance: Normal appearance.   HENT:      Head: Normocephalic and atraumatic.   Cardiovascular:      Rate and Rhythm: Normal rate and regular rhythm.   Pulmonary:      Effort: Pulmonary effort is normal.      Breath sounds: Normal breath sounds.   Abdominal:      General: Bowel sounds are normal.      Palpations: Abdomen is soft.   Musculoskeletal:      Cervical back: Neck supple.   Skin:     General: Skin is warm and dry.   Neurological:      General: No focal deficit present.      Mental Status: She is alert.   Psychiatric:         Mood and Affect: Mood normal.         Behavior: Behavior is cooperative.         I have reviewed and reconciled the medication list with the patient today.   Current Outpatient Medications:     amLODIPine (Norvasc) 5 mg tablet, Take 1 tablet (5 mg) by mouth once daily., Disp: 30 tablet, Rfl: 0    aspirin 81 mg EC tablet, Take 1 tablet (81 mg) by mouth once daily., Disp: , Rfl:     atorvastatin (Lipitor) 10 mg tablet, Take 1 tablet (10 mg) by mouth once daily., Disp: 90 tablet, Rfl: 3    Autolet lancing device, Use as instructed, Disp: 1 each, Rfl: 0    blood sugar diagnostic (OneTouch Ultra Test) strip, 1 strip 3 times a day., Disp: 200 strip, Rfl: 11    blood-glucose meter misc, Use to test glucose 3 times daily, Disp: 1 each, Rfl: 0    brimonidine (AlphaGAN) 0.2 % ophthalmic solution, Administer 1 drop into both eyes 2 times a day., Disp: , Rfl:     carvedilol (Coreg) 25 mg tablet, Take 1 tablet (25 mg) by mouth 2  "times daily (morning and late afternoon)., Disp: 60 tablet, Rfl: 11    hydrALAZINE (Apresoline) 100 mg tablet, Take 1 tablet (100 mg) by mouth 2 times a day., Disp: 90 tablet, Rfl: 11    insulin NPH, Isophane, (HumuLIN N,NovoLIN N) 100 unit/mL (3 mL) injection, Inject 12 Units under the skin 2 times a day before meals. Take as directed per insulin instructions. (Patient taking differently: Inject under the skin 2 times a day before meals. Take as directed per insulin instructions. 18 units subcutaneous every morning and 6 units subcutaneous every evening before meals), Disp: 8 mL, Rfl: 2    insulin syringe-needle U-100 (BD Insulin Syringe Ultra-Fine) 31G X 5/16\" 1 mL syringe, Inject 1 each under the skin 2 times a day. Use as instructed, Disp: 100 each, Rfl: 3    insulin syringe-needle U-100 31G X 5/16\" 0.3 mL syringe, Use as instructed, Disp: 100 each, Rfl: 11    lancets (OneTouch Delica Plus Lancet) 30 gauge misc, Use to test glucose 3 times daily, Disp: 100 each, Rfl: 11    latanoprost (Xalatan) 0.005 % ophthalmic solution, Administer 1 drop into both eyes once daily at bedtime., Disp: , Rfl:     meclizine (Antivert) 12.5 mg tablet, Take 1 tablet (12.5 mg) by mouth 3 times a day as needed for dizziness., Disp: 30 tablet, Rfl: 11    meclizine (Antivert) 12.5 mg tablet, Take 1 tablet (12.5 mg) by mouth 3 times a day as needed for dizziness., Disp: 60 tablet, Rfl: 2    pen needle, diabetic 31 gauge x 5/16\" needle, Use to inject 1-4 times daily as directed., Disp: 200 each, Rfl: 11    sennosides-docusate sodium (Steph-Colace) 8.6-50 mg tablet, Take 1 tablet by mouth once daily., Disp: 30 tablet, Rfl: 11    sodium bicarbonate 650 mg tablet, Take 1 tablet (650 mg) by mouth 2 times a day., Disp: 90 tablet, Rfl: 1    spironolactone (Aldactone) 25 mg tablet, Take 1 tablet (25 mg) by mouth once every 24 hours., Disp: 30 tablet, Rfl: 0    timolol (Timoptic) 0.5 % ophthalmic solution, Administer 1 drop into both eyes once " daily., Disp: , Rfl:      Imaging:  No results found.     Labs reviewed:    Lab Results   Component Value Date    WBC 3.5 (L) 07/25/2024    HGB 8.8 (L) 07/25/2024    HCT 27.7 (L) 07/25/2024     07/25/2024    CHOL 127 07/25/2024    TRIG 75 07/25/2024    HDL 37.8 07/25/2024    ALT 11 07/25/2024    AST 18 07/25/2024     (L) 07/25/2024    K 4.6 07/25/2024     07/25/2024    CREATININE 1.95 (H) 07/25/2024    BUN 35 (H) 07/25/2024    CO2 20 (L) 07/25/2024    TSH 3.58 06/12/2024    HGBA1C 4.5 07/25/2024       Assessment/Plan   Problem List Items Addressed This Visit       Diabetes mellitus (Multi)     Start NPH 12 units twice a day   patient to check her sugar 4 times a day especially around 4:00 in the morning when she break a sweat and be sure she takes snacks before she take and if her sugar goes above 200 to take an extra 10 units of her insulin.         Relevant Orders    POCT fingerstick glucose manually resulted (Completed)    Essential hypertension (Chronic)     Add Norvasc 5 mg daily to the carvedilol and hydralazine  If blood pressure is 120 and below do not take any hydralazine.  If the blood pressure is 130 take half of the hydralazine  Recheck BP at home   Pt denied feeling dizzy or lightheaded         Diabetic nephropathy associated with type 2 diabetes mellitus (Multi) (Chronic)     Renal function is stable we can keep monitoring renal function every 3 months         Chronic diastolic congestive heart failure (Multi) (Chronic)     Carolann Cartagena has heart failure with preserved ejection fraction as evidenced by her most recent EF of 70-75% on 09/16/23 with NYHA function class II as evidenced by patient able to walk around the house without issues but will feel fatigued with occasional shortness of breath with increased activity.   CONTINUE:   Carvedilol 25 mg 1 tablet by mouth twice daily   Hydralazine 100 mg 1 tablet by mouth 2 times daily     Patient will continue to follow-up with  cardiology as scheduled.   Continue Lasix 20 mg daily         Anemia due to chronic kidney disease - Primary (Chronic)     Referral given to Hematology with Dr. Rogers   Pt hasn't gotten infusion from center  Hgb 7.0  Hematocrit 20  Crt 2.0  Pt refused blood tranfusion (Jehova witness)            Continue current medications as listed  Follow up in 3 months check CMP CBC hemoglobin A1c      no

## 2024-07-29 NOTE — ASSESSMENT & PLAN NOTE
Start NPH 12 units twice a day   patient to check her sugar 4 times a day especially around 4:00 in the morning when she break a sweat and be sure she takes snacks before she take and if her sugar goes above 200 to take an extra 10 units of her insulin.

## 2024-07-30 ENCOUNTER — APPOINTMENT (OUTPATIENT)
Dept: INFUSION THERAPY | Facility: CLINIC | Age: 89
End: 2024-07-30
Payer: MEDICARE

## 2024-08-09 ENCOUNTER — APPOINTMENT (OUTPATIENT)
Dept: INFUSION THERAPY | Facility: CLINIC | Age: 89
End: 2024-08-09
Payer: MEDICARE

## 2024-08-16 ENCOUNTER — APPOINTMENT (OUTPATIENT)
Dept: INFUSION THERAPY | Facility: CLINIC | Age: 89
End: 2024-08-16
Payer: MEDICARE

## 2024-09-11 ENCOUNTER — PATIENT OUTREACH (OUTPATIENT)
Dept: PRIMARY CARE | Facility: CLINIC | Age: 89
End: 2024-09-11
Payer: MEDICARE

## 2024-09-11 NOTE — PROGRESS NOTES
Patient has met target of no readmission for (90) days post (hospital discharge and is graduated from Transitional Care Management program at this time.

## 2024-09-23 DIAGNOSIS — Z79.4 TYPE 2 DIABETES MELLITUS WITH CHRONIC KIDNEY DISEASE, WITH LONG-TERM CURRENT USE OF INSULIN, UNSPECIFIED CKD STAGE (MULTI): ICD-10-CM

## 2024-09-23 DIAGNOSIS — E11.22 TYPE 2 DIABETES MELLITUS WITH CHRONIC KIDNEY DISEASE, WITH LONG-TERM CURRENT USE OF INSULIN, UNSPECIFIED CKD STAGE (MULTI): ICD-10-CM

## 2024-09-23 RX ORDER — BLOOD SUGAR DIAGNOSTIC
1 STRIP MISCELLANEOUS 3 TIMES DAILY
Qty: 200 STRIP | Refills: 11 | Status: SHIPPED | OUTPATIENT
Start: 2024-09-23

## 2024-10-11 DIAGNOSIS — I10 ESSENTIAL HYPERTENSION: Chronic | ICD-10-CM

## 2024-10-11 DIAGNOSIS — E78.5 HYPERLIPIDEMIA, UNSPECIFIED HYPERLIPIDEMIA TYPE: ICD-10-CM

## 2024-10-11 RX ORDER — AMLODIPINE BESYLATE 5 MG/1
5 TABLET ORAL DAILY
Qty: 90 TABLET | Refills: 3 | Status: SHIPPED | OUTPATIENT
Start: 2024-10-11

## 2024-10-11 RX ORDER — ATORVASTATIN CALCIUM 10 MG/1
10 TABLET, FILM COATED ORAL DAILY
Qty: 90 TABLET | Refills: 3 | Status: SHIPPED | OUTPATIENT
Start: 2024-10-11

## 2024-10-11 RX ORDER — SPIRONOLACTONE 25 MG/1
25 TABLET ORAL
Qty: 90 TABLET | Refills: 3 | Status: SHIPPED | OUTPATIENT
Start: 2024-10-11

## 2024-10-24 ENCOUNTER — APPOINTMENT (OUTPATIENT)
Dept: PRIMARY CARE | Facility: CLINIC | Age: 89
End: 2024-10-24
Payer: MEDICARE

## 2024-10-24 DIAGNOSIS — I10 BENIGN ESSENTIAL HYPERTENSION: ICD-10-CM

## 2024-10-24 DIAGNOSIS — E03.9 HYPOTHYROIDISM, UNSPECIFIED TYPE: ICD-10-CM

## 2024-10-24 DIAGNOSIS — E11.9 TYPE 2 DIABETES MELLITUS WITHOUT COMPLICATION, WITH LONG-TERM CURRENT USE OF INSULIN (MULTI): ICD-10-CM

## 2024-10-24 DIAGNOSIS — N18.4 STAGE 4 CHRONIC KIDNEY DISEASE (MULTI): ICD-10-CM

## 2024-10-24 DIAGNOSIS — I10 ESSENTIAL HYPERTENSION: Chronic | ICD-10-CM

## 2024-10-24 DIAGNOSIS — D50.9 IRON DEFICIENCY ANEMIA, UNSPECIFIED IRON DEFICIENCY ANEMIA TYPE: ICD-10-CM

## 2024-10-24 DIAGNOSIS — Z79.4 TYPE 2 DIABETES MELLITUS WITHOUT COMPLICATION, WITH LONG-TERM CURRENT USE OF INSULIN (MULTI): ICD-10-CM

## 2024-10-24 LAB
ALBUMIN SERPL BCP-MCNC: 3.3 G/DL (ref 3.4–5)
ALP SERPL-CCNC: 44 U/L (ref 33–136)
ALT SERPL W P-5'-P-CCNC: 17 U/L (ref 7–45)
ANION GAP SERPL CALC-SCNC: 15 MMOL/L (ref 10–20)
AST SERPL W P-5'-P-CCNC: 31 U/L (ref 9–39)
BILIRUB SERPL-MCNC: 0.6 MG/DL (ref 0–1.2)
BUN SERPL-MCNC: 41 MG/DL (ref 6–23)
CALCIUM SERPL-MCNC: 9 MG/DL (ref 8.6–10.6)
CHLORIDE SERPL-SCNC: 103 MMOL/L (ref 98–107)
CO2 SERPL-SCNC: 21 MMOL/L (ref 21–32)
CREAT SERPL-MCNC: 2.1 MG/DL (ref 0.5–1.05)
EGFRCR SERPLBLD CKD-EPI 2021: 22 ML/MIN/1.73M*2
ERYTHROCYTE [DISTWIDTH] IN BLOOD BY AUTOMATED COUNT: 17.1 % (ref 11.5–14.5)
EST. AVERAGE GLUCOSE BLD GHB EST-MCNC: 117 MG/DL
GLUCOSE SERPL-MCNC: 165 MG/DL (ref 74–99)
HBA1C MFR BLD: 5.7 %
HCT VFR BLD AUTO: 29 % (ref 36–46)
HGB BLD-MCNC: 9 G/DL (ref 12–16)
MCH RBC QN AUTO: 33 PG (ref 26–34)
MCHC RBC AUTO-ENTMCNC: 31 G/DL (ref 32–36)
MCV RBC AUTO: 106 FL (ref 80–100)
NRBC BLD-RTO: 0.4 /100 WBCS (ref 0–0)
PLATELET # BLD AUTO: 234 X10*3/UL (ref 150–450)
POTASSIUM SERPL-SCNC: 4.8 MMOL/L (ref 3.5–5.3)
PROT SERPL-MCNC: 7.4 G/DL (ref 6.4–8.2)
RBC # BLD AUTO: 2.73 X10*6/UL (ref 4–5.2)
SODIUM SERPL-SCNC: 134 MMOL/L (ref 136–145)
WBC # BLD AUTO: 5.5 X10*3/UL (ref 4.4–11.3)

## 2024-10-24 PROCEDURE — 83036 HEMOGLOBIN GLYCOSYLATED A1C: CPT

## 2024-10-24 PROCEDURE — 80053 COMPREHEN METABOLIC PANEL: CPT

## 2024-10-24 PROCEDURE — 85027 COMPLETE CBC AUTOMATED: CPT

## 2024-10-25 ENCOUNTER — APPOINTMENT (OUTPATIENT)
Dept: PRIMARY CARE | Facility: CLINIC | Age: 89
End: 2024-10-25
Payer: MEDICARE

## 2024-10-30 ENCOUNTER — APPOINTMENT (OUTPATIENT)
Dept: PRIMARY CARE | Facility: CLINIC | Age: 89
End: 2024-10-30
Payer: MEDICARE

## 2024-10-30 VITALS
BODY MASS INDEX: 27.46 KG/M2 | RESPIRATION RATE: 12 BRPM | OXYGEN SATURATION: 97 % | SYSTOLIC BLOOD PRESSURE: 138 MMHG | WEIGHT: 160 LBS | HEART RATE: 55 BPM | DIASTOLIC BLOOD PRESSURE: 56 MMHG

## 2024-10-30 DIAGNOSIS — D64.9 ANEMIA, UNSPECIFIED TYPE: ICD-10-CM

## 2024-10-30 DIAGNOSIS — I50.32 CHRONIC DIASTOLIC CONGESTIVE HEART FAILURE: Primary | Chronic | ICD-10-CM

## 2024-10-30 DIAGNOSIS — Z79.4 TYPE 2 DIABETES MELLITUS WITHOUT COMPLICATION, WITH LONG-TERM CURRENT USE OF INSULIN (MULTI): ICD-10-CM

## 2024-10-30 DIAGNOSIS — E11.9 TYPE 2 DIABETES MELLITUS WITHOUT COMPLICATION, WITH LONG-TERM CURRENT USE OF INSULIN (MULTI): ICD-10-CM

## 2024-10-30 DIAGNOSIS — N18.4 STAGE 4 CHRONIC KIDNEY DISEASE (MULTI): ICD-10-CM

## 2024-10-30 PROBLEM — K21.9 GASTROESOPHAGEAL REFLUX DISEASE WITHOUT ESOPHAGITIS: Status: RESOLVED | Noted: 2023-05-25 | Resolved: 2024-10-30

## 2024-10-30 PROBLEM — L89.90 PRESSURE ULCER: Status: RESOLVED | Noted: 2023-10-11 | Resolved: 2024-10-30

## 2024-10-30 LAB — POC FINGERSTICK BLOOD GLUCOSE: 224 MG/DL (ref 70–100)

## 2024-10-30 PROCEDURE — 82962 GLUCOSE BLOOD TEST: CPT | Performed by: INTERNAL MEDICINE

## 2024-10-30 PROCEDURE — 1159F MED LIST DOCD IN RCRD: CPT | Performed by: INTERNAL MEDICINE

## 2024-10-30 PROCEDURE — 99214 OFFICE O/P EST MOD 30 MIN: CPT | Performed by: INTERNAL MEDICINE

## 2024-10-30 PROCEDURE — 3075F SYST BP GE 130 - 139MM HG: CPT | Performed by: INTERNAL MEDICINE

## 2024-10-30 PROCEDURE — 1157F ADVNC CARE PLAN IN RCRD: CPT | Performed by: INTERNAL MEDICINE

## 2024-10-30 PROCEDURE — 1036F TOBACCO NON-USER: CPT | Performed by: INTERNAL MEDICINE

## 2024-10-30 PROCEDURE — 3078F DIAST BP <80 MM HG: CPT | Performed by: INTERNAL MEDICINE

## 2024-11-06 ENCOUNTER — HOSPITAL ENCOUNTER (INPATIENT)
Facility: HOSPITAL | Age: 89
End: 2024-11-06
Attending: STUDENT IN AN ORGANIZED HEALTH CARE EDUCATION/TRAINING PROGRAM | Admitting: INTERNAL MEDICINE
Payer: MEDICARE

## 2024-11-06 ENCOUNTER — APPOINTMENT (OUTPATIENT)
Dept: RADIOLOGY | Facility: HOSPITAL | Age: 89
End: 2024-11-06
Payer: MEDICARE

## 2024-11-06 ENCOUNTER — APPOINTMENT (OUTPATIENT)
Dept: CARDIOLOGY | Facility: HOSPITAL | Age: 89
End: 2024-11-06
Payer: MEDICARE

## 2024-11-06 DIAGNOSIS — R79.89 ELEVATED TROPONIN: ICD-10-CM

## 2024-11-06 DIAGNOSIS — E87.70 HYPERVOLEMIA, UNSPECIFIED HYPERVOLEMIA TYPE: ICD-10-CM

## 2024-11-06 DIAGNOSIS — R11.2 NAUSEA, VOMITING AND DIARRHEA: ICD-10-CM

## 2024-11-06 DIAGNOSIS — R19.7 NAUSEA, VOMITING AND DIARRHEA: ICD-10-CM

## 2024-11-06 DIAGNOSIS — K52.9 COLITIS: Primary | ICD-10-CM

## 2024-11-06 DIAGNOSIS — I10 HYPERTENSION, UNSPECIFIED TYPE: ICD-10-CM

## 2024-11-06 LAB
ALBUMIN SERPL BCP-MCNC: 3.3 G/DL (ref 3.4–5)
ALP SERPL-CCNC: 47 U/L (ref 33–136)
ALT SERPL W P-5'-P-CCNC: 16 U/L (ref 7–45)
ANION GAP BLDV CALCULATED.4IONS-SCNC: 14 MMOL/L (ref 10–25)
ANION GAP SERPL CALC-SCNC: 13 MMOL/L (ref 10–20)
APPEARANCE UR: CLEAR
AST SERPL W P-5'-P-CCNC: 30 U/L (ref 9–39)
ATRIAL RATE: 70 BPM
BASE EXCESS BLDV CALC-SCNC: -3.3 MMOL/L (ref -2–3)
BASOPHILS # BLD AUTO: 0.03 X10*3/UL (ref 0–0.1)
BASOPHILS NFR BLD AUTO: 0.4 %
BILIRUB SERPL-MCNC: 0.7 MG/DL (ref 0–1.2)
BILIRUB UR STRIP.AUTO-MCNC: NEGATIVE MG/DL
BNP SERPL-MCNC: 420 PG/ML (ref 0–99)
BODY TEMPERATURE: 37 DEGREES CELSIUS
BUN SERPL-MCNC: 58 MG/DL (ref 6–23)
CA-I BLDV-SCNC: 1.15 MMOL/L (ref 1.1–1.33)
CALCIUM SERPL-MCNC: 8.9 MG/DL (ref 8.6–10.3)
CARDIAC TROPONIN I PNL SERPL HS: 19 NG/L (ref 0–13)
CHLORIDE BLDV-SCNC: 104 MMOL/L (ref 98–107)
CHLORIDE SERPL-SCNC: 105 MMOL/L (ref 98–107)
CO2 SERPL-SCNC: 21 MMOL/L (ref 21–32)
COLOR UR: ABNORMAL
CREAT SERPL-MCNC: 2.76 MG/DL (ref 0.5–1.05)
CRP SERPL-MCNC: 0.8 MG/DL
EGFRCR SERPLBLD CKD-EPI 2021: 16 ML/MIN/1.73M*2
EOSINOPHIL # BLD AUTO: 0.12 X10*3/UL (ref 0–0.4)
EOSINOPHIL NFR BLD AUTO: 1.7 %
ERYTHROCYTE [DISTWIDTH] IN BLOOD BY AUTOMATED COUNT: 15.7 % (ref 11.5–14.5)
GLUCOSE BLD MANUAL STRIP-MCNC: 148 MG/DL (ref 74–99)
GLUCOSE BLD MANUAL STRIP-MCNC: 155 MG/DL (ref 74–99)
GLUCOSE BLDV-MCNC: 177 MG/DL (ref 74–99)
GLUCOSE SERPL-MCNC: 167 MG/DL (ref 74–99)
GLUCOSE UR STRIP.AUTO-MCNC: NORMAL MG/DL
HCO3 BLDV-SCNC: 19.9 MMOL/L (ref 22–26)
HCT VFR BLD AUTO: 32.4 % (ref 36–46)
HCT VFR BLD EST: 41 % (ref 36–46)
HGB BLD-MCNC: 10.2 G/DL (ref 12–16)
HGB BLDV-MCNC: 13.5 G/DL (ref 12–16)
IMM GRANULOCYTES # BLD AUTO: 0.04 X10*3/UL (ref 0–0.5)
IMM GRANULOCYTES NFR BLD AUTO: 0.6 % (ref 0–0.9)
INHALED O2 CONCENTRATION: 21 %
INR PPP: 1.2 (ref 0.9–1.1)
KETONES UR STRIP.AUTO-MCNC: NEGATIVE MG/DL
LACTATE BLDV-SCNC: 1.2 MMOL/L (ref 0.4–2)
LACTATE SERPL-SCNC: 0.9 MMOL/L (ref 0.4–2)
LEUKOCYTE ESTERASE UR QL STRIP.AUTO: ABNORMAL
LIPASE SERPL-CCNC: 58 U/L (ref 9–82)
LYMPHOCYTES # BLD AUTO: 1.91 X10*3/UL (ref 0.8–3)
LYMPHOCYTES NFR BLD AUTO: 27.6 %
MCH RBC QN AUTO: 32 PG (ref 26–34)
MCHC RBC AUTO-ENTMCNC: 31.5 G/DL (ref 32–36)
MCV RBC AUTO: 102 FL (ref 80–100)
MONOCYTES # BLD AUTO: 1.04 X10*3/UL (ref 0.05–0.8)
MONOCYTES NFR BLD AUTO: 15 %
NEUTROPHILS # BLD AUTO: 3.79 X10*3/UL (ref 1.6–5.5)
NEUTROPHILS NFR BLD AUTO: 54.7 %
NITRITE UR QL STRIP.AUTO: NEGATIVE
NRBC BLD-RTO: 0 /100 WBCS (ref 0–0)
OXYHGB MFR BLDV: 91.4 % (ref 45–75)
P AXIS: 79 DEGREES
P OFFSET: 190 MS
P ONSET: 132 MS
PCO2 BLDV: 30 MM HG (ref 41–51)
PH BLDV: 7.43 PH (ref 7.33–7.43)
PH UR STRIP.AUTO: 6.5 [PH]
PLATELET # BLD AUTO: 231 X10*3/UL (ref 150–450)
PO2 BLDV: 67 MM HG (ref 35–45)
POTASSIUM BLDV-SCNC: 4.7 MMOL/L (ref 3.5–5.3)
POTASSIUM SERPL-SCNC: 4.7 MMOL/L (ref 3.5–5.3)
PR INTERVAL: 182 MS
PROT SERPL-MCNC: 8.1 G/DL (ref 6.4–8.2)
PROT UR STRIP.AUTO-MCNC: ABNORMAL MG/DL
PROTHROMBIN TIME: 13.2 SECONDS (ref 9.8–12.8)
Q ONSET: 223 MS
QRS COUNT: 12 BEATS
QRS DURATION: 142 MS
QT INTERVAL: 436 MS
QTC CALCULATION(BAZETT): 470 MS
QTC FREDERICIA: 459 MS
R AXIS: 58 DEGREES
RBC # BLD AUTO: 3.19 X10*6/UL (ref 4–5.2)
RBC # UR STRIP.AUTO: ABNORMAL /UL
RBC #/AREA URNS AUTO: >20 /HPF
SAO2 % BLDV: 95 % (ref 45–75)
SODIUM BLDV-SCNC: 133 MMOL/L (ref 136–145)
SODIUM SERPL-SCNC: 134 MMOL/L (ref 136–145)
SP GR UR STRIP.AUTO: 1.01
SQUAMOUS #/AREA URNS AUTO: ABNORMAL /HPF
T AXIS: 53 DEGREES
T OFFSET: 441 MS
UROBILINOGEN UR STRIP.AUTO-MCNC: NORMAL MG/DL
VENTRICULAR RATE: 70 BPM
WBC # BLD AUTO: 6.9 X10*3/UL (ref 4.4–11.3)
WBC #/AREA URNS AUTO: ABNORMAL /HPF

## 2024-11-06 PROCEDURE — 84300 ASSAY OF URINE SODIUM: CPT | Performed by: INTERNAL MEDICINE

## 2024-11-06 PROCEDURE — 1200000002 HC GENERAL ROOM WITH TELEMETRY DAILY

## 2024-11-06 PROCEDURE — 71045 X-RAY EXAM CHEST 1 VIEW: CPT

## 2024-11-06 PROCEDURE — 86140 C-REACTIVE PROTEIN: CPT | Performed by: INTERNAL MEDICINE

## 2024-11-06 PROCEDURE — 99222 1ST HOSP IP/OBS MODERATE 55: CPT | Performed by: NURSE PRACTITIONER

## 2024-11-06 PROCEDURE — 83690 ASSAY OF LIPASE: CPT | Performed by: STUDENT IN AN ORGANIZED HEALTH CARE EDUCATION/TRAINING PROGRAM

## 2024-11-06 PROCEDURE — 96374 THER/PROPH/DIAG INJ IV PUSH: CPT

## 2024-11-06 PROCEDURE — 84484 ASSAY OF TROPONIN QUANT: CPT | Performed by: STUDENT IN AN ORGANIZED HEALTH CARE EDUCATION/TRAINING PROGRAM

## 2024-11-06 PROCEDURE — 84075 ASSAY ALKALINE PHOSPHATASE: CPT | Performed by: STUDENT IN AN ORGANIZED HEALTH CARE EDUCATION/TRAINING PROGRAM

## 2024-11-06 PROCEDURE — 74176 CT ABD & PELVIS W/O CONTRAST: CPT

## 2024-11-06 PROCEDURE — 70450 CT HEAD/BRAIN W/O DYE: CPT

## 2024-11-06 PROCEDURE — 85610 PROTHROMBIN TIME: CPT | Performed by: STUDENT IN AN ORGANIZED HEALTH CARE EDUCATION/TRAINING PROGRAM

## 2024-11-06 PROCEDURE — 2500000002 HC RX 250 W HCPCS SELF ADMINISTERED DRUGS (ALT 637 FOR MEDICARE OP, ALT 636 FOR OP/ED): Performed by: NURSE PRACTITIONER

## 2024-11-06 PROCEDURE — 93005 ELECTROCARDIOGRAM TRACING: CPT

## 2024-11-06 PROCEDURE — 96375 TX/PRO/DX INJ NEW DRUG ADDON: CPT

## 2024-11-06 PROCEDURE — 2500000001 HC RX 250 WO HCPCS SELF ADMINISTERED DRUGS (ALT 637 FOR MEDICARE OP): Performed by: NURSE PRACTITIONER

## 2024-11-06 PROCEDURE — 83880 ASSAY OF NATRIURETIC PEPTIDE: CPT | Performed by: INTERNAL MEDICINE

## 2024-11-06 PROCEDURE — 71045 X-RAY EXAM CHEST 1 VIEW: CPT | Performed by: RADIOLOGY

## 2024-11-06 PROCEDURE — 87075 CULTR BACTERIA EXCEPT BLOOD: CPT | Mod: AHULAB | Performed by: STUDENT IN AN ORGANIZED HEALTH CARE EDUCATION/TRAINING PROGRAM

## 2024-11-06 PROCEDURE — 2500000004 HC RX 250 GENERAL PHARMACY W/ HCPCS (ALT 636 FOR OP/ED): Performed by: NURSE PRACTITIONER

## 2024-11-06 PROCEDURE — 2500000001 HC RX 250 WO HCPCS SELF ADMINISTERED DRUGS (ALT 637 FOR MEDICARE OP): Performed by: STUDENT IN AN ORGANIZED HEALTH CARE EDUCATION/TRAINING PROGRAM

## 2024-11-06 PROCEDURE — 83605 ASSAY OF LACTIC ACID: CPT | Performed by: STUDENT IN AN ORGANIZED HEALTH CARE EDUCATION/TRAINING PROGRAM

## 2024-11-06 PROCEDURE — 82947 ASSAY GLUCOSE BLOOD QUANT: CPT

## 2024-11-06 PROCEDURE — 36415 COLL VENOUS BLD VENIPUNCTURE: CPT | Performed by: STUDENT IN AN ORGANIZED HEALTH CARE EDUCATION/TRAINING PROGRAM

## 2024-11-06 PROCEDURE — 81001 URINALYSIS AUTO W/SCOPE: CPT | Performed by: STUDENT IN AN ORGANIZED HEALTH CARE EDUCATION/TRAINING PROGRAM

## 2024-11-06 PROCEDURE — 2500000004 HC RX 250 GENERAL PHARMACY W/ HCPCS (ALT 636 FOR OP/ED): Performed by: STUDENT IN AN ORGANIZED HEALTH CARE EDUCATION/TRAINING PROGRAM

## 2024-11-06 PROCEDURE — 70450 CT HEAD/BRAIN W/O DYE: CPT | Performed by: RADIOLOGY

## 2024-11-06 PROCEDURE — 99285 EMERGENCY DEPT VISIT HI MDM: CPT

## 2024-11-06 PROCEDURE — 85025 COMPLETE CBC W/AUTO DIFF WBC: CPT | Performed by: STUDENT IN AN ORGANIZED HEALTH CARE EDUCATION/TRAINING PROGRAM

## 2024-11-06 PROCEDURE — 2500000005 HC RX 250 GENERAL PHARMACY W/O HCPCS: Performed by: NURSE PRACTITIONER

## 2024-11-06 PROCEDURE — 84156 ASSAY OF PROTEIN URINE: CPT | Performed by: INTERNAL MEDICINE

## 2024-11-06 PROCEDURE — 74176 CT ABD & PELVIS W/O CONTRAST: CPT | Performed by: RADIOLOGY

## 2024-11-06 PROCEDURE — 84132 ASSAY OF SERUM POTASSIUM: CPT | Performed by: STUDENT IN AN ORGANIZED HEALTH CARE EDUCATION/TRAINING PROGRAM

## 2024-11-06 PROCEDURE — 87086 URINE CULTURE/COLONY COUNT: CPT | Mod: AHULAB | Performed by: STUDENT IN AN ORGANIZED HEALTH CARE EDUCATION/TRAINING PROGRAM

## 2024-11-06 RX ORDER — HYDRALAZINE HYDROCHLORIDE 50 MG/1
100 TABLET, FILM COATED ORAL ONCE
Status: COMPLETED | OUTPATIENT
Start: 2024-11-06 | End: 2024-11-06

## 2024-11-06 RX ORDER — DEXTROSE 50 % IN WATER (D50W) INTRAVENOUS SYRINGE
25
Status: DISCONTINUED | OUTPATIENT
Start: 2024-11-06 | End: 2024-11-23 | Stop reason: HOSPADM

## 2024-11-06 RX ORDER — AMOXICILLIN 250 MG
1 CAPSULE ORAL NIGHTLY PRN
Status: DISCONTINUED | OUTPATIENT
Start: 2024-11-06 | End: 2024-11-23 | Stop reason: HOSPADM

## 2024-11-06 RX ORDER — ASPIRIN 81 MG/1
81 TABLET ORAL DAILY
Status: DISCONTINUED | OUTPATIENT
Start: 2024-11-07 | End: 2024-11-23 | Stop reason: HOSPADM

## 2024-11-06 RX ORDER — CARVEDILOL 12.5 MG/1
25 TABLET ORAL 2 TIMES DAILY
Status: DISCONTINUED | OUTPATIENT
Start: 2024-11-06 | End: 2024-11-23 | Stop reason: HOSPADM

## 2024-11-06 RX ORDER — HYDRALAZINE HYDROCHLORIDE 50 MG/1
100 TABLET, FILM COATED ORAL 2 TIMES DAILY
Status: DISCONTINUED | OUTPATIENT
Start: 2024-11-06 | End: 2024-11-08

## 2024-11-06 RX ORDER — ACETAMINOPHEN 650 MG/1
650 SUPPOSITORY RECTAL EVERY 4 HOURS PRN
Status: DISCONTINUED | OUTPATIENT
Start: 2024-11-06 | End: 2024-11-23 | Stop reason: HOSPADM

## 2024-11-06 RX ORDER — ATORVASTATIN CALCIUM 20 MG/1
10 TABLET, FILM COATED ORAL NIGHTLY
Status: DISCONTINUED | OUTPATIENT
Start: 2024-11-06 | End: 2024-11-23 | Stop reason: HOSPADM

## 2024-11-06 RX ORDER — LABETALOL HYDROCHLORIDE 5 MG/ML
10 INJECTION, SOLUTION INTRAVENOUS ONCE
Status: COMPLETED | OUTPATIENT
Start: 2024-11-06 | End: 2024-11-06

## 2024-11-06 RX ORDER — HEPARIN SODIUM 5000 [USP'U]/ML
5000 INJECTION, SOLUTION INTRAVENOUS; SUBCUTANEOUS EVERY 8 HOURS SCHEDULED
Status: DISCONTINUED | OUTPATIENT
Start: 2024-11-06 | End: 2024-11-23 | Stop reason: HOSPADM

## 2024-11-06 RX ORDER — DEXTROSE 50 % IN WATER (D50W) INTRAVENOUS SYRINGE
12.5
Status: DISCONTINUED | OUTPATIENT
Start: 2024-11-06 | End: 2024-11-23 | Stop reason: HOSPADM

## 2024-11-06 RX ORDER — ACETAMINOPHEN 325 MG/1
650 TABLET ORAL EVERY 4 HOURS PRN
Status: DISCONTINUED | OUTPATIENT
Start: 2024-11-06 | End: 2024-11-23 | Stop reason: HOSPADM

## 2024-11-06 RX ORDER — LATANOPROST 50 UG/ML
1 SOLUTION/ DROPS OPHTHALMIC NIGHTLY
Status: DISCONTINUED | OUTPATIENT
Start: 2024-11-06 | End: 2024-11-23 | Stop reason: HOSPADM

## 2024-11-06 RX ORDER — ONDANSETRON HYDROCHLORIDE 2 MG/ML
4 INJECTION, SOLUTION INTRAVENOUS EVERY 6 HOURS PRN
Status: DISCONTINUED | OUTPATIENT
Start: 2024-11-06 | End: 2024-11-23 | Stop reason: HOSPADM

## 2024-11-06 RX ORDER — SPIRONOLACTONE 25 MG/1
25 TABLET ORAL
Status: DISCONTINUED | OUTPATIENT
Start: 2024-11-06 | End: 2024-11-23 | Stop reason: HOSPADM

## 2024-11-06 RX ORDER — ACETAMINOPHEN 160 MG/5ML
650 SOLUTION ORAL EVERY 4 HOURS PRN
Status: DISCONTINUED | OUTPATIENT
Start: 2024-11-06 | End: 2024-11-23 | Stop reason: HOSPADM

## 2024-11-06 RX ORDER — BRIMONIDINE TARTRATE 2 MG/ML
1 SOLUTION/ DROPS OPHTHALMIC 2 TIMES DAILY
Status: DISCONTINUED | OUTPATIENT
Start: 2024-11-06 | End: 2024-11-23 | Stop reason: HOSPADM

## 2024-11-06 RX ORDER — ONDANSETRON HYDROCHLORIDE 2 MG/ML
4 INJECTION, SOLUTION INTRAVENOUS ONCE
Status: COMPLETED | OUTPATIENT
Start: 2024-11-06 | End: 2024-11-06

## 2024-11-06 RX ORDER — AMLODIPINE BESYLATE 5 MG/1
5 TABLET ORAL ONCE
Status: COMPLETED | OUTPATIENT
Start: 2024-11-06 | End: 2024-11-06

## 2024-11-06 RX ORDER — HYDRALAZINE HYDROCHLORIDE 20 MG/ML
5 INJECTION INTRAMUSCULAR; INTRAVENOUS EVERY 6 HOURS PRN
Status: DISCONTINUED | OUTPATIENT
Start: 2024-11-06 | End: 2024-11-23 | Stop reason: HOSPADM

## 2024-11-06 RX ORDER — TIMOLOL MALEATE 5 MG/ML
1 SOLUTION/ DROPS OPHTHALMIC DAILY
Status: DISCONTINUED | OUTPATIENT
Start: 2024-11-06 | End: 2024-11-23 | Stop reason: HOSPADM

## 2024-11-06 RX ORDER — CARVEDILOL 12.5 MG/1
25 TABLET ORAL ONCE
Status: COMPLETED | OUTPATIENT
Start: 2024-11-06 | End: 2024-11-06

## 2024-11-06 RX ORDER — SODIUM BICARBONATE 650 MG/1
650 TABLET ORAL 2 TIMES DAILY
Status: DISCONTINUED | OUTPATIENT
Start: 2024-11-06 | End: 2024-11-23 | Stop reason: HOSPADM

## 2024-11-06 RX ORDER — AMLODIPINE BESYLATE 5 MG/1
5 TABLET ORAL DAILY
Status: DISCONTINUED | OUTPATIENT
Start: 2024-11-07 | End: 2024-11-07

## 2024-11-06 RX ADMIN — LATANOPROST 1 DROP: 50 SOLUTION/ DROPS OPHTHALMIC at 21:10

## 2024-11-06 RX ADMIN — SODIUM BICARBONATE 650 MG: 650 TABLET ORAL at 20:53

## 2024-11-06 RX ADMIN — HEPARIN SODIUM 5000 UNITS: 5000 INJECTION INTRAVENOUS; SUBCUTANEOUS at 20:52

## 2024-11-06 RX ADMIN — PIPERACILLIN SODIUM AND TAZOBACTAM SODIUM 2.25 G: 2; .25 INJECTION, SOLUTION INTRAVENOUS at 16:13

## 2024-11-06 RX ADMIN — CARVEDILOL 25 MG: 12.5 TABLET, FILM COATED ORAL at 13:46

## 2024-11-06 RX ADMIN — SPIRONOLACTONE 25 MG: 25 TABLET ORAL at 21:11

## 2024-11-06 RX ADMIN — ONDANSETRON 4 MG: 2 INJECTION, SOLUTION INTRAMUSCULAR; INTRAVENOUS at 11:26

## 2024-11-06 RX ADMIN — TIMOLOL MALEATE 1 DROP: 5 SOLUTION/ DROPS OPHTHALMIC at 21:04

## 2024-11-06 RX ADMIN — AMLODIPINE BESYLATE 5 MG: 5 TABLET ORAL at 13:47

## 2024-11-06 RX ADMIN — ATORVASTATIN CALCIUM 10 MG: 20 TABLET ORAL at 20:53

## 2024-11-06 RX ADMIN — HYDRALAZINE HYDROCHLORIDE 100 MG: 50 TABLET ORAL at 13:47

## 2024-11-06 RX ADMIN — CARVEDILOL 25 MG: 12.5 TABLET, FILM COATED ORAL at 20:53

## 2024-11-06 RX ADMIN — BRIMONIDINE TARTRATE 1 DROP: 2 SOLUTION/ DROPS OPHTHALMIC at 21:03

## 2024-11-06 RX ADMIN — LABETALOL HYDROCHLORIDE 10 MG: 5 INJECTION INTRAVENOUS at 11:25

## 2024-11-06 RX ADMIN — HYDRALAZINE HYDROCHLORIDE 100 MG: 50 TABLET ORAL at 20:53

## 2024-11-06 SDOH — SOCIAL STABILITY: SOCIAL INSECURITY
WITHIN THE LAST YEAR, HAVE YOU BEEN RAPED OR FORCED TO HAVE ANY KIND OF SEXUAL ACTIVITY BY YOUR PARTNER OR EX-PARTNER?: NO

## 2024-11-06 SDOH — ECONOMIC STABILITY: HOUSING INSECURITY: IN THE LAST 12 MONTHS, WAS THERE A TIME WHEN YOU WERE NOT ABLE TO PAY THE MORTGAGE OR RENT ON TIME?: NO

## 2024-11-06 SDOH — HEALTH STABILITY: MENTAL HEALTH: HOW OFTEN DO YOU HAVE SIX OR MORE DRINKS ON ONE OCCASION?: NEVER

## 2024-11-06 SDOH — ECONOMIC STABILITY: FOOD INSECURITY: HOW HARD IS IT FOR YOU TO PAY FOR THE VERY BASICS LIKE FOOD, HOUSING, MEDICAL CARE, AND HEATING?: NOT VERY HARD

## 2024-11-06 SDOH — SOCIAL STABILITY: SOCIAL INSECURITY: DOES ANYONE TRY TO KEEP YOU FROM HAVING/CONTACTING OTHER FRIENDS OR DOING THINGS OUTSIDE YOUR HOME?: NO

## 2024-11-06 SDOH — HEALTH STABILITY: MENTAL HEALTH: HOW OFTEN DO YOU HAVE A DRINK CONTAINING ALCOHOL?: NEVER

## 2024-11-06 SDOH — SOCIAL STABILITY: SOCIAL INSECURITY: WITHIN THE LAST YEAR, HAVE YOU BEEN HUMILIATED OR EMOTIONALLY ABUSED IN OTHER WAYS BY YOUR PARTNER OR EX-PARTNER?: NO

## 2024-11-06 SDOH — ECONOMIC STABILITY: FOOD INSECURITY: WITHIN THE PAST 12 MONTHS, YOU WORRIED THAT YOUR FOOD WOULD RUN OUT BEFORE YOU GOT THE MONEY TO BUY MORE.: NEVER TRUE

## 2024-11-06 SDOH — ECONOMIC STABILITY: HOUSING INSECURITY: IN THE PAST 12 MONTHS, HOW MANY TIMES HAVE YOU MOVED WHERE YOU WERE LIVING?: 0

## 2024-11-06 SDOH — ECONOMIC STABILITY: INCOME INSECURITY: IN THE PAST 12 MONTHS HAS THE ELECTRIC, GAS, OIL, OR WATER COMPANY THREATENED TO SHUT OFF SERVICES IN YOUR HOME?: NO

## 2024-11-06 SDOH — SOCIAL STABILITY: SOCIAL INSECURITY: WITHIN THE LAST YEAR, HAVE YOU BEEN AFRAID OF YOUR PARTNER OR EX-PARTNER?: NO

## 2024-11-06 SDOH — HEALTH STABILITY: MENTAL HEALTH: HOW MANY DRINKS CONTAINING ALCOHOL DO YOU HAVE ON A TYPICAL DAY WHEN YOU ARE DRINKING?: PATIENT DOES NOT DRINK

## 2024-11-06 SDOH — ECONOMIC STABILITY: HOUSING INSECURITY: AT ANY TIME IN THE PAST 12 MONTHS, WERE YOU HOMELESS OR LIVING IN A SHELTER (INCLUDING NOW)?: NO

## 2024-11-06 SDOH — SOCIAL STABILITY: SOCIAL INSECURITY: DO YOU FEEL UNSAFE GOING BACK TO THE PLACE WHERE YOU ARE LIVING?: NO

## 2024-11-06 SDOH — ECONOMIC STABILITY: FOOD INSECURITY: WITHIN THE PAST 12 MONTHS, THE FOOD YOU BOUGHT JUST DIDN'T LAST AND YOU DIDN'T HAVE MONEY TO GET MORE.: NEVER TRUE

## 2024-11-06 SDOH — SOCIAL STABILITY: SOCIAL INSECURITY: DO YOU FEEL ANYONE HAS EXPLOITED OR TAKEN ADVANTAGE OF YOU FINANCIALLY OR OF YOUR PERSONAL PROPERTY?: NO

## 2024-11-06 SDOH — SOCIAL STABILITY: SOCIAL INSECURITY: HAS ANYONE EVER THREATENED TO HURT YOUR FAMILY OR YOUR PETS?: NO

## 2024-11-06 SDOH — SOCIAL STABILITY: SOCIAL INSECURITY: HAVE YOU HAD THOUGHTS OF HARMING ANYONE ELSE?: NO

## 2024-11-06 SDOH — SOCIAL STABILITY: SOCIAL INSECURITY: ARE YOU OR HAVE YOU BEEN THREATENED OR ABUSED PHYSICALLY, EMOTIONALLY, OR SEXUALLY BY ANYONE?: NO

## 2024-11-06 SDOH — SOCIAL STABILITY: SOCIAL INSECURITY: ARE THERE ANY APPARENT SIGNS OF INJURIES/BEHAVIORS THAT COULD BE RELATED TO ABUSE/NEGLECT?: NO

## 2024-11-06 SDOH — SOCIAL STABILITY: SOCIAL INSECURITY: ABUSE: ADULT

## 2024-11-06 SDOH — SOCIAL STABILITY: SOCIAL INSECURITY: WERE YOU ABLE TO COMPLETE ALL THE BEHAVIORAL HEALTH SCREENINGS?: YES

## 2024-11-06 ASSESSMENT — ENCOUNTER SYMPTOMS
RESPIRATORY NEGATIVE: 1
NEUROLOGICAL NEGATIVE: 1
MUSCULOSKELETAL NEGATIVE: 1
NAUSEA: 1
PSYCHIATRIC NEGATIVE: 1
CARDIOVASCULAR NEGATIVE: 1
VOMITING: 1

## 2024-11-06 ASSESSMENT — LIFESTYLE VARIABLES
SKIP TO QUESTIONS 9-10: 1
AUDIT-C TOTAL SCORE: 0
AUDIT-C TOTAL SCORE: 0
HOW OFTEN DO YOU HAVE 6 OR MORE DRINKS ON ONE OCCASION: NEVER
HOW OFTEN DO YOU HAVE A DRINK CONTAINING ALCOHOL: NEVER
HOW MANY STANDARD DRINKS CONTAINING ALCOHOL DO YOU HAVE ON A TYPICAL DAY: PATIENT DOES NOT DRINK
SKIP TO QUESTIONS 9-10: 1
AUDIT-C TOTAL SCORE: 0

## 2024-11-06 ASSESSMENT — COGNITIVE AND FUNCTIONAL STATUS - GENERAL
MOBILITY SCORE: 13
STANDING UP FROM CHAIR USING ARMS: A LOT
MOVING TO AND FROM BED TO CHAIR: A LOT
TOILETING: A LITTLE
DRESSING REGULAR LOWER BODY CLOTHING: A LITTLE
HELP NEEDED FOR BATHING: A LITTLE
PATIENT BASELINE BEDBOUND: NO
CLIMB 3 TO 5 STEPS WITH RAILING: A LOT
MOVING FROM LYING ON BACK TO SITTING ON SIDE OF FLAT BED WITH BEDRAILS: A LITTLE
PERSONAL GROOMING: A LITTLE
WALKING IN HOSPITAL ROOM: A LOT
DRESSING REGULAR UPPER BODY CLOTHING: A LITTLE
DAILY ACTIVITIY SCORE: 19
TURNING FROM BACK TO SIDE WHILE IN FLAT BAD: A LOT

## 2024-11-06 ASSESSMENT — PATIENT HEALTH QUESTIONNAIRE - PHQ9
SUM OF ALL RESPONSES TO PHQ9 QUESTIONS 1 & 2: 0
2. FEELING DOWN, DEPRESSED OR HOPELESS: NOT AT ALL
1. LITTLE INTEREST OR PLEASURE IN DOING THINGS: NOT AT ALL

## 2024-11-06 ASSESSMENT — ACTIVITIES OF DAILY LIVING (ADL)
DRESSING YOURSELF: NEEDS ASSISTANCE
JUDGMENT_ADEQUATE_SAFELY_COMPLETE_DAILY_ACTIVITIES: YES
LACK_OF_TRANSPORTATION: NO
WALKS IN HOME: NEEDS ASSISTANCE
GROOMING: NEEDS ASSISTANCE
TOILETING: NEEDS ASSISTANCE
PATIENT'S MEMORY ADEQUATE TO SAFELY COMPLETE DAILY ACTIVITIES?: YES
BATHING: NEEDS ASSISTANCE
FEEDING YOURSELF: INDEPENDENT
HEARING - RIGHT EAR: HEARING AID
ADEQUATE_TO_COMPLETE_ADL: YES
HEARING - LEFT EAR: HEARING AID

## 2024-11-06 ASSESSMENT — PAIN SCALES - GENERAL
PAINLEVEL_OUTOF10: 0 - NO PAIN
PAINLEVEL_OUTOF10: 0 - NO PAIN

## 2024-11-06 ASSESSMENT — PAIN - FUNCTIONAL ASSESSMENT: PAIN_FUNCTIONAL_ASSESSMENT: 0-10

## 2024-11-06 NOTE — ED PROVIDER NOTES
HPI   Chief Complaint   Patient presents with    Nausea    Vomiting    Hypertension       89-year-old female presents with a chief concern of nausea vomiting and high blood pressure.  She states that her symptoms started earlier today when she woke up.  Denies any chest pain or shortness of breath, denies any fevers or chills, headedness, dizziness or headaches.  Denies any numbness tingling or weakness.  States she is feeling well yesterday and the day before that, no recent illness or sick contacts.  No recent falls or trauma.  Does have burning when she urinates, no flank pain or back pain.  Denies any cough congestion rhinorrhea, other URI symptoms.  Was not able to take any of her occasions this morning because of her nausea and vomiting.              Patient History   Past Medical History:   Diagnosis Date    Anemia due to chronic kidney disease 01/22/2024    Chronic diastolic congestive heart failure 10/02/2023    Chronic hyponatremia 10/02/2023    Chronic kidney disease, stage 3, mod decreased GFR (Multi) 09/18/2018    Diabetic nephropathy associated with type 2 diabetes mellitus (Multi) 12/18/2018    Essential hypertension 05/23/2023    Hyperlipidemia 05/23/2023    Pure hypercholesterolemia 10/02/2015     Past Surgical History:   Procedure Laterality Date    OTHER SURGICAL HISTORY  02/13/2020    Hysterectomy     Family History   Problem Relation Name Age of Onset    No Known Problems Mother      Bone cancer Sister      Lung cancer Daughter       Social History     Tobacco Use    Smoking status: Never    Smokeless tobacco: Never   Vaping Use    Vaping status: Never Used   Substance Use Topics    Alcohol use: Not Currently    Drug use: Not Currently       Physical Exam   ED Triage Vitals   Temperature Heart Rate Respirations BP   11/06/24 1004 11/06/24 1004 11/06/24 1004 11/06/24 1004   36.7 °C (98.1 °F) 65 16 (!) 176/46      Pulse Ox Temp Source Heart Rate Source Patient Position   11/06/24 1004 11/06/24  1130 24 1100 --   100 % Oral Monitor       BP Location FiO2 (%)     -- --             Physical Exam  Vitals and nursing note reviewed.   Constitutional:       Appearance: She is ill-appearing.   HENT:      Head: Atraumatic.      Nose: Nose normal.      Mouth/Throat:      Mouth: Mucous membranes are dry.      Pharynx: Oropharynx is clear.   Eyes:      Pupils: Pupils are equal, round, and reactive to light.   Cardiovascular:      Rate and Rhythm: Regular rhythm. Tachycardia present.   Pulmonary:      Effort: Pulmonary effort is normal. No respiratory distress.   Abdominal:      General: Abdomen is flat.      Palpations: Abdomen is soft.      Tenderness: There is no abdominal tenderness.   Musculoskeletal:      Right lower le+ Pitting Edema present.      Left lower le+ Pitting Edema present.   Skin:     General: Skin is warm and dry.      Capillary Refill: Capillary refill takes 2 to 3 seconds.   Neurological:      General: No focal deficit present.      Mental Status: She is alert.       EKG individual interpretation  Normal sinus rhythm, 70 bpm, , , QTc 470  Prophylaxis, abnormal ST morphology likely secondary to her known existing right bundle branch block.  No T wave abnormalities, no ST segment elevation or depression.  No change when compared to prior    ED Course & [facility]   ED Course as of 24   1149 Comprehensive Metabolic Panel(!)  OLGA LIDIA [KK]   1152 Patient is a 89-year-old female presents with a chief concern of nausea vomiting and high blood pressure.  She states that her symptoms started earlier today when she woke up.  Denies any chest pain or shortness of breath, denies any fevers or chills, headedness, dizziness or headaches.  Denies any numbness tingling or weakness.  States she is feeling well yesterday and the day before that, no recent illness or sick contacts.  No recent falls or trauma.  Does have burning when she urinates, no flank pain or back  pain.  Denies any cough congestion rhinorrhea, other URI symptoms.  Was not able to take any of her occasions this morning because of her nausea and vomiting.    On exam, patient is ill-appearing, hypertensive, not tachycardic or tachypneic.  Lungs slightly diminished at bases, abdomen soft and nontender, 2+ pitting edema to bilateral lower extremities.  Tacky mucous membranes.    Differential diagnosis includes not limited to UTI, intra-abdominal infection, pyelonephritis, hypertensive urgency versus emergency, atypical ACS will complete blood work, urinalysis, CT imaging. [KK]   1154 XR chest 1 view  IMPRESSION:  No acute cardiopulmonary process.  Enlarged but unchanged heart compared to 06/12/2024.       [KK]   1232 Troponin I, High Sensitivity(!): 19 [KK]   1337 Leukocyte Esterase, Urine(!): 75 Nella/µL [KK]   1337 WBC, Urine(!): 6-10 [KK]   1337 RBC, Urine(!): >20 [KK]   1353 CT head wo IV contrast [KK]   1355 CT abdomen pelvis wo IV contrast  IMPRESSION:  Right colon wall thickening consistent with nonspecific colitis.      Small amount of free fluid with mild edematous changes in the lung  bases, mesentery and soft tissues as well as trace pleural  effusions/thickening consistent with fluid overload/CHF.      Subtle density layer in the gallbladder, possible layering  noncalcified stones or sludge.      Additional findings as described above.   [KK]   1540 Patient blood pressures come down nicely with administration of her home blood pressure medications.  With her colitis and her nausea and elevated troponin, concern for worsening of her condition particularly with her colitis if discharged home. [KK]      ED Course User Index  [KK] Mehran Mckeon DO         Diagnoses as of 11/06/24 2245   Colitis   Elevated troponin   Nausea, vomiting and diarrhea   Hypertension, unspecified type   Hypervolemia, unspecified hypervolemia type                 No data recorded                                 Medical Decision  Making  Patient is a 89-year-old female who presented with nonspecific general malaise, nausea and vomiting.  She also was noted to be significantly hypertensive and family noted some lower extremity pitting edema.  On exam, patient did have pitting edema, but otherwise a nontender abdomen, neurologically intact.  Extensive workup completed due to the age and significant of symptoms with nonspecific history.  Workup and evaluation revealing a colitis which is consistent with patient's nausea and vomiting.  Hypertension came down after nausea was controlled at home medications provided.  Imaging showing some fluid overload/CHF complicating patient's condition which would require IV fluid hydration.  No leukocytosis and no fevers low suspicion for infection but was covered with antibiotics.  Patient was admitted to the inpatient service for further workup and evaluation as well as monitoring of patient's fluid status.        Procedure  Procedures     Mehran Mckeon, DO  11/06/24 2244       Mehran Mckeon, DO  11/06/24 2245

## 2024-11-06 NOTE — H&P
History Of Present Illness  Carolann Cartagena is a 89 y.o. female Past medical history of type 2 diabetes, hypertension, hypercholesterolemia, tubular adenoma of colon, neuropathy, lymphedema, GERD, stage IV CKD presenting with nausea vomiting and elevated blood pressure.  Due to patient's symptoms she was not able to take her BP meds.  Patient currently resting in bed nausea/ vomiting has resolved.  She is without fever, chills, cp, abd pain or diarrhea.   CT head negative.  CT abdomen and pelvis without IV contrast showed Right colon wall thickening consistent with nonspecific colitis.  Small amount of free fluid with mild edematous changes in the lung bases, mesentery and soft tissues as well as trace pleural effusions/thickening consistent with fluid overload/CHF.  Subtle density layer in the gallbladder, possible layering noncalcified stones or sludge.      Past Medical History  She has a past medical history of Anemia due to chronic kidney disease (01/22/2024), Chronic diastolic congestive heart failure (10/02/2023), Chronic hyponatremia (10/02/2023), Chronic kidney disease, stage 3, mod decreased GFR (Multi) (09/18/2018), Diabetic nephropathy associated with type 2 diabetes mellitus (Multi) (12/18/2018), Essential hypertension (05/23/2023), Hyperlipidemia (05/23/2023), and Pure hypercholesterolemia (10/02/2015).    Surgical History  She has a past surgical history that includes Other surgical history (02/13/2020).     Social History  She reports that she has never smoked. She has never used smokeless tobacco. She reports that she does not currently use alcohol. She reports that she does not currently use drugs.    Family History  Family History   Problem Relation Name Age of Onset    No Known Problems Mother      Bone cancer Sister      Lung cancer Daughter          Allergies  Dorzolamide, Lisinopril, and Losartan    Review of Systems   HENT: Negative.     Respiratory: Negative.     Cardiovascular: Negative.     Gastrointestinal:  Positive for nausea and vomiting.   Genitourinary: Negative.    Musculoskeletal: Negative.    Skin: Negative.    Neurological: Negative.    Psychiatric/Behavioral: Negative.          Physical Exam  HENT:      Head: Normocephalic.      Mouth/Throat:      Mouth: Mucous membranes are moist.   Cardiovascular:      Rate and Rhythm: Normal rate and regular rhythm.   Abdominal:      General: Bowel sounds are normal.      Palpations: Abdomen is soft.   Musculoskeletal:      Cervical back: Neck supple.      Right lower leg: Edema present.      Left lower leg: Edema present.   Skin:     General: Skin is warm.   Neurological:      Mental Status: She is oriented to person, place, and time.   Psychiatric:         Mood and Affect: Mood normal.          Last Recorded Vitals  BP (!) 172/49   Pulse 56   Temp 36.7 °C (98 °F) (Oral)   Resp 15   Wt 72.6 kg (160 lb)   SpO2 98%     Relevant Results        Results for orders placed or performed during the hospital encounter of 11/06/24 (from the past 24 hours)   Electrocardiogram, 12-lead ACS symptoms   Result Value Ref Range    Ventricular Rate 70 BPM    Atrial Rate 70 BPM    NE Interval 182 ms    QRS Duration 142 ms    QT Interval 436 ms    QTC Calculation(Bazett) 470 ms    P Axis 79 degrees    R Axis 58 degrees    T Axis 53 degrees    QRS Count 12 beats    Q Onset 223 ms    P Onset 132 ms    P Offset 190 ms    T Offset 441 ms    QTC Fredericia 459 ms   CBC and Auto Differential   Result Value Ref Range    WBC 6.9 4.4 - 11.3 x10*3/uL    nRBC 0.0 0.0 - 0.0 /100 WBCs    RBC 3.19 (L) 4.00 - 5.20 x10*6/uL    Hemoglobin 10.2 (L) 12.0 - 16.0 g/dL    Hematocrit 32.4 (L) 36.0 - 46.0 %     (H) 80 - 100 fL    MCH 32.0 26.0 - 34.0 pg    MCHC 31.5 (L) 32.0 - 36.0 g/dL    RDW 15.7 (H) 11.5 - 14.5 %    Platelets 231 150 - 450 x10*3/uL    Neutrophils % 54.7 40.0 - 80.0 %    Immature Granulocytes %, Automated 0.6 0.0 - 0.9 %    Lymphocytes % 27.6 13.0 - 44.0 %     Monocytes % 15.0 2.0 - 10.0 %    Eosinophils % 1.7 0.0 - 6.0 %    Basophils % 0.4 0.0 - 2.0 %    Neutrophils Absolute 3.79 1.60 - 5.50 x10*3/uL    Immature Granulocytes Absolute, Automated 0.04 0.00 - 0.50 x10*3/uL    Lymphocytes Absolute 1.91 0.80 - 3.00 x10*3/uL    Monocytes Absolute 1.04 (H) 0.05 - 0.80 x10*3/uL    Eosinophils Absolute 0.12 0.00 - 0.40 x10*3/uL    Basophils Absolute 0.03 0.00 - 0.10 x10*3/uL   Comprehensive Metabolic Panel   Result Value Ref Range    Glucose 167 (H) 74 - 99 mg/dL    Sodium 134 (L) 136 - 145 mmol/L    Potassium 4.7 3.5 - 5.3 mmol/L    Chloride 105 98 - 107 mmol/L    Bicarbonate 21 21 - 32 mmol/L    Anion Gap 13 10 - 20 mmol/L    Urea Nitrogen 58 (H) 6 - 23 mg/dL    Creatinine 2.76 (H) 0.50 - 1.05 mg/dL    eGFR 16 (L) >60 mL/min/1.73m*2    Calcium 8.9 8.6 - 10.3 mg/dL    Albumin 3.3 (L) 3.4 - 5.0 g/dL    Alkaline Phosphatase 47 33 - 136 U/L    Total Protein 8.1 6.4 - 8.2 g/dL    AST 30 9 - 39 U/L    Bilirubin, Total 0.7 0.0 - 1.2 mg/dL    ALT 16 7 - 45 U/L   Lactate   Result Value Ref Range    Lactate 0.9 0.4 - 2.0 mmol/L   Lipase   Result Value Ref Range    Lipase 58 9 - 82 U/L   Troponin I, High Sensitivity   Result Value Ref Range    Troponin I, High Sensitivity 19 (H) 0 - 13 ng/L   C-reactive protein   Result Value Ref Range    C-Reactive Protein 0.80 <1.00 mg/dL   B-type natriuretic peptide   Result Value Ref Range     (H) 0 - 99 pg/mL   Blood Gas Venous Full Panel   Result Value Ref Range    POCT pH, Venous 7.43 7.33 - 7.43 pH    POCT pCO2, Venous 30 (L) 41 - 51 mm Hg    POCT pO2, Venous 67 (H) 35 - 45 mm Hg    POCT SO2, Venous 95 (H) 45 - 75 %    POCT Oxy Hemoglobin, Venous 91.4 (H) 45.0 - 75.0 %    POCT Hematocrit Calculated, Venous 41.0 36.0 - 46.0 %    POCT Sodium, Venous 133 (L) 136 - 145 mmol/L    POCT Potassium, Venous 4.7 3.5 - 5.3 mmol/L    POCT Chloride, Venous 104 98 - 107 mmol/L    POCT Ionized Calicum, Venous 1.15 1.10 - 1.33 mmol/L    POCT Glucose, Venous  177 (H) 74 - 99 mg/dL    POCT Lactate, Venous 1.2 0.4 - 2.0 mmol/L    POCT Base Excess, Venous -3.3 (L) -2.0 - 3.0 mmol/L    POCT HCO3 Calculated, Venous 19.9 (L) 22.0 - 26.0 mmol/L    POCT Hemoglobin, Venous 13.5 12.0 - 16.0 g/dL    POCT Anion Gap, Venous 14.0 10.0 - 25.0 mmol/L    Patient Temperature 37.0 degrees Celsius    FiO2 21 %   Protime-INR   Result Value Ref Range    Protime 13.2 (H) 9.8 - 12.8 seconds    INR 1.2 (H) 0.9 - 1.1   Urinalysis with Reflex Culture and Microscopic   Result Value Ref Range    Color, Urine Light-Yellow Light-Yellow, Yellow, Dark-Yellow    Appearance, Urine Clear Clear    Specific Gravity, Urine 1.009 1.005 - 1.035    pH, Urine 6.5 5.0, 5.5, 6.0, 6.5, 7.0, 7.5, 8.0    Protein, Urine 70 (1+) (A) NEGATIVE, 10 (TRACE), 20 (TRACE) mg/dL    Glucose, Urine Normal Normal mg/dL    Blood, Urine 0.5 (2+) (A) NEGATIVE    Ketones, Urine NEGATIVE NEGATIVE mg/dL    Bilirubin, Urine NEGATIVE NEGATIVE    Urobilinogen, Urine Normal Normal mg/dL    Nitrite, Urine NEGATIVE NEGATIVE    Leukocyte Esterase, Urine 75 Nella/µL (A) NEGATIVE   Microscopic Only, Urine   Result Value Ref Range    WBC, Urine 6-10 (A) 1-5, NONE /HPF    RBC, Urine >20 (A) NONE, 1-2, 3-5 /HPF    Squamous Epithelial Cells, Urine 1-9 (SPARSE) Reference range not established. /HPF   POCT GLUCOSE   Result Value Ref Range    POCT Glucose 155 (H) 74 - 99 mg/dL          Assessment/Plan   Assessment & Plan  Colitis  Hypertensive urgency   Rafita on ckd   Carolann Cartagena is a 89 y.o. female Past medical history of type 2 diabetes, hypertension, hypercholesterolemia, tubular adenoma of colon, neuropathy, lymphedema, GERD, stage IV CKD presenting with nausea vomiting and elevated blood pressure.  Due to patient's symptoms she was not able to take her BP meds.  Patient currently resting in bed nausea/ vomiting has resolved.  She is with fever, chills, cp, abd pain or diarrhea.   CT head negative.  CT abdomen and pelvis without IV contrast showed  Right colon wall thickening consistent with nonspecific colitis.  Small amount of free fluid with mild edematous changes in the lung bases, mesentery and soft tissues as well as trace pleural effusions/thickening consistent with fluid overload/CHF.  Subtle density layer in the gallbladder, possible layering noncalcified stones or sludge.     Plan:  Continue zosyn   Consider gi consult   Check stool pathogens   Continue home bp meds, as needed hydralazine   Nephrology consult   Continue home nph, achs blood glucose     DVT prophylaxis- heparin        Dangelo Mcgregor, APRN-CNP

## 2024-11-06 NOTE — PROGRESS NOTES
Pharmacy Medication History Review    Carolann Cartagena is a 89 y.o. female admitted for Colitis. Pharmacy reviewed the patient's [id number] medications and allergies for accuracy.    The list below reflectives the updated PTA list. Please review each medication in order reconciliation for additional clarification and justification.  Prior to Admission Medications   Prescriptions Last Dose Informant   amLODIPine (Norvasc) 5 mg tablet Unknown Child   Sig: Take 1 tablet (5 mg) by mouth once daily.   aspirin 81 mg EC tablet Unknown Child   Sig: Take 1 tablet (81 mg) by mouth once daily.   atorvastatin (Lipitor) 10 mg tablet Unknown Child   Sig: Take 1 tablet (10 mg) by mouth once daily.   blood sugar diagnostic (OneTouch Ultra Test) strip Unknown Child   Si strip 3 times a day.   blood-glucose meter misc Unknown Child   Sig: Use to test glucose 3 times daily   brimonidine (AlphaGAN) 0.2 % ophthalmic solution Unknown Child   Sig: Administer 1 drop into both eyes 2 times a day.   carvedilol (Coreg) 25 mg tablet Unknown Child   Sig: Take 1 tablet (25 mg) by mouth 2 times daily (morning and late afternoon).   hydrALAZINE (Apresoline) 100 mg tablet Unknown Child   Sig: Take 1 tablet (100 mg) by mouth 2 times a day.   insulin NPH, Isophane, (HumuLIN N NPH U-100 Insulin) 100 unit/mL injection  Child   Sig: Inject 12 Units under the skin 2 times a day before meals. Take as directed per insulin instructions   insulin NPH, Isophane, (HumuLIN N,NovoLIN N) 100 unit/mL (3 mL) injection Unknown Child   Sig: Inject 12 Units under the skin 2 times a day before meals. Take as directed per insulin instructions.   Patient taking differently: Inject under the skin 2 times a day before meals. Take as directed per insulin instructions. 18 units subcutaneous every morning and 6 units subcutaneous every evening before meals   lancets (OneTouch Delica Plus Lancet) 30 gauge misc Unknown Child   Sig: Use to test glucose 3 times daily  "  latanoprost (Xalatan) 0.005 % ophthalmic solution Unknown Child   Sig: Administer 1 drop into both eyes once daily at bedtime.   pen needle, diabetic 31 gauge x 5/16\" needle Unknown Child   Sig: Use to inject 1-4 times daily as directed.   sennosides-docusate sodium (Steph-Colace) 8.6-50 mg tablet Unknown Child   Sig: Take 1 tablet by mouth once daily.   sodium bicarbonate 650 mg tablet Unknown Child   Sig: Take 1 tablet (650 mg) by mouth 2 times a day.   spironolactone (Aldactone) 25 mg tablet Unknown Child   Sig: Take 1 tablet (25 mg) by mouth once every 24 hours.   timolol (Timoptic) 0.5 % ophthalmic solution Unknown Child   Sig: Administer 1 drop into both eyes once daily.      Facility-Administered Medications: None         The list below reflectives the updated allergy list. Please review each documented allergy for additional clarification and justification.  Allergies  Reviewed by Estrella Lopez on 11/6/2024        Severity Reactions Comments    Dorzolamide Not Specified Other eye infection Caused infection in her eyes.    Lisinopril Not Specified Swelling, Unknown cough    Losartan Not Specified Hives             Below are additional concerns with the patient's PTA list.  The following updates were made to the Prior to Admission medication list:     Source of Information:     Medications ADDED:   N/a  Medications CHANGED:  N/a  Medications REMOVED:   N/a  Medications NOT TAKING:   N/a    Allergy reviewed : Yes    Meds 2 Beds : No    Comments: spoke to daughter to confirm all medications      Estrella Lopez    "

## 2024-11-07 LAB
ANION GAP SERPL CALC-SCNC: 11 MMOL/L (ref 10–20)
BACTERIA UR CULT: NORMAL
BACTERIA UR CULT: NORMAL
BASOPHILS # BLD AUTO: 0.03 X10*3/UL (ref 0–0.1)
BASOPHILS NFR BLD AUTO: 0.6 %
BUN SERPL-MCNC: 55 MG/DL (ref 6–23)
CALCIUM SERPL-MCNC: 8.2 MG/DL (ref 8.6–10.3)
CHLORIDE SERPL-SCNC: 108 MMOL/L (ref 98–107)
CO2 SERPL-SCNC: 23 MMOL/L (ref 21–32)
CREAT SERPL-MCNC: 3.14 MG/DL (ref 0.5–1.05)
CREAT UR-MCNC: 42.6 MG/DL (ref 20–320)
EGFRCR SERPLBLD CKD-EPI 2021: 13 ML/MIN/1.73M*2
EOSINOPHIL # BLD AUTO: 0.16 X10*3/UL (ref 0–0.4)
EOSINOPHIL NFR BLD AUTO: 2.9 %
ERYTHROCYTE [DISTWIDTH] IN BLOOD BY AUTOMATED COUNT: 16 % (ref 11.5–14.5)
GLUCOSE BLD MANUAL STRIP-MCNC: 121 MG/DL (ref 74–99)
GLUCOSE BLD MANUAL STRIP-MCNC: 172 MG/DL (ref 74–99)
GLUCOSE BLD MANUAL STRIP-MCNC: 178 MG/DL (ref 74–99)
GLUCOSE BLD MANUAL STRIP-MCNC: 204 MG/DL (ref 74–99)
GLUCOSE SERPL-MCNC: 129 MG/DL (ref 74–99)
HCT VFR BLD AUTO: 29.6 % (ref 36–46)
HGB BLD-MCNC: 9.1 G/DL (ref 12–16)
HOLD SPECIMEN: NORMAL
IMM GRANULOCYTES # BLD AUTO: 0.02 X10*3/UL (ref 0–0.5)
IMM GRANULOCYTES NFR BLD AUTO: 0.4 % (ref 0–0.9)
LYMPHOCYTES # BLD AUTO: 1.42 X10*3/UL (ref 0.8–3)
LYMPHOCYTES NFR BLD AUTO: 26.1 %
MCH RBC QN AUTO: 32.5 PG (ref 26–34)
MCHC RBC AUTO-ENTMCNC: 30.7 G/DL (ref 32–36)
MCV RBC AUTO: 106 FL (ref 80–100)
MONOCYTES # BLD AUTO: 0.91 X10*3/UL (ref 0.05–0.8)
MONOCYTES NFR BLD AUTO: 16.7 %
NEUTROPHILS # BLD AUTO: 2.9 X10*3/UL (ref 1.6–5.5)
NEUTROPHILS NFR BLD AUTO: 53.3 %
NRBC BLD-RTO: 0 /100 WBCS (ref 0–0)
PLATELET # BLD AUTO: 232 X10*3/UL (ref 150–450)
POTASSIUM SERPL-SCNC: 4.9 MMOL/L (ref 3.5–5.3)
PROT UR-ACNC: 116 MG/DL (ref 5–24)
PROT/CREAT UR: 2.72 MG/MG CREAT (ref 0–0.17)
RBC # BLD AUTO: 2.8 X10*6/UL (ref 4–5.2)
SODIUM SERPL-SCNC: 137 MMOL/L (ref 136–145)
SODIUM UR-SCNC: 80 MMOL/L
SODIUM/CREAT UR-RTO: 188 MMOL/G CREAT
WBC # BLD AUTO: 5.4 X10*3/UL (ref 4.4–11.3)

## 2024-11-07 PROCEDURE — 99222 1ST HOSP IP/OBS MODERATE 55: CPT | Performed by: INTERNAL MEDICINE

## 2024-11-07 PROCEDURE — 99233 SBSQ HOSP IP/OBS HIGH 50: CPT | Performed by: INTERNAL MEDICINE

## 2024-11-07 PROCEDURE — 97161 PT EVAL LOW COMPLEX 20 MIN: CPT | Mod: GP

## 2024-11-07 PROCEDURE — 97165 OT EVAL LOW COMPLEX 30 MIN: CPT | Mod: GO

## 2024-11-07 PROCEDURE — 1200000002 HC GENERAL ROOM WITH TELEMETRY DAILY

## 2024-11-07 PROCEDURE — 2500000001 HC RX 250 WO HCPCS SELF ADMINISTERED DRUGS (ALT 637 FOR MEDICARE OP): Performed by: NURSE PRACTITIONER

## 2024-11-07 PROCEDURE — 2500000004 HC RX 250 GENERAL PHARMACY W/ HCPCS (ALT 636 FOR OP/ED): Performed by: INTERNAL MEDICINE

## 2024-11-07 PROCEDURE — 82947 ASSAY GLUCOSE BLOOD QUANT: CPT

## 2024-11-07 PROCEDURE — 85025 COMPLETE CBC W/AUTO DIFF WBC: CPT | Performed by: NURSE PRACTITIONER

## 2024-11-07 PROCEDURE — 80048 BASIC METABOLIC PNL TOTAL CA: CPT | Performed by: NURSE PRACTITIONER

## 2024-11-07 PROCEDURE — 2500000002 HC RX 250 W HCPCS SELF ADMINISTERED DRUGS (ALT 637 FOR MEDICARE OP, ALT 636 FOR OP/ED): Performed by: NURSE PRACTITIONER

## 2024-11-07 PROCEDURE — 36415 COLL VENOUS BLD VENIPUNCTURE: CPT | Performed by: NURSE PRACTITIONER

## 2024-11-07 PROCEDURE — 2500000004 HC RX 250 GENERAL PHARMACY W/ HCPCS (ALT 636 FOR OP/ED): Performed by: NURSE PRACTITIONER

## 2024-11-07 RX ORDER — SODIUM CHLORIDE 9 MG/ML
50 INJECTION, SOLUTION INTRAVENOUS CONTINUOUS
Status: ACTIVE | OUTPATIENT
Start: 2024-11-07 | End: 2024-11-08

## 2024-11-07 RX ORDER — AMLODIPINE BESYLATE 10 MG/1
10 TABLET ORAL DAILY
Status: DISCONTINUED | OUTPATIENT
Start: 2024-11-08 | End: 2024-11-12

## 2024-11-07 RX ADMIN — INSULIN HUMAN 12 UNITS: 100 INJECTION, SUSPENSION SUBCUTANEOUS at 10:43

## 2024-11-07 RX ADMIN — TIMOLOL MALEATE 1 DROP: 5 SOLUTION/ DROPS OPHTHALMIC at 10:38

## 2024-11-07 RX ADMIN — SODIUM CHLORIDE 50 ML/HR: 9 INJECTION, SOLUTION INTRAVENOUS at 12:35

## 2024-11-07 RX ADMIN — HYDRALAZINE HYDROCHLORIDE 100 MG: 50 TABLET ORAL at 21:17

## 2024-11-07 RX ADMIN — ACETAMINOPHEN 650 MG: 325 TABLET, FILM COATED ORAL at 16:23

## 2024-11-07 RX ADMIN — ATORVASTATIN CALCIUM 10 MG: 20 TABLET ORAL at 21:17

## 2024-11-07 RX ADMIN — HEPARIN SODIUM 5000 UNITS: 5000 INJECTION INTRAVENOUS; SUBCUTANEOUS at 06:13

## 2024-11-07 RX ADMIN — BRIMONIDINE TARTRATE 1 DROP: 2 SOLUTION/ DROPS OPHTHALMIC at 21:19

## 2024-11-07 RX ADMIN — HYDRALAZINE HYDROCHLORIDE 5 MG: 20 INJECTION INTRAMUSCULAR; INTRAVENOUS at 16:19

## 2024-11-07 RX ADMIN — ONDANSETRON 4 MG: 2 INJECTION, SOLUTION INTRAMUSCULAR; INTRAVENOUS at 15:32

## 2024-11-07 RX ADMIN — CARVEDILOL 25 MG: 12.5 TABLET, FILM COATED ORAL at 21:17

## 2024-11-07 RX ADMIN — HEPARIN SODIUM 5000 UNITS: 5000 INJECTION INTRAVENOUS; SUBCUTANEOUS at 15:12

## 2024-11-07 RX ADMIN — INSULIN HUMAN 12 UNITS: 100 INJECTION, SUSPENSION SUBCUTANEOUS at 15:22

## 2024-11-07 RX ADMIN — HYDRALAZINE HYDROCHLORIDE 100 MG: 50 TABLET ORAL at 10:31

## 2024-11-07 RX ADMIN — CARVEDILOL 25 MG: 12.5 TABLET, FILM COATED ORAL at 10:32

## 2024-11-07 RX ADMIN — PIPERACILLIN SODIUM AND TAZOBACTAM SODIUM 2.25 G: 2; .25 INJECTION, SOLUTION INTRAVENOUS at 00:41

## 2024-11-07 RX ADMIN — HEPARIN SODIUM 5000 UNITS: 5000 INJECTION INTRAVENOUS; SUBCUTANEOUS at 21:18

## 2024-11-07 RX ADMIN — LATANOPROST 1 DROP: 50 SOLUTION/ DROPS OPHTHALMIC at 21:31

## 2024-11-07 RX ADMIN — ASPIRIN 81 MG: 81 TABLET, COATED ORAL at 10:32

## 2024-11-07 RX ADMIN — SODIUM BICARBONATE 650 MG: 650 TABLET ORAL at 21:17

## 2024-11-07 RX ADMIN — BRIMONIDINE TARTRATE 1 DROP: 2 SOLUTION/ DROPS OPHTHALMIC at 10:28

## 2024-11-07 RX ADMIN — SODIUM BICARBONATE 650 MG: 650 TABLET ORAL at 10:36

## 2024-11-07 ASSESSMENT — COGNITIVE AND FUNCTIONAL STATUS - GENERAL
MOVING FROM LYING ON BACK TO SITTING ON SIDE OF FLAT BED WITH BEDRAILS: A LITTLE
CLIMB 3 TO 5 STEPS WITH RAILING: A LOT
HELP NEEDED FOR BATHING: A LITTLE
DRESSING REGULAR UPPER BODY CLOTHING: A LITTLE
TOILETING: A LITTLE
WALKING IN HOSPITAL ROOM: A LOT
CLIMB 3 TO 5 STEPS WITH RAILING: TOTAL
DAILY ACTIVITIY SCORE: 19
TOILETING: A LITTLE
DRESSING REGULAR UPPER BODY CLOTHING: A LITTLE
TURNING FROM BACK TO SIDE WHILE IN FLAT BAD: A LOT
DRESSING REGULAR LOWER BODY CLOTHING: A LITTLE
DAILY ACTIVITIY SCORE: 16
TOILETING: A LOT
HELP NEEDED FOR BATHING: A LITTLE
DRESSING REGULAR UPPER BODY CLOTHING: A LITTLE
TURNING FROM BACK TO SIDE WHILE IN FLAT BAD: A LITTLE
PERSONAL GROOMING: A LITTLE
STANDING UP FROM CHAIR USING ARMS: A LOT
WALKING IN HOSPITAL ROOM: A LOT
STANDING UP FROM CHAIR USING ARMS: A LITTLE
STANDING UP FROM CHAIR USING ARMS: A LOT
MOVING FROM LYING ON BACK TO SITTING ON SIDE OF FLAT BED WITH BEDRAILS: A LITTLE
MOBILITY SCORE: 13
MOBILITY SCORE: 15
TURNING FROM BACK TO SIDE WHILE IN FLAT BAD: A LOT
PERSONAL GROOMING: A LITTLE
CLIMB 3 TO 5 STEPS WITH RAILING: A LOT
MOVING TO AND FROM BED TO CHAIR: A LOT
DRESSING REGULAR LOWER BODY CLOTHING: A LITTLE
MOVING FROM LYING ON BACK TO SITTING ON SIDE OF FLAT BED WITH BEDRAILS: A LITTLE
MOVING TO AND FROM BED TO CHAIR: A LITTLE
DAILY ACTIVITIY SCORE: 19
WALKING IN HOSPITAL ROOM: A LOT
PERSONAL GROOMING: A LITTLE
HELP NEEDED FOR BATHING: A LOT
MOBILITY SCORE: 13
DRESSING REGULAR LOWER BODY CLOTHING: A LOT
MOVING TO AND FROM BED TO CHAIR: A LOT

## 2024-11-07 ASSESSMENT — ACTIVITIES OF DAILY LIVING (ADL)
BATHING_ASSISTANCE: MODERATE
ADL_ASSISTANCE: NEEDS ASSISTANCE
ADL_ASSISTANCE: NEEDS ASSISTANCE
LACK_OF_TRANSPORTATION: NO

## 2024-11-07 ASSESSMENT — PAIN - FUNCTIONAL ASSESSMENT
PAIN_FUNCTIONAL_ASSESSMENT: 0-10

## 2024-11-07 ASSESSMENT — PAIN SCALES - GENERAL
PAINLEVEL_OUTOF10: 0 - NO PAIN
PAINLEVEL_OUTOF10: 0 - NO PAIN
PAINLEVEL_OUTOF10: 3
PAINLEVEL_OUTOF10: 0 - NO PAIN
PAINLEVEL_OUTOF10: 2

## 2024-11-07 ASSESSMENT — PAIN SCALES - WONG BAKER: WONGBAKER_NUMERICALRESPONSE: NO HURT

## 2024-11-07 ASSESSMENT — PAIN DESCRIPTION - LOCATION: LOCATION: HEAD

## 2024-11-07 ASSESSMENT — PAIN SCALES - PAIN ASSESSMENT IN ADVANCED DEMENTIA (PAINAD): TOTALSCORE: MEDICATION (SEE MAR)

## 2024-11-07 NOTE — PROGRESS NOTES
Physical Therapy    Physical Therapy Evaluation    Patient Name: Carolann Cartagena  MRN: [id number]  Department: [place] [id number]  Room: [address] [place] [address]  Today's Date: 11/7/2024   Time Calculation  Start Time: 1414  Stop Time: 1432  Time Calculation (min): 18 min    Assessment/Plan   PT Assessment  PT Assessment Results: Decreased strength, Decreased range of motion, Decreased endurance, Impaired balance, Decreased mobility  Rehab Prognosis: Good  End of Session Communication: Bedside nurse  Assessment Comment: PT Evaluation Completed. The patient presented with decreased mobility, balance, endurance, strength, safety awareness, and increased fatigue. These impairments are negatively impacting her ability to perform at baseline functional level, in home environment. The patient is at risk of falls & injury. This patient would benefit from skilled therapy intervention to address limitations and progress towards the PT goals.  Anticipate moderate frequency PT needs at discharge  End of Session Patient Position: Bed, 3 rail up, Alarm on  IP OR SWING BED PT PLAN  Inpatient or Swing Bed: Inpatient  PT Plan  Treatment/Interventions: Bed mobility, Transfer training, Gait training, Balance training, Strengthening, Range of motion, Endurance training, Therapeutic exercise, Therapeutic activity, Home exercise program  PT Plan: Ongoing PT  PT Frequency: 3 times per week  PT Discharge Recommendations: Moderate intensity level of continued care  PT Recommended Transfer Status: Assist x1  PT - OK to Discharge: Yes (PT POC Established.)    Subjective   General Visit Information:  General  Reason for Referral: 89 y/o female admitted with colitis, c/o nausea, vomiting and Htn.  Referred By: Dr Elise  Past Medical History Relevant to Rehab:   Past Medical History:   Diagnosis Date    Anemia due to chronic kidney disease 01/22/2024    Chronic diastolic congestive heart failure 10/02/2023    Chronic hyponatremia 10/02/2023    Chronic kidney disease, stage 3,  mod decreased GFR (Multi) 09/18/2018    Diabetic nephropathy associated with type 2 diabetes mellitus (Multi) 12/18/2018    Essential hypertension 05/23/2023    Hyperlipidemia 05/23/2023    Pure hypercholesterolemia 10/02/2015       Prior to Session Communication: Bedside nurse  Patient Position Received: Bed, 3 rail up, Alarm on  General Comment: Pt pleasant and agreeable to PT eval. Pt's brother present for session.  Home Living:  Home Living  Type of Home: Apartment  Lives With:  (daughter, granddaughter)  Home Adaptive Equipment: Walker rolling or standard, Cane, Wheelchair-manual  Home Layout: One level  Home Access: Level entry  Bathroom Shower/Tub: Walk-in shower  Bathroom Toilet: Handicapped height  Bathroom Equipment: Shower chair with back  Prior Level of Function:  Prior Function Per Pt/Caregiver Report  Level of [place]: Needs assistance with ADLs, Needs assistance with homemaking  Receives Help From: Family  ADL Assistance: Needs assistance (IND dressing, assist with bathing)  Ambulatory Assistance: Independent (using RW)  Precautions:  Precautions  Medical Precautions: Fall precautions     Vital Signs (Past 2hrs)        Date/Time Vitals Session Patient Position Pulse Resp SpO2 BP MAP (mmHg)    11/07/24 1414 --  --  --  --  --  174/42  --     11/07/24 1420 --  --  --  --  --  --  --                   Vital Signs Comment: BP taken due to pt reported dizziness throughout session.     Objective   Pain:  Pain Assessment  Pain Assessment: 0-10  0-10 (Numeric) Pain Score: 0 - No pain  Cognition:  Cognition  Overall Cognitive Status: Impaired (minimal confusion)  Orientation Level: Disoriented to situation, Disoriented to time    General Assessments:  Activity Tolerance  Endurance: Tolerates 10 - 20 min exercise with multiple rests    Sensation  Light Touch:  (numbness in taylor LEs)         Postural Control  Postural Control: Within Functional Limits    Static Sitting Balance  Static Sitting-Balance  Support: Feet supported, Bilateral upper extremity supported  Static Sitting-Level of Assistance: Close supervision  Dynamic Sitting Balance  Dynamic Sitting-Balance Support: Feet supported, Bilateral upper extremity supported  Dynamic Sitting-Level of Assistance: Close supervision    Static Standing Balance  Static Standing-Balance Support: Bilateral upper extremity supported  Static Standing-Level of Assistance: Contact guard  Dynamic Standing Balance  Dynamic Standing-Balance Support: Bilateral upper extremity supported  Dynamic Standing-Level of Assistance: Contact guard  Functional Assessments:  Bed Mobility  Bed Mobility: Yes  Bed Mobility 1  Bed Mobility 1: Supine to sitting, Sitting to supine  Level of Assistance 1: Contact guard  Bed Mobility Comments 1: Cues for sequencing.    Transfers  Transfer: Yes  Transfer 1  Technique 1: Sit to stand, Stand to sit  Transfer Device 1: Walker  Transfer Level of Assistance 1: Minimum assistance  Trials/Comments 1: Cues for hand placement.    Ambulation/Gait Training  Ambulation/Gait Training Performed: Yes  Ambulation/Gait Training 1  Surface 1: Level tile  Device 1: Rolling walker  Assistance 1: Contact guard  Comments/Distance (ft) 1: 3 ft. Pt sidesteps towards the HOB however further ambulation deferred due to dizziness. Reports increased dizziness in standing. Cues for walker placement and sequencing.  Extremity/Trunk Assessments:  RUE   RUE : Within Functional Limits  LUE   LUE: Within Functional Limits  RLE   RLE :  (Strength >3/5 based on observation of functional mobility. ROM WFL.)  LLE   LLE :  (Strength >3/5 based on observation of functional mobility. ROM WFL.)  Outcome Measures:  [facility] Basic Mobility  Turning from your back to your side while in a flat bed without using bedrails: A little  Moving from lying on your back to sitting on the side of a flat bed without using bedrails: A little  Moving to and from bed to chair (including a wheelchair): A  little  Standing up from a chair using your arms (e.g. wheelchair or bedside chair): A little  To walk in hospital room: A lot  Climbing 3-5 steps with railing: Total  Basic Mobility - Total Score: 15    Encounter Problems       Encounter Problems (Active)       Balance       complete all mobility with normal balance while dual tasking, negotiating in a dynamic environment, carrying items, etc., with proactive and reactive static and dynamic standing and sitting tasks, with mod I and RW >15 minutes.         Start:  11/07/24    Expected End:  11/21/24               Mobility       STG - Patient will ambulate 150 ft mod I with a RW.        Start:  11/07/24    Expected End:  11/21/24            Pt will tolerate 15 reps of each LE exercise in order to improve strength and endurance.         Start:  11/07/24    Expected End:  11/21/24               PT Transfers       STG - Patient will perform bed mobility independently.        Start:  11/07/24    Expected End:  11/21/24            STG - Patient will transfer sit to and from stand mod I using RW.        Start:  11/07/24    Expected End:  11/21/24               Pain - Adult              Education Documentation  Body Mechanics, taught by Jeanne Felipe, PT at 11/7/2024  3:33 PM.  Learner: Patient  Readiness: Acceptance  Method: Explanation  Response: Verbalizes Understanding    Mobility Training, taught by Jeanne Felipe, PT at 11/7/2024  3:33 PM.  Learner: Patient  Readiness: Acceptance  Method: Explanation  Response: Verbalizes Understanding    Education Comments  No comments found.

## 2024-11-07 NOTE — PROGRESS NOTES
"Carolann Cartagena is a 89 y.o. female on day 1 of admission presenting with Colitis.    Subjective   Slight bump in creatinine to 3.1, blood pressure still slightly uncontrolled although not in the 200s that she came in increase amlodipine to 10 mg a day, holding Aldactone for now started on IV hydration per nephro.  Has no abdominal pain nausea vomiting or diarrhea low suspicion for colitis discontinue antibiotics       Objective     Physical Exam  Vitals reviewed.   Constitutional:       Appearance: Normal appearance.   HENT:      Head: Normocephalic.      Nose: Nose normal.   Cardiovascular:      Rate and Rhythm: Normal rate and regular rhythm.      Pulses: Normal pulses.      Heart sounds: Normal heart sounds.   Abdominal:      General: Abdomen is flat. Bowel sounds are normal.      Palpations: Abdomen is soft.   Skin:     General: Skin is warm and dry.      Capillary Refill: Capillary refill takes less than 2 seconds.   Neurological:      General: No focal deficit present.      Mental Status: She is alert.         Last Recorded Vitals  Blood pressure 148/62, pulse 54, temperature 36.2 °C (97.1 °F), temperature source Temporal, resp. rate 18, height 1.626 m (5' 4\"), weight 72.6 kg (160 lb), SpO2 100%.  Intake/Output last 3 Shifts:  I/O last 3 completed shifts:  In: 50 (0.7 mL/kg) [IV Piggyback:50]  Out: 200 (2.8 mL/kg) [Urine:200 (0.1 mL/kg/hr)]  Weight: 72.6 kg     Relevant Results  Results for orders placed or performed during the hospital encounter of 11/06/24 (from the past 24 hours)   POCT GLUCOSE   Result Value Ref Range    POCT Glucose 155 (H) 74 - 99 mg/dL   POCT GLUCOSE   Result Value Ref Range    POCT Glucose 148 (H) 74 - 99 mg/dL   CBC and Auto Differential   Result Value Ref Range    WBC 5.4 4.4 - 11.3 x10*3/uL    nRBC 0.0 0.0 - 0.0 /100 WBCs    RBC 2.80 (L) 4.00 - 5.20 x10*6/uL    Hemoglobin 9.1 (L) 12.0 - 16.0 g/dL    Hematocrit 29.6 (L) 36.0 - 46.0 %     (H) 80 - 100 fL    MCH 32.5 26.0 - 34.0 " pg    MCHC 30.7 (L) 32.0 - 36.0 g/dL    RDW 16.0 (H) 11.5 - 14.5 %    Platelets 232 150 - 450 x10*3/uL    Neutrophils % 53.3 40.0 - 80.0 %    Immature Granulocytes %, Automated 0.4 0.0 - 0.9 %    Lymphocytes % 26.1 13.0 - 44.0 %    Monocytes % 16.7 2.0 - 10.0 %    Eosinophils % 2.9 0.0 - 6.0 %    Basophils % 0.6 0.0 - 2.0 %    Neutrophils Absolute 2.90 1.60 - 5.50 x10*3/uL    Immature Granulocytes Absolute, Automated 0.02 0.00 - 0.50 x10*3/uL    Lymphocytes Absolute 1.42 0.80 - 3.00 x10*3/uL    Monocytes Absolute 0.91 (H) 0.05 - 0.80 x10*3/uL    Eosinophils Absolute 0.16 0.00 - 0.40 x10*3/uL    Basophils Absolute 0.03 0.00 - 0.10 x10*3/uL   Basic metabolic panel   Result Value Ref Range    Glucose 129 (H) 74 - 99 mg/dL    Sodium 137 136 - 145 mmol/L    Potassium 4.9 3.5 - 5.3 mmol/L    Chloride 108 (H) 98 - 107 mmol/L    Bicarbonate 23 21 - 32 mmol/L    Anion Gap 11 10 - 20 mmol/L    Urea Nitrogen 55 (H) 6 - 23 mg/dL    Creatinine 3.14 (H) 0.50 - 1.05 mg/dL    eGFR 13 (L) >60 mL/min/1.73m*2    Calcium 8.2 (L) 8.6 - 10.3 mg/dL   POCT GLUCOSE   Result Value Ref Range    POCT Glucose 121 (H) 74 - 99 mg/dL   POCT GLUCOSE   Result Value Ref Range    POCT Glucose 178 (H) 74 - 99 mg/dL       Imaging Results  Ct abd/pelvis:        IMPRESSION:  Right colon wall thickening consistent with nonspecific colitis.      Small amount of free fluid with mild edematous changes in the lung  bases, mesentery and soft tissues as well as trace pleural  effusions/thickening consistent with fluid overload/CHF.      Subtle density layer in the gallbladder, possible layering  noncalcified stones or sludge.      Additional findings as described above.    Head ct;  IMPRESSION:  No acute intracranial hemorrhage or mass-effect.    Cxr:  IMPRESSION:  No acute cardiopulmonary process.  Enlarged but unchanged heart compared to 06/12/2024.    Medications:  [START ON 11/8/2024] amLODIPine, 10 mg, oral, Daily  aspirin, 81 mg, oral, Daily  atorvastatin,  10 mg, oral, Nightly  brimonidine, 1 drop, Both Eyes, BID  carvedilol, 25 mg, oral, BID  heparin (porcine), 5,000 Units, subcutaneous, q8h TIP  hydrALAZINE, 100 mg, oral, BID  insulin NPH (Isophane), 12 Units, subcutaneous, BID AC  latanoprost, 1 drop, Both Eyes, Nightly  sodium bicarbonate, 650 mg, oral, BID  [Held by provider] spironolactone, 25 mg, oral, q24h TIP  timolol, 1 drop, Both Eyes, Daily       PRN medications: acetaminophen **OR** acetaminophen **OR** acetaminophen, dextrose, dextrose, glucagon, glucagon, hydrALAZINE, ondansetron, sennosides-docusate sodium     Assessment/Plan     1.  Accelerated hypertension  -Blood pressure reading on day of admission was 204/69, currently improved I did increase amlodipine to 10 mg oral daily, continue hydralazine 100 mg oral twice daily    2.  Acute kidney injury on CKD 4  -Baseline creatinine 1.8-2.2 currently 3.1, holding Aldactone gentle hydration    3. DM  -continue SSI and NPH    Updated daughter  DVT Prophylaxis:  Heparin subq        Julianne Elise MD  [facility] Medicine

## 2024-11-07 NOTE — CARE PLAN
The patient's goals for the shift include safety..     The clinical goals for the shift include patient will remain safe and free from falls this shift    Over the shift, the patient not make progress toward the following goals.      Problem: Pain - Adult  Goal: Verbalizes/displays adequate comfort level or baseline comfort level  Outcome: Progressing     Problem: Safety - Adult  Goal: Free from fall injury  Outcome: Progressing     Problem: Discharge Planning  Goal: Discharge to home or other facility with appropriate resources  Outcome: Progressing     Problem: Chronic Conditions and Co-morbidities  Goal: Patient's chronic conditions and co-morbidity symptoms are monitored and maintained or improved  Outcome: Progressing     Problem: Diabetes  Goal: Achieve decreasing blood glucose levels by end of shift  Outcome: Progressing  Goal: Increase stability of blood glucose readings by end of shift  Outcome: Progressing  Goal: Decrease in ketones present in urine by end of shift  Outcome: Progressing  Goal: Maintain electrolyte levels within acceptable range throughout shift  Outcome: Progressing  Goal: Maintain glucose levels >70mg/dl to <250mg/dl throughout shift  Outcome: Progressing  Goal: No changes in neurological exam by end of shift  Outcome: Progressing  Goal: Learn about and adhere to nutrition recommendations by end of shift  Outcome: Progressing  Goal: Vital signs within normal range for age by end of shift  Outcome: Progressing  Goal: Increase self care and/or family involovement by end of shift  Outcome: Progressing  Goal: Receive DSME education by end of shift  Outcome: Progressing     Problem: Skin  Goal: Decreased wound size/increased tissue granulation at next dressing change  Outcome: Progressing  Goal: Participates in plan/prevention/treatment measures  Outcome: Progressing  Goal: Prevent/manage excess moisture  Outcome: Progressing  Goal: Prevent/minimize sheer/friction injuries  Outcome:  Progressing  Goal: Promote/optimize nutrition  Outcome: Progressing  Goal: Promote skin healing  Outcome: Progressing

## 2024-11-07 NOTE — CARE PLAN
The patient's goals for the shift include      The clinical goals for the shift include patient will remain safe this shift

## 2024-11-07 NOTE — CONSULTS
CONSULT: NEPHROLOGY SERVICE    REASON FOR CONSULT: OLGA LIDIA  Admit Date: 11/6/2024 10:08 AM       HPI: Patient is a 89 y.o. female admitted 11/6/2024  with h/o CKD stage 4 under care of Dr Rosa, type 2 diabetes, hypertension, hypercholesterolemia, GERD, presenting with nausea vomiting for 1 day and elevated blood pressure.  Due to patient's symptoms she was not able to take her BP meds.  Patient currently resting in bed nausea/ vomiting has resolved, denies having diarrhea but was reported before...  CT abdomen and pelvis without IV contrast showed Right colon wall thickening consistent with nonspecific colitis, trace pleural effusion, clear CXR, , dirty UA consistent with UTI, started on zosyn.   Consulted because of OLGA LIDIA, not given ivf, not on DESHAWN, takes spirolactone  No hypotension    Past Medical History:   Diagnosis Date    Anemia due to chronic kidney disease 01/22/2024    Chronic diastolic congestive heart failure 10/02/2023    Chronic hyponatremia 10/02/2023    Chronic kidney disease, stage 3, mod decreased GFR (Multi) 09/18/2018    Diabetic nephropathy associated with type 2 diabetes mellitus (Multi) 12/18/2018    Essential hypertension 05/23/2023    Hyperlipidemia 05/23/2023    Pure hypercholesterolemia 10/02/2015     Allergies: Dorzolamide, Lisinopril, and Losartan     Past Surgical History:   Procedure Laterality Date    OTHER SURGICAL HISTORY  02/13/2020    Hysterectomy       Family History   Problem Relation Name Age of Onset    No Known Problems Mother      Bone cancer Sister      Lung cancer Daughter         Social History  She reports that she has never smoked. She has never used smokeless tobacco. She reports that she does not currently use alcohol. She reports that she does not currently use drugs.    Review of Systems  As above     CURRENT HOSP MEDS:    Current Facility-Administered Medications:     acetaminophen (Tylenol) tablet 650 mg, 650 mg, oral, q4h PRN **OR** acetaminophen (Tylenol) oral  liquid 650 mg, 650 mg, oral, q4h PRN **OR** acetaminophen (Tylenol) suppository 650 mg, 650 mg, rectal, q4h PRN, BASILIA Mena-CNP    [START ON 11/8/2024] amLODIPine (Norvasc) tablet 10 mg, 10 mg, oral, Daily, Julianne Elise MD    aspirin EC tablet 81 mg, 81 mg, oral, Daily, BASILIA Mena-CNP, 81 mg at 11/07/24 1032    atorvastatin (Lipitor) tablet 10 mg, 10 mg, oral, Nightly, BASILIA Mena-CNP, 10 mg at 11/06/24 2053    brimonidine (AlphaGAN) 0.2 % ophthalmic solution 1 drop, 1 drop, Both Eyes, BID, BASILIA Mena-CNP, 1 drop at 11/07/24 1028    carvedilol (Coreg) tablet 25 mg, 25 mg, oral, BID, BASILIA Mena-CNP, 25 mg at 11/07/24 1032    dextrose 50 % injection 12.5 g, 12.5 g, intravenous, q15 min PRN, Dangelo Mcgregor APRN-CNP    dextrose 50 % injection 25 g, 25 g, intravenous, q15 min PRN, BASILIA Mena-CNP    glucagon (Glucagen) injection 1 mg, 1 mg, intramuscular, q15 min PRN, BASILIA Mena-CNP    glucagon (Glucagen) injection 1 mg, 1 mg, intramuscular, q15 min PRN, BASILIA Mena-CNP    heparin (porcine) injection 5,000 Units, 5,000 Units, subcutaneous, q8h TIP, BASILIA Mena-CNP, 5,000 Units at 11/07/24 0613    hydrALAZINE (Apresoline) injection 5 mg, 5 mg, intravenous, q6h PRN, Dangelo Mcgregor APRN-CNP    hydrALAZINE (Apresoline) tablet 100 mg, 100 mg, oral, BID, BASILIA Mena-CNP, 100 mg at 11/07/24 1031    insulin NPH (Isophane) (HumuLIN N,NovoLIN N) injection 12 Units, 12 Units, subcutaneous, BID AC, ERICA Mena, 12 Units at 11/07/24 1043    latanoprost (Xalatan) 0.005 % ophthalmic solution 1 drop, 1 drop, Both Eyes, Nightly, ERICA Mena, 1 drop at 11/06/24 2110    ondansetron (Zofran) injection 4 mg, 4 mg, intravenous, q6h PRN, ERICA Mena    sennosides-docusate sodium (Steph-Colace) 8.6-50 mg per tablet 1 tablet, 1 tablet, oral, Nightly PRN, ERICA Mena    sodium bicarbonate tablet 650 mg, 650 mg,  "oral, BID, Dangelo REED Mcgregor, APRN-CNP, 650 mg at 11/07/24 1036    [Held by provider] spironolactone (Aldactone) tablet 25 mg, 25 mg, oral, q24h TIP, Dangelo PakBASILIA marcano-CNP, 25 mg at 11/06/24 2111    timolol (Timoptic) 0.5 % ophthalmic solution 1 drop, 1 drop, Both Eyes, Daily, ERICA Mena, 1 drop at 11/07/24 1038     PHYSICAL EXAM:  BP (!) 187/52 (BP Location: Right arm, Patient Position: Lying)   Pulse 54   Temp 36.4 °C (97.6 °F) (Temporal)   Resp 18   Ht 1.626 m (5' 4\")   Wt 72.6 kg (160 lb)   SpO2 98%   BMI 27.46 kg/m²     Intake/Output Summary (Last 24 hours) at 11/7/2024 1131  Last data filed at 11/7/2024 1100  Gross per 24 hour   Intake 300 ml   Output 850 ml   Net -550 ml     Gen: AAO, NAD  Neck: No JVD  Cardiac: RRR  Resp: occasional L base crackles   Abd: Soft, non tender, +BS, non distended   Ext: No edema   Neuro: moves 4 ext  Peripheral Pulses: weak peripheral pulses.  Skin: Skin color, texture, turgor normal, no suspicious rashes or lesions.    LABS:   Results for orders placed or performed during the hospital encounter of 11/06/24 (from the past 24 hours)   Urinalysis with Reflex Culture and Microscopic   Result Value Ref Range    Color, Urine Light-Yellow Light-Yellow, Yellow, Dark-Yellow    Appearance, Urine Clear Clear    Specific Gravity, Urine 1.009 1.005 - 1.035    pH, Urine 6.5 5.0, 5.5, 6.0, 6.5, 7.0, 7.5, 8.0    Protein, Urine 70 (1+) (A) NEGATIVE, 10 (TRACE), 20 (TRACE) mg/dL    Glucose, Urine Normal Normal mg/dL    Blood, Urine 0.5 (2+) (A) NEGATIVE    Ketones, Urine NEGATIVE NEGATIVE mg/dL    Bilirubin, Urine NEGATIVE NEGATIVE    Urobilinogen, Urine Normal Normal mg/dL    Nitrite, Urine NEGATIVE NEGATIVE    Leukocyte Esterase, Urine 75 Nella/µL (A) NEGATIVE   Extra Urine Gray Tube   Result Value Ref Range    Extra Tube Hold for add-ons.    Microscopic Only, Urine   Result Value Ref Range    WBC, Urine 6-10 (A) 1-5, NONE /HPF    RBC, Urine >20 (A) NONE, 1-2, 3-5 /HPF    " Squamous Epithelial Cells, Urine 1-9 (SPARSE) Reference range not established. /HPF   POCT GLUCOSE   Result Value Ref Range    POCT Glucose 155 (H) 74 - 99 mg/dL   POCT GLUCOSE   Result Value Ref Range    POCT Glucose 148 (H) 74 - 99 mg/dL   CBC and Auto Differential   Result Value Ref Range    WBC 5.4 4.4 - 11.3 x10*3/uL    nRBC 0.0 0.0 - 0.0 /100 WBCs    RBC 2.80 (L) 4.00 - 5.20 x10*6/uL    Hemoglobin 9.1 (L) 12.0 - 16.0 g/dL    Hematocrit 29.6 (L) 36.0 - 46.0 %     (H) 80 - 100 fL    MCH 32.5 26.0 - 34.0 pg    MCHC 30.7 (L) 32.0 - 36.0 g/dL    RDW 16.0 (H) 11.5 - 14.5 %    Platelets 232 150 - 450 x10*3/uL    Neutrophils % 53.3 40.0 - 80.0 %    Immature Granulocytes %, Automated 0.4 0.0 - 0.9 %    Lymphocytes % 26.1 13.0 - 44.0 %    Monocytes % 16.7 2.0 - 10.0 %    Eosinophils % 2.9 0.0 - 6.0 %    Basophils % 0.6 0.0 - 2.0 %    Neutrophils Absolute 2.90 1.60 - 5.50 x10*3/uL    Immature Granulocytes Absolute, Automated 0.02 0.00 - 0.50 x10*3/uL    Lymphocytes Absolute 1.42 0.80 - 3.00 x10*3/uL    Monocytes Absolute 0.91 (H) 0.05 - 0.80 x10*3/uL    Eosinophils Absolute 0.16 0.00 - 0.40 x10*3/uL    Basophils Absolute 0.03 0.00 - 0.10 x10*3/uL   Basic metabolic panel   Result Value Ref Range    Glucose 129 (H) 74 - 99 mg/dL    Sodium 137 136 - 145 mmol/L    Potassium 4.9 3.5 - 5.3 mmol/L    Chloride 108 (H) 98 - 107 mmol/L    Bicarbonate 23 21 - 32 mmol/L    Anion Gap 11 10 - 20 mmol/L    Urea Nitrogen 55 (H) 6 - 23 mg/dL    Creatinine 3.14 (H) 0.50 - 1.05 mg/dL    eGFR 13 (L) >60 mL/min/1.73m*2    Calcium 8.2 (L) 8.6 - 10.3 mg/dL   POCT GLUCOSE   Result Value Ref Range    POCT Glucose 121 (H) 74 - 99 mg/dL       DATA:   Diagnostic tests reviewed for today's visit:    Labs and meds    ASSESSMENT AND PLAN:  - OLGA LIDIA on CKD stage 4: baseline Scr 1.8-2.2, rising now likely from volume contraction?, no other clear cause  +UTI, on ATB, culture pending, not obstructed   HTN: suboptimal control  Lytes and acid base:  acceptable  Hb 9.1, plts OK    PLAN:  - gentle ivf till tomorrow, following labs   - restart BP meds, placing spironolactone on hold, getting urine studies       Greatly appreciate the opportunity to assist in the care of this patient. Will continue to follow.     Signature: Timothy Garay MD  Division of Nephrology and Hypertension

## 2024-11-07 NOTE — PROGRESS NOTES
11/07/24 1126   Discharge Planning   Living Arrangements   (dtr)   Support Systems Children   Assistance Needed uses rollator   Type of Residence Private residence   Number of Stairs to Enter Residence 2   Number of Stairs Within Residence 0   Expected Discharge Disposition Home H   Does the patient need discharge transport arranged? Yes   RoundTrip coordination needed? Yes   Has discharge transport been arranged? No   Financial Resource Strain   How hard is it for you to pay for the very basics like food, housing, medical care, and heating? Not hard   Housing Stability   In the last 12 months, was there a time when you were not able to pay the mortgage or rent on time? N   At any time in the past 12 months, were you homeless or living in a shelter (including now)? N   Transportation Needs   In the past 12 months, has lack of transportation kept you from medical appointments or from getting medications? no   In the past 12 months, has lack of transportation kept you from meetings, work, or from getting things needed for daily living? No     TCC met with pt and dtr at bedside. Pt lives with dtr. Uses a rollator to ambulate. PT OT to [facility] [place] plan.    Diagnosis: colitis    Plan: Continue zosyn   Consider gi consult   Check stool pathogens   Continue home bp meds, as needed hydralazine   Nephrology consult   Continue home nph, [facility]chs blood glucose   PT OT    Disposition: home vs C    ADOD: 2 - 3  days

## 2024-11-07 NOTE — PROGRESS NOTES
Occupational Therapy    Evaluation    Patient Name: Carolann Cartagena  MRN: [id number]  Department: [place] [id number]  Room: [address] [place] [address]  Today's Date: 11/7/2024  Time Calculation  Start Time: 1420  Stop Time: 1433  Time Calculation (min): 13 min    Assessment  IP OT Assessment  OT Assessment:  (OT Eval complete, patient is weak and deconditioned. Patient with c/o feeling dizzy. Patient with decreased activity tolerance, decreased ADLs, decreased standing tolerance. Moderate intensity therapy recommended.)  Prognosis: Good  Barriers to Discharge: None  Evaluation/Treatment Tolerance: Patient limited by fatigue  Medical Staff Made Aware: Yes  End of Session Communication: Bedside nurse  End of Session Patient Position: Bed, 3 rail up, Alarm on  Plan:  Treatment Interventions: ADL retraining, Functional transfer training, Endurance training, Patient/family training, Neuromuscular reeducation  OT Frequency: 3 times per week  OT Discharge Recommendations: Moderate intensity level of continued care  Equipment Recommended upon Discharge: Wheeled walker  OT Recommended Transfer Status: Minimal assist, Assist of 1  OT - OK to Discharge: Yes    Subjective   Current Problem:  1. Colitis        2. Elevated troponin        3. Nausea, vomiting and diarrhea        4. Hypertension, unspecified type        5. Hypervolemia, unspecified hypervolemia type          General:  General  Reason for Referral:  (89 y/o female admitted with colitis, c/o nausea, vomiting and Htn.)  Referred By:  (Dr Elise)  Past Medical History Relevant to Rehab:   Past Medical History:   Diagnosis Date    Anemia due to chronic kidney disease 01/22/2024    Chronic diastolic congestive heart failure 10/02/2023    Chronic hyponatremia 10/02/2023    Chronic kidney disease, stage 3, mod decreased GFR (Multi) 09/18/2018    Diabetic nephropathy associated with type 2 diabetes mellitus (Multi) 12/18/2018    Essential hypertension 05/23/2023    Hyperlipidemia 05/23/2023    Pure  hypercholesterolemia 10/02/2015     Family/Caregiver Present: Yes  Caregiver Feedback:  (Brother present)  Prior to Session Communication: Bedside nurse  Patient Position Received: Bed, 3 rail up, Alarm on  General Comment:  (Patient is pleasant and cooperative, however c/o being dizzy during session. /42.)  Precautions:  Medical Precautions: Fall precautions    Pain:  Pain Assessment  Pain Assessment: 0-10  0-10 (Numeric) Pain Score: 0 - No pain    Objective   Cognition:  Overall Cognitive Status:  (Slightly confused.)  Orientation Level: Disoriented to situation, Disoriented to time     Home Living:  Type of Home:  (Apartment 1st floor, bed/bath on 1st floor with no steps, walk in shower, raised toilet seat.)  Lives With:  (Dtr and granddtr)  Home Adaptive Equipment:  (Raised toilet seat, shower seat, grab bars, wheelchair.)   Prior Function:  Level of [place]: Needs assistance with ADLs  Receives Help From: Family  ADL Assistance: Needs assistance (Assist with bathing, patient independent with dressing.)  Ambulatory Assistance: Independent (with wheeled walker.)  Hand Dominance: Right  IADL History:  Homemaking Responsibilities:  (Granddaughter does cooking, cleaning and laundry)  ADL:  Eating Assistance: Independent  Grooming Assistance: Stand by  Bathing Assistance: Moderate (Assist required to bathe below knees.)  UE Dressing Assistance: Moderate (Assist with hospital gown around shoulders.)  LE Dressing Assistance: Maximal (Assist required to initiate foot in leghole of brief, and to don brief over hips.)  Toileting Assistance with Device: Maximal  Functional Assistance: Moderate  Activity Tolerance:  Endurance: Tolerates 10 - 20 min exercise with multiple rests  Activity Tolerance Comments:  (Fair activity tolerance)  Bed Mobility/Transfers: Bed Mobility  Bed Mobility: Yes  Bed Mobility 1  Bed Mobility 1: Supine to sitting  Level of Assistance 1: Contact guard  Bed Mobility Comments 1:  (Increased  time required to complete task.)  Bed Mobility 2  Bed Mobility  2: Sitting to supine  Level of Assistance 2: Contact guard  Bed Mobility Comments 2:  (Increased time required to complete task.)    Transfers  Transfer: Yes  Transfer 1  Transfer From 1: Bed to  Transfer to 1: Chair with arms  Technique 1: Sit to stand  Transfer Device 1: Walker  Transfer Level of Assistance 1: Minimum assistance  Trials/Comments 1:  (Cues for hand placement)    Sitting Balance:  Static Sitting Balance  Static Sitting-Balance Support: Feet supported  Static Sitting-Level of Assistance: Contact guard  Dynamic Sitting Balance  Dynamic Sitting-Balance Support: Feet supported  Dynamic Sitting-Level of Assistance: Contact guard  Dynamic Sitting-Balance: Forward lean  Standing Balance:  Static Standing Balance  Static Standing-Balance Support: Bilateral upper extremity supported  Static Standing-Level of Assistance: Contact guard  Dynamic Standing Balance  Dynamic Standing-Balance Support: Bilateral upper extremity supported  Dynamic Standing-Level of Assistance: Minimum assistance  Dynamic Standing-Balance: Reaching for objects  Dynamic Standing-Comments:  (When attempting to don brief over hips.)    IADL's:   Homemaking Responsibilities:  (Granddaughter does cooking, cleaning and laundry)  Vision: Vision - Basic Assessment  Current Vision: Wears glasses all the time  Sensation:  Sensation Comment:  (Patient reports numbness of LEs.)    Coordination:  Movements are Fluid and Coordinated: Yes   Hand Function:  Hand Function  Gross Grasp: Functional  Coordination: Functional    Outcome Measures: [facility] Daily Activity  Putting on and taking off regular lower body clothing: A lot  Bathing (including washing, rinsing, drying): A lot  Putting on and taking off regular upper body clothing: A little  Toileting, which includes using toilet, bedpan or urinal: A lot  Taking care of personal grooming such as brushing teeth: A little  Eating Meals:  None  Daily Activity - Total Score: 16    Goals:   Encounter Problems       Encounter Problems (Active)       ADLs       Patient will perform UB and LB bathing with stand by assist level of assistance.       Start:  11/07/24    Expected End:  11/21/24            Patient with complete upper body dressing with independent level of assistance donning and doffing all UE clothes with no adaptive equipment while edge of bed        Start:  11/07/24    Expected End:  11/21/24            Patient with complete lower body dressing with independent level of assistance donning and doffing all LE clothes  with no adaptive equipment while edge of bed        Start:  11/07/24    Expected End:  11/21/24            Patient will complete daily grooming tasks brushing teeth and washing face/hair with independent level of assistance and PRN adaptive equipment while standing.       Start:  11/07/24    Expected End:  11/21/24            Patient will complete toileting including hygiene clothing management/hygiene with independent level of assistance and raised toilet seat.       Start:  11/07/24    Expected End:  11/21/24               BALANCE       Pt will maintain dynamic standing balance during ADL task with independent level of assistance in order to demonstrate decreased risk of falling and improved postural control.       Start:  11/07/24    Expected End:  11/21/24               TRANSFERS       Patient will perform bed mobility independent level of assistance and bed rails in order to improve safety and independence with mobility       Start:  11/07/24    Expected End:  11/21/24            Patient will complete functional transfer to with front wheeled walker with modified independent level of assistance.       Start:  11/07/24    Expected End:  11/21/24

## 2024-11-08 LAB
ALBUMIN SERPL BCP-MCNC: 2.9 G/DL (ref 3.4–5)
ANION GAP SERPL CALC-SCNC: 11 MMOL/L (ref 10–20)
BASOPHILS # BLD AUTO: 0.03 X10*3/UL (ref 0–0.1)
BASOPHILS NFR BLD AUTO: 0.5 %
BUN SERPL-MCNC: 51 MG/DL (ref 6–23)
C3 SERPL-MCNC: 67 MG/DL (ref 87–200)
C4 SERPL-MCNC: 19 MG/DL (ref 10–50)
CALCIUM SERPL-MCNC: 8.3 MG/DL (ref 8.6–10.3)
CHLORIDE SERPL-SCNC: 107 MMOL/L (ref 98–107)
CO2 SERPL-SCNC: 21 MMOL/L (ref 21–32)
CREAT SERPL-MCNC: 3.08 MG/DL (ref 0.5–1.05)
CRP SERPL-MCNC: 1.24 MG/DL
EGFRCR SERPLBLD CKD-EPI 2021: 14 ML/MIN/1.73M*2
EOSINOPHIL # BLD AUTO: 0.12 X10*3/UL (ref 0–0.4)
EOSINOPHIL NFR BLD AUTO: 2 %
ERYTHROCYTE [DISTWIDTH] IN BLOOD BY AUTOMATED COUNT: 15.6 % (ref 11.5–14.5)
ERYTHROCYTE [SEDIMENTATION RATE] IN BLOOD BY WESTERGREN METHOD: 75 MM/H (ref 0–30)
GLUCOSE BLD MANUAL STRIP-MCNC: 106 MG/DL (ref 74–99)
GLUCOSE BLD MANUAL STRIP-MCNC: 215 MG/DL (ref 74–99)
GLUCOSE BLD MANUAL STRIP-MCNC: 223 MG/DL (ref 74–99)
GLUCOSE BLD MANUAL STRIP-MCNC: 227 MG/DL (ref 74–99)
GLUCOSE SERPL-MCNC: 200 MG/DL (ref 74–99)
HCT VFR BLD AUTO: 32.1 % (ref 36–46)
HGB BLD-MCNC: 9.6 G/DL (ref 12–16)
IMM GRANULOCYTES # BLD AUTO: 0.02 X10*3/UL (ref 0–0.5)
IMM GRANULOCYTES NFR BLD AUTO: 0.3 % (ref 0–0.9)
LYMPHOCYTES # BLD AUTO: 1.1 X10*3/UL (ref 0.8–3)
LYMPHOCYTES NFR BLD AUTO: 18.3 %
MCH RBC QN AUTO: 31.6 PG (ref 26–34)
MCHC RBC AUTO-ENTMCNC: 29.9 G/DL (ref 32–36)
MCV RBC AUTO: 106 FL (ref 80–100)
MONOCYTES # BLD AUTO: 0.7 X10*3/UL (ref 0.05–0.8)
MONOCYTES NFR BLD AUTO: 11.6 %
NEUTROPHILS # BLD AUTO: 4.04 X10*3/UL (ref 1.6–5.5)
NEUTROPHILS NFR BLD AUTO: 67.3 %
NRBC BLD-RTO: 0 /100 WBCS (ref 0–0)
PHOSPHATE SERPL-MCNC: 4.9 MG/DL (ref 2.5–4.9)
PLATELET # BLD AUTO: 237 X10*3/UL (ref 150–450)
POTASSIUM SERPL-SCNC: 4.8 MMOL/L (ref 3.5–5.3)
RBC # BLD AUTO: 3.04 X10*6/UL (ref 4–5.2)
SODIUM SERPL-SCNC: 134 MMOL/L (ref 136–145)
WBC # BLD AUTO: 6 X10*3/UL (ref 4.4–11.3)

## 2024-11-08 PROCEDURE — 85025 COMPLETE CBC W/AUTO DIFF WBC: CPT | Performed by: INTERNAL MEDICINE

## 2024-11-08 PROCEDURE — 1200000002 HC GENERAL ROOM WITH TELEMETRY DAILY

## 2024-11-08 PROCEDURE — 36415 COLL VENOUS BLD VENIPUNCTURE: CPT | Performed by: INTERNAL MEDICINE

## 2024-11-08 PROCEDURE — 86160 COMPLEMENT ANTIGEN: CPT | Mod: AHULAB | Performed by: INTERNAL MEDICINE

## 2024-11-08 PROCEDURE — 86140 C-REACTIVE PROTEIN: CPT | Performed by: INTERNAL MEDICINE

## 2024-11-08 PROCEDURE — 99233 SBSQ HOSP IP/OBS HIGH 50: CPT | Performed by: INTERNAL MEDICINE

## 2024-11-08 PROCEDURE — 2500000004 HC RX 250 GENERAL PHARMACY W/ HCPCS (ALT 636 FOR OP/ED): Performed by: INTERNAL MEDICINE

## 2024-11-08 PROCEDURE — 82947 ASSAY GLUCOSE BLOOD QUANT: CPT

## 2024-11-08 PROCEDURE — 86038 ANTINUCLEAR ANTIBODIES: CPT | Mod: AHULAB | Performed by: INTERNAL MEDICINE

## 2024-11-08 PROCEDURE — 2500000001 HC RX 250 WO HCPCS SELF ADMINISTERED DRUGS (ALT 637 FOR MEDICARE OP): Performed by: NURSE PRACTITIONER

## 2024-11-08 PROCEDURE — 86036 ANCA SCREEN EACH ANTIBODY: CPT | Performed by: INTERNAL MEDICINE

## 2024-11-08 PROCEDURE — 80069 RENAL FUNCTION PANEL: CPT | Performed by: INTERNAL MEDICINE

## 2024-11-08 PROCEDURE — 2500000001 HC RX 250 WO HCPCS SELF ADMINISTERED DRUGS (ALT 637 FOR MEDICARE OP): Performed by: INTERNAL MEDICINE

## 2024-11-08 PROCEDURE — 2500000002 HC RX 250 W HCPCS SELF ADMINISTERED DRUGS (ALT 637 FOR MEDICARE OP, ALT 636 FOR OP/ED): Performed by: NURSE PRACTITIONER

## 2024-11-08 PROCEDURE — 86235 NUCLEAR ANTIGEN ANTIBODY: CPT | Performed by: INTERNAL MEDICINE

## 2024-11-08 PROCEDURE — 2500000004 HC RX 250 GENERAL PHARMACY W/ HCPCS (ALT 636 FOR OP/ED): Performed by: NURSE PRACTITIONER

## 2024-11-08 PROCEDURE — 85652 RBC SED RATE AUTOMATED: CPT | Performed by: INTERNAL MEDICINE

## 2024-11-08 RX ORDER — HYDRALAZINE HYDROCHLORIDE 50 MG/1
100 TABLET, FILM COATED ORAL 3 TIMES DAILY
Status: DISCONTINUED | OUTPATIENT
Start: 2024-11-08 | End: 2024-11-23 | Stop reason: HOSPADM

## 2024-11-08 RX ADMIN — SODIUM BICARBONATE 650 MG: 650 TABLET ORAL at 20:44

## 2024-11-08 RX ADMIN — HYDRALAZINE HYDROCHLORIDE 100 MG: 50 TABLET ORAL at 09:27

## 2024-11-08 RX ADMIN — ASPIRIN 81 MG: 81 TABLET, COATED ORAL at 09:27

## 2024-11-08 RX ADMIN — HYDRALAZINE HYDROCHLORIDE 5 MG: 20 INJECTION INTRAMUSCULAR; INTRAVENOUS at 05:07

## 2024-11-08 RX ADMIN — INSULIN HUMAN 12 UNITS: 100 INJECTION, SUSPENSION SUBCUTANEOUS at 09:26

## 2024-11-08 RX ADMIN — HYDRALAZINE HYDROCHLORIDE 100 MG: 50 TABLET ORAL at 20:44

## 2024-11-08 RX ADMIN — LATANOPROST 1 DROP: 50 SOLUTION/ DROPS OPHTHALMIC at 21:58

## 2024-11-08 RX ADMIN — ATORVASTATIN CALCIUM 10 MG: 20 TABLET ORAL at 20:44

## 2024-11-08 RX ADMIN — HEPARIN SODIUM 5000 UNITS: 5000 INJECTION INTRAVENOUS; SUBCUTANEOUS at 15:16

## 2024-11-08 RX ADMIN — INSULIN HUMAN 12 UNITS: 100 INJECTION, SUSPENSION SUBCUTANEOUS at 15:18

## 2024-11-08 RX ADMIN — CARVEDILOL 25 MG: 12.5 TABLET, FILM COATED ORAL at 20:44

## 2024-11-08 RX ADMIN — CARVEDILOL 25 MG: 12.5 TABLET, FILM COATED ORAL at 09:28

## 2024-11-08 RX ADMIN — HYDRALAZINE HYDROCHLORIDE 100 MG: 50 TABLET ORAL at 15:17

## 2024-11-08 RX ADMIN — TIMOLOL MALEATE 1 DROP: 5 SOLUTION/ DROPS OPHTHALMIC at 09:33

## 2024-11-08 RX ADMIN — BRIMONIDINE TARTRATE 1 DROP: 2 SOLUTION/ DROPS OPHTHALMIC at 20:45

## 2024-11-08 RX ADMIN — SODIUM BICARBONATE 650 MG: 650 TABLET ORAL at 09:28

## 2024-11-08 RX ADMIN — BRIMONIDINE TARTRATE 1 DROP: 2 SOLUTION/ DROPS OPHTHALMIC at 09:32

## 2024-11-08 RX ADMIN — HEPARIN SODIUM 5000 UNITS: 5000 INJECTION INTRAVENOUS; SUBCUTANEOUS at 21:59

## 2024-11-08 RX ADMIN — SODIUM CHLORIDE 50 ML/HR: 9 INJECTION, SOLUTION INTRAVENOUS at 08:51

## 2024-11-08 RX ADMIN — AMLODIPINE BESYLATE 10 MG: 10 TABLET ORAL at 09:27

## 2024-11-08 RX ADMIN — HEPARIN SODIUM 5000 UNITS: 5000 INJECTION INTRAVENOUS; SUBCUTANEOUS at 05:07

## 2024-11-08 ASSESSMENT — PAIN SCALES - GENERAL
PAINLEVEL_OUTOF10: 0 - NO PAIN
PAINLEVEL_OUTOF10: 0 - NO PAIN

## 2024-11-08 ASSESSMENT — PAIN - FUNCTIONAL ASSESSMENT: PAIN_FUNCTIONAL_ASSESSMENT: 0-10

## 2024-11-08 NOTE — CARE PLAN
Problem: Pain - Adult  Goal: Verbalizes/displays adequate comfort level or baseline comfort level  Outcome: Progressing     Problem: Safety - Adult  Goal: Free from fall injury  Outcome: Progressing     Problem: Discharge Planning  Goal: Discharge to home or other facility with appropriate resources  Outcome: Progressing     Problem: Chronic Conditions and Co-morbidities  Goal: Patient's chronic conditions and co-morbidity symptoms are monitored and maintained or improved  Outcome: Progressing     Problem: Diabetes  Goal: Achieve decreasing blood glucose levels by end of shift  Outcome: Progressing  Goal: Increase stability of blood glucose readings by end of shift  Outcome: Progressing  Goal: Decrease in ketones present in urine by end of shift  Outcome: Progressing  Goal: Maintain electrolyte levels within acceptable range throughout shift  Outcome: Progressing  Goal: Maintain glucose levels >70mg/dl to <250mg/dl throughout shift  Outcome: Progressing  Goal: No changes in neurological exam by end of shift  Outcome: Progressing  Goal: Learn about and adhere to nutrition recommendations by end of shift  Outcome: Progressing  Goal: Vital signs within normal range for age by end of shift  Outcome: Progressing  Goal: Increase self care and/or family involovement by end of shift  Outcome: Progressing  Goal: Receive DSME education by end of shift  Outcome: Progressing     Problem: Skin  Goal: Decreased wound size/increased tissue granulation at next dressing change  Outcome: Progressing  Flowsheets (Taken 11/8/2024 1809)  Decreased wound size/increased tissue granulation at next dressing change: Protective dressings over bony prominences  Goal: Participates in plan/prevention/treatment measures  Outcome: Progressing  Flowsheets (Taken 11/8/2024 1809)  Participates in plan/prevention/treatment measures: Elevate heels  Goal: Prevent/manage excess moisture  Outcome: Progressing  Flowsheets (Taken 11/8/2024  1809)  Prevent/manage excess moisture: Cleanse incontinence/protect with barrier cream  Goal: Prevent/minimize sheer/friction injuries  Outcome: Progressing  Flowsheets (Taken 11/8/2024 1809)  Prevent/minimize sheer/friction injuries: Turn/reposition every 2 hours/use positioning/transfer devices  Goal: Promote/optimize nutrition  Outcome: Progressing  Flowsheets (Taken 11/8/2024 1809)  Promote/optimize nutrition: Consume > 50% meals/supplements  Goal: Promote skin healing  Outcome: Progressing  Flowsheets (Taken 11/7/2024 0048 by Lalitha Skaggs RN)  Promote skin healing:   Assess skin/pad under line(s)/device(s)   Protective dressings over bony prominences   The patient's goals for the shift include  maintaining pt safety    The clinical goals for the shift include pt will remain HDS through out the shift

## 2024-11-08 NOTE — PROGRESS NOTES
"Carolann Cartagena is a 89 y.o. female on day 2 of admission presenting with Colitis.    Subjective   Doing better, creatinine 3.0, uncontrolled bp increased hydralazine 100mg tid       Objective     Physical Exam  Vitals reviewed.   Constitutional:       Appearance: Normal appearance.   HENT:      Head: Normocephalic.      Nose: Nose normal.   Cardiovascular:      Rate and Rhythm: Normal rate and regular rhythm.      Pulses: Normal pulses.      Heart sounds: Normal heart sounds.   Abdominal:      General: Abdomen is flat. Bowel sounds are normal.      Palpations: Abdomen is soft.   Skin:     General: Skin is warm and dry.      Capillary Refill: Capillary refill takes less than 2 seconds.   Neurological:      General: No focal deficit present.      Mental Status: She is alert.         Last Recorded Vitals  Blood pressure 176/66, pulse 61, temperature 36.4 °C (97.6 °F), temperature source Temporal, resp. rate 18, height 1.626 m (5' 4\"), weight 72.6 kg (160 lb), SpO2 94%.  Intake/Output last 3 Shifts:  I/O last 3 completed shifts:  In: 370 (5.1 mL/kg) [P.O.:370]  Out: 1250 (17.2 mL/kg) [Urine:1250 (0.5 mL/kg/hr)]  Weight: 72.6 kg     Relevant Results  Results for orders placed or performed during the hospital encounter of 11/06/24 (from the past 24 hours)   POCT GLUCOSE   Result Value Ref Range    POCT Glucose 204 (H) 74 - 99 mg/dL   POCT GLUCOSE   Result Value Ref Range    POCT Glucose 172 (H) 74 - 99 mg/dL   POCT GLUCOSE   Result Value Ref Range    POCT Glucose 106 (H) 74 - 99 mg/dL   CBC and Auto Differential   Result Value Ref Range    WBC 6.0 4.4 - 11.3 x10*3/uL    nRBC 0.0 0.0 - 0.0 /100 WBCs    RBC 3.04 (L) 4.00 - 5.20 x10*6/uL    Hemoglobin 9.6 (L) 12.0 - 16.0 g/dL    Hematocrit 32.1 (L) 36.0 - 46.0 %     (H) 80 - 100 fL    MCH 31.6 26.0 - 34.0 pg    MCHC 29.9 (L) 32.0 - 36.0 g/dL    RDW 15.6 (H) 11.5 - 14.5 %    Platelets 237 150 - 450 x10*3/uL    Neutrophils % 67.3 40.0 - 80.0 %    Immature Granulocytes %, " Automated 0.3 0.0 - 0.9 %    Lymphocytes % 18.3 13.0 - 44.0 %    Monocytes % 11.6 2.0 - 10.0 %    Eosinophils % 2.0 0.0 - 6.0 %    Basophils % 0.5 0.0 - 2.0 %    Neutrophils Absolute 4.04 1.60 - 5.50 x10*3/uL    Immature Granulocytes Absolute, Automated 0.02 0.00 - 0.50 x10*3/uL    Lymphocytes Absolute 1.10 0.80 - 3.00 x10*3/uL    Monocytes Absolute 0.70 0.05 - 0.80 x10*3/uL    Eosinophils Absolute 0.12 0.00 - 0.40 x10*3/uL    Basophils Absolute 0.03 0.00 - 0.10 x10*3/uL   Sedimentation rate, automated   Result Value Ref Range    Sedimentation Rate 75 (H) 0 - 30 mm/h   C-reactive protein   Result Value Ref Range    C-Reactive Protein 1.24 (H) <1.00 mg/dL   Renal function panel   Result Value Ref Range    Glucose 200 (H) 74 - 99 mg/dL    Sodium 134 (L) 136 - 145 mmol/L    Potassium 4.8 3.5 - 5.3 mmol/L    Chloride 107 98 - 107 mmol/L    Bicarbonate 21 21 - 32 mmol/L    Anion Gap 11 10 - 20 mmol/L    Urea Nitrogen 51 (H) 6 - 23 mg/dL    Creatinine 3.08 (H) 0.50 - 1.05 mg/dL    eGFR 14 (L) >60 mL/min/1.73m*2    Calcium 8.3 (L) 8.6 - 10.3 mg/dL    Phosphorus 4.9 2.5 - 4.9 mg/dL    Albumin 2.9 (L) 3.4 - 5.0 g/dL   POCT GLUCOSE   Result Value Ref Range    POCT Glucose 227 (H) 74 - 99 mg/dL       Imaging Results  Ct abd/pelvis:        IMPRESSION:  Right colon wall thickening consistent with nonspecific colitis.      Small amount of free fluid with mild edematous changes in the lung  bases, mesentery and soft tissues as well as trace pleural  effusions/thickening consistent with fluid overload/CHF.      Subtle density layer in the gallbladder, possible layering  noncalcified stones or sludge.      Additional findings as described above.    Head ct;  IMPRESSION:  No acute intracranial hemorrhage or mass-effect.    Cxr:  IMPRESSION:  No acute cardiopulmonary process.  Enlarged but unchanged heart compared to 06/12/2024.    Medications:  amLODIPine, 10 mg, oral, Daily  aspirin, 81 mg, oral, Daily  atorvastatin, 10 mg, oral,  Nightly  brimonidine, 1 drop, Both Eyes, BID  carvedilol, 25 mg, oral, BID  heparin (porcine), 5,000 Units, subcutaneous, q8h TIP  hydrALAZINE, 100 mg, oral, TID  insulin NPH (Isophane), 12 Units, subcutaneous, BID AC  latanoprost, 1 drop, Both Eyes, Nightly  sodium bicarbonate, 650 mg, oral, BID  [Held by provider] spironolactone, 25 mg, oral, q24h TIP  timolol, 1 drop, Both Eyes, Daily       PRN medications: acetaminophen **OR** acetaminophen **OR** acetaminophen, dextrose, dextrose, glucagon, glucagon, hydrALAZINE, ondansetron, sennosides-docusate sodium     Assessment/Plan     1.  Accelerated hypertension  -Blood pressure reading on day of admission was 204/69, currently improved I did increase amlodipine to 10 mg oral daily, increased hydralazine today to 100mg po tid    2.  Acute kidney injury on CKD 4  -Baseline creatinine 1.8-2.2 currently 3.0, holding Aldactone gentle hydration    3. DM  -continue SSI and NPH    Updated daughter-possible dc in am  DVT Prophylaxis:  Heparin subq        Julianne Elise MD  [facility]

## 2024-11-08 NOTE — PROGRESS NOTES
Nephrology Consult Progress Note    Admit Date: 11/6/2024    Interval history:  Felling better, no nausea/vomiting    CURRENT MEDICATIONS:    Current Facility-Administered Medications:     acetaminophen (Tylenol) tablet 650 mg, 650 mg, oral, q4h PRN, 650 mg at 11/07/24 1623 **OR** acetaminophen (Tylenol) oral liquid 650 mg, 650 mg, oral, q4h PRN **OR** acetaminophen (Tylenol) suppository 650 mg, 650 mg, rectal, q4h PRN, BASILIA Mena-CNP    amLODIPine (Norvasc) tablet 10 mg, 10 mg, oral, Daily, Julianne Elise MD, 10 mg at 11/08/24 0927    aspirin EC tablet 81 mg, 81 mg, oral, Daily, BASILIA Mena-CNP, 81 mg at 11/08/24 0927    atorvastatin (Lipitor) tablet 10 mg, 10 mg, oral, Nightly, BASILIA Mena-CNP, 10 mg at 11/07/24 2117    brimonidine (AlphaGAN) 0.2 % ophthalmic solution 1 drop, 1 drop, Both Eyes, BID, BASILIA Mena-CNP, 1 drop at 11/08/24 0932    carvedilol (Coreg) tablet 25 mg, 25 mg, oral, BID, BASILIA Mena-CNP, 25 mg at 11/08/24 0928    dextrose 50 % injection 12.5 g, 12.5 g, intravenous, q15 min PRN, BASILIA Mena-CNP    dextrose 50 % injection 25 g, 25 g, intravenous, q15 min PRN, BASILIA Mena-CNP    glucagon (Glucagen) injection 1 mg, 1 mg, intramuscular, q15 min PRN, BASILIA Mena-CNP    glucagon (Glucagen) injection 1 mg, 1 mg, intramuscular, q15 min PRN, BASILIA Mena-CNP    heparin (porcine) injection 5,000 Units, 5,000 Units, subcutaneous, q8h TIP, BASILIA Mena-CNP, 5,000 Units at 11/08/24 0507    hydrALAZINE (Apresoline) injection 5 mg, 5 mg, intravenous, q6h PRN, Dangelo Mcgregor, BASILIA-CNP, 5 mg at 11/08/24 0507    hydrALAZINE (Apresoline) tablet 100 mg, 100 mg, oral, TID, Julianne Elise MD    insulin NPH (Isophane) (HumuLIN N,NovoLIN N) injection 12 Units, 12 Units, subcutaneous, BID AC, ERICA Mena, 12 Units at 11/08/24 0926    latanoprost (Xalatan) 0.005 % ophthalmic solution 1 drop, 1 drop, Both Eyes, Nightly, Dangelo REED  "Mcgregor, APRN-CNP, 1 drop at 11/07/24 2131    ondansetron (Zofran) injection 4 mg, 4 mg, intravenous, q6h PRN, BASILIA Mena-CNP, 4 mg at 11/07/24 1532    sennosides-docusate sodium (Steph-Colace) 8.6-50 mg per tablet 1 tablet, 1 tablet, oral, Nightly PRN, ERICA Mena    sodium bicarbonate tablet 650 mg, 650 mg, oral, BID, BASILIA Mena-CNP, 650 mg at 11/08/24 0928    sodium chloride 0.9% infusion, 50 mL/hr, intravenous, Continuous, Timothy Garay MD, Last Rate: 50 mL/hr at 11/08/24 0851, 50 mL/hr at 11/08/24 0851    [Held by provider] spironolactone (Aldactone) tablet 25 mg, 25 mg, oral, q24h TIP, BASILIA Mena-CNP, 25 mg at 11/06/24 2111    timolol (Timoptic) 0.5 % ophthalmic solution 1 drop, 1 drop, Both Eyes, Daily, ERICA Mena, 1 drop at 11/08/24 0933       Intake/Output Summary (Last 24 hours) at 11/8/2024 1212  Last data filed at 11/8/2024 0235  Gross per 24 hour   Intake 120 ml   Output 400 ml   Net -280 ml       PHYSICAL EXAM:  /66 (BP Location: Right arm, Patient Position: Lying)   Pulse 61   Temp 36.4 °C (97.6 °F) (Temporal)   Resp 18   Ht 1.626 m (5' 4\")   Wt 72.6 kg (160 lb)   SpO2 94%   BMI 27.46 kg/m²     Intake/Output Summary (Last 24 hours) at 11/8/2024 1212  Last data filed at 11/8/2024 0235  Gross per 24 hour   Intake 120 ml   Output 400 ml   Net -280 ml     Gen: AAO, NAD  Neck: No JVD  Cardiac: RRR  Resp: occasional L base crackles   Abd: Soft, non tender, +BS, non distended   Ext: No edema   Neuro: moves 4 ext  Peripheral Pulses: weak peripheral pulses.  Skin: Skin color, texture, turgor normal, no suspicious rashes or lesions.    Labs:  Results for orders placed or performed during the hospital encounter of 11/06/24 (from the past 24 hours)   POCT GLUCOSE   Result Value Ref Range    POCT Glucose 204 (H) 74 - 99 mg/dL   POCT GLUCOSE   Result Value Ref Range    POCT Glucose 172 (H) 74 - 99 mg/dL   POCT GLUCOSE   Result Value Ref Range    POCT " Glucose 106 (H) 74 - 99 mg/dL   CBC and Auto Differential   Result Value Ref Range    WBC 6.0 4.4 - 11.3 x10*3/uL    nRBC 0.0 0.0 - 0.0 /100 WBCs    RBC 3.04 (L) 4.00 - 5.20 x10*6/uL    Hemoglobin 9.6 (L) 12.0 - 16.0 g/dL    Hematocrit 32.1 (L) 36.0 - 46.0 %     (H) 80 - 100 fL    MCH 31.6 26.0 - 34.0 pg    MCHC 29.9 (L) 32.0 - 36.0 g/dL    RDW 15.6 (H) 11.5 - 14.5 %    Platelets 237 150 - 450 x10*3/uL    Neutrophils % 67.3 40.0 - 80.0 %    Immature Granulocytes %, Automated 0.3 0.0 - 0.9 %    Lymphocytes % 18.3 13.0 - 44.0 %    Monocytes % 11.6 2.0 - 10.0 %    Eosinophils % 2.0 0.0 - 6.0 %    Basophils % 0.5 0.0 - 2.0 %    Neutrophils Absolute 4.04 1.60 - 5.50 x10*3/uL    Immature Granulocytes Absolute, Automated 0.02 0.00 - 0.50 x10*3/uL    Lymphocytes Absolute 1.10 0.80 - 3.00 x10*3/uL    Monocytes Absolute 0.70 0.05 - 0.80 x10*3/uL    Eosinophils Absolute 0.12 0.00 - 0.40 x10*3/uL    Basophils Absolute 0.03 0.00 - 0.10 x10*3/uL   Sedimentation rate, automated   Result Value Ref Range    Sedimentation Rate 75 (H) 0 - 30 mm/h   C-reactive protein   Result Value Ref Range    C-Reactive Protein 1.24 (H) <1.00 mg/dL   Renal function panel   Result Value Ref Range    Glucose 200 (H) 74 - 99 mg/dL    Sodium 134 (L) 136 - 145 mmol/L    Potassium 4.8 3.5 - 5.3 mmol/L    Chloride 107 98 - 107 mmol/L    Bicarbonate 21 21 - 32 mmol/L    Anion Gap 11 10 - 20 mmol/L    Urea Nitrogen 51 (H) 6 - 23 mg/dL    Creatinine 3.08 (H) 0.50 - 1.05 mg/dL    eGFR 14 (L) >60 mL/min/1.73m*2    Calcium 8.3 (L) 8.6 - 10.3 mg/dL    Phosphorus 4.9 2.5 - 4.9 mg/dL    Albumin 2.9 (L) 3.4 - 5.0 g/dL   POCT GLUCOSE   Result Value Ref Range    POCT Glucose 227 (H) 74 - 99 mg/dL        DATA:   Diagnostic tests reviewed for today's visit:    New labs and imaging   Sed rate, PCR, RFP, CBC, urine studies seen    Assessment and Plan:   h/o CKD stage 4 under care of Dr Rosa, type 2 diabetes, hypertension, hypercholesterolemia, GERD, presenting with  nausea vomiting for 1 day and elevated blood pressure. CT abdomen and pelvis without IV contrast showed Right colon colitis  - OLGA LIDIA on CKD stage 4: baseline Scr 1.8-2.2, OLGA LIDIA from volume contraction, slight better with gentle ivf, non oliguric  +UTI, on ATB, culture pending, not obstructed   PCR 2.7, UA with few wbc/rbc, neg culture, +inflammatory markers  HTN: suboptimal control on several meds  Lytes and acid base: acceptable  Hb 9.6, plts OK     PLAN:  - to end ivf today, dont give diuretics, getting DYAN, ANCA, complements   - Agree with increasing hydralazine dose, supportive care     Will continue to follow.   patient at-risk for clinically significant deterioration      Signature: Timothy Garay MD

## 2024-11-09 LAB
ALBUMIN SERPL BCP-MCNC: 2.7 G/DL (ref 3.4–5)
ANION GAP SERPL CALC-SCNC: 12 MMOL/L (ref 10–20)
BUN SERPL-MCNC: 57 MG/DL (ref 6–23)
CALCIUM SERPL-MCNC: 7.9 MG/DL (ref 8.6–10.3)
CHLORIDE SERPL-SCNC: 105 MMOL/L (ref 98–107)
CO2 SERPL-SCNC: 21 MMOL/L (ref 21–32)
CREAT SERPL-MCNC: 3.46 MG/DL (ref 0.5–1.05)
EGFRCR SERPLBLD CKD-EPI 2021: 12 ML/MIN/1.73M*2
GLUCOSE BLD MANUAL STRIP-MCNC: 145 MG/DL (ref 74–99)
GLUCOSE BLD MANUAL STRIP-MCNC: 172 MG/DL (ref 74–99)
GLUCOSE BLD MANUAL STRIP-MCNC: 197 MG/DL (ref 74–99)
GLUCOSE BLD MANUAL STRIP-MCNC: 225 MG/DL (ref 74–99)
GLUCOSE SERPL-MCNC: 147 MG/DL (ref 74–99)
HOLD SPECIMEN: NORMAL
PHOSPHATE SERPL-MCNC: 5 MG/DL (ref 2.5–4.9)
POTASSIUM SERPL-SCNC: 4.7 MMOL/L (ref 3.5–5.3)
SARS-COV-2 RNA RESP QL NAA+PROBE: NOT DETECTED
SODIUM SERPL-SCNC: 133 MMOL/L (ref 136–145)

## 2024-11-09 PROCEDURE — 80069 RENAL FUNCTION PANEL: CPT | Performed by: INTERNAL MEDICINE

## 2024-11-09 PROCEDURE — 2500000001 HC RX 250 WO HCPCS SELF ADMINISTERED DRUGS (ALT 637 FOR MEDICARE OP): Performed by: NURSE PRACTITIONER

## 2024-11-09 PROCEDURE — 2500000002 HC RX 250 W HCPCS SELF ADMINISTERED DRUGS (ALT 637 FOR MEDICARE OP, ALT 636 FOR OP/ED): Performed by: NURSE PRACTITIONER

## 2024-11-09 PROCEDURE — 2500000004 HC RX 250 GENERAL PHARMACY W/ HCPCS (ALT 636 FOR OP/ED): Performed by: NURSE PRACTITIONER

## 2024-11-09 PROCEDURE — 99233 SBSQ HOSP IP/OBS HIGH 50: CPT | Performed by: INTERNAL MEDICINE

## 2024-11-09 PROCEDURE — 1200000002 HC GENERAL ROOM WITH TELEMETRY DAILY

## 2024-11-09 PROCEDURE — 2500000001 HC RX 250 WO HCPCS SELF ADMINISTERED DRUGS (ALT 637 FOR MEDICARE OP): Performed by: INTERNAL MEDICINE

## 2024-11-09 PROCEDURE — 2500000004 HC RX 250 GENERAL PHARMACY W/ HCPCS (ALT 636 FOR OP/ED): Performed by: INTERNAL MEDICINE

## 2024-11-09 PROCEDURE — 36415 COLL VENOUS BLD VENIPUNCTURE: CPT | Performed by: INTERNAL MEDICINE

## 2024-11-09 PROCEDURE — 82947 ASSAY GLUCOSE BLOOD QUANT: CPT

## 2024-11-09 PROCEDURE — 87635 SARS-COV-2 COVID-19 AMP PRB: CPT | Performed by: INTERNAL MEDICINE

## 2024-11-09 RX ORDER — SODIUM CHLORIDE 9 MG/ML
60 INJECTION, SOLUTION INTRAVENOUS CONTINUOUS
Status: ACTIVE | OUTPATIENT
Start: 2024-11-09 | End: 2024-11-10

## 2024-11-09 RX ADMIN — SODIUM CHLORIDE 60 ML/HR: 9 INJECTION, SOLUTION INTRAVENOUS at 12:35

## 2024-11-09 RX ADMIN — CARVEDILOL 25 MG: 12.5 TABLET, FILM COATED ORAL at 21:49

## 2024-11-09 RX ADMIN — ATORVASTATIN CALCIUM 10 MG: 20 TABLET ORAL at 21:49

## 2024-11-09 RX ADMIN — ASPIRIN 81 MG: 81 TABLET, COATED ORAL at 09:17

## 2024-11-09 RX ADMIN — TIMOLOL MALEATE 1 DROP: 5 SOLUTION/ DROPS OPHTHALMIC at 09:16

## 2024-11-09 RX ADMIN — INSULIN HUMAN 12 UNITS: 100 INJECTION, SUSPENSION SUBCUTANEOUS at 09:17

## 2024-11-09 RX ADMIN — BRIMONIDINE TARTRATE 1 DROP: 2 SOLUTION/ DROPS OPHTHALMIC at 09:16

## 2024-11-09 RX ADMIN — HEPARIN SODIUM 5000 UNITS: 5000 INJECTION INTRAVENOUS; SUBCUTANEOUS at 15:30

## 2024-11-09 RX ADMIN — ACETAMINOPHEN 650 MG: 325 TABLET, FILM COATED ORAL at 06:51

## 2024-11-09 RX ADMIN — HEPARIN SODIUM 5000 UNITS: 5000 INJECTION INTRAVENOUS; SUBCUTANEOUS at 06:38

## 2024-11-09 RX ADMIN — HEPARIN SODIUM 5000 UNITS: 5000 INJECTION INTRAVENOUS; SUBCUTANEOUS at 21:49

## 2024-11-09 RX ADMIN — HYDRALAZINE HYDROCHLORIDE 100 MG: 50 TABLET ORAL at 15:31

## 2024-11-09 RX ADMIN — BRIMONIDINE TARTRATE 1 DROP: 2 SOLUTION/ DROPS OPHTHALMIC at 21:50

## 2024-11-09 RX ADMIN — LATANOPROST 1 DROP: 50 SOLUTION/ DROPS OPHTHALMIC at 21:50

## 2024-11-09 RX ADMIN — SODIUM BICARBONATE 650 MG: 650 TABLET ORAL at 09:17

## 2024-11-09 RX ADMIN — AMLODIPINE BESYLATE 10 MG: 10 TABLET ORAL at 09:17

## 2024-11-09 RX ADMIN — HYDRALAZINE HYDROCHLORIDE 100 MG: 50 TABLET ORAL at 09:16

## 2024-11-09 RX ADMIN — CARVEDILOL 25 MG: 12.5 TABLET, FILM COATED ORAL at 09:17

## 2024-11-09 RX ADMIN — SODIUM BICARBONATE 650 MG: 650 TABLET ORAL at 21:49

## 2024-11-09 RX ADMIN — HYDRALAZINE HYDROCHLORIDE 100 MG: 50 TABLET ORAL at 21:49

## 2024-11-09 RX ADMIN — INSULIN HUMAN 12 UNITS: 100 INJECTION, SUSPENSION SUBCUTANEOUS at 15:31

## 2024-11-09 ASSESSMENT — COGNITIVE AND FUNCTIONAL STATUS - GENERAL
WALKING IN HOSPITAL ROOM: A LOT
STANDING UP FROM CHAIR USING ARMS: A LOT
MOBILITY SCORE: 13
HELP NEEDED FOR BATHING: A LITTLE
MOVING TO AND FROM BED TO CHAIR: A LOT
TURNING FROM BACK TO SIDE WHILE IN FLAT BAD: A LOT
DRESSING REGULAR LOWER BODY CLOTHING: A LITTLE
MOVING TO AND FROM BED TO CHAIR: A LITTLE
MOVING FROM LYING ON BACK TO SITTING ON SIDE OF FLAT BED WITH BEDRAILS: A LITTLE
DRESSING REGULAR UPPER BODY CLOTHING: A LITTLE
MOBILITY SCORE: 14
PERSONAL GROOMING: A LITTLE
EATING MEALS: A LITTLE
TOILETING: A LITTLE
DAILY ACTIVITIY SCORE: 19
CLIMB 3 TO 5 STEPS WITH RAILING: A LOT
STANDING UP FROM CHAIR USING ARMS: A LOT
PERSONAL GROOMING: A LITTLE
DAILY ACTIVITIY SCORE: 18
WALKING IN HOSPITAL ROOM: A LOT
MOVING FROM LYING ON BACK TO SITTING ON SIDE OF FLAT BED WITH BEDRAILS: A LITTLE
DRESSING REGULAR UPPER BODY CLOTHING: A LITTLE
HELP NEEDED FOR BATHING: A LITTLE
TOILETING: A LITTLE
DRESSING REGULAR LOWER BODY CLOTHING: A LITTLE
TURNING FROM BACK TO SIDE WHILE IN FLAT BAD: A LOT
CLIMB 3 TO 5 STEPS WITH RAILING: A LOT

## 2024-11-09 ASSESSMENT — PAIN SCALES - GENERAL
PAINLEVEL_OUTOF10: 0 - NO PAIN
PAINLEVEL_OUTOF10: 0 - NO PAIN

## 2024-11-09 NOTE — CARE PLAN
The patient's goals for the shift include      The clinical goals for the shift include pt will be able to get rest during shift      Problem: Pain - Adult  Goal: Verbalizes/displays adequate comfort level or baseline comfort level  Outcome: Progressing     Problem: Safety - Adult  Goal: Free from fall injury  Outcome: Progressing

## 2024-11-09 NOTE — PROGRESS NOTES
Nephrology Consult Progress Note    Admit Date: 11/6/2024    Interval history:  no nausea/vomiting but poor po intake     CURRENT MEDICATIONS:    Current Facility-Administered Medications:     acetaminophen (Tylenol) tablet 650 mg, 650 mg, oral, q4h PRN, 650 mg at 11/09/24 0651 **OR** acetaminophen (Tylenol) oral liquid 650 mg, 650 mg, oral, q4h PRN **OR** acetaminophen (Tylenol) suppository 650 mg, 650 mg, rectal, q4h PRN, BASILIA Mena-CNP    amLODIPine (Norvasc) tablet 10 mg, 10 mg, oral, Daily, Julianne Elise MD, 10 mg at 11/09/24 0917    aspirin EC tablet 81 mg, 81 mg, oral, Daily, ERICA Mena, 81 mg at 11/09/24 0917    atorvastatin (Lipitor) tablet 10 mg, 10 mg, oral, Nightly, BASILIA Mena-CNP, 10 mg at 11/08/24 2044    brimonidine (AlphaGAN) 0.2 % ophthalmic solution 1 drop, 1 drop, Both Eyes, BID, BASILIA Mena-CNP, 1 drop at 11/09/24 0916    carvedilol (Coreg) tablet 25 mg, 25 mg, oral, BID, BASILIA Mena-CNP, 25 mg at 11/09/24 0917    dextrose 50 % injection 12.5 g, 12.5 g, intravenous, q15 min PRN, BASILIA Mena-CNP    dextrose 50 % injection 25 g, 25 g, intravenous, q15 min PRN, BASILIA Mena-CNP    glucagon (Glucagen) injection 1 mg, 1 mg, intramuscular, q15 min PRN, BASILIA Mena-CNP    glucagon (Glucagen) injection 1 mg, 1 mg, intramuscular, q15 min PRN, BASILIA Mena-CNP    heparin (porcine) injection 5,000 Units, 5,000 Units, subcutaneous, q8h TIP, BASILIA Mena-CNP, 5,000 Units at 11/09/24 0638    hydrALAZINE (Apresoline) injection 5 mg, 5 mg, intravenous, q6h PRN, ERICA Mena, 5 mg at 11/08/24 0507    hydrALAZINE (Apresoline) tablet 100 mg, 100 mg, oral, TID, Julianne Elise MD, 100 mg at 11/09/24 0916    insulin NPH (Isophane) (HumuLIN N,NovoLIN N) injection 12 Units, 12 Units, subcutaneous, BID AC, ERICA Mena, 12 Units at 11/09/24 0917    latanoprost (Xalatan) 0.005 % ophthalmic solution 1 drop, 1 drop,  "Both Eyes, Nightly, BASILIA Mena-CNP, 1 drop at 11/08/24 2158    ondansetron (Zofran) injection 4 mg, 4 mg, intravenous, q6h PRN, BASILIA Mena-CNP, 4 mg at 11/07/24 1532    sennosides-docusate sodium (Steph-Colace) 8.6-50 mg per tablet 1 tablet, 1 tablet, oral, Nightly PRN, BASILIA Mena-CNP    sodium bicarbonate tablet 650 mg, 650 mg, oral, BID, BASILIA Mena-CNP, 650 mg at 11/09/24 0917    sodium chloride 0.9% infusion, 60 mL/hr, intravenous, Continuous, Timothy Garay MD    [Held by provider] spironolactone (Aldactone) tablet 25 mg, 25 mg, oral, q24h TIP, BASILIA Mena-CNP, 25 mg at 11/06/24 2111    timolol (Timoptic) 0.5 % ophthalmic solution 1 drop, 1 drop, Both Eyes, Daily, ERICA Mena, 1 drop at 11/09/24 0916       Intake/Output Summary (Last 24 hours) at 11/9/2024 1136  Last data filed at 11/8/2024 1808  Gross per 24 hour   Intake 1470 ml   Output --   Net 1470 ml       PHYSICAL EXAM:  /60 (BP Location: Left arm, Patient Position: Lying)   Pulse 59   Temp 36.9 °C (98.5 °F) (Oral)   Resp 18   Ht 1.626 m (5' 4\")   Wt 72.6 kg (160 lb)   SpO2 93%   BMI 27.46 kg/m²     Intake/Output Summary (Last 24 hours) at 11/9/2024 1136  Last data filed at 11/8/2024 1808  Gross per 24 hour   Intake 1470 ml   Output --   Net 1470 ml     Gen: AAO, NAD  Neck: No JVD  Cardiac: RRR  Resp: occasional L base crackles   Abd: Soft, non tender, +BS, non distended   Ext: No edema   Neuro: moves 4 ext  Peripheral Pulses: weak peripheral pulses.  Skin: Skin color, texture, turgor normal, no suspicious rashes or lesions.    Labs:  Results for orders placed or performed during the hospital encounter of 11/06/24 (from the past 24 hours)   POCT GLUCOSE   Result Value Ref Range    POCT Glucose 227 (H) 74 - 99 mg/dL   C3 complement   Result Value Ref Range    C3 Complement 67 (L) 87 - 200 mg/dL   C4 complement   Result Value Ref Range    C4 Complement 19 10 - 50 mg/dL   POCT GLUCOSE "   Result Value Ref Range    POCT Glucose 215 (H) 74 - 99 mg/dL   POCT GLUCOSE   Result Value Ref Range    POCT Glucose 223 (H) 74 - 99 mg/dL   Renal Function Panel   Result Value Ref Range    Glucose 147 (H) 74 - 99 mg/dL    Sodium 133 (L) 136 - 145 mmol/L    Potassium 4.7 3.5 - 5.3 mmol/L    Chloride 105 98 - 107 mmol/L    Bicarbonate 21 21 - 32 mmol/L    Anion Gap 12 10 - 20 mmol/L    Urea Nitrogen 57 (H) 6 - 23 mg/dL    Creatinine 3.46 (H) 0.50 - 1.05 mg/dL    eGFR 12 (L) >60 mL/min/1.73m*2    Calcium 7.9 (L) 8.6 - 10.3 mg/dL    Phosphorus 5.0 (H) 2.5 - 4.9 mg/dL    Albumin 2.7 (L) 3.4 - 5.0 g/dL   Lavender Top   Result Value Ref Range    Extra Tube Hold for add-ons.    POCT GLUCOSE   Result Value Ref Range    POCT Glucose 145 (H) 74 - 99 mg/dL        DATA:   Diagnostic tests reviewed for today's visit:    New labs and imaging   Sed rate, PCR, RFP, CBC, urine studies seen    Assessment and Plan:   h/o CKD stage 4 under care of Dr Rosa, type 2 diabetes, hypertension, hypercholesterolemia, GERD, presenting with nausea vomiting for 1 day and elevated blood pressure. CT abdomen and pelvis without IV contrast showed Right colon colitis  - OLGA LIDIA on CKD stage 4: baseline Scr 1.8-2.2, OLGA LIDIA from volume contraction, slight better with gentle ivf, non oliguric but rising again, poor po intake   +UTI, on ATB, culture pending, not obstructed   PCR 2.7, UA with few wbc/rbc, neg culture, +inflammatory markers, ANCA and DYAN pending, normal complements   HTN: erratic control on several meds  Lytes and acid base: acceptable  Hb 9.6, plts OK     PLAN:  - gentle ivf today, poor intake  - Supportive care     Will continue to follow.   patient at-risk for clinically significant deterioration      Signature: Timothy Garay MD

## 2024-11-09 NOTE — PROGRESS NOTES
11/09/24 1139   Discharge Planning   Expected Discharge Disposition SNF     Once med ready plan is to discharge to [facility], received auth will exp 11/14, PT/OT both rec'd Mod on 11/7. Nephro following for CKD, creatine 3.46, urine cx pending, I will continue to monitor for discharge planing.

## 2024-11-09 NOTE — PROGRESS NOTES
"Carolann Cartagena is a 89 y.o. female on day 3 of admission presenting with Colitis.    Subjective   Had a fever of 100.3, creatinine bumped to 3.46 although blood pressures have improved patient has no new symptoms we will check for COVID.  Off has been received for [facility].       Objective     Physical Exam  Vitals reviewed.   Constitutional:       Appearance: Normal appearance.   HENT:      Head: Normocephalic.      Nose: Nose normal.   Cardiovascular:      Rate and Rhythm: Normal rate and regular rhythm.      Pulses: Normal pulses.      Heart sounds: Normal heart sounds.   Abdominal:      General: Abdomen is flat. Bowel sounds are normal.      Palpations: Abdomen is soft.   Skin:     General: Skin is warm and dry.      Capillary Refill: Capillary refill takes less than 2 seconds.   Neurological:      General: No focal deficit present.      Mental Status: She is alert.         Last Recorded Vitals  Blood pressure 173/60, pulse 59, temperature 36.9 °C (98.5 °F), temperature source Oral, resp. rate 18, height 1.626 m (5' 4\"), weight 72.6 kg (160 lb), SpO2 93%.  Intake/Output last 3 Shifts:  I/O last 3 completed shifts:  In: 1590 (21.9 mL/kg) [P.O.:120; I.V.:1470 (20.3 mL/kg)]  Out: 400 (5.5 mL/kg) [Urine:400 (0.2 mL/kg/hr)]  Weight: 72.6 kg     Relevant Results  Results for orders placed or performed during the hospital encounter of 11/06/24 (from the past 24 hours)   C3 complement   Result Value Ref Range    C3 Complement 67 (L) 87 - 200 mg/dL   C4 complement   Result Value Ref Range    C4 Complement 19 10 - 50 mg/dL   POCT GLUCOSE   Result Value Ref Range    POCT Glucose 215 (H) 74 - 99 mg/dL   POCT GLUCOSE   Result Value Ref Range    POCT Glucose 223 (H) 74 - 99 mg/dL   Renal Function Panel   Result Value Ref Range    Glucose 147 (H) 74 - 99 mg/dL    Sodium 133 (L) 136 - 145 mmol/L    Potassium 4.7 3.5 - 5.3 mmol/L    Chloride 105 98 - 107 mmol/L    Bicarbonate 21 21 - 32 mmol/L    Anion Gap 12 10 - 20 mmol/L "    Urea Nitrogen 57 (H) 6 - 23 mg/dL    Creatinine 3.46 (H) 0.50 - 1.05 mg/dL    eGFR 12 (L) >60 mL/min/1.73m*2    Calcium 7.9 (L) 8.6 - 10.3 mg/dL    Phosphorus 5.0 (H) 2.5 - 4.9 mg/dL    Albumin 2.7 (L) 3.4 - 5.0 g/dL   Lavender Top   Result Value Ref Range    Extra Tube Hold for add-ons.    POCT GLUCOSE   Result Value Ref Range    POCT Glucose 145 (H) 74 - 99 mg/dL       Imaging Results  Ct abd/pelvis:        IMPRESSION:  Right colon wall thickening consistent with nonspecific colitis.      Small amount of free fluid with mild edematous changes in the lung  bases, mesentery and soft tissues as well as trace pleural  effusions/thickening consistent with fluid overload/CHF.      Subtle density layer in the gallbladder, possible layering  noncalcified stones or sludge.      Additional findings as described above.    Head ct;  IMPRESSION:  No acute intracranial hemorrhage or mass-effect.    Cxr:  IMPRESSION:  No acute cardiopulmonary process.  Enlarged but unchanged heart compared to 06/12/2024.    Medications:  amLODIPine, 10 mg, oral, Daily  aspirin, 81 mg, oral, Daily  atorvastatin, 10 mg, oral, Nightly  brimonidine, 1 drop, Both Eyes, BID  carvedilol, 25 mg, oral, BID  heparin (porcine), 5,000 Units, subcutaneous, q8h TIP  hydrALAZINE, 100 mg, oral, TID  insulin NPH (Isophane), 12 Units, subcutaneous, BID AC  latanoprost, 1 drop, Both Eyes, Nightly  sodium bicarbonate, 650 mg, oral, BID  [Held by provider] spironolactone, 25 mg, oral, q24h TIP  timolol, 1 drop, Both Eyes, Daily       PRN medications: acetaminophen **OR** acetaminophen **OR** acetaminophen, dextrose, dextrose, glucagon, glucagon, hydrALAZINE, ondansetron, sennosides-docusate sodium     Assessment/Plan     1.  Accelerated hypertension  -Blood pressure reading on day of admission was 204/69, currently improved I did increase amlodipine to 10 mg oral daily, increased hydralazine  to 100mg po tid    2.  Acute kidney injury on CKD 4  -Baseline  creatinine 1.8-2.2 currently 3.4, holding Aldactone , encourage oral hydration    3. DM  -continue SSI and NPH    Updated daughter-dc to [facility] in am  DVT Prophylaxis:  Heparin subq        Julianne Elise MD  [facility]

## 2024-11-10 VITALS
WEIGHT: 160 LBS | SYSTOLIC BLOOD PRESSURE: 170 MMHG | HEART RATE: 55 BPM | BODY MASS INDEX: 27.31 KG/M2 | TEMPERATURE: 100.5 F | OXYGEN SATURATION: 98 % | HEIGHT: 64 IN | RESPIRATION RATE: 20 BRPM | DIASTOLIC BLOOD PRESSURE: 50 MMHG

## 2024-11-10 LAB
ANION GAP SERPL CALC-SCNC: 10 MMOL/L (ref 10–20)
BACTERIA BLD CULT: NORMAL
BACTERIA BLD CULT: NORMAL
BUN SERPL-MCNC: 65 MG/DL (ref 6–23)
CALCIUM SERPL-MCNC: 7.9 MG/DL (ref 8.6–10.3)
CHLORIDE SERPL-SCNC: 105 MMOL/L (ref 98–107)
CO2 SERPL-SCNC: 21 MMOL/L (ref 21–32)
CREAT SERPL-MCNC: 3.91 MG/DL (ref 0.5–1.05)
EGFRCR SERPLBLD CKD-EPI 2021: 10 ML/MIN/1.73M*2
GLUCOSE BLD MANUAL STRIP-MCNC: 108 MG/DL (ref 74–99)
GLUCOSE BLD MANUAL STRIP-MCNC: 135 MG/DL (ref 74–99)
GLUCOSE BLD MANUAL STRIP-MCNC: 161 MG/DL (ref 74–99)
GLUCOSE BLD MANUAL STRIP-MCNC: 183 MG/DL (ref 74–99)
GLUCOSE SERPL-MCNC: 188 MG/DL (ref 74–99)
HOLD SPECIMEN: NORMAL
POTASSIUM SERPL-SCNC: 4.6 MMOL/L (ref 3.5–5.3)
SODIUM SERPL-SCNC: 131 MMOL/L (ref 136–145)

## 2024-11-10 PROCEDURE — 99232 SBSQ HOSP IP/OBS MODERATE 35: CPT | Performed by: INTERNAL MEDICINE

## 2024-11-10 PROCEDURE — 2500000001 HC RX 250 WO HCPCS SELF ADMINISTERED DRUGS (ALT 637 FOR MEDICARE OP): Performed by: NURSE PRACTITIONER

## 2024-11-10 PROCEDURE — 1200000002 HC GENERAL ROOM WITH TELEMETRY DAILY

## 2024-11-10 PROCEDURE — 2500000004 HC RX 250 GENERAL PHARMACY W/ HCPCS (ALT 636 FOR OP/ED): Performed by: INTERNAL MEDICINE

## 2024-11-10 PROCEDURE — 99233 SBSQ HOSP IP/OBS HIGH 50: CPT | Performed by: INTERNAL MEDICINE

## 2024-11-10 PROCEDURE — 2500000002 HC RX 250 W HCPCS SELF ADMINISTERED DRUGS (ALT 637 FOR MEDICARE OP, ALT 636 FOR OP/ED): Performed by: NURSE PRACTITIONER

## 2024-11-10 PROCEDURE — 2500000004 HC RX 250 GENERAL PHARMACY W/ HCPCS (ALT 636 FOR OP/ED): Performed by: NURSE PRACTITIONER

## 2024-11-10 PROCEDURE — 36415 COLL VENOUS BLD VENIPUNCTURE: CPT | Performed by: INTERNAL MEDICINE

## 2024-11-10 PROCEDURE — 2500000001 HC RX 250 WO HCPCS SELF ADMINISTERED DRUGS (ALT 637 FOR MEDICARE OP): Performed by: INTERNAL MEDICINE

## 2024-11-10 PROCEDURE — 82947 ASSAY GLUCOSE BLOOD QUANT: CPT

## 2024-11-10 PROCEDURE — 80048 BASIC METABOLIC PNL TOTAL CA: CPT | Performed by: INTERNAL MEDICINE

## 2024-11-10 RX ADMIN — CARVEDILOL 25 MG: 12.5 TABLET, FILM COATED ORAL at 08:53

## 2024-11-10 RX ADMIN — SODIUM BICARBONATE 650 MG: 650 TABLET ORAL at 22:08

## 2024-11-10 RX ADMIN — INSULIN HUMAN 12 UNITS: 100 INJECTION, SUSPENSION SUBCUTANEOUS at 17:08

## 2024-11-10 RX ADMIN — SODIUM BICARBONATE 650 MG: 650 TABLET ORAL at 08:53

## 2024-11-10 RX ADMIN — ATORVASTATIN CALCIUM 10 MG: 20 TABLET ORAL at 22:08

## 2024-11-10 RX ADMIN — INSULIN HUMAN 12 UNITS: 100 INJECTION, SUSPENSION SUBCUTANEOUS at 08:52

## 2024-11-10 RX ADMIN — AMLODIPINE BESYLATE 10 MG: 10 TABLET ORAL at 08:53

## 2024-11-10 RX ADMIN — ACETAMINOPHEN 650 MG: 325 TABLET, FILM COATED ORAL at 22:13

## 2024-11-10 RX ADMIN — HEPARIN SODIUM 5000 UNITS: 5000 INJECTION INTRAVENOUS; SUBCUTANEOUS at 22:08

## 2024-11-10 RX ADMIN — HEPARIN SODIUM 5000 UNITS: 5000 INJECTION INTRAVENOUS; SUBCUTANEOUS at 14:53

## 2024-11-10 RX ADMIN — TIMOLOL MALEATE 1 DROP: 5 SOLUTION/ DROPS OPHTHALMIC at 08:54

## 2024-11-10 RX ADMIN — SODIUM CHLORIDE 60 ML/HR: 9 INJECTION, SOLUTION INTRAVENOUS at 03:34

## 2024-11-10 RX ADMIN — HYDRALAZINE HYDROCHLORIDE 100 MG: 50 TABLET ORAL at 22:31

## 2024-11-10 RX ADMIN — HYDRALAZINE HYDROCHLORIDE 100 MG: 50 TABLET ORAL at 14:53

## 2024-11-10 RX ADMIN — HEPARIN SODIUM 5000 UNITS: 5000 INJECTION INTRAVENOUS; SUBCUTANEOUS at 06:19

## 2024-11-10 RX ADMIN — BRIMONIDINE TARTRATE 1 DROP: 2 SOLUTION/ DROPS OPHTHALMIC at 22:10

## 2024-11-10 RX ADMIN — ASPIRIN 81 MG: 81 TABLET, COATED ORAL at 08:53

## 2024-11-10 RX ADMIN — BRIMONIDINE TARTRATE 1 DROP: 2 SOLUTION/ DROPS OPHTHALMIC at 08:54

## 2024-11-10 RX ADMIN — LATANOPROST 1 DROP: 50 SOLUTION/ DROPS OPHTHALMIC at 22:10

## 2024-11-10 RX ADMIN — CARVEDILOL 25 MG: 12.5 TABLET, FILM COATED ORAL at 22:19

## 2024-11-10 RX ADMIN — HYDRALAZINE HYDROCHLORIDE 100 MG: 50 TABLET ORAL at 08:53

## 2024-11-10 ASSESSMENT — COGNITIVE AND FUNCTIONAL STATUS - GENERAL
WALKING IN HOSPITAL ROOM: A LOT
STANDING UP FROM CHAIR USING ARMS: A LOT
MOVING TO AND FROM BED TO CHAIR: A LITTLE
MOBILITY SCORE: 14
STANDING UP FROM CHAIR USING ARMS: A LOT
EATING MEALS: A LITTLE
CLIMB 3 TO 5 STEPS WITH RAILING: A LOT
MOBILITY SCORE: 14
DRESSING REGULAR UPPER BODY CLOTHING: A LITTLE
PERSONAL GROOMING: A LITTLE
EATING MEALS: A LITTLE
TURNING FROM BACK TO SIDE WHILE IN FLAT BAD: A LOT
CLIMB 3 TO 5 STEPS WITH RAILING: A LOT
TOILETING: A LITTLE
DRESSING REGULAR LOWER BODY CLOTHING: A LITTLE
DAILY ACTIVITIY SCORE: 18
MOVING FROM LYING ON BACK TO SITTING ON SIDE OF FLAT BED WITH BEDRAILS: A LITTLE
PERSONAL GROOMING: A LITTLE
MOVING FROM LYING ON BACK TO SITTING ON SIDE OF FLAT BED WITH BEDRAILS: A LITTLE
DRESSING REGULAR UPPER BODY CLOTHING: A LITTLE
MOVING TO AND FROM BED TO CHAIR: A LITTLE
DAILY ACTIVITIY SCORE: 18
TOILETING: A LITTLE
HELP NEEDED FOR BATHING: A LITTLE
TURNING FROM BACK TO SIDE WHILE IN FLAT BAD: A LOT
WALKING IN HOSPITAL ROOM: A LOT
DRESSING REGULAR LOWER BODY CLOTHING: A LITTLE
HELP NEEDED FOR BATHING: A LITTLE

## 2024-11-10 NOTE — PROGRESS NOTES
"Carolann Cartagena is a 89 y.o. female on day 4 of admission presenting with Colitis.    Subjective   Creatinine worsening at 3.9, blood pressure stable, now family would like the patient to go to [place], [place].  Finishing up IV hydration today encourage p.o.       Objective     Physical Exam  Vitals reviewed.   Constitutional:       Appearance: Normal appearance.   HENT:      Head: Normocephalic.      Nose: Nose normal.   Cardiovascular:      Rate and Rhythm: Normal rate and regular rhythm.      Pulses: Normal pulses.      Heart sounds: Normal heart sounds.   Abdominal:      General: Abdomen is flat. Bowel sounds are normal.      Palpations: Abdomen is soft.   Skin:     General: Skin is warm and dry.      Capillary Refill: Capillary refill takes less than 2 seconds.   Neurological:      General: No focal deficit present.      Mental Status: She is alert.         Last Recorded Vitals  Blood pressure 170/58, pulse 52, temperature 36.9 °C (98.5 °F), temperature source Oral, resp. rate 18, height 1.626 m (5' 4\"), weight 72.6 kg (160 lb), SpO2 99%.  Intake/Output last 3 Shifts:  I/O last 3 completed shifts:  In: 750 (10.3 mL/kg) [I.V.:750 (10.3 mL/kg)]  Out: 700 (9.6 mL/kg) [Urine:700 (0.3 mL/kg/hr)]  Weight: 72.6 kg     Relevant Results  Results for orders placed or performed during the hospital encounter of 11/06/24 (from the past 24 hours)   POCT GLUCOSE   Result Value Ref Range    POCT Glucose 197 (H) 74 - 99 mg/dL   Sars-CoV-2 PCR   Result Value Ref Range    Coronavirus 2019, PCR Not Detected Not Detected   POCT GLUCOSE   Result Value Ref Range    POCT Glucose 172 (H) 74 - 99 mg/dL   POCT GLUCOSE   Result Value Ref Range    POCT Glucose 225 (H) 74 - 99 mg/dL   Basic metabolic panel   Result Value Ref Range    Glucose 188 (H) 74 - 99 mg/dL    Sodium 131 (L) 136 - 145 mmol/L    Potassium 4.6 3.5 - 5.3 mmol/L    Chloride 105 98 - 107 mmol/L    Bicarbonate 21 21 - 32 mmol/L    Anion Gap 10 10 - 20 mmol/L    Urea " Nitrogen 65 (H) 6 - 23 mg/dL    Creatinine 3.91 (H) 0.50 - 1.05 mg/dL    eGFR 10 (L) >60 mL/min/1.73m*2    Calcium 7.9 (L) 8.6 - 10.3 mg/dL   POCT GLUCOSE   Result Value Ref Range    POCT Glucose 183 (H) 74 - 99 mg/dL       Imaging Results  Ct abd/pelvis:        IMPRESSION:  Right colon wall thickening consistent with nonspecific colitis.      Small amount of free fluid with mild edematous changes in the lung  bases, mesentery and soft tissues as well as trace pleural  effusions/thickening consistent with fluid overload/CHF.      Subtle density layer in the gallbladder, possible layering  noncalcified stones or sludge.      Additional findings as described above.    Head ct;  IMPRESSION:  No acute intracranial hemorrhage or mass-effect.    Cxr:  IMPRESSION:  No acute cardiopulmonary process.  Enlarged but unchanged heart compared to 06/12/2024.    Medications:  amLODIPine, 10 mg, oral, Daily  aspirin, 81 mg, oral, Daily  atorvastatin, 10 mg, oral, Nightly  brimonidine, 1 drop, Both Eyes, BID  carvedilol, 25 mg, oral, BID  heparin (porcine), 5,000 Units, subcutaneous, q8h TIP  hydrALAZINE, 100 mg, oral, TID  insulin NPH (Isophane), 12 Units, subcutaneous, BID AC  latanoprost, 1 drop, Both Eyes, Nightly  sodium bicarbonate, 650 mg, oral, BID  [Held by provider] spironolactone, 25 mg, oral, q24h TIP  timolol, 1 drop, Both Eyes, Daily       PRN medications: acetaminophen **OR** acetaminophen **OR** acetaminophen, dextrose, dextrose, glucagon, glucagon, hydrALAZINE, ondansetron, sennosides-docusate sodium     Assessment/Plan     1.  Accelerated hypertension  -Blood pressure reading on day of admission was 204/69, currently improved I did increase amlodipine to 10 mg oral daily, increased hydralazine  to 100mg po tid    2.  Acute kidney injury on CKD 4  -Baseline creatinine 1.8-2.2 currently 3.9, holding Aldactone , encourage oral hydration    3. DM  -continue SSI and NPH    Updated daughter-now they would like [facility]  DVT Prophylaxis:  Heparin subq        Julianne Elise MD  [facility]

## 2024-11-10 NOTE — CARE PLAN
The patient's goals for the shift include      The clinical goals for the shift include pt will remain free of injury this shift    Over the shift, the patient did make progress toward the following goals.   Problem: Pain - Adult  Goal: Verbalizes/displays adequate comfort level or baseline comfort level  Outcome: Progressing     Problem: Safety - Adult  Goal: Free from fall injury  Outcome: Progressing     Problem: Discharge Planning  Goal: Discharge to home or other facility with appropriate resources  Outcome: Progressing     Problem: Chronic Conditions and Co-morbidities  Goal: Patient's chronic conditions and co-morbidity symptoms are monitored and maintained or improved  Outcome: Progressing     Problem: Diabetes  Goal: Achieve decreasing blood glucose levels by end of shift  Outcome: Progressing  Goal: Increase stability of blood glucose readings by end of shift  Outcome: Progressing  Goal: Decrease in ketones present in urine by end of shift  Outcome: Progressing  Goal: Maintain electrolyte levels within acceptable range throughout shift  Outcome: Progressing  Goal: Maintain glucose levels >70mg/dl to <250mg/dl throughout shift  Outcome: Progressing  Goal: No changes in neurological exam by end of shift  Outcome: Progressing  Goal: Learn about and adhere to nutrition recommendations by end of shift  Outcome: Progressing  Goal: Vital signs within normal range for age by end of shift  Outcome: Progressing  Goal: Increase self care and/or family involovement by end of shift  Outcome: Progressing  Goal: Receive DSME education by end of shift  Outcome: Progressing     Problem: Skin  Goal: Decreased wound size/increased tissue granulation at next dressing change  Outcome: Progressing  Flowsheets (Taken 11/10/2024 0233)  Decreased wound size/increased tissue granulation at next dressing change: Promote sleep for wound healing  Goal: Participates in plan/prevention/treatment measures  Outcome:  Progressing  Flowsheets (Taken 11/10/2024 0233)  Participates in plan/prevention/treatment measures: Elevate heels  Goal: Prevent/manage excess moisture  Outcome: Progressing  Flowsheets (Taken 11/10/2024 0233)  Prevent/manage excess moisture: Cleanse incontinence/protect with barrier cream  Goal: Prevent/minimize sheer/friction injuries  Outcome: Progressing  Flowsheets (Taken 11/10/2024 0233)  Prevent/minimize sheer/friction injuries: Use pull sheet  Goal: Promote/optimize nutrition  Outcome: Progressing  Flowsheets (Taken 11/10/2024 0233)  Promote/optimize nutrition: Monitor/record intake including meals  Goal: Promote skin healing  Outcome: Progressing  Flowsheets (Taken 11/10/2024 0233)  Promote skin healing: Turn/reposition every 2 hours/use positioning/transfer devices

## 2024-11-10 NOTE — PROGRESS NOTES
Nephrology Consult Progress Note    Admit Date: 11/6/2024    Interval history:  Resolved N/V, eating better    CURRENT MEDICATIONS:    Current Facility-Administered Medications:     acetaminophen (Tylenol) tablet 650 mg, 650 mg, oral, q4h PRN, 650 mg at 11/09/24 0651 **OR** acetaminophen (Tylenol) oral liquid 650 mg, 650 mg, oral, q4h PRN **OR** acetaminophen (Tylenol) suppository 650 mg, 650 mg, rectal, q4h PRN, BASILIA Mena-CNP    amLODIPine (Norvasc) tablet 10 mg, 10 mg, oral, Daily, Julianne Elise MD, 10 mg at 11/10/24 0853    aspirin EC tablet 81 mg, 81 mg, oral, Daily, BASILIA Mena-CNP, 81 mg at 11/10/24 0853    atorvastatin (Lipitor) tablet 10 mg, 10 mg, oral, Nightly, BASILIA Mena-CNP, 10 mg at 11/09/24 2149    brimonidine (AlphaGAN) 0.2 % ophthalmic solution 1 drop, 1 drop, Both Eyes, BID, BASILIA Mena-CNP, 1 drop at 11/10/24 0854    carvedilol (Coreg) tablet 25 mg, 25 mg, oral, BID, BASILIA Mena-CNP, 25 mg at 11/10/24 0853    dextrose 50 % injection 12.5 g, 12.5 g, intravenous, q15 min PRN, BASILIA Mena-CNP    dextrose 50 % injection 25 g, 25 g, intravenous, q15 min PRN, BASILIA Mena-CNP    glucagon (Glucagen) injection 1 mg, 1 mg, intramuscular, q15 min PRN, BASILIA Mena-CNP    glucagon (Glucagen) injection 1 mg, 1 mg, intramuscular, q15 min PRN, BASILIA Mena-CNP    heparin (porcine) injection 5,000 Units, 5,000 Units, subcutaneous, q8h TIP, BASILIA Mena-CNP, 5,000 Units at 11/10/24 0619    hydrALAZINE (Apresoline) injection 5 mg, 5 mg, intravenous, q6h PRN, ERICA Mena, 5 mg at 11/08/24 0507    hydrALAZINE (Apresoline) tablet 100 mg, 100 mg, oral, TID, Julianne lEise MD, 100 mg at 11/10/24 0853    insulin NPH (Isophane) (HumuLIN N,NovoLIN N) injection 12 Units, 12 Units, subcutaneous, BID AC, ERICA Mena, 12 Units at 11/10/24 0852    latanoprost (Xalatan) 0.005 % ophthalmic solution 1 drop, 1 drop, Both Eyes,  "Nightly, ERICA Mena, 1 drop at 11/09/24 2150    ondansetron (Zofran) injection 4 mg, 4 mg, intravenous, q6h PRN, BASILIA Mena-CNP, 4 mg at 11/07/24 1532    sennosides-docusate sodium (Steph-Colace) 8.6-50 mg per tablet 1 tablet, 1 tablet, oral, Nightly PRN, ERICA Mena    sodium bicarbonate tablet 650 mg, 650 mg, oral, BID, ERICA Mena, 650 mg at 11/10/24 0853    sodium chloride 0.9% infusion, 60 mL/hr, intravenous, Continuous, Timothy Garay MD, Last Rate: 60 mL/hr at 11/10/24 0334, 60 mL/hr at 11/10/24 0334    [Held by provider] spironolactone (Aldactone) tablet 25 mg, 25 mg, oral, q24h TIP, BASILIA Mena-CNP, 25 mg at 11/06/24 2111    timolol (Timoptic) 0.5 % ophthalmic solution 1 drop, 1 drop, Both Eyes, Daily, ERICA Mena, 1 drop at 11/10/24 0854       Intake/Output Summary (Last 24 hours) at 11/10/2024 1059  Last data filed at 11/10/2024 0349  Gross per 24 hour   Intake 750 ml   Output 700 ml   Net 50 ml       PHYSICAL EXAM:  /58 (BP Location: Right arm, Patient Position: Lying)   Pulse 52   Temp 36.9 °C (98.5 °F) (Oral)   Resp 18   Ht 1.626 m (5' 4\")   Wt 72.6 kg (160 lb)   SpO2 99%   BMI 27.46 kg/m²     Intake/Output Summary (Last 24 hours) at 11/10/2024 1059  Last data filed at 11/10/2024 0349  Gross per 24 hour   Intake 750 ml   Output 700 ml   Net 50 ml     Gen: AAO, NAD  Neck: No JVD  Cardiac: RRR  Resp: occasional L base crackles   Abd: Soft, non tender, +BS, non distended   Ext: No edema   Neuro: moves 4 ext  Peripheral Pulses: weak peripheral pulses.  Skin: Skin color, texture, turgor normal, no suspicious rashes or lesions.    Labs:  Results for orders placed or performed during the hospital encounter of 11/06/24 (from the past 24 hours)   POCT GLUCOSE   Result Value Ref Range    POCT Glucose 197 (H) 74 - 99 mg/dL   Sars-CoV-2 PCR   Result Value Ref Range    Coronavirus 2019, PCR Not Detected Not Detected   POCT GLUCOSE "   Result Value Ref Range    POCT Glucose 172 (H) 74 - 99 mg/dL   POCT GLUCOSE   Result Value Ref Range    POCT Glucose 225 (H) 74 - 99 mg/dL        DATA:   Diagnostic tests reviewed for today's visit:    New labs and imaging   Sed rate, PCR, RFP, CBC, urine studies seen    Assessment and Plan:   h/o CKD stage 4 under care of Dr Rosa, type 2 diabetes, hypertension, hypercholesterolemia, GERD, presenting with nausea vomiting for 1 day and elevated blood pressure. CT abdomen and pelvis without IV contrast showed Right colon colitis  - OLGA LIDIA on CKD stage 4: baseline Scr 1.8-2.2, OLGA LIDIA from volume contraction, ATN, getting ivf and not much better yet  UA with +wbc and rbc, PCR 2, neg urine culture, no clear signs of AIN,  +inflammatory markers, ANCA and DYAN pending, normal complements   HTN: erratic control on several meds  Lytes and acid base: acceptable  Hb 9.6, plts OK     PLAN:  - to finish ivf today, improving po intake  - Supportive care, following labs     Will continue to follow.   patient at-risk for clinically significant deterioration      Signature: Timothy Garay MD

## 2024-11-10 NOTE — PROGRESS NOTES
11/10/24 1254   Discharge Planning   Expected Discharge Disposition SNF     TCC spoke with pt dtr Heather on the phone. Family is requesting pt dc to [facility]. Pt pcp follows there. TCC sent message to have auth transferred if able.

## 2024-11-11 ENCOUNTER — APPOINTMENT (OUTPATIENT)
Dept: RADIOLOGY | Facility: HOSPITAL | Age: 89
End: 2024-11-11
Payer: MEDICARE

## 2024-11-11 LAB
ANION GAP SERPL CALC-SCNC: 12 MMOL/L (ref 10–20)
APPEARANCE UR: ABNORMAL
BILIRUB UR STRIP.AUTO-MCNC: NEGATIVE MG/DL
BUN SERPL-MCNC: 66 MG/DL (ref 6–23)
CALCIUM SERPL-MCNC: 7.9 MG/DL (ref 8.6–10.3)
CHLORIDE SERPL-SCNC: 103 MMOL/L (ref 98–107)
CO2 SERPL-SCNC: 21 MMOL/L (ref 21–32)
COLOR UR: YELLOW
CREAT SERPL-MCNC: 4.13 MG/DL (ref 0.5–1.05)
CREAT UR-MCNC: 114 MG/DL (ref 20–320)
CREAT UR-MCNC: 114 MG/DL (ref 20–320)
EGFRCR SERPLBLD CKD-EPI 2021: 10 ML/MIN/1.73M*2
FLUAV RNA RESP QL NAA+PROBE: NOT DETECTED
FLUBV RNA RESP QL NAA+PROBE: NOT DETECTED
GLUCOSE BLD MANUAL STRIP-MCNC: 124 MG/DL (ref 74–99)
GLUCOSE BLD MANUAL STRIP-MCNC: 154 MG/DL (ref 74–99)
GLUCOSE BLD MANUAL STRIP-MCNC: 251 MG/DL (ref 74–99)
GLUCOSE BLD MANUAL STRIP-MCNC: 92 MG/DL (ref 74–99)
GLUCOSE SERPL-MCNC: 101 MG/DL (ref 74–99)
GLUCOSE UR STRIP.AUTO-MCNC: NORMAL MG/DL
KETONES UR STRIP.AUTO-MCNC: NEGATIVE MG/DL
LEUKOCYTE ESTERASE UR QL STRIP.AUTO: ABNORMAL
NITRITE UR QL STRIP.AUTO: NEGATIVE
PH UR STRIP.AUTO: 5 [PH]
POTASSIUM SERPL-SCNC: 4.9 MMOL/L (ref 3.5–5.3)
PROCALCITONIN SERPL-MCNC: 0.35 NG/ML
PROT UR STRIP.AUTO-MCNC: ABNORMAL MG/DL
RBC # UR STRIP.AUTO: ABNORMAL /UL
RBC #/AREA URNS AUTO: >20 /HPF
SODIUM SERPL-SCNC: 131 MMOL/L (ref 136–145)
SODIUM UR-SCNC: 39 MMOL/L
SODIUM/CREAT UR-RTO: 34 MMOL/G CREAT
SP GR UR STRIP.AUTO: 1.01
SQUAMOUS #/AREA URNS AUTO: ABNORMAL /HPF
UROBILINOGEN UR STRIP.AUTO-MCNC: NORMAL MG/DL
WBC #/AREA URNS AUTO: ABNORMAL /HPF

## 2024-11-11 PROCEDURE — 81001 URINALYSIS AUTO W/SCOPE: CPT | Performed by: INTERNAL MEDICINE

## 2024-11-11 PROCEDURE — 1200000002 HC GENERAL ROOM WITH TELEMETRY DAILY

## 2024-11-11 PROCEDURE — 86235 NUCLEAR ANTIGEN ANTIBODY: CPT | Performed by: INTERNAL MEDICINE

## 2024-11-11 PROCEDURE — 97110 THERAPEUTIC EXERCISES: CPT | Mod: GP,CQ

## 2024-11-11 PROCEDURE — 99233 SBSQ HOSP IP/OBS HIGH 50: CPT | Performed by: INTERNAL MEDICINE

## 2024-11-11 PROCEDURE — 97530 THERAPEUTIC ACTIVITIES: CPT | Mod: GP,CQ

## 2024-11-11 PROCEDURE — 2500000002 HC RX 250 W HCPCS SELF ADMINISTERED DRUGS (ALT 637 FOR MEDICARE OP, ALT 636 FOR OP/ED): Performed by: NURSE PRACTITIONER

## 2024-11-11 PROCEDURE — 84145 PROCALCITONIN (PCT): CPT | Mod: AHULAB | Performed by: INTERNAL MEDICINE

## 2024-11-11 PROCEDURE — 87502 INFLUENZA DNA AMP PROBE: CPT | Performed by: INTERNAL MEDICINE

## 2024-11-11 PROCEDURE — 71046 X-RAY EXAM CHEST 2 VIEWS: CPT

## 2024-11-11 PROCEDURE — 84300 ASSAY OF URINE SODIUM: CPT | Performed by: INTERNAL MEDICINE

## 2024-11-11 PROCEDURE — 2500000001 HC RX 250 WO HCPCS SELF ADMINISTERED DRUGS (ALT 637 FOR MEDICARE OP): Performed by: NURSE PRACTITIONER

## 2024-11-11 PROCEDURE — 2500000004 HC RX 250 GENERAL PHARMACY W/ HCPCS (ALT 636 FOR OP/ED): Performed by: NURSE PRACTITIONER

## 2024-11-11 PROCEDURE — 2500000001 HC RX 250 WO HCPCS SELF ADMINISTERED DRUGS (ALT 637 FOR MEDICARE OP): Performed by: INTERNAL MEDICINE

## 2024-11-11 PROCEDURE — 87040 BLOOD CULTURE FOR BACTERIA: CPT | Mod: AHULAB | Performed by: INTERNAL MEDICINE

## 2024-11-11 PROCEDURE — 89190 NASAL SMEAR FOR EOSINOPHILS: CPT | Mod: AHULAB | Performed by: INTERNAL MEDICINE

## 2024-11-11 PROCEDURE — 82947 ASSAY GLUCOSE BLOOD QUANT: CPT

## 2024-11-11 PROCEDURE — 82374 ASSAY BLOOD CARBON DIOXIDE: CPT | Performed by: INTERNAL MEDICINE

## 2024-11-11 PROCEDURE — 71046 X-RAY EXAM CHEST 2 VIEWS: CPT | Performed by: RADIOLOGY

## 2024-11-11 PROCEDURE — 36415 COLL VENOUS BLD VENIPUNCTURE: CPT | Performed by: INTERNAL MEDICINE

## 2024-11-11 PROCEDURE — 82570 ASSAY OF URINE CREATININE: CPT | Performed by: INTERNAL MEDICINE

## 2024-11-11 RX ADMIN — TIMOLOL MALEATE 1 DROP: 5 SOLUTION/ DROPS OPHTHALMIC at 08:30

## 2024-11-11 RX ADMIN — INSULIN HUMAN 12 UNITS: 100 INJECTION, SUSPENSION SUBCUTANEOUS at 15:30

## 2024-11-11 RX ADMIN — ACETAMINOPHEN 650 MG: 325 TABLET, FILM COATED ORAL at 15:20

## 2024-11-11 RX ADMIN — HYDRALAZINE HYDROCHLORIDE 100 MG: 50 TABLET ORAL at 08:29

## 2024-11-11 RX ADMIN — BRIMONIDINE TARTRATE 1 DROP: 2 SOLUTION/ DROPS OPHTHALMIC at 08:31

## 2024-11-11 RX ADMIN — HEPARIN SODIUM 5000 UNITS: 5000 INJECTION INTRAVENOUS; SUBCUTANEOUS at 05:18

## 2024-11-11 RX ADMIN — SODIUM BICARBONATE 650 MG: 650 TABLET ORAL at 20:48

## 2024-11-11 RX ADMIN — HEPARIN SODIUM 5000 UNITS: 5000 INJECTION INTRAVENOUS; SUBCUTANEOUS at 21:27

## 2024-11-11 RX ADMIN — CARVEDILOL 25 MG: 12.5 TABLET, FILM COATED ORAL at 20:48

## 2024-11-11 RX ADMIN — AMLODIPINE BESYLATE 10 MG: 10 TABLET ORAL at 08:29

## 2024-11-11 RX ADMIN — HEPARIN SODIUM 5000 UNITS: 5000 INJECTION INTRAVENOUS; SUBCUTANEOUS at 15:20

## 2024-11-11 RX ADMIN — BRIMONIDINE TARTRATE 1 DROP: 2 SOLUTION/ DROPS OPHTHALMIC at 20:48

## 2024-11-11 RX ADMIN — LATANOPROST 1 DROP: 50 SOLUTION/ DROPS OPHTHALMIC at 20:48

## 2024-11-11 RX ADMIN — ATORVASTATIN CALCIUM 10 MG: 20 TABLET ORAL at 20:48

## 2024-11-11 RX ADMIN — CARVEDILOL 25 MG: 12.5 TABLET, FILM COATED ORAL at 08:28

## 2024-11-11 RX ADMIN — SODIUM BICARBONATE 650 MG: 650 TABLET ORAL at 08:29

## 2024-11-11 RX ADMIN — ASPIRIN 81 MG: 81 TABLET, COATED ORAL at 08:29

## 2024-11-11 ASSESSMENT — COGNITIVE AND FUNCTIONAL STATUS - GENERAL
STANDING UP FROM CHAIR USING ARMS: A LITTLE
MOVING FROM LYING ON BACK TO SITTING ON SIDE OF FLAT BED WITH BEDRAILS: A LITTLE
MOBILITY SCORE: 16
CLIMB 3 TO 5 STEPS WITH RAILING: TOTAL
MOVING TO AND FROM BED TO CHAIR: A LITTLE
WALKING IN HOSPITAL ROOM: A LITTLE
TURNING FROM BACK TO SIDE WHILE IN FLAT BAD: A LITTLE

## 2024-11-11 ASSESSMENT — PAIN SCALES - WONG BAKER: WONGBAKER_NUMERICALRESPONSE: NO HURT

## 2024-11-11 ASSESSMENT — PAIN SCALES - GENERAL
PAINLEVEL_OUTOF10: 0 - NO PAIN

## 2024-11-11 ASSESSMENT — PAIN - FUNCTIONAL ASSESSMENT: PAIN_FUNCTIONAL_ASSESSMENT: 0-10

## 2024-11-11 NOTE — PROGRESS NOTES
Occupational Therapy                 Therapy Communication Note    Patient Name: Carolann Cartagena  MRN: [id number]  Department: [facility]  Room: 525/525-A  Today's Date: 11/11/2024     Discipline: Occupational Therapy    Missed Visit Reason: Missed Visit Reason: Patient in a medical procedure (Attempted OT Tx, per RN, pt off the floor for chest XRAY.)    Missed Time: Attempt

## 2024-11-11 NOTE — PROGRESS NOTES
11/11/24 1343   Discharge Planning   Expected Discharge Disposition SNF     Auth was able to be transferred to [facility]. Family is aware Auth good until 11/14. Adod 1-2 days.

## 2024-11-11 NOTE — PROGRESS NOTES
"Carolann Cartagena is a 89 y.o. female on day 5 of admission presenting with Colitis.    Subjective   Did have a fever yesterday of 100.5, check chest x-ray UA, check influenza swab procalcitonin.  Updated daughters at bedside.  Patient states she does not feel well but does not offer specific symptoms, creatinine continues to worsen at 4.1       Objective     Physical Exam  Vitals reviewed.   Constitutional:       Appearance: Normal appearance.   HENT:      Head: Normocephalic.      Nose: Nose normal.   Cardiovascular:      Rate and Rhythm: Normal rate and regular rhythm.      Pulses: Normal pulses.      Heart sounds: Normal heart sounds.   Abdominal:      General: Abdomen is flat. Bowel sounds are normal.      Palpations: Abdomen is soft.   Skin:     General: Skin is warm and dry.      Capillary Refill: Capillary refill takes less than 2 seconds.   Neurological:      General: No focal deficit present.      Mental Status: She is alert.         Last Recorded Vitals  Blood pressure 147/69, pulse 55, temperature 37.4 °C (99.4 °F), temperature source Oral, resp. rate 19, height 1.626 m (5' 4\"), weight 72.6 kg (160 lb), SpO2 99%.  Intake/Output last 3 Shifts:  I/O last 3 completed shifts:  In: - (0 mL/kg)   Out: 900 (12.4 mL/kg) [Urine:900 (0.3 mL/kg/hr)]  Weight: 72.6 kg     Relevant Results  Results for orders placed or performed during the hospital encounter of 11/06/24 (from the past 24 hours)   POCT GLUCOSE   Result Value Ref Range    POCT Glucose 161 (H) 74 - 99 mg/dL   POCT GLUCOSE   Result Value Ref Range    POCT Glucose 135 (H) 74 - 99 mg/dL   POCT GLUCOSE   Result Value Ref Range    POCT Glucose 108 (H) 74 - 99 mg/dL   POCT GLUCOSE   Result Value Ref Range    POCT Glucose 92 74 - 99 mg/dL   Basic metabolic panel   Result Value Ref Range    Glucose 101 (H) 74 - 99 mg/dL    Sodium 131 (L) 136 - 145 mmol/L    Potassium 4.9 3.5 - 5.3 mmol/L    Chloride 103 98 - 107 mmol/L    Bicarbonate 21 21 - 32 mmol/L    Anion Gap 12 " 10 - 20 mmol/L    Urea Nitrogen 66 (H) 6 - 23 mg/dL    Creatinine 4.13 (H) 0.50 - 1.05 mg/dL    eGFR 10 (L) >60 mL/min/1.73m*2    Calcium 7.9 (L) 8.6 - 10.3 mg/dL   Influenza A, and B PCR   Result Value Ref Range    Flu A Result Not Detected Not Detected    Flu B Result Not Detected Not Detected   POCT GLUCOSE   Result Value Ref Range    POCT Glucose 154 (H) 74 - 99 mg/dL       Imaging Results  Ct abd/pelvis:        IMPRESSION:  Right colon wall thickening consistent with nonspecific colitis.      Small amount of free fluid with mild edematous changes in the lung  bases, mesentery and soft tissues as well as trace pleural  effusions/thickening consistent with fluid overload/CHF.      Subtle density layer in the gallbladder, possible layering  noncalcified stones or sludge.      Additional findings as described above.    Head ct;  IMPRESSION:  No acute intracranial hemorrhage or mass-effect.    Cxr:  IMPRESSION:  No acute cardiopulmonary process.  Enlarged but unchanged heart compared to 06/12/2024.    Medications:  amLODIPine, 10 mg, oral, Daily  aspirin, 81 mg, oral, Daily  atorvastatin, 10 mg, oral, Nightly  brimonidine, 1 drop, Both Eyes, BID  carvedilol, 25 mg, oral, BID  heparin (porcine), 5,000 Units, subcutaneous, q8h TIP  hydrALAZINE, 100 mg, oral, TID  insulin NPH (Isophane), 12 Units, subcutaneous, BID AC  latanoprost, 1 drop, Both Eyes, Nightly  sodium bicarbonate, 650 mg, oral, BID  [Held by provider] spironolactone, 25 mg, oral, q24h TIP  timolol, 1 drop, Both Eyes, Daily       PRN medications: acetaminophen **OR** acetaminophen **OR** acetaminophen, dextrose, dextrose, glucagon, glucagon, hydrALAZINE, ondansetron, sennosides-docusate sodium     Assessment/Plan     1.  Accelerated hypertension  -Blood pressure reading on day of admission was 204/69, currently improved I did increase amlodipine to 10 mg oral daily, increased hydralazine  to 100mg po tid    2.  Acute kidney injury on CKD 4  -Baseline  creatinine 1.8-2.2 currently 4.1, holding Aldactone , encourage oral hydration, awaiting nephro recommendations    3. DM  -continue SSI and NPH    4. Fever  -one episode yesterday  -check flu swab/bcx/ua/cxr  -pending procalcitonin    Updated daughter at bedside  DVT Prophylaxis:  Heparin subq        Julianne Elise MD  [facility]

## 2024-11-11 NOTE — CARE PLAN
The patient's goals for the shift include      The clinical goals for the shift include pt will remain free of injury this shift    Over the shift, the patient did not make progress toward the following goals. Barriers to progression include. Recommendations to address these barriers include.

## 2024-11-11 NOTE — PROGRESS NOTES
Carolann Cartagena is a 89 y.o. female on day 5 of admission presenting with Colitis.      Subjective   Seen and examined.   Reportedly feels ok. She denies nausea or emesis.   FRANDY.  She does not offer specific complaints.     Objective        Vitals 24HR  Heart Rate:  [55-59]   Temp:  [36.3 °C (97.3 °F)-38.1 °C (100.5 °F)]   Resp:  [19-20]   BP: (147-170)/(50-69)   SpO2:  [97 %-99 %]     Intake/Output last 3 Shifts:    Intake/Output Summary (Last 24 hours) at 11/11/2024 1405  Last data filed at 11/11/2024 1300  Gross per 24 hour   Intake --   Output 450 ml   Net -450 ml       Physical Exam  Constitutional: Well developed, awake/alert/oriented  x3, no distress, alert and cooperative   Eyes: PERRL, EOMI, clear sclera   ENMT: mucous membranes moist, no apparent injury, no lesions seen   Head/Neck: Neck supple, no JVD, trachea midline   Respiratory/Thorax: CTAB   Cardiovascular: Regular, rate and rhythm, systolic  murmur, normal S 1 and S 2   Gastrointestinal: Nondistended, soft, non-tender, no rebound tenderness or guarding   Musculoskeletal: ROM intact, no joint swelling, normal  strength   Extremities: normal extremities, no lower extremity  edema    Neurological: alert and oriented x3  No asterixis   Skin: Warm and dry, no lesions, no rashes     Scheduled Medications  amLODIPine, 10 mg, oral, Daily  aspirin, 81 mg, oral, Daily  atorvastatin, 10 mg, oral, Nightly  brimonidine, 1 drop, Both Eyes, BID  carvedilol, 25 mg, oral, BID  heparin (porcine), 5,000 Units, subcutaneous, q8h TIP  hydrALAZINE, 100 mg, oral, TID  insulin NPH (Isophane), 12 Units, subcutaneous, BID AC  latanoprost, 1 drop, Both Eyes, Nightly  sodium bicarbonate, 650 mg, oral, BID  [Held by provider] spironolactone, 25 mg, oral, q24h TIP  timolol, 1 drop, Both Eyes, Daily      Continuous medications     PRN medications: acetaminophen **OR** acetaminophen **OR** acetaminophen, dextrose, dextrose, glucagon, glucagon, hydrALAZINE, ondansetron,  sennosides-docusate sodium     Relevant Results  Results from last 7 days   Lab Units 11/08/24  1028 11/07/24  0551 11/06/24  1107   WBC AUTO x10*3/uL 6.0 5.4 6.9   HEMOGLOBIN g/dL 9.6* 9.1* 10.2*   HEMATOCRIT % 32.1* 29.6* 32.4*   PLATELETS AUTO x10*3/uL 237 232 231   NEUTROS PCT AUTO % 67.3 53.3 54.7   LYMPHS PCT AUTO % 18.3 26.1 27.6   MONOS PCT AUTO % 11.6 16.7 15.0   EOS PCT AUTO % 2.0 2.9 1.7     Results from last 7 days   Lab Units 11/11/24  0909 11/10/24  1030 11/09/24  0726   SODIUM mmol/L 131* 131* 133*   POTASSIUM mmol/L 4.9 4.6 4.7   CHLORIDE mmol/L 103 105 105   CO2 mmol/L 21 21 21   BUN mg/dL 66* 65* 57*   CREATININE mg/dL 4.13* 3.91* 3.46*   GLUCOSE mg/dL 101* 188* 147*   CALCIUM mg/dL 7.9* 7.9* 7.9*       CT head wo IV contrast   Final Result   No acute intracranial hemorrhage or mass-effect.        MACRO:   None.        Signed by: Loly Albarran 11/6/2024 1:44 PM   Dictation workstation:   [id number]      CT abdomen pelvis wo IV contrast   Final Result   Right colon wall thickening consistent with nonspecific colitis.        Small amount of free fluid with mild edematous changes in the lung   bases, mesentery and soft tissues as well as trace pleural   effusions/thickening consistent with fluid overload/CHF.        Subtle density layer in the gallbladder, possible layering   noncalcified stones or sludge.        Additional findings as described above.        MACRO:   None.        Signed by: Loly Albarran 11/6/2024 1:52 PM   Dictation workstation:   [id number]      XR chest 1 view   Final Result   No acute cardiopulmonary process.   Enlarged but unchanged heart compared to 06/12/2024.        MACRO:   None        Signed by: Yokasta Jackson 11/6/2024 11:42 AM   Dictation workstation:   [id number]      XR chest 2 views    (Results Pending)            Assessment/Plan      Carolann Cartagena is a 89 y.o. female with a past medical history of hypertension, hyperlipidemia, diabetes, chronic anemia, IgG kappa  monoclonal protein, chronic hyponatremia, HFpEF, moderate concentric LVH, mild aortic stenosis and mild to moderate pulmonary hypertension, and G4/A3 chronic kidney disease with a fluctuating creatinine baseline between 1.8 up to 2.3 mg/dL who presented with nausea, emesis and suboptimally controlled hypertension.  Reportedly this occurred prior to presentation.  Workup was notable for right colon wall thickening consistent with nonspecific colitis noted on noncontrast CT abdominal imaging.  There was no renal obstruction.  Her chest plain film was clear.  BNP was 400.  Her urinalysis was notable for proteinuria, blood, leuks, WBCs and RBCs.  Urine culture was sent with no growth noted.  Her creatinine was 2.76 upon presentation with further decline to 4.1 despite IV volume expansion.  Nephrology is consulted for acute kidney injury.    Unclear etiology of acute kidney injury.  Blood pressures were not low but rather high upon presentation.  No contrast exposure.  She denies anti-inflammatory use.  She reports essentially 1 episode of nausea with emesis prior to presentation.  She is prescribed spironolactone which is appropriately held.  Blood and urine cultures are without growth.  Given the hematuria/proteinuria and worsening proteinuria with a urine protein to creatinine ratio of 2.7 g a serologic workup sent.  She has a reduced C3.  C4 is normal.  DYAN/ABBY and ANCA are pending.  I will also add on an antihistone antibody.  I would like to see these back as she is on hydralazine which can cause a lupus-like GN and/or an ANCA vasculitis.  She will have a repeat urinalysis and I will check urine indices. However she does not acutely appear to be in decompensated heart failure. ATN is possible in the setting of colitis. I will obtain urine eosinophils given recent biotic exposure. Trend her RFP. We are avoiding nephrotoxins as we are able. Will follow.       Principal Problem:    Colitis  Acute kidney  injury  Hypertension  Proteinuria  Hyponatremia  I spent 40 minutes in the professional and overall care of this patient.      Pascual Bhakta, DO

## 2024-11-11 NOTE — PROGRESS NOTES
Physical Therapy    Physical Therapy Treatment    Patient Name: Carolann Cartagena  MRN: [id number]  Department: [place] [address]  Room: [address] [place] [address]  Today's Date: 11/11/2024  Time Calculation  Start Time: 0822  Stop Time: 0845  Time Calculation (min): 23 min         Assessment/Plan   PT Assessment  End of Session Communication: Bedside nurse  Assessment Comment: Pt performed bed mobility, transfer training, and gait training at today's visit.  End of Session Patient Position: Up in chair, Alarm on  PT Plan  Inpatient/Swing Bed or Outpatient: Inpatient  PT Plan  Treatment/Interventions: Transfer training, Bed mobility, Gait training, Therapeutic exercise  PT Plan: Ongoing PT  PT Frequency: 3 times per week  PT Discharge Recommendations: Moderate intensity level of continued care  PT Recommended Transfer Status: Assist x1  PT - OK to Discharge: Yes (per POC)      General Visit Information:   PT  Visit  PT Received On: 11/11/24  General  Reason for Referral: 89 y/o female admitted with colitis, c/o nausea, vomiting and Htn.  Referred By: Dr Elise  Prior to Session Communication: Bedside nurse  Patient Position Received: Bed, 3 rail up, Alarm on  General Comment: Pt supine in bed upon arrival, agreeable to tx    Subjective   Precautions:       Vital Signs (Past 2hrs)                 Objective   Pain:  Pain Assessment  Pain Assessment: 0-10  0-10 (Numeric) Pain Score: 0 - No pain  Cognition:  Cognition  Orientation Level: Disoriented to time, Disoriented to situation  Coordination:     Postural Control:  Static Sitting Balance  Static Sitting-Balance Support: Feet supported, Bilateral upper extremity supported  Static Sitting-Level of Assistance: Close supervision  Static Sitting-Comment/Number of Minutes: 2  Extremity/Trunk Assessments:  Activity Tolerance:     Treatments:  Therapeutic Exercise  Therapeutic Exercise Performed: Yes  Therapeutic Exercise Activity 1: Pt performed LAQ, Ankle Pumps, Marches x 15 each with cues given for proper  sequencing and form.         Bed Mobility  Bed Mobility: Yes  Bed Mobility 1  Bed Mobility 1: Supine to sitting  Level of Assistance 1: Contact guard  Bed Mobility Comments 1: Pt performed with cues given for proper sequencing and with HOB elevated. No physical assistance needed.    Ambulation/Gait Training  Ambulation/Gait Training Performed: Yes  Ambulation/Gait Training 1  Surface 1: Level tile  Device 1: Rolling walker  Assistance 1: Contact guard  Quality of Gait 1: Forward flexed posture, Decreased step length  Comments/Distance (ft) 1: Pt ambulated 5 ft bed to chair with cues given for upright posture and forward gaze. Pt reporting dizziness with standing and demonstrating unsteadiness throughout.  Transfer 1  Technique 1: Sit to stand, Stand to sit  Transfer Device 1: Walker  Transfer Level of Assistance 1: Contact guard  Trials/Comments 1: Pt performed with safety cues given for hand placement before sitting and standing.         Outcome Measures:  [facility] Basic Mobility  Turning from your back to your side while in a flat bed without using bedrails: A little  Moving from lying on your back to sitting on the side of a flat bed without using bedrails: A little  Moving to and from bed to chair (including a wheelchair): A little  Standing up from a chair using your arms (e.g. wheelchair or bedside chair): A little  To walk in hospital room: A little  Climbing 3-5 steps with railing: Total  Basic Mobility - Total Score: 16    Education Documentation  Body Mechanics, taught by Markus Jon PTA at 11/11/2024  9:33 AM.  Learner: Patient  Readiness: Acceptance  Method: Explanation  Response: Verbalizes Understanding    Mobility Training, taught by Markus Jon PTA at 11/11/2024  9:33 AM.  Learner: Patient  Readiness: Acceptance  Method: Explanation  Response: Verbalizes Understanding    Education Comments  No comments found.        OP EDUCATION:  Outpatient Education  Education Comment: educated on importance  of OOB activity    Encounter Problems       Encounter Problems (Active)       Balance       complete all mobility with normal balance while dual tasking, negotiating in a dynamic environment, carrying items, etc., with proactive and reactive static and dynamic standing and sitting tasks, with mod I and RW >15 minutes.         Start:  11/07/24    Expected End:  11/21/24               Mobility       STG - Patient will ambulate 150 ft mod I with a RW.  (Progressing)       Start:  11/07/24    Expected End:  11/21/24            Pt will tolerate 15 reps of each LE exercise in order to improve strength and endurance.   (Progressing)       Start:  11/07/24    Expected End:  11/21/24               PT Transfers       STG - Patient will perform bed mobility independently.  (Progressing)       Start:  11/07/24    Expected End:  11/21/24            STG - Patient will transfer sit to and from stand mod I using RW.  (Progressing)       Start:  11/07/24    Expected End:  11/21/24               Pain - Adult

## 2024-11-12 LAB
ANCA AB PATTERN SER IF-IMP: ABNORMAL
ANCA IGG TITR SER IF: ABNORMAL {TITER}
ANION GAP SERPL CALC-SCNC: 13 MMOL/L (ref 10–20)
BUN SERPL-MCNC: 75 MG/DL (ref 6–23)
CALCIUM SERPL-MCNC: 8 MG/DL (ref 8.6–10.3)
CHLORIDE SERPL-SCNC: 103 MMOL/L (ref 98–107)
CO2 SERPL-SCNC: 20 MMOL/L (ref 21–32)
CREAT SERPL-MCNC: 4.5 MG/DL (ref 0.5–1.05)
EGFRCR SERPLBLD CKD-EPI 2021: 9 ML/MIN/1.73M*2
EOSINOPHIL SMEAR: ABNORMAL
GLUCOSE BLD MANUAL STRIP-MCNC: 137 MG/DL (ref 74–99)
GLUCOSE BLD MANUAL STRIP-MCNC: 139 MG/DL (ref 74–99)
GLUCOSE BLD MANUAL STRIP-MCNC: 155 MG/DL (ref 74–99)
GLUCOSE BLD MANUAL STRIP-MCNC: 155 MG/DL (ref 74–99)
GLUCOSE SERPL-MCNC: 96 MG/DL (ref 74–99)
MYELOPEROXIDASE AB SER-ACNC: 116 AU/ML (ref 0–19)
POTASSIUM SERPL-SCNC: 5 MMOL/L (ref 3.5–5.3)
PROTEINASE3 AB SER-ACNC: 2 AU/ML (ref 0–19)
SODIUM SERPL-SCNC: 131 MMOL/L (ref 136–145)

## 2024-11-12 PROCEDURE — 82374 ASSAY BLOOD CARBON DIOXIDE: CPT | Performed by: INTERNAL MEDICINE

## 2024-11-12 PROCEDURE — 2500000002 HC RX 250 W HCPCS SELF ADMINISTERED DRUGS (ALT 637 FOR MEDICARE OP, ALT 636 FOR OP/ED): Performed by: NURSE PRACTITIONER

## 2024-11-12 PROCEDURE — 2500000001 HC RX 250 WO HCPCS SELF ADMINISTERED DRUGS (ALT 637 FOR MEDICARE OP): Performed by: INTERNAL MEDICINE

## 2024-11-12 PROCEDURE — 97535 SELF CARE MNGMENT TRAINING: CPT | Mod: GO,CO

## 2024-11-12 PROCEDURE — 82947 ASSAY GLUCOSE BLOOD QUANT: CPT

## 2024-11-12 PROCEDURE — 1200000002 HC GENERAL ROOM WITH TELEMETRY DAILY

## 2024-11-12 PROCEDURE — 2500000001 HC RX 250 WO HCPCS SELF ADMINISTERED DRUGS (ALT 637 FOR MEDICARE OP): Performed by: NURSE PRACTITIONER

## 2024-11-12 PROCEDURE — 2500000004 HC RX 250 GENERAL PHARMACY W/ HCPCS (ALT 636 FOR OP/ED): Performed by: NURSE PRACTITIONER

## 2024-11-12 PROCEDURE — 97530 THERAPEUTIC ACTIVITIES: CPT | Mod: GO,CO

## 2024-11-12 PROCEDURE — 99232 SBSQ HOSP IP/OBS MODERATE 35: CPT | Performed by: INTERNAL MEDICINE

## 2024-11-12 PROCEDURE — 36415 COLL VENOUS BLD VENIPUNCTURE: CPT | Performed by: INTERNAL MEDICINE

## 2024-11-12 RX ORDER — NIFEDIPINE 30 MG/1
60 TABLET, FILM COATED, EXTENDED RELEASE ORAL
Status: DISCONTINUED | OUTPATIENT
Start: 2024-11-13 | End: 2024-11-14

## 2024-11-12 RX ORDER — DOXAZOSIN 2 MG/1
4 TABLET ORAL DAILY
Status: DISCONTINUED | OUTPATIENT
Start: 2024-11-12 | End: 2024-11-23 | Stop reason: HOSPADM

## 2024-11-12 RX ADMIN — SODIUM BICARBONATE 650 MG: 650 TABLET ORAL at 09:14

## 2024-11-12 RX ADMIN — SODIUM BICARBONATE 650 MG: 650 TABLET ORAL at 20:30

## 2024-11-12 RX ADMIN — BRIMONIDINE TARTRATE 1 DROP: 2 SOLUTION/ DROPS OPHTHALMIC at 22:22

## 2024-11-12 RX ADMIN — HEPARIN SODIUM 5000 UNITS: 5000 INJECTION INTRAVENOUS; SUBCUTANEOUS at 22:22

## 2024-11-12 RX ADMIN — ASPIRIN 81 MG: 81 TABLET, COATED ORAL at 09:14

## 2024-11-12 RX ADMIN — BRIMONIDINE TARTRATE 1 DROP: 2 SOLUTION/ DROPS OPHTHALMIC at 11:46

## 2024-11-12 RX ADMIN — INSULIN HUMAN 12 UNITS: 100 INJECTION, SUSPENSION SUBCUTANEOUS at 09:32

## 2024-11-12 RX ADMIN — CARVEDILOL 25 MG: 12.5 TABLET, FILM COATED ORAL at 09:14

## 2024-11-12 RX ADMIN — AMLODIPINE BESYLATE 10 MG: 10 TABLET ORAL at 09:14

## 2024-11-12 RX ADMIN — HEPARIN SODIUM 5000 UNITS: 5000 INJECTION INTRAVENOUS; SUBCUTANEOUS at 15:47

## 2024-11-12 RX ADMIN — LATANOPROST 1 DROP: 50 SOLUTION/ DROPS OPHTHALMIC at 22:22

## 2024-11-12 RX ADMIN — HEPARIN SODIUM 5000 UNITS: 5000 INJECTION INTRAVENOUS; SUBCUTANEOUS at 06:16

## 2024-11-12 RX ADMIN — ATORVASTATIN CALCIUM 10 MG: 20 TABLET ORAL at 20:30

## 2024-11-12 RX ADMIN — TIMOLOL MALEATE 1 DROP: 5 SOLUTION/ DROPS OPHTHALMIC at 11:46

## 2024-11-12 RX ADMIN — INSULIN HUMAN 12 UNITS: 100 INJECTION, SUSPENSION SUBCUTANEOUS at 15:47

## 2024-11-12 RX ADMIN — DOXAZOSIN 4 MG: 2 TABLET ORAL at 11:53

## 2024-11-12 ASSESSMENT — PAIN - FUNCTIONAL ASSESSMENT: PAIN_FUNCTIONAL_ASSESSMENT: 0-10

## 2024-11-12 ASSESSMENT — PAIN SCALES - GENERAL
PAINLEVEL_OUTOF10: 0 - NO PAIN

## 2024-11-12 ASSESSMENT — COGNITIVE AND FUNCTIONAL STATUS - GENERAL
TOILETING: A LOT
DAILY ACTIVITIY SCORE: 16
DRESSING REGULAR UPPER BODY CLOTHING: A LITTLE
HELP NEEDED FOR BATHING: A LOT
PERSONAL GROOMING: A LITTLE
DRESSING REGULAR LOWER BODY CLOTHING: A LOT

## 2024-11-12 ASSESSMENT — ACTIVITIES OF DAILY LIVING (ADL): HOME_MANAGEMENT_TIME_ENTRY: 16

## 2024-11-12 ASSESSMENT — PAIN SCALES - WONG BAKER: WONGBAKER_NUMERICALRESPONSE: NO HURT

## 2024-11-12 NOTE — PROGRESS NOTES
"Carolann Cartagena is a 89 y.o. female on day 6 of admission presenting with Colitis.    Subjective   No acute events overnight. Feeling a bit better today.        Objective     VITALS  Blood pressure 153/52, pulse 51, temperature 36.2 °C (97.2 °F), temperature source Temporal, resp. rate 18, height 1.626 m (5' 4\"), weight 72.6 kg (160 lb), SpO2 98%.  Physical Exam  Vitals reviewed.   Constitutional:       Appearance: Normal appearance.   HENT:      Head: Normocephalic.      Nose: Nose normal.   Cardiovascular:      Rate and Rhythm: Normal rate and regular rhythm.      Pulses: Normal pulses.      Heart sounds: Normal heart sounds.   Abdominal:      General: Abdomen is flat. Bowel sounds are normal.      Palpations: Abdomen is soft.   Skin:     General: Skin is warm and dry.      Capillary Refill: Capillary refill takes less than 2 seconds.   Neurological:      General: No focal deficit present.      Mental Status: She is alert and oriented to person, place, and time.           Intake/Output last 3 Shifts:  I/O last 3 completed shifts:  In: - (0 mL/kg)   Out: 400 (5.5 mL/kg) [Urine:400 (0.2 mL/kg/hr)]  Weight: 72.6 kg     Relevant Results  Results for orders placed or performed during the hospital encounter of 11/06/24 (from the past 24 hours)   POCT GLUCOSE   Result Value Ref Range    POCT Glucose 251 (H) 74 - 99 mg/dL   POCT GLUCOSE   Result Value Ref Range    POCT Glucose 124 (H) 74 - 99 mg/dL   Basic metabolic panel   Result Value Ref Range    Glucose 96 74 - 99 mg/dL    Sodium 131 (L) 136 - 145 mmol/L    Potassium 5.0 3.5 - 5.3 mmol/L    Chloride 103 98 - 107 mmol/L    Bicarbonate 20 (L) 21 - 32 mmol/L    Anion Gap 13 10 - 20 mmol/L    Urea Nitrogen 75 (H) 6 - 23 mg/dL    Creatinine 4.50 (H) 0.50 - 1.05 mg/dL    eGFR 9 (L) >60 mL/min/1.73m*2    Calcium 8.0 (L) 8.6 - 10.3 mg/dL   POCT GLUCOSE   Result Value Ref Range    POCT Glucose 139 (H) 74 - 99 mg/dL   POCT GLUCOSE   Result Value Ref Range    POCT Glucose 155 (H) " 74 - 99 mg/dL   POCT GLUCOSE   Result Value Ref Range    POCT Glucose 155 (H) 74 - 99 mg/dL       Imaging Results  XR chest 2 views   Final Result   Findings suspicious for ongoing CHF.        Left pleural effusion.        Hazy opacities in the lungs, most pronounced at the posteromedial   left lung base. Atelectasis versus pneumonia versus  asymmetric   pulmonary edema        MACRO:   None        Signed by: Adam Javier 11/11/2024 2:58 PM   Dictation workstation:   [id number]      CT head wo IV contrast   Final Result   No acute intracranial hemorrhage or mass-effect.        MACRO:   None.        Signed by: Loly Albarran 11/6/2024 1:44 PM   Dictation workstation:   [id number]      CT abdomen pelvis wo IV contrast   Final Result   Right colon wall thickening consistent with nonspecific colitis.        Small amount of free fluid with mild edematous changes in the lung   bases, mesentery and soft tissues as well as trace pleural   effusions/thickening consistent with fluid overload/CHF.        Subtle density layer in the gallbladder, possible layering   noncalcified stones or sludge.        Additional findings as described above.        MACRO:   None.        Signed by: Loly Albarran 11/6/2024 1:52 PM   Dictation workstation:   [id number]      XR chest 1 view   Final Result   No acute cardiopulmonary process.   Enlarged but unchanged heart compared to 06/12/2024.        MACRO:   None        Signed by: Yokasta Jackson 11/6/2024 11:42 AM   Dictation workstation:   [id number]          Medications:  [Held by provider] aspirin, 81 mg, oral, Daily  atorvastatin, 10 mg, oral, Nightly  brimonidine, 1 drop, Both Eyes, BID  carvedilol, 25 mg, oral, BID  doxazosin, 4 mg, oral, Daily  heparin (porcine), 5,000 Units, subcutaneous, q8h TIP  [Held by provider] hydrALAZINE, 100 mg, oral, TID  insulin NPH (Isophane), 12 Units, subcutaneous, BID AC  latanoprost, 1 drop, Both Eyes, Nightly  [START ON 11/13/2024] NIFEdipine ER, 60  mg, oral, Daily before breakfast  sodium bicarbonate, 650 mg, oral, BID  [Held by provider] spironolactone, 25 mg, oral, q24h TIP  timolol, 1 drop, Both Eyes, Daily       PRN medications: acetaminophen **OR** acetaminophen **OR** acetaminophen, dextrose, dextrose, glucagon, glucagon, hydrALAZINE, ondansetron, sennosides-docusate sodium        Assessment/Plan          Principal Problem:    Colitis    1.  Accelerated hypertension  -A bit high this AM, appreciate nephro recs  -Coreg  -Doxazosin  -Nifedipine started today      2.  Acute kidney injury on CKD 4  -Worsening  -Appreciate nephro recs, to get kidney biopsy      3. DM  -continue SSI and NPH     4. Fever  -one episode yesterday  -check flu swab/bcx/ua/cxr  -pending procalcitonin    DVT Prophylaxis:  Heparin subq    Disposition:  Kidney biopsy tomorrow     Emeterio Alves, [facility] Medicine

## 2024-11-12 NOTE — CARE PLAN
Problem: Pain - Adult  Goal: Verbalizes/displays adequate comfort level or baseline comfort level  11/12/2024 1528 by Kvng Nick RN  Outcome: Progressing  11/12/2024 1527 by Kvng Nick RN  Outcome: Progressing     Problem: Safety - Adult  Goal: Free from fall injury  11/12/2024 1528 by Kvng Nick RN  Outcome: Progressing  11/12/2024 1527 by Kvng Nick RN  Outcome: Progressing     Problem: Discharge Planning  Goal: Discharge to home or other facility with appropriate resources  11/12/2024 1528 by Kvng Nick RN  Outcome: Progressing  11/12/2024 1527 by Kvng Nick RN  Outcome: Progressing     Problem: Chronic Conditions and Co-morbidities  Goal: Patient's chronic conditions and co-morbidity symptoms are monitored and maintained or improved  11/12/2024 1528 by Kvng Nick RN  Outcome: Progressing  11/12/2024 1527 by Kvng Nick RN  Outcome: Progressing     Problem: Diabetes  Goal: Achieve decreasing blood glucose levels by end of shift  11/12/2024 1528 by Kvng Nick RN  Outcome: Progressing  11/12/2024 1527 by Kvng Nick RN  Outcome: Progressing  Goal: Increase stability of blood glucose readings by end of shift  11/12/2024 1528 by Kvng Nick RN  Outcome: Progressing  11/12/2024 1527 by Kvng Nick RN  Outcome: Progressing  Goal: Decrease in ketones present in urine by end of shift  11/12/2024 1528 by Kvng Nick RN  Outcome: Progressing  11/12/2024 1527 by Kvng Nick RN  Outcome: Progressing  Goal: Maintain electrolyte levels within acceptable range throughout shift  11/12/2024 1528 by Kvng Nick RN  Outcome: Progressing  11/12/2024 1527 by Kvng Nick RN  Outcome: Progressing  Goal: Maintain glucose levels >70mg/dl to <250mg/dl throughout shift  11/12/2024 1528 by Kvng Nick RN  Outcome: Progressing  11/12/2024 1527 by Kvng Nick RN  Outcome: Progressing  Goal: No changes in neurological exam by end of shift  11/12/2024 1528 by Kvng  NEIL Nick  Outcome: Progressing  11/12/2024 1527 by Kvng Nick RN  Outcome: Progressing  Goal: Learn about and adhere to nutrition recommendations by end of shift  11/12/2024 1528 by Kvng Nick RN  Outcome: Progressing  11/12/2024 1527 by Kvng Nick RN  Outcome: Progressing  Goal: Vital signs within normal range for age by end of shift  11/12/2024 1528 by Kvng Nick RN  Outcome: Progressing  11/12/2024 1527 by Kvng Nick RN  Outcome: Progressing  Goal: Increase self care and/or family involovement by end of shift  11/12/2024 1528 by Kvng Nick RN  Outcome: Progressing  11/12/2024 1527 by Kvng Nick RN  Outcome: Progressing  Goal: Receive DSME education by end of shift  11/12/2024 1528 by Kvng Nick RN  Outcome: Progressing  11/12/2024 1527 by Kvng Nick RN  Outcome: Progressing     Problem: Skin  Goal: Decreased wound size/increased tissue granulation at next dressing change  11/12/2024 1653 by Kvng Nick RN  Outcome: Progressing  Note: Pt demonstrates understanding via verbal   11/12/2024 1528 by Kvng Nick RN  Outcome: Progressing  11/12/2024 1527 by Kvng Nick RN  Outcome: Progressing  Goal: Participates in plan/prevention/treatment measures  11/12/2024 1653 by Kvng Nick RN  Flowsheets (Taken 11/12/2024 1653)  Participates in plan/prevention/treatment measures:   Elevate heels   Increase activity/out of bed for meals  Note: Pt demonstrates understanding via verbal   11/12/2024 1528 by Kvng Nick RN  Outcome: Progressing  11/12/2024 1527 by Kvng Nick RN  Outcome: Progressing  Goal: Prevent/manage excess moisture  11/12/2024 1653 by Kvng Nick RN  Flowsheets (Taken 11/12/2024 1653)  Prevent/manage excess moisture:   Cleanse incontinence/protect with barrier cream   Moisturize dry skin   Follow provider orders for dressing changes  Note: Pt demonstrates understanding via verbal   11/12/2024 1528 by Kvng Nick RN  Outcome:  Progressing  11/12/2024 1527 by Kvng Nick RN  Outcome: Progressing  Goal: Prevent/minimize sheer/friction injuries  11/12/2024 1653 by Kvng Nick RN  Flowsheets (Taken 11/12/2024 1653)  Prevent/minimize sheer/friction injuries:   HOB 30 degrees or less   Turn/reposition every 2 hours/use positioning/transfer devices   Increase activity/out of bed for meals  Note: Pt demonstrates understanding via verbal   11/12/2024 1528 by Kvng Nick RN  Outcome: Progressing  11/12/2024 1527 by Kvng Nick RN  Outcome: Progressing  Goal: Promote/optimize nutrition  11/12/2024 1653 by Kvng Nick RN  Flowsheets (Taken 11/12/2024 1653)  Promote/optimize nutrition:   Assist with feeding   Offer water/supplements/favorite foods   Consume > 50% meals/supplements   Monitor/record intake including meals  Note: Pt demonstrates understanding via verbal   11/12/2024 1528 by Kvng Nick RN  Outcome: Progressing  11/12/2024 1527 by Kvng Nick RN  Outcome: Progressing  Goal: Promote skin healing  11/12/2024 1653 by Kvng Nick RN  Flowsheets (Taken 11/12/2024 1653)  Promote skin healing:   Assess skin/pad under line(s)/device(s)   Ensure correct size (line/device) and apply per  instructions   Protective dressings over bony prominences   Turn/reposition every 2 hours/use positioning/transfer devices  Note: Pt demonstrates understanding via verbal   11/12/2024 1528 by Kvng Nick RN  Outcome: Progressing  11/12/2024 1527 by Kvng Nick RN  Outcome: Progressing   The patient's goals for the shift include  free from pain, falls, maintain BP WNL    The clinical goals for the shift include pt will remain HDS

## 2024-11-12 NOTE — NURSING NOTE
Patient bradycardia holding steady in mid to upper 50s all day until HR dipped to 40bpm around 18:00. Tele stickers changed, HR still 40s. /50. Patient looks tired but no complaints. MD and hospitalized notified and aware.     Kvng Nick RN

## 2024-11-12 NOTE — SIGNIFICANT EVENT
11/12/24 0700   Vital Signs   BP (!) (S)  191/64     MD notified. BP meds given. Will recheck BP in 1 hour.   Kvng Nick RN

## 2024-11-12 NOTE — PROGRESS NOTES
Occupational Therapy    Occupational Therapy Treatment    Name: Carolann Cartagena  MRN: [id number]  Department: [facility]  Room: [address] [place] [address]  Date: 11/12/24  Time Calculation  Start Time: 0857  Stop Time: 0926  Time Calculation (min): 29 min    Assessment:  Medical Staff Made Aware: Yes  End of Session Communication: Bedside nurse, PCT/NA/CTA  End of Session Patient Position: Up in chair, Alarm on  Plan:  Treatment Interventions: ADL retraining, Functional transfer training  OT Frequency: 3 times per week  OT Discharge Recommendations: Moderate intensity level of continued care  Equipment Recommended upon Discharge: Wheeled walker  OT Recommended Transfer Status: Assist of 1  OT - OK to Discharge: Yes (per POC)    Subjective   Previous Visit Info:  OT Last Visit  OT Received On: 11/12/24  General:  General  Reason for Referral: 89 y/o female admitted with colitis, c/o nausea, vomiting and Htn  Missed Visit: Yes  Missed Visit Reason: Patient in a medical procedure (Attempted OT Tx, per RN, pt off the floor for chest XRAY.)  Prior to Session Communication: Bedside nurse  Patient Position Received: Bed, 3 rail up, Alarm on  Preferred Learning Style: auditory, kinesthetic, verbal  General Comment: Pt cooperative and compliant with session, fair activity tolerance, fair safety awareness.  Precautions:  Medical Precautions: Fall precautions    Pain Assessment:  Pain Assessment  Pain Assessment: 0-10  0-10 (Numeric) Pain Score: 0 - No pain     Objective   Cognition:  Overall Cognitive Status: Within Functional Limits  Orientation Level: Oriented X4  Activities of Daily Living: Grooming  Grooming Level of Assistance: Close supervision, Setup  Grooming Where Assessed: Chair  Grooming Comments: pt completed face washing & tooth brushing. Declined additional ADLs.    Toileting  Toileting Level of Assistance: Moderate assistance  Where Assessed: Toilet  Toileting Comments: assist required for pericare hygiene & doffing depends. Cues  provided for pariticpation    Functional Standing Tolerance:     Bed Mobility/Transfers: Bed Mobility  Bed Mobility: Yes  Bed Mobility 1  Bed Mobility 1: Supine to sitting  Level of Assistance 1: Contact guard  Bed Mobility Comments 1: HOB elevated    Transfers  Transfer: Yes  Transfer 1  Transfer From 1: Bed to  Transfer to 1: Toilet  Technique 1: Sit to stand, Stand to sit  Transfer Device 1: Walker  Transfer Level of Assistance 1: Contact guard  Trials/Comments 1: Cues for hand placement.  Transfers 2  Transfer From 2: Toilet to  Transfer to 2: Chair with drop arm  Technique 2: Sit to stand, Stand to sit  Transfer Device 2: Walker  Transfer Level of Assistance 2: Contact guard  Trials/Comments 2: VC provided for safety awareness      Functional Mobility:  Functional Mobility  Functional Mobility Performed: Yes  Functional Mobility 1  Surface 1: Level tile  Device 1: Rolling walker  Assistance 1: Contact guard  Comments 1: Pt completed functional mobility with FWW a short household distance at [facility] with VC for safety awareness.    Outcome Measures:  [facility] Daily Activity  Putting on and taking off regular lower body clothing: A lot  Bathing (including washing, rinsing, drying): A lot  Putting on and taking off regular upper body clothing: A little  Toileting, which includes using toilet, bedpan or urinal: A lot  Taking care of personal grooming such as brushing teeth: A little  Eating Meals: None  Daily Activity - Total Score: 16    Education Documentation  ADL Training, taught by ADNNA Maria at 11/12/2024  9:36 AM.  Learner: Patient  Readiness: Acceptance  Method: Explanation, Demonstration  Response: Verbalizes Understanding, Needs Reinforcement    Education Comments  No comments found.      Goals:  Encounter Problems       Encounter Problems (Active)       ADLs       Patient will perform UB and LB bathing with stand by assist level of assistance. (Not Progressing)       Start:  11/07/24    Expected  End:  11/21/24            Patient with complete upper body dressing with independent level of assistance donning and doffing all UE clothes with no adaptive equipment while edge of bed  (Not Progressing)       Start:  11/07/24    Expected End:  11/21/24            Patient with complete lower body dressing with independent level of assistance donning and doffing all LE clothes  with no adaptive equipment while edge of bed  (Progressing)       Start:  11/07/24    Expected End:  11/21/24            Patient will complete daily grooming tasks brushing teeth and washing face/hair with independent level of assistance and PRN adaptive equipment while standing. (Progressing)       Start:  11/07/24    Expected End:  11/21/24            Patient will complete toileting including hygiene clothing management/hygiene with independent level of assistance and raised toilet seat. (Progressing)       Start:  11/07/24    Expected End:  11/21/24               BALANCE       Pt will maintain dynamic standing balance during ADL task with independent level of assistance in order to demonstrate decreased risk of falling and improved postural control. (Not Progressing)       Start:  11/07/24    Expected End:  11/21/24               TRANSFERS       Patient will perform bed mobility independent level of assistance and bed rails in order to improve safety and independence with mobility (Progressing)       Start:  11/07/24    Expected End:  11/21/24            Patient will complete functional transfer to with front wheeled walker with modified independent level of assistance. (Progressing)       Start:  11/07/24    Expected End:  11/21/24

## 2024-11-12 NOTE — CARE PLAN
The patient's goals for the shift include      The clinical goals for the shift include Pt safety

## 2024-11-12 NOTE — CARE PLAN
The patient's goals for the shift include  pt will remain HDS  Problem: Pain - Adult  Goal: Verbalizes/displays adequate comfort level or baseline comfort level  Outcome: Progressing     Problem: Safety - Adult  Goal: Free from fall injury  Outcome: Progressing     Problem: Discharge Planning  Goal: Discharge to home or other facility with appropriate resources  Outcome: Progressing     Problem: Chronic Conditions and Co-morbidities  Goal: Patient's chronic conditions and co-morbidity symptoms are monitored and maintained or improved  Outcome: Progressing     Problem: Diabetes  Goal: Achieve decreasing blood glucose levels by end of shift  Outcome: Progressing  Goal: Increase stability of blood glucose readings by end of shift  Outcome: Progressing  Goal: Decrease in ketones present in urine by end of shift  Outcome: Progressing  Goal: Maintain electrolyte levels within acceptable range throughout shift  Outcome: Progressing  Goal: Maintain glucose levels >70mg/dl to <250mg/dl throughout shift  Outcome: Progressing  Goal: No changes in neurological exam by end of shift  Outcome: Progressing  Goal: Learn about and adhere to nutrition recommendations by end of shift  Outcome: Progressing  Goal: Vital signs within normal range for age by end of shift  Outcome: Progressing  Goal: Increase self care and/or family involovement by end of shift  Outcome: Progressing  Goal: Receive DSME education by end of shift  Outcome: Progressing     Problem: Skin  Goal: Decreased wound size/increased tissue granulation at next dressing change  Outcome: Progressing  Goal: Participates in plan/prevention/treatment measures  Outcome: Progressing  Goal: Prevent/manage excess moisture  Outcome: Progressing  Goal: Prevent/minimize sheer/friction injuries  Outcome: Progressing  Goal: Promote/optimize nutrition  Outcome: Progressing  Goal: Promote skin healing  Outcome: Progressing       The clinical goals for the shift include pt remain hds  throughout entire shift

## 2024-11-12 NOTE — PROGRESS NOTES
11/12/24 1204   Discharge Planning   Expected Discharge Disposition SNF     Pt kidney function worsening. Will need renal biopsy. Most likely will lose auth. Adod 2-3 days.

## 2024-11-12 NOTE — PROGRESS NOTES
Carolann Cartagena is a 89 y.o. female on day 6 of admission presenting with Colitis.      Subjective   Seen and examined.   Sitting up in a chair. Her daughter is present at bedside.   She reportedly feels ok. Denies nausea or emesis.   FRADNY.  She does not offer specific complaints.     Objective        Vitals 24HR  Heart Rate:  [62-65]   Temp:  [36.2 °C (97.1 °F)-36.6 °C (97.9 °F)]   Resp:  [16-18]   BP: (147-191)/(53-68)   SpO2:  [98 %-100 %]     Intake/Output last 3 Shifts:    Intake/Output Summary (Last 24 hours) at 11/12/2024 1124  Last data filed at 11/12/2024 0940  Gross per 24 hour   Intake 125 ml   Output 400 ml   Net -275 ml       Physical Exam  Constitutional: Well developed, awake/alert/oriented  x3, no distress, alert and cooperative   Eyes: PERRL, EOMI, clear sclera   ENMT: mucous membranes moist, no apparent injury, no lesions seen   Head/Neck: Neck supple, no JVD, trachea midline   Respiratory/Thorax: CTAB   Cardiovascular: Regular, rate and rhythm, systolic  murmur, normal S 1 and S 2   Gastrointestinal: Nondistended, soft, non-tender, no rebound tenderness or guarding   Musculoskeletal: ROM intact, no joint swelling, normal  strength   Extremities: normal extremities, trace lower extremity  edema    Neurological: alert and oriented x3  No asterixis   Skin: Warm and dry, no lesions, no rashes     Scheduled Medications  aspirin, 81 mg, oral, Daily  atorvastatin, 10 mg, oral, Nightly  brimonidine, 1 drop, Both Eyes, BID  carvedilol, 25 mg, oral, BID  doxazosin, 4 mg, oral, Daily  heparin (porcine), 5,000 Units, subcutaneous, q8h TIP  [Held by provider] hydrALAZINE, 100 mg, oral, TID  insulin NPH (Isophane), 12 Units, subcutaneous, BID AC  latanoprost, 1 drop, Both Eyes, Nightly  [START ON 11/13/2024] NIFEdipine ER, 60 mg, oral, Daily before breakfast  sodium bicarbonate, 650 mg, oral, BID  [Held by provider] spironolactone, 25 mg, oral, q24h TIP  timolol, 1 drop, Both Eyes, Daily      Continuous  medications     PRN medications: acetaminophen **OR** acetaminophen **OR** acetaminophen, dextrose, dextrose, glucagon, glucagon, hydrALAZINE, ondansetron, sennosides-docusate sodium     Relevant Results  Results from last 7 days   Lab Units 11/08/24  1028 11/07/24  0551 11/06/24  1107   WBC AUTO x10*3/uL 6.0 5.4 6.9   HEMOGLOBIN g/dL 9.6* 9.1* 10.2*   HEMATOCRIT % 32.1* 29.6* 32.4*   PLATELETS AUTO x10*3/uL 237 232 231   NEUTROS PCT AUTO % 67.3 53.3 54.7   LYMPHS PCT AUTO % 18.3 26.1 27.6   MONOS PCT AUTO % 11.6 16.7 15.0   EOS PCT AUTO % 2.0 2.9 1.7     Results from last 7 days   Lab Units 11/12/24  0558 11/11/24  0909 11/10/24  1030   SODIUM mmol/L 131* 131* 131*   POTASSIUM mmol/L 5.0 4.9 4.6   CHLORIDE mmol/L 103 103 105   CO2 mmol/L 20* 21 21   BUN mg/dL 75* 66* 65*   CREATININE mg/dL 4.50* 4.13* 3.91*   GLUCOSE mg/dL 96 101* 188*   CALCIUM mg/dL 8.0* 7.9* 7.9*       XR chest 2 views   Final Result   Findings suspicious for ongoing CHF.        Left pleural effusion.        Hazy opacities in the lungs, most pronounced at the posteromedial   left lung base. Atelectasis versus pneumonia versus  asymmetric   pulmonary edema        MACRO:   None        Signed by: Adam Javier 11/11/2024 2:58 PM   Dictation workstation:   [id number]      CT head wo IV contrast   Final Result   No acute intracranial hemorrhage or mass-effect.        MACRO:   None.        Signed by: Loly Albarran 11/6/2024 1:44 PM   Dictation workstation:   [id number]      CT abdomen pelvis wo IV contrast   Final Result   Right colon wall thickening consistent with nonspecific colitis.        Small amount of free fluid with mild edematous changes in the lung   bases, mesentery and soft tissues as well as trace pleural   effusions/thickening consistent with fluid overload/CHF.        Subtle density layer in the gallbladder, possible layering   noncalcified stones or sludge.        Additional findings as described above.        MACRO:   None.         Signed by: Loly Albarran 11/6/2024 1:52 PM   Dictation workstation:   [id number]      XR chest 1 view   Final Result   No acute cardiopulmonary process.   Enlarged but unchanged heart compared to 06/12/2024.        MACRO:   None        Signed by: Yokasta Jackson 11/6/2024 11:42 AM   Dictation workstation:   [id number]               Assessment/Plan      Carolann Cartagena is a 89 y.o. female with a past medical history of hypertension, hyperlipidemia, diabetes, chronic anemia, IgG kappa monoclonal protein, chronic hyponatremia, HFpEF, moderate concentric LVH, mild aortic stenosis and mild to moderate pulmonary hypertension, and G4/A3 chronic kidney disease with a fluctuating creatinine baseline between 1.8 up to 2.3 mg/dL who presented with nausea, emesis and suboptimally controlled hypertension.  Reportedly the nausea/emesis occurred just prior to presentation.  Workup was notable for right colon wall thickening consistent with nonspecific colitis noted on noncontrast CT abdominal imaging. There was no renal obstruction. Her chest plain film was clear.  BNP was 400.  Her urinalysis was notable for proteinuria, blood, leuks, WBCs and RBCs.  Urine culture was sent, no growth was noted.  Her creatinine was 2.76 upon presentation with further decline to 4.1 despite IV volume expansion. Nephrology is consulted for acute kidney injury.    Etiology of acute kidney injury was elusive.  Blood pressures were not low but rather high upon presentation.  No contrast exposure. She denies anti-inflammatory use.  She reports essentially 1 episode of nausea with emesis prior to presentation. She is prescribed spironolactone which was appropriately held.  Blood and urine cultures were without growth. Given the hematuria/proteinuria on her urinalysis and worsening proteinuria with a urine protein to creatinine ratio of 2.7 g, we sent a serologic workup. We also note Hydralazine use. She has a reduced C3.  C4 is normal. No urine  eosinophils present. DYAN/ABBY and antihistone antibody are pending. P-ANCA is positive with a titer greater than 1:1280, MPO+ consistent with microscopic polyangiitis. We would not expect a reduced C3 in the setting of a positive ANCA. I held her hydralazine.  She is hypertensive today.  I will stop the Norvasc 10 mg and start 60 mg of nifedipine.  I will add 4 mg of Cardura.  I discussed renal biopsy with her and her daughter.  She is agreeable to undergo a renal biopsy. I will hold ASA and have placed the order. I will hold off on steroids at the moment. Her renal function continues to slowly worsen. Trend her RFP. We are avoiding nephrotoxins as we are able. Will follow.       Principal Problem:    Colitis  Acute kidney injury  Hypertension  Proteinuria  Hyponatremia  I spent 40 minutes in the professional and overall care of this patient.      Pascual Bhakta, DO

## 2024-11-12 NOTE — CARE PLAN
Problem: Pain - Adult  Goal: Verbalizes/displays adequate comfort level or baseline comfort level  11/12/2024 1528 by Kvng Nick RN  Outcome: Progressing  11/12/2024 1527 by Kvng Nick RN  Outcome: Progressing     Problem: Safety - Adult  Goal: Free from fall injury  11/12/2024 1528 by Kvng Nick RN  Outcome: Progressing  11/12/2024 1527 by Kvng Nick RN  Outcome: Progressing     Problem: Discharge Planning  Goal: Discharge to home or other facility with appropriate resources  11/12/2024 1528 by Kvng Nick RN  Outcome: Progressing  11/12/2024 1527 by Kvng Nick RN  Outcome: Progressing     Problem: Chronic Conditions and Co-morbidities  Goal: Patient's chronic conditions and co-morbidity symptoms are monitored and maintained or improved  11/12/2024 1528 by Kvng Nick RN  Outcome: Progressing  11/12/2024 1527 by Kvng Nick RN  Outcome: Progressing     Problem: Diabetes  Goal: Achieve decreasing blood glucose levels by end of shift  11/12/2024 1528 by Kvng Nick RN  Outcome: Progressing  11/12/2024 1527 by Kvng Nick RN  Outcome: Progressing  Goal: Increase stability of blood glucose readings by end of shift  11/12/2024 1528 by Kvng Nick RN  Outcome: Progressing  11/12/2024 1527 by Kvng Nick RN  Outcome: Progressing  Goal: Decrease in ketones present in urine by end of shift  11/12/2024 1528 by Kvng Nick RN  Outcome: Progressing  11/12/2024 1527 by Kvng Nick RN  Outcome: Progressing  Goal: Maintain electrolyte levels within acceptable range throughout shift  11/12/2024 1528 by Kvng Nick RN  Outcome: Progressing  11/12/2024 1527 by Kvng Nick RN  Outcome: Progressing  Goal: Maintain glucose levels >70mg/dl to <250mg/dl throughout shift  11/12/2024 1528 by Kvng Nick RN  Outcome: Progressing  11/12/2024 1527 by Kvng Nick RN  Outcome: Progressing  Goal: No changes in neurological exam by end of shift  11/12/2024 1528 by Kvng  NEIL Nick  Outcome: Progressing  11/12/2024 1527 by Kvng Nick RN  Outcome: Progressing  Goal: Learn about and adhere to nutrition recommendations by end of shift  11/12/2024 1528 by Kvng Nick RN  Outcome: Progressing  11/12/2024 1527 by Kvng Nick RN  Outcome: Progressing  Goal: Vital signs within normal range for age by end of shift  11/12/2024 1528 by Kvng Nick RN  Outcome: Progressing  11/12/2024 1527 by Kvng Nick RN  Outcome: Progressing  Goal: Increase self care and/or family involovement by end of shift  11/12/2024 1528 by Kvng Nick RN  Outcome: Progressing  11/12/2024 1527 by Kvng Nick RN  Outcome: Progressing  Goal: Receive DSME education by end of shift  11/12/2024 1528 by Kvng Nick RN  Outcome: Progressing  11/12/2024 1527 by Kvng Nick RN  Outcome: Progressing     Problem: Skin  Goal: Decreased wound size/increased tissue granulation at next dressing change  11/12/2024 1528 by Kvng Nick RN  Outcome: Progressing  11/12/2024 1527 by Kvng Nick RN  Outcome: Progressing  Goal: Participates in plan/prevention/treatment measures  11/12/2024 1528 by Kvng Nick RN  Outcome: Progressing  11/12/2024 1527 by Kvng Nick RN  Outcome: Progressing  Goal: Prevent/manage excess moisture  11/12/2024 1528 by Kvng Nick RN  Outcome: Progressing  11/12/2024 1527 by Kvng Nick RN  Outcome: Progressing  Goal: Prevent/minimize sheer/friction injuries  11/12/2024 1528 by Kvng Nick RN  Outcome: Progressing  11/12/2024 1527 by Kvng Nick RN  Outcome: Progressing  Goal: Promote/optimize nutrition  11/12/2024 1528 by Kvng Nick RN  Outcome: Progressing  11/12/2024 1527 by Kvng Nick RN  Outcome: Progressing  Goal: Promote skin healing  11/12/2024 1528 by Kvng Nick RN  Outcome: Progressing  11/12/2024 1527 by Kvng Kreydatus, RN  Outcome: Progressing   The patient's goals for the shift include  pt will remain HDS    The  clinical goals for the shift include pt will remain HDS

## 2024-11-13 LAB
ANA PATTERN: ABNORMAL
ANA SER QL HEP2 SUBST: POSITIVE
ANA TITR SER IF: ABNORMAL {TITER}
ANION GAP SERPL CALC-SCNC: 12 MMOL/L (ref 10–20)
BUN SERPL-MCNC: 78 MG/DL (ref 6–23)
CALCIUM SERPL-MCNC: 7.7 MG/DL (ref 8.6–10.3)
CENTROMERE B AB SER-ACNC: <0.2 AI
CHLORIDE SERPL-SCNC: 103 MMOL/L (ref 98–107)
CHROMATIN AB SERPL-ACNC: 1.1 AI
CO2 SERPL-SCNC: 18 MMOL/L (ref 21–32)
CREAT SERPL-MCNC: 4.96 MG/DL (ref 0.5–1.05)
DSDNA AB SER-ACNC: 29 IU/ML
EGFRCR SERPLBLD CKD-EPI 2021: 8 ML/MIN/1.73M*2
ENA JO1 AB SER QL IA: <0.2 AI
ENA RNP AB SER IA-ACNC: 0.4 AI
ENA SCL70 AB SER QL IA: <0.2 AI
ENA SM AB SER IA-ACNC: 0.3 AI
ENA SM+RNP AB SER QL IA: 0.2 AI
ENA SS-A AB SER IA-ACNC: 0.2 AI
ENA SS-B AB SER IA-ACNC: <0.2 AI
GLUCOSE BLD MANUAL STRIP-MCNC: 111 MG/DL (ref 74–99)
GLUCOSE BLD MANUAL STRIP-MCNC: 129 MG/DL (ref 74–99)
GLUCOSE BLD MANUAL STRIP-MCNC: 166 MG/DL (ref 74–99)
GLUCOSE BLD MANUAL STRIP-MCNC: 51 MG/DL (ref 74–99)
GLUCOSE BLD MANUAL STRIP-MCNC: 88 MG/DL (ref 74–99)
GLUCOSE SERPL-MCNC: 42 MG/DL (ref 74–99)
HOLD SPECIMEN: NORMAL
POTASSIUM SERPL-SCNC: 5.2 MMOL/L (ref 3.5–5.3)
RIBOSOMAL P AB SER-ACNC: 0.6 AI
SODIUM SERPL-SCNC: 128 MMOL/L (ref 136–145)

## 2024-11-13 PROCEDURE — 2500000001 HC RX 250 WO HCPCS SELF ADMINISTERED DRUGS (ALT 637 FOR MEDICARE OP): Performed by: NURSE PRACTITIONER

## 2024-11-13 PROCEDURE — 80048 BASIC METABOLIC PNL TOTAL CA: CPT | Performed by: INTERNAL MEDICINE

## 2024-11-13 PROCEDURE — 2500000001 HC RX 250 WO HCPCS SELF ADMINISTERED DRUGS (ALT 637 FOR MEDICARE OP): Performed by: INTERNAL MEDICINE

## 2024-11-13 PROCEDURE — 99222 1ST HOSP IP/OBS MODERATE 55: CPT

## 2024-11-13 PROCEDURE — 2500000004 HC RX 250 GENERAL PHARMACY W/ HCPCS (ALT 636 FOR OP/ED): Performed by: NURSE PRACTITIONER

## 2024-11-13 PROCEDURE — 82947 ASSAY GLUCOSE BLOOD QUANT: CPT

## 2024-11-13 PROCEDURE — 2500000002 HC RX 250 W HCPCS SELF ADMINISTERED DRUGS (ALT 637 FOR MEDICARE OP, ALT 636 FOR OP/ED): Performed by: NURSE PRACTITIONER

## 2024-11-13 PROCEDURE — 2500000005 HC RX 250 GENERAL PHARMACY W/O HCPCS: Performed by: NURSE PRACTITIONER

## 2024-11-13 PROCEDURE — 2500000002 HC RX 250 W HCPCS SELF ADMINISTERED DRUGS (ALT 637 FOR MEDICARE OP, ALT 636 FOR OP/ED): Performed by: INTERNAL MEDICINE

## 2024-11-13 PROCEDURE — 36415 COLL VENOUS BLD VENIPUNCTURE: CPT | Performed by: INTERNAL MEDICINE

## 2024-11-13 PROCEDURE — 1200000002 HC GENERAL ROOM WITH TELEMETRY DAILY

## 2024-11-13 PROCEDURE — 99232 SBSQ HOSP IP/OBS MODERATE 35: CPT | Performed by: INTERNAL MEDICINE

## 2024-11-13 PROCEDURE — 97530 THERAPEUTIC ACTIVITIES: CPT | Mod: GP,CQ

## 2024-11-13 RX ADMIN — INSULIN HUMAN 12 UNITS: 100 INJECTION, SUSPENSION SUBCUTANEOUS at 17:36

## 2024-11-13 RX ADMIN — ATORVASTATIN CALCIUM 10 MG: 20 TABLET ORAL at 20:29

## 2024-11-13 RX ADMIN — LATANOPROST 1 DROP: 50 SOLUTION/ DROPS OPHTHALMIC at 20:28

## 2024-11-13 RX ADMIN — NIFEDIPINE 60 MG: 30 TABLET, FILM COATED, EXTENDED RELEASE ORAL at 06:09

## 2024-11-13 RX ADMIN — DEXTROSE MONOHYDRATE 12.5 G: 25 INJECTION, SOLUTION INTRAVENOUS at 07:35

## 2024-11-13 RX ADMIN — ACETAMINOPHEN 650 MG: 325 TABLET, FILM COATED ORAL at 20:26

## 2024-11-13 RX ADMIN — SODIUM BICARBONATE 650 MG: 650 TABLET ORAL at 20:28

## 2024-11-13 RX ADMIN — BRIMONIDINE TARTRATE 1 DROP: 2 SOLUTION/ DROPS OPHTHALMIC at 09:19

## 2024-11-13 RX ADMIN — HEPARIN SODIUM 5000 UNITS: 5000 INJECTION INTRAVENOUS; SUBCUTANEOUS at 23:06

## 2024-11-13 RX ADMIN — SODIUM BICARBONATE 650 MG: 650 TABLET ORAL at 09:18

## 2024-11-13 RX ADMIN — HEPARIN SODIUM 5000 UNITS: 5000 INJECTION INTRAVENOUS; SUBCUTANEOUS at 06:09

## 2024-11-13 RX ADMIN — DOXAZOSIN 4 MG: 2 TABLET ORAL at 09:17

## 2024-11-13 RX ADMIN — BRIMONIDINE TARTRATE 1 DROP: 2 SOLUTION/ DROPS OPHTHALMIC at 20:29

## 2024-11-13 RX ADMIN — HEPARIN SODIUM 5000 UNITS: 5000 INJECTION INTRAVENOUS; SUBCUTANEOUS at 14:42

## 2024-11-13 RX ADMIN — TIMOLOL MALEATE 1 DROP: 5 SOLUTION/ DROPS OPHTHALMIC at 09:19

## 2024-11-13 ASSESSMENT — PAIN SCALES - GENERAL
PAINLEVEL_OUTOF10: 10 - WORST POSSIBLE PAIN
PAINLEVEL_OUTOF10: 0 - NO PAIN

## 2024-11-13 ASSESSMENT — COGNITIVE AND FUNCTIONAL STATUS - GENERAL
WALKING IN HOSPITAL ROOM: A LITTLE
MOVING FROM LYING ON BACK TO SITTING ON SIDE OF FLAT BED WITH BEDRAILS: A LITTLE
MOBILITY SCORE: 16
MOVING TO AND FROM BED TO CHAIR: A LITTLE
TURNING FROM BACK TO SIDE WHILE IN FLAT BAD: A LITTLE
CLIMB 3 TO 5 STEPS WITH RAILING: TOTAL
STANDING UP FROM CHAIR USING ARMS: A LITTLE

## 2024-11-13 ASSESSMENT — PAIN SCALES - PAIN ASSESSMENT IN ADVANCED DEMENTIA (PAINAD): TOTALSCORE: MEDICATION (SEE MAR);HEAT APPLIED;REPOSITIONED

## 2024-11-13 ASSESSMENT — PAIN DESCRIPTION - LOCATION: LOCATION: HIP

## 2024-11-13 NOTE — PROGRESS NOTES
Carolann Cartagena is a 89 y.o. female on day 7 of admission presenting with Colitis.      Subjective   Seen and examined.   Resting comfortably in bed. I have discussed matters further with her daughter.   She reportedly feels ok.   FRANDY.  She does not offer specific complaints.     Objective        Vitals 24HR  Heart Rate:  [51-58]   Temp:  [36.2 °C (97.2 °F)-36.6 °C (97.9 °F)]   Resp:  [17-18]   BP: (125-172)/(44-68)   SpO2:  [98 %-100 %]     Intake/Output last 3 Shifts:    Intake/Output Summary (Last 24 hours) at 11/13/2024 1059  Last data filed at 11/12/2024 1600  Gross per 24 hour   Intake 250 ml   Output 250 ml   Net 0 ml       Physical Exam  Constitutional: Well developed, awake/alert/oriented  x3, no distress, alert and cooperative   Eyes: PERRL, EOMI, clear sclera   ENMT: mucous membranes moist, no apparent injury, no lesions seen   Head/Neck: Neck supple, no JVD, trachea midline   Respiratory/Thorax: CTAB   Cardiovascular: Regular, rate and rhythm, systolic  murmur, normal S 1 and S 2   Gastrointestinal: Nondistended, soft, non-tender, no rebound tenderness or guarding   Musculoskeletal: ROM intact, no joint swelling, normal  strength   Extremities: normal extremities, trace lower extremity  edema    Neurological: alert and oriented x3  No asterixis   Skin: Warm and dry, no lesions, no rashes     Scheduled Medications  [Held by provider] aspirin, 81 mg, oral, Daily  atorvastatin, 10 mg, oral, Nightly  brimonidine, 1 drop, Both Eyes, BID  carvedilol, 25 mg, oral, BID  doxazosin, 4 mg, oral, Daily  heparin (porcine), 5,000 Units, subcutaneous, q8h TIP  [Held by provider] hydrALAZINE, 100 mg, oral, TID  insulin NPH (Isophane), 12 Units, subcutaneous, BID AC  latanoprost, 1 drop, Both Eyes, Nightly  NIFEdipine ER, 60 mg, oral, Daily before breakfast  sodium bicarbonate, 650 mg, oral, BID  [Held by provider] spironolactone, 25 mg, oral, q24h TIP  timolol, 1 drop, Both Eyes, Daily      Continuous medications     PRN  medications: acetaminophen **OR** acetaminophen **OR** acetaminophen, dextrose, dextrose, glucagon, glucagon, hydrALAZINE, ondansetron, sennosides-docusate sodium     Relevant Results  Results from last 7 days   Lab Units 11/08/24  1028 11/07/24  0551 11/06/24  1107   WBC AUTO x10*3/uL 6.0 5.4 6.9   HEMOGLOBIN g/dL 9.6* 9.1* 10.2*   HEMATOCRIT % 32.1* 29.6* 32.4*   PLATELETS AUTO x10*3/uL 237 232 231   NEUTROS PCT AUTO % 67.3 53.3 54.7   LYMPHS PCT AUTO % 18.3 26.1 27.6   MONOS PCT AUTO % 11.6 16.7 15.0   EOS PCT AUTO % 2.0 2.9 1.7     Results from last 7 days   Lab Units 11/13/24  0532 11/12/24  0558 11/11/24  0909   SODIUM mmol/L 128* 131* 131*   POTASSIUM mmol/L 5.2 5.0 4.9   CHLORIDE mmol/L 103 103 103   CO2 mmol/L 18* 20* 21   BUN mg/dL 78* 75* 66*   CREATININE mg/dL 4.96* 4.50* 4.13*   GLUCOSE mg/dL 42* 96 101*   CALCIUM mg/dL 7.7* 8.0* 7.9*       XR chest 2 views   Final Result   Findings suspicious for ongoing CHF.        Left pleural effusion.        Hazy opacities in the lungs, most pronounced at the posteromedial   left lung base. Atelectasis versus pneumonia versus  asymmetric   pulmonary edema        MACRO:   None        Signed by: Adam Javier 11/11/2024 2:58 PM   Dictation workstation:   [id number]      CT head wo IV contrast   Final Result   No acute intracranial hemorrhage or mass-effect.        MACRO:   None.        Signed by: Loly Albarran 11/6/2024 1:44 PM   Dictation workstation:   [id number]      CT abdomen pelvis wo IV contrast   Final Result   Right colon wall thickening consistent with nonspecific colitis.        Small amount of free fluid with mild edematous changes in the lung   bases, mesentery and soft tissues as well as trace pleural   effusions/thickening consistent with fluid overload/CHF.        Subtle density layer in the gallbladder, possible layering   noncalcified stones or sludge.        Additional findings as described above.        MACRO:   None.        Signed by: Loly  Luis Alfredokary 11/6/2024 1:52 PM   Dictation workstation:   [id number]      XR chest 1 view   Final Result   No acute cardiopulmonary process.   Enlarged but unchanged heart compared to 06/12/2024.        MACRO:   None        Signed by: Yokasta Jackson 11/6/2024 11:42 AM   Dictation workstation:   [id number]      Consult to Interventional Radiology    (Results Pending)            Assessment/Plan      Carolann Cartagena is a 89 y.o. female with a past medical history of hypertension, hyperlipidemia, diabetes, chronic anemia, IgG kappa monoclonal protein, chronic hyponatremia, HFpEF, moderate concentric LVH, mild aortic stenosis and mild to moderate pulmonary hypertension, and G4/A3 chronic kidney disease with a fluctuating creatinine baseline between 1.8 up to 2.3 mg/dL who presented with nausea, emesis and suboptimally controlled hypertension.  Reportedly the nausea/emesis occurred just prior to presentation.  Workup was notable for right colon wall thickening consistent with nonspecific colitis noted on noncontrast CT abdominal imaging. There was no renal obstruction. Her chest plain film was clear.  BNP was 400.  Her urinalysis was notable for proteinuria, blood, leuks, WBCs and RBCs.  Urine culture was sent, no growth was noted.  Her creatinine was 2.76 upon presentation with further decline to 4.1 despite IV volume expansion. Nephrology is consulted for acute kidney injury.    Etiology of acute kidney injury was elusive.  Blood pressures were not low but rather high upon presentation.  No contrast exposure. She denies anti-inflammatory use.  She reports essentially 1 episode of nausea with emesis prior to presentation. She is prescribed spironolactone which was appropriately held.  Blood and urine cultures were without growth. Given the hematuria/proteinuria on her urinalysis and worsening proteinuria having a urine protein to creatinine ratio of 2.7 g, we sent a serologic workup. We also note Hydralazine use. She has a  reduced C3.  C4 is normal. No urine eosinophils present. DYAN/ABBY and antihistone antibody are pending. P-ANCA is positive with a titer greater than 1:1280, MPO+ consistent with microscopic polyangiitis. We would not expect a reduced C3 in the setting of a positive ANCA. I held her hydralazine.  In addition to her current findings she has elevated serum free kappa and lambda light chains with an M protein of 0.55 g/dL.  She continues to have worsening renal function.  It would be interesting to see his on her renal biopsy.  Unfortunately, this may be just a diagnostic biopsy with her advanced age.  I discussed matters with her daughter.  I anticipate that she will head towards dialysis, we will see what her and her daughter are agreeable to.  Otherwise, to address her hypertension I have stopped the Norvasc 10 mg and started 60 mg of nifedipine. I added 4 mg of Cardura.  I would like to see her blood pressure down around 140 prior to renal biopsy which may happen on Friday. I also held the ASA. I will hold off on initiating steroids at the moment. Trend her RFP. We are avoiding nephrotoxins as we are able. Will follow.       Principal Problem:    Colitis  Acute kidney injury  Hypertension  Proteinuria  Hyponatremia  I spent 40 minutes in the professional and overall care of this patient.      Pascual Bhakta, DO

## 2024-11-13 NOTE — CONSULTS
Consults    Reason For Consult  Consulted by Dr. Bhakta for a renal biopsy.    History Of Present Illness  Carolann Cartagena is a 89 y.o. female presenting with a past medical history of CKD stage IV, TIIDM, HTN, GERD, HLD, OA, obesity, vitamin D deficiency, monoclonal gammopathy, chronic anemia, chronic hyponatremia, and HFpEF. Presented to [facility] [place] ED for abdominal pain and N/V. She was diagnosed with acute on chronic kidney failure and was given volume expander without improvement. Etiology of kidney injury is autoimmune in nature concerning for microscopic polyangiitis. Nephrology has asked IR to perform a renal biopsy.     Carolann reports N/V prior to arrival, she also reports decreases PO intake, dark colored urine with decreased amounts. Denies use of NSAIDs, diarrhea, CP, SOB.   Past Medical History  She has a past medical history of Anemia due to chronic kidney disease (01/22/2024), Chronic diastolic congestive heart failure (10/02/2023), Chronic hyponatremia (10/02/2023), Chronic kidney disease, stage 3, mod decreased GFR (Multi) (09/18/2018), Diabetic nephropathy associated with type 2 diabetes mellitus (Multi) (12/18/2018), Essential hypertension (05/23/2023), Hyperlipidemia (05/23/2023), and Pure hypercholesterolemia (10/02/2015).    Surgical History  She has a past surgical history that includes Other surgical history (02/13/2020).     Social History  She reports that she has never smoked. She has never used smokeless tobacco. She reports that she does not currently use alcohol. She reports that she does not currently use drugs.    Family History  Family History   Problem Relation Name Age of Onset    No Known Problems Mother      Bone cancer Sister      Lung cancer Daughter          Allergies  Dorzolamide, Lisinopril, and Losartan    MEDS:    Current Facility-Administered Medications:     acetaminophen (Tylenol) tablet 650 mg, 650 mg, oral, q4h PRN, 650 mg at 11/11/24 1520 **OR** acetaminophen (Tylenol) oral  liquid 650 mg, 650 mg, oral, q4h PRN **OR** acetaminophen (Tylenol) suppository 650 mg, 650 mg, rectal, q4h PRN, BASILIA Mena-CNP    [Held by provider] aspirin EC tablet 81 mg, 81 mg, oral, Daily, BASILIA Mena-CNP, 81 mg at 11/12/24 0914    atorvastatin (Lipitor) tablet 10 mg, 10 mg, oral, Nightly, BASILIA Mena-CNP, 10 mg at 11/12/24 2030    brimonidine (AlphaGAN) 0.2 % ophthalmic solution 1 drop, 1 drop, Both Eyes, BID, BASILIA Mena-CNP, 1 drop at 11/13/24 0919    carvedilol (Coreg) tablet 25 mg, 25 mg, oral, BID, Dangelo Mcgregor APRN-CNP, 25 mg at 11/12/24 0914    dextrose 50 % injection 12.5 g, 12.5 g, intravenous, q15 min PRN, Dangelo Mcgregor APRN-CNP, 12.5 g at 11/13/24 0735    dextrose 50 % injection 25 g, 25 g, intravenous, q15 min PRN, Dangelo Mcgregor APRN-CNP    doxazosin (Cardura) tablet 4 mg, 4 mg, oral, Daily, Pascual Bhakta, DO, 4 mg at 11/13/24 0917    glucagon (Glucagen) injection 1 mg, 1 mg, intramuscular, q15 min PRN, Dangelo Mcgregor APRN-CNP    glucagon (Glucagen) injection 1 mg, 1 mg, intramuscular, q15 min PRN, Dangelo Mcgregor, APRN-CNP    heparin (porcine) injection 5,000 Units, 5,000 Units, subcutaneous, q8h TIP, BASILIA Mena-CNP, 5,000 Units at 11/13/24 0609    hydrALAZINE (Apresoline) injection 5 mg, 5 mg, intravenous, q6h PRN, Dangelo Mcgregor APRN-CNP, 5 mg at 11/08/24 0507    [Held by provider] hydrALAZINE (Apresoline) tablet 100 mg, 100 mg, oral, TID, Julianne Elise MD, 100 mg at 11/11/24 0829    insulin NPH (Isophane) (HumuLIN N,NovoLIN N) injection 12 Units, 12 Units, subcutaneous, BID AC, ERICA Mena, 12 Units at 11/12/24 1547    latanoprost (Xalatan) 0.005 % ophthalmic solution 1 drop, 1 drop, Both Eyes, Nightly, ERICA Mena, 1 drop at 11/12/24 2222    NIFEdipine ER (Adalat CC) 24 hr tablet 60 mg, 60 mg, oral, Daily before breakfast, Pascual Bhakta DO, 60 mg at 11/13/24 0609    ondansetron (Zofran) injection 4 mg, 4 mg, intravenous,  "q6h PRN, ERICA Mena, 4 mg at 11/07/24 1532    sennosides-docusate sodium (Stpeh-Colace) 8.6-50 mg per tablet 1 tablet, 1 tablet, oral, Nightly PRN, ERICA Mena    sodium bicarbonate tablet 650 mg, 650 mg, oral, BID, ERICA Mena, 650 mg at 11/13/24 0918    [Held by provider] spironolactone (Aldactone) tablet 25 mg, 25 mg, oral, q24h TIP, ERICA Mena, 25 mg at 11/06/24 2111    timolol (Timoptic) 0.5 % ophthalmic solution 1 drop, 1 drop, Both Eyes, Daily, ERICA Mena, 1 drop at 11/13/24 0919    Review of Systems  Respiratory ROS: no cough, shortness of breath, or wheezing  Cardiovascular ROS: no chest pain or dyspnea on exertion  Gastrointestinal ROS: no abdominal pain, change in bowel habits, or black or bloody stools  Neurological ROS: negative     Last Recorded Vitals  /57 (BP Location: Right arm, Patient Position: Lying)   Pulse 53   Temp 36.5 °C (97.7 °F) (Temporal)   Resp 17   Wt 72.6 kg (160 lb)   SpO2 99%      Physical Exam  Orientation: oriented to person, place, time, and general circumstances  HEENT: normocephalic, atraumatic  Pulm: clear to auscultation bilaterally, no wheezes, good air entry  Cardiac: Regular rate and rhythm or without murmur or extra heart sounds  GI: soft, nontender, normal bowel sounds  Pulses: peripheral pulses symmetrical    Relevant Results    LABS:  Lab Results   Component Value Date    WBC 6.0 11/08/2024    HGB 9.6 (L) 11/08/2024    HCT 32.1 (L) 11/08/2024     (H) 11/08/2024     11/08/2024      Results from last 72 hours   Lab Units 11/13/24  0532   SODIUM mmol/L 128*   POTASSIUM mmol/L 5.2   CHLORIDE mmol/L 103   CO2 mmol/L 18*   BUN mg/dL 78*   CREATININE mg/dL 4.96*   GLUCOSE mg/dL 42*   CALCIUM mg/dL 7.7*   ANION GAP mmol/L 12   EGFR mL/min/1.73m*2 8*               No lab exists for component: \"PT\"    MICRO:  No results found for the last 14 days.      IMAGING:   XR chest 2 views   Final Result "   Findings suspicious for ongoing CHF.        Left pleural effusion.        Hazy opacities in the lungs, most pronounced at the posteromedial   left lung base. Atelectasis versus pneumonia versus  asymmetric   pulmonary edema        MACRO:   None        Signed by: Adam Javier 11/11/2024 2:58 PM   Dictation workstation:   [id number]      CT head wo IV contrast   Final Result   No acute intracranial hemorrhage or mass-effect.        MACRO:   None.        Signed by: Loly Albarran 11/6/2024 1:44 PM   Dictation workstation:   [id number]      CT abdomen pelvis wo IV contrast   Final Result   Right colon wall thickening consistent with nonspecific colitis.        Small amount of free fluid with mild edematous changes in the lung   bases, mesentery and soft tissues as well as trace pleural   effusions/thickening consistent with fluid overload/CHF.        Subtle density layer in the gallbladder, possible layering   noncalcified stones or sludge.        Additional findings as described above.        MACRO:   None.        Signed by: Loly Albarran 11/6/2024 1:52 PM   Dictation workstation:   [id number]      XR chest 1 view   Final Result   No acute cardiopulmonary process.   Enlarged but unchanged heart compared to 06/12/2024.        MACRO:   None        Signed by: Yokasta Jackson 11/6/2024 11:42 AM   Dictation workstation:   [id number]      Consult to Interventional Radiology    (Results Pending)        Assessment/Plan     This is a 89 y.o. female presenting with a past medical history of CKD stage IV, TIIDM, HTN, GERD, HLD, OA, obesity, vitamin D deficiency, monoclonal gammopathy, chronic anemia, chronic hyponatremia, and HFpEF. Presented [place] [facility] [place] [place] for abdominal pain and N/V. She had a CT A/P which was reviewed and significant for right colon wall thickening consistent for colitis, no evidence of renal obstruction, initial creatinine upon presentation was 2.76 with further decline to 4.96. She was diagnosed with acute  on chronic kidney failure and was given volume expander without improvement. Urinalysis was positive for blood, protein and leukocytes. Etiology of kidney injury is autoimmune in nature concerning for microscopic polyangiitis. Nephrology has asked IR to perform a renal biopsy for confirmation.    Patient is clinically stable at this time. Does have worsening kidney function despite attempts at volume re expansion. She does take ASA, last dose was Tuesday 11/12 @ 0900. She is amenable to a renal biopsy. Could attempt a renal biopsy as early as Friday 11/15 pending IR schedule. Please continue to hold ASA, and make NPO at [phone] Friday morning.     Please reach out to IR with any questions.     Clark Correa, APRN-CNP    Time : Billing Time  Prep time on date of the patient encounter: 20 minutes.   Time spent directly with patient/family/caregiver: 20 minutes.   Documentation time: 20 minutes.   Total time (minutes):  60 minutes  Time Spent with this Patient (minutes).  More than 50% of This Time was Spent in Counseling and/or Coordination of Care'

## 2024-11-13 NOTE — PROGRESS NOTES
Physical Therapy    Physical Therapy Treatment    Patient Name: Carolann Cartagena  MRN: [id number]  Department: [place] [id number]  Room: [address] [place] [address]  Today's Date: 11/13/2024  Time Calculation  Start Time: 1355  Stop Time: 1418  Time Calculation (min): 23 min         Assessment/Plan   PT Assessment  End of Session Communication: Bedside nurse  Assessment Comment: pt able to get up to chair this date  End of Session Patient Position: Alarm on, Up in chair  PT Plan  Inpatient/Swing Bed or Outpatient: Inpatient  PT Plan  Treatment/Interventions: Bed mobility, Transfer training, Gait training  PT Plan: Ongoing PT  PT Frequency: 3 times per week  PT Discharge Recommendations: Moderate intensity level of continued care  PT Recommended Transfer Status: Assist x1  PT - OK to Discharge: Yes (per POC)      General Visit Information:   PT  Visit  PT Received On: 11/13/24  General  Reason for Referral: 89 y/o female admitted with colitis, c/o nausea, vomiting and Htn  Referred By: Dr Elise  Prior to Session Communication: Bedside nurse  Patient Position Received: Bed, 3 rail up, Alarm on  Preferred Learning Style: auditory, kinesthetic, verbal  General Comment: pt agreeable to tx, increased time req to complete activity    Subjective   Precautions:  Precautions  Medical Precautions: Fall precautions    Vital Signs (Past 2hrs)        Date/Time Vitals Session Patient Position Pulse Resp SpO2 BP MAP (mmHg)    11/13/24 1449 --  --  50  17  97 %  149/55  86                         Objective   Pain:  Pain Assessment  0-10 (Numeric) Pain Score: 0 - No pain  Cognition:  Cognition  Overall Cognitive Status: Within Functional Limits  Coordination:     Postural Control:  Static Sitting Balance  Static Sitting-Comment/Number of Minutes: pt performed at EOB with  SBA, pt performed for extended period of time  Static Standing Balance  Static Standing-Comment/Number of Minutes: pt performed static standing balance with UE support at RW and  CGA,  x3-4  min  Extremity/Trunk Assessments:    Activity Tolerance:     Treatments:            Bed Mobility  Bed Mobility: Yes  Bed Mobility 1  Bed Mobility 1: Supine to sitting  Level of Assistance 1: Contact guard  Bed Mobility Comments 1: HOB elevated,  pt performed slowly, min VC for technique    Ambulation/Gait Training  Ambulation/Gait Training Performed: Yes  Ambulation/Gait Training 1  Surface 1: Level tile  Device 1: Rolling walker  Gait Support Devices: Gait belt  Assistance 1: Contact guard  Comments/Distance (ft) 1: 5x1 (pt able to take small steps to chair, declined to go further at this time)  Transfer 1  Technique 1: Sit to stand, Stand to sit  Transfer Device 1: Walker  Transfer Level of Assistance 1: Contact guard  Trials/Comments 1: vc/tc for hand placment on solid sitting surface and not RW.    Outcome Measures:  [facility] Basic Mobility  Turning from your back to your side while in a flat bed without using bedrails: A little  Moving from lying on your back to sitting on the side of a flat bed without using bedrails: A little  Moving to and from bed to chair (including a wheelchair): A little  Standing up from a chair using your arms (e.g. wheelchair or bedside chair): A little  To walk in hospital room: A little  Climbing 3-5 steps with railing: Total  Basic Mobility - Total Score: 16    Education Documentation  Mobility Training, taught by Vincent Asif PTA at 11/13/2024  4:19 PM.  Learner: Patient  Readiness: Acceptance  Method: Explanation  Response: Verbalizes Understanding    Education Comments  No comments found.        OP EDUCATION:  Outpatient Education  Education Comment: educated pt on importance of OOB activity    Encounter Problems       Encounter Problems (Active)       Balance       complete all mobility with normal balance while dual tasking, negotiating in a dynamic environment, carrying items, etc., with proactive and reactive static and dynamic standing and sitting tasks, with mod I and RW  >15 minutes.         Start:  11/07/24    Expected End:  11/21/24               Mobility       STG - Patient will ambulate 150 ft mod I with a RW.  (Progressing)       Start:  11/07/24    Expected End:  11/21/24            Pt will tolerate 15 reps of each LE exercise in order to improve strength and endurance.   (Progressing)       Start:  11/07/24    Expected End:  11/21/24               PT Transfers       STG - Patient will perform bed mobility independently.  (Progressing)       Start:  11/07/24    Expected End:  11/21/24            STG - Patient will transfer sit to and from stand mod I using RW.  (Progressing)       Start:  11/07/24    Expected End:  11/21/24               Pain - Adult

## 2024-11-13 NOTE — PROGRESS NOTES
11/13/24 1239   Discharge Planning   Expected Discharge Disposition SNF     Kidney biopsy Friday. Will lose auth tomorrow. Adod over the weekend.

## 2024-11-13 NOTE — H&P (VIEW-ONLY)
Consults    Reason For Consult  Consulted by Dr. Bhakta for a renal biopsy.    History Of Present Illness  Carolann Cartagena is a 89 y.o. female presenting with a past medical history of CKD stage IV, TIIDM, HTN, GERD, HLD, OA, obesity, vitamin D deficiency, monoclonal gammopathy, chronic anemia, chronic hyponatremia, and HFpEF. Presented to [facility] ED for abdominal pain and N/V. She was diagnosed with acute on chronic kidney failure and was given volume expander without improvement. Etiology of kidney injury is autoimmune in nature concerning for microscopic polyangiitis. Nephrology has asked IR to perform a renal biopsy.     Carolann reports N/V prior to arrival, she also reports decreases PO intake, dark colored urine with decreased amounts. Denies use of NSAIDs, diarrhea, CP, SOB.   Past Medical History  She has a past medical history of Anemia due to chronic kidney disease (01/22/2024), Chronic diastolic congestive heart failure (10/02/2023), Chronic hyponatremia (10/02/2023), Chronic kidney disease, stage 3, mod decreased GFR (Multi) (09/18/2018), Diabetic nephropathy associated with type 2 diabetes mellitus (Multi) (12/18/2018), Essential hypertension (05/23/2023), Hyperlipidemia (05/23/2023), and Pure hypercholesterolemia (10/02/2015).    Surgical History  She has a past surgical history that includes Other surgical history (02/13/2020).     Social History  She reports that she has never smoked. She has never used smokeless tobacco. She reports that she does not currently use alcohol. She reports that she does not currently use drugs.    Family History  Family History   Problem Relation Name Age of Onset    No Known Problems Mother      Bone cancer Sister      Lung cancer Daughter          Allergies  Dorzolamide, Lisinopril, and Losartan    MEDS:    Current Facility-Administered Medications:     acetaminophen (Tylenol) tablet 650 mg, 650 mg, oral, q4h PRN, 650 mg at 11/11/24 1520 **OR** acetaminophen (Tylenol) oral  liquid 650 mg, 650 mg, oral, q4h PRN **OR** acetaminophen (Tylenol) suppository 650 mg, 650 mg, rectal, q4h PRN, BASILIA Mena-CNP    [Held by provider] aspirin EC tablet 81 mg, 81 mg, oral, Daily, BASILIA Mena-CNP, 81 mg at 11/12/24 0914    atorvastatin (Lipitor) tablet 10 mg, 10 mg, oral, Nightly, BASILIA Mena-CNP, 10 mg at 11/12/24 2030    brimonidine (AlphaGAN) 0.2 % ophthalmic solution 1 drop, 1 drop, Both Eyes, BID, BASILIA Mena-CNP, 1 drop at 11/13/24 0919    carvedilol (Coreg) tablet 25 mg, 25 mg, oral, BID, Dangelo Mcgregor APRN-CNP, 25 mg at 11/12/24 0914    dextrose 50 % injection 12.5 g, 12.5 g, intravenous, q15 min PRN, Dangelo Mcgregor APRN-CNP, 12.5 g at 11/13/24 0735    dextrose 50 % injection 25 g, 25 g, intravenous, q15 min PRN, Dangelo Mcgregor APRN-CNP    doxazosin (Cardura) tablet 4 mg, 4 mg, oral, Daily, Pascual Bhakta, DO, 4 mg at 11/13/24 0917    glucagon (Glucagen) injection 1 mg, 1 mg, intramuscular, q15 min PRN, Dangelo Mcgregor APRN-CNP    glucagon (Glucagen) injection 1 mg, 1 mg, intramuscular, q15 min PRN, Dangelo Mcgregor, APRN-CNP    heparin (porcine) injection 5,000 Units, 5,000 Units, subcutaneous, q8h TIP, BASILIA Mena-CNP, 5,000 Units at 11/13/24 0609    hydrALAZINE (Apresoline) injection 5 mg, 5 mg, intravenous, q6h PRN, Dangelo Mcgregor APRN-CNP, 5 mg at 11/08/24 0507    [Held by provider] hydrALAZINE (Apresoline) tablet 100 mg, 100 mg, oral, TID, Julianne Elise MD, 100 mg at 11/11/24 0829    insulin NPH (Isophane) (HumuLIN N,NovoLIN N) injection 12 Units, 12 Units, subcutaneous, BID AC, ERICA Mena, 12 Units at 11/12/24 1547    latanoprost (Xalatan) 0.005 % ophthalmic solution 1 drop, 1 drop, Both Eyes, Nightly, ERICA Mena, 1 drop at 11/12/24 2222    NIFEdipine ER (Adalat CC) 24 hr tablet 60 mg, 60 mg, oral, Daily before breakfast, Pascual Bhakta DO, 60 mg at 11/13/24 0609    ondansetron (Zofran) injection 4 mg, 4 mg, intravenous,  "q6h PRN, ERICA Mena, 4 mg at 11/07/24 1532    sennosides-docusate sodium (Steph-Colace) 8.6-50 mg per tablet 1 tablet, 1 tablet, oral, Nightly PRN, ERICA Mena    sodium bicarbonate tablet 650 mg, 650 mg, oral, BID, ERICA Mena, 650 mg at 11/13/24 0918    [Held by provider] spironolactone (Aldactone) tablet 25 mg, 25 mg, oral, q24h TIP, ERICA Mena, 25 mg at 11/06/24 2111    timolol (Timoptic) 0.5 % ophthalmic solution 1 drop, 1 drop, Both Eyes, Daily, ERICA Mena, 1 drop at 11/13/24 0919    Review of Systems  Respiratory ROS: no cough, shortness of breath, or wheezing  Cardiovascular ROS: no chest pain or dyspnea on exertion  Gastrointestinal ROS: no abdominal pain, change in bowel habits, or black or bloody stools  Neurological ROS: negative     Last Recorded Vitals  /57 (BP Location: Right arm, Patient Position: Lying)   Pulse 53   Temp 36.5 °C (97.7 °F) (Temporal)   Resp 17   Wt 72.6 kg (160 lb)   SpO2 99%      Physical Exam  Orientation: oriented to person, place, time, and general circumstances  HEENT: normocephalic, atraumatic  Pulm: clear to auscultation bilaterally, no wheezes, good air entry  Cardiac: Regular rate and rhythm or without murmur or extra heart sounds  GI: soft, nontender, normal bowel sounds  Pulses: peripheral pulses symmetrical    Relevant Results    LABS:  Lab Results   Component Value Date    WBC 6.0 11/08/2024    HGB 9.6 (L) 11/08/2024    HCT 32.1 (L) 11/08/2024     (H) 11/08/2024     11/08/2024      Results from last 72 hours   Lab Units 11/13/24  0532   SODIUM mmol/L 128*   POTASSIUM mmol/L 5.2   CHLORIDE mmol/L 103   CO2 mmol/L 18*   BUN mg/dL 78*   CREATININE mg/dL 4.96*   GLUCOSE mg/dL 42*   CALCIUM mg/dL 7.7*   ANION GAP mmol/L 12   EGFR mL/min/1.73m*2 8*               No lab exists for component: \"PT\"    MICRO:  No results found for the last 14 days.      IMAGING:   XR chest 2 views   Final Result "   Findings suspicious for ongoing CHF.        Left pleural effusion.        Hazy opacities in the lungs, most pronounced at the posteromedial   left lung base. Atelectasis versus pneumonia versus  asymmetric   pulmonary edema        MACRO:   None        Signed by: Adam Javier 11/11/2024 2:58 PM   Dictation workstation:   [id number]      CT head wo IV contrast   Final Result   No acute intracranial hemorrhage or mass-effect.        MACRO:   None.        Signed by: Loly Albarran 11/6/2024 1:44 PM   Dictation workstation:   [id number]      CT abdomen pelvis wo IV contrast   Final Result   Right colon wall thickening consistent with nonspecific colitis.        Small amount of free fluid with mild edematous changes in the lung   bases, mesentery and soft tissues as well as trace pleural   effusions/thickening consistent with fluid overload/CHF.        Subtle density layer in the gallbladder, possible layering   noncalcified stones or sludge.        Additional findings as described above.        MACRO:   None.        Signed by: Loly Albarran 11/6/2024 1:52 PM   Dictation workstation:   [id number]      XR chest 1 view   Final Result   No acute cardiopulmonary process.   Enlarged but unchanged heart compared to 06/12/2024.        MACRO:   None        Signed by: Yokasta Jackson 11/6/2024 11:42 AM   Dictation workstation:   [id number]      Consult to Interventional Radiology    (Results Pending)        Assessment/Plan     This is a 89 y.o. female presenting with a past medical history of CKD stage IV, TIIDM, HTN, GERD, HLD, OA, obesity, vitamin D deficiency, monoclonal gammopathy, chronic anemia, chronic hyponatremia, and HFpEF. Presented to [facility] ED for abdominal pain and N/V. She had a CT A/P which was reviewed and significant for right colon wall thickening consistent for colitis, no evidence of renal obstruction, initial creatinine upon presentation was 2.76 with further decline to 4.96. She was diagnosed with acute  on chronic kidney failure and was given volume expander without improvement. Urinalysis was positive for blood, protein and leukocytes. Etiology of kidney injury is autoimmune in nature concerning for microscopic polyangiitis. Nephrology has asked IR to perform a renal biopsy for confirmation.    Patient is clinically stable at this time. Does have worsening kidney function despite attempts at volume re expansion. She does take ASA, last dose was Tuesday 11/12 @ 0900. She is amenable to a renal biopsy. Could attempt a renal biopsy as early as Friday 11/15 pending IR schedule. Please continue to hold ASA, and make NPO at 0001 Friday morning.     Please reach out to IR with any questions.     Clark Correa, APRN-CNP    Time : Billing Time  Prep time on date of the patient encounter: 20 minutes.   Time spent directly with patient/family/caregiver: 20 minutes.   Documentation time: 20 minutes.   Total time (minutes):  60 minutes  Time Spent with this Patient (minutes).  More than 50% of This Time was Spent in Counseling and/or Coordination of Care'

## 2024-11-13 NOTE — PROGRESS NOTES
"Carolann Cartagena is a 89 y.o. female on day 7 of admission presenting with Colitis.    Subjective   No acute events overnight. Anxious to get biopsy done, but overall feeling ok.        Objective     VITALS  Blood pressure 154/59, pulse 50, temperature 36.5 °C (97.7 °F), temperature source Temporal, resp. rate 17, height 1.626 m (5' 4\"), weight 72.6 kg (160 lb), SpO2 98%.  Physical Exam  Vitals reviewed.   Constitutional:       Appearance: Normal appearance.   HENT:      Head: Normocephalic.      Nose: Nose normal.   Cardiovascular:      Rate and Rhythm: Normal rate and regular rhythm.      Pulses: Normal pulses.      Heart sounds: Normal heart sounds.   Abdominal:      General: Abdomen is flat. Bowel sounds are normal.      Palpations: Abdomen is soft.   Skin:     General: Skin is warm and dry.      Capillary Refill: Capillary refill takes less than 2 seconds.   Neurological:      General: No focal deficit present.      Mental Status: She is alert and oriented to person, place, and time.           Intake/Output last 3 Shifts:  I/O last 3 completed shifts:  In: 375 (5.2 mL/kg) [P.O.:375]  Out: 250 (3.4 mL/kg) [Urine:250 (0.1 mL/kg/hr)]  Weight: 72.6 kg     Relevant Results  Results for orders placed or performed during the hospital encounter of 11/06/24 (from the past 24 hours)   POCT GLUCOSE   Result Value Ref Range    POCT Glucose 155 (H) 74 - 99 mg/dL   POCT GLUCOSE   Result Value Ref Range    POCT Glucose 137 (H) 74 - 99 mg/dL   Basic metabolic panel   Result Value Ref Range    Glucose 42 (LL) 74 - 99 mg/dL    Sodium 128 (L) 136 - 145 mmol/L    Potassium 5.2 3.5 - 5.3 mmol/L    Chloride 103 98 - 107 mmol/L    Bicarbonate 18 (L) 21 - 32 mmol/L    Anion Gap 12 10 - 20 mmol/L    Urea Nitrogen 78 (H) 6 - 23 mg/dL    Creatinine 4.96 (H) 0.50 - 1.05 mg/dL    eGFR 8 (L) >60 mL/min/1.73m*2    Calcium 7.7 (L) 8.6 - 10.3 mg/dL   Lavender Top   Result Value Ref Range    Extra Tube Hold for add-ons.    POCT GLUCOSE   Result " Value Ref Range    POCT Glucose 51 (L) 74 - 99 mg/dL   POCT GLUCOSE   Result Value Ref Range    POCT Glucose 88 74 - 99 mg/dL   POCT GLUCOSE   Result Value Ref Range    POCT Glucose 129 (H) 74 - 99 mg/dL       Imaging Results  XR chest 2 views   Final Result   Findings suspicious for ongoing CHF.        Left pleural effusion.        Hazy opacities in the lungs, most pronounced at the posteromedial   left lung base. Atelectasis versus pneumonia versus  asymmetric   pulmonary edema        MACRO:   None        Signed by: Adam Javier 11/11/2024 2:58 PM   Dictation workstation:   [id number]      CT head wo IV contrast   Final Result   No acute intracranial hemorrhage or mass-effect.        MACRO:   None.        Signed by: Loly Albarran 11/6/2024 1:44 PM   Dictation workstation:   [id number]      CT abdomen pelvis wo IV contrast   Final Result   Right colon wall thickening consistent with nonspecific colitis.        Small amount of free fluid with mild edematous changes in the lung   bases, mesentery and soft tissues as well as trace pleural   effusions/thickening consistent with fluid overload/CHF.        Subtle density layer in the gallbladder, possible layering   noncalcified stones or sludge.        Additional findings as described above.        MACRO:   None.        Signed by: Loly Albarran 11/6/2024 1:52 PM   Dictation workstation:   [id number]      XR chest 1 view   Final Result   No acute cardiopulmonary process.   Enlarged but unchanged heart compared to 06/12/2024.        MACRO:   None        Signed by: Yokasta Jackson 11/6/2024 11:42 AM   Dictation workstation:   [id number]      Consult to Interventional Radiology    (Results Pending)       Medications:  [Held by provider] aspirin, 81 mg, oral, Daily  atorvastatin, 10 mg, oral, Nightly  brimonidine, 1 drop, Both Eyes, BID  carvedilol, 25 mg, oral, BID  doxazosin, 4 mg, oral, Daily  heparin (porcine), 5,000 Units, subcutaneous, q8h TIP  [Held by provider]  hydrALAZINE, 100 mg, oral, TID  insulin NPH (Isophane), 12 Units, subcutaneous, BID AC  latanoprost, 1 drop, Both Eyes, Nightly  NIFEdipine ER, 60 mg, oral, Daily before breakfast  sodium bicarbonate, 650 mg, oral, BID  [Held by provider] spironolactone, 25 mg, oral, q24h TIP  timolol, 1 drop, Both Eyes, Daily       PRN medications: acetaminophen **OR** acetaminophen **OR** acetaminophen, dextrose, dextrose, glucagon, glucagon, hydrALAZINE, ondansetron, sennosides-docusate sodium        Assessment/Plan          Principal Problem:    Colitis    1.  Accelerated hypertension  -Improved pressures overall, nephro would like SBP <140  -Coreg  -Doxazosin  -Nifedipine      2.  Acute kidney injury on CKD 4  -Worsening  -Appreciate nephro recs, to get kidney biopsy on Friday  -Concern she will continue to worsen to the point that she needs dialysis though at her age it is unclear if she would want this.      3. DM  -continue SSI and NPH     4. Fever  -one episode yesterday  -check flu swab/bcx/ua/cxr  -pending procalcitonin    DVT Prophylaxis:  Heparin subq    Disposition:  Kidney biopsy on Friday     Emeterio Alves, [facility]

## 2024-11-13 NOTE — CARE PLAN
Problem: Pain - Adult  Goal: Verbalizes/displays adequate comfort level or baseline comfort level  11/12/2024 1903 by Kvng Nick RN  Outcome: Progressing  11/12/2024 1903 by Kvng Nick RN  Outcome: Progressing  11/12/2024 1528 by Kvng Nick RN  Outcome: Progressing  11/12/2024 1527 by Kvng Nick RN  Outcome: Progressing     Problem: Safety - Adult  Goal: Free from fall injury  11/12/2024 1903 by Kvng Nick RN  Outcome: Progressing  11/12/2024 1903 by Kvng Nick RN  Outcome: Progressing  11/12/2024 1528 by Kvng Nick RN  Outcome: Progressing  11/12/2024 1527 by Kvng Nick RN  Outcome: Progressing     Problem: Discharge Planning  Goal: Discharge to home or other facility with appropriate resources  11/12/2024 1903 by Kvng Nick RN  Outcome: Progressing  11/12/2024 1903 by Kvng Nick RN  Outcome: Progressing  11/12/2024 1528 by Kvng Nick RN  Outcome: Progressing  11/12/2024 1527 by Kvng Nick RN  Outcome: Progressing     Problem: Chronic Conditions and Co-morbidities  Goal: Patient's chronic conditions and co-morbidity symptoms are monitored and maintained or improved  11/12/2024 1903 by Kvng Nick RN  Outcome: Progressing  11/12/2024 1903 by Kvng Nick RN  Outcome: Progressing  11/12/2024 1528 by Kvng Nick RN  Outcome: Progressing  11/12/2024 1527 by Kvng Nick RN  Outcome: Progressing     Problem: Diabetes  Goal: Achieve decreasing blood glucose levels by end of shift  11/12/2024 1903 by Kvng Nick RN  Outcome: Progressing  11/12/2024 1903 by Kvng Nick RN  Outcome: Progressing  11/12/2024 1528 by Kvng Nick RN  Outcome: Progressing  11/12/2024 1527 by Kvng Nick RN  Outcome: Progressing  Goal: Increase stability of blood glucose readings by end of shift  11/12/2024 1903 by Kvng Nick RN  Outcome: Progressing  11/12/2024 1903 by Kvng Nick RN  Outcome: Progressing  11/12/2024 1528 by Kvng Nick,  RN  Outcome: Progressing  11/12/2024 1527 by Kvng Nick RN  Outcome: Progressing  Goal: Decrease in ketones present in urine by end of shift  11/12/2024 1903 by Kvng Nick, RN  Outcome: Progressing  11/12/2024 1903 by Kvng Nick RN  Outcome: Progressing  11/12/2024 1528 by Kvng Nick, RN  Outcome: Progressing  11/12/2024 1527 by Kvng Nick, RN  Outcome: Progressing  Goal: Maintain electrolyte levels within acceptable range throughout shift  11/12/2024 1903 by Kvng Nick RN  Outcome: Progressing  11/12/2024 1903 by Kvng Nick, RN  Outcome: Progressing  11/12/2024 1528 by Kvng Nick, RN  Outcome: Progressing  11/12/2024 1527 by Kvng Nick RN  Outcome: Progressing  Goal: Maintain glucose levels >70mg/dl to <250mg/dl throughout shift  11/12/2024 1903 by Kvng Nick, RN  Outcome: Progressing  11/12/2024 1903 by Kvng Nick, RN  Outcome: Progressing  11/12/2024 1528 by Kvng Nick RN  Outcome: Progressing  11/12/2024 1527 by Kvng Nick RN  Outcome: Progressing  Goal: No changes in neurological exam by end of shift  11/12/2024 1903 by Kvng Nick RN  Outcome: Progressing  11/12/2024 1903 by Kvng Nick, RN  Outcome: Progressing  11/12/2024 1528 by Kvng Nick, RN  Outcome: Progressing  11/12/2024 1527 by Kvng Nick RN  Outcome: Progressing  Goal: Learn about and adhere to nutrition recommendations by end of shift  11/12/2024 1903 by Kvng Nick, RN  Outcome: Progressing  11/12/2024 1903 by Kvng Nick RN  Outcome: Progressing  11/12/2024 1528 by Kvng Nick RN  Outcome: Progressing  11/12/2024 1527 by Kvng Nick RN  Outcome: Progressing  Goal: Vital signs within normal range for age by end of shift  11/12/2024 1903 by Kvng Nick, RN  Outcome: Progressing  11/12/2024 1903 by Kvng Nick RN  Outcome: Progressing  11/12/2024 1528 by Kvng Nick RN  Outcome: Progressing  11/12/2024 1527 by Kvng Nick RN  Outcome:  Progressing  Goal: Increase self care and/or family involovement by end of shift  11/12/2024 1903 by Kvng Nick RN  Outcome: Progressing  11/12/2024 1903 by Kvng Nick RN  Outcome: Progressing  11/12/2024 1528 by Kvng Nick RN  Outcome: Progressing  11/12/2024 1527 by Kvng Nick RN  Outcome: Progressing  Goal: Receive DSME education by end of shift  11/12/2024 1903 by Kvng Nick RN  Outcome: Progressing  11/12/2024 1903 by Kvng Nick RN  Outcome: Progressing  11/12/2024 1528 by Kvng Nick RN  Outcome: Progressing  11/12/2024 1527 by Kvng Nick RN  Outcome: Progressing     Problem: Skin  Goal: Decreased wound size/increased tissue granulation at next dressing change  11/12/2024 1903 by Kvng Nick RN  Outcome: Progressing  Note: Pt verbalized understanding  11/12/2024 1903 by Kvng Nick RN  Outcome: Progressing  11/12/2024 1653 by Kvng Nick RN  Outcome: Progressing  Note: Pt demonstrates understanding via verbal   11/12/2024 1528 by Kvng Nick RN  Outcome: Progressing  11/12/2024 1527 by Kvng Nick RN  Outcome: Progressing  Goal: Participates in plan/prevention/treatment measures  11/12/2024 1903 by Kvng Nick RN  Outcome: Progressing  Flowsheets (Taken 11/12/2024 1903)  Participates in plan/prevention/treatment measures:   Discuss with provider PT/OT consult   Increase activity/out of bed for meals   Elevate heels  Note: Pt verbalized understanding  11/12/2024 1903 by Kvng Nick RN  Outcome: Progressing  Flowsheets (Taken 11/12/2024 1903)  Participates in plan/prevention/treatment measures:   Discuss with provider PT/OT consult   Increase activity/out of bed for meals   Elevate heels  11/12/2024 1653 by Knvg Nick RN  Flowsheets (Taken 11/12/2024 1653)  Participates in plan/prevention/treatment measures:   Elevate heels   Increase activity/out of bed for meals  Note: Pt demonstrates understanding via verbal   11/12/2024 1528 by Kvng  NEIL Nick  Outcome: Progressing  11/12/2024 1527 by Kvng Nick RN  Outcome: Progressing  Goal: Prevent/manage excess moisture  11/12/2024 1903 by Kvng Nick RN  Outcome: Progressing  Flowsheets (Taken 11/12/2024 1903)  Prevent/manage excess moisture: Moisturize dry skin  Note: Pt verbalized understanding  11/12/2024 1903 by Kvng Nick RN  Outcome: Progressing  Flowsheets (Taken 11/12/2024 1903)  Prevent/manage excess moisture: Moisturize dry skin  11/12/2024 1653 by Kvng Nick RN  Flowsheets (Taken 11/12/2024 1653)  Prevent/manage excess moisture:   Cleanse incontinence/protect with barrier cream   Moisturize dry skin   Follow provider orders for dressing changes  Note: Pt demonstrates understanding via verbal   11/12/2024 1528 by Kvng Nick RN  Outcome: Progressing  11/12/2024 1527 by Kvng Nick RN  Outcome: Progressing  Goal: Prevent/minimize sheer/friction injuries  11/12/2024 1903 by Kvng Nick RN  Outcome: Progressing  Flowsheets (Taken 11/12/2024 1903)  Prevent/minimize sheer/friction injuries: Increase activity/out of bed for meals  Note: Pt verbalized understanding  11/12/2024 1903 by Kvng Nick RN  Outcome: Progressing  Flowsheets (Taken 11/12/2024 1903)  Prevent/minimize sheer/friction injuries: Increase activity/out of bed for meals  11/12/2024 1653 by Kvng Nick RN  Flowsheets (Taken 11/12/2024 1653)  Prevent/minimize sheer/friction injuries:   HOB 30 degrees or less   Turn/reposition every 2 hours/use positioning/transfer devices   Increase activity/out of bed for meals  Note: Pt demonstrates understanding via verbal   11/12/2024 1528 by Kvng Nick RN  Outcome: Progressing  11/12/2024 1527 by Kvng Nick RN  Outcome: Progressing  Goal: Promote/optimize nutrition  11/12/2024 1903 by Kvng Nick RN  Outcome: Progressing  Flowsheets (Taken 11/12/2024 1903)  Promote/optimize nutrition:   Offer water/supplements/favorite foods   Consume > 50%  meals/supplements   Monitor/record intake including meals  Note: Pt verbalized understanding  11/12/2024 1903 by Kvng Nick RN  Outcome: Progressing  Flowsheets (Taken 11/12/2024 1903)  Promote/optimize nutrition:   Offer water/supplements/favorite foods   Consume > 50% meals/supplements   Monitor/record intake including meals  11/12/2024 1653 by Kvng Nick RN  Flowsheets (Taken 11/12/2024 1653)  Promote/optimize nutrition:   Assist with feeding   Offer water/supplements/favorite foods   Consume > 50% meals/supplements   Monitor/record intake including meals  Note: Pt demonstrates understanding via verbal   11/12/2024 1528 by Kvng Nick RN  Outcome: Progressing  11/12/2024 1527 by Kvng Nick RN  Outcome: Progressing  Goal: Promote skin healing  11/12/2024 1903 by Kvng Nick RN  Outcome: Progressing  Flowsheets (Taken 11/12/2024 1903)  Promote skin healing: Protective dressings over bony prominences  Note: Pt verbalized understanding  11/12/2024 1903 by Kvng Nick RN  Outcome: Progressing  Flowsheets (Taken 11/12/2024 1903)  Promote skin healing: Protective dressings over bony prominences  11/12/2024 1653 by Kvng Nick RN  Flowsheets (Taken 11/12/2024 1653)  Promote skin healing:   Assess skin/pad under line(s)/device(s)   Ensure correct size (line/device) and apply per  instructions   Protective dressings over bony prominences   Turn/reposition every 2 hours/use positioning/transfer devices  Note: Pt demonstrates understanding via verbal   11/12/2024 1528 by Kvng Nick RN  Outcome: Progressing  11/12/2024 1527 by Kvng Nick RN  Outcome: Progressing   The patient's goals for the shift include  free from chest pain    The clinical goals for the shift include pt will remain HDS

## 2024-11-14 LAB
ANION GAP SERPL CALC-SCNC: 11 MMOL/L (ref 10–20)
BUN SERPL-MCNC: 86 MG/DL (ref 6–23)
CALCIUM SERPL-MCNC: 7.6 MG/DL (ref 8.6–10.3)
CHLORIDE SERPL-SCNC: 99 MMOL/L (ref 98–107)
CO2 SERPL-SCNC: 21 MMOL/L (ref 21–32)
CREAT SERPL-MCNC: 5.25 MG/DL (ref 0.5–1.05)
EGFRCR SERPLBLD CKD-EPI 2021: 7 ML/MIN/1.73M*2
GLUCOSE BLD MANUAL STRIP-MCNC: 149 MG/DL (ref 74–99)
GLUCOSE BLD MANUAL STRIP-MCNC: 171 MG/DL (ref 74–99)
GLUCOSE BLD MANUAL STRIP-MCNC: 70 MG/DL (ref 74–99)
GLUCOSE BLD MANUAL STRIP-MCNC: 94 MG/DL (ref 74–99)
GLUCOSE SERPL-MCNC: 62 MG/DL (ref 74–99)
HOLD SPECIMEN: NORMAL
POTASSIUM SERPL-SCNC: 5.3 MMOL/L (ref 3.5–5.3)
SODIUM SERPL-SCNC: 126 MMOL/L (ref 136–145)

## 2024-11-14 PROCEDURE — 82374 ASSAY BLOOD CARBON DIOXIDE: CPT | Performed by: INTERNAL MEDICINE

## 2024-11-14 PROCEDURE — 1100000001 HC PRIVATE ROOM DAILY

## 2024-11-14 PROCEDURE — 99232 SBSQ HOSP IP/OBS MODERATE 35: CPT | Performed by: INTERNAL MEDICINE

## 2024-11-14 PROCEDURE — 82947 ASSAY GLUCOSE BLOOD QUANT: CPT

## 2024-11-14 PROCEDURE — 2500000004 HC RX 250 GENERAL PHARMACY W/ HCPCS (ALT 636 FOR OP/ED): Performed by: NURSE PRACTITIONER

## 2024-11-14 PROCEDURE — 2500000001 HC RX 250 WO HCPCS SELF ADMINISTERED DRUGS (ALT 637 FOR MEDICARE OP): Performed by: NURSE PRACTITIONER

## 2024-11-14 PROCEDURE — 2500000002 HC RX 250 W HCPCS SELF ADMINISTERED DRUGS (ALT 637 FOR MEDICARE OP, ALT 636 FOR OP/ED): Performed by: INTERNAL MEDICINE

## 2024-11-14 PROCEDURE — 2500000004 HC RX 250 GENERAL PHARMACY W/ HCPCS (ALT 636 FOR OP/ED): Performed by: INTERNAL MEDICINE

## 2024-11-14 PROCEDURE — 2500000001 HC RX 250 WO HCPCS SELF ADMINISTERED DRUGS (ALT 637 FOR MEDICARE OP): Performed by: INTERNAL MEDICINE

## 2024-11-14 PROCEDURE — 97535 SELF CARE MNGMENT TRAINING: CPT | Mod: GO,CO

## 2024-11-14 PROCEDURE — 97530 THERAPEUTIC ACTIVITIES: CPT | Mod: GO,CO

## 2024-11-14 PROCEDURE — 36415 COLL VENOUS BLD VENIPUNCTURE: CPT | Performed by: INTERNAL MEDICINE

## 2024-11-14 RX ORDER — NIFEDIPINE 30 MG/1
90 TABLET, FILM COATED, EXTENDED RELEASE ORAL
Status: DISCONTINUED | OUTPATIENT
Start: 2024-11-14 | End: 2024-11-23 | Stop reason: HOSPADM

## 2024-11-14 RX ORDER — FUROSEMIDE 10 MG/ML
80 INJECTION INTRAMUSCULAR; INTRAVENOUS ONCE
Status: COMPLETED | OUTPATIENT
Start: 2024-11-14 | End: 2024-11-14

## 2024-11-14 RX ORDER — NIFEDIPINE 30 MG/1
90 TABLET, FILM COATED, EXTENDED RELEASE ORAL
Status: DISCONTINUED | OUTPATIENT
Start: 2024-11-15 | End: 2024-11-14

## 2024-11-14 RX ADMIN — SODIUM BICARBONATE 650 MG: 650 TABLET ORAL at 10:20

## 2024-11-14 RX ADMIN — TIMOLOL MALEATE 1 DROP: 5 SOLUTION/ DROPS OPHTHALMIC at 10:21

## 2024-11-14 RX ADMIN — FUROSEMIDE 80 MG: 10 INJECTION, SOLUTION INTRAMUSCULAR; INTRAVENOUS at 11:40

## 2024-11-14 RX ADMIN — ONDANSETRON 4 MG: 2 INJECTION, SOLUTION INTRAMUSCULAR; INTRAVENOUS at 16:52

## 2024-11-14 RX ADMIN — INSULIN HUMAN 6 UNITS: 100 INJECTION, SUSPENSION SUBCUTANEOUS at 16:49

## 2024-11-14 RX ADMIN — LATANOPROST 1 DROP: 50 SOLUTION/ DROPS OPHTHALMIC at 20:42

## 2024-11-14 RX ADMIN — BRIMONIDINE TARTRATE 1 DROP: 2 SOLUTION/ DROPS OPHTHALMIC at 10:21

## 2024-11-14 RX ADMIN — NIFEDIPINE 90 MG: 30 TABLET, FILM COATED, EXTENDED RELEASE ORAL at 10:20

## 2024-11-14 RX ADMIN — ATORVASTATIN CALCIUM 10 MG: 20 TABLET ORAL at 20:42

## 2024-11-14 RX ADMIN — HEPARIN SODIUM 5000 UNITS: 5000 INJECTION INTRAVENOUS; SUBCUTANEOUS at 16:49

## 2024-11-14 RX ADMIN — BRIMONIDINE TARTRATE 1 DROP: 2 SOLUTION/ DROPS OPHTHALMIC at 20:42

## 2024-11-14 RX ADMIN — DOXAZOSIN 4 MG: 2 TABLET ORAL at 10:20

## 2024-11-14 RX ADMIN — HEPARIN SODIUM 5000 UNITS: 5000 INJECTION INTRAVENOUS; SUBCUTANEOUS at 06:55

## 2024-11-14 RX ADMIN — SODIUM BICARBONATE 650 MG: 650 TABLET ORAL at 20:42

## 2024-11-14 ASSESSMENT — PAIN SCALES - GENERAL
PAINLEVEL_OUTOF10: 0 - NO PAIN

## 2024-11-14 ASSESSMENT — COGNITIVE AND FUNCTIONAL STATUS - GENERAL
DRESSING REGULAR LOWER BODY CLOTHING: A LOT
MOVING TO AND FROM BED TO CHAIR: A LITTLE
DAILY ACTIVITIY SCORE: 16
TOILETING: A LOT
WALKING IN HOSPITAL ROOM: A LITTLE
MOBILITY SCORE: 17
TURNING FROM BACK TO SIDE WHILE IN FLAT BAD: A LITTLE
STANDING UP FROM CHAIR USING ARMS: A LITTLE
HELP NEEDED FOR BATHING: A LOT
PERSONAL GROOMING: A LITTLE
DRESSING REGULAR UPPER BODY CLOTHING: A LITTLE
CLIMB 3 TO 5 STEPS WITH RAILING: A LOT
MOVING FROM LYING ON BACK TO SITTING ON SIDE OF FLAT BED WITH BEDRAILS: A LITTLE

## 2024-11-14 ASSESSMENT — ACTIVITIES OF DAILY LIVING (ADL): HOME_MANAGEMENT_TIME_ENTRY: 15

## 2024-11-14 ASSESSMENT — PAIN - FUNCTIONAL ASSESSMENT
PAIN_FUNCTIONAL_ASSESSMENT: 0-10

## 2024-11-14 NOTE — CARE PLAN
The patient's goals for the shift include      The clinical goals for the shift include patient josselyn remain stable through out shift    Problem: Pain - Adult  Goal: Verbalizes/displays adequate comfort level or baseline comfort level  Outcome: Progressing     Problem: Safety - Adult  Goal: Free from fall injury  Outcome: Progressing     Problem: Discharge Planning  Goal: Discharge to home or other facility with appropriate resources  Outcome: Progressing     Problem: Chronic Conditions and Co-morbidities  Goal: Patient's chronic conditions and co-morbidity symptoms are monitored and maintained or improved  Outcome: Progressing     Problem: Diabetes  Goal: Achieve decreasing blood glucose levels by end of shift  Outcome: Progressing  Goal: Increase stability of blood glucose readings by end of shift  Outcome: Progressing  Goal: Decrease in ketones present in urine by end of shift  Outcome: Progressing  Goal: Maintain electrolyte levels within acceptable range throughout shift  Outcome: Progressing  Goal: Maintain glucose levels >70mg/dl to <250mg/dl throughout shift  Outcome: Progressing  Goal: No changes in neurological exam by end of shift  Outcome: Progressing  Goal: Learn about and adhere to nutrition recommendations by end of shift  Outcome: Progressing  Goal: Vital signs within normal range for age by end of shift  Outcome: Progressing  Goal: Increase self care and/or family involovement by end of shift  Outcome: Progressing  Goal: Receive DSME education by end of shift  Outcome: Progressing     Problem: Skin  Goal: Decreased wound size/increased tissue granulation at next dressing change  Outcome: Progressing  Goal: Participates in plan/prevention/treatment measures  Outcome: Progressing  Goal: Prevent/manage excess moisture  Outcome: Progressing  Goal: Prevent/minimize sheer/friction injuries  Outcome: Progressing  Goal: Promote/optimize nutrition  Outcome: Progressing  Goal: Promote skin healing  Outcome:  Progressing

## 2024-11-14 NOTE — PROGRESS NOTES
Occupational Therapy    Occupational Therapy Treatment    Name: Carolann Cartagena  MRN: [id number]  Department: [facility]  Room: [address] [place] [address]  Date: 11/14/24  Time Calculation  Start Time: 1426  Stop Time: 1453  Time Calculation (min): 27 min    Assessment:  Medical Staff Made Aware: Yes  End of Session Communication: Bedside nurse  End of Session Patient Position: Up in chair, Alarm on  Plan:  Treatment Interventions: ADL retraining, Functional transfer training  OT Frequency: 3 times per week  OT Discharge Recommendations: Moderate intensity level of continued care  Equipment Recommended upon Discharge: Wheeled walker, Other (comment) (Pt owns rollator at home per daughters)  OT Recommended Transfer Status: Assist of 1  OT - OK to Discharge: Yes (per POC)    Subjective   Previous Visit Info:  OT Last Visit  OT Received On: 11/14/24  General:  General  Reason for Referral: 89 y/o female admitted with colitis, c/o nausea, vomiting and Htn.  Family/Caregiver Present: Yes  Caregiver Feedback: daughters present  Prior to Session Communication: Bedside nurse  Patient Position Received: Bed, 3 rail up, Alarm on  Preferred Learning Style: auditory, kinesthetic, verbal  General Comment: Pt deconditioned, cooperative and compliant with session. Required increased time to complete all tasks.  Precautions:  Medical Precautions: Fall precautions    Pain Assessment:  Pain Assessment  Pain Assessment: 0-10  0-10 (Numeric) Pain Score: 0 - No pain     Objective   Cognition:  Overall Cognitive Status: Within Functional Limits  Orientation Level: Oriented X4  Activities of Daily Living: Grooming  Grooming Level of Assistance: Close supervision, Setup  Grooming Where Assessed: Chair  Grooming Comments: pt complete mouth/denture care and face washing.     Toileting  Toileting Level of Assistance: Maximum assistance  Where Assessed: Toilet  Toileting Comments: Max assist to complete pericare hygiene & to don/doff depends (Increased time required  to complete BM)    Functional Standing Tolerance:     Bed Mobility/Transfers: Bed Mobility  Bed Mobility: Yes  Bed Mobility 1  Bed Mobility 1: Supine to sitting, Long sit  Level of Assistance 1: Contact guard  Bed Mobility Comments 1: VC provided for encouragement with use of grab bars for UE support  Bed Mobility 2  Bed Mobility  2: Scooting  Level of Assistance 2: Close supervision  Bed Mobility Comments 2: to bring hips towards EOB    Transfers  Transfer: Yes  Transfer 1  Transfer From 1: Bed to  Transfer to 1: Toilet  Technique 1: Stand to sit, Sit to stand  Transfer Device 1: Walker  Transfer Level of Assistance 1: Contact guard  Trials/Comments 1: Cues for hand placement.  Transfers 2  Transfer From 2: Toilet to  Transfer to 2: Chair with drop arm  Technique 2: Stand to sit, Sit to stand  Transfer Device 2: Walker  Transfer Level of Assistance 2: Contact guard  Trials/Comments 2: VC provided for safety awareness    Functional Mobility:  Functional Mobility  Functional Mobility Performed: Yes  Functional Mobility 1  Surface 1: Level tile  Device 1: Rolling walker  Assistance 1: Contact guard  Comments 1: Pt completed functional mobility with FWW a short household distance at [facility] with VC for safety awareness.    Outcome Measures:  [facility] Daily Activity  Putting on and taking off regular lower body clothing: A lot  Bathing (including washing, rinsing, drying): A lot  Putting on and taking off regular upper body clothing: A little  Toileting, which includes using toilet, bedpan or urinal: A lot  Taking care of personal grooming such as brushing teeth: A little  Eating Meals: None  Daily Activity - Total Score: 16    Education Documentation  Body Mechanics, taught by DANNA Maria at 11/14/2024  3:22 PM.  Learner: Patient  Readiness: Acceptance  Method: Explanation, Demonstration  Response: Verbalizes Understanding, Needs Reinforcement    ADL Training, taught by DANNA Maria at 11/14/2024  3:22  PM.  Learner: Patient  Readiness: Acceptance  Method: Explanation, Demonstration  Response: Verbalizes Understanding, Needs Reinforcement    Education Comments  No comments found.      Goals:  Encounter Problems       Encounter Problems (Active)       ADLs       Patient will perform UB and LB bathing with stand by assist level of assistance. (Not Progressing)       Start:  11/07/24    Expected End:  11/21/24            Patient with complete upper body dressing with independent level of assistance donning and doffing all UE clothes with no adaptive equipment while edge of bed  (Not Progressing)       Start:  11/07/24    Expected End:  11/21/24            Patient with complete lower body dressing with independent level of assistance donning and doffing all LE clothes  with no adaptive equipment while edge of bed  (Progressing)       Start:  11/07/24    Expected End:  11/21/24            Patient will complete daily grooming tasks brushing teeth and washing face/hair with independent level of assistance and PRN adaptive equipment while standing. (Progressing)       Start:  11/07/24    Expected End:  11/21/24            Patient will complete toileting including hygiene clothing management/hygiene with independent level of assistance and raised toilet seat. (Progressing)       Start:  11/07/24    Expected End:  11/21/24               BALANCE       Pt will maintain dynamic standing balance during ADL task with independent level of assistance in order to demonstrate decreased risk of falling and improved postural control. (Not Progressing)       Start:  11/07/24    Expected End:  11/21/24               TRANSFERS       Patient will perform bed mobility independent level of assistance and bed rails in order to improve safety and independence with mobility (Progressing)       Start:  11/07/24    Expected End:  11/21/24            Patient will complete functional transfer to with front wheeled walker with modified independent  level of assistance. (Progressing)       Start:  11/07/24    Expected End:  11/21/24

## 2024-11-14 NOTE — PROGRESS NOTES
11/14/24 0958   Discharge Planning   Who is requesting discharge planning? Provider   Home or Post Acute Services Post acute facilities (Rehab/SNF/etc)   Type of Post Acute Facility Services Skilled nursing   Expected Discharge Disposition SNF   Does the patient need discharge transport arranged? Yes   RoundTrip coordination needed? Yes   Has discharge transport been arranged? No     11/14/24 0958  Plan for renal biopsy tomorrow.  Per renal, patient may require dialysis.  Renal will discuss with daughter.  Will go to [facility] at discharge.  Auth expires today.  Will need new auth.  Heena Garcia RN [facility]

## 2024-11-14 NOTE — PROGRESS NOTES
Carolann Cartagena is a 89 y.o. female on day 8 of admission presenting with Colitis.      Subjective   Seen and examined.   Resting comfortably in bed. She reports feeling well. She has a healthy appetite. Her sodium is slowly declining.   FRANDY.  She does not offer specific complaints.     Objective        Vitals 24HR  Heart Rate:  [44-50]   Temp:  [36.2 °C (97.2 °F)-36.8 °C (98.2 °F)]   Resp:  [16-17]   BP: (132-154)/(40-64)   SpO2:  [96 %-100 %]     Intake/Output last 3 Shifts:  No intake or output data in the 24 hours ending 11/14/24 1027      Physical Exam  Constitutional: Well developed, awake/alert/oriented  x3, no distress, alert and cooperative   Eyes: PERRL, EOMI, clear sclera   ENMT: mucous membranes moist, no apparent injury, no lesions seen   Head/Neck: Neck supple, no JVD, trachea midline   Respiratory/Thorax: CTAB   Cardiovascular: Regular, rate and rhythm, systolic  murmur, normal S 1 and S 2   Gastrointestinal: Nondistended, soft, non-tender, no rebound tenderness or guarding   Musculoskeletal: ROM intact, no joint swelling, normal  strength   Extremities: normal extremities, trace lower extremity  edema    Neurological: alert and oriented x3  No asterixis   Skin: Warm and dry, no lesions, no rashes     Scheduled Medications  [Held by provider] aspirin, 81 mg, oral, Daily  atorvastatin, 10 mg, oral, Nightly  brimonidine, 1 drop, Both Eyes, BID  carvedilol, 25 mg, oral, BID  doxazosin, 4 mg, oral, Daily  heparin (porcine), 5,000 Units, subcutaneous, q8h TIP  [Held by provider] hydrALAZINE, 100 mg, oral, TID  insulin NPH (Isophane), 12 Units, subcutaneous, BID AC  latanoprost, 1 drop, Both Eyes, Nightly  NIFEdipine ER, 90 mg, oral, Daily before breakfast  sodium bicarbonate, 650 mg, oral, BID  [Held by provider] spironolactone, 25 mg, oral, q24h TIP  timolol, 1 drop, Both Eyes, Daily      Continuous medications     PRN medications: acetaminophen **OR** acetaminophen **OR** acetaminophen, dextrose, dextrose,  glucagon, glucagon, hydrALAZINE, ondansetron, sennosides-docusate sodium     Relevant Results  Results from last 7 days   Lab Units 11/08/24  1028   WBC AUTO x10*3/uL 6.0   HEMOGLOBIN g/dL 9.6*   HEMATOCRIT % 32.1*   PLATELETS AUTO x10*3/uL 237   NEUTROS PCT AUTO % 67.3   LYMPHS PCT AUTO % 18.3   MONOS PCT AUTO % 11.6   EOS PCT AUTO % 2.0     Results from last 7 days   Lab Units 11/14/24  0606 11/13/24  0532 11/12/24  0558   SODIUM mmol/L 126* 128* 131*   POTASSIUM mmol/L 5.3 5.2 5.0   CHLORIDE mmol/L 99 103 103   CO2 mmol/L 21 18* 20*   BUN mg/dL 86* 78* 75*   CREATININE mg/dL 5.25* 4.96* 4.50*   GLUCOSE mg/dL 62* 42* 96   CALCIUM mg/dL 7.6* 7.7* 8.0*       XR chest 2 views   Final Result   Findings suspicious for ongoing CHF.        Left pleural effusion.        Hazy opacities in the lungs, most pronounced at the posteromedial   left lung base. Atelectasis versus pneumonia versus  asymmetric   pulmonary edema        MACRO:   None        Signed by: Adam Javier 11/11/2024 2:58 PM   Dictation workstation:   [id number]      CT head wo IV contrast   Final Result   No acute intracranial hemorrhage or mass-effect.        MACRO:   None.        Signed by: Loly Albarran 11/6/2024 1:44 PM   Dictation workstation:   [id number]      CT abdomen pelvis wo IV contrast   Final Result   Right colon wall thickening consistent with nonspecific colitis.        Small amount of free fluid with mild edematous changes in the lung   bases, mesentery and soft tissues as well as trace pleural   effusions/thickening consistent with fluid overload/CHF.        Subtle density layer in the gallbladder, possible layering   noncalcified stones or sludge.        Additional findings as described above.        MACRO:   None.        Signed by: Loly Albarran 11/6/2024 1:52 PM   Dictation workstation:   [id number]      XR chest 1 view   Final Result   No acute cardiopulmonary process.   Enlarged but unchanged heart compared to 06/12/2024.         MACRO:   None        Signed by: Yokasta Jackson 11/6/2024 11:42 AM   Dictation workstation:   [id number]      Consult to Interventional Radiology    (Results Pending)            Assessment/Plan      Carolann Cartagena is a 89 y.o. female with a past medical history of hypertension, hyperlipidemia, diabetes, chronic anemia, IgG kappa monoclonal protein, chronic hyponatremia, HFpEF, moderate concentric LVH, mild aortic stenosis and mild to moderate pulmonary hypertension, and G4/A3 chronic kidney disease with a fluctuating creatinine baseline between 1.8 up to 2.3 mg/dL who presented with nausea, emesis and suboptimally controlled hypertension.  Reportedly the nausea/emesis occurred just prior to presentation.  Workup was notable for right colon wall thickening consistent with nonspecific colitis noted on noncontrast CT abdominal imaging. There was no renal obstruction. Her chest plain film was clear.  BNP was 400.  Her urinalysis was notable for proteinuria, blood, leuks, WBCs and RBCs.  Urine culture was sent, no growth was noted.  Her creatinine was 2.76 upon presentation with further decline to 4.1 despite IV volume expansion. Nephrology is consulted for acute kidney injury.    Etiology of acute kidney injury initially was elusive.  Blood pressures were not low but rather high upon presentation.  No contrast exposure. She denied anti-inflammatory use.  She reports essentially 1 episode of nausea with emesis prior to presentation. She is prescribed spironolactone which was appropriately held.  Blood and urine cultures were without growth. Given the hematuria/proteinuria on her urinalysis and worsening proteinuria having a urine protein to creatinine ratio of 2.7 g, we sent a serologic workup. We also noted Hydralazine use which we held. She has a reduced C3.  C4 is normal. No urine eosinophils present. DYAN is now positive with a titer of 1:2560 along with a positive ds DNA. Antihistone antibody is pending. P-ANCA is  also positive (titer greater than 1:1280), MPO+ ~ consistent with microscopic polyangiitis.  In addition to her current findings she has elevated serum free kappa and lambda light chains with an M protein of 0.55 g/dL.  She continues to have worsening renal function. She is tentatively scheduled for renal biopsy tentatively on Friday. But given the positive ANCA and DYAN this would point toward hydralazine. She has a poor renal prognosis and unfortunately, this may be just a diagnostic biopsy with her advanced age.  I discussed matters with her daughter.  I anticipate that she will head towards dialysis soon. We will see what her and her daughter are agreeable to. Her blood pressures remain elevated. To address her hypertension I have stopped the Norvasc 10 mg and started 90 mg of nifedipine. I added 4 mg of Cardura.  I would like to see her blood pressure down around 140 prior to renal biopsy which may happen on Friday. I held the ASA. I will hold off on initiating steroids at the moment. Otherwise, she has worsening hyponatremia. I placed her on a 1.4 L fluid restriction. She is mildly clinically overloaded on exam. I will give her 80 mg of IV Lasix x 1 dose. Trend her RFP. We are avoiding nephrotoxins as we are able. Will follow.       Principal Problem:    Colitis  Acute kidney injury  Hypertension  Proteinuria  Hyponatremia  I spent 40 minutes in the professional and overall care of this patient.      Pascual Bhakta, DO

## 2024-11-14 NOTE — PROGRESS NOTES
"Carolann Cartagena is a 89 y.o. female on day 8 of admission presenting with Colitis.    Subjective   No acute events overnight. Daughter at the bedside. Wonders the utility of the biopsy if it really will not change anything. Seemed very upset that the care team is giving the patient \"false hope\".        Objective     VITALS  Blood pressure 150/64, pulse 50, temperature 36.8 °C (98.2 °F), temperature source Oral, resp. rate 16, height 1.626 m (5' 4\"), weight 72.6 kg (160 lb), SpO2 100%.  Physical Exam  Vitals reviewed.   Constitutional:       Appearance: Normal appearance.   HENT:      Head: Normocephalic.      Nose: Nose normal.   Cardiovascular:      Rate and Rhythm: Normal rate and regular rhythm.      Pulses: Normal pulses.      Heart sounds: Normal heart sounds.   Abdominal:      General: Abdomen is flat. Bowel sounds are normal.      Palpations: Abdomen is soft.   Skin:     General: Skin is warm and dry.      Capillary Refill: Capillary refill takes less than 2 seconds.   Neurological:      General: No focal deficit present.      Mental Status: She is alert and oriented to person, place, and time.           Intake/Output last 3 Shifts:  No intake/output data recorded.    Relevant Results  Results for orders placed or performed during the hospital encounter of 11/06/24 (from the past 24 hours)   POCT GLUCOSE   Result Value Ref Range    POCT Glucose 129 (H) 74 - 99 mg/dL   POCT GLUCOSE   Result Value Ref Range    POCT Glucose 166 (H) 74 - 99 mg/dL   POCT GLUCOSE   Result Value Ref Range    POCT Glucose 111 (H) 74 - 99 mg/dL   Lavender Top   Result Value Ref Range    Extra Tube Hold for add-ons.    Basic metabolic panel   Result Value Ref Range    Glucose 62 (L) 74 - 99 mg/dL    Sodium 126 (L) 136 - 145 mmol/L    Potassium 5.3 3.5 - 5.3 mmol/L    Chloride 99 98 - 107 mmol/L    Bicarbonate 21 21 - 32 mmol/L    Anion Gap 11 10 - 20 mmol/L    Urea Nitrogen 86 (H) 6 - 23 mg/dL    Creatinine 5.25 (H) 0.50 - 1.05 mg/dL    " eGFR 7 (L) >60 mL/min/1.73m*2    Calcium 7.6 (L) 8.6 - 10.3 mg/dL   POCT GLUCOSE   Result Value Ref Range    POCT Glucose 70 (L) 74 - 99 mg/dL   POCT GLUCOSE   Result Value Ref Range    POCT Glucose 94 74 - 99 mg/dL       Imaging Results  XR chest 2 views   Final Result   Findings suspicious for ongoing CHF.        Left pleural effusion.        Hazy opacities in the lungs, most pronounced at the posteromedial   left lung base. Atelectasis versus pneumonia versus  asymmetric   pulmonary edema        MACRO:   None        Signed by: Adam Javier 11/11/2024 2:58 PM   Dictation workstation:   [id number]      CT head wo IV contrast   Final Result   No acute intracranial hemorrhage or mass-effect.        MACRO:   None.        Signed by: Loly Albarran 11/6/2024 1:44 PM   Dictation workstation:   [id number]      CT abdomen pelvis wo IV contrast   Final Result   Right colon wall thickening consistent with nonspecific colitis.        Small amount of free fluid with mild edematous changes in the lung   bases, mesentery and soft tissues as well as trace pleural   effusions/thickening consistent with fluid overload/CHF.        Subtle density layer in the gallbladder, possible layering   noncalcified stones or sludge.        Additional findings as described above.        MACRO:   None.        Signed by: Loly Albarran 11/6/2024 1:52 PM   Dictation workstation:   [id number]      XR chest 1 view   Final Result   No acute cardiopulmonary process.   Enlarged but unchanged heart compared to 06/12/2024.        MACRO:   None        Signed by: Yokasta Jackson 11/6/2024 11:42 AM   Dictation workstation:   [id number]      Consult to Interventional Radiology    (Results Pending)       Medications:  [Held by provider] aspirin, 81 mg, oral, Daily  atorvastatin, 10 mg, oral, Nightly  brimonidine, 1 drop, Both Eyes, BID  carvedilol, 25 mg, oral, BID  doxazosin, 4 mg, oral, Daily  furosemide, 80 mg, intravenous, Once  heparin (porcine), 5,000  Units, subcutaneous, q8h TIP  [Held by provider] hydrALAZINE, 100 mg, oral, TID  insulin NPH (Isophane), 6 Units, subcutaneous, BID AC  latanoprost, 1 drop, Both Eyes, Nightly  NIFEdipine ER, 90 mg, oral, Daily before breakfast  sodium bicarbonate, 650 mg, oral, BID  [Held by provider] spironolactone, 25 mg, oral, q24h TIP  timolol, 1 drop, Both Eyes, Daily       PRN medications: acetaminophen **OR** acetaminophen **OR** acetaminophen, dextrose, dextrose, glucagon, glucagon, hydrALAZINE, ondansetron, sennosides-docusate sodium        Assessment/Plan          Principal Problem:    Colitis    1.  Accelerated hypertension  -Improved pressures overall, nephro would like SBP <140  -Coreg, held for bradycardia   -Doxazosin  -Nifedipine      2.  Acute kidney injury on CKD 4  -Worsening  -Appreciate nephro recs, to get kidney biopsy on Friday  -Concern she will continue to worsen to the point that she needs dialysis though at her age it is unclear if she would want this.      3. DM  -continue SSI and NPH     4. Fever  -one episode early in admission  -check flu swab/bcx/ua/cxr  -pending procalcitonin    DVT Prophylaxis:  Heparin subq    Disposition:  Kidney biopsy on Friday     Emeterio Alves, [facility]

## 2024-11-15 ENCOUNTER — APPOINTMENT (OUTPATIENT)
Dept: RADIOLOGY | Facility: HOSPITAL | Age: 89
DRG: 683 | End: 2024-11-15
Payer: MEDICARE

## 2024-11-15 LAB
ANION GAP SERPL CALC-SCNC: 13 MMOL/L (ref 10–20)
ATRIAL RATE: 70 BPM
BACTERIA BLD CULT: NORMAL
BUN SERPL-MCNC: 88 MG/DL (ref 6–23)
CALCIUM SERPL-MCNC: 7.7 MG/DL (ref 8.6–10.3)
CHLORIDE SERPL-SCNC: 100 MMOL/L (ref 98–107)
CO2 SERPL-SCNC: 21 MMOL/L (ref 21–32)
CREAT SERPL-MCNC: 5.79 MG/DL (ref 0.5–1.05)
EGFRCR SERPLBLD CKD-EPI 2021: 6 ML/MIN/1.73M*2
GLUCOSE BLD MANUAL STRIP-MCNC: 115 MG/DL (ref 74–99)
GLUCOSE BLD MANUAL STRIP-MCNC: 115 MG/DL (ref 74–99)
GLUCOSE BLD MANUAL STRIP-MCNC: 187 MG/DL (ref 74–99)
GLUCOSE SERPL-MCNC: 99 MG/DL (ref 74–99)
HISTONE IGG SER IA-ACNC: 11.7 UNITS (ref 0–0.9)
P AXIS: 79 DEGREES
P OFFSET: 190 MS
P ONSET: 132 MS
POTASSIUM SERPL-SCNC: 5.8 MMOL/L (ref 3.5–5.3)
PR INTERVAL: 182 MS
Q ONSET: 223 MS
QRS COUNT: 12 BEATS
QRS DURATION: 142 MS
QT INTERVAL: 436 MS
QTC CALCULATION(BAZETT): 470 MS
QTC FREDERICIA: 459 MS
R AXIS: 58 DEGREES
SODIUM SERPL-SCNC: 128 MMOL/L (ref 136–145)
T AXIS: 53 DEGREES
T OFFSET: 441 MS
VENTRICULAR RATE: 70 BPM

## 2024-11-15 PROCEDURE — 2500000002 HC RX 250 W HCPCS SELF ADMINISTERED DRUGS (ALT 637 FOR MEDICARE OP, ALT 636 FOR OP/ED): Performed by: INTERNAL MEDICINE

## 2024-11-15 PROCEDURE — 88313 SPECIAL STAINS GROUP 2: CPT | Performed by: STUDENT IN AN ORGANIZED HEALTH CARE EDUCATION/TRAINING PROGRAM

## 2024-11-15 PROCEDURE — 80048 BASIC METABOLIC PNL TOTAL CA: CPT | Performed by: INTERNAL MEDICINE

## 2024-11-15 PROCEDURE — 2500000004 HC RX 250 GENERAL PHARMACY W/ HCPCS (ALT 636 FOR OP/ED): Performed by: INTERNAL MEDICINE

## 2024-11-15 PROCEDURE — 36415 COLL VENOUS BLD VENIPUNCTURE: CPT | Performed by: INTERNAL MEDICINE

## 2024-11-15 PROCEDURE — 76942 ECHO GUIDE FOR BIOPSY: CPT | Mod: LEFT SIDE | Performed by: RADIOLOGY

## 2024-11-15 PROCEDURE — 2720000007 HC OR 272 NO HCPCS

## 2024-11-15 PROCEDURE — 2500000004 HC RX 250 GENERAL PHARMACY W/ HCPCS (ALT 636 FOR OP/ED): Performed by: RADIOLOGY

## 2024-11-15 PROCEDURE — 2500000005 HC RX 250 GENERAL PHARMACY W/O HCPCS: Performed by: RADIOLOGY

## 2024-11-15 PROCEDURE — 99152 MOD SED SAME PHYS/QHP 5/>YRS: CPT

## 2024-11-15 PROCEDURE — 99233 SBSQ HOSP IP/OBS HIGH 50: CPT | Performed by: INTERNAL MEDICINE

## 2024-11-15 PROCEDURE — 88350 IMFLUOR EA ADDL 1ANTB STN PX: CPT | Mod: TC,AHULAB | Performed by: INTERNAL MEDICINE

## 2024-11-15 PROCEDURE — 1100000001 HC PRIVATE ROOM DAILY

## 2024-11-15 PROCEDURE — 82947 ASSAY GLUCOSE BLOOD QUANT: CPT

## 2024-11-15 PROCEDURE — 99152 MOD SED SAME PHYS/QHP 5/>YRS: CPT | Mod: LEFT SIDE | Performed by: RADIOLOGY

## 2024-11-15 PROCEDURE — 88305 TISSUE EXAM BY PATHOLOGIST: CPT | Performed by: STUDENT IN AN ORGANIZED HEALTH CARE EDUCATION/TRAINING PROGRAM

## 2024-11-15 PROCEDURE — 2500000001 HC RX 250 WO HCPCS SELF ADMINISTERED DRUGS (ALT 637 FOR MEDICARE OP): Performed by: NURSE PRACTITIONER

## 2024-11-15 PROCEDURE — 88346 IMFLUOR 1ST 1ANTB STAIN PX: CPT | Performed by: STUDENT IN AN ORGANIZED HEALTH CARE EDUCATION/TRAINING PROGRAM

## 2024-11-15 PROCEDURE — 0TB13ZX EXCISION OF LEFT KIDNEY, PERCUTANEOUS APPROACH, DIAGNOSTIC: ICD-10-PCS | Performed by: RADIOLOGY

## 2024-11-15 PROCEDURE — 88350 IMFLUOR EA ADDL 1ANTB STN PX: CPT | Performed by: STUDENT IN AN ORGANIZED HEALTH CARE EDUCATION/TRAINING PROGRAM

## 2024-11-15 PROCEDURE — 2500000004 HC RX 250 GENERAL PHARMACY W/ HCPCS (ALT 636 FOR OP/ED): Performed by: NURSE PRACTITIONER

## 2024-11-15 PROCEDURE — 7100000009 HC PHASE TWO TIME - INITIAL BASE CHARGE

## 2024-11-15 PROCEDURE — 2500000001 HC RX 250 WO HCPCS SELF ADMINISTERED DRUGS (ALT 637 FOR MEDICARE OP): Performed by: INTERNAL MEDICINE

## 2024-11-15 PROCEDURE — 50200 RENAL BIOPSY PERQ: CPT | Mod: LT

## 2024-11-15 PROCEDURE — 7100000010 HC PHASE TWO TIME - EACH INCREMENTAL 1 MINUTE

## 2024-11-15 PROCEDURE — 88348 ELECTRON MICROSCOPY DX: CPT | Performed by: STUDENT IN AN ORGANIZED HEALTH CARE EDUCATION/TRAINING PROGRAM

## 2024-11-15 PROCEDURE — 97530 THERAPEUTIC ACTIVITIES: CPT | Mod: GP,CQ

## 2024-11-15 PROCEDURE — 99153 MOD SED SAME PHYS/QHP EA: CPT | Mod: LEFT SIDE | Performed by: RADIOLOGY

## 2024-11-15 PROCEDURE — 50200 RENAL BIOPSY PERQ: CPT | Mod: LEFT SIDE | Performed by: RADIOLOGY

## 2024-11-15 PROCEDURE — 97116 GAIT TRAINING THERAPY: CPT | Mod: GP,CQ

## 2024-11-15 RX ORDER — FENTANYL CITRATE 50 UG/ML
INJECTION, SOLUTION INTRAMUSCULAR; INTRAVENOUS
Status: COMPLETED | OUTPATIENT
Start: 2024-11-15 | End: 2024-11-15

## 2024-11-15 RX ORDER — LIDOCAINE HYDROCHLORIDE 10 MG/ML
INJECTION, SOLUTION EPIDURAL; INFILTRATION; INTRACAUDAL; PERINEURAL
Status: COMPLETED | OUTPATIENT
Start: 2024-11-15 | End: 2024-11-15

## 2024-11-15 RX ORDER — MIDAZOLAM HYDROCHLORIDE 2 MG/2ML
INJECTION, SOLUTION INTRAMUSCULAR; INTRAVENOUS
Status: COMPLETED | OUTPATIENT
Start: 2024-11-15 | End: 2024-11-15

## 2024-11-15 RX ORDER — FUROSEMIDE 10 MG/ML
80 INJECTION INTRAMUSCULAR; INTRAVENOUS ONCE
Status: COMPLETED | OUTPATIENT
Start: 2024-11-15 | End: 2024-11-15

## 2024-11-15 RX ADMIN — HEPARIN SODIUM 5000 UNITS: 5000 INJECTION INTRAVENOUS; SUBCUTANEOUS at 17:40

## 2024-11-15 RX ADMIN — BRIMONIDINE TARTRATE 1 DROP: 2 SOLUTION/ DROPS OPHTHALMIC at 20:16

## 2024-11-15 RX ADMIN — HEPARIN SODIUM 5000 UNITS: 5000 INJECTION INTRAVENOUS; SUBCUTANEOUS at 01:31

## 2024-11-15 RX ADMIN — FUROSEMIDE 80 MG: 10 INJECTION, SOLUTION INTRAMUSCULAR; INTRAVENOUS at 17:40

## 2024-11-15 RX ADMIN — CARVEDILOL 25 MG: 12.5 TABLET, FILM COATED ORAL at 09:22

## 2024-11-15 RX ADMIN — INSULIN HUMAN 6 UNITS: 100 INJECTION, SUSPENSION SUBCUTANEOUS at 17:40

## 2024-11-15 RX ADMIN — Medication 1 EACH: at 12:11

## 2024-11-15 RX ADMIN — SODIUM BICARBONATE 650 MG: 650 TABLET ORAL at 09:24

## 2024-11-15 RX ADMIN — MIDAZOLAM HYDROCHLORIDE 0.5 MG: 1 INJECTION, SOLUTION INTRAMUSCULAR; INTRAVENOUS at 11:53

## 2024-11-15 RX ADMIN — LIDOCAINE HYDROCHLORIDE 10 ML: 10 INJECTION, SOLUTION EPIDURAL; INFILTRATION; INTRACAUDAL; PERINEURAL at 11:57

## 2024-11-15 RX ADMIN — ATORVASTATIN CALCIUM 10 MG: 20 TABLET ORAL at 20:16

## 2024-11-15 RX ADMIN — DOXAZOSIN 4 MG: 2 TABLET ORAL at 09:22

## 2024-11-15 RX ADMIN — LATANOPROST 1 DROP: 50 SOLUTION/ DROPS OPHTHALMIC at 20:16

## 2024-11-15 RX ADMIN — BRIMONIDINE TARTRATE 1 DROP: 2 SOLUTION/ DROPS OPHTHALMIC at 09:23

## 2024-11-15 RX ADMIN — TIMOLOL MALEATE 1 DROP: 5 SOLUTION/ DROPS OPHTHALMIC at 09:23

## 2024-11-15 RX ADMIN — SODIUM BICARBONATE 650 MG: 650 TABLET ORAL at 20:16

## 2024-11-15 RX ADMIN — SODIUM ZIRCONIUM CYCLOSILICATE 10 G: 10 POWDER, FOR SUSPENSION ORAL at 17:41

## 2024-11-15 RX ADMIN — FENTANYL CITRATE 25 MCG: 50 INJECTION INTRAMUSCULAR; INTRAVENOUS at 11:54

## 2024-11-15 ASSESSMENT — PAIN SCALES - GENERAL
PAINLEVEL_OUTOF10: 0 - NO PAIN

## 2024-11-15 ASSESSMENT — COGNITIVE AND FUNCTIONAL STATUS - GENERAL
CLIMB 3 TO 5 STEPS WITH RAILING: TOTAL
HELP NEEDED FOR BATHING: A LITTLE
MOVING FROM LYING ON BACK TO SITTING ON SIDE OF FLAT BED WITH BEDRAILS: A LITTLE
MOBILITY SCORE: 18
CLIMB 3 TO 5 STEPS WITH RAILING: A LITTLE
PERSONAL GROOMING: A LITTLE
MOVING TO AND FROM BED TO CHAIR: A LITTLE
STANDING UP FROM CHAIR USING ARMS: A LITTLE
MOVING TO AND FROM BED TO CHAIR: A LITTLE
MOVING FROM LYING ON BACK TO SITTING ON SIDE OF FLAT BED WITH BEDRAILS: A LITTLE
EATING MEALS: A LITTLE
WALKING IN HOSPITAL ROOM: A LITTLE
DRESSING REGULAR LOWER BODY CLOTHING: A LITTLE
TURNING FROM BACK TO SIDE WHILE IN FLAT BAD: A LITTLE
MOVING TO AND FROM BED TO CHAIR: A LITTLE
WALKING IN HOSPITAL ROOM: A LITTLE
STANDING UP FROM CHAIR USING ARMS: A LITTLE
WALKING IN HOSPITAL ROOM: A LITTLE
CLIMB 3 TO 5 STEPS WITH RAILING: A LITTLE
MOVING FROM LYING ON BACK TO SITTING ON SIDE OF FLAT BED WITH BEDRAILS: A LITTLE
TOILETING: A LITTLE
STANDING UP FROM CHAIR USING ARMS: A LITTLE
TURNING FROM BACK TO SIDE WHILE IN FLAT BAD: A LITTLE
TURNING FROM BACK TO SIDE WHILE IN FLAT BAD: A LITTLE
DAILY ACTIVITIY SCORE: 18
MOBILITY SCORE: 18
MOBILITY SCORE: 16
DRESSING REGULAR UPPER BODY CLOTHING: A LITTLE

## 2024-11-15 ASSESSMENT — PAIN - FUNCTIONAL ASSESSMENT
PAIN_FUNCTIONAL_ASSESSMENT: 0-10
PAIN_FUNCTIONAL_ASSESSMENT: 0-10

## 2024-11-15 NOTE — PROGRESS NOTES
Physical Therapy    Physical Therapy Treatment    Patient Name: Carolann Cartagena  MRN: [id number]  Department: [place] [id number]  Room: [address] [place] [address]  Today's Date: 11/15/2024  Time Calculation  Start Time: 0840  Stop Time: 0903  Time Calculation (min): 23 min         Assessment/Plan   PT Assessment  End of Session Communication: Bedside nurse  Assessment Comment: Pt able to tolerate increased ambulation distance at this date.  End of Session Patient Position: Up in chair, Alarm on  PT Plan  Inpatient/Swing Bed or Outpatient: Inpatient  PT Plan  Treatment/Interventions: Bed mobility, Transfer training, Gait training, Therapeutic exercise  PT Plan: Ongoing PT  PT Frequency: 3 times per week  PT Discharge Recommendations: Moderate intensity level of continued care  PT Recommended Transfer Status: Assist x1  PT - OK to Discharge: Yes (per POC)      General Visit Information:   PT  Visit  PT Received On: 11/15/24  General  Reason for Referral: 89 y/o female admitted with colitis, c/o nausea, vomiting and Htn.  Referred By: Dr Elise  Prior to Session Communication: Bedside nurse  Patient Position Received: Bed, 3 rail up, Alarm on  Preferred Learning Style: auditory, kinesthetic, verbal  General Comment: Pt supine in bed upon arrival, agreeable to tx.    Subjective   Precautions:  Precautions  Medical Precautions: Fall precautions  Objective   Pain:  Pain Assessment  Pain Assessment: 0-10  0-10 (Numeric) Pain Score: 0 - No pain  Cognition:  Cognition  Overall Cognitive Status: Within Functional Limits  Postural Control:  Static Sitting Balance  Static Sitting-Balance Support: Feet supported, Bilateral upper extremity supported  Static Sitting-Level of Assistance: Close supervision  Static Sitting-Comment/Number of Minutes: 2  Treatments:  Therapeutic Exercise  Therapeutic Exercise Performed: Yes  Therapeutic Exercise Activity 1: Pt performed LAQ, Ankle Pumps, Marches x 15 each with cues given for proper sequencing and form.         Bed  Mobility  Bed Mobility: Yes  Bed Mobility 1  Bed Mobility 1: Supine to sitting  Level of Assistance 1: Contact guard  Bed Mobility Comments 1: Pt performed with HOB elevated and cues given for proper sequencing.    Ambulation/Gait Training  Ambulation/Gait Training Performed: Yes  Ambulation/Gait Training 1  Surface 1: Level tile  Device 1: Rolling walker  Gait Support Devices: Gait belt  Assistance 1: Contact guard  Quality of Gait 1: Forward flexed posture, Decreased step length  Comments/Distance (ft) 1: Pt ambulated 24ftx2 with cues given for upright posture, forward gaze, and for walker placement. Refuses further ambulation at this time due to increased pain.  Transfer 1  Transfer Device 1: Walker  Transfer Level of Assistance 1: Contact guard  Trials/Comments 1: Pt performed with safety cues given for hand placement before sitting and standing.         Outcome Measures:  [facility] Basic Mobility  Turning from your back to your side while in a flat bed without using bedrails: A little  Moving from lying on your back to sitting on the side of a flat bed without using bedrails: A little  Moving to and from bed to chair (including a wheelchair): A little  Standing up from a chair using your arms (e.g. wheelchair or bedside chair): A little  To walk in hospital room: A little  Climbing 3-5 steps with railing: Total  Basic Mobility - Total Score: 16    Education Documentation  Body Mechanics, taught by Markus Jon PTA at 11/15/2024  9:22 AM.  Learner: Patient  Readiness: Acceptance  Method: Explanation  Response: Verbalizes Understanding    Mobility Training, taught by Markus Jon PTA at 11/15/2024  9:22 AM.  Learner: Patient  Readiness: Acceptance  Method: Explanation  Response: Verbalizes Understanding    Education Comments  No comments found.        OP EDUCATION:  Outpatient Education  Education Comment: educated pt on importance of OOB activity    Encounter Problems       Encounter Problems (Active)        Balance       complete all mobility with normal balance while dual tasking, negotiating in a dynamic environment, carrying items, etc., with proactive and reactive static and dynamic standing and sitting tasks, with mod I and RW >15 minutes.         Start:  11/07/24    Expected End:  11/21/24               Mobility       STG - Patient will ambulate 150 ft mod I with a RW.  (Progressing)       Start:  11/07/24    Expected End:  11/21/24            Pt will tolerate 15 reps of each LE exercise in order to improve strength and endurance.   (Progressing)       Start:  11/07/24    Expected End:  11/21/24               PT Transfers       STG - Patient will perform bed mobility independently.  (Progressing)       Start:  11/07/24    Expected End:  11/21/24            STG - Patient will transfer sit to and from stand mod I using RW.  (Progressing)       Start:  11/07/24    Expected End:  11/21/24               Pain - Adult

## 2024-11-15 NOTE — PROGRESS NOTES
"Carolann Cartagena is a 89 y.o. female on day 9 of admission presenting with Colitis.    Subjective   No acute events overnight. Had renal biopsy this AM. Feels overall comfortable        Objective     VITALS  Blood pressure 134/60, pulse 50, temperature 36.6 °C (97.9 °F), temperature source Temporal, resp. rate 14, height 1.626 m (5' 4\"), weight 72.6 kg (160 lb), SpO2 100%.  Physical Exam  Vitals reviewed.   Constitutional:       Appearance: Normal appearance.   HENT:      Head: Normocephalic.      Nose: Nose normal.   Cardiovascular:      Rate and Rhythm: Normal rate and regular rhythm.      Pulses: Normal pulses.      Heart sounds: Normal heart sounds.   Abdominal:      General: Abdomen is flat. Bowel sounds are normal.      Palpations: Abdomen is soft.   Skin:     General: Skin is warm and dry.      Capillary Refill: Capillary refill takes less than 2 seconds.   Neurological:      General: No focal deficit present.      Mental Status: She is alert and oriented to person, place, and time.           Intake/Output last 3 Shifts:  I/O last 3 completed shifts:  In: 240 (3.3 mL/kg) [P.O.:240]  Out: - (0 mL/kg)   Weight: 72.6 kg     Relevant Results  Results for orders placed or performed during the hospital encounter of 11/06/24 (from the past 24 hours)   POCT GLUCOSE   Result Value Ref Range    POCT Glucose 171 (H) 74 - 99 mg/dL   POCT GLUCOSE   Result Value Ref Range    POCT Glucose 149 (H) 74 - 99 mg/dL   Basic Metabolic Panel   Result Value Ref Range    Glucose 99 74 - 99 mg/dL    Sodium 128 (L) 136 - 145 mmol/L    Potassium 5.8 (H) 3.5 - 5.3 mmol/L    Chloride 100 98 - 107 mmol/L    Bicarbonate 21 21 - 32 mmol/L    Anion Gap 13 10 - 20 mmol/L    Urea Nitrogen 88 (H) 6 - 23 mg/dL    Creatinine 5.79 (H) 0.50 - 1.05 mg/dL    eGFR 6 (L) >60 mL/min/1.73m*2    Calcium 7.7 (L) 8.6 - 10.3 mg/dL   POCT GLUCOSE   Result Value Ref Range    POCT Glucose 115 (H) 74 - 99 mg/dL       Imaging Results  XR chest 2 views   Final Result "   Findings suspicious for ongoing CHF.        Left pleural effusion.        Hazy opacities in the lungs, most pronounced at the posteromedial   left lung base. Atelectasis versus pneumonia versus  asymmetric   pulmonary edema        MACRO:   None        Signed by: Adam Javier 11/11/2024 2:58 PM   Dictation workstation:   [id number]      CT head wo IV contrast   Final Result   No acute intracranial hemorrhage or mass-effect.        MACRO:   None.        Signed by: Loly Albarran 11/6/2024 1:44 PM   Dictation workstation:   [id number]      CT abdomen pelvis wo IV contrast   Final Result   Right colon wall thickening consistent with nonspecific colitis.        Small amount of free fluid with mild edematous changes in the lung   bases, mesentery and soft tissues as well as trace pleural   effusions/thickening consistent with fluid overload/CHF.        Subtle density layer in the gallbladder, possible layering   noncalcified stones or sludge.        Additional findings as described above.        MACRO:   None.        Signed by: Loly Albarran 11/6/2024 1:52 PM   Dictation workstation:   [id number]      XR chest 1 view   Final Result   No acute cardiopulmonary process.   Enlarged but unchanged heart compared to 06/12/2024.        MACRO:   None        Signed by: Yokasta Jackson 11/6/2024 11:42 AM   Dictation workstation:   [id number]      US guided percutaneous biopsy renal left    (Results Pending)       Medications:  [Held by provider] aspirin, 81 mg, oral, Daily  atorvastatin, 10 mg, oral, Nightly  brimonidine, 1 drop, Both Eyes, BID  carvedilol, 25 mg, oral, BID  doxazosin, 4 mg, oral, Daily  furosemide, 80 mg, intravenous, Once  heparin (porcine), 5,000 Units, subcutaneous, q8h TIP  [Held by provider] hydrALAZINE, 100 mg, oral, TID  insulin NPH (Isophane), 6 Units, subcutaneous, BID AC  latanoprost, 1 drop, Both Eyes, Nightly  NIFEdipine ER, 90 mg, oral, Daily before breakfast  sodium bicarbonate, 650 mg, oral,  BID  sodium zirconium cyclosilicate, 10 g, oral, Once  [Held by provider] spironolactone, 25 mg, oral, q24h TIP  timolol, 1 drop, Both Eyes, Daily       PRN medications: acetaminophen **OR** acetaminophen **OR** acetaminophen, dextrose, dextrose, glucagon, glucagon, hydrALAZINE, ondansetron, sennosides-docusate sodium        Assessment/Plan          Principal Problem:    Colitis    1.  Accelerated hypertension  -Improved pressures overall, nephro would like SBP <140  -Coreg, held for bradycardia   -Doxazosin  -Nifedipine      2.  Acute kidney injury on CKD 4  -Worsening  -Appreciate nephro recs, awaiting renal biopsy results.   -Concern she will continue to worsen to the point that she needs dialysis though at her age it is unclear if she would want this.      3. DM  -continue SSI and NPH     4. Fever  -one episode early in admission  -check flu swab/bcx/ua/cxr  -pending procalcitonin    5. Hyperkalemia  - Discussed with nephro, lasix and Lokelma ordered.     DVT Prophylaxis:  Heparin subq    Disposition:  Awaiting renal biopsy results. Discussed with Dr. Bhakta may need to start steroids     Emeterio Alves, [facility]

## 2024-11-15 NOTE — PROGRESS NOTES
Carolann Cartagena is a 89 y.o. female on day 9 of admission presenting with Colitis.      Subjective   Seen and examined.   She is s/p renal biopsy. She is resting. Multiple family members are present at bedside.   Her sodium is slowly improving with fluid restriction and IVP Lasix.   FRANDY.  She does not offer specific complaints.     Objective        Vitals 24HR  Heart Rate:  [49-57]   Temp:  [36.2 °C (97.2 °F)-36.9 °C (98.4 °F)]   Resp:  [10-18]   BP: (121-155)/(37-70)   SpO2:  [95 %-100 %]     Intake/Output last 3 Shifts:    Intake/Output Summary (Last 24 hours) at 11/15/2024 1318  Last data filed at 11/15/2024 0700  Gross per 24 hour   Intake --   Output 50 ml   Net -50 ml         Physical Exam  Constitutional: Well developed, awake/alert/oriented  x3, no distress, alert and cooperative   Eyes: PERRL, EOMI, clear sclera   ENMT: mucous membranes moist, no apparent injury, no lesions seen   Head/Neck: Neck supple, no JVD, trachea midline   Respiratory/Thorax: CTAB   Cardiovascular: Regular, rate and rhythm, systolic  murmur, normal S 1 and S 2   Gastrointestinal: Nondistended, soft, non-tender, no rebound tenderness or guarding   Musculoskeletal: ROM intact, no joint swelling, normal  strength   Extremities: normal extremities, pitting thigh edema   Neurological: alert and oriented x3  No asterixis   Skin: Warm and dry, no lesions, no rashes     Scheduled Medications  [Held by provider] aspirin, 81 mg, oral, Daily  atorvastatin, 10 mg, oral, Nightly  brimonidine, 1 drop, Both Eyes, BID  carvedilol, 25 mg, oral, BID  doxazosin, 4 mg, oral, Daily  furosemide, 80 mg, intravenous, Once  heparin (porcine), 5,000 Units, subcutaneous, q8h TIP  [Held by provider] hydrALAZINE, 100 mg, oral, TID  insulin NPH (Isophane), 6 Units, subcutaneous, BID AC  latanoprost, 1 drop, Both Eyes, Nightly  NIFEdipine ER, 90 mg, oral, Daily before breakfast  sodium bicarbonate, 650 mg, oral, BID  sodium zirconium cyclosilicate, 10 g, oral,  Once  [Held by provider] spironolactone, 25 mg, oral, q24h TIP  timolol, 1 drop, Both Eyes, Daily      Continuous medications     PRN medications: acetaminophen **OR** acetaminophen **OR** acetaminophen, dextrose, dextrose, glucagon, glucagon, hydrALAZINE, ondansetron, sennosides-docusate sodium     Relevant Results        Results from last 7 days   Lab Units 11/15/24  0605 11/14/24  0606 11/13/24  0532   SODIUM mmol/L 128* 126* 128*   POTASSIUM mmol/L 5.8* 5.3 5.2   CHLORIDE mmol/L 100 99 103   CO2 mmol/L 21 21 18*   BUN mg/dL 88* 86* 78*   CREATININE mg/dL 5.79* 5.25* 4.96*   GLUCOSE mg/dL 99 62* 42*   CALCIUM mg/dL 7.7* 7.6* 7.7*       XR chest 2 views   Final Result   Findings suspicious for ongoing CHF.        Left pleural effusion.        Hazy opacities in the lungs, most pronounced at the posteromedial   left lung base. Atelectasis versus pneumonia versus  asymmetric   pulmonary edema        MACRO:   None        Signed by: Adam Javier 11/11/2024 2:58 PM   Dictation workstation:   [id number]      CT head wo IV contrast   Final Result   No acute intracranial hemorrhage or mass-effect.        MACRO:   None.        Signed by: Loly Albarran 11/6/2024 1:44 PM   Dictation workstation:   [id number]      CT abdomen pelvis wo IV contrast   Final Result   Right colon wall thickening consistent with nonspecific colitis.        Small amount of free fluid with mild edematous changes in the lung   bases, mesentery and soft tissues as well as trace pleural   effusions/thickening consistent with fluid overload/CHF.        Subtle density layer in the gallbladder, possible layering   noncalcified stones or sludge.        Additional findings as described above.        MACRO:   None.        Signed by: Loly Albarran 11/6/2024 1:52 PM   Dictation workstation:   [id number]      XR chest 1 view   Final Result   No acute cardiopulmonary process.   Enlarged but unchanged heart compared to 06/12/2024.        MACRO:   None         Signed by: Yokasta Jackson 11/6/2024 11:42 AM   Dictation workstation:   [id number]      US guided percutaneous biopsy renal left    (Results Pending)            Assessment/Plan      Carolann Cartagena is a 89 y.o. female with a past medical history of hypertension, hyperlipidemia, diabetes, chronic anemia, IgG kappa monoclonal protein, chronic hyponatremia, HFpEF, moderate concentric LVH, mild aortic stenosis and mild to moderate pulmonary hypertension, and G4/A3 chronic kidney disease with a fluctuating creatinine baseline between 1.8 up to 2.3 mg/dL who presented with nausea, emesis and suboptimally controlled hypertension.  Reportedly the nausea/emesis occurred just prior to presentation.  Workup was notable for right colon wall thickening consistent with nonspecific colitis noted on noncontrast CT abdominal imaging. There was no renal obstruction. Her chest plain film was clear.  BNP was 400.  Her urinalysis was notable for proteinuria, blood, leuks, WBCs and RBCs.  Urine culture was sent, no growth was noted.  Her creatinine was 2.76 upon presentation with further decline to 4.1 despite IV volume expansion. Nephrology is consulted for acute kidney injury.    Etiology of acute kidney injury initially was elusive.  Blood pressures were not low but rather high upon presentation.  No contrast exposure. She denied anti-inflammatory use.  She reports essentially 1 episode of nausea with emesis prior to presentation. She is prescribed spironolactone which was appropriately held.  Blood and urine cultures were without growth. Given the hematuria/proteinuria on her urinalysis and worsening proteinuria having a urine protein to creatinine ratio of 2.7 g, we sent a serologic workup. We also noted Hydralazine use which we held. She has a reduced C3.  C4 is normal. No urine eosinophils present. DYAN is now positive with a titer of 1:2560 along with a positive ds DNA. Antihistone antibody is pending. P-ANCA is also positive  (titer greater than 1:1280), MPO+ ~ consistent with microscopic polyangiitis.  In addition to her current findings she has elevated serum free kappa and lambda light chains with an M protein of 0.55 g/dL. But given the positive ANCA and DYAN this would point toward hydralazine. She continues to have worsening renal function. She went for renal biopsy this morning. We have adjusted her BP medication and have stopped the Norvasc 10 mg and started 90 mg of nifedipine. I added 4 mg of Cardura to bring her blood pressure down around 140 mm Hg systolic prior to renal biopsy. I held the ASA. She will lye flat post biopsy for at least 4 hours. I will await the prelim report and decide on initiating steroids. She is headed toward renal replacement therapy albeit there is no immediate indication. Her family wants to talk things over. I get the since that the family will likely lean toward comfort measures. I have ordered a dose of Lokelma for her hyperkalemia. She will get another dose of IVP Lasix 80 mg x 1 dose today and I will continue her fluid restriction at 1.4 L daily. Her sodium is slowly improving. Trend her RFP. We are avoiding nephrotoxins as we are able. Will follow.       Principal Problem:    Colitis  Acute kidney injury  +ANCA   Hypertension  Proteinuria  Hyponatremia  Hypervolemia   I spent 40 minutes in the professional and overall care of this patient.      Pascual Bhakta, DO

## 2024-11-15 NOTE — CARE PLAN
The patient's goals for the shift include      The clinical goals for the shift include pt to remain HDS this shift

## 2024-11-15 NOTE — PRE-PROCEDURE NOTE
Interventional Radiology Preprocedure Note    Indication for procedure: The primary encounter diagnosis was Colitis. Diagnoses of Elevated troponin, Nausea, vomiting and diarrhea, Hypertension, unspecified type, and Hypervolemia, unspecified hypervolemia type were also pertinent to this visit.    Relevant review of systems: NA    Relevant Labs:   Lab Results   Component Value Date    CREATININE 5.79 (H) 11/15/2024    EGFR 6 (L) 11/15/2024    INR 1.2 (H) 11/06/2024    PROTIME 13.2 (H) 11/06/2024       Planned Sedation/Anesthesia: Moderate    Airway assessment: normal    Directed physical examination:    Thorax symmetric    Mallampati: II (hard and soft palate, upper portion of tonsils and uvula visible)    ASA Score: ASA 2 - Patient with mild systemic disease with no functional limitations    Benefits, risks and alternatives of procedure and planned sedation have been discussed with the patient and/or their representative. All questions answered and they agree to proceed.

## 2024-11-15 NOTE — POST-PROCEDURE NOTE
Interventional Radiology Brief Postprocedure Note    Attending: Puneet    Assistant: None    Diagnosis: OLGA LIDIA    Description of procedure: US guided L renal biopsy, 3 x 18g cores obtained     Anesthesia:  Local  0.5 mg Versed, 25 mcg Fentanyl    Complications: None    Estimated Blood Loss: minimal        ID Type Source Tests Collected by Time   1 : Left Renal Random Biopsy Tissue KIDNEY BIOPSY NATIVE LEFT SURGICAL PATHOLOGY EXAM Devon Teixeira MD 11/15/2024 1200         See detailed result report with images in PACS.    The patient tolerated the procedure well without incident or complication and is in stable condition.

## 2024-11-15 NOTE — PROGRESS NOTES
11/15/24 0838   Discharge Planning   Expected Discharge Disposition SNF     Pt NPO for renal biopsy today. Will need new auth for SNF.

## 2024-11-16 LAB
ANION GAP SERPL CALC-SCNC: 14 MMOL/L (ref 10–20)
BUN SERPL-MCNC: 93 MG/DL (ref 6–23)
CALCIUM SERPL-MCNC: 7.9 MG/DL (ref 8.6–10.3)
CHLORIDE SERPL-SCNC: 99 MMOL/L (ref 98–107)
CO2 SERPL-SCNC: 21 MMOL/L (ref 21–32)
CREAT SERPL-MCNC: 6.21 MG/DL (ref 0.5–1.05)
EGFRCR SERPLBLD CKD-EPI 2021: 6 ML/MIN/1.73M*2
GLUCOSE BLD MANUAL STRIP-MCNC: 120 MG/DL (ref 74–99)
GLUCOSE BLD MANUAL STRIP-MCNC: 174 MG/DL (ref 74–99)
GLUCOSE BLD MANUAL STRIP-MCNC: 84 MG/DL (ref 74–99)
GLUCOSE SERPL-MCNC: 81 MG/DL (ref 74–99)
HOLD SPECIMEN: NORMAL
HOLD SPECIMEN: NORMAL
POTASSIUM SERPL-SCNC: 6 MMOL/L (ref 3.5–5.3)
SODIUM SERPL-SCNC: 128 MMOL/L (ref 136–145)

## 2024-11-16 PROCEDURE — 82947 ASSAY GLUCOSE BLOOD QUANT: CPT

## 2024-11-16 PROCEDURE — 2500000002 HC RX 250 W HCPCS SELF ADMINISTERED DRUGS (ALT 637 FOR MEDICARE OP, ALT 636 FOR OP/ED): Performed by: INTERNAL MEDICINE

## 2024-11-16 PROCEDURE — 2500000004 HC RX 250 GENERAL PHARMACY W/ HCPCS (ALT 636 FOR OP/ED): Performed by: INTERNAL MEDICINE

## 2024-11-16 PROCEDURE — 36415 COLL VENOUS BLD VENIPUNCTURE: CPT | Performed by: INTERNAL MEDICINE

## 2024-11-16 PROCEDURE — 2500000001 HC RX 250 WO HCPCS SELF ADMINISTERED DRUGS (ALT 637 FOR MEDICARE OP): Performed by: INTERNAL MEDICINE

## 2024-11-16 PROCEDURE — 2500000001 HC RX 250 WO HCPCS SELF ADMINISTERED DRUGS (ALT 637 FOR MEDICARE OP): Performed by: NURSE PRACTITIONER

## 2024-11-16 PROCEDURE — 80048 BASIC METABOLIC PNL TOTAL CA: CPT | Performed by: INTERNAL MEDICINE

## 2024-11-16 PROCEDURE — 1200000002 HC GENERAL ROOM WITH TELEMETRY DAILY

## 2024-11-16 PROCEDURE — 2500000004 HC RX 250 GENERAL PHARMACY W/ HCPCS (ALT 636 FOR OP/ED): Performed by: NURSE PRACTITIONER

## 2024-11-16 PROCEDURE — 99233 SBSQ HOSP IP/OBS HIGH 50: CPT | Performed by: INTERNAL MEDICINE

## 2024-11-16 RX ORDER — SODIUM CHLORIDE 9 MG/ML
75 INJECTION, SOLUTION INTRAVENOUS CONTINUOUS
Status: DISCONTINUED | OUTPATIENT
Start: 2024-11-16 | End: 2024-11-17

## 2024-11-16 RX ORDER — FUROSEMIDE 10 MG/ML
80 INJECTION INTRAMUSCULAR; INTRAVENOUS ONCE
Status: COMPLETED | OUTPATIENT
Start: 2024-11-16 | End: 2024-11-16

## 2024-11-16 RX ADMIN — ATORVASTATIN CALCIUM 10 MG: 20 TABLET ORAL at 21:58

## 2024-11-16 RX ADMIN — CARVEDILOL 25 MG: 12.5 TABLET, FILM COATED ORAL at 08:58

## 2024-11-16 RX ADMIN — INSULIN HUMAN 6 UNITS: 100 INJECTION, SUSPENSION SUBCUTANEOUS at 17:36

## 2024-11-16 RX ADMIN — DOXAZOSIN 4 MG: 2 TABLET ORAL at 08:58

## 2024-11-16 RX ADMIN — NIFEDIPINE 90 MG: 30 TABLET, FILM COATED, EXTENDED RELEASE ORAL at 07:15

## 2024-11-16 RX ADMIN — HEPARIN SODIUM 5000 UNITS: 5000 INJECTION INTRAVENOUS; SUBCUTANEOUS at 17:36

## 2024-11-16 RX ADMIN — SODIUM ZIRCONIUM CYCLOSILICATE 10 G: 10 POWDER, FOR SUSPENSION ORAL at 21:58

## 2024-11-16 RX ADMIN — SODIUM ZIRCONIUM CYCLOSILICATE 10 G: 10 POWDER, FOR SUSPENSION ORAL at 11:49

## 2024-11-16 RX ADMIN — BRIMONIDINE TARTRATE 1 DROP: 2 SOLUTION/ DROPS OPHTHALMIC at 21:58

## 2024-11-16 RX ADMIN — FUROSEMIDE 80 MG: 10 INJECTION, SOLUTION INTRAMUSCULAR; INTRAVENOUS at 11:32

## 2024-11-16 RX ADMIN — SODIUM CHLORIDE 75 ML/HR: 9 INJECTION, SOLUTION INTRAVENOUS at 15:16

## 2024-11-16 RX ADMIN — CARVEDILOL 25 MG: 12.5 TABLET, FILM COATED ORAL at 21:58

## 2024-11-16 RX ADMIN — SODIUM ZIRCONIUM CYCLOSILICATE 10 G: 10 POWDER, FOR SUSPENSION ORAL at 15:16

## 2024-11-16 RX ADMIN — SODIUM BICARBONATE 650 MG: 650 TABLET ORAL at 21:58

## 2024-11-16 RX ADMIN — LATANOPROST 1 DROP: 50 SOLUTION/ DROPS OPHTHALMIC at 21:58

## 2024-11-16 RX ADMIN — BRIMONIDINE TARTRATE 1 DROP: 2 SOLUTION/ DROPS OPHTHALMIC at 08:59

## 2024-11-16 RX ADMIN — SODIUM BICARBONATE 650 MG: 650 TABLET ORAL at 08:58

## 2024-11-16 RX ADMIN — HEPARIN SODIUM 5000 UNITS: 5000 INJECTION INTRAVENOUS; SUBCUTANEOUS at 08:58

## 2024-11-16 RX ADMIN — ACETAMINOPHEN 650 MG: 325 TABLET, FILM COATED ORAL at 15:26

## 2024-11-16 RX ADMIN — TIMOLOL MALEATE 1 DROP: 5 SOLUTION/ DROPS OPHTHALMIC at 08:58

## 2024-11-16 RX ADMIN — HEPARIN SODIUM 5000 UNITS: 5000 INJECTION INTRAVENOUS; SUBCUTANEOUS at 02:29

## 2024-11-16 ASSESSMENT — PAIN SCALES - GENERAL
PAINLEVEL_OUTOF10: 0 - NO PAIN
PAINLEVEL_OUTOF10: 4

## 2024-11-16 ASSESSMENT — COGNITIVE AND FUNCTIONAL STATUS - GENERAL
HELP NEEDED FOR BATHING: A LITTLE
TURNING FROM BACK TO SIDE WHILE IN FLAT BAD: A LITTLE
MOBILITY SCORE: 18
MOVING TO AND FROM BED TO CHAIR: A LITTLE
DRESSING REGULAR LOWER BODY CLOTHING: A LITTLE
CLIMB 3 TO 5 STEPS WITH RAILING: A LITTLE
TOILETING: A LITTLE
DAILY ACTIVITIY SCORE: 18
WALKING IN HOSPITAL ROOM: A LITTLE
STANDING UP FROM CHAIR USING ARMS: A LITTLE
MOVING FROM LYING ON BACK TO SITTING ON SIDE OF FLAT BED WITH BEDRAILS: A LITTLE
EATING MEALS: A LITTLE
PERSONAL GROOMING: A LITTLE
DRESSING REGULAR UPPER BODY CLOTHING: A LITTLE

## 2024-11-16 ASSESSMENT — PAIN DESCRIPTION - ORIENTATION: ORIENTATION: LEFT

## 2024-11-16 ASSESSMENT — PAIN SCALES - WONG BAKER: WONGBAKER_NUMERICALRESPONSE: HURTS LITTLE BIT

## 2024-11-16 ASSESSMENT — PAIN DESCRIPTION - LOCATION: LOCATION: HIP

## 2024-11-16 NOTE — CARE PLAN
The patient's goals for the shift include      The clinical goals for the shift include pt will have renal biopsy done today    Pt safety during shift

## 2024-11-16 NOTE — PROGRESS NOTES
"Carolann Cartagena is a 89 y.o. female on day 10 of admission presenting with Colitis.    Subjective   No acute events overnight. Overall feeling well. Sitting up in the chair when I arrived.        Objective     VITALS  Blood pressure 166/55, pulse 57, temperature 36.9 °C (98.4 °F), temperature source Temporal, resp. rate 12, height 1.626 m (5' 4\"), weight 72.6 kg (160 lb), SpO2 98%.  Physical Exam  Vitals reviewed.   Constitutional:       Appearance: Normal appearance.   HENT:      Head: Normocephalic.      Nose: Nose normal.   Cardiovascular:      Rate and Rhythm: Normal rate and regular rhythm.      Pulses: Normal pulses.      Heart sounds: Normal heart sounds.   Abdominal:      General: Abdomen is flat. Bowel sounds are normal.      Palpations: Abdomen is soft.   Skin:     General: Skin is warm and dry.      Capillary Refill: Capillary refill takes less than 2 seconds.   Neurological:      General: No focal deficit present.      Mental Status: She is alert and oriented to person, place, and time.           Intake/Output last 3 Shifts:  I/O last 3 completed shifts:  In: - (0 mL/kg)   Out: 50 (0.7 mL/kg) [Urine:50 (0 mL/kg/hr)]  Weight: 72.6 kg     Relevant Results  Results for orders placed or performed during the hospital encounter of 11/06/24 (from the past 24 hours)   POCT GLUCOSE   Result Value Ref Range    POCT Glucose 115 (H) 74 - 99 mg/dL   POCT GLUCOSE   Result Value Ref Range    POCT Glucose 187 (H) 74 - 99 mg/dL   Lavender Top   Result Value Ref Range    Extra Tube Hold for add-ons.    SST TOP   Result Value Ref Range    Extra Tube Hold for add-ons.    Basic Metabolic Panel   Result Value Ref Range    Glucose 81 74 - 99 mg/dL    Sodium 128 (L) 136 - 145 mmol/L    Potassium 6.0 (H) 3.5 - 5.3 mmol/L    Chloride 99 98 - 107 mmol/L    Bicarbonate 21 21 - 32 mmol/L    Anion Gap 14 10 - 20 mmol/L    Urea Nitrogen 93 (HH) 6 - 23 mg/dL    Creatinine 6.21 (H) 0.50 - 1.05 mg/dL    eGFR 6 (L) >60 mL/min/1.73m*2    " Calcium 7.9 (L) 8.6 - 10.3 mg/dL   POCT GLUCOSE   Result Value Ref Range    POCT Glucose 84 74 - 99 mg/dL       Imaging Results  US guided percutaneous biopsy renal left   Final Result   Ultrasound-guided percutaneous non targeted/random renal biopsy, 3 x   18 gauge core needle biopsy specimens obtained of the inferior left   renal subcortical parenchyma.             MACRO:   None        Signed by: Devon Teixeira 11/15/2024 4:52 PM   Dictation workstation:   [id number]      XR chest 2 views   Final Result   Findings suspicious for ongoing CHF.        Left pleural effusion.        Hazy opacities in the lungs, most pronounced at the posteromedial   left lung base. Atelectasis versus pneumonia versus  asymmetric   pulmonary edema        MACRO:   None        Signed by: Adam Javier 11/11/2024 2:58 PM   Dictation workstation:   [id number]      CT head wo IV contrast   Final Result   No acute intracranial hemorrhage or mass-effect.        MACRO:   None.        Signed by: Loly Albarran 11/6/2024 1:44 PM   Dictation workstation:   [id number]      CT abdomen pelvis wo IV contrast   Final Result   Right colon wall thickening consistent with nonspecific colitis.        Small amount of free fluid with mild edematous changes in the lung   bases, mesentery and soft tissues as well as trace pleural   effusions/thickening consistent with fluid overload/CHF.        Subtle density layer in the gallbladder, possible layering   noncalcified stones or sludge.        Additional findings as described above.        MACRO:   None.        Signed by: Loly Albarran 11/6/2024 1:52 PM   Dictation workstation:   [id number]      XR chest 1 view   Final Result   No acute cardiopulmonary process.   Enlarged but unchanged heart compared to 06/12/2024.        MACRO:   None        Signed by: Yokasta Jackson 11/6/2024 11:42 AM   Dictation workstation:   [id number]          Medications:  [Held by provider] aspirin, 81 mg, oral,  Daily  atorvastatin, 10 mg, oral, Nightly  brimonidine, 1 drop, Both Eyes, BID  carvedilol, 25 mg, oral, BID  doxazosin, 4 mg, oral, Daily  heparin (porcine), 5,000 Units, subcutaneous, q8h TIP  [Held by provider] hydrALAZINE, 100 mg, oral, TID  insulin NPH (Isophane), 6 Units, subcutaneous, BID AC  latanoprost, 1 drop, Both Eyes, Nightly  NIFEdipine ER, 90 mg, oral, Daily before breakfast  sodium bicarbonate, 650 mg, oral, BID  [Held by provider] spironolactone, 25 mg, oral, q24h TIP  timolol, 1 drop, Both Eyes, Daily       PRN medications: acetaminophen **OR** acetaminophen **OR** acetaminophen, dextrose, dextrose, glucagon, glucagon, hydrALAZINE, ondansetron, sennosides-docusate sodium        Assessment/Plan          Principal Problem:    Colitis    1.  Accelerated hypertension resolved   -Improved pressures overall, nephro would like SBP <140  -Coreg, held for bradycardia   -Doxazosin  -Nifedipine      2.  Acute kidney injury on CKD 4  -Worsening  -Appreciate nephro recs, awaiting renal biopsy results.   -Concern she will continue to worsen to the point that she needs dialysis though at her age it is unclear if she would want this.      3. DM  -continue SSI and NPH     4. Fever  -one episode early in admission  -check flu swab/bcx/ua/cxr  -pending procalcitonin    5. Hyperkalemia  - Continue with lasix and Lokelma  - Will discuss with nephro, she may require more drastic measures to get this down  -Monitor on tele for arrhythmias     DVT Prophylaxis:  Heparin subq    Disposition:  Awaiting renal biopsy results.     Emeterio Alves, [facility]

## 2024-11-16 NOTE — PROGRESS NOTES
Carolann Cartagena is a 89 y.o. female on day 10 of admission presenting with Colitis.      Subjective   Sick looking patient in no acute distress,  Kidney biopsy completed yesterday will follow results early next week.  Started on Lokelma for hyperkalemia.       Objective        Vitals 24HR  Heart Rate:  [37-57]   Temp:  [36.5 °C (97.7 °F)-36.9 °C (98.4 °F)]   Resp:  [12-18]   BP: (129-166)/(51-78)   SpO2:  [98 %-100 %]     Intake/Output last 3 Shifts:    Intake/Output Summary (Last 24 hours) at 11/16/2024 1438  Last data filed at 11/16/2024 0900  Gross per 24 hour   Intake 120 ml   Output --   Net 120 ml       Physical Exam    General appearance: Awake alert no distress  Head and ENT: Normocephalic/atraumatic/supple neck/no JVD  Lungs: CTA  Heart: RRR  Abdomen: Soft no tenderness, no organomegaly   Extremities pitting thigh edema  Neurologic: Physiologic                  Relevant Results        Results from last 7 days   Lab Units 11/16/24  0555 11/15/24  0605 11/14/24  0606   SODIUM mmol/L 128* 128* 126*   POTASSIUM mmol/L 6.0* 5.8* 5.3   CHLORIDE mmol/L 99 100 99   CO2 mmol/L 21 21 21   BUN mg/dL 93* 88* 86*   CREATININE mg/dL 6.21* 5.79* 5.25*   GLUCOSE mg/dL 81 99 62*   CALCIUM mg/dL 7.9* 7.7* 7.6*        Current Facility-Administered Medications:     acetaminophen (Tylenol) tablet 650 mg, 650 mg, oral, q4h PRN, 650 mg at 11/13/24 2026 **OR** acetaminophen (Tylenol) oral liquid 650 mg, 650 mg, oral, q4h PRN **OR** acetaminophen (Tylenol) suppository 650 mg, 650 mg, rectal, q4h PRN, BASILIA Mena-CNP    [Held by provider] aspirin EC tablet 81 mg, 81 mg, oral, Daily, BASILIA Mena-CNP, 81 mg at 11/12/24 0914    atorvastatin (Lipitor) tablet 10 mg, 10 mg, oral, Nightly, ERICA Mena, 10 mg at 11/15/24 2016    brimonidine (AlphaGAN) 0.2 % ophthalmic solution 1 drop, 1 drop, Both Eyes, BID, ERICA Mena, 1 drop at 11/16/24 0859    carvedilol (Coreg) tablet 25 mg, 25 mg, oral, BID, Dangelo  J Ford, APRN-CNP, 25 mg at 11/16/24 0858    dextrose 50 % injection 12.5 g, 12.5 g, intravenous, q15 min PRN, Dangelo Mcgregor, APRN-CNP, 12.5 g at 11/13/24 0735    dextrose 50 % injection 25 g, 25 g, intravenous, q15 min PRN, Dangelo Mcgregor APRN-CNP    doxazosin (Cardura) tablet 4 mg, 4 mg, oral, Daily, Pascual Bhakta DO, 4 mg at 11/16/24 0858    glucagon (Glucagen) injection 1 mg, 1 mg, intramuscular, q15 min PRN, BASILIA Mena-CNP    glucagon (Glucagen) injection 1 mg, 1 mg, intramuscular, q15 min PRN, BASILIA Mena-CNP    heparin (porcine) injection 5,000 Units, 5,000 Units, subcutaneous, q8h TIP, BASILIA Mena-CNP, 5,000 Units at 11/16/24 0858    hydrALAZINE (Apresoline) injection 5 mg, 5 mg, intravenous, q6h PRN, Dangelo Mcgregor APRN-CNP, 5 mg at 11/08/24 0507    [Held by provider] hydrALAZINE (Apresoline) tablet 100 mg, 100 mg, oral, TID, Julianne Elise MD, 100 mg at 11/11/24 0829    insulin NPH (Isophane) (HumuLIN N,NovoLIN N) injection 6 Units, 6 Units, subcutaneous, BID AC, Emeterio Alves DO, 6 Units at 11/15/24 1740    latanoprost (Xalatan) 0.005 % ophthalmic solution 1 drop, 1 drop, Both Eyes, Nightly, BASILIA Mena-CNP, 1 drop at 11/15/24 2016    NIFEdipine ER (Adalat CC) 24 hr tablet 90 mg, 90 mg, oral, Daily before breakfast, Pascual Bhakta DO, 90 mg at 11/16/24 0715    ondansetron (Zofran) injection 4 mg, 4 mg, intravenous, q6h PRN, BASILIA Mena-CNP, 4 mg at 11/14/24 1652    sennosides-docusate sodium (Steph-Colace) 8.6-50 mg per tablet 1 tablet, 1 tablet, oral, Nightly PRN, ERICA Mena    sodium bicarbonate tablet 650 mg, 650 mg, oral, BID, ERICA Mena, 650 mg at 11/16/24 0858    [Held by provider] spironolactone (Aldactone) tablet 25 mg, 25 mg, oral, q24h TIP, ERICA Mena, 25 mg at 11/06/24 2111    timolol (Timoptic) 0.5 % ophthalmic solution 1 drop, 1 drop, Both Eyes, Daily, ERICA Mena, 1 drop at 11/16/24 0858            Assessment/Plan   1.  Colitis  2.  OLGA LIDIA on top of CKD stage IV, with ANCA positive  Will continue Lokelma and IV fluids initiation  Will follow kidney biopsy results   3.  Hypertension continue current management    Will continue reviewing patient daily and labs and other consultants input        Principal Problem:    Colitis              I spent 35 minutes in the professional and overall care of this patient.      Suly Camilo MD

## 2024-11-16 NOTE — CARE PLAN
Problem: Pain - Adult  Goal: Verbalizes/displays adequate comfort level or baseline comfort level  Outcome: Progressing     Problem: Safety - Adult  Goal: Free from fall injury  Outcome: Progressing     Problem: Discharge Planning  Goal: Discharge to home or other facility with appropriate resources  Outcome: Progressing     Problem: Chronic Conditions and Co-morbidities  Goal: Patient's chronic conditions and co-morbidity symptoms are monitored and maintained or improved  Outcome: Progressing     Problem: Diabetes  Goal: Achieve decreasing blood glucose levels by end of shift  Outcome: Progressing  Goal: Increase stability of blood glucose readings by end of shift  Outcome: Progressing  Goal: Decrease in ketones present in urine by end of shift  Outcome: Progressing  Goal: Maintain electrolyte levels within acceptable range throughout shift  Outcome: Progressing  Goal: Maintain glucose levels >70mg/dl to <250mg/dl throughout shift  Outcome: Progressing  Goal: No changes in neurological exam by end of shift  Outcome: Progressing  Goal: Learn about and adhere to nutrition recommendations by end of shift  Outcome: Progressing  Goal: Vital signs within normal range for age by end of shift  Outcome: Progressing  Goal: Increase self care and/or family involovement by end of shift  Outcome: Progressing  Goal: Receive DSME education by end of shift  Outcome: Progressing     Problem: Skin  Goal: Decreased wound size/increased tissue granulation at next dressing change  Outcome: Progressing  Goal: Participates in plan/prevention/treatment measures  Outcome: Progressing  Goal: Prevent/manage excess moisture  Outcome: Progressing  Goal: Prevent/minimize sheer/friction injuries  Outcome: Progressing  Goal: Promote/optimize nutrition  Outcome: Progressing  Goal: Promote skin healing  Outcome: Progressing   The clinical goals for the shift include pt safety

## 2024-11-17 VITALS
RESPIRATION RATE: 16 BRPM | SYSTOLIC BLOOD PRESSURE: 167 MMHG | OXYGEN SATURATION: 100 % | DIASTOLIC BLOOD PRESSURE: 66 MMHG | WEIGHT: 160 LBS | BODY MASS INDEX: 27.31 KG/M2 | HEART RATE: 49 BPM | TEMPERATURE: 97.8 F | HEIGHT: 64 IN

## 2024-11-17 DIAGNOSIS — E11.9 TYPE 2 DIABETES MELLITUS WITHOUT COMPLICATION, WITH LONG-TERM CURRENT USE OF INSULIN (MULTI): ICD-10-CM

## 2024-11-17 DIAGNOSIS — Z79.4 TYPE 2 DIABETES MELLITUS WITHOUT COMPLICATION, WITH LONG-TERM CURRENT USE OF INSULIN (MULTI): ICD-10-CM

## 2024-11-17 LAB
ANION GAP SERPL CALC-SCNC: 13 MMOL/L (ref 10–20)
BUN SERPL-MCNC: 94 MG/DL (ref 6–23)
CALCIUM SERPL-MCNC: 7.1 MG/DL (ref 8.6–10.3)
CHLORIDE SERPL-SCNC: 99 MMOL/L (ref 98–107)
CO2 SERPL-SCNC: 21 MMOL/L (ref 21–32)
CREAT SERPL-MCNC: 6.07 MG/DL (ref 0.5–1.05)
EGFRCR SERPLBLD CKD-EPI 2021: 6 ML/MIN/1.73M*2
GLUCOSE BLD MANUAL STRIP-MCNC: 116 MG/DL (ref 74–99)
GLUCOSE BLD MANUAL STRIP-MCNC: 168 MG/DL (ref 74–99)
GLUCOSE BLD MANUAL STRIP-MCNC: 180 MG/DL (ref 74–99)
GLUCOSE SERPL-MCNC: 105 MG/DL (ref 74–99)
HOLD SPECIMEN: NORMAL
HOLD SPECIMEN: NORMAL
POTASSIUM SERPL-SCNC: 5.3 MMOL/L (ref 3.5–5.3)
SODIUM SERPL-SCNC: 128 MMOL/L (ref 136–145)

## 2024-11-17 PROCEDURE — 36415 COLL VENOUS BLD VENIPUNCTURE: CPT | Performed by: INTERNAL MEDICINE

## 2024-11-17 PROCEDURE — 2500000001 HC RX 250 WO HCPCS SELF ADMINISTERED DRUGS (ALT 637 FOR MEDICARE OP): Performed by: INTERNAL MEDICINE

## 2024-11-17 PROCEDURE — 2500000002 HC RX 250 W HCPCS SELF ADMINISTERED DRUGS (ALT 637 FOR MEDICARE OP, ALT 636 FOR OP/ED): Performed by: INTERNAL MEDICINE

## 2024-11-17 PROCEDURE — 99233 SBSQ HOSP IP/OBS HIGH 50: CPT | Performed by: INTERNAL MEDICINE

## 2024-11-17 PROCEDURE — 2500000001 HC RX 250 WO HCPCS SELF ADMINISTERED DRUGS (ALT 637 FOR MEDICARE OP): Performed by: NURSE PRACTITIONER

## 2024-11-17 PROCEDURE — 82947 ASSAY GLUCOSE BLOOD QUANT: CPT

## 2024-11-17 PROCEDURE — 1200000002 HC GENERAL ROOM WITH TELEMETRY DAILY

## 2024-11-17 PROCEDURE — 80048 BASIC METABOLIC PNL TOTAL CA: CPT | Performed by: INTERNAL MEDICINE

## 2024-11-17 PROCEDURE — 2500000004 HC RX 250 GENERAL PHARMACY W/ HCPCS (ALT 636 FOR OP/ED): Performed by: NURSE PRACTITIONER

## 2024-11-17 RX ADMIN — INSULIN HUMAN 6 UNITS: 100 INJECTION, SUSPENSION SUBCUTANEOUS at 10:17

## 2024-11-17 RX ADMIN — SODIUM ZIRCONIUM CYCLOSILICATE 10 G: 10 POWDER, FOR SUSPENSION ORAL at 21:36

## 2024-11-17 RX ADMIN — SODIUM ZIRCONIUM CYCLOSILICATE 10 G: 10 POWDER, FOR SUSPENSION ORAL at 10:17

## 2024-11-17 RX ADMIN — BRIMONIDINE TARTRATE 1 DROP: 2 SOLUTION/ DROPS OPHTHALMIC at 10:17

## 2024-11-17 RX ADMIN — BRIMONIDINE TARTRATE 1 DROP: 2 SOLUTION/ DROPS OPHTHALMIC at 21:36

## 2024-11-17 RX ADMIN — ACETAMINOPHEN 650 MG: 325 TABLET, FILM COATED ORAL at 16:20

## 2024-11-17 RX ADMIN — INSULIN HUMAN 6 UNITS: 100 INJECTION, SUSPENSION SUBCUTANEOUS at 16:19

## 2024-11-17 RX ADMIN — SODIUM ZIRCONIUM CYCLOSILICATE 10 G: 10 POWDER, FOR SUSPENSION ORAL at 16:19

## 2024-11-17 RX ADMIN — SODIUM BICARBONATE 650 MG: 650 TABLET ORAL at 21:37

## 2024-11-17 RX ADMIN — HEPARIN SODIUM 5000 UNITS: 5000 INJECTION INTRAVENOUS; SUBCUTANEOUS at 10:17

## 2024-11-17 RX ADMIN — HEPARIN SODIUM 5000 UNITS: 5000 INJECTION INTRAVENOUS; SUBCUTANEOUS at 01:58

## 2024-11-17 RX ADMIN — HEPARIN SODIUM 5000 UNITS: 5000 INJECTION INTRAVENOUS; SUBCUTANEOUS at 16:20

## 2024-11-17 RX ADMIN — LATANOPROST 1 DROP: 50 SOLUTION/ DROPS OPHTHALMIC at 21:36

## 2024-11-17 RX ADMIN — SODIUM BICARBONATE 650 MG: 650 TABLET ORAL at 10:17

## 2024-11-17 RX ADMIN — DOXAZOSIN 4 MG: 2 TABLET ORAL at 10:17

## 2024-11-17 RX ADMIN — ATORVASTATIN CALCIUM 10 MG: 20 TABLET ORAL at 21:35

## 2024-11-17 RX ADMIN — CARVEDILOL 25 MG: 12.5 TABLET, FILM COATED ORAL at 10:17

## 2024-11-17 RX ADMIN — CARVEDILOL 25 MG: 12.5 TABLET, FILM COATED ORAL at 21:35

## 2024-11-17 RX ADMIN — TIMOLOL MALEATE 1 DROP: 5 SOLUTION/ DROPS OPHTHALMIC at 10:17

## 2024-11-17 ASSESSMENT — COGNITIVE AND FUNCTIONAL STATUS - GENERAL
MOVING FROM LYING ON BACK TO SITTING ON SIDE OF FLAT BED WITH BEDRAILS: A LITTLE
DRESSING REGULAR LOWER BODY CLOTHING: A LITTLE
HELP NEEDED FOR BATHING: A LITTLE
CLIMB 3 TO 5 STEPS WITH RAILING: A LOT
TOILETING: A LITTLE
MOBILITY SCORE: 17
STANDING UP FROM CHAIR USING ARMS: A LITTLE
PERSONAL GROOMING: A LITTLE
TURNING FROM BACK TO SIDE WHILE IN FLAT BAD: A LITTLE
MOVING TO AND FROM BED TO CHAIR: A LITTLE
DRESSING REGULAR UPPER BODY CLOTHING: A LITTLE
DAILY ACTIVITIY SCORE: 18
EATING MEALS: A LITTLE
WALKING IN HOSPITAL ROOM: A LITTLE

## 2024-11-17 ASSESSMENT — PAIN SCALES - WONG BAKER
WONGBAKER_NUMERICALRESPONSE: NO HURT
WONGBAKER_NUMERICALRESPONSE: NO HURT

## 2024-11-17 ASSESSMENT — PAIN SCALES - GENERAL
PAINLEVEL_OUTOF10: 4
PAINLEVEL_OUTOF10: 0 - NO PAIN
PAINLEVEL_OUTOF10: 0 - NO PAIN

## 2024-11-17 ASSESSMENT — PAIN DESCRIPTION - ORIENTATION: ORIENTATION: LEFT

## 2024-11-17 ASSESSMENT — PAIN DESCRIPTION - LOCATION: LOCATION: HIP

## 2024-11-17 ASSESSMENT — PAIN SCALES - PAIN ASSESSMENT IN ADVANCED DEMENTIA (PAINAD): TOTALSCORE: MEDICATION (SEE MAR)

## 2024-11-17 NOTE — PROGRESS NOTES
"Carolann Cartagena is a 89 y.o. female on day 11 of admission presenting with Colitis.    Subjective   Made quite a bit of urine yesterday. Feeling well today. Grandson at the bedside.        Objective     VITALS  Blood pressure 152/62, pulse (!) 48, temperature 36.3 °C (97.3 °F), temperature source Temporal, resp. rate 16, height 1.626 m (5' 4\"), weight 72.6 kg (160 lb), SpO2 98%.  Physical Exam  Vitals reviewed.   Constitutional:       Appearance: Normal appearance.   HENT:      Head: Normocephalic.      Nose: Nose normal.   Cardiovascular:      Rate and Rhythm: Normal rate and regular rhythm.      Pulses: Normal pulses.      Heart sounds: Normal heart sounds.   Abdominal:      General: Abdomen is flat. Bowel sounds are normal.      Palpations: Abdomen is soft.   Skin:     General: Skin is warm and dry.      Capillary Refill: Capillary refill takes less than 2 seconds.   Neurological:      General: No focal deficit present.      Mental Status: She is alert and oriented to person, place, and time.           Intake/Output last 3 Shifts:  I/O last 3 completed shifts:  In: 120 (1.7 mL/kg) [P.O.:120]  Out: - (0 mL/kg)   Weight: 72.6 kg     Relevant Results  Results for orders placed or performed during the hospital encounter of 11/06/24 (from the past 24 hours)   POCT GLUCOSE   Result Value Ref Range    POCT Glucose 174 (H) 74 - 99 mg/dL   Basic Metabolic Panel   Result Value Ref Range    Glucose 105 (H) 74 - 99 mg/dL    Sodium 128 (L) 136 - 145 mmol/L    Potassium 5.3 3.5 - 5.3 mmol/L    Chloride 99 98 - 107 mmol/L    Bicarbonate 21 21 - 32 mmol/L    Anion Gap 13 10 - 20 mmol/L    Urea Nitrogen 94 (HH) 6 - 23 mg/dL    Creatinine 6.07 (H) 0.50 - 1.05 mg/dL    eGFR 6 (L) >60 mL/min/1.73m*2    Calcium 7.1 (L) 8.6 - 10.3 mg/dL   SST TOP   Result Value Ref Range    Extra Tube Hold for add-ons.    POCT GLUCOSE   Result Value Ref Range    POCT Glucose 116 (H) 74 - 99 mg/dL       Imaging Results  US guided percutaneous biopsy renal " left   Final Result   Ultrasound-guided percutaneous non targeted/random renal biopsy, 3 x   18 gauge core needle biopsy specimens obtained of the inferior left   renal subcortical parenchyma.             MACRO:   None        Signed by: Devon Teixeira 11/15/2024 4:52 PM   Dictation workstation:   [id number]      XR chest 2 views   Final Result   Findings suspicious for ongoing CHF.        Left pleural effusion.        Hazy opacities in the lungs, most pronounced at the posteromedial   left lung base. Atelectasis versus pneumonia versus  asymmetric   pulmonary edema        MACRO:   None        Signed by: Adam Javier 11/11/2024 2:58 PM   Dictation workstation:   [id number]      CT head wo IV contrast   Final Result   No acute intracranial hemorrhage or mass-effect.        MACRO:   None.        Signed by: Loly Albarran 11/6/2024 1:44 PM   Dictation workstation:   [id number]      CT abdomen pelvis wo IV contrast   Final Result   Right colon wall thickening consistent with nonspecific colitis.        Small amount of free fluid with mild edematous changes in the lung   bases, mesentery and soft tissues as well as trace pleural   effusions/thickening consistent with fluid overload/CHF.        Subtle density layer in the gallbladder, possible layering   noncalcified stones or sludge.        Additional findings as described above.        MACRO:   None.        Signed by: Loly Albarran 11/6/2024 1:52 PM   Dictation workstation:   [id number]      XR chest 1 view   Final Result   No acute cardiopulmonary process.   Enlarged but unchanged heart compared to 06/12/2024.        MACRO:   None        Signed by: Yokasta Jackson 11/6/2024 11:42 AM   Dictation workstation:   [id number]          Medications:  [Held by provider] aspirin, 81 mg, oral, Daily  atorvastatin, 10 mg, oral, Nightly  brimonidine, 1 drop, Both Eyes, BID  carvedilol, 25 mg, oral, BID  doxazosin, 4 mg, oral, Daily  heparin (porcine), 5,000 Units,  subcutaneous, q8h TIP  [Held by provider] hydrALAZINE, 100 mg, oral, TID  insulin NPH (Isophane), 6 Units, subcutaneous, BID AC  latanoprost, 1 drop, Both Eyes, Nightly  NIFEdipine ER, 90 mg, oral, Daily before breakfast  sodium bicarbonate, 650 mg, oral, BID  sodium zirconium cyclosilicate, 10 g, oral, TID  [Held by provider] spironolactone, 25 mg, oral, q24h TIP  timolol, 1 drop, Both Eyes, Daily       PRN medications: acetaminophen **OR** acetaminophen **OR** acetaminophen, dextrose, dextrose, glucagon, glucagon, hydrALAZINE, ondansetron, sennosides-docusate sodium        Assessment/Plan          Principal Problem:    Colitis    1.  Accelerated hypertension resolved   -Improved pressures overall, nephro would like SBP <140  -Coreg, held for bradycardia   -Doxazosin  -Nifedipine      2.  Acute kidney injury on CKD 4  -Creatinine has leveled off today.   -Appreciate nephro recs, awaiting renal biopsy results.   -Concern she will continue to worsen to the point that she needs dialysis though at her age it is unclear if she would want this.      3. DM  -continue SSI and NPH     4. Fever  -one episode early in admission  -check flu swab/bcx/ua/cxr  -pending procalcitonin    5. Hyperkalemia  - Resolved, will keep a close eye on her labs as she has high risk of this recurring while kidneys are under preforming   -Monitor on tele for arrhythmias     DVT Prophylaxis:  Heparin subq    Disposition:  Awaiting renal biopsy results.     Emeterio Alves, [facility]

## 2024-11-17 NOTE — PROGRESS NOTES
Carolann Cartagena is a 89 y.o. female on day 11 of admission presenting with Colitis.      Subjective   Overall doing well no new complaints, pending results of the kidney biopsy it was done last week from the left kidney.       Objective        Vitals 24HR  Heart Rate:  [48]   Temp:  [36.2 °C (97.1 °F)-36.9 °C (98.4 °F)]   Resp:  [16]   BP: (106-152)/(53-72)   SpO2:  [92 %-99 %]     Intake/Output last 3 Shifts:    Intake/Output Summary (Last 24 hours) at 11/17/2024 1306  Last data filed at 11/17/2024 1028  Gross per 24 hour   Intake 1240 ml   Output --   Net 1240 ml       Physical Exam        General appearance: Awake alert no distress  Head and ENT: Normocephalic/atraumatic/supple neck/no JVD  Lungs: CTA  Heart: RRR  Abdomen: Soft no tenderness, no organomegaly        Extremities pitting thigh edema  Neurologic: Physiologic         Relevant Results        Results from last 7 days   Lab Units 11/17/24  0644 11/16/24  0555 11/15/24  0605   SODIUM mmol/L 128* 128* 128*   POTASSIUM mmol/L 5.3 6.0* 5.8*   CHLORIDE mmol/L 99 99 100   CO2 mmol/L 21 21 21   BUN mg/dL 94* 93* 88*   CREATININE mg/dL 6.07* 6.21* 5.79*   GLUCOSE mg/dL 105* 81 99   CALCIUM mg/dL 7.1* 7.9* 7.7*        Current Facility-Administered Medications:     acetaminophen (Tylenol) tablet 650 mg, 650 mg, oral, q4h PRN, 650 mg at 11/16/24 1526 **OR** acetaminophen (Tylenol) oral liquid 650 mg, 650 mg, oral, q4h PRN **OR** acetaminophen (Tylenol) suppository 650 mg, 650 mg, rectal, q4h PRN, ERICA Mena    [Held by provider] aspirin EC tablet 81 mg, 81 mg, oral, Daily, BASILIA Mena-CNP, 81 mg at 11/12/24 0914    atorvastatin (Lipitor) tablet 10 mg, 10 mg, oral, Nightly, BASILIA Mena-CNP, 10 mg at 11/16/24 2158    brimonidine (AlphaGAN) 0.2 % ophthalmic solution 1 drop, 1 drop, Both Eyes, BID, ERICA Mena, 1 drop at 11/17/24 1017    carvedilol (Coreg) tablet 25 mg, 25 mg, oral, BID, ERICA Mena, 25 mg at 11/17/24  1017    dextrose 50 % injection 12.5 g, 12.5 g, intravenous, q15 min PRN, Dangelo Mcgregor, APRN-CNP, 12.5 g at 11/13/24 0735    dextrose 50 % injection 25 g, 25 g, intravenous, q15 min PRN, Dangelo Mcgregor, APRN-CNP    doxazosin (Cardura) tablet 4 mg, 4 mg, oral, Daily, Pascual Bhakta DO, 4 mg at 11/17/24 1017    glucagon (Glucagen) injection 1 mg, 1 mg, intramuscular, q15 min PRN, Dangelo Mcgregor, APRN-CNP    glucagon (Glucagen) injection 1 mg, 1 mg, intramuscular, q15 min PRN, Dangelo Mcgregor, APRN-CNP    heparin (porcine) injection 5,000 Units, 5,000 Units, subcutaneous, q8h TIP, Dangelo Mcgregor APRN-CNP, 5,000 Units at 11/17/24 1017    hydrALAZINE (Apresoline) injection 5 mg, 5 mg, intravenous, q6h PRN, Dangelo Mcgregor, APRN-CNP, 5 mg at 11/08/24 0507    [Held by provider] hydrALAZINE (Apresoline) tablet 100 mg, 100 mg, oral, TID, Julianne Elise MD, 100 mg at 11/11/24 0829    insulin NPH (Isophane) (HumuLIN N,NovoLIN N) injection 6 Units, 6 Units, subcutaneous, BID AC, Emeterio Alves DO, 6 Units at 11/17/24 1017    latanoprost (Xalatan) 0.005 % ophthalmic solution 1 drop, 1 drop, Both Eyes, Nightly, Dangelo Mcgregor APRN-CNP, 1 drop at 11/16/24 2158    NIFEdipine ER (Adalat CC) 24 hr tablet 90 mg, 90 mg, oral, Daily before breakfast, Pascual Bhakta DO, 90 mg at 11/16/24 0715    ondansetron (Zofran) injection 4 mg, 4 mg, intravenous, q6h PRN, Dangelo Mcgregor, APRN-CNP, 4 mg at 11/14/24 1652    sennosides-docusate sodium (Steph-Colace) 8.6-50 mg per tablet 1 tablet, 1 tablet, oral, Nightly PRN, ERICA Mena    sodium bicarbonate tablet 650 mg, 650 mg, oral, BID, ERICA Mena, 650 mg at 11/17/24 1017    sodium chloride 0.9% infusion, 75 mL/hr, intravenous, Continuous, Suly Camilo MD, Last Rate: 75 mL/hr at 11/17/24 1028, 75 mL/hr at 11/17/24 1028    sodium zirconium cyclosilicate (Lokelma) packet 10 g, 10 g, oral, TID, Suly Camilo MD, 10 g at 11/17/24 1017    [Held by provider]  spironolactone (Aldactone) tablet 25 mg, 25 mg, oral, q24h TIP, BASILIA Mena-CNP, 25 mg at 11/06/24 2111    timolol (Timoptic) 0.5 % ophthalmic solution 1 drop, 1 drop, Both Eyes, Daily, ERICA Mena, 1 drop at 11/17/24 1017           Assessment/Plan        1.  Colitis  2.  OLGA LIDIA on top of CKD stage IV, with ANCA positive  Will continue Lokelma and IV fluids initiation  Will follow kidney biopsy results   3.  Hypertension continue current management  4.  Hyponatremia, restrict p.o. free water intake to 1 L per 24 hours    Pending results of the kidney biopsy that was done last week to make a plans of future management and through discussion with the patient and the family at the same time.      Will continue reviewing patient daily and labs and other consultants input            Principal Problem:    Colitis              I spent 35 minutes in the professional and overall care of this patient.      Suly Camilo MD

## 2024-11-18 LAB
ANION GAP SERPL CALC-SCNC: 12 MMOL/L (ref 10–20)
BUN SERPL-MCNC: 91 MG/DL (ref 6–23)
CALCIUM SERPL-MCNC: 7.5 MG/DL (ref 8.6–10.3)
CHLORIDE SERPL-SCNC: 100 MMOL/L (ref 98–107)
CO2 SERPL-SCNC: 21 MMOL/L (ref 21–32)
CREAT SERPL-MCNC: 6.06 MG/DL (ref 0.5–1.05)
EGFRCR SERPLBLD CKD-EPI 2021: 6 ML/MIN/1.73M*2
GLUCOSE BLD MANUAL STRIP-MCNC: 109 MG/DL (ref 74–99)
GLUCOSE BLD MANUAL STRIP-MCNC: 140 MG/DL (ref 74–99)
GLUCOSE BLD MANUAL STRIP-MCNC: 143 MG/DL (ref 74–99)
GLUCOSE SERPL-MCNC: 95 MG/DL (ref 74–99)
HOLD SPECIMEN: NORMAL
POTASSIUM SERPL-SCNC: 4.8 MMOL/L (ref 3.5–5.3)
SODIUM SERPL-SCNC: 128 MMOL/L (ref 136–145)

## 2024-11-18 PROCEDURE — 2500000002 HC RX 250 W HCPCS SELF ADMINISTERED DRUGS (ALT 637 FOR MEDICARE OP, ALT 636 FOR OP/ED): Performed by: INTERNAL MEDICINE

## 2024-11-18 PROCEDURE — 2500000001 HC RX 250 WO HCPCS SELF ADMINISTERED DRUGS (ALT 637 FOR MEDICARE OP): Performed by: NURSE PRACTITIONER

## 2024-11-18 PROCEDURE — 97530 THERAPEUTIC ACTIVITIES: CPT | Mod: GO,CO

## 2024-11-18 PROCEDURE — 97116 GAIT TRAINING THERAPY: CPT | Mod: GP,CQ

## 2024-11-18 PROCEDURE — 97110 THERAPEUTIC EXERCISES: CPT | Mod: GP,CQ

## 2024-11-18 PROCEDURE — 36415 COLL VENOUS BLD VENIPUNCTURE: CPT | Performed by: INTERNAL MEDICINE

## 2024-11-18 PROCEDURE — 2500000004 HC RX 250 GENERAL PHARMACY W/ HCPCS (ALT 636 FOR OP/ED): Performed by: NURSE PRACTITIONER

## 2024-11-18 PROCEDURE — 97535 SELF CARE MNGMENT TRAINING: CPT | Mod: GO,CO

## 2024-11-18 PROCEDURE — 1200000002 HC GENERAL ROOM WITH TELEMETRY DAILY

## 2024-11-18 PROCEDURE — 82947 ASSAY GLUCOSE BLOOD QUANT: CPT

## 2024-11-18 PROCEDURE — 2500000001 HC RX 250 WO HCPCS SELF ADMINISTERED DRUGS (ALT 637 FOR MEDICARE OP): Performed by: INTERNAL MEDICINE

## 2024-11-18 PROCEDURE — 99232 SBSQ HOSP IP/OBS MODERATE 35: CPT | Performed by: INTERNAL MEDICINE

## 2024-11-18 PROCEDURE — 82374 ASSAY BLOOD CARBON DIOXIDE: CPT | Performed by: INTERNAL MEDICINE

## 2024-11-18 RX ORDER — PEN NEEDLE, DIABETIC 29 G X1/2"
NEEDLE, DISPOSABLE MISCELLANEOUS
Qty: 90 EACH | Refills: 10 | Status: SHIPPED | OUTPATIENT
Start: 2024-11-18

## 2024-11-18 RX ADMIN — INSULIN HUMAN 6 UNITS: 100 INJECTION, SUSPENSION SUBCUTANEOUS at 16:21

## 2024-11-18 RX ADMIN — BRIMONIDINE TARTRATE 1 DROP: 2 SOLUTION/ DROPS OPHTHALMIC at 10:53

## 2024-11-18 RX ADMIN — ATORVASTATIN CALCIUM 10 MG: 20 TABLET ORAL at 21:14

## 2024-11-18 RX ADMIN — HEPARIN SODIUM 5000 UNITS: 5000 INJECTION INTRAVENOUS; SUBCUTANEOUS at 16:21

## 2024-11-18 RX ADMIN — CARVEDILOL 25 MG: 12.5 TABLET, FILM COATED ORAL at 21:13

## 2024-11-18 RX ADMIN — TIMOLOL MALEATE 1 DROP: 5 SOLUTION/ DROPS OPHTHALMIC at 10:53

## 2024-11-18 RX ADMIN — HEPARIN SODIUM 5000 UNITS: 5000 INJECTION INTRAVENOUS; SUBCUTANEOUS at 01:57

## 2024-11-18 RX ADMIN — SODIUM BICARBONATE 650 MG: 650 TABLET ORAL at 09:41

## 2024-11-18 RX ADMIN — SODIUM BICARBONATE 650 MG: 650 TABLET ORAL at 21:14

## 2024-11-18 RX ADMIN — SODIUM ZIRCONIUM CYCLOSILICATE 10 G: 10 POWDER, FOR SUSPENSION ORAL at 14:42

## 2024-11-18 RX ADMIN — SODIUM ZIRCONIUM CYCLOSILICATE 10 G: 10 POWDER, FOR SUSPENSION ORAL at 09:42

## 2024-11-18 RX ADMIN — HEPARIN SODIUM 5000 UNITS: 5000 INJECTION INTRAVENOUS; SUBCUTANEOUS at 09:41

## 2024-11-18 RX ADMIN — LATANOPROST 1 DROP: 50 SOLUTION/ DROPS OPHTHALMIC at 21:14

## 2024-11-18 RX ADMIN — DOXAZOSIN 4 MG: 2 TABLET ORAL at 09:42

## 2024-11-18 RX ADMIN — NIFEDIPINE 90 MG: 30 TABLET, FILM COATED, EXTENDED RELEASE ORAL at 09:41

## 2024-11-18 RX ADMIN — CARVEDILOL 25 MG: 12.5 TABLET, FILM COATED ORAL at 09:41

## 2024-11-18 RX ADMIN — INSULIN HUMAN 6 UNITS: 100 INJECTION, SUSPENSION SUBCUTANEOUS at 09:41

## 2024-11-18 RX ADMIN — BRIMONIDINE TARTRATE 1 DROP: 2 SOLUTION/ DROPS OPHTHALMIC at 21:14

## 2024-11-18 ASSESSMENT — COGNITIVE AND FUNCTIONAL STATUS - GENERAL
MOBILITY SCORE: 16
MOVING FROM LYING ON BACK TO SITTING ON SIDE OF FLAT BED WITH BEDRAILS: A LITTLE
DRESSING REGULAR UPPER BODY CLOTHING: A LITTLE
MOVING TO AND FROM BED TO CHAIR: A LITTLE
TOILETING: A LITTLE
MOVING TO AND FROM BED TO CHAIR: A LITTLE
HELP NEEDED FOR BATHING: A LITTLE
CLIMB 3 TO 5 STEPS WITH RAILING: TOTAL
CLIMB 3 TO 5 STEPS WITH RAILING: A LOT
MOBILITY SCORE: 17
DAILY ACTIVITIY SCORE: 16
STANDING UP FROM CHAIR USING ARMS: A LITTLE
DRESSING REGULAR UPPER BODY CLOTHING: A LITTLE
TURNING FROM BACK TO SIDE WHILE IN FLAT BAD: A LITTLE
WALKING IN HOSPITAL ROOM: A LITTLE
DRESSING REGULAR LOWER BODY CLOTHING: A LOT
WALKING IN HOSPITAL ROOM: A LITTLE
PERSONAL GROOMING: A LITTLE
DAILY ACTIVITIY SCORE: 19
MOVING FROM LYING ON BACK TO SITTING ON SIDE OF FLAT BED WITH BEDRAILS: A LITTLE
STANDING UP FROM CHAIR USING ARMS: A LITTLE
DRESSING REGULAR LOWER BODY CLOTHING: A LITTLE
PERSONAL GROOMING: A LITTLE
TOILETING: A LOT
HELP NEEDED FOR BATHING: A LOT
TURNING FROM BACK TO SIDE WHILE IN FLAT BAD: A LITTLE

## 2024-11-18 ASSESSMENT — ACTIVITIES OF DAILY LIVING (ADL): HOME_MANAGEMENT_TIME_ENTRY: 16

## 2024-11-18 ASSESSMENT — PAIN SCALES - GENERAL
PAINLEVEL_OUTOF10: 0 - NO PAIN

## 2024-11-18 ASSESSMENT — PAIN - FUNCTIONAL ASSESSMENT: PAIN_FUNCTIONAL_ASSESSMENT: 0-10

## 2024-11-18 NOTE — PROGRESS NOTES
Occupational Therapy    Occupational Therapy Treatment    Name: Carolann Cartagena  MRN: [id number]  Department: [facility]  Room: [address] [place] [address]  Date: 11/18/24  Time Calculation  Start Time: 1110  Stop Time: 1135  Time Calculation (min): 25 min    Assessment:  Medical Staff Made Aware: Yes  End of Session Communication: Bedside nurse  End of Session Patient Position: Bed, 3 rail up, Alarm on  Plan:  Treatment Interventions: ADL retraining, Endurance training  OT Frequency: 3 times per week  OT Discharge Recommendations: Moderate intensity level of continued care  Equipment Recommended upon Discharge: Wheeled walker  OT Recommended Transfer Status: Assist of 1  OT - OK to Discharge: Yes (per POC)    Subjective   Previous Visit Info:  OT Last Visit  OT Received On: 11/18/24  General:  General  Reason for Referral: 89 y/o female admitted with colitis, c/o nausea, vomiting and Htn.  Family/Caregiver Present: Yes  Caregiver Feedback: Daughter present  Prior to Session Communication: Bedside nurse  Patient Position Received: Bed, 3 rail up, Alarm on  Preferred Learning Style: auditory, kinesthetic, verbal  General Comment: Pt cooperative and compliant with session, slow moving with all tasks, requiring VC for encouragement to participate this day.  Precautions:  Medical Precautions: Fall precautions    Vital Signs (Past 2hrs)        Date/Time Vitals Session Patient Position Pulse Resp SpO2 BP MAP (mmHg)    11/18/24 1110 During OT  --  56  --  --  --  --                  Pain Assessment:  Pain Assessment  Pain Assessment: 0-10  0-10 (Numeric) Pain Score: 0 - No pain     Objective   Cognition:  Overall Cognitive Status: Within Functional Limits  Orientation Level: Oriented X4  Activities of Daily Living: Grooming  Grooming Level of Assistance: Close supervision, Setup, Minimum assistance  Grooming Where Assessed: Edge of bed  Grooming Comments: pt completed face washing, tooth brushing, donned deodorant, and don lotion to ALEJANDRO UE/LE's. Pt  required VC for task initiation and participation.    UE Bathing  UE Bathing Level of Assistance: Close supervision, Setup, Minimal verbal cues  UE Bathing Where Assessed: Edge of bed  UE Bathing Comments: pt required VC for participation    UE Dressing  UE Dressing Level of Assistance: Minimum assistance, Minimal verbal cues  UE Dressing Where Assessed: Edge of bed  UE Dressing Comments: assist to don/doff gown    LE Dressing  LE Dressing: Yes  Sock Level of Assistance: Moderate assistance, Moderate verbal cues  LE Dressing Where Assessed: Edge of bed  LE Dressing Comments: assist to don/doff socks with increased time and effort to complete with VC for participation and encouragement.    Functional Standing Tolerance:     Bed Mobility/Transfers: Bed Mobility  Bed Mobility: Yes  Bed Mobility 1  Bed Mobility 1: Supine to sitting, Sitting to supine  Level of Assistance 1: Contact guard  Bed Mobility Comments 1: HOB elevated; pt completed x 2 with increased effort to complete    Sitting Balance:  Dynamic Sitting Balance  Dynamic Sitting-Balance Support: Feet supported  Dynamic Sitting-Level of Assistance: Close supervision  Dynamic Sitting-Balance: Forward lean, Reaching across midline    Outcome Measures:  [facility] Daily Activity  Putting on and taking off regular lower body clothing: A lot  Bathing (including washing, rinsing, drying): A lot  Putting on and taking off regular upper body clothing: A little  Toileting, which includes using toilet, bedpan or urinal: A lot  Taking care of personal grooming such as brushing teeth: A little  Eating Meals: None  Daily Activity - Total Score: 16    Education Documentation  Body Mechanics, taught by DANNA Maria at 11/18/2024 11:54 AM.  Learner: Family, Patient  Readiness: Acceptance  Method: Explanation, Demonstration  Response: Needs Reinforcement    ADL Training, taught by DANNA Maria at 11/18/2024 11:54 AM.  Learner: Family, Patient  Readiness:  Acceptance  Method: Explanation, Demonstration  Response: Needs Reinforcement    Education Comments  No comments found.      Goals:  Encounter Problems       Encounter Problems (Active)       ADLs       Patient will perform UB and LB bathing with stand by assist level of assistance. (Progressing)       Start:  11/07/24    Expected End:  11/21/24            Patient with complete upper body dressing with independent level of assistance donning and doffing all UE clothes with no adaptive equipment while edge of bed  (Progressing)       Start:  11/07/24    Expected End:  11/21/24            Patient with complete lower body dressing with independent level of assistance donning and doffing all LE clothes  with no adaptive equipment while edge of bed  (Progressing)       Start:  11/07/24    Expected End:  11/21/24            Patient will complete daily grooming tasks brushing teeth and washing face/hair with independent level of assistance and PRN adaptive equipment while standing. (Progressing)       Start:  11/07/24    Expected End:  11/21/24            Patient will complete toileting including hygiene clothing management/hygiene with independent level of assistance and raised toilet seat. (Not Progressing)       Start:  11/07/24    Expected End:  11/21/24               BALANCE       Pt will maintain dynamic standing balance during ADL task with independent level of assistance in order to demonstrate decreased risk of falling and improved postural control. (Not Progressing)       Start:  11/07/24    Expected End:  11/21/24               TRANSFERS       Patient will perform bed mobility independent level of assistance and bed rails in order to improve safety and independence with mobility (Progressing)       Start:  11/07/24    Expected End:  11/21/24            Patient will complete functional transfer to with front wheeled walker with modified independent level of assistance. (Not Progressing)       Start:  11/07/24     Expected End:  11/21/24

## 2024-11-18 NOTE — CARE PLAN
The patient's goals for the shift include      The clinical goals for the shift include pt safety      Problem: Safety - Adult  Goal: Free from fall injury  Outcome: Progressing

## 2024-11-18 NOTE — PROGRESS NOTES
Physical Therapy    Physical Therapy Treatment    Patient Name: Carolann Cartagena  MRN: [id number]  Department: [place] [address]  Room: [address] [place] [address]  Today's Date: 11/18/2024  Time Calculation  Start Time: 0946  Stop Time: 1010  Time Calculation (min): 24 min         Assessment/Plan   PT Assessment  End of Session Communication: Bedside nurse  Assessment Comment: pt able to walk in room  End of Session Patient Position: Up in chair, Alarm on  PT Plan  Inpatient/Swing Bed or Outpatient: Inpatient  PT Plan  Treatment/Interventions: Bed mobility, Transfer training, Gait training  PT Plan: Ongoing PT  PT Frequency: 3 times per week  PT Discharge Recommendations: Moderate intensity level of continued care  PT Recommended Transfer Status: Assist x1  PT - OK to Discharge: Yes (per POC)      General Visit Information:   PT  Visit  PT Received On: 11/18/24  General  Reason for Referral: 89 y/o female admitted with colitis, c/o nausea, vomiting and Htn.  Referred By: Dr Elise  Family/Caregiver Present: Yes  Prior to Session Communication: Bedside nurse  Patient Position Received: Bed, 3 rail up, Alarm on  Preferred Learning Style: auditory, kinesthetic, verbal  General Comment: pt agreeable to tx    Subjective   Precautions:  Precautions  Medical Precautions: Fall precautions    Vital Signs (Past 2hrs)        Date/Time Vitals Session Patient Position Pulse Resp SpO2 BP MAP (mmHg)    11/18/24 0941 --  --  62  --  --  --  --     11/18/24 0946 --  --  61  --  96 %  --  --                         Objective   Pain:  Pain Assessment  0-10 (Numeric) Pain Score: 0 - No pain  Cognition:  Cognition  Overall Cognitive Status: Within Functional Limits  Coordination:     Postural Control:  Static Sitting Balance  Static Sitting-Comment/Number of Minutes: pt performed at EOB with SBA, pt performed x4 min  Static Standing Balance  Static Standing-Comment/Number of Minutes: pt performed static standing balance with UE support at RW and  CGA,  x3  min  Extremity/Trunk Assessments:    Activity Tolerance:     Treatments:  Therapeutic Exercise  Therapeutic Exercise Performed: Yes  Therapeutic Exercise Activity 1: pt performed seated ther ex with min vc/tc x15: HR DF LAQ, Marching         Bed Mobility  Bed Mobility: Yes  Bed Mobility 1  Bed Mobility 1: Supine to sitting  Level of Assistance 1: Contact guard  Bed Mobility Comments 1: HOB elevated    Ambulation/Gait Training  Ambulation/Gait Training Performed: Yes  Ambulation/Gait Training 1  Surface 1: Level tile  Device 1: Rolling walker  Gait Support Devices: Gait belt  Assistance 1: Contact guard  Comments/Distance (ft) 1: 30x2. (pt performed with step through giat pattern.  x1 short standing rest break.  no overt LOB.  increased time req)  Transfer 1  Technique 1: Sit to stand, Stand to sit  Transfer Device 1: Walker  Transfer Level of Assistance 1: Contact guard  Trials/Comments 1: vc/tc for hand placment on solid sitting surface and not RW.    Outcome Measures:  [facility] Basic Mobility  Turning from your back to your side while in a flat bed without using bedrails: A little  Moving from lying on your back to sitting on the side of a flat bed without using bedrails: A little  Moving to and from bed to chair (including a wheelchair): A little  Standing up from a chair using your arms (e.g. wheelchair or bedside chair): A little  To walk in hospital room: A little  Climbing 3-5 steps with railing: Total  Basic Mobility - Total Score: 16    Education Documentation  Body Mechanics, taught by Vincent Asif PTA at 11/18/2024 10:56 AM.  Learner: Patient  Readiness: Acceptance  Method: Explanation  Response: Verbalizes Understanding    Mobility Training, taught by Vincent Asif PTA at 11/18/2024 10:56 AM.  Learner: Patient  Readiness: Acceptance  Method: Explanation  Response: Verbalizes Understanding    Education Comments  No comments found.        OP EDUCATION:  Outpatient Education  Education Comment:  educated pt on importance of OOB activity    Encounter Problems       Encounter Problems (Active)       Balance       complete all mobility with normal balance while dual tasking, negotiating in a dynamic environment, carrying items, etc., with proactive and reactive static and dynamic standing and sitting tasks, with mod I and RW >15 minutes.         Start:  11/07/24    Expected End:  11/21/24               Mobility       STG - Patient will ambulate 150 ft mod I with a RW.  (Progressing)       Start:  11/07/24    Expected End:  11/21/24            Pt will tolerate 15 reps of each LE exercise in order to improve strength and endurance.   (Progressing)       Start:  11/07/24    Expected End:  11/21/24               PT Transfers       STG - Patient will perform bed mobility independently.  (Progressing)       Start:  11/07/24    Expected End:  11/21/24            STG - Patient will transfer sit to and from stand mod I using RW.  (Progressing)       Start:  11/07/24    Expected End:  11/21/24               Pain - Adult

## 2024-11-18 NOTE — PROGRESS NOTES
11/18/24 1216   Discharge Planning   Expected Discharge Disposition SNF     Renal biopsy results pending. [facility] need [facility].

## 2024-11-18 NOTE — PROGRESS NOTES
Carolann Cartagena is a 89 y.o. female on day 12 of admission presenting with Colitis.      Subjective   Patient was seen, examined and evaluated in her room she has no new complaints or new problems overnight.       Objective        Vitals 24HR  Heart Rate:  [49-62]   Temp:  [36.3 °C (97.3 °F)-37.1 °C (98.7 °F)]   Resp:  [16]   BP: (128-173)/(50-66)   SpO2:  [95 %-100 %]     Intake/Output last 3 Shifts:    Intake/Output Summary (Last 24 hours) at 11/18/2024 1259  Last data filed at 11/18/2024 1030  Gross per 24 hour   Intake 240 ml   Output 201 ml   Net 39 ml       Physical Exam        General appearance: Awake alert no distress  Head and ENT: Normocephalic/atraumatic/supple neck/no JVD  Lungs: CTA  Heart: RRR  Abdomen: Soft no tenderness, no organomegaly        Extremities pitting thigh edema  Neurologic: Physiologic          Relevant Results        Results from last 7 days   Lab Units 11/18/24  0543 11/17/24  0644 11/16/24  0555   SODIUM mmol/L 128* 128* 128*   POTASSIUM mmol/L 4.8 5.3 6.0*   CHLORIDE mmol/L 100 99 99   CO2 mmol/L 21 21 21   BUN mg/dL 91* 94* 93*   CREATININE mg/dL 6.06* 6.07* 6.21*   GLUCOSE mg/dL 95 105* 81   CALCIUM mg/dL 7.5* 7.1* 7.9*        Current Facility-Administered Medications:     acetaminophen (Tylenol) tablet 650 mg, 650 mg, oral, q4h PRN, 650 mg at 11/17/24 1620 **OR** acetaminophen (Tylenol) oral liquid 650 mg, 650 mg, oral, q4h PRN **OR** acetaminophen (Tylenol) suppository 650 mg, 650 mg, rectal, q4h PRN, ERICA Mena    [Held by provider] aspirin EC tablet 81 mg, 81 mg, oral, Daily, BASILIA Mena-CNP, 81 mg at 11/12/24 0914    atorvastatin (Lipitor) tablet 10 mg, 10 mg, oral, Nightly, BASILIA Mena-CNP, 10 mg at 11/17/24 2135    brimonidine (AlphaGAN) 0.2 % ophthalmic solution 1 drop, 1 drop, Both Eyes, BID, ERICA Mena, 1 drop at 11/18/24 1052    carvedilol (Coreg) tablet 25 mg, 25 mg, oral, BID, ERICA Mena, 25 mg at 11/18/24 2739     dextrose 50 % injection 12.5 g, 12.5 g, intravenous, q15 min PRN, Dangelo Mcgregor, APRN-CNP, 12.5 g at 11/13/24 0735    dextrose 50 % injection 25 g, 25 g, intravenous, q15 min PRN, Dangelo Mcgregor, APRN-CNP    doxazosin (Cardura) tablet 4 mg, 4 mg, oral, Daily, Pascual Bhakta DO, 4 mg at 11/18/24 0942    glucagon (Glucagen) injection 1 mg, 1 mg, intramuscular, q15 min PRN, Dangelo Mcgregor, APRN-CNP    glucagon (Glucagen) injection 1 mg, 1 mg, intramuscular, q15 min PRN, Dangelo Mcgregor, APRN-CNP    heparin (porcine) injection 5,000 Units, 5,000 Units, subcutaneous, q8h TIP, Dangelo Mcgregor, APRN-CNP, 5,000 Units at 11/18/24 0941    hydrALAZINE (Apresoline) injection 5 mg, 5 mg, intravenous, q6h PRN, Dangelo Mcgregor, APRN-CNP, 5 mg at 11/08/24 0507    [Held by provider] hydrALAZINE (Apresoline) tablet 100 mg, 100 mg, oral, TID, Julianne Elise MD, 100 mg at 11/11/24 0829    insulin NPH (Isophane) (HumuLIN N,NovoLIN N) injection 6 Units, 6 Units, subcutaneous, BID AC, Emeterio Alves DO, 6 Units at 11/18/24 0941    latanoprost (Xalatan) 0.005 % ophthalmic solution 1 drop, 1 drop, Both Eyes, Nightly, Dangelo Mcgregor, APRN-CNP, 1 drop at 11/17/24 2136    NIFEdipine ER (Adalat CC) 24 hr tablet 90 mg, 90 mg, oral, Daily before breakfast, Pascual Bhakta DO, 90 mg at 11/18/24 0941    ondansetron (Zofran) injection 4 mg, 4 mg, intravenous, q6h PRN, Dangelo Mcgregor, APRN-CNP, 4 mg at 11/14/24 1652    sennosides-docusate sodium (Steph-Colace) 8.6-50 mg per tablet 1 tablet, 1 tablet, oral, Nightly PRN, ERICA Mena    sodium bicarbonate tablet 650 mg, 650 mg, oral, BID, ERICA Mena, 650 mg at 11/18/24 0941    sodium zirconium cyclosilicate (Lokelma) packet 10 g, 10 g, oral, TID, Suly Camilo MD, 10 g at 11/18/24 0942    [Held by provider] spironolactone (Aldactone) tablet 25 mg, 25 mg, oral, q24h TIP, ERICA Mena, 25 mg at 11/06/24 2111    timolol (Timoptic) 0.5 % ophthalmic solution 1 drop, 1 drop,  Both Eyes, Daily, Dangelo Mcgregor, APRN-CNP, 1 drop at 11/18/24 1053           Assessment/Plan        1.  Colitis  2.  OLGA LIDIA on top of CKD stage IV, with ANCA positive  Will continue Lokelma and IV fluids initiation  Will follow kidney biopsy results   BUN/creatinine= 91/6.06, stable c/w last few days  No signs or symptoms of uremia  Pending results from the kidney biopsy  3.  Hypertension continue current management  4.  Hyponatremia, restrict p.o. free water intake to 1 L per 24 hours     Pending results of the kidney biopsy that was done last week to make a plans of future management and through discussion with the patient and the family at the same time.        Will continue reviewing patient daily and labs and other consultants input         Principal Problem:    Colitis              I spent  35minutes in the professional and overall care of this patient.      Suly Camilo MD

## 2024-11-19 LAB
ANION GAP SERPL CALC-SCNC: 13 MMOL/L (ref 10–20)
BUN SERPL-MCNC: 88 MG/DL (ref 6–23)
CALCIUM SERPL-MCNC: 7.3 MG/DL (ref 8.6–10.3)
CHLORIDE SERPL-SCNC: 99 MMOL/L (ref 98–107)
CO2 SERPL-SCNC: 21 MMOL/L (ref 21–32)
CREAT SERPL-MCNC: 6.12 MG/DL (ref 0.5–1.05)
EGFRCR SERPLBLD CKD-EPI 2021: 6 ML/MIN/1.73M*2
GLUCOSE BLD MANUAL STRIP-MCNC: 131 MG/DL (ref 74–99)
GLUCOSE BLD MANUAL STRIP-MCNC: 99 MG/DL (ref 74–99)
GLUCOSE SERPL-MCNC: 92 MG/DL (ref 74–99)
POTASSIUM SERPL-SCNC: 4.9 MMOL/L (ref 3.5–5.3)
SODIUM SERPL-SCNC: 128 MMOL/L (ref 136–145)

## 2024-11-19 PROCEDURE — 2500000002 HC RX 250 W HCPCS SELF ADMINISTERED DRUGS (ALT 637 FOR MEDICARE OP, ALT 636 FOR OP/ED): Performed by: INTERNAL MEDICINE

## 2024-11-19 PROCEDURE — 99232 SBSQ HOSP IP/OBS MODERATE 35: CPT | Performed by: INTERNAL MEDICINE

## 2024-11-19 PROCEDURE — 2500000001 HC RX 250 WO HCPCS SELF ADMINISTERED DRUGS (ALT 637 FOR MEDICARE OP): Performed by: NURSE PRACTITIONER

## 2024-11-19 PROCEDURE — 2500000001 HC RX 250 WO HCPCS SELF ADMINISTERED DRUGS (ALT 637 FOR MEDICARE OP): Performed by: INTERNAL MEDICINE

## 2024-11-19 PROCEDURE — 36415 COLL VENOUS BLD VENIPUNCTURE: CPT | Performed by: INTERNAL MEDICINE

## 2024-11-19 PROCEDURE — 80048 BASIC METABOLIC PNL TOTAL CA: CPT | Performed by: INTERNAL MEDICINE

## 2024-11-19 PROCEDURE — 2500000004 HC RX 250 GENERAL PHARMACY W/ HCPCS (ALT 636 FOR OP/ED): Performed by: NURSE PRACTITIONER

## 2024-11-19 PROCEDURE — 82947 ASSAY GLUCOSE BLOOD QUANT: CPT

## 2024-11-19 PROCEDURE — 1100000001 HC PRIVATE ROOM DAILY

## 2024-11-19 RX ADMIN — NIFEDIPINE 90 MG: 30 TABLET, FILM COATED, EXTENDED RELEASE ORAL at 06:50

## 2024-11-19 RX ADMIN — BRIMONIDINE TARTRATE 1 DROP: 2 SOLUTION/ DROPS OPHTHALMIC at 20:36

## 2024-11-19 RX ADMIN — INSULIN HUMAN 6 UNITS: 100 INJECTION, SUSPENSION SUBCUTANEOUS at 18:23

## 2024-11-19 RX ADMIN — TIMOLOL MALEATE 1 DROP: 5 SOLUTION/ DROPS OPHTHALMIC at 09:35

## 2024-11-19 RX ADMIN — SODIUM ZIRCONIUM CYCLOSILICATE 10 G: 10 POWDER, FOR SUSPENSION ORAL at 09:35

## 2024-11-19 RX ADMIN — CARVEDILOL 25 MG: 12.5 TABLET, FILM COATED ORAL at 20:35

## 2024-11-19 RX ADMIN — SODIUM BICARBONATE 650 MG: 650 TABLET ORAL at 09:35

## 2024-11-19 RX ADMIN — CARVEDILOL 25 MG: 12.5 TABLET, FILM COATED ORAL at 09:34

## 2024-11-19 RX ADMIN — ATORVASTATIN CALCIUM 10 MG: 20 TABLET ORAL at 20:35

## 2024-11-19 RX ADMIN — ACETAMINOPHEN 650 MG: 325 TABLET, FILM COATED ORAL at 15:39

## 2024-11-19 RX ADMIN — DOXAZOSIN 4 MG: 2 TABLET ORAL at 09:35

## 2024-11-19 RX ADMIN — HEPARIN SODIUM 5000 UNITS: 5000 INJECTION INTRAVENOUS; SUBCUTANEOUS at 09:36

## 2024-11-19 RX ADMIN — LATANOPROST 1 DROP: 50 SOLUTION/ DROPS OPHTHALMIC at 20:37

## 2024-11-19 RX ADMIN — BRIMONIDINE TARTRATE 1 DROP: 2 SOLUTION/ DROPS OPHTHALMIC at 09:35

## 2024-11-19 RX ADMIN — INSULIN HUMAN 6 UNITS: 100 INJECTION, SUSPENSION SUBCUTANEOUS at 09:36

## 2024-11-19 RX ADMIN — ONDANSETRON 4 MG: 2 INJECTION, SOLUTION INTRAMUSCULAR; INTRAVENOUS at 12:41

## 2024-11-19 RX ADMIN — HEPARIN SODIUM 5000 UNITS: 5000 INJECTION INTRAVENOUS; SUBCUTANEOUS at 18:23

## 2024-11-19 ASSESSMENT — PAIN SCALES - WONG BAKER
WONGBAKER_NUMERICALRESPONSE: NO HURT

## 2024-11-19 ASSESSMENT — PAIN - FUNCTIONAL ASSESSMENT
PAIN_FUNCTIONAL_ASSESSMENT: 0-10

## 2024-11-19 ASSESSMENT — COGNITIVE AND FUNCTIONAL STATUS - GENERAL
STANDING UP FROM CHAIR USING ARMS: A LITTLE
MOVING TO AND FROM BED TO CHAIR: A LITTLE
CLIMB 3 TO 5 STEPS WITH RAILING: A LOT
DRESSING REGULAR LOWER BODY CLOTHING: A LOT
PERSONAL GROOMING: A LITTLE
DRESSING REGULAR UPPER BODY CLOTHING: A LITTLE
MOVING FROM LYING ON BACK TO SITTING ON SIDE OF FLAT BED WITH BEDRAILS: A LITTLE
WALKING IN HOSPITAL ROOM: A LOT
HELP NEEDED FOR BATHING: A LITTLE
TURNING FROM BACK TO SIDE WHILE IN FLAT BAD: A LITTLE
MOBILITY SCORE: 16
DAILY ACTIVITIY SCORE: 18
TOILETING: A LITTLE

## 2024-11-19 ASSESSMENT — PAIN SCALES - GENERAL
PAINLEVEL_OUTOF10: 0 - NO PAIN
PAINLEVEL_OUTOF10: 3
PAINLEVEL_OUTOF10: 0 - NO PAIN
PAINLEVEL_OUTOF10: 0 - NO PAIN

## 2024-11-19 NOTE — CARE PLAN
The patient's goals for the shift include      The clinical goals for the shift include patient will remain safe and free from falls this shift.    Over the shift, the patient did make progress toward the following goals.      Problem: Pain - Adult  Goal: Verbalizes/displays adequate comfort level or baseline comfort level  Outcome: Progressing     Problem: Safety - Adult  Goal: Free from fall injury  Outcome: Progressing     Problem: Discharge Planning  Goal: Discharge to home or other facility with appropriate resources  Outcome: Progressing     Problem: Chronic Conditions and Co-morbidities  Goal: Patient's chronic conditions and co-morbidity symptoms are monitored and maintained or improved  Outcome: Progressing     Problem: Diabetes  Goal: Achieve decreasing blood glucose levels by end of shift  Outcome: Progressing  Goal: Increase stability of blood glucose readings by end of shift  Outcome: Progressing  Goal: Decrease in ketones present in urine by end of shift  Outcome: Progressing  Goal: Maintain electrolyte levels within acceptable range throughout shift  Outcome: Progressing  Goal: Maintain glucose levels >70mg/dl to <250mg/dl throughout shift  Outcome: Progressing  Goal: No changes in neurological exam by end of shift  Outcome: Progressing  Goal: Learn about and adhere to nutrition recommendations by end of shift  Outcome: Progressing  Goal: Vital signs within normal range for age by end of shift  Outcome: Progressing  Goal: Increase self care and/or family involovement by end of shift  Outcome: Progressing  Goal: Receive DSME education by end of shift  Outcome: Progressing     Problem: Skin  Goal: Decreased wound size/increased tissue granulation at next dressing change  Outcome: Progressing  Goal: Participates in plan/prevention/treatment measures  Outcome: Progressing  Goal: Prevent/manage excess moisture  Outcome: Progressing  Goal: Prevent/minimize sheer/friction injuries  Outcome:  Progressing  Goal: Promote/optimize nutrition  Outcome: Progressing  Goal: Promote skin healing  Outcome: Progressing

## 2024-11-19 NOTE — CARE PLAN
The patient's goals for the shift include      The clinical goals for the shift include pt will remain free from injury this shift      Problem: Pain - Adult  Goal: Verbalizes/displays adequate comfort level or baseline comfort level  Outcome: Progressing     Problem: Safety - Adult  Goal: Free from fall injury  Outcome: Progressing     Problem: Skin  Goal: Decreased wound size/increased tissue granulation at next dressing change  Outcome: Progressing  Flowsheets (Taken 11/18/2024 2232)  Decreased wound size/increased tissue granulation at next dressing change: Protective dressings over bony prominences  Goal: Participates in plan/prevention/treatment measures  Outcome: Progressing  Flowsheets (Taken 11/18/2024 2232)  Participates in plan/prevention/treatment measures: Elevate heels  Goal: Prevent/manage excess moisture  Outcome: Progressing  Flowsheets (Taken 11/18/2024 2232)  Prevent/manage excess moisture: Moisturize dry skin  Goal: Prevent/minimize sheer/friction injuries  Outcome: Progressing  Flowsheets (Taken 11/18/2024 2232)  Prevent/minimize sheer/friction injuries:   HOB 30 degrees or less   Turn/reposition every 2 hours/use positioning/transfer devices   Use pull sheet  Goal: Promote/optimize nutrition  Outcome: Progressing  Flowsheets (Taken 11/18/2024 2232)  Promote/optimize nutrition: Discuss with provider if NPO > 2 days  Goal: Promote skin healing  Outcome: Progressing  Flowsheets (Taken 11/18/2024 2232)  Promote skin healing:   Assess skin/pad under line(s)/device(s)   Protective dressings over bony prominences   Turn/reposition every 2 hours/use positioning/transfer devices

## 2024-11-19 NOTE — PROGRESS NOTES
Carolann Cartagena is a 89 y.o. female on day 13 of admission presenting with Colitis.      Subjective   Overall stable no new complaints       Objective        Vitals 24HR  Heart Rate:  [50-60]   Temp:  [36.2 °C (97.1 °F)-36.9 °C (98.4 °F)]   Resp:  [14-18]   BP: (117-179)/(43-63)   SpO2:  [95 %-99 %]     Intake/Output last 3 Shifts:    Intake/Output Summary (Last 24 hours) at 11/19/2024 1241  Last data filed at 11/19/2024 1000  Gross per 24 hour   Intake 640 ml   Output --   Net 640 ml       Physical Exam          General appearance: Awake alert no distress  Head and ENT: Normocephalic/atraumatic/supple neck/no JVD  Lungs: CTA  Heart: RRR  Abdomen: Soft no tenderness, no organomegaly        Extremities pitting thigh edema  Neurologic: Physiologic       Relevant Results        Results from last 7 days   Lab Units 11/19/24  0705 11/18/24  0543 11/17/24  0644   SODIUM mmol/L 128* 128* 128*   POTASSIUM mmol/L 4.9 4.8 5.3   CHLORIDE mmol/L 99 100 99   CO2 mmol/L 21 21 21   BUN mg/dL 88* 91* 94*   CREATININE mg/dL 6.12* 6.06* 6.07*   GLUCOSE mg/dL 92 95 105*   CALCIUM mg/dL 7.3* 7.5* 7.1*        Current Facility-Administered Medications:     acetaminophen (Tylenol) tablet 650 mg, 650 mg, oral, q4h PRN, 650 mg at 11/17/24 1620 **OR** acetaminophen (Tylenol) oral liquid 650 mg, 650 mg, oral, q4h PRN **OR** acetaminophen (Tylenol) suppository 650 mg, 650 mg, rectal, q4h PRN, ERICA Mena    [Held by provider] aspirin EC tablet 81 mg, 81 mg, oral, Daily, ERICA Mena, 81 mg at 11/12/24 0914    atorvastatin (Lipitor) tablet 10 mg, 10 mg, oral, Nightly, BASILIA Mena-CNP, 10 mg at 11/18/24 2114    brimonidine (AlphaGAN) 0.2 % ophthalmic solution 1 drop, 1 drop, Both Eyes, BID, ERICA Mena, 1 drop at 11/19/24 0935    carvedilol (Coreg) tablet 25 mg, 25 mg, oral, BID, ERICA Mena, 25 mg at 11/19/24 0934    dextrose 50 % injection 12.5 g, 12.5 g, intravenous, q15 min PRN, Dangelo REED  Mcgregor, APRN-CNP, 12.5 g at 11/13/24 0735    dextrose 50 % injection 25 g, 25 g, intravenous, q15 min PRN, BASILIA Mena-CNP    doxazosin (Cardura) tablet 4 mg, 4 mg, oral, Daily, Pascual Bhakta, DO, 4 mg at 11/19/24 0935    glucagon (Glucagen) injection 1 mg, 1 mg, intramuscular, q15 min PRN, BASILIA Mena-CNP    glucagon (Glucagen) injection 1 mg, 1 mg, intramuscular, q15 min PRN, BASILIA Mena-CNP    heparin (porcine) injection 5,000 Units, 5,000 Units, subcutaneous, q8h TIP, BASILIA Mena-CNP, 5,000 Units at 11/19/24 0936    hydrALAZINE (Apresoline) injection 5 mg, 5 mg, intravenous, q6h PRN, BASILIA Mena-CNP, 5 mg at 11/08/24 0507    [Held by provider] hydrALAZINE (Apresoline) tablet 100 mg, 100 mg, oral, TID, Julianne Elise MD, 100 mg at 11/11/24 0829    insulin NPH (Isophane) (HumuLIN N,NovoLIN N) injection 6 Units, 6 Units, subcutaneous, BID AC, Emeterio Alevs DO, 6 Units at 11/19/24 0936    latanoprost (Xalatan) 0.005 % ophthalmic solution 1 drop, 1 drop, Both Eyes, Nightly, ERICA Mena, 1 drop at 11/18/24 2114    NIFEdipine ER (Adalat CC) 24 hr tablet 90 mg, 90 mg, oral, Daily before breakfast, Pascual Bhakta, DO, 90 mg at 11/19/24 0650    ondansetron (Zofran) injection 4 mg, 4 mg, intravenous, q6h PRN, BASILIA Mena-CNP, 4 mg at 11/19/24 1241    sennosides-docusate sodium (Steph-Colace) 8.6-50 mg per tablet 1 tablet, 1 tablet, oral, Nightly PRN, ERICA Mena    sodium bicarbonate tablet 650 mg, 650 mg, oral, BID, ERICA Mena, 650 mg at 11/19/24 0935    sodium zirconium cyclosilicate (Lokelma) packet 10 g, 10 g, oral, TID, Suly Camilo MD, 10 g at 11/19/24 0935    [Held by provider] spironolactone (Aldactone) tablet 25 mg, 25 mg, oral, q24h TIP, ERICA Mena, 25 mg at 11/06/24 2111    timolol (Timoptic) 0.5 % ophthalmic solution 1 drop, 1 drop, Both Eyes, Daily, ERICA Mena, 1 drop at 11/19/24 0935            Assessment/Plan        1.  Colitis  2.  OLGA LIDIA on top of CKD stage IV, with ANCA positive  Will continue Lokelma and IV fluids initiation  Will follow kidney biopsy results   BUN/creatinine= 88/6.12, stable c/w last few days  No signs or symptoms of uremia  Pending results from the kidney biopsy  3.  Hypertension continue current management  4.  Hyponatremia, restrict p.o. free water intake to 1 L per 24 hours     Pending results of the kidney biopsy that was done last week to make a plans of future management and through discussion with the patient and the family at the same time.        Will continue reviewing patient daily and labs and other consultants input     Agree with evaluation of Dr. Emeterio Alves, as far as patient's condition she may be a very poor candidate for dialysis initiation this should be discussed with the family medicine conclusions probably she will better off to be on palliative care.            Principal Problem:    Colitis              I spent 35 minutes in the professional and overall care of this patient.      Suly Camilo MD

## 2024-11-19 NOTE — PROGRESS NOTES
Report received from nightshift RN. Patient awake and alert in bed, oriented x4 but [place], with no complaints of pain or discomfort at this time. High falls risk, red socks on patient, bed alarm on and confirmed as working. Vital signs reviewed. Labs and orders reviewed. Will assume care of patient.

## 2024-11-19 NOTE — PROGRESS NOTES
"Carolann Cartagena is a 89 y.o. female on day 13 of admission presenting with Colitis.    Subjective   No acute events overnight.        Objective     VITALS  Blood pressure 154/55, pulse 59, temperature 36.5 °C (97.7 °F), temperature source Temporal, resp. rate 16, height 1.626 m (5' 4\"), weight 72.6 kg (160 lb), SpO2 98%.  Physical Exam  Vitals reviewed.   Constitutional:       Appearance: Normal appearance.   HENT:      Head: Normocephalic.      Nose: Nose normal.   Cardiovascular:      Rate and Rhythm: Normal rate and regular rhythm.      Pulses: Normal pulses.      Heart sounds: Normal heart sounds.   Abdominal:      General: Abdomen is flat. Bowel sounds are normal.      Palpations: Abdomen is soft.   Skin:     General: Skin is warm and dry.      Capillary Refill: Capillary refill takes less than 2 seconds.   Neurological:      General: No focal deficit present.      Mental Status: She is alert and oriented to person, place, and time.           Intake/Output last 3 Shifts:  I/O last 3 completed shifts:  In: 700 (9.6 mL/kg) [P.O.:700]  Out: 201 (2.8 mL/kg) [Urine:200 (0.1 mL/kg/hr); Stool:1]  Weight: 72.6 kg     Relevant Results  Results for orders placed or performed during the hospital encounter of 11/06/24 (from the past 24 hours)   POCT GLUCOSE   Result Value Ref Range    POCT Glucose 143 (H) 74 - 99 mg/dL   POCT GLUCOSE   Result Value Ref Range    POCT Glucose 140 (H) 74 - 99 mg/dL   Basic Metabolic Panel   Result Value Ref Range    Glucose 92 74 - 99 mg/dL    Sodium 128 (L) 136 - 145 mmol/L    Potassium 4.9 3.5 - 5.3 mmol/L    Chloride 99 98 - 107 mmol/L    Bicarbonate 21 21 - 32 mmol/L    Anion Gap 13 10 - 20 mmol/L    Urea Nitrogen 88 (H) 6 - 23 mg/dL    Creatinine 6.12 (H) 0.50 - 1.05 mg/dL    eGFR 6 (L) >60 mL/min/1.73m*2    Calcium 7.3 (L) 8.6 - 10.3 mg/dL   POCT GLUCOSE   Result Value Ref Range    POCT Glucose 99 74 - 99 mg/dL   POCT GLUCOSE   Result Value Ref Range    POCT Glucose 131 (H) 74 - 99 mg/dL "       Imaging Results  US guided percutaneous biopsy renal left   Final Result   Ultrasound-guided percutaneous non targeted/random renal biopsy, 3 x   18 gauge core needle biopsy specimens obtained of the inferior left   renal subcortical parenchyma.             MACRO:   None        Signed by: Devon Teixeira 11/15/2024 4:52 PM   Dictation workstation:   [id number]      XR chest 2 views   Final Result   Findings suspicious for ongoing CHF.        Left pleural effusion.        Hazy opacities in the lungs, most pronounced at the posteromedial   left lung base. Atelectasis versus pneumonia versus  asymmetric   pulmonary edema        MACRO:   None        Signed by: Adam Javier 11/11/2024 2:58 PM   Dictation workstation:   [id number]      CT head wo IV contrast   Final Result   No acute intracranial hemorrhage or mass-effect.        MACRO:   None.        Signed by: Loly Albarran 11/6/2024 1:44 PM   Dictation workstation:   [id number]      CT abdomen pelvis wo IV contrast   Final Result   Right colon wall thickening consistent with nonspecific colitis.        Small amount of free fluid with mild edematous changes in the lung   bases, mesentery and soft tissues as well as trace pleural   effusions/thickening consistent with fluid overload/CHF.        Subtle density layer in the gallbladder, possible layering   noncalcified stones or sludge.        Additional findings as described above.        MACRO:   None.        Signed by: Loly Albarran 11/6/2024 1:52 PM   Dictation workstation:   [id number]      XR chest 1 view   Final Result   No acute cardiopulmonary process.   Enlarged but unchanged heart compared to 06/12/2024.        MACRO:   None        Signed by: Yokasta Jackson 11/6/2024 11:42 AM   Dictation workstation:   [id number]          Medications:  [Held by provider] aspirin, 81 mg, oral, Daily  atorvastatin, 10 mg, oral, Nightly  brimonidine, 1 drop, Both Eyes, BID  carvedilol, 25 mg, oral, BID  doxazosin,  4 mg, oral, Daily  heparin (porcine), 5,000 Units, subcutaneous, q8h TIP  [Held by provider] hydrALAZINE, 100 mg, oral, TID  insulin NPH (Isophane), 6 Units, subcutaneous, BID AC  latanoprost, 1 drop, Both Eyes, Nightly  NIFEdipine ER, 90 mg, oral, Daily before breakfast  sodium bicarbonate, 650 mg, oral, BID  sodium zirconium cyclosilicate, 10 g, oral, TID  [Held by provider] spironolactone, 25 mg, oral, q24h TIP  timolol, 1 drop, Both Eyes, Daily       PRN medications: acetaminophen **OR** acetaminophen **OR** acetaminophen, dextrose, dextrose, glucagon, glucagon, hydrALAZINE, ondansetron, sennosides-docusate sodium        Assessment/Plan          Principal Problem:    Colitis    1.  Accelerated hypertension resolved   -Improved pressures overall, nephro would like SBP <140  -Coreg, held for bradycardia   -Doxazosin  -Nifedipine      2.  Acute kidney injury on CKD 4  -Creatinine continues to be stable today   -Appreciate nephro recs, awaiting renal biopsy results.   -Concern she will continue to worsen to the point that she needs dialysis though at her age it is unclear if she would want this.      3. DM  -continue SSI and NPH     4. Fever  -one episode early in admission  -check flu swab/bcx/ua/cxr    5. Hyperkalemia  - Resolved, will keep a close eye on her labs as she has high risk of this recurring while kidneys are under preforming   -Monitor on tele for arrhythmias     DVT Prophylaxis:  Heparin subq    Disposition:  Awaiting renal biopsy results. Will ask [facility]'s to start auth today.     Emeterio Alves, [facility]

## 2024-11-19 NOTE — PROGRESS NOTES
11/19/24 1305   Discharge Planning   Expected Discharge Disposition SNF     We have auth [facility]. [facility] van tomorrow.

## 2024-11-20 LAB
ANION GAP SERPL CALC-SCNC: 14 MMOL/L (ref 10–20)
BUN SERPL-MCNC: 89 MG/DL (ref 6–23)
CALCIUM SERPL-MCNC: 7.6 MG/DL (ref 8.6–10.3)
CHLORIDE SERPL-SCNC: 97 MMOL/L (ref 98–107)
CO2 SERPL-SCNC: 21 MMOL/L (ref 21–32)
CREAT SERPL-MCNC: 6.14 MG/DL (ref 0.5–1.05)
EGFRCR SERPLBLD CKD-EPI 2021: 6 ML/MIN/1.73M*2
GLUCOSE BLD MANUAL STRIP-MCNC: 127 MG/DL (ref 74–99)
GLUCOSE BLD MANUAL STRIP-MCNC: 78 MG/DL (ref 74–99)
GLUCOSE BLD MANUAL STRIP-MCNC: 79 MG/DL (ref 74–99)
GLUCOSE SERPL-MCNC: 61 MG/DL (ref 74–99)
HOLD SPECIMEN: NORMAL
HOLD SPECIMEN: NORMAL
POTASSIUM SERPL-SCNC: 4.9 MMOL/L (ref 3.5–5.3)
SODIUM SERPL-SCNC: 127 MMOL/L (ref 136–145)

## 2024-11-20 PROCEDURE — 2500000004 HC RX 250 GENERAL PHARMACY W/ HCPCS (ALT 636 FOR OP/ED): Performed by: NURSE PRACTITIONER

## 2024-11-20 PROCEDURE — 99233 SBSQ HOSP IP/OBS HIGH 50: CPT | Performed by: INTERNAL MEDICINE

## 2024-11-20 PROCEDURE — 97110 THERAPEUTIC EXERCISES: CPT | Mod: GP,CQ

## 2024-11-20 PROCEDURE — 36415 COLL VENOUS BLD VENIPUNCTURE: CPT | Performed by: INTERNAL MEDICINE

## 2024-11-20 PROCEDURE — 82947 ASSAY GLUCOSE BLOOD QUANT: CPT

## 2024-11-20 PROCEDURE — 99223 1ST HOSP IP/OBS HIGH 75: CPT | Performed by: NURSE PRACTITIONER

## 2024-11-20 PROCEDURE — 2500000001 HC RX 250 WO HCPCS SELF ADMINISTERED DRUGS (ALT 637 FOR MEDICARE OP): Performed by: NURSE PRACTITIONER

## 2024-11-20 PROCEDURE — 82374 ASSAY BLOOD CARBON DIOXIDE: CPT | Performed by: INTERNAL MEDICINE

## 2024-11-20 PROCEDURE — 97116 GAIT TRAINING THERAPY: CPT | Mod: GP,CQ

## 2024-11-20 PROCEDURE — 1100000001 HC PRIVATE ROOM DAILY

## 2024-11-20 PROCEDURE — 2500000002 HC RX 250 W HCPCS SELF ADMINISTERED DRUGS (ALT 637 FOR MEDICARE OP, ALT 636 FOR OP/ED): Performed by: INTERNAL MEDICINE

## 2024-11-20 PROCEDURE — 2500000001 HC RX 250 WO HCPCS SELF ADMINISTERED DRUGS (ALT 637 FOR MEDICARE OP): Performed by: INTERNAL MEDICINE

## 2024-11-20 RX ADMIN — ATORVASTATIN CALCIUM 10 MG: 20 TABLET ORAL at 21:26

## 2024-11-20 RX ADMIN — DOXAZOSIN 4 MG: 2 TABLET ORAL at 09:18

## 2024-11-20 RX ADMIN — CARVEDILOL 25 MG: 12.5 TABLET, FILM COATED ORAL at 21:26

## 2024-11-20 RX ADMIN — HEPARIN SODIUM 5000 UNITS: 5000 INJECTION INTRAVENOUS; SUBCUTANEOUS at 18:44

## 2024-11-20 RX ADMIN — TIMOLOL MALEATE 1 DROP: 5 SOLUTION/ DROPS OPHTHALMIC at 09:23

## 2024-11-20 RX ADMIN — ONDANSETRON 4 MG: 2 INJECTION, SOLUTION INTRAMUSCULAR; INTRAVENOUS at 09:23

## 2024-11-20 RX ADMIN — ACETAMINOPHEN 650 MG: 325 TABLET, FILM COATED ORAL at 09:23

## 2024-11-20 RX ADMIN — HEPARIN SODIUM 5000 UNITS: 5000 INJECTION INTRAVENOUS; SUBCUTANEOUS at 09:18

## 2024-11-20 RX ADMIN — HEPARIN SODIUM 5000 UNITS: 5000 INJECTION INTRAVENOUS; SUBCUTANEOUS at 01:18

## 2024-11-20 RX ADMIN — ACETAMINOPHEN 650 MG: 325 TABLET, FILM COATED ORAL at 16:10

## 2024-11-20 RX ADMIN — BRIMONIDINE TARTRATE 1 DROP: 2 SOLUTION/ DROPS OPHTHALMIC at 09:23

## 2024-11-20 RX ADMIN — INSULIN HUMAN 6 UNITS: 100 INJECTION, SUSPENSION SUBCUTANEOUS at 18:44

## 2024-11-20 RX ADMIN — SODIUM BICARBONATE 650 MG: 650 TABLET ORAL at 09:18

## 2024-11-20 RX ADMIN — LATANOPROST 1 DROP: 50 SOLUTION/ DROPS OPHTHALMIC at 21:30

## 2024-11-20 RX ADMIN — INSULIN HUMAN 6 UNITS: 100 INJECTION, SUSPENSION SUBCUTANEOUS at 09:23

## 2024-11-20 RX ADMIN — BRIMONIDINE TARTRATE 1 DROP: 2 SOLUTION/ DROPS OPHTHALMIC at 21:30

## 2024-11-20 RX ADMIN — SODIUM BICARBONATE 650 MG: 650 TABLET ORAL at 21:26

## 2024-11-20 ASSESSMENT — ENCOUNTER SYMPTOMS
HEMATOLOGIC/LYMPHATIC NEGATIVE: 1
RESPIRATORY NEGATIVE: 1
PSYCHIATRIC NEGATIVE: 1
ENDOCRINE NEGATIVE: 1
MUSCULOSKELETAL NEGATIVE: 1
CONSTITUTIONAL NEGATIVE: 1
NEUROLOGICAL NEGATIVE: 1
GASTROINTESTINAL NEGATIVE: 1
ALLERGIC/IMMUNOLOGIC NEGATIVE: 1
EYES NEGATIVE: 1

## 2024-11-20 ASSESSMENT — PAIN - FUNCTIONAL ASSESSMENT: PAIN_FUNCTIONAL_ASSESSMENT: 0-10

## 2024-11-20 ASSESSMENT — COGNITIVE AND FUNCTIONAL STATUS - GENERAL
STANDING UP FROM CHAIR USING ARMS: A LITTLE
MOVING TO AND FROM BED TO CHAIR: A LITTLE
DRESSING REGULAR UPPER BODY CLOTHING: A LITTLE
MOBILITY SCORE: 16
DRESSING REGULAR LOWER BODY CLOTHING: A LOT
MOVING FROM LYING ON BACK TO SITTING ON SIDE OF FLAT BED WITH BEDRAILS: A LITTLE
MOVING TO AND FROM BED TO CHAIR: A LITTLE
WALKING IN HOSPITAL ROOM: A LITTLE
CLIMB 3 TO 5 STEPS WITH RAILING: A LOT
HELP NEEDED FOR BATHING: A LITTLE
TOILETING: A LITTLE
DAILY ACTIVITIY SCORE: 18
TURNING FROM BACK TO SIDE WHILE IN FLAT BAD: A LITTLE
MOBILITY SCORE: 16
WALKING IN HOSPITAL ROOM: A LOT
TURNING FROM BACK TO SIDE WHILE IN FLAT BAD: A LITTLE
CLIMB 3 TO 5 STEPS WITH RAILING: A LOT
MOVING TO AND FROM BED TO CHAIR: A LITTLE
STANDING UP FROM CHAIR USING ARMS: A LITTLE
DAILY ACTIVITIY SCORE: 18
DRESSING REGULAR LOWER BODY CLOTHING: A LOT
WALKING IN HOSPITAL ROOM: A LOT
MOVING FROM LYING ON BACK TO SITTING ON SIDE OF FLAT BED WITH BEDRAILS: A LITTLE
MOBILITY SCORE: 16
STANDING UP FROM CHAIR USING ARMS: A LITTLE
TURNING FROM BACK TO SIDE WHILE IN FLAT BAD: A LITTLE
PERSONAL GROOMING: A LITTLE
TOILETING: A LITTLE
DRESSING REGULAR UPPER BODY CLOTHING: A LITTLE
HELP NEEDED FOR BATHING: A LITTLE
CLIMB 3 TO 5 STEPS WITH RAILING: TOTAL
PERSONAL GROOMING: A LITTLE
MOVING FROM LYING ON BACK TO SITTING ON SIDE OF FLAT BED WITH BEDRAILS: A LITTLE

## 2024-11-20 ASSESSMENT — PAIN SCALES - GENERAL
PAINLEVEL_OUTOF10: 2
PAINLEVEL_OUTOF10: 0 - NO PAIN
PAINLEVEL_OUTOF10: 0 - NO PAIN

## 2024-11-20 ASSESSMENT — PAIN SCALES - WONG BAKER: WONGBAKER_NUMERICALRESPONSE: NO HURT

## 2024-11-20 NOTE — CONSULTS
"Nutrition Assessment Note  Nutrition Assessment      Reason for Assessment  Reason for Assessment: Length of stay  Admitted for colitis.  MST score of 0.   Chart review shows good appetite at this time with >75% of meals eaten.  POC is to discharge to SNF when medically appropriate.  No nutrition intervention is indicated at this time. Please reconsult if there are changes in patient status & nutrition therapy is warranted.     Anthropometrics:  Height: 162.6 cm (5' 4.02\")  Weight: 72.6 kg (160 lb)  BMI (Calculated): 27.45    Weight Change: 0  IBW/kg (Dietitian Calculated): 54.5 kg    Dietary Orders   Adult diet Regular      Nutrition Interventions/Recommendations   Nutrition Prescription  Individualized Nutrition Prescription Provided for : Continue diet as ordered.  If poorly controled glucose levels noted, may need to add carbohydrate restriction to diet.    Food and/or Nutrient Delivery Interventions  Meals and Snacks: General healthful diet  Goal: intake meets >75% estimated nutrient needs.     Follow Up  Last Date of Nutrition Visit: 11/20/24  Nutrition Follow-Up Needed?: Dietitian to reassess per policy  Follow up Comment: [id number]       "

## 2024-11-20 NOTE — PROGRESS NOTES
Carolann Cartagena is a 89 y.o. female on day 14 of admission presenting with Colitis.      Subjective   I spoke with Ms. Cartagena and a family member in the room.  We are somehow still awaiting the biopsy result, I asked pathology for a result ASAP.     Objective        Vitals 24HR  Heart Rate:  [45-57]   Temp:  [36.1 °C (97 °F)-37 °C (98.6 °F)]   Resp:  [16]   BP: (129-147)/(44-68)   SpO2:  [97 %-100 %]     Intake/Output last 3 Shifts:    Intake/Output Summary (Last 24 hours) at 11/20/2024 1145  Last data filed at 11/20/2024 0900  Gross per 24 hour   Intake 620 ml   Output 200 ml   Net 420 ml         Physical Exam  Constitutional: Well developed, awake/alert/oriented  x3, no distress, alert and cooperative   Eyes: PERRL, EOMI, clear sclera   ENMT: mucous membranes moist, no apparent injury, no lesions seen   Head/Neck: Neck supple, no JVD, trachea midline   Respiratory/Thorax: CTAB   Cardiovascular: Regular, rate and rhythm, systolic  murmur, normal S 1 and S 2   Gastrointestinal: Nondistended, soft, non-tender, no rebound tenderness or guarding   Musculoskeletal: ROM intact, no joint swelling, normal  strength   Extremities: normal extremities, pitting thigh edema   Neurological: alert and oriented x3  No asterixis   Skin: Warm and dry, no lesions, no rashes     Scheduled Medications  [Held by provider] aspirin, 81 mg, oral, Daily  atorvastatin, 10 mg, oral, Nightly  brimonidine, 1 drop, Both Eyes, BID  carvedilol, 25 mg, oral, BID  doxazosin, 4 mg, oral, Daily  heparin (porcine), 5,000 Units, subcutaneous, q8h TIP  [Held by provider] hydrALAZINE, 100 mg, oral, TID  insulin NPH (Isophane), 6 Units, subcutaneous, BID AC  latanoprost, 1 drop, Both Eyes, Nightly  NIFEdipine ER, 90 mg, oral, Daily before breakfast  sodium bicarbonate, 650 mg, oral, BID  [Held by provider] spironolactone, 25 mg, oral, q24h TIP  timolol, 1 drop, Both Eyes, Daily      Continuous medications     PRN medications: acetaminophen **OR** acetaminophen  **OR** acetaminophen, dextrose, dextrose, glucagon, glucagon, hydrALAZINE, ondansetron, sennosides-docusate sodium     Relevant Results        Results from last 7 days   Lab Units 11/20/24  0533 11/19/24  0705 11/18/24  0543   SODIUM mmol/L 127* 128* 128*   POTASSIUM mmol/L 4.9 4.9 4.8   CHLORIDE mmol/L 97* 99 100   CO2 mmol/L 21 21 21   BUN mg/dL 89* 88* 91*   CREATININE mg/dL 6.14* 6.12* 6.06*   GLUCOSE mg/dL 61* 92 95   CALCIUM mg/dL 7.6* 7.3* 7.5*       US guided percutaneous biopsy renal left   Final Result   Ultrasound-guided percutaneous non targeted/random renal biopsy, 3 x   18 gauge core needle biopsy specimens obtained of the inferior left   renal subcortical parenchyma.             MACRO:   None        Signed by: Devon Teixeira 11/15/2024 4:52 PM   Dictation workstation:   [id number]      XR chest 2 views   Final Result   Findings suspicious for ongoing CHF.        Left pleural effusion.        Hazy opacities in the lungs, most pronounced at the posteromedial   left lung base. Atelectasis versus pneumonia versus  asymmetric   pulmonary edema        MACRO:   None        Signed by: Adam Javier 11/11/2024 2:58 PM   Dictation workstation:   [id number]      CT head wo IV contrast   Final Result   No acute intracranial hemorrhage or mass-effect.        MACRO:   None.        Signed by: Loly Albarran 11/6/2024 1:44 PM   Dictation workstation:   [id number]      CT abdomen pelvis wo IV contrast   Final Result   Right colon wall thickening consistent with nonspecific colitis.        Small amount of free fluid with mild edematous changes in the lung   bases, mesentery and soft tissues as well as trace pleural   effusions/thickening consistent with fluid overload/CHF.        Subtle density layer in the gallbladder, possible layering   noncalcified stones or sludge.        Additional findings as described above.        MACRO:   None.        Signed by: Loly Albarran 11/6/2024 1:52 PM   Dictation workstation:    [id number]      XR chest 1 view   Final Result   No acute cardiopulmonary process.   Enlarged but unchanged heart compared to 06/12/2024.        MACRO:   None        Signed by: Yokasta Jackson 11/6/2024 11:42 AM   Dictation workstation:   [id number]               Assessment/Plan      Carolann Cartagena is a 89 y.o. female with a past medical history of hypertension, hyperlipidemia, diabetes, chronic anemia, IgG kappa monoclonal protein, chronic hyponatremia, HFpEF, moderate concentric LVH, mild aortic stenosis and mild to moderate pulmonary hypertension, and G4/A3 chronic kidney disease with a fluctuating creatinine baseline between 1.8 up to 2.3 mg/dL who presented with nausea, emesis and suboptimally controlled hypertension.  Reportedly the nausea/emesis occurred just prior to presentation.  Workup was notable for right colon wall thickening consistent with nonspecific colitis noted on noncontrast CT abdominal imaging. There was no renal obstruction. Her chest plain film was clear.  BNP was 400.  Her urinalysis was notable for proteinuria, blood, leuks, WBCs and RBCs.  Urine culture was sent, no growth was noted.  Her creatinine was 2.76 upon presentation with further decline to 4.1 despite IV volume expansion. Nephrology is consulted for acute kidney injury.    Etiology of acute kidney injury initially was elusive.  Blood pressures were not low but rather high upon presentation.  No contrast exposure. She denied anti-inflammatory use.  She reports essentially 1 episode of nausea with emesis prior to presentation. She is prescribed spironolactone which was appropriately held.  Blood and urine cultures were without growth. Given the hematuria/proteinuria on her urinalysis and worsening proteinuria having a urine protein to creatinine ratio of 2.7 g, we sent a serologic workup. We also noted Hydralazine use which we held. She has a reduced C3.  C4 is normal. No urine eosinophils present. DYAN is now positive with a  "titer of 1:2560 along with a positive ds DNA. Antihistone antibody is pending. P-ANCA is also positive (titer greater than 1:1280), MPO+ ~ consistent with microscopic polyangiitis.  In addition to her current findings she has elevated serum free kappa and lambda light chains with an M protein of 0.55 g/dL. But given the positive ANCA and DYAN this would point toward hydralazine.     Somehow, the biopsy results are pending, I reached out to pathology.  The question is whether this is lupus-like with a low C3, or if this is more consistent with a ANCA associated small vessel vasculitis.  I feel as if she should get corticosteroids but I am worried about her blood sugars, and whether the benefit is going to outweigh risk.  I also wonder whether I would expose her to rituximab.  I will check total immunoglobulins.  But at her age, this is a very difficult question.  She is a [employer], hemodialysis may be very difficult for her.  She herself felt that she was \"too old \"to do dialysis.  As an just meeting her today I will try to get the biopsy results and think through this more carefully.    Principal Problem:    Colitis  Acute kidney injury  +ANCA   Hypertension  Proteinuria  Hyponatremia  Hypervolemia     I spent 55 minutes in the professional and overall care of this patient.      Adam Leonard MD      "

## 2024-11-20 NOTE — CARE PLAN
The patient's goals for the shift include      The clinical goals for the shift include patient will remain safe and free from falls this shift.

## 2024-11-20 NOTE — PROGRESS NOTES
Physical Therapy    Physical Therapy Treatment    Patient Name: Carolann Cartagena  MRN: [id number]  Department: [facility] [address]  Room: [address] [place] [address]  Today's Date: 11/20/2024  Time Calculation  Start Time: 1030  Stop Time: 1053  Time Calculation (min): 23 min         Assessment/Plan   PT Assessment  End of Session Communication: Bedside nurse  Assessment Comment: pt walked in room and completed exercises  End of Session Patient Position: Alarm on, Up in chair  PT Plan  Inpatient/Swing Bed or Outpatient: Inpatient  PT Plan  Treatment/Interventions: Bed mobility, Transfer training, Gait training  PT Plan: Ongoing PT  PT Frequency: 3 times per week  PT Discharge Recommendations: Moderate intensity level of continued care  PT Recommended Transfer Status: Assist x1  PT - OK to Discharge: Yes (per POC)      General Visit Information:   PT  Visit  PT Received On: 11/20/24  General  Reason for Referral: 89 y/o female admitted with colitis, c/o nausea, vomiting and Htn.  Referred By: Dr Elise  Prior to Session Communication: Bedside nurse  Patient Position Received: Bed, 3 rail up, Alarm on  Preferred Learning Style: auditory, kinesthetic, verbal  General Comment: pt agreeable to tx    Subjective   Precautions:  Precautions  Medical Precautions: Fall precautions            Objective   Pain:  Pain Assessment  0-10 (Numeric) Pain Score: 0 - No pain  Cognition:  Cognition  Overall Cognitive Status: Within Functional Limits  Coordination:     Postural Control:  Static Sitting Balance  Static Sitting-Comment/Number of Minutes: pt performed at EOB with SBA, pt performed x3 min  Static Standing Balance  Static Standing-Comment/Number of Minutes: pt performed static standing balance with UE support at RW and  CGA,  x1-2 min  Extremity/Trunk Assessments:    Activity Tolerance:     Treatments:  Therapeutic Exercise  Therapeutic Exercise Performed: Yes  Therapeutic Exercise Activity 1: pt performed seated ther ex with min vc/tc x15: HR DF Santi JONES  Hip ABD/ADD              Ambulation/Gait Training  Ambulation/Gait Training Performed: Yes  Ambulation/Gait Training 1  Surface 1: Level tile  Device 1: Rolling walker  Gait Support Devices: Gait belt  Assistance 1: Contact guard  Comments/Distance (ft) 1: 25x2 (cues for forward gaze/up right posture. VC to stay within RW. slow and steady, no overt LOB noted x1 standing rest break)  Transfer 1  Technique 1: Sit to stand, Stand to sit  Transfer Device 1: Walker  Transfer Level of Assistance 1: Contact guard  Trials/Comments 1: vc/tc for hand placment on solid sitting surface and not RW.    Outcome Measures:  [facility] Basic Mobility  Turning from your back to your side while in a flat bed without using bedrails: A little  Moving from lying on your back to sitting on the side of a flat bed without using bedrails: A little  Moving to and from bed to chair (including a wheelchair): A little  Standing up from a chair using your arms (e.g. wheelchair or bedside chair): A little  To walk in hospital room: A little  Climbing 3-5 steps with railing: Total  Basic Mobility - Total Score: 16    Education Documentation  Body Mechanics, taught by Vincent Asif PTA at 11/20/2024  3:13 PM.  Learner: Patient  Readiness: Acceptance  Method: Explanation  Response: Verbalizes Understanding    Mobility Training, taught by Vincent Asif PTA at 11/20/2024  3:13 PM.  Learner: Patient  Readiness: Acceptance  Method: Explanation  Response: Verbalizes Understanding    Education Comments  No comments found.        OP EDUCATION:       Encounter Problems       Encounter Problems (Active)       Balance       complete all mobility with normal balance while dual tasking, negotiating in a dynamic environment, carrying items, etc., with proactive and reactive static and dynamic standing and sitting tasks, with mod I and RW >15 minutes.         Start:  11/07/24    Expected End:  11/21/24               Mobility       STG - Patient will ambulate 150  ft mod I with a RW.  (Progressing)       Start:  11/07/24    Expected End:  11/21/24            Pt will tolerate 15 reps of each LE exercise in order to improve strength and endurance.   (Progressing)       Start:  11/07/24    Expected End:  11/21/24               PT Transfers       STG - Patient will perform bed mobility independently.  (Progressing)       Start:  11/07/24    Expected End:  11/21/24            STG - Patient will transfer sit to and from stand mod I using RW.  (Progressing)       Start:  11/07/24    Expected End:  11/21/24               Pain - Adult

## 2024-11-20 NOTE — CONSULTS
Inpatient consult to Palliative Care  Consult performed by: Alberta Looney, BASILIA-CNP  Consult ordered by: Julianne Elise MD          Reason For Consult  Reason for Consult: communication / medical decision making     History Of Present Illness  Carolann Cartagena is a 89 y.o. female with past medical history of DM II with neuropathy, HTN, HLD, tubular adenoma of colon, monoclonal gammopathy, chronic Fe def anemia getting Fe infusions, chronic hyponatremia, HFpEF, lymphedema, GERD, CKD IV who presented to our ED with ABD pain, N/V on 11/6/24 and dx with colitis and acute renal failure.  A renal bx was done 11/13 and path still pending although renal feels it is likely autoimmune.  Her kidney function continues to worsen and palliative med consulted to discuss goals of care.  She is a [employer] and does not accept blood products    Symptoms (0 - 10, Best to Worst)  [facility] Symptom Assessment System  0-10 (Numeric) Pain Score: 2    BM in last 48 hours? yes       Personal/Social History   She reports that she has never smoked. She has never used smokeless tobacco. She reports that she does not currently use alcohol. She reports that she does not currently use drugs.       Past Medical History  She has a past medical history of Anemia due to chronic kidney disease (01/22/2024), Chronic diastolic congestive heart failure (10/02/2023), Chronic hyponatremia (10/02/2023), Chronic kidney disease, stage 3, mod decreased GFR (Multi) (09/18/2018), Diabetic nephropathy associated with type 2 diabetes mellitus (Multi) (12/18/2018), Essential hypertension (05/23/2023), Hyperlipidemia (05/23/2023), and Pure hypercholesterolemia (10/02/2015).    Surgical History  She has a past surgical history that includes Other surgical history (02/13/2020).     Family History  Family History   Problem Relation Name Age of Onset    No Known Problems Mother      Bone cancer Sister      Lung cancer Daughter       Allergies  Dorzolamide,  "Lisinopril, and Losartan    Review of Systems   Constitutional: Negative.    HENT: Negative.     Eyes: Negative.    Respiratory: Negative.     Cardiovascular:  Positive for leg swelling.   Gastrointestinal: Negative.    Endocrine: Negative.    Genitourinary: Negative.    Musculoskeletal: Negative.    Skin: Negative.    Allergic/Immunologic: Negative.    Neurological: Negative.    Hematological: Negative.    Psychiatric/Behavioral: Negative.          Physical Exam  Constitutional:       Appearance: Normal appearance.   HENT:      Head: Normocephalic and atraumatic.      Comments: Kake     Nose: Nose normal.      Mouth/Throat:      Mouth: Mucous membranes are moist.      Pharynx: Oropharynx is clear.   Eyes:      Conjunctiva/sclera: Conjunctivae normal.      Pupils: Pupils are equal, round, and reactive to light.   Cardiovascular:      Rate and Rhythm: Normal rate and regular rhythm.   Pulmonary:      Effort: Pulmonary effort is normal.      Breath sounds: Normal breath sounds.   Abdominal:      General: Abdomen is flat. Bowel sounds are normal.      Palpations: Abdomen is soft.   Genitourinary:     Comments: deferred  Musculoskeletal:      Cervical back: Normal range of motion.      Right lower leg: Edema present.      Left lower leg: Edema present.   Skin:     General: Skin is warm and dry.      Capillary Refill: Capillary refill takes 2 to 3 seconds.   Neurological:      Mental Status: She is alert and oriented to person, place, and time.   Psychiatric:         Mood and Affect: Mood normal.         Behavior: Behavior normal.         Last Recorded Vitals  Blood pressure 139/68, pulse 57, temperature 36.5 °C (97.7 °F), temperature source Temporal, resp. rate 16, height 1.626 m (5' 4\"), weight 72.6 kg (160 lb), SpO2 100%.    US guided percutaneous biopsy renal left    Result Date: 11/15/2024  Interpreted By:  Devon Teixeira, STUDY: US GUIDED PERCUTANEOUS BIOPSY RENAL LEFT; ;  11/15/2024 12:15 pm   " ULTRASOUND-GUIDED PERCUTANEOUS NON TARGETED/RANDOM RENAL BIOPSY   INDICATION: Signs/Symptoms:ANCA vasculitis - renal biopsy. 89-year-old woman with renal dysfunction.     COMPARISON: CT abdomen pelvis 11/06/2024   ACCESSION NUMBER(S): [id number]   ORDERING CLINICIAN: FANTASMA LIPSCOMB   TECHNIQUE:   ATTENDING : Devon Teixeira M.D.   TECHNICAL DESCRIPTION/FINDINGS: The procedure, including all risks, benefits and alternatives were explained to the patient in detail. All questions were answered and written informed consent was obtained.   The patient was positioned supine on her hospital bed in the ultrasound suite. A time-out was performed.   She was then positioned prone on her hospital bed. Focused ultrasound was performed of the left kidney via the posterior left flank for localization purposes. The overlying skin was prepped and draped in usual sterile fashion. 1% lidocaine was administered subcutaneously for local anesthesia. Under real-time ultrasound guidance, a 17 gauge guide needle was advanced via an oblique subcostal approach into the inferior aspect of the left kidney, into the subcortical parenchyma. Through the guide needle, 3 x 18 gauge core needle biopsy specimens were obtained and provided to pathology who confirmed preliminary adequacy. After acquisitions of specimen confirmation, a Gel-Foam pledget was administered during guide needle removal. Focused post biopsy sonographic evaluation demonstrates no significant hematoma or other immediate complication. A sterile dressing was applied.   SEDATION/MEDICATIONS: Continuous cardiopulmonary monitoring was performed by a radiology nurse for the duration of the procedure. 0.5 mg Versed and   25 mcg Fentanyl were administered for moderate conscious sedation time of 40 minutes.      10 cc 1% Lidocaine was administered subcutaneously for local anesthesia. SPECIMENS: 3 x 18 gauge core needle biopsy specimens of the inferior left renal subcortical  parenchyma. ESTIMATED BLOOD LOSS:  5 cc FLOUROSCOPY: None. CONTRAST: None.   FINDINGS: Test       Ultrasound-guided percutaneous non targeted/random renal biopsy, 3 x 18 gauge core needle biopsy specimens obtained of the inferior left renal subcortical parenchyma.     MACRO: None   Signed by: Devon Teixeira 11/15/2024 4:52 PM Dictation workstation:   [id number]    Electrocardiogram, 12-lead ACS symptoms    Result Date: 11/15/2024  Normal sinus rhythm Right bundle branch block Abnormal ECG When compared with ECG of 12-JUN-2024 23:24, No significant change was found See ED provider note for full interpretation and clinical correlation Confirmed by Johnna Oliva ([id number]) on 11/15/2024 11:18:00 AM    XR chest 2 views    Result Date: 11/11/2024  Interpreted By:  Adam Javier, STUDY: XR CHEST 2 VIEWS;  11/11/2024 2:47 pm   INDICATION: Signs/Symptoms:shortness of breath.   COMPARISON: CT scan abdomen and pelvis from 11/06/2024. Chest x-rays, most recent from 11/06/2024.   ACCESSION NUMBER(S): [id number]   ORDERING CLINICIAN: MARCUS SORIANO   TECHNIQUE: PA and lateral views of the chest were obtained.   FINDINGS: MEDIASTINUM/LUNGS/CHARU: Cardiomegaly. Left pleural effusion. Scattered areas of hazy infiltrative opacity in the lungs, most pronounced at the posterior left base. Pulmonary vasculature and interstitium are mildly prominent. No abnormal opacity in either lung worrisome for tumor or pneumonia. No pneumothorax. No tracheal deviation. No abnormal hilar fullness or gross mass on either side.   BONES: No lytic or blastic destructive bone lesion. Stable DJD in both glenohumeral joints and throughout the thoracic spine.   UPPER ABDOMEN: Grossly intact.       Findings suspicious for ongoing CHF.   Left pleural effusion.   Hazy opacities in the lungs, most pronounced at the posteromedial left lung base. Atelectasis versus pneumonia versus  asymmetric pulmonary edema   MACRO: None   Signed by: Adam Javier 11/11/2024  2:58 PM Dictation workstation:   [id number]    CT abdomen pelvis wo IV contrast    Result Date: 11/6/2024  Interpreted By:  Loly Albarran, STUDY: CT ABDOMEN PELVIS WO IV CONTRAST;  11/6/2024 1:12 pm   INDICATION: Signs/Symptoms:r/o intraab pathos.     COMPARISON: 06/21/2024   ACCESSION NUMBER(S): [id number]   ORDERING CLINICIAN: MITRA SANTILLAN   TECHNIQUE: CT of the abdomen and pelvis was performed. Sagittal and coronal reconstructions were generated. No intravenous contrast given for the exam.   FINDINGS: Solid organ and vessel evaluation limited without IV contrast.   ABDOMINAL ORGANS:   LIVER: Several hypodense lesions and innumerable punctate calcifications particularly in the posterior right lobe similar to the prior exam.   GALL BLADDER AND BILIARY TREE: Vague density layer in the gallbladder. No calcified gallstone.   SPLEEN: No focal lesion within limits of unenhanced exam   PANCREAS: No focal lesion within limits of unenhanced exam   ADRENALS: No adrenal mass   KIDNEYS AND URETERS: No renal mass within limits of unenhanced exam. No hydronephrosis.   BOWEL: No abnormally dilated large or small bowel loops. Wall thickening in the right colon. Distal colon diverticulosis. Rectum is distended with gas and fecal debris.   PERITONEUM, RETROPERITONEUM, NODES: Small amount of free fluid in the cul-de-sac. Stranding in the mesentery. No free intraperitoneal air. No significant retroperitoneal adenopathy within limits of unenhanced exam.   VESSELS:  Lack of IV contrast precludes vascular luminal assessment. Multifocal atherosclerotic calcifications. No abdominal aortic aneurysm.   PELVIS: Urinary bladder is moderately distended without focal wall thickening. Presumed surgical absence of the uterus.   ABDOMINAL WALL: No sizable abdominal wall hernia. Mild soft tissue edema.   BONES: Osteopenia. Multifocal presumed degenerative changes.   LOWER CHEST: Basilar interstitial opacities with septal thickening and small  bilateral pleural effusions/thickening, left-greater-than-right. Small pericardial effusion. Atherosclerotic calcifications.       Right colon wall thickening consistent with nonspecific colitis.   Small amount of free fluid with mild edematous changes in the lung bases, mesentery and soft tissues as well as trace pleural effusions/thickening consistent with fluid overload/CHF.   Subtle density layer in the gallbladder, possible layering noncalcified stones or sludge.   Additional findings as described above.   MACRO: None.   Signed by: Loly Albarran 11/6/2024 1:52 PM Dictation workstation:   [id number]    CT head wo IV contrast    Result Date: 11/6/2024  Interpreted By:  Loly Albarran, STUDY: CT HEAD WO IV CONTRAST;  11/6/2024 1:12 pm   INDICATION: Signs/Symptoms:HTN, vomiting.     COMPARISON: 01/31/2024   ACCESSION NUMBER(S): [id number]   ORDERING CLINICIAN: MITRA SANTILLAN   TECHNIQUE: Unenhanced CT images of the head were obtained.   FINDINGS: The ventricles, cisterns and sulci are enlarged, consistent with diffuse volume loss. There are areas of nonspecific white matter hypodensity, which are probably age related or microvascular in nature. Faint symmetric likely physiologic bilateral basal ganglia calcifications. These findings are similar to the previous exam. There is no acute intracranial hemorrhage, mass effect or midline shift. No extraaxial fluid collection.   No focal calvarial lesion.   Visualized paranasal sinuses are clear.           No acute intracranial hemorrhage or mass-effect.   MACRO: None.   Signed by: Loly Albarran 11/6/2024 1:44 PM Dictation workstation:   [id number]    XR chest 1 view    Result Date: 11/6/2024  Interpreted By:  Yokasta Jackson, STUDY: XR CHEST 1 VIEW;  11/6/2024 11:24 am   INDICATION: Signs/Symptoms:gen malaise and nausea.   COMPARISON: 06/12/2024   ACCESSION NUMBER(S): [id number]   ORDERING CLINICIAN: MITRA SANTILLAN   FINDINGS: CARDIOMEDIASTINAL SILHOUETTE: The heart is  enlarged but unchanged.     LUNGS: Lungs are clear.   ABDOMEN: No remarkable upper abdominal findings.     BONES: No acute osseous changes. There is marked degenerative change of both glenohumeral joints.       No acute cardiopulmonary process. Enlarged but unchanged heart compared to 06/12/2024.   MACRO: None   Signed by: Yokasta Jackson 11/6/2024 11:42 AM Dictation workstation:   [id number]      Results for orders placed or performed during the hospital encounter of 11/06/24 (from the past 24 hours)   Basic Metabolic Panel   Result Value Ref Range    Glucose 61 (L) 74 - 99 mg/dL    Sodium 127 (L) 136 - 145 mmol/L    Potassium 4.9 3.5 - 5.3 mmol/L    Chloride 97 (L) 98 - 107 mmol/L    Bicarbonate 21 21 - 32 mmol/L    Anion Gap 14 10 - 20 mmol/L    Urea Nitrogen 89 (H) 6 - 23 mg/dL    Creatinine 6.14 (H) 0.50 - 1.05 mg/dL    eGFR 6 (L) >60 mL/min/1.73m*2    Calcium 7.6 (L) 8.6 - 10.3 mg/dL   Lavender Top   Result Value Ref Range    Extra Tube Hold for add-ons.    SST TOP   Result Value Ref Range    Extra Tube Hold for add-ons.    POCT GLUCOSE   Result Value Ref Range    POCT Glucose 78 74 - 99 mg/dL    Scheduled medications  [Held by provider] aspirin, 81 mg, oral, Daily  atorvastatin, 10 mg, oral, Nightly  brimonidine, 1 drop, Both Eyes, BID  carvedilol, 25 mg, oral, BID  doxazosin, 4 mg, oral, Daily  heparin (porcine), 5,000 Units, subcutaneous, q8h TIP  [Held by provider] hydrALAZINE, 100 mg, oral, TID  insulin NPH (Isophane), 6 Units, subcutaneous, BID AC  latanoprost, 1 drop, Both Eyes, Nightly  NIFEdipine ER, 90 mg, oral, Daily before breakfast  sodium bicarbonate, 650 mg, oral, BID  [Held by provider] spironolactone, 25 mg, oral, q24h TIP  timolol, 1 drop, Both Eyes, Daily      Continuous medications     PRN medications  PRN medications: acetaminophen **OR** acetaminophen **OR** acetaminophen, dextrose, dextrose, glucagon, glucagon, hydrALAZINE, ondansetron, sennosides-docusate sodium      Assessment/Plan      Carolann Cartagena is a 89 y.o. female with past medical history of DM II with neuropathy, HTN, HLD, tubular adenoma of colon, monoclonal gammopathy, chronic anemia, chronic hyponatremia, HFpEF, lymphedema, GERD, CKD IV who presented to our ED with ABD pain, N/V on 11/6/24 and dx with colitis and acute renal failure.  A renal bx was done 11/13 and path still pending.  Her kidney function continues to worsen and palliative med consulted to discuss goals of care.  She is a [employer] and does not accept blood products.  Palliative medicine consulted for goals of care    Goals:  patient and daughter would not want resuscitative measures.  They are in agreement to changing code status.    -code status changed to DNR/DNI  -will fill out [place] Portable form for transport    ARF on CKD:  patient reports she would not want dialysis but open to treatment plan that can improve kidney function, this is pending path report  -defer to renal for tx plan  -family aware that hospice can be an option should kidney function worsen    Patient without symptom needs at present.      Thank you for this consult, palliative care will continue to follow          BASILIA Orozco-CNP

## 2024-11-20 NOTE — PROGRESS NOTES
Report received from nightshift RN. Patient awake and alert in bed, oriented x4, [place]. Complains of mild discomfort to the left shoulder. Purewick in place and draining urine appropriately. Vital signs reviewed. Labs and orders reviewed. Will assume care of patient.

## 2024-11-20 NOTE — PROGRESS NOTES
"Carolann Cartagena is a 89 y.o. female on day 14 of admission presenting with Colitis.    Subjective   Monica case with nephrology, waiting for pathology results from the [facility] creatinine seems to be stable at 6.1 The question is whether this is lupus-like with a low C3, or if this is more consistent with a ANCA associated small vessel vasculitis.        Objective     VITALS  Blood pressure 109/54, pulse (!) 38, temperature 36.2 °C (97.1 °F), temperature source Temporal, resp. rate 16, height 1.626 m (5' 4\"), weight 72.6 kg (160 lb), SpO2 100%.  Physical Exam  Vitals reviewed.   Constitutional:       Appearance: Normal appearance.   HENT:      Head: Normocephalic.      Nose: Nose normal.   Cardiovascular:      Rate and Rhythm: Normal rate and regular rhythm.      Pulses: Normal pulses.      Heart sounds: Normal heart sounds.   Abdominal:      General: Abdomen is flat. Bowel sounds are normal.      Palpations: Abdomen is soft.   Skin:     General: Skin is warm and dry.      Capillary Refill: Capillary refill takes less than 2 seconds.   Neurological:      General: No focal deficit present.      Mental Status: She is alert and oriented to person, place, and time.           Intake/Output last 3 Shifts:  I/O last 3 completed shifts:  In: 740 (10.2 mL/kg) [P.O.:740]  Out: 200 (2.8 mL/kg) [Urine:200 (0.1 mL/kg/hr)]  Weight: 72.6 kg     Relevant Results  Results for orders placed or performed during the hospital encounter of 11/06/24 (from the past 24 hours)   Basic Metabolic Panel   Result Value Ref Range    Glucose 61 (L) 74 - 99 mg/dL    Sodium 127 (L) 136 - 145 mmol/L    Potassium 4.9 3.5 - 5.3 mmol/L    Chloride 97 (L) 98 - 107 mmol/L    Bicarbonate 21 21 - 32 mmol/L    Anion Gap 14 10 - 20 mmol/L    Urea Nitrogen 89 (H) 6 - 23 mg/dL    Creatinine 6.14 (H) 0.50 - 1.05 mg/dL    eGFR 6 (L) >60 mL/min/1.73m*2    Calcium 7.6 (L) 8.6 - 10.3 mg/dL   Lavender Top   Result Value Ref Range    Extra Tube Hold for add-ons.  "   SST TOP   Result Value Ref Range    Extra Tube Hold for add-ons.    POCT GLUCOSE   Result Value Ref Range    POCT Glucose 78 74 - 99 mg/dL   POCT GLUCOSE   Result Value Ref Range    POCT Glucose 79 74 - 99 mg/dL       Imaging Results  US guided percutaneous biopsy renal left   Final Result   Ultrasound-guided percutaneous non targeted/random renal biopsy, 3 x   18 gauge core needle biopsy specimens obtained of the inferior left   renal subcortical parenchyma.             MACRO:   None        Signed by: Devon Teixeira 11/15/2024 4:52 PM   Dictation workstation:   [id number]      XR chest 2 views   Final Result   Findings suspicious for ongoing CHF.        Left pleural effusion.        Hazy opacities in the lungs, most pronounced at the posteromedial   left lung base. Atelectasis versus pneumonia versus  asymmetric   pulmonary edema        MACRO:   None        Signed by: Adam Javier 11/11/2024 2:58 PM   Dictation workstation:   [id number]      CT head wo IV contrast   Final Result   No acute intracranial hemorrhage or mass-effect.        MACRO:   None.        Signed by: Loly Albarran 11/6/2024 1:44 PM   Dictation workstation:   [id number]      CT abdomen pelvis wo IV contrast   Final Result   Right colon wall thickening consistent with nonspecific colitis.        Small amount of free fluid with mild edematous changes in the lung   bases, mesentery and soft tissues as well as trace pleural   effusions/thickening consistent with fluid overload/CHF.        Subtle density layer in the gallbladder, possible layering   noncalcified stones or sludge.        Additional findings as described above.        MACRO:   None.        Signed by: Loly Albarran 11/6/2024 1:52 PM   Dictation workstation:   [id number]      XR chest 1 view   Final Result   No acute cardiopulmonary process.   Enlarged but unchanged heart compared to 06/12/2024.        MACRO:   None        Signed by: Yokasta Jackson 11/6/2024 11:42 AM   Dictation  workstation:   [id number]          Medications:  [Held by provider] aspirin, 81 mg, oral, Daily  atorvastatin, 10 mg, oral, Nightly  brimonidine, 1 drop, Both Eyes, BID  carvedilol, 25 mg, oral, BID  doxazosin, 4 mg, oral, Daily  heparin (porcine), 5,000 Units, subcutaneous, q8h TIP  [Held by provider] hydrALAZINE, 100 mg, oral, TID  insulin NPH (Isophane), 6 Units, subcutaneous, BID AC  latanoprost, 1 drop, Both Eyes, Nightly  NIFEdipine ER, 90 mg, oral, Daily before breakfast  sodium bicarbonate, 650 mg, oral, BID  [Held by provider] spironolactone, 25 mg, oral, q24h TIP  timolol, 1 drop, Both Eyes, Daily       PRN medications: acetaminophen **OR** acetaminophen **OR** acetaminophen, dextrose, dextrose, glucagon, glucagon, hydrALAZINE, ondansetron, sennosides-docusate sodium        Assessment/Plan          Principal Problem:    Colitis    1.  Accelerated hypertension resolved   -Improved pressures overall, nephro would like SBP <140  -Coreg, held for bradycardia   -Doxazosin  -Nifedipine      2.  Acute kidney injury on CKD 4  -creat stable at 6.1  -Appreciate nephro recs, awaiting renal biopsy results.   -The question is whether this is lupus-like with a low C3, or if this is more consistent with a ANCA associated small vessel vasculitis.     3. DM  -continue SSI and NPH     4. Fever  -one episode early in admission  -work up negative    5. Hyperkalemia  - Resolved    DVT Prophylaxis:  Heparin subq        Julianne Elise MD  [facility] Medicine

## 2024-11-21 LAB
ALBUMIN SERPL BCP-MCNC: 2.6 G/DL (ref 3.4–5)
ANION GAP SERPL CALC-SCNC: 14 MMOL/L (ref 10–20)
BUN SERPL-MCNC: 92 MG/DL (ref 6–23)
CALCIUM SERPL-MCNC: 7.8 MG/DL (ref 8.6–10.3)
CHLORIDE SERPL-SCNC: 97 MMOL/L (ref 98–107)
CO2 SERPL-SCNC: 21 MMOL/L (ref 21–32)
CREAT SERPL-MCNC: 6.6 MG/DL (ref 0.5–1.05)
EGFRCR SERPLBLD CKD-EPI 2021: 5 ML/MIN/1.73M*2
ERYTHROCYTE [DISTWIDTH] IN BLOOD BY AUTOMATED COUNT: 13.3 % (ref 11.5–14.5)
GLUCOSE BLD MANUAL STRIP-MCNC: 100 MG/DL (ref 74–99)
GLUCOSE BLD MANUAL STRIP-MCNC: 101 MG/DL (ref 74–99)
GLUCOSE BLD MANUAL STRIP-MCNC: 149 MG/DL (ref 74–99)
GLUCOSE BLD MANUAL STRIP-MCNC: 155 MG/DL (ref 74–99)
GLUCOSE BLD MANUAL STRIP-MCNC: 65 MG/DL (ref 74–99)
GLUCOSE SERPL-MCNC: 47 MG/DL (ref 74–99)
HCT VFR BLD AUTO: 28.6 % (ref 36–46)
HGB BLD-MCNC: 9 G/DL (ref 12–16)
MCH RBC QN AUTO: 31.5 PG (ref 26–34)
MCHC RBC AUTO-ENTMCNC: 31.5 G/DL (ref 32–36)
MCV RBC AUTO: 100 FL (ref 80–100)
NRBC BLD-RTO: 0 /100 WBCS (ref 0–0)
PHOSPHATE SERPL-MCNC: 7.2 MG/DL (ref 2.5–4.9)
PLATELET # BLD AUTO: 263 X10*3/UL (ref 150–450)
POTASSIUM SERPL-SCNC: 5.2 MMOL/L (ref 3.5–5.3)
RBC # BLD AUTO: 2.86 X10*6/UL (ref 4–5.2)
SODIUM SERPL-SCNC: 127 MMOL/L (ref 136–145)
WBC # BLD AUTO: 4.2 X10*3/UL (ref 4.4–11.3)

## 2024-11-21 PROCEDURE — 85027 COMPLETE CBC AUTOMATED: CPT | Performed by: INTERNAL MEDICINE

## 2024-11-21 PROCEDURE — 99232 SBSQ HOSP IP/OBS MODERATE 35: CPT | Performed by: NURSE PRACTITIONER

## 2024-11-21 PROCEDURE — 99232 SBSQ HOSP IP/OBS MODERATE 35: CPT | Performed by: INTERNAL MEDICINE

## 2024-11-21 PROCEDURE — 1100000001 HC PRIVATE ROOM DAILY

## 2024-11-21 PROCEDURE — 84100 ASSAY OF PHOSPHORUS: CPT | Performed by: INTERNAL MEDICINE

## 2024-11-21 PROCEDURE — 82947 ASSAY GLUCOSE BLOOD QUANT: CPT

## 2024-11-21 PROCEDURE — 2500000001 HC RX 250 WO HCPCS SELF ADMINISTERED DRUGS (ALT 637 FOR MEDICARE OP): Performed by: NURSE PRACTITIONER

## 2024-11-21 PROCEDURE — 2500000002 HC RX 250 W HCPCS SELF ADMINISTERED DRUGS (ALT 637 FOR MEDICARE OP, ALT 636 FOR OP/ED): Performed by: INTERNAL MEDICINE

## 2024-11-21 PROCEDURE — 2500000001 HC RX 250 WO HCPCS SELF ADMINISTERED DRUGS (ALT 637 FOR MEDICARE OP): Performed by: INTERNAL MEDICINE

## 2024-11-21 PROCEDURE — 2500000004 HC RX 250 GENERAL PHARMACY W/ HCPCS (ALT 636 FOR OP/ED): Performed by: NURSE PRACTITIONER

## 2024-11-21 PROCEDURE — 36415 COLL VENOUS BLD VENIPUNCTURE: CPT | Performed by: INTERNAL MEDICINE

## 2024-11-21 RX ADMIN — DOXAZOSIN 4 MG: 2 TABLET ORAL at 09:14

## 2024-11-21 RX ADMIN — BRIMONIDINE TARTRATE 1 DROP: 2 SOLUTION/ DROPS OPHTHALMIC at 09:17

## 2024-11-21 RX ADMIN — NIFEDIPINE 90 MG: 30 TABLET, FILM COATED, EXTENDED RELEASE ORAL at 09:16

## 2024-11-21 RX ADMIN — HEPARIN SODIUM 5000 UNITS: 5000 INJECTION INTRAVENOUS; SUBCUTANEOUS at 01:58

## 2024-11-21 RX ADMIN — ACETAMINOPHEN 650 MG: 325 TABLET, FILM COATED ORAL at 12:34

## 2024-11-21 RX ADMIN — SODIUM BICARBONATE 650 MG: 650 TABLET ORAL at 21:07

## 2024-11-21 RX ADMIN — HEPARIN SODIUM 5000 UNITS: 5000 INJECTION INTRAVENOUS; SUBCUTANEOUS at 09:14

## 2024-11-21 RX ADMIN — BRIMONIDINE TARTRATE 1 DROP: 2 SOLUTION/ DROPS OPHTHALMIC at 21:12

## 2024-11-21 RX ADMIN — HEPARIN SODIUM 5000 UNITS: 5000 INJECTION INTRAVENOUS; SUBCUTANEOUS at 16:55

## 2024-11-21 RX ADMIN — TIMOLOL MALEATE 1 DROP: 5 SOLUTION/ DROPS OPHTHALMIC at 09:17

## 2024-11-21 RX ADMIN — CARVEDILOL 25 MG: 12.5 TABLET, FILM COATED ORAL at 21:07

## 2024-11-21 RX ADMIN — ATORVASTATIN CALCIUM 10 MG: 20 TABLET ORAL at 21:06

## 2024-11-21 RX ADMIN — CARVEDILOL 25 MG: 12.5 TABLET, FILM COATED ORAL at 09:14

## 2024-11-21 RX ADMIN — SODIUM BICARBONATE 650 MG: 650 TABLET ORAL at 09:14

## 2024-11-21 RX ADMIN — ACETAMINOPHEN 650 MG: 325 TABLET, FILM COATED ORAL at 21:05

## 2024-11-21 RX ADMIN — LATANOPROST 1 DROP: 50 SOLUTION/ DROPS OPHTHALMIC at 21:12

## 2024-11-21 ASSESSMENT — COGNITIVE AND FUNCTIONAL STATUS - GENERAL
HELP NEEDED FOR BATHING: A LITTLE
MOVING TO AND FROM BED TO CHAIR: A LITTLE
MOBILITY SCORE: 18
PERSONAL GROOMING: A LITTLE
STANDING UP FROM CHAIR USING ARMS: A LITTLE
DAILY ACTIVITIY SCORE: 18
WALKING IN HOSPITAL ROOM: A LITTLE
DRESSING REGULAR UPPER BODY CLOTHING: A LITTLE
TURNING FROM BACK TO SIDE WHILE IN FLAT BAD: A LITTLE
MOVING FROM LYING ON BACK TO SITTING ON SIDE OF FLAT BED WITH BEDRAILS: A LITTLE
CLIMB 3 TO 5 STEPS WITH RAILING: A LITTLE
DRESSING REGULAR LOWER BODY CLOTHING: A LOT
TOILETING: A LITTLE

## 2024-11-21 ASSESSMENT — PAIN SCALES - GENERAL
PAINLEVEL_OUTOF10: 3
PAINLEVEL_OUTOF10: 0 - NO PAIN
PAINLEVEL_OUTOF10: 0 - NO PAIN
PAINLEVEL_OUTOF10: 3
PAINLEVEL_OUTOF10: 0 - NO PAIN

## 2024-11-21 ASSESSMENT — PAIN DESCRIPTION - ORIENTATION: ORIENTATION: RIGHT

## 2024-11-21 ASSESSMENT — PAIN - FUNCTIONAL ASSESSMENT: PAIN_FUNCTIONAL_ASSESSMENT: UNABLE TO SELF-REPORT

## 2024-11-21 ASSESSMENT — PAIN DESCRIPTION - LOCATION: LOCATION: HIP

## 2024-11-21 NOTE — PROGRESS NOTES
"Carolann Cartagena is a 89 y.o. female on day 15 of admission presenting with Colitis.      Interval Hx and Subjective:  per renal not autoimmune in nature so no tx available.  Family now interested in hospice meeting.  This afternoon patient reporting some mild right hip pain.  Has hot packs on it and has asked for PRN APAP.  No falls/near misses       Objective  Blood pressure 177/67, pulse 52, temperature 36.2 °C (97.1 °F), temperature source Temporal, resp. rate 17, height 1.626 m (5' 4.02\"), weight 72.6 kg (160 lb), SpO2 97%.    General:  appears comfortable, NAD, lying in bed  Neuro: alert, oriented to situation.  No focal findings  Psych:  normal affect, engaged, cooperative  HEENT:  oral mucosa moist without exudate, tongue midline.   Resps:  resps unlabored, lungs CTA, no cough noted  Card:  RRR, no murmurs, rubs, or gallops.  No JVD noted  GI:  normal bowel sounds, soft and non tender  :  deferred  MS:  BLE edema noted.  No obvious deformity to right hip while in bed  Integ:  skin warm and dry overall              No results found.   Results for orders placed or performed during the hospital encounter of 11/06/24 (from the past 24 hours)   POCT GLUCOSE   Result Value Ref Range    POCT Glucose 79 74 - 99 mg/dL   POCT GLUCOSE   Result Value Ref Range    POCT Glucose 127 (H) 74 - 99 mg/dL   CBC   Result Value Ref Range    WBC 4.2 (L) 4.4 - 11.3 x10*3/uL    nRBC 0.0 0.0 - 0.0 /100 WBCs    RBC 2.86 (L) 4.00 - 5.20 x10*6/uL    Hemoglobin 9.0 (L) 12.0 - 16.0 g/dL    Hematocrit 28.6 (L) 36.0 - 46.0 %     80 - 100 fL    MCH 31.5 26.0 - 34.0 pg    MCHC 31.5 (L) 32.0 - 36.0 g/dL    RDW 13.3 11.5 - 14.5 %    Platelets 263 150 - 450 x10*3/uL   Renal Function Panel   Result Value Ref Range    Glucose 47 (LL) 74 - 99 mg/dL    Sodium 127 (L) 136 - 145 mmol/L    Potassium 5.2 3.5 - 5.3 mmol/L    Chloride 97 (L) 98 - 107 mmol/L    Bicarbonate 21 21 - 32 mmol/L    Anion Gap 14 10 - 20 mmol/L    Urea Nitrogen 92 (HH) 6 - " 23 mg/dL    Creatinine 6.60 (H) 0.50 - 1.05 mg/dL    eGFR 5 (L) >60 mL/min/1.73m*2    Calcium 7.8 (L) 8.6 - 10.3 mg/dL    Phosphorus 7.2 (H) 2.5 - 4.9 mg/dL    Albumin 2.6 (L) 3.4 - 5.0 g/dL   POCT GLUCOSE   Result Value Ref Range    POCT Glucose 65 (L) 74 - 99 mg/dL   POCT GLUCOSE   Result Value Ref Range    POCT Glucose 100 (H) 74 - 99 mg/dL     *Note: Due to a large number of results and/or encounters for the requested time period, some results have not been displayed. A complete set of results can be found in Results Review.      Scheduled medications  [Held by provider] aspirin, 81 mg, oral, Daily  atorvastatin, 10 mg, oral, Nightly  brimonidine, 1 drop, Both Eyes, BID  carvedilol, 25 mg, oral, BID  doxazosin, 4 mg, oral, Daily  heparin (porcine), 5,000 Units, subcutaneous, q8h TIP  [Held by provider] hydrALAZINE, 100 mg, oral, TID  [Held by provider] insulin NPH (Isophane), 6 Units, subcutaneous, BID AC  latanoprost, 1 drop, Both Eyes, Nightly  NIFEdipine ER, 90 mg, oral, Daily before breakfast  sodium bicarbonate, 650 mg, oral, BID  [Held by provider] spironolactone, 25 mg, oral, q24h TIP  timolol, 1 drop, Both Eyes, Daily      Continuous medications     PRN medications  PRN medications: acetaminophen **OR** acetaminophen **OR** acetaminophen, dextrose, dextrose, glucagon, glucagon, hydrALAZINE, ondansetron, sennosides-docusate sodium     Dietary Orders (From admission, onward)       Start     Ordered    11/15/24 1402  Adult diet Regular  Diet effective now        Question:  Diet type  Answer:  Regular    11/15/24 1401    11/06/24 2126  May Participate in Room Service  ( ROOM SERVICE MAY PARTICIPATE)  Once        Question:  .  Answer:  Yes    11/06/24 2125                     Assessment/Plan    Carolann Cartagena is a 89 y.o. female with past medical history of DM II with neuropathy, HTN, HLD, tubular adenoma of colon, monoclonal gammopathy, chronic anemia, chronic hyponatremia, HFpEF, lymphedema, GERD, CKD IV  who presented to our ED with ABD pain, N/V on 11/6/24 and dx with colitis and acute renal failure.  A renal bx was done 11/13 and path still pending.  Her kidney function continues to worsen and palliative med consulted to discuss goals of care.  She is a [employer] and does not accept blood products.  Palliative medicine consulted for goals of care     Goals:  patient and daughter would not want resuscitative measures.  They are in agreement to changing code status.    -code status changed to DNR/DNI  -will fill out [place] Portable form for transport  -hospice consult ordered     ARF on CKD:  etiology unfortunately not autoimmune and patient clear she would not want dialysis.  Patient and family agreeable to hospice meeting  -hospice consult ordered     Pain right hip, presumably OA, no trauma  -continue APAP, heat     Thank you for this consult, palliative care will continue to follow       Time spent in chart review, assessment, coordination of care and documentation was 30 minutes  Louise CUI CNP

## 2024-11-21 NOTE — PROGRESS NOTES
PALLIATIVE CARE SOCIAL WORK NOTE     Hospice order received. Dr Leonard had spoken with 2 of patient's daughters this afternoon. Recommendation made for hospice. I spoke with daughter Martha who reached out to her 2 sisters,  Yamile and Heather. Family agreeable to meeting with hospice. Referral made to [facility] via [facility]. Hospice meeting scheduled with all 3 daughters for 11:00 am tomorrow morning. Will continue to follow as appropriate. Thank you.     PRICILA Mendez

## 2024-11-21 NOTE — CARE PLAN
Problem: Pain - Adult  Goal: Verbalizes/displays adequate comfort level or baseline comfort level  Outcome: Progressing     Problem: Safety - Adult  Goal: Free from fall injury  Outcome: Progressing     Problem: Discharge Planning  Goal: Discharge to home or other facility with appropriate resources  Outcome: Progressing     Problem: Chronic Conditions and Co-morbidities  Goal: Patient's chronic conditions and co-morbidity symptoms are monitored and maintained or improved  Outcome: Progressing     Problem: Diabetes  Goal: Achieve decreasing blood glucose levels by end of shift  Outcome: Progressing  Goal: Increase stability of blood glucose readings by end of shift  Outcome: Progressing  Goal: Decrease in ketones present in urine by end of shift  Outcome: Progressing  Goal: Maintain electrolyte levels within acceptable range throughout shift  Outcome: Progressing  Goal: Maintain glucose levels >70mg/dl to <250mg/dl throughout shift  Outcome: Progressing  Goal: No changes in neurological exam by end of shift  Outcome: Progressing  Goal: Learn about and adhere to nutrition recommendations by end of shift  Outcome: Progressing  Goal: Vital signs within normal range for age by end of shift  Outcome: Progressing  Goal: Increase self care and/or family involovement by end of shift  Outcome: Progressing  Goal: Receive DSME education by end of shift  Outcome: Progressing     Problem: Skin  Goal: Decreased wound size/increased tissue granulation at next dressing change  Outcome: Progressing  Flowsheets (Taken 11/20/2024 0050 by Layla Riojas LPN)  Decreased wound size/increased tissue granulation at next dressing change: Promote sleep for wound healing  Goal: Participates in plan/prevention/treatment measures  Outcome: Progressing  Flowsheets (Taken 11/20/2024 0050 by Layal Riojas LPN)  Participates in plan/prevention/treatment measures: Elevate heels  Goal: Prevent/manage excess moisture  Outcome:  Progressing  Flowsheets (Taken 11/20/2024 0050 by Layla Riojas LPN)  Prevent/manage excess moisture: Moisturize dry skin  Goal: Prevent/minimize sheer/friction injuries  Outcome: Progressing  Flowsheets (Taken 11/20/2024 0050 by Layla Riojas LPN)  Prevent/minimize sheer/friction injuries: Increase activity/out of bed for meals  Goal: Promote/optimize nutrition  Outcome: Progressing  Flowsheets (Taken 11/20/2024 0050 by Layla Riojas LPN)  Promote/optimize nutrition: Monitor/record intake including meals  Goal: Promote skin healing  Outcome: Progressing  Flowsheets (Taken 11/20/2024 0050 by Layla Riojas LPN)  Promote skin healing: Assess skin/pad under line(s)/device(s)   The patient's goals for the shift include      The clinical goals for the shift include patient will remain safe and free from falls this shift.    Over the shift, the patient did not make progress toward the following goals. Barriers to progression include . Recommendations to address these barriers include .

## 2024-11-21 NOTE — PROGRESS NOTES
"Carolann Cartagena is a 89 y.o. female on day 15 of admission presenting with Colitis.    Subjective   Creatinine worsening, family and patient agreeable they would not like dialysis hospice was consulted.       Objective     VITALS  Blood pressure 150/60, pulse 51, temperature 35.6 °C (96 °F), temperature source Oral, resp. rate 17, height 1.626 m (5' 4.02\"), weight 72.6 kg (160 lb), SpO2 97%.  Physical Exam  Vitals reviewed.   Constitutional:       Appearance: Normal appearance.   HENT:      Head: Normocephalic.      Nose: Nose normal.   Cardiovascular:      Rate and Rhythm: Normal rate and regular rhythm.      Pulses: Normal pulses.      Heart sounds: Normal heart sounds.   Abdominal:      General: Abdomen is flat. Bowel sounds are normal.      Palpations: Abdomen is soft.   Skin:     General: Skin is warm and dry.      Capillary Refill: Capillary refill takes less than 2 seconds.   Neurological:      General: No focal deficit present.      Mental Status: She is alert and oriented to person, place, and time.           Intake/Output last 3 Shifts:  I/O last 3 completed shifts:  In: 860 (11.8 mL/kg) [P.O.:860]  Out: 700 (9.6 mL/kg) [Urine:700 (0.3 mL/kg/hr)]  Weight: 72.6 kg     Relevant Results  Results for orders placed or performed during the hospital encounter of 11/06/24 (from the past 24 hours)   POCT GLUCOSE   Result Value Ref Range    POCT Glucose 127 (H) 74 - 99 mg/dL   CBC   Result Value Ref Range    WBC 4.2 (L) 4.4 - 11.3 x10*3/uL    nRBC 0.0 0.0 - 0.0 /100 WBCs    RBC 2.86 (L) 4.00 - 5.20 x10*6/uL    Hemoglobin 9.0 (L) 12.0 - 16.0 g/dL    Hematocrit 28.6 (L) 36.0 - 46.0 %     80 - 100 fL    MCH 31.5 26.0 - 34.0 pg    MCHC 31.5 (L) 32.0 - 36.0 g/dL    RDW 13.3 11.5 - 14.5 %    Platelets 263 150 - 450 x10*3/uL   Renal Function Panel   Result Value Ref Range    Glucose 47 (LL) 74 - 99 mg/dL    Sodium 127 (L) 136 - 145 mmol/L    Potassium 5.2 3.5 - 5.3 mmol/L    Chloride 97 (L) 98 - 107 mmol/L    " Bicarbonate 21 21 - 32 mmol/L    Anion Gap 14 10 - 20 mmol/L    Urea Nitrogen 92 (HH) 6 - 23 mg/dL    Creatinine 6.60 (H) 0.50 - 1.05 mg/dL    eGFR 5 (L) >60 mL/min/1.73m*2    Calcium 7.8 (L) 8.6 - 10.3 mg/dL    Phosphorus 7.2 (H) 2.5 - 4.9 mg/dL    Albumin 2.6 (L) 3.4 - 5.0 g/dL   POCT GLUCOSE   Result Value Ref Range    POCT Glucose 65 (L) 74 - 99 mg/dL   POCT GLUCOSE   Result Value Ref Range    POCT Glucose 100 (H) 74 - 99 mg/dL   POCT GLUCOSE   Result Value Ref Range    POCT Glucose 101 (H) 74 - 99 mg/dL       Imaging Results  US guided percutaneous biopsy renal left   Final Result   Ultrasound-guided percutaneous non targeted/random renal biopsy, 3 x   18 gauge core needle biopsy specimens obtained of the inferior left   renal subcortical parenchyma.             MACRO:   None        Signed by: Devon Teixeira 11/15/2024 4:52 PM   Dictation workstation:   [id number]      XR chest 2 views   Final Result   Findings suspicious for ongoing CHF.        Left pleural effusion.        Hazy opacities in the lungs, most pronounced at the posteromedial   left lung base. Atelectasis versus pneumonia versus  asymmetric   pulmonary edema        MACRO:   None        Signed by: Adam Javier 11/11/2024 2:58 PM   Dictation workstation:   [id number]      CT head wo IV contrast   Final Result   No acute intracranial hemorrhage or mass-effect.        MACRO:   None.        Signed by: Loly Albarran 11/6/2024 1:44 PM   Dictation workstation:   [id number]      CT abdomen pelvis wo IV contrast   Final Result   Right colon wall thickening consistent with nonspecific colitis.        Small amount of free fluid with mild edematous changes in the lung   bases, mesentery and soft tissues as well as trace pleural   effusions/thickening consistent with fluid overload/CHF.        Subtle density layer in the gallbladder, possible layering   noncalcified stones or sludge.        Additional findings as described above.        MACRO:   None.         Signed by: Loly Albarran 11/6/2024 1:52 PM   Dictation workstation:   [id number]      XR chest 1 view   Final Result   No acute cardiopulmonary process.   Enlarged but unchanged heart compared to 06/12/2024.        MACRO:   None        Signed by: Yokasta Jackson 11/6/2024 11:42 AM   Dictation workstation:   [id number]          Medications:  [Held by provider] aspirin, 81 mg, oral, Daily  atorvastatin, 10 mg, oral, Nightly  brimonidine, 1 drop, Both Eyes, BID  carvedilol, 25 mg, oral, BID  doxazosin, 4 mg, oral, Daily  heparin (porcine), 5,000 Units, subcutaneous, q8h TIP  [Held by provider] hydrALAZINE, 100 mg, oral, TID  [Held by provider] insulin NPH (Isophane), 6 Units, subcutaneous, BID AC  latanoprost, 1 drop, Both Eyes, Nightly  NIFEdipine ER, 90 mg, oral, Daily before breakfast  sodium bicarbonate, 650 mg, oral, BID  [Held by provider] spironolactone, 25 mg, oral, q24h TIP  timolol, 1 drop, Both Eyes, Daily       PRN medications: acetaminophen **OR** acetaminophen **OR** acetaminophen, dextrose, dextrose, glucagon, glucagon, hydrALAZINE, ondansetron, sennosides-docusate sodium        Assessment/Plan          Principal Problem:    Colitis    1.  Accelerated hypertension resolved   -Improved pressures overall, nephro would like SBP <140  -Coreg, held for bradycardia   -Doxazosin  -Nifedipine      2.  Acute kidney injury on CKD 4  -creat  at 6.6  -As well as renal biopsy pathology results do not reveal proliferative glomerulonephritis, family opted not to have dialysis in the future as well as the patient, therefore hospice was consulted    3. DM  -continue SSI and NPH     4. Fever  -one episode early in admission  -work up negative    5. Hyperkalemia  - Resolved    Hospice consult pending    DVT Prophylaxis:  Heparin subq        Julianne Elise MD  [facility]

## 2024-11-21 NOTE — PROGRESS NOTES
"Carolann Cartagena is a 89 y.o. female on day 15 of admission presenting with Colitis.      Subjective   No overnight events.  I spoke with the [facility], the biopsy finds primarily diabetic scarring, no active proliferative glomerulonephritis.  I spoke with her daughters this morning.     Objective        Vitals 24HR  Heart Rate:  [38-52]   Temp:  [35.7 °C (96.2 °F)-36.7 °C (98.1 °F)]   Resp:  [16-18]   BP: (109-177)/(42-67)   Height:  [162.6 cm (5' 4.02\")]   Weight:  [72.6 kg (160 lb)]   SpO2:  [96 %-100 %]     Intake/Output last 3 Shifts:    Intake/Output Summary (Last 24 hours) at 11/21/2024 1029  Last data filed at 11/21/2024 0600  Gross per 24 hour   Intake 240 ml   Output 500 ml   Net -260 ml         Physical Exam  Constitutional: Well developed, awake/alert/oriented  x3, no distress, alert and cooperative   Eyes: PERRL, EOMI, clear sclera   ENMT: mucous membranes moist, no apparent injury, no lesions seen   Head/Neck: Neck supple, no JVD, trachea midline   Respiratory/Thorax: CTAB   Cardiovascular: Regular, rate and rhythm, systolic  murmur, normal S 1 and S 2   Gastrointestinal: Nondistended, soft, non-tender, no rebound tenderness or guarding   Musculoskeletal: ROM intact, no joint swelling, normal  strength   Extremities: normal extremities, pitting thigh edema   Neurological: alert and oriented x3  No asterixis   Skin: Warm and dry, no lesions, no rashes     Scheduled Medications  [Held by provider] aspirin, 81 mg, oral, Daily  atorvastatin, 10 mg, oral, Nightly  brimonidine, 1 drop, Both Eyes, BID  carvedilol, 25 mg, oral, BID  doxazosin, 4 mg, oral, Daily  heparin (porcine), 5,000 Units, subcutaneous, q8h TIP  [Held by provider] hydrALAZINE, 100 mg, oral, TID  [Held by provider] insulin NPH (Isophane), 6 Units, subcutaneous, BID AC  latanoprost, 1 drop, Both Eyes, Nightly  NIFEdipine ER, 90 mg, oral, Daily before breakfast  sodium bicarbonate, 650 mg, oral, BID  [Held by provider] spironolactone, 25 " mg, oral, q24h TIP  timolol, 1 drop, Both Eyes, Daily      Continuous medications     PRN medications: acetaminophen **OR** acetaminophen **OR** acetaminophen, dextrose, dextrose, glucagon, glucagon, hydrALAZINE, ondansetron, sennosides-docusate sodium     Relevant Results  Results from last 7 days   Lab Units 11/21/24  0647   WBC AUTO x10*3/uL 4.2*   HEMOGLOBIN g/dL 9.0*   HEMATOCRIT % 28.6*   PLATELETS AUTO x10*3/uL 263       Results from last 7 days   Lab Units 11/21/24  0647 11/20/24  0533 11/19/24  0705   SODIUM mmol/L 127* 127* 128*   POTASSIUM mmol/L 5.2 4.9 4.9   CHLORIDE mmol/L 97* 97* 99   CO2 mmol/L 21 21 21   BUN mg/dL 92* 89* 88*   CREATININE mg/dL 6.60* 6.14* 6.12*   GLUCOSE mg/dL 47* 61* 92   CALCIUM mg/dL 7.8* 7.6* 7.3*       US guided percutaneous biopsy renal left   Final Result   Ultrasound-guided percutaneous non targeted/random renal biopsy, 3 x   18 gauge core needle biopsy specimens obtained of the inferior left   renal subcortical parenchyma.             MACRO:   None        Signed by: Devon Teixeira 11/15/2024 4:52 PM   Dictation workstation:   [id number]      XR chest 2 views   Final Result   Findings suspicious for ongoing CHF.        Left pleural effusion.        Hazy opacities in the lungs, most pronounced at the posteromedial   left lung base. Atelectasis versus pneumonia versus  asymmetric   pulmonary edema        MACRO:   None        Signed by: Adam Javier 11/11/2024 2:58 PM   Dictation workstation:   [id number]      CT head wo IV contrast   Final Result   No acute intracranial hemorrhage or mass-effect.        MACRO:   None.        Signed by: Loly Albarran 11/6/2024 1:44 PM   Dictation workstation:   [id number]      CT abdomen pelvis wo IV contrast   Final Result   Right colon wall thickening consistent with nonspecific colitis.        Small amount of free fluid with mild edematous changes in the lung   bases, mesentery and soft tissues as well as trace pleural    effusions/thickening consistent with fluid overload/CHF.        Subtle density layer in the gallbladder, possible layering   noncalcified stones or sludge.        Additional findings as described above.        MACRO:   None.        Signed by: Loly Albarran 11/6/2024 1:52 PM   Dictation workstation:   [id number]      XR chest 1 view   Final Result   No acute cardiopulmonary process.   Enlarged but unchanged heart compared to 06/12/2024.        MACRO:   None        Signed by: Yokasta Jackson 11/6/2024 11:42 AM   Dictation workstation:   [id number]               Assessment/Plan      Carolann Cartagena is a 89 y.o. female with a past medical history of hypertension, hyperlipidemia, diabetes, chronic anemia, IgG kappa monoclonal protein, chronic hyponatremia, HFpEF, moderate concentric LVH, mild aortic stenosis and mild to moderate pulmonary hypertension, and G4/A3 chronic kidney disease with a fluctuating creatinine baseline between 1.8 up to 2.3 mg/dL who presented with nausea, emesis and suboptimally controlled hypertension.  Reportedly the nausea/emesis occurred just prior to presentation.  Workup was notable for right colon wall thickening consistent with nonspecific colitis noted on noncontrast CT abdominal imaging. There was no renal obstruction. Her chest plain film was clear.  BNP was 400.  Her urinalysis was notable for proteinuria, blood, leuks, WBCs and RBCs.  Urine culture was sent, no growth was noted.  Her creatinine was 2.76 upon presentation with further decline to 4.1 despite IV volume expansion. Nephrology is consulted for acute kidney injury.    Etiology of acute kidney injury initially was elusive.  Blood pressures were not low but rather high upon presentation.  No contrast exposure. She denied anti-inflammatory use.  She reports essentially 1 episode of nausea with emesis prior to presentation. She is prescribed spironolactone which was appropriately held.  Blood and urine cultures were without  growth. Given the hematuria/proteinuria on her urinalysis and worsening proteinuria having a urine protein to creatinine ratio of 2.7 g, we sent a serologic workup. We also noted Hydralazine use which we held. She has a reduced C3.  C4 is normal. No urine eosinophils present. DYAN is now positive with a titer of 1:2560 along with a positive ds DNA. Antihistone antibody is pending. P-ANCA is also positive (titer greater than 1:1280), MPO+ ~ consistent with microscopic polyangiitis.  In addition to her current findings she has elevated serum free kappa and lambda light chains with an M protein of 0.55 g/dL. But given the positive ANCA and DYAN this would point toward hydralazine.     The biopsy did not reveal a proliferative GN despite the antibody findings.  The hydralazine is likely led to the antibodies but there will be no need to consider immune suppression.  I sat with her, she feels that dialysis would not be prudent.  She has set I talk to her daughters.  I conference called with Heather and Martha.  Yamile was not available.  They agree with her mother and they want to respect her wishes.  I will consult Dr. Hansen, I hope to work toward a hospice meeting ASAP.      Principal Problem:    Colitis  Acute kidney injury  +ANCA   Hypertension  Proteinuria  Hyponatremia  Hypervolemia     I spent 50 minutes in the professional and overall care of this patient.      Adam Leonard MD

## 2024-11-22 VITALS
SYSTOLIC BLOOD PRESSURE: 138 MMHG | BODY MASS INDEX: 27.31 KG/M2 | OXYGEN SATURATION: 96 % | DIASTOLIC BLOOD PRESSURE: 41 MMHG | RESPIRATION RATE: 16 BRPM | WEIGHT: 160 LBS | HEIGHT: 64 IN | HEART RATE: 42 BPM | TEMPERATURE: 98.4 F

## 2024-11-22 LAB
GLUCOSE BLD MANUAL STRIP-MCNC: 100 MG/DL (ref 74–99)
GLUCOSE BLD MANUAL STRIP-MCNC: 124 MG/DL (ref 74–99)
GLUCOSE BLD MANUAL STRIP-MCNC: 179 MG/DL (ref 74–99)
GLUCOSE BLD MANUAL STRIP-MCNC: 187 MG/DL (ref 74–99)

## 2024-11-22 PROCEDURE — 97116 GAIT TRAINING THERAPY: CPT | Mod: GP,CQ

## 2024-11-22 PROCEDURE — 99238 HOSP IP/OBS DSCHRG MGMT 30/<: CPT | Performed by: INTERNAL MEDICINE

## 2024-11-22 PROCEDURE — 82947 ASSAY GLUCOSE BLOOD QUANT: CPT

## 2024-11-22 PROCEDURE — 2500000004 HC RX 250 GENERAL PHARMACY W/ HCPCS (ALT 636 FOR OP/ED): Performed by: NURSE PRACTITIONER

## 2024-11-22 PROCEDURE — 2500000002 HC RX 250 W HCPCS SELF ADMINISTERED DRUGS (ALT 637 FOR MEDICARE OP, ALT 636 FOR OP/ED): Performed by: INTERNAL MEDICINE

## 2024-11-22 PROCEDURE — 99232 SBSQ HOSP IP/OBS MODERATE 35: CPT | Performed by: NURSE PRACTITIONER

## 2024-11-22 PROCEDURE — 2500000001 HC RX 250 WO HCPCS SELF ADMINISTERED DRUGS (ALT 637 FOR MEDICARE OP): Performed by: INTERNAL MEDICINE

## 2024-11-22 PROCEDURE — 1100000001 HC PRIVATE ROOM DAILY

## 2024-11-22 PROCEDURE — 2500000001 HC RX 250 WO HCPCS SELF ADMINISTERED DRUGS (ALT 637 FOR MEDICARE OP): Performed by: NURSE PRACTITIONER

## 2024-11-22 RX ORDER — NIFEDIPINE 90 MG/1
90 TABLET, EXTENDED RELEASE ORAL
Qty: 30 TABLET | Refills: 0 | Status: SHIPPED | OUTPATIENT
Start: 2024-11-23 | End: 2024-12-23

## 2024-11-22 RX ORDER — HALOPERIDOL LACTATE 5 MG/ML
1 INJECTION, SOLUTION INTRAMUSCULAR EVERY 6 HOURS PRN
Status: DISCONTINUED | OUTPATIENT
Start: 2024-11-22 | End: 2024-11-23 | Stop reason: HOSPADM

## 2024-11-22 RX ADMIN — CARVEDILOL 25 MG: 12.5 TABLET, FILM COATED ORAL at 09:48

## 2024-11-22 RX ADMIN — SODIUM BICARBONATE 650 MG: 650 TABLET ORAL at 09:48

## 2024-11-22 RX ADMIN — LATANOPROST 1 DROP: 50 SOLUTION/ DROPS OPHTHALMIC at 21:52

## 2024-11-22 RX ADMIN — BRIMONIDINE TARTRATE 1 DROP: 2 SOLUTION/ DROPS OPHTHALMIC at 10:37

## 2024-11-22 RX ADMIN — NIFEDIPINE 90 MG: 30 TABLET, FILM COATED, EXTENDED RELEASE ORAL at 06:54

## 2024-11-22 RX ADMIN — SODIUM BICARBONATE 650 MG: 650 TABLET ORAL at 21:50

## 2024-11-22 RX ADMIN — ACETAMINOPHEN 650 MG: 325 TABLET, FILM COATED ORAL at 16:24

## 2024-11-22 RX ADMIN — HEPARIN SODIUM 5000 UNITS: 5000 INJECTION INTRAVENOUS; SUBCUTANEOUS at 09:49

## 2024-11-22 RX ADMIN — BRIMONIDINE TARTRATE 1 DROP: 2 SOLUTION/ DROPS OPHTHALMIC at 21:51

## 2024-11-22 RX ADMIN — HEPARIN SODIUM 5000 UNITS: 5000 INJECTION INTRAVENOUS; SUBCUTANEOUS at 01:58

## 2024-11-22 RX ADMIN — DOXAZOSIN 4 MG: 2 TABLET ORAL at 09:48

## 2024-11-22 RX ADMIN — ATORVASTATIN CALCIUM 10 MG: 20 TABLET ORAL at 21:51

## 2024-11-22 RX ADMIN — TIMOLOL MALEATE 1 DROP: 5 SOLUTION/ DROPS OPHTHALMIC at 10:38

## 2024-11-22 ASSESSMENT — COGNITIVE AND FUNCTIONAL STATUS - GENERAL
DRESSING REGULAR UPPER BODY CLOTHING: A LITTLE
WALKING IN HOSPITAL ROOM: A LITTLE
MOVING FROM LYING ON BACK TO SITTING ON SIDE OF FLAT BED WITH BEDRAILS: A LITTLE
MOBILITY SCORE: 16
CLIMB 3 TO 5 STEPS WITH RAILING: A LITTLE
CLIMB 3 TO 5 STEPS WITH RAILING: TOTAL
MOVING FROM LYING ON BACK TO SITTING ON SIDE OF FLAT BED WITH BEDRAILS: A LITTLE
PERSONAL GROOMING: A LITTLE
DRESSING REGULAR LOWER BODY CLOTHING: A LOT
WALKING IN HOSPITAL ROOM: A LITTLE
STANDING UP FROM CHAIR USING ARMS: A LITTLE
MOBILITY SCORE: 18
HELP NEEDED FOR BATHING: A LITTLE
TURNING FROM BACK TO SIDE WHILE IN FLAT BAD: A LITTLE
STANDING UP FROM CHAIR USING ARMS: A LITTLE
TURNING FROM BACK TO SIDE WHILE IN FLAT BAD: A LITTLE
MOVING TO AND FROM BED TO CHAIR: A LITTLE
MOVING TO AND FROM BED TO CHAIR: A LITTLE
TOILETING: A LITTLE
DAILY ACTIVITIY SCORE: 18

## 2024-11-22 ASSESSMENT — PAIN - FUNCTIONAL ASSESSMENT
PAIN_FUNCTIONAL_ASSESSMENT: 0-10
PAIN_FUNCTIONAL_ASSESSMENT: 0-10

## 2024-11-22 ASSESSMENT — PAIN DESCRIPTION - LOCATION: LOCATION: BUTTOCKS

## 2024-11-22 ASSESSMENT — PAIN SCALES - GENERAL
PAINLEVEL_OUTOF10: 3
PAINLEVEL_OUTOF10: 0 - NO PAIN
PAINLEVEL_OUTOF10: 0 - NO PAIN

## 2024-11-22 ASSESSMENT — PAIN SCALES - PAIN ASSESSMENT IN ADVANCED DEMENTIA (PAINAD): TOTALSCORE: MEDICATION (SEE MAR)

## 2024-11-22 NOTE — PROGRESS NOTES
Physical Therapy    Physical Therapy Treatment    Patient Name: Carolann Cartagena  MRN: [id number]  Department: [place] [id number]  Room: [address] [place] [address]  Today's Date: 11/22/2024  Time Calculation  Start Time: 1336  Stop Time: 1350  Time Calculation (min): 14 min         Assessment/Plan   PT Assessment  End of Session Communication: Bedside nurse  Assessment Comment: Pt completed gait training at today's visit.  End of Session Patient Position: Bed, 3 rail up, Alarm on  PT Plan  Inpatient/Swing Bed or Outpatient: Inpatient  PT Plan  Treatment/Interventions: Bed mobility, Transfer training, Gait training  PT Plan: Ongoing PT  PT Frequency: 3 times per week  PT Discharge Recommendations: Moderate intensity level of continued care  PT Recommended Transfer Status: Assist x1  PT - OK to Discharge: Yes (per POC)      General Visit Information:   PT  Visit  PT Received On: 11/22/24  General  Reason for Referral: 89 y/o female admitted with colitis, c/o nausea, vomiting and Htn.  Referred By: Dr Elise  Missed Visit: Yes  Missed Visit Reason: Other (Comment) (Pt currently in a hospice meeting)  Prior to Session Communication: Bedside nurse  Patient Position Received: Bed, 3 rail up, Alarm on  Preferred Learning Style: auditory, kinesthetic, verbal  General Comment: Pt supine in bed upon arrival, agreeable to tx.    Subjective   Precautions:  Precautions  Medical Precautions: Fall precautions    Objective   Pain:  Pain Assessment  Pain Assessment: 0-10  0-10 (Numeric) Pain Score: 0 - No pain  Cognition:  Cognition  Overall Cognitive Status: Within Functional Limits  Treatments:    Bed Mobility  Bed Mobility: Yes  Bed Mobility 1  Bed Mobility 1: Supine to sitting, Sitting to supine  Level of Assistance 1: Contact guard  Bed Mobility Comments 1: Pt performed with HOB elevated and with cues given for proper sequencing.    Ambulation/Gait Training  Ambulation/Gait Training Performed: Yes  Ambulation/Gait Training 1  Surface 1: Level tile  Device 1:  Rolling walker  Gait Support Devices: Gait belt  Assistance 1: Contact guard  Quality of Gait 1: Forward flexed posture, Decreased step length  Comments/Distance (ft) 1: Pt ambulated 20ftx2 with cues given for upright posture and forward gaze. Pt reporting dizziness and demonstrating fatigue, further ambulation deferred.    Transfers  Transfer: Yes  Transfer 1  Technique 1: Sit to stand, Stand to sit  Transfer Device 1: Walker  Transfer Level of Assistance 1: Contact guard  Trials/Comments 1: Pt performed with safety cues given for hand placement before sitting and standing.  Outcome Measures:  [facility] Basic Mobility  Turning from your back to your side while in a flat bed without using bedrails: A little  Moving from lying on your back to sitting on the side of a flat bed without using bedrails: A little  Moving to and from bed to chair (including a wheelchair): A little  Standing up from a chair using your arms (e.g. wheelchair or bedside chair): A little  To walk in hospital room: A little  Climbing 3-5 steps with railing: Total  Basic Mobility - Total Score: 16    Education Documentation  Body Mechanics, taught by Markus Jon PTA at 11/22/2024  2:18 PM.  Learner: Patient  Readiness: Acceptance  Method: Explanation  Response: Verbalizes Understanding    Mobility Training, taught by Markus Jon PTA at 11/22/2024  2:18 PM.  Learner: Patient  Readiness: Acceptance  Method: Explanation  Response: Verbalizes Understanding    Education Comments  No comments found.        OP EDUCATION:  Outpatient Education  Education Comment: educated pt on importance of OOB activity    Encounter Problems       Encounter Problems (Active)       Balance       complete all mobility with normal balance while dual tasking, negotiating in a dynamic environment, carrying items, etc., with proactive and reactive static and dynamic standing and sitting tasks, with mod I and RW >15 minutes.         Start:  11/07/24    Expected End:   11/21/24               Mobility       STG - Patient will ambulate 150 ft mod I with a RW.  (Progressing)       Start:  11/07/24    Expected End:  11/21/24            Pt will tolerate 15 reps of each LE exercise in order to improve strength and endurance.   (Progressing)       Start:  11/07/24    Expected End:  11/21/24               PT Transfers       STG - Patient will perform bed mobility independently.  (Progressing)       Start:  11/07/24    Expected End:  11/21/24            STG - Patient will transfer sit to and from stand mod I using RW.  (Progressing)       Start:  11/07/24    Expected End:  11/21/24               Pain - Adult

## 2024-11-22 NOTE — CARE PLAN
PASRR submitted. Team told. Facility  told    [facility]  informed that Medicaid no is  [phone]    CAT Prater, LSW

## 2024-11-22 NOTE — PROGRESS NOTES
"Carolann Cartagena is a 89 y.o. female on day 16 of admission presenting with Colitis.      Interval Hx and Subjective    Signing onto hospice today.  Citing nausea without emesis today.  + BM yesterday, does endorse some epigastric discomfort       Objective  Blood pressure 147/58, pulse (!) 39, temperature 36.7 °C (98.1 °F), temperature source Temporal, resp. rate 18, height 1.626 m (5' 4.02\"), weight 72.6 kg (160 lb), SpO2 98%.    General:  appears uncomfortable but not in extremis  Neuro: alert, oriented to situation.  No focal findings  Psych:  normal affect, engaged, cooperative  HEENT:  oral mucosa moist without exudate, tongue midline.  PERRLA, no discharge.  No thyromegaly, no LAD  Resps:  resps unlabored, lungs CTA, no cough noted  Card:  RRR, no murmurs, rubs, or gallops.  No JVD noted  GI:  normal bowel sounds, soft and non tender  :  deferred  MS:  No injury or deformity noted  Integ:  skin warm and dry overall              No results found.   Results for orders placed or performed during the hospital encounter of 11/06/24 (from the past 24 hours)   POCT GLUCOSE   Result Value Ref Range    POCT Glucose 101 (H) 74 - 99 mg/dL   POCT GLUCOSE   Result Value Ref Range    POCT Glucose 149 (H) 74 - 99 mg/dL   POCT GLUCOSE   Result Value Ref Range    POCT Glucose 155 (H) 74 - 99 mg/dL   POCT GLUCOSE   Result Value Ref Range    POCT Glucose 100 (H) 74 - 99 mg/dL   POCT GLUCOSE   Result Value Ref Range    POCT Glucose 124 (H) 74 - 99 mg/dL      Scheduled medications  [Held by provider] aspirin, 81 mg, oral, Daily  atorvastatin, 10 mg, oral, Nightly  brimonidine, 1 drop, Both Eyes, BID  carvedilol, 25 mg, oral, BID  doxazosin, 4 mg, oral, Daily  heparin (porcine), 5,000 Units, subcutaneous, q8h TIP  [Held by provider] hydrALAZINE, 100 mg, oral, TID  [Held by provider] insulin NPH (Isophane), 6 Units, subcutaneous, BID AC  latanoprost, 1 drop, Both Eyes, Nightly  NIFEdipine ER, 90 mg, oral, Daily before " breakfast  sodium bicarbonate, 650 mg, oral, BID  [Held by provider] spironolactone, 25 mg, oral, q24h TIP  timolol, 1 drop, Both Eyes, Daily      Continuous medications     PRN medications  PRN medications: acetaminophen **OR** acetaminophen **OR** acetaminophen, dextrose, dextrose, glucagon, glucagon, haloperidol lactate, hydrALAZINE, ondansetron, sennosides-docusate sodium     Dietary Orders (From admission, onward)       Start     Ordered    11/15/24 1402  Adult diet Regular  Diet effective now        Question:  Diet type  Answer:  Regular    11/15/24 1401    11/06/24 2126  May Participate in Room Service  ( ROOM SERVICE MAY PARTICIPATE)  Once        Question:  .  Answer:  Yes    11/06/24 2125                     Assessment/Plan    Carolann Cartagena is a 89 y.o. female with past medical history of DM II with neuropathy, HTN, HLD, tubular adenoma of colon, monoclonal gammopathy, chronic anemia, chronic hyponatremia, HFpEF, lymphedema, GERD, CKD IV who presented to our ED with ABD pain, N/V on 11/6/24 and dx with colitis and acute renal failure.  A renal bx was done 11/13 and path still pending.  Her kidney function continues to worsen and palliative med consulted to discuss goals of care.  She is a [employer] and does not accept blood products.  Palliative medicine consulted for goals of care     Goals:  patient signed hospice consents, plan for transfer to ECF later today  -code status changed to DNR/DNI  -[place] Portable form for transport     ESRD with likely uremia related nausea.  Family reports ondansetron not really helping  -ordered haloperidol 1 mg IV Q 6 hours     Pain right hip, presumably OA, no trauma, denies at present  -continue APAP, heat     Thank you for this consult, palliative care will continue to follow        Louise Looney APRN CNP

## 2024-11-22 NOTE — CARE PLAN
The patient's goals for the shift include      The clinical goals for the shift include PATIENT WILL REMAIN SAFE      Problem: Safety - Adult  Goal: Free from fall injury  Outcome: Progressing

## 2024-11-22 NOTE — PROGRESS NOTES
PALLIATIVE CARE SOCIAL WORK NOTE     Pt and her daughters met with [facility] this morning. Hospice consents signed. Plan will be for pt to transfer to [facility] this evening. She will be going in long term care with [facility] following. Arrangements per [facility] - transport has been arranged for an 8:00 pm . PASRR has been completed by CHIP Bundy. Thank you.    PRICILA Mendez

## 2024-11-22 NOTE — PROGRESS NOTES
"Carolann Cartagena is a 89 y.o. female on day 16 of admission presenting with Colitis.    Subjective   Pending family meeting at 11       Objective     VITALS  Blood pressure 165/55, pulse 52, temperature 36.7 °C (98.1 °F), temperature source Temporal, resp. rate 18, height 1.626 m (5' 4.02\"), weight 72.6 kg (160 lb), SpO2 99%.  Physical Exam  Vitals reviewed.   Constitutional:       Appearance: Normal appearance.   HENT:      Head: Normocephalic.      Nose: Nose normal.   Cardiovascular:      Rate and Rhythm: Normal rate and regular rhythm.      Pulses: Normal pulses.      Heart sounds: Normal heart sounds.   Abdominal:      General: Abdomen is flat. Bowel sounds are normal.      Palpations: Abdomen is soft.   Skin:     General: Skin is warm and dry.      Capillary Refill: Capillary refill takes less than 2 seconds.   Neurological:      General: No focal deficit present.      Mental Status: She is alert and oriented to person, place, and time.           Intake/Output last 3 Shifts:  I/O last 3 completed shifts:  In: 480 (6.6 mL/kg) [P.O.:480]  Out: 600 (8.3 mL/kg) [Urine:600 (0.2 mL/kg/hr)]  Weight: 72.6 kg     Relevant Results  Results for orders placed or performed during the hospital encounter of 11/06/24 (from the past 24 hours)   POCT GLUCOSE   Result Value Ref Range    POCT Glucose 101 (H) 74 - 99 mg/dL   POCT GLUCOSE   Result Value Ref Range    POCT Glucose 149 (H) 74 - 99 mg/dL   POCT GLUCOSE   Result Value Ref Range    POCT Glucose 155 (H) 74 - 99 mg/dL   POCT GLUCOSE   Result Value Ref Range    POCT Glucose 100 (H) 74 - 99 mg/dL     *Note: Due to a large number of results and/or encounters for the requested time period, some results have not been displayed. A complete set of results can be found in Results Review.       Imaging Results  US guided percutaneous biopsy renal left   Final Result   Ultrasound-guided percutaneous non targeted/random renal biopsy, 3 x   18 gauge core needle biopsy specimens obtained " of the inferior left   renal subcortical parenchyma.             MACRO:   None        Signed by: Devon Teixeira 11/15/2024 4:52 PM   Dictation workstation:   [id number]      XR chest 2 views   Final Result   Findings suspicious for ongoing CHF.        Left pleural effusion.        Hazy opacities in the lungs, most pronounced at the posteromedial   left lung base. Atelectasis versus pneumonia versus  asymmetric   pulmonary edema        MACRO:   None        Signed by: Adam Javier 11/11/2024 2:58 PM   Dictation workstation:   [id number]      CT head wo IV contrast   Final Result   No acute intracranial hemorrhage or mass-effect.        MACRO:   None.        Signed by: Loly Albarran 11/6/2024 1:44 PM   Dictation workstation:   [id number]      CT abdomen pelvis wo IV contrast   Final Result   Right colon wall thickening consistent with nonspecific colitis.        Small amount of free fluid with mild edematous changes in the lung   bases, mesentery and soft tissues as well as trace pleural   effusions/thickening consistent with fluid overload/CHF.        Subtle density layer in the gallbladder, possible layering   noncalcified stones or sludge.        Additional findings as described above.        MACRO:   None.        Signed by: Loly Albarran 11/6/2024 1:52 PM   Dictation workstation:   [id number]      XR chest 1 view   Final Result   No acute cardiopulmonary process.   Enlarged but unchanged heart compared to 06/12/2024.        MACRO:   None        Signed by: Yokasta Jackson 11/6/2024 11:42 AM   Dictation workstation:   [id number]          Medications:  [Held by provider] aspirin, 81 mg, oral, Daily  atorvastatin, 10 mg, oral, Nightly  brimonidine, 1 drop, Both Eyes, BID  carvedilol, 25 mg, oral, BID  doxazosin, 4 mg, oral, Daily  heparin (porcine), 5,000 Units, subcutaneous, q8h TIP  [Held by provider] hydrALAZINE, 100 mg, oral, TID  [Held by provider] insulin NPH (Isophane), 6 Units, subcutaneous, BID  AC  latanoprost, 1 drop, Both Eyes, Nightly  NIFEdipine ER, 90 mg, oral, Daily before breakfast  sodium bicarbonate, 650 mg, oral, BID  [Held by provider] spironolactone, 25 mg, oral, q24h TIP  timolol, 1 drop, Both Eyes, Daily       PRN medications: acetaminophen **OR** acetaminophen **OR** acetaminophen, dextrose, dextrose, glucagon, glucagon, hydrALAZINE, ondansetron, sennosides-docusate sodium        Assessment/Plan          Principal Problem:    Colitis    1.  Accelerated hypertension resolved   -Improved pressures overall, nephro would like SBP <140  -Coreg, held for bradycardia   -Doxazosin  -Nifedipine      2.  Acute kidney injury on CKD 4  -creat  at 6.6  -hospice meeting at 11am    3. DM  -continue SSI and NPH     4. Fever  -one episode early in admission  -work up negative    5. Hyperkalemia  - Resolved    Hospice meeting this morning    DVT Prophylaxis:  Heparin subq        Julianne Elise MD  [facility] Medicine

## 2024-11-22 NOTE — PROGRESS NOTES
Carolann Cartagena is a 89 y.o. female on day 16 of admission presenting with Colitis.      Subjective   No overnight events.  I spoke with the [facility], the biopsy finds primarily diabetic scarring, no active proliferative glomerulonephritis.  No overnight events   Objective        Vitals 24HR  Heart Rate:  [40-51]   Temp:  [35.6 °C (96 °F)-36.7 °C (98 °F)]   Resp:  [17-18]   BP: (128-152)/(54-74)   SpO2:  [95 %-100 %]     Intake/Output last 3 Shifts:    Intake/Output Summary (Last 24 hours) at 11/22/2024 0738  Last data filed at 11/22/2024 0439  Gross per 24 hour   Intake 240 ml   Output 300 ml   Net -60 ml         Physical Exam  Constitutional: Well developed, awake/alert/oriented  x3, no distress, alert and cooperative   Eyes: PERRL, EOMI, clear sclera   ENMT: mucous membranes moist, no apparent injury, no lesions seen   Head/Neck: Neck supple, no JVD, trachea midline   Respiratory/Thorax: CTAB   Cardiovascular: Regular, rate and rhythm, systolic  murmur, normal S 1 and S 2   Gastrointestinal: Nondistended, soft, non-tender, no rebound tenderness or guarding   Musculoskeletal: ROM intact, no joint swelling, normal  strength   Extremities: normal extremities, pitting thigh edema   Neurological: alert and oriented x3  No asterixis   Skin: Warm and dry, no lesions, no rashes     Scheduled Medications  [Held by provider] aspirin, 81 mg, oral, Daily  atorvastatin, 10 mg, oral, Nightly  brimonidine, 1 drop, Both Eyes, BID  carvedilol, 25 mg, oral, BID  doxazosin, 4 mg, oral, Daily  heparin (porcine), 5,000 Units, subcutaneous, q8h TIP  [Held by provider] hydrALAZINE, 100 mg, oral, TID  [Held by provider] insulin NPH (Isophane), 6 Units, subcutaneous, BID AC  latanoprost, 1 drop, Both Eyes, Nightly  NIFEdipine ER, 90 mg, oral, Daily before breakfast  sodium bicarbonate, 650 mg, oral, BID  [Held by provider] spironolactone, 25 mg, oral, q24h TIP  timolol, 1 drop, Both Eyes, Daily      Continuous medications     PRN  medications: acetaminophen **OR** acetaminophen **OR** acetaminophen, dextrose, dextrose, glucagon, glucagon, hydrALAZINE, ondansetron, sennosides-docusate sodium     Relevant Results  Results from last 7 days   Lab Units 11/21/24  0647   WBC AUTO x10*3/uL 4.2*   HEMOGLOBIN g/dL 9.0*   HEMATOCRIT % 28.6*   PLATELETS AUTO x10*3/uL 263       Results from last 7 days   Lab Units 11/21/24  0647 11/20/24  0533 11/19/24  0705   SODIUM mmol/L 127* 127* 128*   POTASSIUM mmol/L 5.2 4.9 4.9   CHLORIDE mmol/L 97* 97* 99   CO2 mmol/L 21 21 21   BUN mg/dL 92* 89* 88*   CREATININE mg/dL 6.60* 6.14* 6.12*   GLUCOSE mg/dL 47* 61* 92   CALCIUM mg/dL 7.8* 7.6* 7.3*       US guided percutaneous biopsy renal left   Final Result   Ultrasound-guided percutaneous non targeted/random renal biopsy, 3 x   18 gauge core needle biopsy specimens obtained of the inferior left   renal subcortical parenchyma.             MACRO:   None        Signed by: Devon Teixeira 11/15/2024 4:52 PM   Dictation workstation:   [id number]      XR chest 2 views   Final Result   Findings suspicious for ongoing CHF.        Left pleural effusion.        Hazy opacities in the lungs, most pronounced at the posteromedial   left lung base. Atelectasis versus pneumonia versus  asymmetric   pulmonary edema        MACRO:   None        Signed by: Adam Javier 11/11/2024 2:58 PM   Dictation workstation:   [id number]      CT head wo IV contrast   Final Result   No acute intracranial hemorrhage or mass-effect.        MACRO:   None.        Signed by: Loly Albarran 11/6/2024 1:44 PM   Dictation workstation:   [id number]      CT abdomen pelvis wo IV contrast   Final Result   Right colon wall thickening consistent with nonspecific colitis.        Small amount of free fluid with mild edematous changes in the lung   bases, mesentery and soft tissues as well as trace pleural   effusions/thickening consistent with fluid overload/CHF.        Subtle density layer in the  gallbladder, possible layering   noncalcified stones or sludge.        Additional findings as described above.        MACRO:   None.        Signed by: Loly Albarran 11/6/2024 1:52 PM   Dictation workstation:   [id number]      XR chest 1 view   Final Result   No acute cardiopulmonary process.   Enlarged but unchanged heart compared to 06/12/2024.        MACRO:   None        Signed by: Yokasta Jackson 11/6/2024 11:42 AM   Dictation workstation:   [id number]               Assessment/Plan      Carolann Cartagena is a 89 y.o. female with a past medical history of hypertension, hyperlipidemia, diabetes, chronic anemia, IgG kappa monoclonal protein, chronic hyponatremia, HFpEF, moderate concentric LVH, mild aortic stenosis and mild to moderate pulmonary hypertension, and G4/A3 chronic kidney disease with a fluctuating creatinine baseline between 1.8 up to 2.3 mg/dL who presented with nausea, emesis and suboptimally controlled hypertension.  Reportedly the nausea/emesis occurred just prior to presentation.  Workup was notable for right colon wall thickening consistent with nonspecific colitis noted on noncontrast CT abdominal imaging. There was no renal obstruction. Her chest plain film was clear.  BNP was 400.  Her urinalysis was notable for proteinuria, blood, leuks, WBCs and RBCs.  Urine culture was sent, no growth was noted.  Her creatinine was 2.76 upon presentation with further decline to 4.1 despite IV volume expansion. Nephrology is consulted for acute kidney injury.    Etiology of acute kidney injury initially was elusive.  Blood pressures were not low but rather high upon presentation.  No contrast exposure. She denied anti-inflammatory use.  She reports essentially 1 episode of nausea with emesis prior to presentation. She is prescribed spironolactone which was appropriately held.  Blood and urine cultures were without growth. Given the hematuria/proteinuria on her urinalysis and worsening proteinuria having a urine  protein to creatinine ratio of 2.7 g, we sent a serologic workup. We also noted Hydralazine use which we held. She has a reduced C3.  C4 is normal. No urine eosinophils present. DYAN is now positive with a titer of 1:2560 along with a positive ds DNA. Antihistone antibody is pending. P-ANCA is also positive (titer greater than 1:1280), MPO+ ~ consistent with microscopic polyangiitis.  In addition to her current findings she has elevated serum free kappa and lambda light chains with an M protein of 0.55 g/dL. But given the positive ANCA and DYAN this would point toward hydralazine.     The biopsy did not reveal a proliferative GN despite the antibody findings.  The hydralazine is likely led to the antibodies but there will be no need to consider immune suppression.  I sat with her, she feels that dialysis would not be prudent.  I spoke with her daughters Heather and Martha yesterday.  They have agreed on a conversation with hospice.  I will not order labs, I will see where we stand later today.      Principal Problem:    Colitis  Acute kidney injury  +ANCA   Hypertension  Proteinuria  Hyponatremia  Hypervolemia     I spent 35 minutes in the professional and overall care of this patient.      Adam Leonard MD

## 2024-11-22 NOTE — PROGRESS NOTES
Physical Therapy                 Therapy Communication Note    Patient Name: Carolann Cartagena  MRN: [id number]  Department: [facility]   Room: [address] [place] [address]  Today's Date: 11/22/2024     Discipline: Physical Therapy    Missed Visit Reason: Missed Visit Reason: Other (Comment) (Pt currently in a hospice meeting)    Missed Time: Attempt    Comment:

## 2024-11-23 LAB
LAB AP ASR DISCLAIMER: NORMAL
LABORATORY COMMENT REPORT: NORMAL
PATH REPORT.COMMENTS IMP SPEC: NORMAL
PATH REPORT.FINAL DX SPEC: NORMAL
PATH REPORT.GROSS SPEC: NORMAL
PATH REPORT.MICROSCOPIC SPEC OTHER STN: NORMAL
PATH REPORT.RELEVANT HX SPEC: NORMAL
PATH REPORT.TOTAL CANCER: NORMAL

## 2024-11-23 NOTE — DISCHARGE SUMMARY
"Discharge Diagnosis  OLGA LIDIA on CKD    Issues Requiring Follow-Up  hospice    Discharge Meds     Medication List      START taking these medications     BD Insulin Syringe Ultra-Fine 0.3 mL 31 gauge x 5/16\" syringe; Generic   drug: insulin syringe-needle U-100; USE AS INSTRUCTED   NIFEdipine ER 90 mg 24 hr tablet; Commonly known as: Adalat CC; Take 1   tablet (90 mg) by mouth once daily in the morning. Take before meals. Do   not crush, chew, or split.     CONTINUE taking these medications     atorvastatin 10 mg tablet; Commonly known as: Lipitor; Take 1 tablet (10   mg) by mouth once daily.   blood-glucose meter misc; Use to test glucose 3 times daily   brimonidine 0.2 % ophthalmic solution; Commonly known as: AlphaGAN   carvedilol 25 mg tablet; Commonly known as: Coreg; Take 1 tablet (25 mg)   by mouth 2 times daily (morning and late afternoon).   lancets 30 gauge misc; Commonly known as: OneTouch Delica Plus Lancet;   Use to test glucose 3 times daily   pen needle, diabetic 31 gauge x 5/16\" needle; Use to inject 1-4 times   daily as directed.   sodium bicarbonate 650 mg tablet; Take 1 tablet (650 mg) by mouth 2   times a day.   timolol 0.5 % ophthalmic solution; Commonly known as: Timoptic     STOP taking these medications     amLODIPine 5 mg tablet; Commonly known as: Norvasc   aspirin 81 mg EC tablet   HumuLIN N NPH U-100 Insulin 100 unit/mL injection; Generic drug: insulin   NPH (Isophane)   hydrALAZINE 100 mg tablet; Commonly known as: Apresoline   insulin NPH (Isophane) 100 unit/mL (3 mL) injection; Commonly known as:   HumuLIN N,NovoLIN N   latanoprost 0.005 % ophthalmic solution; Commonly known as: Xalatan   OneTouch Ultra Test strip; Generic drug: blood sugar diagnostic   sennosides-docusate sodium 8.6-50 mg tablet; Commonly known as:   Steph-Colace   spironolactone 25 mg tablet; Commonly known as: Aldactone       Test Results Pending At Discharge  Pending Labs       Order Current Status    Surgical Pathology " Exam In process            Hospital Course   Patient is a very pleasant 89-year-old female, resenting to the hospital initially due to elevated blood pressures also had CKD underlying.  But had acute kidney injury on top of CKD which was progressing towards hemodialysis patient did not wish to proceed with hemodialysis.  Therefore patient transition to hospice and was discharged.    Pertinent Physical Exam At Time of Discharge  Physical Exam    Outpatient Follow-Up  No future appointments.      Julinane Elise MD

## 2024-11-25 ENCOUNTER — NURSING HOME VISIT (OUTPATIENT)
Dept: POST ACUTE CARE | Facility: EXTERNAL LOCATION | Age: 89
End: 2024-11-25
Payer: MEDICARE

## 2024-11-25 DIAGNOSIS — E78.5 HYPERLIPIDEMIA, UNSPECIFIED HYPERLIPIDEMIA TYPE: Chronic | ICD-10-CM

## 2024-11-25 DIAGNOSIS — I50.32 CHRONIC DIASTOLIC CONGESTIVE HEART FAILURE: Chronic | ICD-10-CM

## 2024-11-25 DIAGNOSIS — I10 ESSENTIAL HYPERTENSION: Chronic | ICD-10-CM

## 2024-11-25 DIAGNOSIS — R62.7 FAILURE TO THRIVE IN ADULT: Primary | ICD-10-CM

## 2024-11-25 DIAGNOSIS — N18.9 CHRONIC KIDNEY DISEASE, UNSPECIFIED CKD STAGE: ICD-10-CM

## 2024-11-25 PROCEDURE — 99305 1ST NF CARE MODERATE MDM 35: CPT | Performed by: INTERNAL MEDICINE

## 2024-11-25 NOTE — PROGRESS NOTES
HISTORY & PHYSICAL    Subjective   Chief complaint: Carolann Cartagena is a 89 y.o. female who is being seen and evaluated for multiple medical problems.  Patient presents for admission to nursing facility.    HPI:  HPI  Patient is a 89-year-old female presenting for admission to nursing facility following a hospitalization.  Patient had presented to the hospital due to elevated blood pressures with CKD.  Patient noted on labs to have OLGA LIDIA and recommendations for patient to begin hemodialysis.  Patient did not wish to proceed with hemodialysis and plan of care discussed and patient transition to hospice.  Patient is admitted to facility under hospice benefit.  Patient seen and examined at the bedside, appears to be in no acute distress.  Denies chest pain or shortness of breath.  Appears comfortable.  Past Medical History:   Diagnosis Date    Anemia due to chronic kidney disease 01/22/2024    Chronic diastolic congestive heart failure 10/02/2023    Chronic hyponatremia 10/02/2023    Chronic kidney disease, stage 3, mod decreased GFR (Multi) 09/18/2018    Diabetic nephropathy associated with type 2 diabetes mellitus (Multi) 12/18/2018    Essential hypertension 05/23/2023    Hyperlipidemia 05/23/2023    Pure hypercholesterolemia 10/02/2015       Past Surgical History:   Procedure Laterality Date    OTHER SURGICAL HISTORY  02/13/2020    Hysterectomy       Family History   Problem Relation Name Age of Onset    No Known Problems Mother      Bone cancer Sister      Lung cancer Daughter         Social History     Socioeconomic History    Marital status:    Tobacco Use    Smoking status: Never    Smokeless tobacco: Never   Vaping Use    Vaping status: Never Used   Substance and Sexual Activity    Alcohol use: Not Currently    Drug use: Not Currently     Social Drivers of Health     Financial Resource Strain: Low Risk  (11/7/2024)    Overall Financial Resource Strain (CARDIA)     Difficulty of Paying Living Expenses: Not  hard at all   Food Insecurity: No Food Insecurity (11/6/2024)    Hunger Vital Sign     Worried About Running Out of Food in the Last Year: Never true     Ran Out of Food in the Last Year: Never true   Transportation Needs: No Transportation Needs (11/7/2024)    PRAPARE - Transportation     Lack of Transportation (Medical): No     Lack of Transportation (Non-Medical): No   Physical Activity: Inactive (10/13/2023)    Exercise Vital Sign     Days of Exercise per Week: 0 days     Minutes of Exercise per Session: 0 min   Stress: No Stress Concern Present (10/13/2023)    [employer] - Occupational Stress Questionnaire     Feeling of Stress : Only a little   Social Connections: Feeling Socially Integrated (11/15/2023)    OASIS : Social Isolation     Frequency of experiencing loneliness or isolation: Never   Recent Concern: Social Connections - Moderately Isolated (10/13/2023)    Social Connection and Isolation Panel [NHANES]     Frequency of Communication with Friends and Family: More than three times a week     Frequency of Social Gatherings with Friends and Family: More than three times a week     Attends Restorationist Services: More than 4 times per year     Active Member of Clubs or Organizations: No     Attends Club or Organization Meetings: Never     Marital Status:    Intimate Partner Violence: Not At Risk (11/6/2024)    Humiliation, Afraid, Rape, and Kick questionnaire     Fear of Current or Ex-Partner: No     Emotionally Abused: No     Physically Abused: No     Sexually Abused: No   Housing Stability: Low Risk  (11/7/2024)    Housing Stability Vital Sign     Unable to Pay for Housing in the Last Year: No     Number of Times Moved in the Last Year: 0     Homeless in the Last Year: No       Vital signs: 157/60, 97.6, 58, 18, 96%    Objective   Physical Exam  Constitutional:       General: She is not in acute distress.  Eyes:      Extraocular Movements: Extraocular movements  intact.   Cardiovascular:      Rate and Rhythm: Normal rate and regular rhythm.   Pulmonary:      Effort: Pulmonary effort is normal.      Breath sounds: Normal breath sounds.   Abdominal:      General: Bowel sounds are normal.      Palpations: Abdomen is soft.   Musculoskeletal:      Cervical back: Neck supple.      Right lower leg: No edema.      Left lower leg: No edema.   Neurological:      Mental Status: She is alert.   Psychiatric:         Mood and Affect: Mood normal.         Behavior: Behavior is cooperative.         Assessment/Plan   Problem List Items Addressed This Visit       Essential hypertension (Chronic)     Monitor blood pressure  Carvedilol  Nifedipine         Hyperlipidemia (Chronic)     Continue statin         Chronic diastolic congestive heart failure (Chronic)     Monitor for shortness of breath         Failure to thrive in adult - Primary     Hospice for comfort care measures         Chronic kidney disease     Requiring HD, patient refusal, transition to hospice for comfort care          Hospital records reviewed  Medications, treatments, and labs reviewed  Continue medications and treatments as listed in EMR  Discussed with nursing and therapy      Scribe Attestation  Lilibeth BUTTS Scribe   attest that this documentation has been prepared under the direction and in the presence of Josh Rosa MD    Provider Attestation - Scribe documentation  All medical record entries made by the Scribe were at my direction and personally dictated by me. I have reviewed the chart and agree that the record accurately reflects my personal performance of the history, physical exam, discussion and plan.   Josh Rosa MD

## 2024-11-25 NOTE — LETTER
Patient: Carolann Cartagena  : 3/26/1931    Encounter Date: 2024    HISTORY & PHYSICAL    Subjective  Chief complaint: Carolann Cartagena is a 89 y.o. female who is being seen and evaluated for multiple medical problems.  Patient presents for admission to nursing facility.    HPI:  HPI  Patient is a 89-year-old female presenting for admission to nursing facility following a hospitalization.  Patient had presented to the hospital due to elevated blood pressures with CKD.  Patient noted on labs to have OLGA LIDIA and recommendations for patient to begin hemodialysis.  Patient did not wish to proceed with hemodialysis and plan of care discussed and patient transition to hospice.  Patient is admitted to facility under hospice benefit.  Patient seen and examined at the bedside, appears to be in no acute distress.  Denies chest pain or shortness of breath.  Appears comfortable.  Past Medical History:   Diagnosis Date   • Anemia due to chronic kidney disease 2024   • Chronic diastolic congestive heart failure 10/02/2023   • Chronic hyponatremia 10/02/2023   • Chronic kidney disease, stage 3, mod decreased GFR (Multi) 2018   • Diabetic nephropathy associated with type 2 diabetes mellitus (Multi) 2018   • Essential hypertension 2023   • Hyperlipidemia 2023   • Pure hypercholesterolemia 10/02/2015       Past Surgical History:   Procedure Laterality Date   • OTHER SURGICAL HISTORY  2020    Hysterectomy       Family History   Problem Relation Name Age of Onset   • No Known Problems Mother     • Bone cancer Sister     • Lung cancer Daughter         Social History     Socioeconomic History   • Marital status:    Tobacco Use   • Smoking status: Never   • Smokeless tobacco: Never   Vaping Use   • Vaping status: Never Used   Substance and Sexual Activity   • Alcohol use: Not Currently   • Drug use: Not Currently     Social Drivers of Health     Financial Resource Strain: Low Risk  (2024)     Overall Financial Resource Strain (CARDIA)    • Difficulty of Paying Living Expenses: Not hard at all   Food Insecurity: No Food Insecurity (11/6/2024)    Hunger Vital Sign    • Worried About Running Out of Food in the Last Year: Never true    • Ran Out of Food in the Last Year: Never true   Transportation Needs: No Transportation Needs (11/7/2024)    PRAPARE - Transportation    • Lack of Transportation (Medical): No    • Lack of Transportation (Non-Medical): No   Physical Activity: Inactive (10/13/2023)    Exercise Vital Sign    • Days of Exercise per Week: 0 days    • Minutes of Exercise per Session: 0 min   Stress: No Stress Concern Present (10/13/2023)    [employer] - Occupational Stress Questionnaire    • Feeling of Stress : Only a little   Social Connections: Feeling Socially Integrated (11/15/2023)    OASIS : Social Isolation    • Frequency of experiencing loneliness or isolation: Never   Recent Concern: Social Connections - Moderately Isolated (10/13/2023)    Social Connection and Isolation Panel [NHANES]    • Frequency of Communication with Friends and Family: More than three times a week    • Frequency of Social Gatherings with Friends and Family: More than three times a week    • Attends Yarsani Services: More than 4 times per year    • Active Member of Clubs or Organizations: No    • Attends Club or Organization Meetings: Never    • Marital Status:    Intimate Partner Violence: Not At Risk (11/6/2024)    Humiliation, Afraid, Rape, and Kick questionnaire    • Fear of Current or Ex-Partner: No    • Emotionally Abused: No    • Physically Abused: No    • Sexually Abused: No   Housing Stability: Low Risk  (11/7/2024)    Housing Stability Vital Sign    • Unable to Pay for Housing in the Last Year: No    • Number of Times Moved in the Last Year: 0    • Homeless in the Last Year: No       Vital signs: 157/60, 97.6, 58, 18, 96%    Objective  Physical  Exam  Constitutional:       General: She is not in acute distress.  Eyes:      Extraocular Movements: Extraocular movements intact.   Cardiovascular:      Rate and Rhythm: Normal rate and regular rhythm.   Pulmonary:      Effort: Pulmonary effort is normal.      Breath sounds: Normal breath sounds.   Abdominal:      General: Bowel sounds are normal.      Palpations: Abdomen is soft.   Musculoskeletal:      Cervical back: Neck supple.      Right lower leg: No edema.      Left lower leg: No edema.   Neurological:      Mental Status: She is alert.   Psychiatric:         Mood and Affect: Mood normal.         Behavior: Behavior is cooperative.         Assessment/Plan  Problem List Items Addressed This Visit       Essential hypertension (Chronic)     Monitor blood pressure  Carvedilol  Nifedipine         Hyperlipidemia (Chronic)     Continue statin         Chronic diastolic congestive heart failure (Chronic)     Monitor for shortness of breath         Failure to thrive in adult - Primary     Hospice for comfort care measures         Chronic kidney disease     Requiring HD, patient refusal, transition to hospice for comfort care          Hospital records reviewed  Medications, treatments, and labs reviewed  Continue medications and treatments as listed in EMR  Discussed with nursing and therapy      Scribe Attestation  ILilibeth Scribe   attest that this documentation has been prepared under the direction and in the presence of Josh Rosa MD    Provider Attestation - Scribe documentation  All medical record entries made by the Scribe were at my direction and personally dictated by me. I have reviewed the chart and agree that the record accurately reflects my personal performance of the history, physical exam, discussion and plan.   Josh Rosa MD      Electronically Signed By: Josh Rosa MD   11/26/24 11:52 AM

## 2024-11-26 ENCOUNTER — NURSING HOME VISIT (OUTPATIENT)
Dept: POST ACUTE CARE | Facility: EXTERNAL LOCATION | Age: 89
End: 2024-11-26
Payer: MEDICARE

## 2024-11-26 DIAGNOSIS — N18.9 CHRONIC KIDNEY DISEASE, UNSPECIFIED CKD STAGE: ICD-10-CM

## 2024-11-26 DIAGNOSIS — H40.1131 PRIMARY OPEN ANGLE GLAUCOMA OF BOTH EYES, MILD STAGE: ICD-10-CM

## 2024-11-26 DIAGNOSIS — I10 ESSENTIAL HYPERTENSION: Chronic | ICD-10-CM

## 2024-11-26 DIAGNOSIS — Z51.5 HOSPICE CARE: Primary | ICD-10-CM

## 2024-11-26 PROCEDURE — 99309 SBSQ NF CARE MODERATE MDM 30: CPT | Performed by: NURSE PRACTITIONER

## 2024-11-26 NOTE — PROGRESS NOTES
PROGRESS NOTE    Subjective   Chief complaint: Carolann Cartagena is a 89 y.o. female who is a long term care patient being seen and evaluated for nausea.     HPI: Is resting in bed. Reports nausea. Nursing reports that the nausea has been a chronic complaint - has order for zofran.  Reviewed medications.    Discussed her needs with the hospice care manager.   Is taking fluids slowly.    Is visiting with a friend.   Reports that she is comfortable in bed.   HPI      Objective   Vital signs: 119/68-83-18-98.1     Physical Exam  Vitals and nursing note reviewed.   Constitutional:       General: She is not in acute distress.     Appearance: She is well-groomed and overweight.   Eyes:      Extraocular Movements: Extraocular movements intact.   Cardiovascular:      Rate and Rhythm: Normal rate and regular rhythm.   Pulmonary:      Effort: Pulmonary effort is normal.      Breath sounds: Normal breath sounds.      Comments: Lungs clear   Abdominal:      General: Bowel sounds are normal.      Palpations: Abdomen is soft.      Comments: Soft   BS active   No tenderness    Musculoskeletal:      Cervical back: Neck supple.      Right lower leg: No edema.      Left lower leg: No edema.   Neurological:      Mental Status: She is alert.   Psychiatric:         Mood and Affect: Mood normal.         Behavior: Behavior is cooperative.         Assessment/Plan   Problem List Items Addressed This Visit       Essential hypertension (Chronic)     Monitor blood pressure  Carvedilol  Nifedipine         Primary open angle glaucoma of both eyes, mild stage     Timolol gtts   Brimonidine gtts          Hospice care - Primary     Hospice care following   Will coordinate all care needs with hospice team   Comfort care meds available   Has chronic nausea- zofran  HX adult failure to thrive   Encourage fluids    Boost supplements   Had been recommended hemodialysis - refused tX - admitted hospice care   Supportive care          Chronic kidney disease      Requiring HD, patient refusal, transition to hospice for comfort care          Medications, treatments, and labs reviewed  Continue medications and treatments as listed in EMR    Yamile Horta, APRN-CNP

## 2024-11-26 NOTE — LETTER
Patient: Carolann Cartagena  : 3/26/1931    Encounter Date: 2024    PROGRESS NOTE    Subjective  Chief complaint: Carolann Cartagena is a 89 y.o. female who is a long term care patient being seen and evaluated for nausea.     HPI: Is resting in bed. Reports nausea. Nursing reports that the nausea has been a chronic complaint - has order for zofran.  Reviewed medications.    Discussed her needs with the hospice care manager.   Is taking fluids slowly.    Is visiting with a friend.   Reports that she is comfortable in bed.   HPI      Objective  Vital signs: 119/68-83-18-98.1     Physical Exam  Vitals and nursing note reviewed.   Constitutional:       General: She is not in acute distress.     Appearance: She is well-groomed and overweight.   Eyes:      Extraocular Movements: Extraocular movements intact.   Cardiovascular:      Rate and Rhythm: Normal rate and regular rhythm.   Pulmonary:      Effort: Pulmonary effort is normal.      Breath sounds: Normal breath sounds.      Comments: Lungs clear   Abdominal:      General: Bowel sounds are normal.      Palpations: Abdomen is soft.      Comments: Soft   BS active   No tenderness    Musculoskeletal:      Cervical back: Neck supple.      Right lower leg: No edema.      Left lower leg: No edema.   Neurological:      Mental Status: She is alert.   Psychiatric:         Mood and Affect: Mood normal.         Behavior: Behavior is cooperative.         Assessment/Plan  Problem List Items Addressed This Visit       Essential hypertension (Chronic)     Monitor blood pressure  Carvedilol  Nifedipine         Primary open angle glaucoma of both eyes, mild stage     Timolol gtts   Brimonidine gtts          Hospice care - Primary     Hospice care following   Will coordinate all care needs with hospice team   Comfort care meds available   Has chronic nausea- zofran  HX adult failure to thrive   Encourage fluids    Boost supplements   Had been recommended hemodialysis - refused tX -  admitted hospice care   Supportive care          Chronic kidney disease     Requiring HD, patient refusal, transition to hospice for comfort care          Medications, treatments, and labs reviewed  Continue medications and treatments as listed in EMR    ERICA Camargo      Electronically Signed By: ERICA Camargo   11/28/24  7:34 PM

## 2024-11-27 ENCOUNTER — NURSING HOME VISIT (OUTPATIENT)
Dept: POST ACUTE CARE | Facility: EXTERNAL LOCATION | Age: 89
End: 2024-11-27
Payer: MEDICARE

## 2024-11-27 DIAGNOSIS — R60.9 EDEMA, UNSPECIFIED TYPE: ICD-10-CM

## 2024-11-27 DIAGNOSIS — R62.7 FAILURE TO THRIVE IN ADULT: Primary | ICD-10-CM

## 2024-11-27 DIAGNOSIS — I10 ESSENTIAL HYPERTENSION: Chronic | ICD-10-CM

## 2024-11-27 PROCEDURE — 99308 SBSQ NF CARE LOW MDM 20: CPT | Performed by: INTERNAL MEDICINE

## 2024-11-27 NOTE — LETTER
Patient: Carolann Cartagena  : 3/26/1931    Encounter Date: 2024    PROGRESS NOTE    Subjective  Chief complaint: Carolann Cartagena is a 89 y.o. female who is a long term care patient being seen and evaluated for followup    HPI:  HPI  Patient is on hospice for comfort care measures.  Patient has noted with lower extremity edema and right breast edema.  Orders placed to elevate legs as able.  Continue to monitor.    Objective  Vital signs: 157/60, 97.6, 58, 18, 96%    Physical Exam  Constitutional:       General: She is not in acute distress.  Eyes:      Extraocular Movements: Extraocular movements intact.   Cardiovascular:      Rate and Rhythm: Normal rate and regular rhythm.   Pulmonary:      Effort: Pulmonary effort is normal.      Breath sounds: Normal breath sounds.   Abdominal:      General: Bowel sounds are normal.      Palpations: Abdomen is soft.   Musculoskeletal:      Cervical back: Neck supple.      Right lower leg: No edema.      Left lower leg: No edema.   Neurological:      Mental Status: She is alert.   Psychiatric:         Mood and Affect: Mood normal.         Behavior: Behavior is cooperative.         Assessment/Plan  Problem List Items Addressed This Visit       Essential hypertension (Chronic)     Monitor blood pressure  Carvedilol  Nifedipine         Failure to thrive in adult - Primary     Hospice for comfort care measures         Edema     Monitor  Elevate legs as able          Medications, treatments, and labs reviewed  Continue medications and treatments as listed in EMR    Scribe Attestation  I, Flakito Bonilla   attest that this documentation has been prepared under the direction and in the presence of Josh Rosa MD    Provider Attestation - Scribe documentation  All medical record entries made by the Scribe were at my direction and personally dictated by me. I have reviewed the chart and agree that the record accurately reflects my personal performance of the history, physical  exam, discussion and plan.   Josh Rosa MD            Electronically Signed By: Josh Rosa MD   12/1/24  3:38 PM

## 2024-11-27 NOTE — PROGRESS NOTES
PROGRESS NOTE    Subjective   Chief complaint: Carolann Cartagena is a 89 y.o. female who is a long term care patient being seen and evaluated for followup    HPI:  HPI  Patient is on hospice for comfort care measures.  Patient has noted with lower extremity edema and right breast edema.  Orders placed to elevate legs as able.  Continue to monitor.    Objective   Vital signs: 157/60, 97.6, 58, 18, 96%    Physical Exam  Constitutional:       General: She is not in acute distress.  Eyes:      Extraocular Movements: Extraocular movements intact.   Cardiovascular:      Rate and Rhythm: Normal rate and regular rhythm.   Pulmonary:      Effort: Pulmonary effort is normal.      Breath sounds: Normal breath sounds.   Abdominal:      General: Bowel sounds are normal.      Palpations: Abdomen is soft.   Musculoskeletal:      Cervical back: Neck supple.      Right lower leg: No edema.      Left lower leg: No edema.   Neurological:      Mental Status: She is alert.   Psychiatric:         Mood and Affect: Mood normal.         Behavior: Behavior is cooperative.         Assessment/Plan   Problem List Items Addressed This Visit       Essential hypertension (Chronic)     Monitor blood pressure  Carvedilol  Nifedipine         Failure to thrive in adult - Primary     Hospice for comfort care measures         Edema     Monitor  Elevate legs as able          Medications, treatments, and labs reviewed  Continue medications and treatments as listed in EMR    Scribe Attestation  I, Flakito Bonilla   attest that this documentation has been prepared under the direction and in the presence of Josh Rosa MD    Provider Attestation - Scribe documentation  All medical record entries made by the Scribe were at my direction and personally dictated by me. I have reviewed the chart and agree that the record accurately reflects my personal performance of the history, physical exam, discussion and plan.   Josh Rosa MD

## 2024-11-28 PROBLEM — E78.5 HYPERLIPIDEMIA: Chronic | Status: RESOLVED | Noted: 2023-05-23 | Resolved: 2024-11-28

## 2024-11-28 PROBLEM — I16.0 HYPERTENSIVE URGENCY: Status: RESOLVED | Noted: 2024-02-01 | Resolved: 2024-11-28

## 2024-11-28 PROBLEM — E87.1 HYPONATREMIA: Status: RESOLVED | Noted: 2024-02-02 | Resolved: 2024-11-28

## 2024-11-28 PROBLEM — R60.9 EDEMA: Status: RESOLVED | Noted: 2024-11-27 | Resolved: 2024-11-28

## 2024-11-28 PROBLEM — H43.819 POSTERIOR VITREOUS DETACHMENT: Status: RESOLVED | Noted: 2023-04-12 | Resolved: 2024-11-28

## 2024-11-28 PROBLEM — H35.373 EPIRETINAL MEMBRANE (ERM) OF BOTH EYES: Status: RESOLVED | Noted: 2023-04-12 | Resolved: 2024-11-28

## 2024-11-28 PROBLEM — I95.89 SECONDARY HYPOTENSION: Status: RESOLVED | Noted: 2024-05-15 | Resolved: 2024-11-28

## 2024-11-28 PROBLEM — H02.409 PTOSIS: Status: RESOLVED | Noted: 2023-04-12 | Resolved: 2024-11-28

## 2024-11-28 PROBLEM — N18.4 STAGE 4 CHRONIC KIDNEY DISEASE (MULTI): Status: RESOLVED | Noted: 2018-09-18 | Resolved: 2024-11-28

## 2024-11-28 PROBLEM — R42 VERTIGO: Status: RESOLVED | Noted: 2024-05-06 | Resolved: 2024-11-28

## 2024-11-28 PROBLEM — E66.812 OBESITY, CLASS II, BMI 35-39.9: Status: RESOLVED | Noted: 2019-08-27 | Resolved: 2024-11-28

## 2024-11-28 PROBLEM — Z51.5 HOSPICE CARE: Status: ACTIVE | Noted: 2024-11-25

## 2024-11-28 PROBLEM — Z00.00 ENCOUNTER FOR ANNUAL WELLNESS VISIT (AWV) IN MEDICARE PATIENT: Status: RESOLVED | Noted: 2024-04-30 | Resolved: 2024-11-28

## 2024-11-28 PROBLEM — E16.2 HYPOGLYCEMIA: Status: RESOLVED | Noted: 2024-06-04 | Resolved: 2024-11-28

## 2024-11-28 PROBLEM — M19.90 OA (OSTEOARTHRITIS): Status: RESOLVED | Noted: 2023-05-25 | Resolved: 2024-11-28

## 2024-11-29 NOTE — ASSESSMENT & PLAN NOTE
Hospice care following   Will coordinate all care needs with hospice team   Comfort care meds available   Has chronic nausea- zofran  HX adult failure to thrive   Encourage fluids    Boost supplements   Had been recommended hemodialysis - refused tX - admitted hospice care   Supportive care